# Patient Record
Sex: MALE | Race: BLACK OR AFRICAN AMERICAN | Employment: OTHER | URBAN - METROPOLITAN AREA
[De-identification: names, ages, dates, MRNs, and addresses within clinical notes are randomized per-mention and may not be internally consistent; named-entity substitution may affect disease eponyms.]

---

## 2017-01-05 ENCOUNTER — HOSPITAL ENCOUNTER (OUTPATIENT)
Dept: SLEEP MEDICINE | Age: 62
Discharge: HOME | End: 2017-01-05
Payer: COMMERCIAL

## 2017-01-05 PROCEDURE — 95811 POLYSOM 6/>YRS CPAP 4/> PARM: CPT

## 2017-01-12 PROBLEM — G47.33 SEVERE OBSTRUCTIVE SLEEP APNEA: Status: ACTIVE | Noted: 2017-01-12

## 2017-01-18 ENCOUNTER — HOSPITAL ENCOUNTER (OUTPATIENT)
Dept: LAB | Age: 62
Discharge: HOME | End: 2017-01-18

## 2017-01-18 PROCEDURE — 88305 TISSUE EXAM BY PATHOLOGIST: CPT | Performed by: INTERNAL MEDICINE

## 2017-05-30 ENCOUNTER — HOSPITAL ENCOUNTER (OUTPATIENT)
Dept: GENERAL RADIOLOGY | Age: 62
Discharge: HOME | End: 2017-05-30
Attending: FAMILY MEDICINE
Payer: COMMERCIAL

## 2017-05-30 DIAGNOSIS — M79.604 PAIN OF RIGHT LOWER EXTREMITY: ICD-10-CM

## 2017-05-30 PROCEDURE — 73552 X-RAY EXAM OF FEMUR 2/>: CPT

## 2017-05-31 NOTE — PROGRESS NOTES
X ray only showed mild osteoarthritis at the hip and knee. Try over the counter aleve twice a day for a couple of weeks to see if it helps with the inflammation. If that does not work then we can consider other treatment like amitriptyline for nerve issues.     Nikia Clifton MD

## 2018-01-23 PROBLEM — N40.1 BENIGN PROSTATIC HYPERPLASIA WITH NOCTURIA: Chronic | Status: ACTIVE | Noted: 2018-01-23

## 2018-01-23 PROBLEM — R35.1 BENIGN PROSTATIC HYPERPLASIA WITH NOCTURIA: Chronic | Status: ACTIVE | Noted: 2018-01-23

## 2018-01-30 VITALS
RESPIRATION RATE: 16 BRPM | DIASTOLIC BLOOD PRESSURE: 87 MMHG | OXYGEN SATURATION: 97 % | WEIGHT: 264 LBS | HEIGHT: 72 IN | BODY MASS INDEX: 35.76 KG/M2 | SYSTOLIC BLOOD PRESSURE: 169 MMHG | HEART RATE: 94 BPM | TEMPERATURE: 97.7 F

## 2018-01-30 PROCEDURE — 99283 EMERGENCY DEPT VISIT LOW MDM: CPT | Performed by: EMERGENCY MEDICINE

## 2018-01-31 ENCOUNTER — HOSPITAL ENCOUNTER (EMERGENCY)
Age: 63
Discharge: HOME | End: 2018-01-31
Attending: EMERGENCY MEDICINE
Payer: COMMERCIAL

## 2018-01-31 DIAGNOSIS — T78.40XA ACUTE ALLERGIC REACTION, INITIAL ENCOUNTER: Primary | ICD-10-CM

## 2018-01-31 PROCEDURE — 74011250637 HC RX REV CODE- 250/637: Performed by: EMERGENCY MEDICINE

## 2018-01-31 PROCEDURE — 74011636637 HC RX REV CODE- 636/637: Performed by: EMERGENCY MEDICINE

## 2018-01-31 RX ORDER — FAMOTIDINE 20 MG/1
20 TABLET, FILM COATED ORAL 2 TIMES DAILY
Qty: 20 TAB | Refills: 0 | Status: SHIPPED | OUTPATIENT
Start: 2018-01-31 | End: 2018-02-10

## 2018-01-31 RX ORDER — PREDNISONE 20 MG/1
60 TABLET ORAL
Status: COMPLETED | OUTPATIENT
Start: 2018-01-31 | End: 2018-01-31

## 2018-01-31 RX ORDER — FAMOTIDINE 20 MG/1
20 TABLET, FILM COATED ORAL
Status: COMPLETED | OUTPATIENT
Start: 2018-01-31 | End: 2018-01-31

## 2018-01-31 RX ORDER — PREDNISONE 20 MG/1
20 TABLET ORAL 2 TIMES DAILY
Qty: 10 TAB | Refills: 0 | Status: SHIPPED | OUTPATIENT
Start: 2018-01-31 | End: 2018-02-05

## 2018-01-31 RX ADMIN — FAMOTIDINE 20 MG: 20 TABLET ORAL at 00:14

## 2018-01-31 RX ADMIN — PREDNISONE 60 MG: 20 TABLET ORAL at 00:14

## 2018-01-31 NOTE — ED NOTES
I have reviewed discharge instructions with the patient. The patient verbalized understanding. Patient left ED via Discharge Method: ambulatory to Home with (spouse). Opportunity for questions and clarification provided. Patient given 2 scripts. To continue your aftercare when you leave the hospital, you may receive an automated call from our care team to check in on how you are doing. This is a free service and part of our promise to provide the best care and service to meet your aftercare needs.  If you have questions, or wish to unsubscribe from this service please call [phone]. Thank you for Choosing our [facility] Emergency Department.

## 2018-01-31 NOTE — ED PROVIDER NOTES
HPI Comments: After having a shrimp plate at [employer] this evening, the patient developed  Rash to the face and all over the body with significant pruritus. Pictures of the rash are consistent with urticaria. Patient is a 58 y.o. male presenting with allergic reaction. The history is provided by the patient and the spouse. Allergic Reaction    This is a new problem. The current episode started 1 to 2 hours ago. The problem has been gradually improving. Ingested substance: shrimp dinner. Pertinent negatives include no slurred speech, no nausea, no vomiting, no depression and no shortness of breath. Past Medical History:   Diagnosis Date    Back pain     Cervical radiculopathy     GERD (gastroesophageal reflux disease)     H/O seasonal allergies     Hyperlipidemia     Impotence     Insomnia     Lateral epicondylitis     Obesity     Sleep apnea        Past Surgical History:   Procedure Laterality Date    HX CIRCUMCISION      HX COLONOSCOPY  2017    Due in 2027    HX ORTHOPAEDIC      r wrist         Family History:   Problem Relation Age of Onset    Cancer Mother 80     pancreatic    Cancer Father 76     breast    No Known Problems Son     Hypertension Brother     Hypertension Brother     No Known Problems Daughter        Social History     Social History    Marital status:      Spouse name: N/A    Number of children: N/A    Years of education: N/A     Occupational History    Not on file. Social History Main Topics    Smoking status: Never Smoker    Smokeless tobacco: Never Used    Alcohol use 1.8 oz/week     3 Shots of liquor per week    Drug use: No    Sexual activity: Yes     Partners: Female     Other Topics Concern    Not on file     Social History Narrative         ALLERGIES: Review of patient's allergies indicates no known allergies. Review of Systems   Constitutional: Negative for chills and fever.    HENT: Negative for congestion, sore throat and trouble swallowing. Respiratory: Negative for shortness of breath and wheezing. Gastrointestinal: Negative for nausea and vomiting. Skin: Positive for rash. Psychiatric/Behavioral: Negative for depression. All other systems reviewed and are negative. Vitals:    01/30/18 2147   BP: 169/87   Pulse: 94   Resp: 16   Temp: 97.7 °F (36.5 °C)   SpO2: 97%   Weight: 119.7 kg (264 lb)   Height: 6' (1.829 m)            Physical Exam   Constitutional: He is oriented to person, place, and time. He appears well-developed and well-nourished. No distress. HENT:   Head: Normocephalic and atraumatic. Right Ear: Tympanic membrane and external ear normal.   Left Ear: Tympanic membrane and external ear normal.   Mouth/Throat: Oropharynx is clear and moist.   Eyes: Conjunctivae and EOM are normal. Pupils are equal, round, and reactive to light. Neck: Normal range of motion. Neck supple. No tracheal deviation present. Cardiovascular: Normal rate, regular rhythm, normal heart sounds and intact distal pulses. Exam reveals no gallop and no friction rub. No murmur heard. Pulmonary/Chest: Effort normal and breath sounds normal. No respiratory distress. He has no wheezes. Abdominal: Soft. Bowel sounds are normal. He exhibits no distension and no mass. There is no hepatosplenomegaly. There is no tenderness. There is no rebound and no guarding. Musculoskeletal: Normal range of motion. He exhibits no edema. Lymphadenopathy:     He has no cervical adenopathy. Neurological: He is alert and oriented to person, place, and time. He displays normal reflexes. No cranial nerve deficit. Skin: Skin is warm and dry. Rash (urticarial) noted. He is not diaphoretic. No erythema. Psychiatric: He has a normal mood and affect. Nursing note and vitals reviewed.        MDM  Number of Diagnoses or Management Options  Acute allergic reaction, initial encounter: new and does not require workup     Amount and/or Complexity of Data Reviewed  Review and summarize past medical records: yes    Risk of Complications, Morbidity, and/or Mortality  Presenting problems: low  Diagnostic procedures: minimal  Management options: low    Patient Progress  Patient progress: improved        ED Course       Procedures

## 2018-01-31 NOTE — LETTER
[address] [facility] EMERGENCY DEPT 
[facility] [address] [address] 
[phone] Work/School Note Date: 1/30/2018 To Whom It May concern: 
 
Chuck Kebede was seen and treated today in the emergency room by the following provider(s): 
Attending Provider: Zheng Morin MD.   
 
Chuck Kebede may return to work on 2-1-18. Sincerely, Felicia Pike RN

## 2018-01-31 NOTE — DISCHARGE INSTRUCTIONS
Allergic Reaction: Care Instructions  Your Care Instructions    An allergic reaction is an excessive response from your immune system to a medicine, chemical, food, insect bite, or other substance. A reaction can range from mild to life-threatening. Some people have a mild rash, hives, and itching or stomach cramps. In severe reactions, swelling of your tongue and throat can close up your airway so that you cannot breathe. Follow-up care is a key part of your treatment and safety. Be sure to make and go to all appointments, and call your doctor if you are having problems. It's also a good idea to know your test results and keep a list of the medicines you take. How can you care for yourself at home? · If you know what caused your allergic reaction, be sure to avoid it. Your allergy may become more severe each time you have a reaction. · Take an over-the-counter antihistamine, such as cetirizine (Zyrtec) or loratadine (Claritin), to treat mild symptoms. Read and follow directions on the label. Some antihistamines can make you feel sleepy. Do not give antihistamines to a child unless you have checked with your doctor first. Mild symptoms include sneezing or an itchy or runny nose; an itchy mouth; a few hives or mild itching; and mild nausea or stomach discomfort. · Do not scratch hives or a rash. Put a cold, moist towel on them or take cool baths to relieve itching. Put ice packs on hives, swelling, or insect stings for 10 to 15 minutes at a time. Put a thin cloth between the ice pack and your skin. Do not take hot baths or showers. They will make the itching worse. · Your doctor may prescribe a shot of epinephrine to carry with you in case you have a severe reaction. Learn how to give yourself the shot and keep it with you at all times. Make sure it is not . · Go to the emergency room every time you have a severe reaction, even if you have used your shot of epinephrine and are feeling better. Symptoms can come back after a shot. · Wear medical alert jewelry that lists your allergies. You can buy this at most drugstores. · If your child has a severe allergy, make sure that his or her teachers, babysitters, coaches, and other caregivers know about the allergy. They should have an epinephrine shot, know how and when to give it, and have a plan to take your child to the hospital.  When should you call for help? Give an epinephrine shot if:  ? · You think you are having a severe allergic reaction. ? · You have symptoms in more than one body area, such as mild nausea and an itchy mouth. ? After giving an epinephrine shot call 911, even if you feel better. ?Call 911 if:  ? · You have symptoms of a severe allergic reaction. These may include:  ¨ Sudden raised, red areas (hives) all over your body. ¨ Swelling of the throat, mouth, lips, or tongue. ¨ Trouble breathing. ¨ Passing out (losing consciousness). Or you may feel very lightheaded or suddenly feel weak, confused, or restless. ? · You have been given an epinephrine shot, even if you feel better. ?Call your doctor now or seek immediate medical care if:  ? · You have symptoms of an allergic reaction, such as:  ¨ A rash or hives (raised, red areas on the skin). ¨ Itching. ¨ Swelling. ¨ Belly pain, nausea, or vomiting. ? Watch closely for changes in your health, and be sure to contact your doctor if:  ? · You do not get better as expected. Where can you learn more? Go to http://jose-saul.info/. Enter A495 in the search box to learn more about \"Allergic Reaction: Care Instructions. \"  Current as of: September 29, 2016  Content Version: 11.4  © 3483-6763 [employer]. Care instructions adapted under license by [employer] (which disclaims liability or warranty for this information).  If you have questions about a medical condition or this instruction, always ask your healthcare professional. [employer] disclaims any warranty or liability for your use of this information.

## 2018-01-31 NOTE — ED TRIAGE NOTES
Pt states that he ate some shrimp tonight and felt like his mouth was swelling and has a generalized rash. Benadryl x 2 taken PTA.   States that he just started taking a statin last week

## 2018-01-31 NOTE — ED NOTES
Agree w/ triage note, pt reports eating shrimp carbonarra at [employer] which he has had in the past, reaction began as he was leaving the restaurant. Picture on spouses phone shows significant facial hives as well as hives to chest and arms. Currently the pt is w/o distress states that he feels much better since he took 50 mg benadryl, still has some hives to chest and few to arms. Discussed steroid and class II anti-histamine use w/ verbal understanding, will cont to monitor.

## 2018-09-06 PROBLEM — E66.01 SEVERE OBESITY (BMI 35.0-39.9): Status: ACTIVE | Noted: 2018-09-06

## 2018-11-26 ENCOUNTER — HOSPITAL ENCOUNTER (OUTPATIENT)
Dept: PHYSICAL THERAPY | Age: 63
Discharge: HOME | End: 2018-11-26
Attending: NURSE PRACTITIONER
Payer: COMMERCIAL

## 2018-11-26 DIAGNOSIS — M54.41 ACUTE RIGHT-SIDED LOW BACK PAIN WITH RIGHT-SIDED SCIATICA: ICD-10-CM

## 2018-11-26 PROCEDURE — 97161 PT EVAL LOW COMPLEX 20 MIN: CPT

## 2018-11-26 PROCEDURE — 97110 THERAPEUTIC EXERCISES: CPT

## 2018-11-26 NOTE — THERAPY EVALUATION
Balwinder Gonzalez  : 1955  Payor: [employer], [address] [place] [address] / Plan: [place] [employer] / Product Type: Commerical /    [address]  at [facility]  [address] , [address], [place] [address]  [phone]   [phone]         OUTPATIENT PHYSICAL THERAPY:Initial Assessment and Daily Note 2018    ICD-10: Treatment Diagnosis: Pain in right hip (M25.551); Low back pain (M54.5)  Precautions/Allergies:   Patient has no known allergies. MD Orders: PT eval and tx  MEDICAL/REFERRING DIAGNOSIS:  Acute right-sided low back pain with right-sided sciatica [M54.41]   DATE OF ONSET: 2 months ago  REFERRING PHYSICIAN: Jordan Ramirez NP  RETURN PHYSICIAN APPOINTMENT: none scheduled     INITIAL ASSESSMENT:  Mr. Judy Daniel presents to PT eval w/ c/o R sided hip and LB pain. He notes that his symptoms go all the way down to his R lateral calf. With evaluation, he displayed decreased ability to transfer from sit to stand, antalgic gait, decreased hip ROM, and decreased hip abduction strength. He will benefit from continued skilled PT services to improve his deficits listed below and to progress towards his PLOF. PROBLEM LIST (Impacting functional limitations):  1. Decreased Strength  2. Decreased ADL/Functional Activities  3. Decreased Transfer Abilities  4. Decreased Ambulation Ability/Technique  5. Decreased Balance  6. Increased Pain  7. Decreased Activity Tolerance  8. Decreased Flexibility/Joint Mobility  9. Decreased [place] with Home Exercise Program INTERVENTIONS PLANNED:  1. Balance Exercise  2. Cold  3. Electrical Stimulation  4. Heat  5. Home Exercise Program (HEP)  6. Manual Therapy  7. Range of Motion (ROM)  8. Therapeutic Exercise/Strengthening  9. Traction   TREATMENT PLAN:  Effective Dates: 2018 TO 2019 (60 days).  Frequency/Duration: 2 times a week for 60 Days  GOALS: (Goals have been discussed and agreed upon with patient.)  Short-Term Functional Goals: Time Frame: 3 weeks  1. Pt will be independent w/ HEP in order to improve outcomes and decrease pain. 2. Pt will report pain of 4/10 or less while performing ADLs in order to improve functional mobility. 3. Pt will report increase in sleep by 10% or more in order to return to PLOF. Discharge Goals: Time Frame: 5 weeks  1. Pt will have Oswestry score of 13 or less in order to report decreased pain and decreased functional impairments. 2. Pt will report pain of 2/10 or less while performing ADLs in order to improve functional mobility. 3. Pt will report sleep increase by 20% or more in order to return to PLOF. Rehabilitation Potential For Stated Goals: Good  Regarding Evangelina Walker's therapy, I certify that the treatment plan above will be carried out by a therapist or under their direction. Thank you for this referral,  Lluvia Guerrero, PT     Referring Physician Signature: Sigrid Fernández NP              Date                    The information in this section was collected on 11/26/18 (except where otherwise noted). HISTORY:   History of Present Injury/Illness (Reason for Referral):  Pt has L4-5 broad based disc protruction w/ mild L foraminal stensis. L5-S1 broad based disc protrusion paracentral to R lateral disc protrusion w/ stenosis noted. Pt reports pain has been going on for 2 months and is unsure how it started. No MRI or Xray for this bout. Had back pain prior which MRI results above were from. He did not have therapy for that bout. Had an injection in his hip a month ago which helped but it wasn't enough relief. Past Medical History/Comorbidities:   Mr. Jani Brooks  has a past medical history of Back pain, Cervical radiculopathy, GERD (gastroesophageal reflux disease), H/O seasonal allergies, Hyperlipidemia, Impotence, Insomnia, Lateral epicondylitis, Obesity, and Sleep apnea.   Mr. Jani Brooks  has a past surgical history that includes hx orthopaedic; hx circumcision; hx colonoscopy (2017); and hx wisdom teeth extraction. Social History/Living Environment:     Lives w/ spouse in 2 story home, w/ his bed/bath upstairs, he has walk in shower  Prior Level of Function/Work/Activity:  Works full time as , not active but would like to be  Dominant Side:         RIGHT  Current Medications:       Ambulatory/Rehab Services H2 Model Falls Risk Assessment    Risk Factors:       (1)  Gender [Male] Ability to Rise from Chair:       (1)  Pushes up, successful in one attempt    Falls Prevention Plan:       No modifications necessary   Total: (5 or greater = High Risk): 2    ©2010 [facility] . [facility] States Patent #[id number]. Federal Law prohibits the replication, distribution or use without written permission from [facility] [employer]          Current Outpatient Medications:     ibuprofen (MOTRIN) 800 mg tablet, Take 1 Tab by mouth every twelve (12) hours as needed for Pain., Disp: 60 Tab, Rfl: 1    cyclobenzaprine (FLEXERIL) 10 mg tablet, Take 1 Tab by mouth nightly., Disp: 30 Tab, Rfl: 0    predniSONE (DELTASONE) 20 mg tablet, Take 3 tabs PO x2 days, then take 2 tabs PO x2 days, then take 1 tab PO x2 days, then take 1/2 tab PO x2 days and STOP., Disp: 13 Tab, Rfl: 0    olmesartan-hydroCHLOROthiazide (BENICAR HCT) 20-12.5 mg per tablet, Take 1 Tab by mouth daily. , Disp: 90 Tab, Rfl: 1    lovastatin (MEVACOR) 10 mg tablet, Take 1 Tab by mouth nightly., Disp: 30 Tab, Rfl: 3    DISABLED PLACARD (DISABLED PLACARD) DMV, Handicap parking permit, Disp: 1 Each, Rfl: 0    amitriptyline (ELAVIL) 50 mg tablet, Take 1 Tab by mouth nightly., Disp: 30 Tab, Rfl: 3    tadalafil (CIALIS) 5 mg tablet, Take 1 Tab by mouth daily.  Indications: benign prostatic hyperplasia with lower urinary tract sx, Erectile Dysfunction, Disp: 30 Tab, Rfl: 5    CORTIFOAM 10 % (80 mg) rectal foam, INSERT 1 APPLICATOR INTO RECTUM QOD, Disp: , Rfl: 3    CETIRIZINE HCL (ZYRTEC PO), Take  by mouth., Disp: , Rfl:     multivitamin (ONE A DAY) tablet, Take 1 Tab by mouth daily. , Disp: , Rfl:     DOCOSAHEXANOIC ACID/EPA (FISH OIL PO), Take  by mouth., Disp: , Rfl:     VITAMIN A/VITAMIN D3 (NATURAL VITAMIN D PO), Take  by mouth., Disp: , Rfl:     NUCYNTA 75 mg tablet, 75 mg daily. , Disp: , Rfl: 0    VIMOVO 500-20 mg TbID, two (2) times a day., Disp: , Rfl:    Date Last Reviewed:  11/26/2018    Number of Personal Factors/Comorbidities that affect the Plan of Care: 1-2: MODERATE COMPLEXITY   EXAMINATION:   Observation/Orthostatic Postural Assessment:          Pt displayed antalgic gait and avoids sitting if possible   Palpation:          No tenderness to muscles at this time in glute or LB, pain over L3-4 area  Special Tests:           - tender over L3-S1   Functional Mobility:         Gait/Ambulation:  Antalgic        Transfers:  Uses hands unless prompted         Bed Mobility:  Independent   Balance:          Decreased in SLS on BLEs  Sensation:         Intact w/ light touch to BLEs    Joint/Muscle ROM Strength Updates   Hip flexion [facility]    Hip extension Decreased WFL    Hip abduction WFL 4-/5 R, 4/5 L    Knee flexion [facility]    Knee extension [facility]    DF [facility] WFL                     Body Structures Involved:  1. Nerves  2. Bones  3. Joints  4. Muscles  5. Ligaments Body Functions Affected:  1. Sensory/Pain  2. Neuromusculoskeletal  3. Movement Related Activities and Participation Affected:  1. General Tasks and Demands  2. Mobility  3. Self Care  4. Domestic Life  5. Interpersonal Interactions and Relationships  6. Community, Social and [place]   Number of elements (examined above) that affect the Plan of Care: 3: MODERATE COMPLEXITY   CLINICAL PRESENTATION:   Presentation: Stable and uncomplicated: LOW COMPLEXITY   CLINICAL DECISION MAKING:   Outcome Measure:    Tool Used: Modified Oswestry Low Back Pain Questionnaire  Score:  Initial: 17/50  Most Recent: X/50 (Date: -- )   Interpretation of Score: Each section is scored on a 0-5 scale, 5 representing the greatest disability. The scores of each section are added together for a total score of 50. Score 0 1-10 11-20 21-30 31-40 41-49 50   Modifier CH CI CJ CK CL CM CN     ? Mobility - Walking and Moving Around:     - CURRENT STATUS: CJ - 20%-39% impaired, limited or restricted    - GOAL STATUS: CJ - 20%-39% impaired, limited or restricted    - D/C STATUS:  ---------------To be determined---------------    Medical Necessity:   · Patient is expected to demonstrate progress in strength, range of motion, balance and coordination to increase independence with functional tasks. Reason for Services/Other Comments:  · Patient continues to demonstrate capacity to improve strength, ROM, balance, mobility which will increase independence. Use of outcome tool(s) and clinical judgement create a POC that gives a: Clear prediction of patient's progress: LOW COMPLEXITY            TREATMENT:   (In addition to Assessment/Re-Assessment sessions the following treatments were rendered)  Pre-treatment Symptoms/Complaints:  See above. Pain: Initial:     7/10 Post Session:  Much better, 0/10      THERAPEUTIC EXERCISE: (15 minutes):  Exercises per grid below to improve mobility, strength and balance. Required minimal verbal and tactile cues to promote proper body alignment, promote proper body posture and promote proper body mechanics. Progressed resistance, range, repetitions and complexity of movement as indicated.    Date:  11/26/18 Date:   Date:     Activity/Exercise Parameters Parameters Parameters   Prone press up 5x     Lumbar extension standing 10x6 in //     Pirifomis stretch 1x on R too painful     Bridge 10x     Pelvic tilt 5x, pt unable to understand at this time     Cat camel 5x, pt unable to understand at this time             Treatment/Session Assessment:    · Response to Treatment:  Pt had pain dissipate w/ lumbar extension. He struggled w/ pelvic tilt and cat camel. · Compliance with Program/Exercises: Will assess as treatment progresses. · Recommendations/Intent for next treatment session: \"Next visit will focus on advancements to more challenging activities\". [employer]  Access Code: [email or web address]   URL: [email or web address]   Date: 11/26/2018   Prepared by: Regine Wright     Exercises   Supine Bridge - 10 reps - 2 sets - 5 hold - 1x daily - 3x weekly   Standing Lumbar Extension with Counter - 10 reps - 1 sets - 5 hold - 8x daily - 3x weekly    Variance from POC: none     Treatment time: 15  Regine Wright, PT

## 2018-11-29 ENCOUNTER — HOSPITAL ENCOUNTER (OUTPATIENT)
Dept: PHYSICAL THERAPY | Age: 63
Discharge: HOME | End: 2018-11-29
Payer: COMMERCIAL

## 2018-11-29 PROCEDURE — 97110 THERAPEUTIC EXERCISES: CPT

## 2018-11-29 NOTE — PROGRESS NOTES
Agustin Gonzalez  : 1955  Payor: Manual [employer] / Plan: [employer] / Product Type: Commerical /    [address]  at [facility]  [address] , [address], [place] [address]  [phone]   [phone]         OUTPATIENT PHYSICAL THERAPY:Daily Note 2018    ICD-10: Treatment Diagnosis: Pain in right hip (M25.551); Low back pain (M54.5)  Precautions/Allergies:   Patient has no known allergies. MD Orders: PT eval and tx  MEDICAL/REFERRING DIAGNOSIS:  Lumbago with sciatica, right side [M54.41]   DATE OF ONSET: 2 months ago  REFERRING PHYSICIAN: Shelley Martínez NP  RETURN PHYSICIAN APPOINTMENT: none scheduled      ASSESSMENT:  Mr. Devera Brittle presents to PT eval w/ c/o R sided hip and LB pain. He notes that his symptoms go all the way down to his R lateral calf. With evaluation, he displayed decreased ability to transfer from sit to stand, antalgic gait, decreased hip ROM, and decreased hip abduction strength. He will benefit from continued skilled PT services to improve his deficits listed below and to progress towards his PLOF. PROBLEM LIST (Impacting functional limitations):  1. Decreased Strength  2. Decreased ADL/Functional Activities  3. Decreased Transfer Abilities  4. Decreased Ambulation Ability/Technique  5. Decreased Balance  6. Increased Pain  7. Decreased Activity Tolerance  8. Decreased Flexibility/Joint Mobility  9. Decreased [place] with Home Exercise Program INTERVENTIONS PLANNED:  1. Balance Exercise  2. Cold  3. Electrical Stimulation  4. Heat  5. Home Exercise Program (HEP)  6. Manual Therapy  7. Range of Motion (ROM)  8. Therapeutic Exercise/Strengthening  9. Traction   TREATMENT PLAN:  Effective Dates: 2018 TO 2019 (60 days). Frequency/Duration: 2 times a week for 60 Days  GOALS: (Goals have been discussed and agreed upon with patient.)  Short-Term Functional Goals: Time Frame: 3 weeks  1.  Pt will be independent w/ HEP in order to improve outcomes and decrease pain. 2. Pt will report pain of 4/10 or less while performing ADLs in order to improve functional mobility. 3. Pt will report increase in sleep by 10% or more in order to return to PLOF. Discharge Goals: Time Frame: 5 weeks  1. Pt will have Oswestry score of 13 or less in order to report decreased pain and decreased functional impairments. 2. Pt will report pain of 2/10 or less while performing ADLs in order to improve functional mobility. 3. Pt will report sleep increase by 20% or more in order to return to PLOF. The information in this section was collected on 11/26/18 (except where otherwise noted). HISTORY:   History of Present Injury/Illness (Reason for Referral):  Pt has L4-5 broad based disc protruction w/ mild L foraminal stensis. L5-S1 broad based disc protrusion paracentral to R lateral disc protrusion w/ stenosis noted. Pt reports pain has been going on for 2 months and is unsure how it started. No MRI or Xray for this bout. Had back pain prior which MRI results above were from. He did not have therapy for that bout. Had an injection in his hip a month ago which helped but it wasn't enough relief. Past Medical History/Comorbidities:   Mr. Cory Leggett  has a past medical history of Back pain, Cervical radiculopathy, GERD (gastroesophageal reflux disease), H/O seasonal allergies, Hyperlipidemia, Impotence, Insomnia, Lateral epicondylitis, Obesity, and Sleep apnea. Mr. Cory Leggett  has a past surgical history that includes hx orthopaedic; hx circumcision; hx colonoscopy (2017); and hx wisdom teeth extraction.   Social History/Living Environment:     Lives w/ spouse in 2 story home, w/ his bed/bath upstairs, he has walk in shower  Prior Level of Function/Work/Activity:  Works full time as , not active but would like to be  Dominant Side:         RIGHT  Current Medications:       Ambulatory/Rehab Services H2 Model Falls Risk Assessment    Risk Factors:       (1)  Gender [Male] Ability to Rise from Chair:       (1)  Pushes up, successful in one attempt    Falls Prevention Plan:       No modifications necessary   Total: (5 or greater = High Risk): 2    ©2010 [facility] [employer]. All Rights Reserved. [facility] Patent #[id number]. Federal Law prohibits the replication, distribution or use without written permission from [facility] [employer]          Current Outpatient Medications:     ibuprofen (MOTRIN) 800 mg tablet, Take 1 Tab by mouth every twelve (12) hours as needed for Pain., Disp: 60 Tab, Rfl: 1    cyclobenzaprine (FLEXERIL) 10 mg tablet, Take 1 Tab by mouth nightly., Disp: 30 Tab, Rfl: 0    predniSONE (DELTASONE) 20 mg tablet, Take 3 tabs PO x2 days, then take 2 tabs PO x2 days, then take 1 tab PO x2 days, then take 1/2 tab PO x2 days and STOP., Disp: 13 Tab, Rfl: 0    olmesartan-hydroCHLOROthiazide (BENICAR HCT) 20-12.5 mg per tablet, Take 1 Tab by mouth daily. , Disp: 90 Tab, Rfl: 1    lovastatin (MEVACOR) 10 mg tablet, Take 1 Tab by mouth nightly., Disp: 30 Tab, Rfl: 3    DISABLED PLACARD (DISABLED PLACARD) DMV, Handicap parking permit, Disp: 1 Each, Rfl: 0    amitriptyline (ELAVIL) 50 mg tablet, Take 1 Tab by mouth nightly., Disp: 30 Tab, Rfl: 3    tadalafil (CIALIS) 5 mg tablet, Take 1 Tab by mouth daily. Indications: benign prostatic hyperplasia with lower urinary tract sx, Erectile Dysfunction, Disp: 30 Tab, Rfl: 5    CORTIFOAM 10 % (80 mg) rectal foam, INSERT 1 APPLICATOR INTO RECTUM QOD, Disp: , Rfl: 3    CETIRIZINE HCL (ZYRTEC PO), Take  by mouth., Disp: , Rfl:     multivitamin (ONE A DAY) tablet, Take 1 Tab by mouth daily. , Disp: , Rfl:     DOCOSAHEXANOIC ACID/EPA (FISH OIL PO), Take  by mouth., Disp: , Rfl:     VITAMIN A/VITAMIN D3 (NATURAL VITAMIN D PO), Take  by mouth., Disp: , Rfl:     NUCYNTA 75 mg tablet, 75 mg daily. , Disp: , Rfl: 0    VIMOVO 500-20 mg TbID, two (2) times a day., Disp: , Rfl:    Date Last Reviewed:  11/29/2018    EXAMINATION:   Observation/Orthostatic Postural Assessment:          Pt displayed antalgic gait and avoids sitting if possible   Palpation:          No tenderness to muscles at this time in glute or LB, pain over L3-4 area  Special Tests:           - tender over L3-S1   Functional Mobility:         Gait/Ambulation:  Antalgic        Transfers:  Uses hands unless prompted         Bed Mobility:  Independent   Balance:          Decreased in SLS on BLEs  Sensation:         Intact w/ light touch to BLEs    Joint/Muscle ROM Strength Updates   Hip flexion [facility]    Hip extension Decreased WFL    Hip abduction WFL 4-/5 R, 4/5 L    Knee flexion [facility]    Knee extension [facility]    DF [facility] WFL                     Body Structures Involved:  1. Nerves  2. Bones  3. Joints  4. Muscles  5. Ligaments Body Functions Affected:  1. Sensory/Pain  2. Neuromusculoskeletal  3. Movement Related Activities and Participation Affected:  1. General Tasks and Demands  2. Mobility  3. Self Care  4. Domestic Life  5. Interpersonal Interactions and Relationships  6. Community, Social and Civic Life   CLINICAL PRESENTATION:   CLINICAL DECISION MAKING:   Outcome Measure: Tool Used: Modified Oswestry Low Back Pain Questionnaire  Score:  Initial: 17/50  Most Recent: X/50 (Date: -- )   Interpretation of Score: Each section is scored on a 0-5 scale, 5 representing the greatest disability. The scores of each section are added together for a total score of 50. Score 0 1-10 11-20 21-30 31-40 41-49 50   Modifier CH CI CJ CK CL CM CN     ?  Mobility - Walking and Moving Around:     - CURRENT STATUS: CJ - 20%-39% impaired, limited or restricted    - GOAL STATUS: CJ - 20%-39% impaired, limited or restricted    - D/C STATUS:  ---------------To be determined---------------    Medical Necessity:   · Patient is expected to demonstrate progress in strength, range of motion, balance and coordination to increase independence with functional tasks. Reason for Services/Other Comments:  · Patient continues to demonstrate capacity to improve strength, ROM, balance, mobility which will increase independence. TREATMENT:   (In addition to Assessment/Re-Assessment sessions the following treatments were rendered)  Pre-treatment Symptoms/Complaints:  Pt reports he did his exercises and can already tell a difference. Pain: Initial:   Pain Intensity 1: 5  Post Session:  Much better, 3/10      THERAPEUTIC EXERCISE: (40 minutes):  Exercises per grid below to improve mobility, strength and balance. Required minimal verbal and tactile cues to promote proper body alignment, promote proper body posture and promote proper body mechanics. Progressed resistance, range, repetitions and complexity of movement as indicated. MODALITIES: (5 minutes): *  Hot Pack Therapy in order to provide analgesia and relieve muscle spasm. Date:  11/26/18 Date:  11/29/18 Date:     Activity/Exercise Parameters Parameters Parameters   Prone press up 5x 10x    Lumbar extension standing 10x6 in // 10x2    Pirifomis stretch 1x on R too painful     Bridge 10x 10x2 YTB    Pelvic tilt 5x, pt unable to understand at this time     Cat camel 5x, pt unable to understand at this time     NuStep  10 mins, 2.5    SLR  10x2 1.5# B    Clamshell  10x2 YTB B    Hip extension  10x2 B prone    Step ups  10x B 6 inch step    Side step ups  10x B 6 inch step    Paloff press  15x RTB B    Anti rotation step aways  10x B RTB    Mini squat  10x2 w/ ex ball      Treatment/Session Assessment:    · Response to Treatment:  Pt's pain decreased w/ therex. All therex was progressed this session w/o issue. Pt really enjoyed moist heat to LB. · Compliance with Program/Exercises: Will assess as treatment progresses. · Recommendations/Intent for next treatment session:  \"Next visit will focus on advancements to more challenging activities\". [employer] Portal  Access Code: [email or web address]   URL: [email or web address]   Date: 11/26/2018   Prepared by: Kylee Xiong     Exercises   Supine Bridge - 10 reps - 2 sets - 5 hold - 1x daily - 3x weekly   Standing Lumbar Extension with Counter - 10 reps - 1 sets - 5 hold - 8x daily - 3x weekly    Variance from POC: none  PT Patient Time In/Time Out  Time In: 0815  Time Out: 0900  Treatment time:45  Kylee Xiong, PT

## 2018-12-03 ENCOUNTER — HOSPITAL ENCOUNTER (OUTPATIENT)
Dept: PHYSICAL THERAPY | Age: 63
Discharge: HOME | End: 2018-12-03
Payer: COMMERCIAL

## 2018-12-03 PROCEDURE — 97110 THERAPEUTIC EXERCISES: CPT

## 2018-12-03 NOTE — PROGRESS NOTES
Lolis Wayne  : 1955  Payor: Job [employer] / Plan: [employer] / Product Type: Commerical /    [address]  at [address]  [address], [address], [place] [address]  [phone]   [phone]         OUTPATIENT PHYSICAL THERAPY:Daily Note 12/3/2018    ICD-10: Treatment Diagnosis: Pain in right hip (M25.551); Low back pain (M54.5)  Precautions/Allergies:   Patient has no known allergies. MD Orders: PT eval and tx  MEDICAL/REFERRING DIAGNOSIS:  Lumbago with sciatica, right side [M54.41]   DATE OF ONSET: 2 months ago  REFERRING PHYSICIAN: Merry Chau NP  RETURN PHYSICIAN APPOINTMENT: none scheduled      ASSESSMENT:  Mr. Naren Roper presents to PT eval w/ c/o R sided hip and LB pain. He notes that his symptoms go all the way down to his R lateral calf. With evaluation, he displayed decreased ability to transfer from sit to stand, antalgic gait, decreased hip ROM, and decreased hip abduction strength. He will benefit from continued skilled PT services to improve his deficits listed below and to progress towards his PLOF. PROBLEM LIST (Impacting functional limitations):  1. Decreased Strength  2. Decreased ADL/Functional Activities  3. Decreased Transfer Abilities  4. Decreased Ambulation Ability/Technique  5. Decreased Balance  6. Increased Pain  7. Decreased Activity Tolerance  8. Decreased Flexibility/Joint Mobility  9. Decreased [place] with Home Exercise Program INTERVENTIONS PLANNED:  1. Balance Exercise  2. Cold  3. Electrical Stimulation  4. Heat  5. Home Exercise Program (HEP)  6. Manual Therapy  7. Range of Motion (ROM)  8. Therapeutic Exercise/Strengthening  9. Traction   TREATMENT PLAN:  Effective Dates: 2018 TO 2019 (60 days). Frequency/Duration: 2 times a week for 60 Days  GOALS: (Goals have been discussed and agreed upon with patient.)  Short-Term Functional Goals: Time Frame: 3 weeks  1.  Pt will be independent w/ HEP in order to improve outcomes and decrease pain. 2. Pt will report pain of 4/10 or less while performing ADLs in order to improve functional mobility. 3. Pt will report increase in sleep by 10% or more in order to return to PLOF. Discharge Goals: Time Frame: 5 weeks  1. Pt will have Oswestry score of 13 or less in order to report decreased pain and decreased functional impairments. 2. Pt will report pain of 2/10 or less while performing ADLs in order to improve functional mobility. 3. Pt will report sleep increase by 20% or more in order to return to PLOF. The information in this section was collected on 11/26/18 (except where otherwise noted). HISTORY:   History of Present Injury/Illness (Reason for Referral):  Pt has L4-5 broad based disc protruction w/ mild L foraminal stensis. L5-S1 broad based disc protrusion paracentral to R lateral disc protrusion w/ stenosis noted. Pt reports pain has been going on for 2 months and is unsure how it started. No MRI or Xray for this bout. Had back pain prior which MRI results above were from. He did not have therapy for that bout. Had an injection in his hip a month ago which helped but it wasn't enough relief. Past Medical History/Comorbidities:   Mr. Zoraida Terry  has a past medical history of Back pain, Cervical radiculopathy, GERD (gastroesophageal reflux disease), H/O seasonal allergies, Hyperlipidemia, Impotence, Insomnia, Lateral epicondylitis, Obesity, and Sleep apnea. Mr. Zoraida Terry  has a past surgical history that includes hx orthopaedic; hx circumcision; hx colonoscopy (2017); and hx wisdom teeth extraction.   Social History/Living Environment:     Lives w/ spouse in 2 story home, w/ his bed/bath upstairs, he has walk in shower  Prior Level of Function/Work/Activity:  Works full time as , not active but would like to be  Dominant Side:         RIGHT  Current Medications:       Ambulatory/Rehab Services H2 Model Falls Risk Assessment    Risk Factors:       (1)  Gender [Male] Ability to Rise from Chair:       (1)  Pushes up, successful in one attempt    Falls Prevention Plan:       No modifications necessary   Total: (5 or greater = High Risk): 2    ©2010 [facility] [employer]. All Rights Reserved. [facility] Patent #[id number]. Federal Law prohibits the replication, distribution or use without written permission from [facility] [employer]          Current Outpatient Medications:     ibuprofen (MOTRIN) 800 mg tablet, Take 1 Tab by mouth every twelve (12) hours as needed for Pain., Disp: 60 Tab, Rfl: 1    cyclobenzaprine (FLEXERIL) 10 mg tablet, Take 1 Tab by mouth nightly., Disp: 30 Tab, Rfl: 0    predniSONE (DELTASONE) 20 mg tablet, Take 3 tabs PO x2 days, then take 2 tabs PO x2 days, then take 1 tab PO x2 days, then take 1/2 tab PO x2 days and STOP., Disp: 13 Tab, Rfl: 0    olmesartan-hydroCHLOROthiazide (BENICAR HCT) 20-12.5 mg per tablet, Take 1 Tab by mouth daily. , Disp: 90 Tab, Rfl: 1    lovastatin (MEVACOR) 10 mg tablet, Take 1 Tab by mouth nightly., Disp: 30 Tab, Rfl: 3    DISABLED PLACARD (DISABLED PLACARD) DMV, Handicap parking permit, Disp: 1 Each, Rfl: 0    amitriptyline (ELAVIL) 50 mg tablet, Take 1 Tab by mouth nightly., Disp: 30 Tab, Rfl: 3    tadalafil (CIALIS) 5 mg tablet, Take 1 Tab by mouth daily. Indications: benign prostatic hyperplasia with lower urinary tract sx, Erectile Dysfunction, Disp: 30 Tab, Rfl: 5    CORTIFOAM 10 % (80 mg) rectal foam, INSERT 1 APPLICATOR INTO RECTUM QOD, Disp: , Rfl: 3    CETIRIZINE HCL (ZYRTEC PO), Take  by mouth., Disp: , Rfl:     multivitamin (ONE A DAY) tablet, Take 1 Tab by mouth daily. , Disp: , Rfl:     DOCOSAHEXANOIC ACID/EPA (FISH OIL PO), Take  by mouth., Disp: , Rfl:     VITAMIN A/VITAMIN D3 (NATURAL VITAMIN D PO), Take  by mouth., Disp: , Rfl:     NUCYNTA 75 mg tablet, 75 mg daily. , Disp: , Rfl: 0    VIMOVO 500-20 mg TbID, two (2) times a day., Disp: , Rfl:    Date Last Reviewed:  12/3/2018    EXAMINATION:   Observation/Orthostatic Postural Assessment:          Pt displayed antalgic gait and avoids sitting if possible   Palpation:          No tenderness to muscles at this time in glute or LB, pain over L3-4 area  Special Tests:           - tender over L3-S1   Functional Mobility:         Gait/Ambulation:  Antalgic        Transfers:  Uses hands unless prompted         Bed Mobility:  Independent   Balance:          Decreased in SLS on BLEs  Sensation:         Intact w/ light touch to BLEs    Joint/Muscle ROM Strength Updates   Hip flexion [facility]    Hip extension Decreased WFL    Hip abduction WFL 4-/5 R, 4/5 L    Knee flexion [facility]    Knee extension [facility]    DF [facility] WFL                     Body Structures Involved:  1. Nerves  2. Bones  3. Joints  4. Muscles  5. Ligaments Body Functions Affected:  1. Sensory/Pain  2. Neuromusculoskeletal  3. Movement Related Activities and Participation Affected:  1. General Tasks and Demands  2. Mobility  3. Self Care  4. Domestic Life  5. Interpersonal Interactions and Relationships  6. Community, Social and Civic Life   CLINICAL PRESENTATION:   CLINICAL DECISION MAKING:   Outcome Measure: Tool Used: Modified Oswestry Low Back Pain Questionnaire  Score:  Initial: 17/50  Most Recent: X/50 (Date: -- )   Interpretation of Score: Each section is scored on a 0-5 scale, 5 representing the greatest disability. The scores of each section are added together for a total score of 50. Score 0 1-10 11-20 21-30 31-40 41-49 50   Modifier CH CI CJ CK CL CM CN     ?  Mobility - Walking and Moving Around:     - CURRENT STATUS: CJ - 20%-39% impaired, limited or restricted    - GOAL STATUS: CJ - 20%-39% impaired, limited or restricted    - D/C STATUS:  ---------------To be determined---------------    Medical Necessity:   · Patient is expected to demonstrate progress in strength, range of motion, balance and coordination to increase independence with functional tasks. Reason for Services/Other Comments:  · Patient continues to demonstrate capacity to improve strength, ROM, balance, mobility which will increase independence. TREATMENT:   (In addition to Assessment/Re-Assessment sessions the following treatments were rendered)  Pre-treatment Symptoms/Complaints:  Pt reports he feels better than he did and he can put on shoes and socks w/o issue. He notes many ADLs have become easier and his pain has moved out of his lower leg towards his hip and back. Pain: Initial:   Pain Intensity 1: 4  Post Session:  Much better, 0/10      THERAPEUTIC EXERCISE: (40 minutes):  Exercises per grid below to improve mobility, strength and balance. Required minimal verbal and tactile cues to promote proper body alignment, promote proper body posture and promote proper body mechanics. Progressed resistance, range, repetitions and complexity of movement as indicated. MODALITIES: (5 minutes): *  Hot Pack Therapy in order to provide analgesia and relieve muscle spasm. Date:  11/26/18 Date:  11/29/18 Date:  12/3/18   Activity/Exercise Parameters Parameters Parameters   Prone press up 5x 10x 10x   Lumbar extension standing 10x6 in // 10x2 10x2   Pirifomis stretch 1x on R too painful     Bridge 10x 10x2 YTB 10x2 RTB   Pelvic tilt 5x, pt unable to understand at this time     Cat camel 5x, pt unable to understand at this time     NuStep  10 mins, 2.5 10 mins, 3.0    SLR  10x2 1.5# B 10x 2 1.5# B   Clamshell  10x2 YTB B 10x2 RTB B   Hip extension  10x2 B prone 10x2 prone   Step ups  10x B 6 inch step 10x B 6 inch step   Side step ups  10x B 6 inch step 10x B 6 inch step   Paloff press  15x RTB B 15x RTB B   Anti rotation step aways  10x B RTB 10x B RTB   Mini squat  10x2 w/ ex ball 10x2 w/ ex ball     Treatment/Session Assessment:    · Response to Treatment:  Pt had no pain w/ therex this session.  He displayed decreased strength and endurance, but was able to complete all therex w/ a few rest breaks. His pain decreased w/ exercises and moist heat. · Compliance with Program/Exercises: Will assess as treatment progresses. · Recommendations/Intent for next treatment session: \"Next visit will focus on advancements to more challenging activities\". [employer] Portal  Access Code: [email or web address]   URL: [email or web address]   Date: 11/26/2018   Prepared by: Katiana Colon     Exercises   Supine Bridge - 10 reps - 2 sets - 5 hold - 1x daily - 3x weekly   Standing Lumbar Extension with Counter - 10 reps - 1 sets - 5 hold - 8x daily - 3x weekly    Variance from POC: none  PT Patient Time In/Time Out  Time In: 1030  Time Out: 1115  Treatment time:45  Katiana Colon, PT

## 2018-12-13 ENCOUNTER — HOSPITAL ENCOUNTER (OUTPATIENT)
Dept: LAB | Age: 63
Discharge: HOME | End: 2018-12-13
Payer: COMMERCIAL

## 2018-12-13 DIAGNOSIS — D72.829 LEUKOCYTOSIS, UNSPECIFIED TYPE: ICD-10-CM

## 2018-12-13 LAB
BASOPHILS # BLD: 0 K/UL (ref 0–0.2)
BASOPHILS NFR BLD: 0 % (ref 0–2)
DIFFERENTIAL METHOD BLD: ABNORMAL
EOSINOPHIL # BLD: 0 K/UL (ref 0–0.8)
EOSINOPHIL NFR BLD: 0 % (ref 0.5–7.8)
ERYTHROCYTE [DISTWIDTH] IN BLOOD BY AUTOMATED COUNT: 14.2 % (ref 11.9–14.6)
HCT VFR BLD AUTO: 42.2 % (ref 41.1–50.3)
HGB BLD-MCNC: 13.4 G/DL (ref 13.6–17.2)
IMM GRANULOCYTES # BLD: 0 K/UL (ref 0–0.5)
IMM GRANULOCYTES NFR BLD AUTO: 0 % (ref 0–5)
LDH SERPL L TO P-CCNC: 302 U/L (ref 110–210)
LYMPHOCYTES # BLD: 78.8 K/UL (ref 0.5–4.6)
LYMPHOCYTES NFR BLD: 91 % (ref 13–44)
MCH RBC QN AUTO: 29.9 PG (ref 26.1–32.9)
MCHC RBC AUTO-ENTMCNC: 31.8 G/DL (ref 31.4–35)
MCV RBC AUTO: 94.2 FL (ref 79.6–97.8)
MONOCYTES # BLD: 3.5 K/UL (ref 0.1–1.3)
MONOCYTES NFR BLD: 4 % (ref 4–12)
NEUTS SEG # BLD: 4.3 K/UL (ref 1.7–8.2)
NEUTS SEG NFR BLD: 5 % (ref 43–78)
NRBC # BLD: 0 K/UL (ref 0–0.2)
PLATELET # BLD AUTO: 142 K/UL (ref 150–450)
PLATELET COMMENTS,PCOM: ADEQUATE
PMV BLD AUTO: 9.7 FL (ref 9.4–12.3)
RBC # BLD AUTO: 4.48 M/UL (ref 4.23–5.67)
RBC MORPH BLD: ABNORMAL
URATE SERPL-MCNC: 5.7 MG/DL (ref 2.6–6)
WBC # BLD AUTO: 86.6 K/UL (ref 4.3–11.1)
WBC MORPH BLD: ABNORMAL

## 2018-12-13 PROCEDURE — 88185 FLOWCYTOMETRY/TC ADD-ON: CPT

## 2018-12-13 PROCEDURE — 83615 LACTATE (LD) (LDH) ENZYME: CPT

## 2018-12-13 PROCEDURE — 86704 HEP B CORE ANTIBODY TOTAL: CPT

## 2018-12-13 PROCEDURE — 88184 FLOWCYTOMETRY/ TC 1 MARKER: CPT

## 2018-12-13 PROCEDURE — 84550 ASSAY OF BLOOD/URIC ACID: CPT

## 2018-12-13 PROCEDURE — 85025 COMPLETE CBC W/AUTO DIFF WBC: CPT

## 2018-12-13 PROCEDURE — 87340 HEPATITIS B SURFACE AG IA: CPT

## 2018-12-13 PROCEDURE — 36415 COLL VENOUS BLD VENIPUNCTURE: CPT

## 2018-12-14 LAB
HBV CORE AB SERPL QL IA: NEGATIVE
HBV SURFACE AG SERPL QL IA: NEGATIVE

## 2018-12-21 LAB
FLOW CYTOMETRY, FBTC1: NORMAL
SPECIMEN SOURCE: NORMAL
TEST ORDERED:: NORMAL

## 2018-12-28 ENCOUNTER — HOSPITAL ENCOUNTER (OUTPATIENT)
Dept: LAB | Age: 63
Discharge: HOME | End: 2018-12-28
Payer: COMMERCIAL

## 2018-12-28 DIAGNOSIS — D72.829 LEUKOCYTOSIS, UNSPECIFIED TYPE: ICD-10-CM

## 2018-12-28 LAB
BASOPHILS # BLD: 0 K/UL (ref 0–0.2)
BASOPHILS NFR BLD: 0 % (ref 0–2)
DIFFERENTIAL METHOD BLD: ABNORMAL
EOSINOPHIL # BLD: 0 K/UL (ref 0–0.8)
EOSINOPHIL NFR BLD: 0 % (ref 0.5–7.8)
ERYTHROCYTE [DISTWIDTH] IN BLOOD BY AUTOMATED COUNT: 14.6 % (ref 11.9–14.6)
HCT VFR BLD AUTO: 37.4 % (ref 41.1–50.3)
HGB BLD-MCNC: 12 G/DL (ref 13.6–17.2)
IMM GRANULOCYTES # BLD: 0 K/UL (ref 0–0.5)
IMM GRANULOCYTES NFR BLD AUTO: 0 % (ref 0–5)
LYMPHOCYTES # BLD: 85.5 K/UL (ref 0.5–4.6)
LYMPHOCYTES NFR BLD: 92 % (ref 13–44)
MCH RBC QN AUTO: 29.9 PG (ref 26.1–32.9)
MCHC RBC AUTO-ENTMCNC: 32.1 G/DL (ref 31.4–35)
MCV RBC AUTO: 93.3 FL (ref 79.6–97.8)
MONOCYTES # BLD: 3.7 K/UL (ref 0.1–1.3)
MONOCYTES NFR BLD: 4 % (ref 4–12)
NEUTS SEG # BLD: 3.7 K/UL (ref 1.7–8.2)
NEUTS SEG NFR BLD: 4 % (ref 43–78)
NRBC # BLD: 0 K/UL (ref 0–0.2)
PLATELET # BLD AUTO: 133 K/UL (ref 150–450)
PLATELET COMMENTS,PCOM: ADEQUATE
PMV BLD AUTO: 9.9 FL (ref 9.4–12.3)
RBC # BLD AUTO: 4.01 M/UL (ref 4.23–5.67)
RBC MORPH BLD: ABNORMAL
RBC MORPH BLD: ABNORMAL
WBC # BLD AUTO: 92.9 K/UL (ref 4.3–11.1)
WBC MORPH BLD: ABNORMAL

## 2018-12-28 PROCEDURE — 88312 SPECIAL STAINS GROUP 1: CPT

## 2018-12-28 PROCEDURE — 88305 TISSUE EXAM BY PATHOLOGIST: CPT

## 2018-12-28 PROCEDURE — 85025 COMPLETE CBC W/AUTO DIFF WBC: CPT

## 2018-12-28 PROCEDURE — 88311 DECALCIFY TISSUE: CPT

## 2018-12-28 PROCEDURE — 36415 COLL VENOUS BLD VENIPUNCTURE: CPT

## 2018-12-28 PROCEDURE — 88313 SPECIAL STAINS GROUP 2: CPT

## 2019-01-08 PROBLEM — C91.60 PROLYMPHOCYTIC LEUKEMIA OF T-CELL (HCC): Status: ACTIVE | Noted: 2019-01-08

## 2019-01-09 LAB
FLOW CYTOMETRY, FBTC1: NORMAL
SPECIMEN SOURCE: NORMAL
TEST ORDERED:: NORMAL

## 2019-01-10 ENCOUNTER — PATIENT OUTREACH (OUTPATIENT)
Dept: CASE MANAGEMENT | Age: 64
End: 2019-01-10

## 2019-01-10 ENCOUNTER — HOSPITAL ENCOUNTER (OUTPATIENT)
Dept: LAB | Age: 64
Discharge: HOME | End: 2019-01-10
Payer: COMMERCIAL

## 2019-01-10 DIAGNOSIS — C91.60 PROLYMPHOCYTIC LEUKEMIA OF T-CELL (HCC): ICD-10-CM

## 2019-01-10 DIAGNOSIS — D72.829 LEUKOCYTOSIS, UNSPECIFIED TYPE: ICD-10-CM

## 2019-01-10 LAB
BASOPHILS # BLD: 0 K/UL (ref 0–0.2)
BASOPHILS NFR BLD: 0 % (ref 0–2)
DIFFERENTIAL METHOD BLD: ABNORMAL
EOSINOPHIL # BLD: 0 K/UL (ref 0–0.8)
EOSINOPHIL NFR BLD: 0 % (ref 0.5–7.8)
ERYTHROCYTE [DISTWIDTH] IN BLOOD BY AUTOMATED COUNT: 14.4 % (ref 11.9–14.6)
HCT VFR BLD AUTO: 39.4 % (ref 41.1–50.3)
HGB BLD-MCNC: 12.9 G/DL (ref 13.6–17.2)
IMM GRANULOCYTES # BLD AUTO: 0 K/UL (ref 0–0.5)
IMM GRANULOCYTES NFR BLD AUTO: 0 % (ref 0–5)
LYMPHOCYTES # BLD: 109.1 K/UL (ref 0.5–4.6)
LYMPHOCYTES NFR BLD: 88 % (ref 13–44)
MCH RBC QN AUTO: 30.4 PG (ref 26.1–32.9)
MCHC RBC AUTO-ENTMCNC: 32.7 G/DL (ref 31.4–35)
MCV RBC AUTO: 92.9 FL (ref 79.6–97.8)
MONOCYTES # BLD: 9.9 K/UL (ref 0.1–1.3)
MONOCYTES NFR BLD: 8 % (ref 4–12)
NEUTS SEG # BLD: 5 K/UL (ref 1.7–8.2)
NEUTS SEG NFR BLD: 4 % (ref 43–78)
NRBC # BLD: 0 K/UL (ref 0–0.2)
PLATELET # BLD AUTO: 115 K/UL (ref 150–450)
PLATELET COMMENTS,PCOM: ABNORMAL
PMV BLD AUTO: 10.3 FL (ref 9.4–12.3)
RBC # BLD AUTO: 4.24 M/UL (ref 4.23–5.67)
RBC MORPH BLD: ABNORMAL
WBC # BLD AUTO: 124 K/UL (ref 4.3–11.1)
WBC MORPH BLD: ABNORMAL

## 2019-01-10 PROCEDURE — 85025 COMPLETE CBC W/AUTO DIFF WBC: CPT

## 2019-01-10 PROCEDURE — 86687 HTLV-I ANTIBODY: CPT

## 2019-01-10 PROCEDURE — 87529 HSV DNA AMP PROBE: CPT

## 2019-01-10 PROCEDURE — 36415 COLL VENOUS BLD VENIPUNCTURE: CPT

## 2019-01-11 LAB
HSV1 DNA SPEC QL NAA+PROBE: NEGATIVE
HSV2 DNA SPEC QL NAA+PROBE: NEGATIVE
SPECIMEN SOURCE: NORMAL

## 2019-01-11 NOTE — PROGRESS NOTES
1/10/19 Pt seen in the office today with Dr Hernan Florentino. Pt has recent diagnosis of T Cell Leukemia. Today Dr Hernan Florentino reviewed pathology of diagnosis as well as treatment options. Pt to start campath as soon as drug is available. He will start on inpatient for the first 2 doses and then continue out patient as tolerated. Pt given navigation's contact info and role explained. We again reviewed disease process and treatment. Pt concerned about how treatment will affect his employment. FMLA papers to be started as well as SW referral related to the possibility of disability income in the future. Pt to be contacted once Campath is available and admission will be arranged.

## 2019-01-14 ENCOUNTER — HOSPITAL ENCOUNTER (INPATIENT)
Age: 64
LOS: 3 days | Discharge: HOME | DRG: 847 | End: 2019-01-17
Attending: INTERNAL MEDICINE | Admitting: INTERNAL MEDICINE
Payer: COMMERCIAL

## 2019-01-14 DIAGNOSIS — C91.60 PROLYMPHOCYTIC LEUKEMIA OF T-CELL (HCC): Primary | ICD-10-CM

## 2019-01-14 DIAGNOSIS — N40.1 BENIGN PROSTATIC HYPERPLASIA WITH NOCTURIA: Chronic | ICD-10-CM

## 2019-01-14 DIAGNOSIS — G47.33 SEVERE OBSTRUCTIVE SLEEP APNEA: ICD-10-CM

## 2019-01-14 DIAGNOSIS — R35.1 BENIGN PROSTATIC HYPERPLASIA WITH NOCTURIA: Chronic | ICD-10-CM

## 2019-01-14 DIAGNOSIS — I10 ESSENTIAL HYPERTENSION, BENIGN: ICD-10-CM

## 2019-01-14 DIAGNOSIS — Z51.11 ADMISSION FOR ANTINEOPLASTIC CHEMOTHERAPY: ICD-10-CM

## 2019-01-14 DIAGNOSIS — M50.30 DDD (DEGENERATIVE DISC DISEASE), CERVICAL: ICD-10-CM

## 2019-01-14 LAB
ALBUMIN SERPL-MCNC: 4.6 G/DL (ref 3.2–4.6)
ALBUMIN/GLOB SERPL: 1.6 {RATIO} (ref 1.2–3.5)
ALP SERPL-CCNC: 32 U/L (ref 50–136)
ALT SERPL-CCNC: 39 U/L (ref 12–65)
ANION GAP SERPL CALC-SCNC: 7 MMOL/L (ref 7–16)
AST SERPL-CCNC: 34 U/L (ref 15–37)
BASOPHILS # BLD: 0 K/UL (ref 0–0.2)
BASOPHILS NFR BLD: 0 % (ref 0–2)
BILIRUB SERPL-MCNC: 0.6 MG/DL (ref 0.2–1.1)
BUN SERPL-MCNC: 18 MG/DL (ref 8–23)
CALCIUM SERPL-MCNC: 9 MG/DL (ref 8.3–10.4)
CHLORIDE SERPL-SCNC: 106 MMOL/L (ref 98–107)
CMV DNA SERPL NAA+PROBE-ACNC: NEGATIVE IU/ML
CMV DNA SERPL NAA+PROBE-LOG IU: NORMAL LOG10 IU/ML
CO2 SERPL-SCNC: 28 MMOL/L (ref 21–32)
CREAT SERPL-MCNC: 1.14 MG/DL (ref 0.8–1.5)
DIFFERENTIAL METHOD BLD: ABNORMAL
EOSINOPHIL # BLD: 0 K/UL (ref 0–0.8)
EOSINOPHIL NFR BLD: 0 % (ref 0.5–7.8)
ERYTHROCYTE [DISTWIDTH] IN BLOOD BY AUTOMATED COUNT: 14.6 % (ref 11.9–14.6)
GLOBULIN SER CALC-MCNC: 2.8 G/DL (ref 2.3–3.5)
GLUCOSE SERPL-MCNC: 96 MG/DL (ref 65–100)
HCT VFR BLD AUTO: 40.5 % (ref 41.1–50.3)
HGB BLD-MCNC: 13.1 G/DL (ref 13.6–17.2)
IMM GRANULOCYTES # BLD AUTO: 0 K/UL (ref 0–0.5)
IMM GRANULOCYTES NFR BLD AUTO: 0 % (ref 0–5)
LYMPHOCYTES # BLD: 100.2 K/UL (ref 0.5–4.6)
LYMPHOCYTES NFR BLD: 92 % (ref 13–44)
MAGNESIUM SERPL-MCNC: 2.2 MG/DL (ref 1.8–2.4)
MCH RBC QN AUTO: 30.8 PG (ref 26.1–32.9)
MCHC RBC AUTO-ENTMCNC: 32.3 G/DL (ref 31.4–35)
MCV RBC AUTO: 95.1 FL (ref 79.6–97.8)
MONOCYTES # BLD: 5.4 K/UL (ref 0.1–1.3)
MONOCYTES NFR BLD: 5 % (ref 4–12)
NEUTS SEG # BLD: 3.3 K/UL (ref 1.7–8.2)
NEUTS SEG NFR BLD: 3 % (ref 43–78)
NRBC # BLD: 0 K/UL (ref 0–0.2)
PLATELET # BLD AUTO: 107 K/UL (ref 150–450)
PLATELET COMMENTS,PCOM: SLIGHT
PMV BLD AUTO: 10.2 FL (ref 9.4–12.3)
POTASSIUM SERPL-SCNC: 4.4 MMOL/L (ref 3.5–5.1)
PROT SERPL-MCNC: 7.4 G/DL (ref 6.3–8.2)
RBC # BLD AUTO: 4.26 M/UL (ref 4.23–5.6)
RBC MORPH BLD: ABNORMAL
SODIUM SERPL-SCNC: 141 MMOL/L (ref 136–145)
URATE SERPL-MCNC: 6.3 MG/DL (ref 2.6–6)
WBC # BLD AUTO: 108.9 K/UL (ref 4.3–11.1)
WBC MORPH BLD: ABNORMAL

## 2019-01-14 PROCEDURE — 02HV33Z INSERTION OF INFUSION DEVICE INTO SUPERIOR VENA CAVA, PERCUTANEOUS APPROACH: ICD-10-PCS | Performed by: INTERNAL MEDICINE

## 2019-01-14 PROCEDURE — 74011000250 HC RX REV CODE- 250: Performed by: INTERNAL MEDICINE

## 2019-01-14 PROCEDURE — 77030020263 HC SOL INJ SOD CL0.9% LFCR 1000ML

## 2019-01-14 PROCEDURE — 84550 ASSAY OF BLOOD/URIC ACID: CPT

## 2019-01-14 PROCEDURE — 36415 COLL VENOUS BLD VENIPUNCTURE: CPT

## 2019-01-14 PROCEDURE — 3E0430M INTRODUCTION OF MONOCLONAL ANTIBODY INTO CENTRAL VEIN, PERCUTANEOUS APPROACH: ICD-10-PCS | Performed by: INTERNAL MEDICINE

## 2019-01-14 PROCEDURE — 99223 1ST HOSP IP/OBS HIGH 75: CPT | Performed by: INTERNAL MEDICINE

## 2019-01-14 PROCEDURE — 74011250637 HC RX REV CODE- 250/637: Performed by: INTERNAL MEDICINE

## 2019-01-14 PROCEDURE — 74011250636 HC RX REV CODE- 250/636: Performed by: NURSE PRACTITIONER

## 2019-01-14 PROCEDURE — 65270000015 HC RM PRIVATE ONCOLOGY

## 2019-01-14 PROCEDURE — 74011250637 HC RX REV CODE- 250/637: Performed by: NURSE PRACTITIONER

## 2019-01-14 PROCEDURE — 83735 ASSAY OF MAGNESIUM: CPT

## 2019-01-14 PROCEDURE — 74011250636 HC RX REV CODE- 250/636: Performed by: INTERNAL MEDICINE

## 2019-01-14 PROCEDURE — 36569 INSJ PICC 5 YR+ W/O IMAGING: CPT | Performed by: NURSE PRACTITIONER

## 2019-01-14 PROCEDURE — 85025 COMPLETE CBC W/AUTO DIFF WBC: CPT

## 2019-01-14 PROCEDURE — 74011000258 HC RX REV CODE- 258: Performed by: INTERNAL MEDICINE

## 2019-01-14 PROCEDURE — C1751 CATH, INF, PER/CENT/MIDLINE: HCPCS

## 2019-01-14 PROCEDURE — 76937 US GUIDE VASCULAR ACCESS: CPT

## 2019-01-14 PROCEDURE — 80053 COMPREHEN METABOLIC PANEL: CPT

## 2019-01-14 RX ORDER — SODIUM CHLORIDE 9 MG/ML
75 INJECTION, SOLUTION INTRAVENOUS CONTINUOUS
Status: DISCONTINUED | OUTPATIENT
Start: 2019-01-14 | End: 2019-01-17 | Stop reason: HOSPADM

## 2019-01-14 RX ORDER — ALBUTEROL SULFATE 0.83 MG/ML
2.5 SOLUTION RESPIRATORY (INHALATION) AS NEEDED
Status: CANCELLED
Start: 2019-01-30

## 2019-01-14 RX ORDER — HEPARIN 100 UNIT/ML
300-500 SYRINGE INTRAVENOUS AS NEEDED
Status: CANCELLED
Start: 2019-01-21

## 2019-01-14 RX ORDER — EPINEPHRINE 1 MG/ML
0.3 INJECTION, SOLUTION, CONCENTRATE INTRAVENOUS AS NEEDED
Status: CANCELLED | OUTPATIENT
Start: 2019-02-06

## 2019-01-14 RX ORDER — SODIUM CHLORIDE 9 MG/ML
10 INJECTION INTRAMUSCULAR; INTRAVENOUS; SUBCUTANEOUS AS NEEDED
Status: CANCELLED | OUTPATIENT
Start: 2019-01-18

## 2019-01-14 RX ORDER — ACETAMINOPHEN 325 MG/1
650 TABLET ORAL ONCE
Status: CANCELLED
Start: 2019-02-06 | End: 2019-02-06

## 2019-01-14 RX ORDER — HEPARIN 100 UNIT/ML
300-500 SYRINGE INTRAVENOUS AS NEEDED
Status: CANCELLED
Start: 2019-01-23

## 2019-01-14 RX ORDER — DIPHENHYDRAMINE HYDROCHLORIDE 50 MG/ML
50 INJECTION, SOLUTION INTRAMUSCULAR; INTRAVENOUS ONCE
Status: CANCELLED
Start: 2019-02-06 | End: 2019-02-06

## 2019-01-14 RX ORDER — ONDANSETRON 2 MG/ML
4 INJECTION INTRAMUSCULAR; INTRAVENOUS ONCE
Status: CANCELLED
Start: 2019-01-23 | End: 2019-01-23

## 2019-01-14 RX ORDER — ACETAMINOPHEN 325 MG/1
650 TABLET ORAL AS NEEDED
Status: CANCELLED
Start: 2019-01-18

## 2019-01-14 RX ORDER — ACETAMINOPHEN 325 MG/1
650 TABLET ORAL ONCE
Status: CANCELLED
Start: 2019-01-18 | End: 2019-01-18

## 2019-01-14 RX ORDER — ALBUTEROL SULFATE 0.83 MG/ML
2.5 SOLUTION RESPIRATORY (INHALATION) AS NEEDED
Status: CANCELLED
Start: 2019-02-06

## 2019-01-14 RX ORDER — HEPARIN 100 UNIT/ML
600 SYRINGE INTRAVENOUS EVERY 8 HOURS
Status: DISCONTINUED | OUTPATIENT
Start: 2019-01-14 | End: 2019-01-17 | Stop reason: HOSPADM

## 2019-01-14 RX ORDER — HEPARIN 100 UNIT/ML
300-500 SYRINGE INTRAVENOUS AS NEEDED
Status: CANCELLED
Start: 2019-02-08

## 2019-01-14 RX ORDER — DIPHENHYDRAMINE HYDROCHLORIDE 50 MG/ML
50 INJECTION, SOLUTION INTRAMUSCULAR; INTRAVENOUS AS NEEDED
Status: CANCELLED
Start: 2019-01-16

## 2019-01-14 RX ORDER — DIPHENHYDRAMINE HYDROCHLORIDE 50 MG/ML
50 INJECTION, SOLUTION INTRAMUSCULAR; INTRAVENOUS AS NEEDED
Status: ACTIVE | OUTPATIENT
Start: 2019-01-14 | End: 2019-01-14

## 2019-01-14 RX ORDER — DIPHENHYDRAMINE HYDROCHLORIDE 50 MG/ML
50 INJECTION, SOLUTION INTRAMUSCULAR; INTRAVENOUS AS NEEDED
Status: CANCELLED
Start: 2019-01-21

## 2019-01-14 RX ORDER — ONDANSETRON 2 MG/ML
4 INJECTION INTRAMUSCULAR; INTRAVENOUS ONCE
Status: CANCELLED
Start: 2019-01-30 | End: 2019-01-30

## 2019-01-14 RX ORDER — SODIUM CHLORIDE 0.9 % (FLUSH) 0.9 %
10 SYRINGE (ML) INJECTION AS NEEDED
Status: CANCELLED
Start: 2019-02-08

## 2019-01-14 RX ORDER — ACETAMINOPHEN 325 MG/1
650 TABLET ORAL AS NEEDED
Status: CANCELLED
Start: 2019-01-25

## 2019-01-14 RX ORDER — SODIUM CHLORIDE 9 MG/ML
10 INJECTION INTRAMUSCULAR; INTRAVENOUS; SUBCUTANEOUS AS NEEDED
Status: CANCELLED | OUTPATIENT
Start: 2019-01-23

## 2019-01-14 RX ORDER — DIPHENHYDRAMINE HYDROCHLORIDE 50 MG/ML
50 INJECTION, SOLUTION INTRAMUSCULAR; INTRAVENOUS AS NEEDED
Status: CANCELLED
Start: 2019-01-23

## 2019-01-14 RX ORDER — DIPHENHYDRAMINE HYDROCHLORIDE 50 MG/ML
50 INJECTION, SOLUTION INTRAMUSCULAR; INTRAVENOUS ONCE
Status: CANCELLED
Start: 2019-01-23 | End: 2019-01-23

## 2019-01-14 RX ORDER — SODIUM CHLORIDE 0.9 % (FLUSH) 0.9 %
20 SYRINGE (ML) INJECTION AS NEEDED
Status: DISCONTINUED | OUTPATIENT
Start: 2019-01-14 | End: 2019-01-17 | Stop reason: HOSPADM

## 2019-01-14 RX ORDER — ALBUTEROL SULFATE 0.83 MG/ML
2.5 SOLUTION RESPIRATORY (INHALATION) AS NEEDED
Status: CANCELLED
Start: 2019-01-15

## 2019-01-14 RX ORDER — SODIUM CHLORIDE 0.9 % (FLUSH) 0.9 %
10 SYRINGE (ML) INJECTION AS NEEDED
Status: CANCELLED
Start: 2019-02-04

## 2019-01-14 RX ORDER — ONDANSETRON 2 MG/ML
4 INJECTION INTRAMUSCULAR; INTRAVENOUS ONCE
Status: CANCELLED
Start: 2019-02-04 | End: 2019-02-04

## 2019-01-14 RX ORDER — HYDROCORTISONE SODIUM SUCCINATE 100 MG/2ML
100 INJECTION, POWDER, FOR SOLUTION INTRAMUSCULAR; INTRAVENOUS AS NEEDED
Status: CANCELLED | OUTPATIENT
Start: 2019-01-25

## 2019-01-14 RX ORDER — EPINEPHRINE 1 MG/ML
0.3 INJECTION, SOLUTION, CONCENTRATE INTRAVENOUS AS NEEDED
Status: CANCELLED | OUTPATIENT
Start: 2019-01-25

## 2019-01-14 RX ORDER — ALBUTEROL SULFATE 0.83 MG/ML
2.5 SOLUTION RESPIRATORY (INHALATION) AS NEEDED
Status: CANCELLED
Start: 2019-01-28

## 2019-01-14 RX ORDER — HEPARIN 100 UNIT/ML
300-500 SYRINGE INTRAVENOUS AS NEEDED
Status: CANCELLED
Start: 2019-01-16

## 2019-01-14 RX ORDER — ONDANSETRON 2 MG/ML
8 INJECTION INTRAMUSCULAR; INTRAVENOUS AS NEEDED
Status: CANCELLED | OUTPATIENT
Start: 2019-01-21

## 2019-01-14 RX ORDER — DIPHENHYDRAMINE HYDROCHLORIDE 50 MG/ML
50 INJECTION, SOLUTION INTRAMUSCULAR; INTRAVENOUS ONCE
Status: COMPLETED | OUTPATIENT
Start: 2019-01-14 | End: 2019-01-14

## 2019-01-14 RX ORDER — HYDROCORTISONE SODIUM SUCCINATE 100 MG/2ML
100 INJECTION, POWDER, FOR SOLUTION INTRAMUSCULAR; INTRAVENOUS AS NEEDED
Status: CANCELLED | OUTPATIENT
Start: 2019-01-14

## 2019-01-14 RX ORDER — ALBUTEROL SULFATE 0.83 MG/ML
2.5 SOLUTION RESPIRATORY (INHALATION) AS NEEDED
Status: CANCELLED
Start: 2019-01-23

## 2019-01-14 RX ORDER — DIPHENHYDRAMINE HYDROCHLORIDE 50 MG/ML
50 INJECTION, SOLUTION INTRAMUSCULAR; INTRAVENOUS AS NEEDED
Status: CANCELLED
Start: 2019-02-06

## 2019-01-14 RX ORDER — SODIUM CHLORIDE 0.9 % (FLUSH) 0.9 %
10 SYRINGE (ML) INJECTION AS NEEDED
Status: ACTIVE | OUTPATIENT
Start: 2019-01-14 | End: 2019-01-14

## 2019-01-14 RX ORDER — SODIUM CHLORIDE 0.9 % (FLUSH) 0.9 %
10 SYRINGE (ML) INJECTION AS NEEDED
Status: CANCELLED
Start: 2019-01-30

## 2019-01-14 RX ORDER — HYDROCORTISONE SODIUM SUCCINATE 100 MG/2ML
100 INJECTION, POWDER, FOR SOLUTION INTRAMUSCULAR; INTRAVENOUS AS NEEDED
Status: CANCELLED | OUTPATIENT
Start: 2019-01-18

## 2019-01-14 RX ORDER — SODIUM CHLORIDE 0.9 % (FLUSH) 0.9 %
10 SYRINGE (ML) INJECTION AS NEEDED
Status: CANCELLED
Start: 2019-01-25

## 2019-01-14 RX ORDER — ONDANSETRON 2 MG/ML
8 INJECTION INTRAMUSCULAR; INTRAVENOUS AS NEEDED
Status: CANCELLED | OUTPATIENT
Start: 2019-01-28

## 2019-01-14 RX ORDER — ACETAMINOPHEN 325 MG/1
650 TABLET ORAL AS NEEDED
Status: CANCELLED
Start: 2019-01-28

## 2019-01-14 RX ORDER — ACETAMINOPHEN 325 MG/1
650 TABLET ORAL ONCE
Status: CANCELLED
Start: 2019-02-08 | End: 2019-02-08

## 2019-01-14 RX ORDER — EPINEPHRINE 1 MG/ML
0.3 INJECTION, SOLUTION, CONCENTRATE INTRAVENOUS AS NEEDED
Status: CANCELLED | OUTPATIENT
Start: 2019-02-01

## 2019-01-14 RX ORDER — EPINEPHRINE 1 MG/ML
0.3 INJECTION, SOLUTION, CONCENTRATE INTRAVENOUS AS NEEDED
Status: CANCELLED | OUTPATIENT
Start: 2019-01-30

## 2019-01-14 RX ORDER — ACETAMINOPHEN 325 MG/1
650 TABLET ORAL AS NEEDED
Status: ACTIVE | OUTPATIENT
Start: 2019-01-14 | End: 2019-01-14

## 2019-01-14 RX ORDER — ONDANSETRON 2 MG/ML
8 INJECTION INTRAMUSCULAR; INTRAVENOUS AS NEEDED
Status: CANCELLED | OUTPATIENT
Start: 2019-02-08

## 2019-01-14 RX ORDER — ONDANSETRON 2 MG/ML
4 INJECTION INTRAMUSCULAR; INTRAVENOUS ONCE
Status: CANCELLED
Start: 2019-01-18 | End: 2019-01-18

## 2019-01-14 RX ORDER — HEPARIN 100 UNIT/ML
300-500 SYRINGE INTRAVENOUS AS NEEDED
Status: ACTIVE | OUTPATIENT
Start: 2019-01-14 | End: 2019-01-14

## 2019-01-14 RX ORDER — SODIUM CHLORIDE 9 MG/ML
10 INJECTION INTRAMUSCULAR; INTRAVENOUS; SUBCUTANEOUS AS NEEDED
Status: CANCELLED | OUTPATIENT
Start: 2019-01-28

## 2019-01-14 RX ORDER — ONDANSETRON 2 MG/ML
8 INJECTION INTRAMUSCULAR; INTRAVENOUS AS NEEDED
Status: CANCELLED | OUTPATIENT
Start: 2019-01-18

## 2019-01-14 RX ORDER — DIPHENHYDRAMINE HYDROCHLORIDE 50 MG/ML
50 INJECTION, SOLUTION INTRAMUSCULAR; INTRAVENOUS AS NEEDED
Status: CANCELLED
Start: 2019-01-18

## 2019-01-14 RX ORDER — ALBUTEROL SULFATE 0.83 MG/ML
2.5 SOLUTION RESPIRATORY (INHALATION) AS NEEDED
Status: CANCELLED
Start: 2019-02-01

## 2019-01-14 RX ORDER — ALLOPURINOL 300 MG/1
300 TABLET ORAL 2 TIMES DAILY
Status: DISCONTINUED | OUTPATIENT
Start: 2019-01-14 | End: 2019-01-17 | Stop reason: HOSPADM

## 2019-01-14 RX ORDER — ALBUTEROL SULFATE 0.83 MG/ML
2.5 SOLUTION RESPIRATORY (INHALATION) AS NEEDED
Status: CANCELLED
Start: 2019-01-21

## 2019-01-14 RX ORDER — EPINEPHRINE 1 MG/ML
0.3 INJECTION, SOLUTION, CONCENTRATE INTRAVENOUS AS NEEDED
Status: CANCELLED | OUTPATIENT
Start: 2019-01-21

## 2019-01-14 RX ORDER — HEPARIN 100 UNIT/ML
300-500 SYRINGE INTRAVENOUS AS NEEDED
Status: CANCELLED
Start: 2019-02-06

## 2019-01-14 RX ORDER — ACETAMINOPHEN 325 MG/1
650 TABLET ORAL AS NEEDED
Status: CANCELLED
Start: 2019-01-15

## 2019-01-14 RX ORDER — ONDANSETRON 2 MG/ML
4 INJECTION INTRAMUSCULAR; INTRAVENOUS ONCE
Status: CANCELLED
Start: 2019-02-01 | End: 2019-02-01

## 2019-01-14 RX ORDER — HYDROCORTISONE SODIUM SUCCINATE 100 MG/2ML
100 INJECTION, POWDER, FOR SOLUTION INTRAMUSCULAR; INTRAVENOUS ONCE
Status: COMPLETED | OUTPATIENT
Start: 2019-01-14 | End: 2019-01-14

## 2019-01-14 RX ORDER — ONDANSETRON 2 MG/ML
8 INJECTION INTRAMUSCULAR; INTRAVENOUS AS NEEDED
Status: CANCELLED | OUTPATIENT
Start: 2019-02-06

## 2019-01-14 RX ORDER — ALBUTEROL SULFATE 0.83 MG/ML
2.5 SOLUTION RESPIRATORY (INHALATION) AS NEEDED
Status: CANCELLED
Start: 2019-02-08

## 2019-01-14 RX ORDER — SODIUM CHLORIDE 0.9 % (FLUSH) 0.9 %
20 SYRINGE (ML) INJECTION EVERY 8 HOURS
Status: DISCONTINUED | OUTPATIENT
Start: 2019-01-14 | End: 2019-01-17 | Stop reason: HOSPADM

## 2019-01-14 RX ORDER — EPINEPHRINE 1 MG/ML
0.3 INJECTION, SOLUTION, CONCENTRATE INTRAVENOUS AS NEEDED
Status: CANCELLED | OUTPATIENT
Start: 2019-01-14

## 2019-01-14 RX ORDER — DIPHENHYDRAMINE HYDROCHLORIDE 50 MG/ML
50 INJECTION, SOLUTION INTRAMUSCULAR; INTRAVENOUS AS NEEDED
Status: CANCELLED
Start: 2019-02-01

## 2019-01-14 RX ORDER — ONDANSETRON 2 MG/ML
4 INJECTION INTRAMUSCULAR; INTRAVENOUS ONCE
Status: CANCELLED
Start: 2019-02-08 | End: 2019-02-08

## 2019-01-14 RX ORDER — DIPHENHYDRAMINE HYDROCHLORIDE 50 MG/ML
50 INJECTION, SOLUTION INTRAMUSCULAR; INTRAVENOUS AS NEEDED
Status: CANCELLED
Start: 2019-01-28

## 2019-01-14 RX ORDER — DIPHENHYDRAMINE HYDROCHLORIDE 50 MG/ML
50 INJECTION, SOLUTION INTRAMUSCULAR; INTRAVENOUS AS NEEDED
Status: CANCELLED
Start: 2019-01-15

## 2019-01-14 RX ORDER — ACETAMINOPHEN 325 MG/1
650 TABLET ORAL AS NEEDED
Status: CANCELLED
Start: 2019-02-06

## 2019-01-14 RX ORDER — ONDANSETRON 2 MG/ML
8 INJECTION INTRAMUSCULAR; INTRAVENOUS AS NEEDED
Status: CANCELLED | OUTPATIENT
Start: 2019-02-04

## 2019-01-14 RX ORDER — SODIUM CHLORIDE 9 MG/ML
10 INJECTION INTRAMUSCULAR; INTRAVENOUS; SUBCUTANEOUS AS NEEDED
Status: CANCELLED | OUTPATIENT
Start: 2019-02-04

## 2019-01-14 RX ORDER — ONDANSETRON 2 MG/ML
8 INJECTION INTRAMUSCULAR; INTRAVENOUS AS NEEDED
Status: CANCELLED | OUTPATIENT
Start: 2019-01-23

## 2019-01-14 RX ORDER — SODIUM CHLORIDE 0.9 % (FLUSH) 0.9 %
10 SYRINGE (ML) INJECTION AS NEEDED
Status: CANCELLED
Start: 2019-01-21

## 2019-01-14 RX ORDER — SODIUM CHLORIDE 9 MG/ML
10 INJECTION INTRAMUSCULAR; INTRAVENOUS; SUBCUTANEOUS AS NEEDED
Status: CANCELLED | OUTPATIENT
Start: 2019-01-16

## 2019-01-14 RX ORDER — ONDANSETRON 2 MG/ML
8 INJECTION INTRAMUSCULAR; INTRAVENOUS AS NEEDED
Status: CANCELLED | OUTPATIENT
Start: 2019-01-30

## 2019-01-14 RX ORDER — DIPHENHYDRAMINE HYDROCHLORIDE 50 MG/ML
50 INJECTION, SOLUTION INTRAMUSCULAR; INTRAVENOUS AS NEEDED
Status: CANCELLED
Start: 2019-02-04

## 2019-01-14 RX ORDER — ACETAMINOPHEN 325 MG/1
650 TABLET ORAL AS NEEDED
Status: CANCELLED
Start: 2019-02-08

## 2019-01-14 RX ORDER — ALBUTEROL SULFATE 0.83 MG/ML
2.5 SOLUTION RESPIRATORY (INHALATION) AS NEEDED
Status: CANCELLED
Start: 2019-01-14

## 2019-01-14 RX ORDER — DIPHENHYDRAMINE HYDROCHLORIDE 50 MG/ML
50 INJECTION, SOLUTION INTRAMUSCULAR; INTRAVENOUS AS NEEDED
Status: CANCELLED
Start: 2019-01-25

## 2019-01-14 RX ORDER — EPINEPHRINE 1 MG/ML
0.3 INJECTION, SOLUTION, CONCENTRATE INTRAVENOUS AS NEEDED
Status: CANCELLED | OUTPATIENT
Start: 2019-02-04

## 2019-01-14 RX ORDER — ACETAMINOPHEN 325 MG/1
650 TABLET ORAL ONCE
Status: CANCELLED
Start: 2019-01-30 | End: 2019-01-30

## 2019-01-14 RX ORDER — DIPHENHYDRAMINE HYDROCHLORIDE 50 MG/ML
50 INJECTION, SOLUTION INTRAMUSCULAR; INTRAVENOUS ONCE
Status: CANCELLED
Start: 2019-01-25 | End: 2019-01-25

## 2019-01-14 RX ORDER — SODIUM CHLORIDE 0.9 % (FLUSH) 0.9 %
10 SYRINGE (ML) INJECTION AS NEEDED
Status: CANCELLED
Start: 2019-01-28

## 2019-01-14 RX ORDER — ACETAMINOPHEN 325 MG/1
650 TABLET ORAL AS NEEDED
Status: CANCELLED
Start: 2019-01-21

## 2019-01-14 RX ORDER — EPINEPHRINE 1 MG/ML
0.3 INJECTION, SOLUTION, CONCENTRATE INTRAVENOUS AS NEEDED
Status: CANCELLED | OUTPATIENT
Start: 2019-02-08

## 2019-01-14 RX ORDER — DIPHENHYDRAMINE HYDROCHLORIDE 50 MG/ML
50 INJECTION, SOLUTION INTRAMUSCULAR; INTRAVENOUS ONCE
Status: CANCELLED
Start: 2019-02-01 | End: 2019-02-01

## 2019-01-14 RX ORDER — SODIUM CHLORIDE 9 MG/ML
10 INJECTION INTRAMUSCULAR; INTRAVENOUS; SUBCUTANEOUS AS NEEDED
Status: CANCELLED | OUTPATIENT
Start: 2019-01-15

## 2019-01-14 RX ORDER — EPINEPHRINE 1 MG/ML
0.3 INJECTION, SOLUTION, CONCENTRATE INTRAVENOUS AS NEEDED
Status: CANCELLED | OUTPATIENT
Start: 2019-01-16

## 2019-01-14 RX ORDER — SODIUM CHLORIDE 9 MG/ML
10 INJECTION INTRAMUSCULAR; INTRAVENOUS; SUBCUTANEOUS AS NEEDED
Status: CANCELLED | OUTPATIENT
Start: 2019-01-30

## 2019-01-14 RX ORDER — ONDANSETRON 2 MG/ML
8 INJECTION INTRAMUSCULAR; INTRAVENOUS AS NEEDED
Status: CANCELLED | OUTPATIENT
Start: 2019-01-25

## 2019-01-14 RX ORDER — HYDROCORTISONE SODIUM SUCCINATE 100 MG/2ML
100 INJECTION, POWDER, FOR SOLUTION INTRAMUSCULAR; INTRAVENOUS AS NEEDED
Status: CANCELLED | OUTPATIENT
Start: 2019-02-01

## 2019-01-14 RX ORDER — ALBUTEROL SULFATE 0.83 MG/ML
2.5 SOLUTION RESPIRATORY (INHALATION) AS NEEDED
Status: CANCELLED
Start: 2019-01-25

## 2019-01-14 RX ORDER — ACYCLOVIR 800 MG/1
400 TABLET ORAL 2 TIMES DAILY
Status: DISCONTINUED | OUTPATIENT
Start: 2019-01-14 | End: 2019-01-17 | Stop reason: HOSPADM

## 2019-01-14 RX ORDER — HEPARIN 100 UNIT/ML
300-500 SYRINGE INTRAVENOUS AS NEEDED
Status: CANCELLED
Start: 2019-01-25

## 2019-01-14 RX ORDER — ACETAMINOPHEN 325 MG/1
650 TABLET ORAL ONCE
Status: CANCELLED
Start: 2019-01-25 | End: 2019-01-25

## 2019-01-14 RX ORDER — ACETAMINOPHEN 325 MG/1
650 TABLET ORAL AS NEEDED
Status: CANCELLED
Start: 2019-01-16

## 2019-01-14 RX ORDER — SODIUM CHLORIDE 9 MG/ML
10 INJECTION INTRAMUSCULAR; INTRAVENOUS; SUBCUTANEOUS AS NEEDED
Status: CANCELLED | OUTPATIENT
Start: 2019-01-25

## 2019-01-14 RX ORDER — ACETAMINOPHEN 325 MG/1
650 TABLET ORAL AS NEEDED
Status: CANCELLED
Start: 2019-01-30

## 2019-01-14 RX ORDER — SULFAMETHOXAZOLE AND TRIMETHOPRIM 800; 160 MG/1; MG/1
1 TABLET ORAL
Status: DISCONTINUED | OUTPATIENT
Start: 2019-01-14 | End: 2019-01-17 | Stop reason: HOSPADM

## 2019-01-14 RX ORDER — HYDROCORTISONE SODIUM SUCCINATE 100 MG/2ML
100 INJECTION, POWDER, FOR SOLUTION INTRAMUSCULAR; INTRAVENOUS AS NEEDED
Status: CANCELLED | OUTPATIENT
Start: 2019-02-08

## 2019-01-14 RX ORDER — ONDANSETRON 2 MG/ML
8 INJECTION INTRAMUSCULAR; INTRAVENOUS ONCE
Status: COMPLETED | OUTPATIENT
Start: 2019-01-14 | End: 2019-01-14

## 2019-01-14 RX ORDER — ONDANSETRON 2 MG/ML
8 INJECTION INTRAMUSCULAR; INTRAVENOUS AS NEEDED
Status: DISPENSED | OUTPATIENT
Start: 2019-01-14 | End: 2019-01-14

## 2019-01-14 RX ORDER — EPINEPHRINE 1 MG/ML
0.3 INJECTION, SOLUTION, CONCENTRATE INTRAVENOUS AS NEEDED
Status: CANCELLED | OUTPATIENT
Start: 2019-01-18

## 2019-01-14 RX ORDER — DIPHENHYDRAMINE HYDROCHLORIDE 50 MG/ML
50 INJECTION, SOLUTION INTRAMUSCULAR; INTRAVENOUS ONCE
Status: CANCELLED
Start: 2019-01-18 | End: 2019-01-18

## 2019-01-14 RX ORDER — ACETAMINOPHEN 325 MG/1
650 TABLET ORAL ONCE
Status: CANCELLED
Start: 2019-01-23 | End: 2019-01-23

## 2019-01-14 RX ORDER — ONDANSETRON 2 MG/ML
8 INJECTION INTRAMUSCULAR; INTRAVENOUS AS NEEDED
Status: CANCELLED | OUTPATIENT
Start: 2019-02-01

## 2019-01-14 RX ORDER — HEPARIN 100 UNIT/ML
300-500 SYRINGE INTRAVENOUS AS NEEDED
Status: CANCELLED
Start: 2019-02-01

## 2019-01-14 RX ORDER — HYDROCORTISONE SODIUM SUCCINATE 100 MG/2ML
100 INJECTION, POWDER, FOR SOLUTION INTRAMUSCULAR; INTRAVENOUS AS NEEDED
Status: CANCELLED | OUTPATIENT
Start: 2019-01-23

## 2019-01-14 RX ORDER — EPINEPHRINE 1 MG/ML
0.3 INJECTION, SOLUTION, CONCENTRATE INTRAVENOUS AS NEEDED
Status: CANCELLED | OUTPATIENT
Start: 2019-01-15

## 2019-01-14 RX ORDER — ENOXAPARIN SODIUM 100 MG/ML
40 INJECTION SUBCUTANEOUS EVERY 24 HOURS
Status: DISCONTINUED | OUTPATIENT
Start: 2019-01-14 | End: 2019-01-17 | Stop reason: HOSPADM

## 2019-01-14 RX ORDER — DIPHENHYDRAMINE HYDROCHLORIDE 50 MG/ML
50 INJECTION, SOLUTION INTRAMUSCULAR; INTRAVENOUS AS NEEDED
Status: CANCELLED
Start: 2019-01-30

## 2019-01-14 RX ORDER — EPINEPHRINE 1 MG/ML
0.3 INJECTION, SOLUTION, CONCENTRATE INTRAVENOUS AS NEEDED
Status: CANCELLED | OUTPATIENT
Start: 2019-01-28

## 2019-01-14 RX ORDER — DIPHENHYDRAMINE HYDROCHLORIDE 50 MG/ML
50 INJECTION, SOLUTION INTRAMUSCULAR; INTRAVENOUS ONCE
Status: CANCELLED
Start: 2019-01-30 | End: 2019-01-30

## 2019-01-14 RX ORDER — DIPHENHYDRAMINE HYDROCHLORIDE 50 MG/ML
50 INJECTION, SOLUTION INTRAMUSCULAR; INTRAVENOUS ONCE
Status: CANCELLED
Start: 2019-02-08 | End: 2019-02-08

## 2019-01-14 RX ORDER — SODIUM CHLORIDE 0.9 % (FLUSH) 0.9 %
10 SYRINGE (ML) INJECTION AS NEEDED
Status: CANCELLED
Start: 2019-01-23

## 2019-01-14 RX ORDER — SODIUM CHLORIDE 0.9 % (FLUSH) 0.9 %
10 SYRINGE (ML) INJECTION AS NEEDED
Status: CANCELLED
Start: 2019-01-16

## 2019-01-14 RX ORDER — ONDANSETRON 2 MG/ML
4 INJECTION INTRAMUSCULAR; INTRAVENOUS ONCE
Status: CANCELLED
Start: 2019-02-06 | End: 2019-02-06

## 2019-01-14 RX ORDER — SODIUM CHLORIDE 9 MG/ML
10 INJECTION INTRAMUSCULAR; INTRAVENOUS; SUBCUTANEOUS AS NEEDED
Status: ACTIVE | OUTPATIENT
Start: 2019-01-14 | End: 2019-01-14

## 2019-01-14 RX ORDER — SODIUM CHLORIDE 9 MG/ML
10 INJECTION INTRAMUSCULAR; INTRAVENOUS; SUBCUTANEOUS AS NEEDED
Status: CANCELLED | OUTPATIENT
Start: 2019-02-08

## 2019-01-14 RX ORDER — ONDANSETRON 2 MG/ML
8 INJECTION INTRAMUSCULAR; INTRAVENOUS AS NEEDED
Status: CANCELLED | OUTPATIENT
Start: 2019-01-16

## 2019-01-14 RX ORDER — ACETAMINOPHEN 325 MG/1
650 TABLET ORAL ONCE
Status: CANCELLED
Start: 2019-01-21 | End: 2019-01-21

## 2019-01-14 RX ORDER — HYDROCORTISONE SODIUM SUCCINATE 100 MG/2ML
100 INJECTION, POWDER, FOR SOLUTION INTRAMUSCULAR; INTRAVENOUS AS NEEDED
Status: CANCELLED | OUTPATIENT
Start: 2019-01-28

## 2019-01-14 RX ORDER — DIPHENHYDRAMINE HYDROCHLORIDE 50 MG/ML
50 INJECTION, SOLUTION INTRAMUSCULAR; INTRAVENOUS ONCE
Status: CANCELLED
Start: 2019-02-04 | End: 2019-02-04

## 2019-01-14 RX ORDER — HYDROCORTISONE SODIUM SUCCINATE 100 MG/2ML
100 INJECTION, POWDER, FOR SOLUTION INTRAMUSCULAR; INTRAVENOUS AS NEEDED
Status: CANCELLED | OUTPATIENT
Start: 2019-02-04

## 2019-01-14 RX ORDER — HYDROCORTISONE SODIUM SUCCINATE 100 MG/2ML
100 INJECTION, POWDER, FOR SOLUTION INTRAMUSCULAR; INTRAVENOUS AS NEEDED
Status: CANCELLED | OUTPATIENT
Start: 2019-01-30

## 2019-01-14 RX ORDER — ACETAMINOPHEN 325 MG/1
650 TABLET ORAL ONCE
Status: CANCELLED
Start: 2019-02-01 | End: 2019-02-01

## 2019-01-14 RX ORDER — DIPHENHYDRAMINE HYDROCHLORIDE 50 MG/ML
50 INJECTION, SOLUTION INTRAMUSCULAR; INTRAVENOUS AS NEEDED
Status: CANCELLED
Start: 2019-01-14

## 2019-01-14 RX ORDER — HEPARIN 100 UNIT/ML
300-500 SYRINGE INTRAVENOUS AS NEEDED
Status: CANCELLED
Start: 2019-01-15

## 2019-01-14 RX ORDER — ONDANSETRON 2 MG/ML
4 INJECTION INTRAMUSCULAR; INTRAVENOUS ONCE
Status: CANCELLED
Start: 2019-01-25 | End: 2019-01-25

## 2019-01-14 RX ORDER — HYDROCORTISONE SODIUM SUCCINATE 100 MG/2ML
100 INJECTION, POWDER, FOR SOLUTION INTRAMUSCULAR; INTRAVENOUS AS NEEDED
Status: CANCELLED | OUTPATIENT
Start: 2019-02-06

## 2019-01-14 RX ORDER — DIPHENHYDRAMINE HYDROCHLORIDE 50 MG/ML
50 INJECTION, SOLUTION INTRAMUSCULAR; INTRAVENOUS AS NEEDED
Status: CANCELLED
Start: 2019-02-08

## 2019-01-14 RX ORDER — ALBUTEROL SULFATE 0.83 MG/ML
2.5 SOLUTION RESPIRATORY (INHALATION) AS NEEDED
Status: CANCELLED
Start: 2019-02-04

## 2019-01-14 RX ORDER — ACETAMINOPHEN 325 MG/1
650 TABLET ORAL ONCE
Status: CANCELLED
Start: 2019-02-04 | End: 2019-02-04

## 2019-01-14 RX ORDER — SODIUM CHLORIDE 9 MG/ML
10 INJECTION INTRAMUSCULAR; INTRAVENOUS; SUBCUTANEOUS AS NEEDED
Status: CANCELLED | OUTPATIENT
Start: 2019-02-06

## 2019-01-14 RX ORDER — SODIUM CHLORIDE 0.9 % (FLUSH) 0.9 %
10 SYRINGE (ML) INJECTION AS NEEDED
Status: CANCELLED
Start: 2019-02-06

## 2019-01-14 RX ORDER — ACETAMINOPHEN 325 MG/1
650 TABLET ORAL ONCE
Status: CANCELLED
Start: 2019-01-28 | End: 2019-01-28

## 2019-01-14 RX ORDER — ALBUTEROL SULFATE 0.83 MG/ML
2.5 SOLUTION RESPIRATORY (INHALATION) AS NEEDED
Status: CANCELLED
Start: 2019-01-16

## 2019-01-14 RX ORDER — SIMVASTATIN 10 MG/1
10 TABLET, FILM COATED ORAL
Status: DISCONTINUED | OUTPATIENT
Start: 2019-01-14 | End: 2019-01-17 | Stop reason: HOSPADM

## 2019-01-14 RX ORDER — HYDROCORTISONE SODIUM SUCCINATE 100 MG/2ML
100 INJECTION, POWDER, FOR SOLUTION INTRAMUSCULAR; INTRAVENOUS AS NEEDED
Status: CANCELLED | OUTPATIENT
Start: 2019-01-15

## 2019-01-14 RX ORDER — HEPARIN 100 UNIT/ML
600 SYRINGE INTRAVENOUS AS NEEDED
Status: DISCONTINUED | OUTPATIENT
Start: 2019-01-14 | End: 2019-01-17 | Stop reason: HOSPADM

## 2019-01-14 RX ORDER — EPINEPHRINE 1 MG/ML
0.3 INJECTION, SOLUTION, CONCENTRATE INTRAVENOUS AS NEEDED
Status: CANCELLED | OUTPATIENT
Start: 2019-01-23

## 2019-01-14 RX ORDER — ACETAMINOPHEN 325 MG/1
650 TABLET ORAL AS NEEDED
Status: CANCELLED
Start: 2019-02-01

## 2019-01-14 RX ORDER — ACETAMINOPHEN 325 MG/1
650 TABLET ORAL AS NEEDED
Status: CANCELLED
Start: 2019-02-04

## 2019-01-14 RX ORDER — HYDROCORTISONE SODIUM SUCCINATE 100 MG/2ML
100 INJECTION, POWDER, FOR SOLUTION INTRAMUSCULAR; INTRAVENOUS AS NEEDED
Status: CANCELLED | OUTPATIENT
Start: 2019-01-16

## 2019-01-14 RX ORDER — SODIUM CHLORIDE 9 MG/ML
10 INJECTION INTRAMUSCULAR; INTRAVENOUS; SUBCUTANEOUS AS NEEDED
Status: CANCELLED | OUTPATIENT
Start: 2019-01-21

## 2019-01-14 RX ORDER — HYDROCORTISONE SODIUM SUCCINATE 100 MG/2ML
100 INJECTION, POWDER, FOR SOLUTION INTRAMUSCULAR; INTRAVENOUS AS NEEDED
Status: CANCELLED | OUTPATIENT
Start: 2019-01-21

## 2019-01-14 RX ORDER — SODIUM CHLORIDE 0.9 % (FLUSH) 0.9 %
10 SYRINGE (ML) INJECTION AS NEEDED
Status: CANCELLED
Start: 2019-01-18

## 2019-01-14 RX ORDER — ACETAMINOPHEN 325 MG/1
650 TABLET ORAL ONCE
Status: COMPLETED | OUTPATIENT
Start: 2019-01-14 | End: 2019-01-14

## 2019-01-14 RX ORDER — ACETAMINOPHEN 325 MG/1
650 TABLET ORAL AS NEEDED
Status: CANCELLED
Start: 2019-01-23

## 2019-01-14 RX ORDER — HEPARIN 100 UNIT/ML
300-500 SYRINGE INTRAVENOUS AS NEEDED
Status: CANCELLED
Start: 2019-01-30

## 2019-01-14 RX ORDER — SODIUM CHLORIDE 0.9 % (FLUSH) 0.9 %
10 SYRINGE (ML) INJECTION AS NEEDED
Status: CANCELLED
Start: 2019-01-15

## 2019-01-14 RX ORDER — HEPARIN 100 UNIT/ML
300-500 SYRINGE INTRAVENOUS AS NEEDED
Status: CANCELLED
Start: 2019-02-04

## 2019-01-14 RX ORDER — ALBUTEROL SULFATE 0.83 MG/ML
2.5 SOLUTION RESPIRATORY (INHALATION) AS NEEDED
Status: CANCELLED
Start: 2019-01-18

## 2019-01-14 RX ORDER — ONDANSETRON 2 MG/ML
8 INJECTION INTRAMUSCULAR; INTRAVENOUS AS NEEDED
Status: CANCELLED | OUTPATIENT
Start: 2019-01-15

## 2019-01-14 RX ORDER — SODIUM CHLORIDE 0.9 % (FLUSH) 0.9 %
10 SYRINGE (ML) INJECTION AS NEEDED
Status: CANCELLED
Start: 2019-02-01

## 2019-01-14 RX ORDER — SODIUM CHLORIDE 9 MG/ML
10 INJECTION INTRAMUSCULAR; INTRAVENOUS; SUBCUTANEOUS AS NEEDED
Status: CANCELLED | OUTPATIENT
Start: 2019-02-01

## 2019-01-14 RX ORDER — ONDANSETRON 2 MG/ML
4 INJECTION INTRAMUSCULAR; INTRAVENOUS ONCE
Status: CANCELLED
Start: 2019-01-21 | End: 2019-01-21

## 2019-01-14 RX ORDER — DIPHENHYDRAMINE HYDROCHLORIDE 50 MG/ML
50 INJECTION, SOLUTION INTRAMUSCULAR; INTRAVENOUS ONCE
Status: CANCELLED
Start: 2019-01-28 | End: 2019-01-28

## 2019-01-14 RX ORDER — ONDANSETRON 2 MG/ML
4 INJECTION INTRAMUSCULAR; INTRAVENOUS ONCE
Status: CANCELLED
Start: 2019-01-28 | End: 2019-01-28

## 2019-01-14 RX ORDER — HEPARIN 100 UNIT/ML
300-500 SYRINGE INTRAVENOUS AS NEEDED
Status: CANCELLED
Start: 2019-01-28

## 2019-01-14 RX ORDER — DIPHENHYDRAMINE HYDROCHLORIDE 50 MG/ML
50 INJECTION, SOLUTION INTRAMUSCULAR; INTRAVENOUS ONCE
Status: CANCELLED
Start: 2019-01-21 | End: 2019-01-21

## 2019-01-14 RX ORDER — FLUCONAZOLE 100 MG/1
200 TABLET ORAL DAILY
Status: DISCONTINUED | OUTPATIENT
Start: 2019-01-14 | End: 2019-01-17 | Stop reason: HOSPADM

## 2019-01-14 RX ORDER — HEPARIN 100 UNIT/ML
300-500 SYRINGE INTRAVENOUS AS NEEDED
Status: CANCELLED
Start: 2019-01-18

## 2019-01-14 RX ADMIN — MEPERIDINE HYDROCHLORIDE 25 MG: 25 INJECTION INTRAMUSCULAR; INTRAVENOUS; SUBCUTANEOUS at 19:23

## 2019-01-14 RX ADMIN — ALEMTUZUMAB 3 MG: 30 INJECTION INTRAVENOUS at 14:37

## 2019-01-14 RX ADMIN — SULFAMETHOXAZOLE AND TRIMETHOPRIM 1 TABLET: 800; 160 TABLET ORAL at 16:25

## 2019-01-14 RX ADMIN — SODIUM CHLORIDE 500 ML: 900 INJECTION, SOLUTION INTRAVENOUS at 12:53

## 2019-01-14 RX ADMIN — ACETAMINOPHEN 650 MG: 325 TABLET ORAL at 13:42

## 2019-01-14 RX ADMIN — MEPERIDINE HYDROCHLORIDE 25 MG: 25 INJECTION INTRAMUSCULAR; INTRAVENOUS; SUBCUTANEOUS at 16:18

## 2019-01-14 RX ADMIN — SODIUM CHLORIDE 75 ML/HR: 900 INJECTION, SOLUTION INTRAVENOUS at 09:44

## 2019-01-14 RX ADMIN — SODIUM CHLORIDE, PRESERVATIVE FREE 300 UNITS: 5 INJECTION INTRAVENOUS at 16:19

## 2019-01-14 RX ADMIN — ONDANSETRON 8 MG: 2 INJECTION INTRAMUSCULAR; INTRAVENOUS at 19:50

## 2019-01-14 RX ADMIN — FAMOTIDINE 20 MG: 10 INJECTION INTRAVENOUS at 19:50

## 2019-01-14 RX ADMIN — SIMVASTATIN 10 MG: 10 TABLET, FILM COATED ORAL at 21:14

## 2019-01-14 RX ADMIN — SODIUM CHLORIDE, PRESERVATIVE FREE 300 UNITS: 5 INJECTION INTRAVENOUS at 21:14

## 2019-01-14 RX ADMIN — ALLOPURINOL 300 MG: 300 TABLET ORAL at 12:52

## 2019-01-14 RX ADMIN — SODIUM CHLORIDE 75 ML/HR: 900 INJECTION, SOLUTION INTRAVENOUS at 17:34

## 2019-01-14 RX ADMIN — DIPHENHYDRAMINE HYDROCHLORIDE 50 MG: 50 INJECTION, SOLUTION INTRAMUSCULAR; INTRAVENOUS at 13:46

## 2019-01-14 RX ADMIN — ACYCLOVIR 400 MG: 800 TABLET ORAL at 16:25

## 2019-01-14 RX ADMIN — FLUCONAZOLE 200 MG: 100 TABLET ORAL at 09:41

## 2019-01-14 RX ADMIN — HYDROCORTISONE SODIUM SUCCINATE 100 MG: 100 INJECTION, POWDER, FOR SOLUTION INTRAMUSCULAR; INTRAVENOUS at 19:56

## 2019-01-14 RX ADMIN — Medication 20 ML: at 16:18

## 2019-01-14 RX ADMIN — ALLOPURINOL 300 MG: 300 TABLET ORAL at 16:26

## 2019-01-14 RX ADMIN — ACYCLOVIR 400 MG: 800 TABLET ORAL at 09:43

## 2019-01-14 RX ADMIN — ONDANSETRON 8 MG: 2 INJECTION INTRAMUSCULAR; INTRAVENOUS at 13:44

## 2019-01-14 RX ADMIN — Medication 20 ML: at 21:14

## 2019-01-14 NOTE — PROGRESS NOTES
's initial visit attempted. Mr. Lynda Shanks was receiving visits from a couple staff while I waited. I offered 's card to staff to share with patient. Chaplains remain available for support. Alyson Walker MDiv Board Certified [employer]

## 2019-01-14 NOTE — H&P
[facility] Hematology & Oncology Inpatient Hematology / Oncology History and PhysicalReason for Asmission:  T-cell lymphoma Admission for antineoplastic chemotherapy History of Present Illness: 
Mr. Sonia Blount is a 61 y.o. male admitted on 1/14/2019. He is a known patient of Dr. 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 He is feeling well and is ready to proceed with treatment. Heme History (copied from previous):  
61 y.o. M consulted for lymphocytosis. He is a  without major [address], functions well for work and life, reportedly had recurrent shingles per dermatology, denied B symptoms and exam was unremarkable. We discussed the ddx of lymphocytosis being reactive vs malignant, however he had no viral symptoms and I suspect CLL as the leading ddx, discussed and agreed to pursue Compass test with peripheral blood, check hepatitis, LDH, uric acid, suspect rapid doubling time given the CBC in 2017 was wnl, discussed potential scenarios and RTC in 2 weeks to follow results and probably need to start rx rather than monitoring, may get CT for initial staging given the apparent rapid development. 
  
However the peripheral flowcytometry showed T-prolymphoblastic leukemia, Dr. Celine Arce discussed with me, although there was not comprehensive report issued form pathology, the flowcytometry and T-cell clonality positive were diagnostic and we urgently arrange pt and wife to return, discussed the very rare leukemia he has does not have standard nor many treatment options, stem cell transplant may be the only possible curative rx but he may have to seek opinion in major center given his age, arrange marrow biopsy to full evaluation and confirmed CD52+, aiming to start first line Campath, check HIV/HBV/HCV/HTLV, also checked molecular profile but did not find new target therapy candidate.  Pt came back with 6 family members on 1/10/19, all very supportive and brought a lot of questions, all answered and agreed to pursue first line Campath 3 times/week till maximal response for up to 12 weeks, may pursue second opinion at major academic center later, will explore the option of stem cell transplant and whether to start HLA testing for his siblings, Len Drivermarilyn to arrange ordering and plan to start in about a week, discussed toxicities and management, prophylacitic bactrim/acyclovir/fluconazole Review of Systems: 
Constitutional Denies fever, chills, weight loss, appetite changes, fatigue, night sweats. HEENT Denies trauma, blurry vision, hearing loss, ear pain, nosebleeds, sore throat, neck pain and ear discharge. Skin Denies lesions or rashes. Lungs Denies dyspnea, cough, sputum production or hemoptysis. Cardiovascular Denies chest pain, palpitations, or lower extremity edema. Gastrointestinal Denies nausea, vomiting, changes in bowel habits, bloody or black stools, abdominal pain.  Denies dysuria, frequency or hesitancy of urination. Neuro Denies headaches, visual changes or ataxia. Denies dizziness, tingling, tremors, sensory change, speech change, focal weakness or headaches. Hematology Denies easy bruising or bleeding, denies gingival bleeding or epistaxis. Endo Denies heat/cold intolerance, denies diabetes or thyroid abnormalities. MSK Denies back pain, arthralgias, myalgias or frequent falls. Psychiatric/Behavioral Denies depression and substance abuse. The patient is not nervous/anxious. No Known Allergies Past Medical History:  
Diagnosis Date  Back pain  Cervical radiculopathy  GERD (gastroesophageal reflux disease)  H/O seasonal allergies  Hyperlipidemia  Impotence  Insomnia  Lateral epicondylitis  Obesity  Sleep apnea Past Surgical History:  
Procedure Laterality Date  HX CIRCUMCISION    
 HX COLONOSCOPY  2017 Due in 2027  HX ORTHOPAEDIC    
 r wrist  
 HX WISDOM TEETH EXTRACTION Family History Problem Relation Age of Onset  Cancer Mother 80  
     pancreatic  Cancer Father 76  
     breast  
 No Known Problems Son   
 Hypertension Brother  Hypertension Brother  No Known Problems Daughter Social History Socioeconomic History  Marital status:  Spouse name: Not on file  Number of children: Not on file  Years of education: Not on file  Highest education level: Not on file Social Needs  Financial resource strain: Not on file  Food insecurity - worry: Not on file  Food insecurity - inability: Not on file  Transportation needs - medical: Not on file  Transportation needs - non-medical: Not on file Occupational History  Not on file Tobacco Use  Smoking status: Never Smoker  Smokeless tobacco: Never Used Substance and Sexual Activity  Alcohol use: Yes Alcohol/week: 1.8 oz Types: 3 Shots of liquor per week  Drug use: No  
 Sexual activity: Yes  
  Partners: Female Other Topics Concern  Not on file Social History Narrative  Not on file Current Facility-Administered Medications Medication Dose Route Frequency Provider Last Rate Last Dose  simvastatin (ZOCOR) tablet 10 mg  10 mg Oral QHS Chantell Pinto NP      
 0.9% sodium chloride infusion  75 mL/hr IntraVENous CONTINUOUS Chantell Pinto NP 75 mL/hr at 01/14/19 0944 75 mL/hr at 01/14/19 0944  enoxaparin (LOVENOX) injection 40 mg  40 mg SubCUTAneous Q24H Chantell Pinto NP   Stopped at 01/14/19 1000  
 acyclovir (ZOVIRAX) tablet 400 mg  400 mg Oral BID Mulu Collado NP   400 mg at 01/14/19 2600  fluconazole (DIFLUCAN) tablet 200 mg  200 mg Oral DAILY Mulu Collado NP   200 mg at 01/14/19 4759  trimethoprim-sulfamethoxazole (BACTRIM DS, SEPTRA DS) 160-800 mg per tablet 1 Tab  1 Tab Oral Q MON, WED & Krystle Riley NP      
  olmesartan/hydroCHLOROthiazide (BENICAR HCT) 20/12.5 mg   Oral DAILY Ladarius Aguirre MD   Stopped at 19 1100  
 sodium chloride 0.9 % bolus infusion 500 mL  500 mL IntraVENous ONCE Ladarius Aguirre MD      
 ondansetron [facility]) injection 8 mg  8 mg IntraVENous ONCE Ladarius Aguirre MD      
 acetaminophen (TYLENOL) tablet 650 mg  650 mg Oral Brien Sherman MD      
 diphenhydrAMINE (BENADRYL) injection 50 mg  50 mg IntraVENous ONCE Ladarius Aguirre MD      
 alemtuzumab (CAMPATH) 3 mg in 0.9% sodium chloride 100 mL chemo  3 mg IntraVENous ONCE Ladarius Aguirre MD      
 saline peripheral flush soln 10 mL  10 mL InterCATHeter PRN Ladarius Aguirre MD      
 sodium chloride 0.9% injection 10 mL  10 mL IntraVENous PRN Ladarius Aguirre MD      
 heparin (porcine) pf 300-500 Units  300-500 Units InterCATHeter PRN Ladarius Aguirre MD      
 sodium chloride 0.9 % bolus infusion 500 mL  500 mL IntraVENous PRN Ladarius Aguirre MD      
 diphenhydrAMINE (BENADRYL) injection 50 mg  50 mg IntraVENous PRN Ladarius Aguirre MD      
 famotidine (PF) (PEPCID) 20 mg in sodium chloride 0.9% 10 mL injection  20 mg IntraVENous PRN Ladarius Aguirre MD      
 acetaminophen (TYLENOL) tablet 650 mg  650 mg Oral PRN Ladarius Aguirre MD      
 meperidine (DEMEROL) injection 25 mg  25 mg IntraVENous PRN MD Alana Mooneydansetron [facility]) injection 8 mg  8 mg IntraVENous PRN Ladarius Aguirre MD      
 allopurinol (ZYLOPRIM) tablet 300 mg  300 mg Oral BID Abelino Espino NP      
 
 
OBJECTIVE: 
Patient Vitals for the past 8 hrs: 
 BP Temp Pulse Resp SpO2 Height Weight 19 0903 141/86 97.9 °F (36.6 °C) 83 20 97 %    
19 0900      6' (1.829 m) 273 lb 1.6 oz (123.9 kg) Temp (24hrs), Av.9 °F (36.6 °C), Min:97.9 °F (36.6 °C), Max:97.9 °F (36.6 °C) No intake/output data recorded. Physical Exam: Constitutional: Well developed, well nourished male in no acute distress, sitting comfortably in the hospital bed. Family at bedside. HEENT: Normocephalic and atraumatic. Oropharynx is clear, mucous membranes are moist.  Extraocular muscles are intact. Sclerae anicteric. Neck supple without JVD. No thyromegaly present. Lymph node Deferred Skin Warm and dry. No bruising and no rash noted. No erythema. No pallor. Respiratory Lungs are clear to auscultation bilaterally without wheezes, rales or rhonchi, normal air exchange without accessory muscle use. CVS Normal rate, regular rhythm and normal S1 and S2. No murmurs, gallops, or rubs. Abdomen Soft, nontender and nondistended, normoactive bowel sounds. No palpable mass. No hepatosplenomegaly. Neuro Grossly nonfocal with no obvious sensory or motor deficits. MSK Normal range of motion in general.  No edema and no tenderness. Psych Appropriate mood and affect. Labs:   
Recent Results (from the past 24 hour(s)) METABOLIC PANEL, COMPREHENSIVE Collection Time: 01/14/19  9:31 AM  
Result Value Ref Range Sodium 141 136 - 145 mmol/L Potassium 4.4 3.5 - 5.1 mmol/L Chloride 106 98 - 107 mmol/L  
 CO2 28 21 - 32 mmol/L Anion gap 7 7 - 16 mmol/L Glucose 96 65 - 100 mg/dL BUN 18 8 - 23 MG/DL Creatinine 1.14 0.8 - 1.5 MG/DL  
 GFR est AA >60 >60 ml/min/1.73m2 GFR est non-AA >60 >60 ml/min/1.73m2 Calcium 9.0 8.3 - 10.4 MG/DL Bilirubin, total 0.6 0.2 - 1.1 MG/DL  
 ALT (SGPT) 39 12 - 65 U/L  
 AST (SGOT) 34 15 - 37 U/L Alk. phosphatase 32 (L) 50 - 136 U/L Protein, total 7.4 6.3 - 8.2 g/dL Albumin 4.6 3.2 - 4.6 g/dL Globulin 2.8 2.3 - 3.5 g/dL A-G Ratio 1.6 1.2 - 3.5 MAGNESIUM Collection Time: 01/14/19  9:31 AM  
Result Value Ref Range Magnesium 2.2 1.8 - 2.4 mg/dL CBC WITH AUTOMATED DIFF Collection Time: 01/14/19  9:31 AM  
Result Value Ref Range .9 (HH) 4.3 - 11.1 K/uL RBC 4.26 4.23 - 5.6 M/uL  
 HGB 13.1 (L) 13.6 - 17.2 g/dL HCT 40.5 (L) 41.1 - 50.3 % MCV 95.1 79.6 - 97.8 FL  
 MCH 30.8 26.1 - 32.9 PG  
 MCHC 32.3 31.4 - 35.0 g/dL  
 RDW 14.6 11.9 - 14.6 % PLATELET 644 (L) 636 - 450 K/uL MPV 10.2 9.4 - 12.3 FL ABSOLUTE NRBC 0.00 0.0 - 0.2 K/uL NEUTROPHILS 3 (L) 43 - 78 % LYMPHOCYTES 92 (H) 13 - 44 % MONOCYTES 5 4.0 - 12.0 % EOSINOPHILS 0 (L) 0.5 - 7.8 % BASOPHILS 0 0.0 - 2.0 % IMMATURE GRANULOCYTES 0 0.0 - 5.0 %  
 ABS. NEUTROPHILS 3.3 1.7 - 8.2 K/UL  
 ABS. LYMPHOCYTES 100.2 (H) 0.5 - 4.6 K/UL  
 ABS. MONOCYTES 5.4 (H) 0.1 - 1.3 K/UL  
 ABS. EOSINOPHILS 0.0 0.0 - 0.8 K/UL  
 ABS. BASOPHILS 0.0 0.0 - 0.2 K/UL  
 ABS. IMM. GRANS. 0.0 0.0 - 0.5 K/UL  
 RBC COMMENTS NORMOCYTIC/NORMOCHROMIC    
 WBC COMMENTS OCCASIONAL    
 PLATELET COMMENTS SLIGHT    
 DF MANUAL    
URIC ACID Collection Time: 01/14/19  9:31 AM  
Result Value Ref Range Uric acid 6.3 (H) 2.6 - 6.0 MG/DL Imaging: 
n/a ASSESSMENT: 
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 9) ICD-10-CM: E66.01 
ICD-9-CM: 278.01  9/6/2018 Benign prostatic hyperplasia with nocturia (Chronic) ICD-10-CM: N40.1, R35.1 ICD-9-CM: 600.01, 788.43  1/23/2018 Severe obstructive sleep apnea ICD-10-CM: G47.33 
ICD-9-CM: 327.23  1/12/2017 Pure hypercholesterolemia (Chronic) ICD-10-CM: E78.00 ICD-9-CM: 272.0  12/22/2016 Essential hypertension, benign ICD-10-CM: I10 
ICD-9-CM: 401.1  9/21/2015 Impotence ICD-10-CM: N52.9 ICD-9-CM: 607.84  9/21/2015 DDD (degenerative disc disease), cervical ICD-10-CM: M50.30 ICD-9-CM: 722.4  9/21/2015  DDD (degenerative disc disease), lumbar ICD-10-CM: M51.36 
 ICD-9-CM: 722.52  9/21/2015 Right shoulder pain ICD-10-CM: M25.511 ICD-9-CM: 719.41  9/21/2015 First degree hemorrhoids ICD-10-CM: K64.0 ICD-9-CM: 455.0  9/21/2015 PLAN: 
T-cell prolymphocytic leukemia - Admitted for the initiation of Campath, begins at 3mg today 
- PICC ordered Risk of TLS 
- Check uric acid, start allopurinol Continue home meds Prophylactic Antibx: Acyclovir, Diflucan, Bactrim Juan Jose SOPs Lovenox for DVT prophylaxis (hold for plt <50k) Check CMV PCR weekly (next due 1/17) [facility] [address] [place] [address] Office : [phone] Fax : [phone] Attending Addendum: 
I have personally performed a face to face diagnostic evaluation on this patient. I have reviewed and agree with the care plan as documented above by Kevin Duncan, N.P.  My findings are as follows: Patient appears lethargic, heart rate regular without murmurs, abdomen is non-tender, bowel sounds are positive. 61 M newly diagnosed T cell lymphoproliferative disorder, now admitted for Alemtuzumab therapy/dose escalation. PICC line ordered. Supportive care as above including hydration. Check uric acid, may need rasburicase. Start allopurinol. Prophylactic abx weekly PCR. MD Radames Lemon [facility] [address] [place] [address] Office : [phone] Fax : [phone]

## 2019-01-14 NOTE — PROGRESS NOTES
Change of shift report given to Fox Ashford RN. Pt sitting up on sofa at this time visiting with family.

## 2019-01-14 NOTE — PROGRESS NOTES
PICC Placement Note PRE-PROCEDURE VERIFICATION Correct Procedure: yes. Time out completed with assistant Trista Antunez RN and all persons present in agreement with time out. Correct Site:  yes Temperature: Temp: 97.4 °F (36.3 °C), Temperature Source: Temp Source: Oral 
Recent Labs  
  01/14/19 
0931 BUN 18 CREA 1.14 * .9* Allergies: Patient has no known allergies. Education materials for [facility] given to patient or family. PROCEDURE DETAIL A double lumen PICC line was started for chemotherapy. The following documentation is in addition to the PICC properties in the lines/airways flowsheet : 
Lot #: [id number] 
xylocaine used: yes Mid-Arm Circumference: 40 (cm) Internal Catheter Length: 42 (cm) Internal Catheter Total Length: 43 (cm) Vein Selection for PICC:right brachial 
Central Line Bundle followed yes Complication Related to Insertion: none Both the insertion guidewire and ECG guidewire were removed intact all ports have positive blood return and were flush well with normal saline. The location of the tip of the PICC is verified using ECG technology. The tip is in the SVC per ECG reading. See image below. Line is okay to use: yes

## 2019-01-14 NOTE — PROGRESS NOTES
01/14/19 0900 Dual Skin Pressure Injury Assessment Dual Skin Pressure Injury Assessment WDL Second Care Provider (Based on [address] [place] [address]) Subha Chapman RN

## 2019-01-14 NOTE — INTERDISCIPLINARY ROUNDS
Interdisciplinary rounds with charge nurse, provider, and . Presenting Problem: T cell lymphoma; chemo Daily Goal : chemo, picc placement Expected Discharge Date: End of week Expected Discharge Needs: None identified at this time. Patient/Family Concerns addressed

## 2019-01-14 NOTE — PROGRESS NOTES
01/14/19 1437 Pre Treatment Chemotherapy Consent Form Signed Written consent obtained;Verbal consent obtained Pertinent Lab/ Xray Results Reviewed Yes BSA and Drug Calculations verified by 2 RNs yes (Comment) Initial Blood Return Obtained yes Proceed With Treatment Yes IV Access Type of IV Access  PICC IV Pump From Home No  
Chemotherapy Flowsheet Cycle 1 Date 01/14/19 Drug / Regimen Campath Dosage 3 mg Time Up 1437 Time Down 9718 Pre Hydration Yes Post Hydration N/A Pre Meds Zofran, Tylenol, Benadryl Chemotherapy Infusion Status Adverse Reaction Suspected Yes Chemotherapy Reaction Symptoms Chills;Rigors Notification of reaction Charge Nurse Interventions Stopped infusion;Treat symptoms Extravasation Suspected No  
 
Kelsea Palmer NP notified of reaction and interventions. No new orders received.

## 2019-01-15 LAB
ALBUMIN SERPL-MCNC: 3.9 G/DL (ref 3.2–4.6)
ALBUMIN/GLOB SERPL: 1.3 {RATIO} (ref 1.2–3.5)
ALP SERPL-CCNC: 37 U/L (ref 50–136)
ALT SERPL-CCNC: 36 U/L (ref 12–65)
ANION GAP SERPL CALC-SCNC: 5 MMOL/L (ref 7–16)
AST SERPL-CCNC: 41 U/L (ref 15–37)
BASOPHILS # BLD: 0 K/UL (ref 0–0.2)
BASOPHILS NFR BLD: 0 % (ref 0–2)
BILIRUB SERPL-MCNC: 0.5 MG/DL (ref 0.2–1.1)
BUN SERPL-MCNC: 16 MG/DL (ref 8–23)
CALCIUM SERPL-MCNC: 8.3 MG/DL (ref 8.3–10.4)
CHLORIDE SERPL-SCNC: 109 MMOL/L (ref 98–107)
CO2 SERPL-SCNC: 26 MMOL/L (ref 21–32)
CREAT SERPL-MCNC: 1.24 MG/DL (ref 0.8–1.5)
DIFFERENTIAL METHOD BLD: ABNORMAL
EOSINOPHIL # BLD: 0 K/UL (ref 0–0.8)
EOSINOPHIL NFR BLD: 0 % (ref 0.5–7.8)
ERYTHROCYTE [DISTWIDTH] IN BLOOD BY AUTOMATED COUNT: 14.8 % (ref 11.9–14.6)
GLOBULIN SER CALC-MCNC: 2.9 G/DL (ref 2.3–3.5)
GLUCOSE SERPL-MCNC: 124 MG/DL (ref 65–100)
HCT VFR BLD AUTO: 39.5 % (ref 41.1–50.3)
HGB BLD-MCNC: 12.5 G/DL (ref 13.6–17.2)
HTLV I AB SER QL IB: NEGATIVE
HTLV II AB SER QL: NEGATIVE
IMM GRANULOCYTES # BLD AUTO: 1.5 K/UL (ref 0–0.5)
IMM GRANULOCYTES NFR BLD AUTO: 2 % (ref 0–5)
LYMPHOCYTES # BLD: 51 K/UL (ref 0.5–4.6)
LYMPHOCYTES NFR BLD: 70 % (ref 13–44)
MAGNESIUM SERPL-MCNC: 1.9 MG/DL (ref 1.8–2.4)
MCH RBC QN AUTO: 30.5 PG (ref 26.1–32.9)
MCHC RBC AUTO-ENTMCNC: 31.6 G/DL (ref 31.4–35)
MCV RBC AUTO: 96.3 FL (ref 79.6–97.8)
MONOCYTES # BLD: 3.6 K/UL (ref 0.1–1.3)
MONOCYTES NFR BLD: 5 % (ref 4–12)
NEUTS SEG # BLD: 16.8 K/UL (ref 1.7–8.2)
NEUTS SEG NFR BLD: 23 % (ref 43–78)
NRBC # BLD: 0.14 K/UL (ref 0–0.2)
PLATELET # BLD AUTO: 68 K/UL (ref 150–450)
PLATELET COMMENTS,PCOM: ABNORMAL
PMV BLD AUTO: 10.8 FL (ref 9.4–12.3)
POTASSIUM SERPL-SCNC: 4.5 MMOL/L (ref 3.5–5.1)
PROT SERPL-MCNC: 6.8 G/DL (ref 6.3–8.2)
RBC # BLD AUTO: 4.1 M/UL (ref 4.23–5.6)
RBC MORPH BLD: ABNORMAL
SODIUM SERPL-SCNC: 140 MMOL/L (ref 136–145)
URATE SERPL-MCNC: 4.9 MG/DL (ref 2.6–6)
WBC # BLD AUTO: 72.9 K/UL (ref 4.3–11.1)
WBC MORPH BLD: ABNORMAL

## 2019-01-15 PROCEDURE — 99233 SBSQ HOSP IP/OBS HIGH 50: CPT | Performed by: INTERNAL MEDICINE

## 2019-01-15 PROCEDURE — 74011250636 HC RX REV CODE- 250/636: Performed by: NURSE PRACTITIONER

## 2019-01-15 PROCEDURE — 74011250637 HC RX REV CODE- 250/637: Performed by: NURSE PRACTITIONER

## 2019-01-15 PROCEDURE — 83735 ASSAY OF MAGNESIUM: CPT

## 2019-01-15 PROCEDURE — 74011000258 HC RX REV CODE- 258: Performed by: INTERNAL MEDICINE

## 2019-01-15 PROCEDURE — 80053 COMPREHEN METABOLIC PANEL: CPT

## 2019-01-15 PROCEDURE — 84550 ASSAY OF BLOOD/URIC ACID: CPT

## 2019-01-15 PROCEDURE — 74011250636 HC RX REV CODE- 250/636: Performed by: INTERNAL MEDICINE

## 2019-01-15 PROCEDURE — 77030020263 HC SOL INJ SOD CL0.9% LFCR 1000ML

## 2019-01-15 PROCEDURE — 36592 COLLECT BLOOD FROM PICC: CPT

## 2019-01-15 PROCEDURE — 74011250637 HC RX REV CODE- 250/637: Performed by: INTERNAL MEDICINE

## 2019-01-15 PROCEDURE — 85025 COMPLETE CBC W/AUTO DIFF WBC: CPT

## 2019-01-15 PROCEDURE — 65270000015 HC RM PRIVATE ONCOLOGY

## 2019-01-15 RX ORDER — CALCIUM CARBONATE 500(1250)
500 TABLET ORAL
Status: DISCONTINUED | OUTPATIENT
Start: 2019-01-15 | End: 2019-01-17 | Stop reason: HOSPADM

## 2019-01-15 RX ORDER — DIPHENHYDRAMINE HYDROCHLORIDE 50 MG/ML
50 INJECTION, SOLUTION INTRAMUSCULAR; INTRAVENOUS ONCE
Status: COMPLETED | OUTPATIENT
Start: 2019-01-15 | End: 2019-01-15

## 2019-01-15 RX ORDER — ONDANSETRON 2 MG/ML
4 INJECTION INTRAMUSCULAR; INTRAVENOUS ONCE
Status: COMPLETED | OUTPATIENT
Start: 2019-01-15 | End: 2019-01-15

## 2019-01-15 RX ORDER — ACETAMINOPHEN 325 MG/1
650 TABLET ORAL ONCE
Status: COMPLETED | OUTPATIENT
Start: 2019-01-15 | End: 2019-01-15

## 2019-01-15 RX ORDER — POLYETHYLENE GLYCOL 3350 17 G/17G
17 POWDER, FOR SOLUTION ORAL DAILY PRN
Status: DISCONTINUED | OUTPATIENT
Start: 2019-01-15 | End: 2019-01-17 | Stop reason: HOSPADM

## 2019-01-15 RX ORDER — AMOXICILLIN 250 MG
1 CAPSULE ORAL
Status: DISCONTINUED | OUTPATIENT
Start: 2019-01-15 | End: 2019-01-17 | Stop reason: HOSPADM

## 2019-01-15 RX ADMIN — Medication 500 MG: at 09:18

## 2019-01-15 RX ADMIN — SODIUM CHLORIDE 75 ML/HR: 900 INJECTION, SOLUTION INTRAVENOUS at 05:51

## 2019-01-15 RX ADMIN — ACYCLOVIR 400 MG: 800 TABLET ORAL at 09:17

## 2019-01-15 RX ADMIN — SIMVASTATIN 10 MG: 10 TABLET, FILM COATED ORAL at 21:01

## 2019-01-15 RX ADMIN — ALLOPURINOL 300 MG: 300 TABLET ORAL at 09:18

## 2019-01-15 RX ADMIN — STANDARDIZED SENNA CONCENTRATE AND DOCUSATE SODIUM 1 TABLET: 8.6; 5 TABLET, FILM COATED ORAL at 19:25

## 2019-01-15 RX ADMIN — ONDANSETRON 4 MG: 2 INJECTION INTRAMUSCULAR; INTRAVENOUS at 15:12

## 2019-01-15 RX ADMIN — ALLOPURINOL 300 MG: 300 TABLET ORAL at 17:43

## 2019-01-15 RX ADMIN — Medication 500 MG: at 17:43

## 2019-01-15 RX ADMIN — FLUCONAZOLE 200 MG: 100 TABLET ORAL at 09:18

## 2019-01-15 RX ADMIN — HYDROCHLOROTHIAZIDE: 12.5 CAPSULE ORAL at 09:18

## 2019-01-15 RX ADMIN — ACYCLOVIR 400 MG: 800 TABLET ORAL at 17:43

## 2019-01-15 RX ADMIN — Medication 20 ML: at 21:01

## 2019-01-15 RX ADMIN — SODIUM CHLORIDE 75 ML/HR: 900 INJECTION, SOLUTION INTRAVENOUS at 21:56

## 2019-01-15 RX ADMIN — ENOXAPARIN SODIUM 40 MG: 40 INJECTION SUBCUTANEOUS at 09:18

## 2019-01-15 RX ADMIN — DIPHENHYDRAMINE HYDROCHLORIDE 50 MG: 50 INJECTION, SOLUTION INTRAMUSCULAR; INTRAVENOUS at 15:12

## 2019-01-15 RX ADMIN — Medication 20 ML: at 05:02

## 2019-01-15 RX ADMIN — SODIUM CHLORIDE, PRESERVATIVE FREE 300 UNITS: 5 INJECTION INTRAVENOUS at 05:01

## 2019-01-15 RX ADMIN — SODIUM CHLORIDE 500 ML: 900 INJECTION, SOLUTION INTRAVENOUS at 15:12

## 2019-01-15 RX ADMIN — Medication 500 MG: at 12:44

## 2019-01-15 RX ADMIN — SODIUM CHLORIDE, PRESERVATIVE FREE 300 UNITS: 5 INJECTION INTRAVENOUS at 21:00

## 2019-01-15 RX ADMIN — ACETAMINOPHEN 650 MG: 325 TABLET ORAL at 15:12

## 2019-01-15 RX ADMIN — ALEMTUZUMAB 10 MG: 30 INJECTION INTRAVENOUS at 16:19

## 2019-01-15 NOTE — PROGRESS NOTES
END OF SHIFT NOTE: 
 
Intake/Output 01/14 1901 - 01/15 0700 In: 3575 [P.O.:980; I.V.:833] Out: 1075 [[facility]:7912] Voiding: YES Catheter: NO 
Drain:   
 
 
 
 
Stool:  0 occurrences. Emesis:  1 occurrence. VITAL SIGNS Patient Vitals for the past 12 hrs: 
 Temp Pulse Resp BP SpO2  
01/15/19 0243 97.9 °F (36.6 °C) 67 16 99/59 94 % 01/14/19 2345 98.3 °F (36.8 °C) 76 18 110/71 92 % 01/14/19 1926 98 °F (36.7 °C) 82 18 104/53 97 % 01/14/19 1659 98.4 °F (36.9 °C) 69 18 108/67 98 % Pain Assessment Pain 1 Pain Scale 1: Numeric (0 - 10) (01/15/19 0130) Pain Intensity 1: 0 (01/15/19 0130) Patient Stated Pain Goal: 0 (01/15/19 0130) Ambulating Yes Additional Information: S/P first dose Campath day shift. At around 1920;pt experienced rigors;prn dose of IV Demerol given which instantly resolved rigors;no other symptoms. NP David Mack notified; IV Solucortef/Pepcid given. Had emesis x 1;prn Zofran given;no further vomiting afterwards. Monitored pt closely;no further episodes. Shift report given to oncoming nurse SYLVIA Novak at the bedside.  
 
Senait Blake RN

## 2019-01-15 NOTE — PROGRESS NOTES
Problem: Falls - Risk of 
Goal: *Absence of Falls Outcome: Progressing Towards Goal 
Fall Risk Interventions: 
  
 
  
 
Medication Interventions: Teach patient to arise slowly, Patient to call before getting OOB Elimination Interventions: Call light in reach, Patient to call for help with toileting needs, Elevated toilet seat, Toilet paper/wipes in reach, Toileting schedule/hourly rounds

## 2019-01-15 NOTE — PROGRESS NOTES
[facility]ing in bed. No needs voiced. No s/s of acute distress. 1219-Discussed treatment plan and yesterdays infusion reactions with Deedee Sanderson NP. Ok to proceed with chemo without additional pre-meds and plt=68K.

## 2019-01-15 NOTE — PROGRESS NOTES
Problem: Falls - Risk of 
Goal: *Absence of Falls Document Maricarmen Busch Fall Risk and appropriate interventions in the flowsheet. Outcome: Progressing Towards Goal 
Fall Risk Interventions: 
  
 
  
 
Medication Interventions: Teach patient to arise slowly Elimination Interventions: Call light in reach, Patient to call for help with toileting needs, Toilet paper/wipes in reach, Toileting schedule/hourly rounds, Urinal in reach

## 2019-01-15 NOTE — PROGRESS NOTES
[facility] Hematology & Oncology Inpatient Hematology / Oncology Daily Progress Note Reason for Asmission:  T-cell lymphoma Admission for antineoplastic chemotherapy 24 Hour Events: 
Afebrile, VSS 
C1D2 Campath, 10mg today Had rigors with campath yesterday, resolved with demerol Wife at bedside ROS: 
Constitutional: +chills - resolved. Negative for fever, weakness, malaise, fatigue. CV: Negative for chest pain, palpitations, edema. Respiratory: Negative for dyspnea, cough, wheezing. GI: Negative for nausea, abdominal pain, diarrhea. 10 point review of systems is otherwise negative with the exception of the elements mentioned above in the HPI. No Known Allergies Past Medical History:  
Diagnosis Date  Back pain  Cervical radiculopathy  GERD (gastroesophageal reflux disease)  H/O seasonal allergies  Hyperlipidemia  Impotence  Insomnia  Lateral epicondylitis  Obesity  Sleep apnea Past Surgical History:  
Procedure Laterality Date  HX CIRCUMCISION    
 HX COLONOSCOPY  2017 Due in 2027  HX ORTHOPAEDIC    
 r wrist  
 HX WISDOM TEETH EXTRACTION Family History Problem Relation Age of Onset  Cancer Mother 80  
     pancreatic  Cancer Father 76  
     breast  
 No Known Problems Son   
 Hypertension Brother  Hypertension Brother  No Known Problems Daughter Social History Socioeconomic History  Marital status:  Spouse name: Not on file  Number of children: Not on file  Years of education: Not on file  Highest education level: Not on file Social Needs  Financial resource strain: Not on file  Food insecurity - worry: Not on file  Food insecurity - inability: Not on file  Transportation needs - medical: Not on file  Transportation needs - non-medical: Not on file Occupational History  Not on file Tobacco Use  Smoking status: Never Smoker  Smokeless tobacco: Never Used Substance and Sexual Activity  Alcohol use: Yes Alcohol/week: 1.8 oz Types: 3 Shots of liquor per week  Drug use: No  
 Sexual activity: Yes  
  Partners: Female Other Topics Concern  Not on file Social History Narrative  Not on file Current Facility-Administered Medications Medication Dose Route Frequency Provider Last Rate Last Dose  calcium carbonate (OS-DANIELA) tablet 500 mg [elemental]  500 mg Oral TID WITH MEALS Gal Stubbs MD   500 mg at 01/15/19 7085  simvastatin (ZOCOR) tablet 10 mg  10 mg Oral QHS Jaylin Conklin NP   10 mg at 01/14/19 2114  
 0.9% sodium chloride infusion  75 mL/hr IntraVENous CONTINUOUS Jaylin Conklin NP 75 mL/hr at 01/15/19 0551 75 mL/hr at 01/15/19 0551  enoxaparin (LOVENOX) injection 40 mg  40 mg SubCUTAneous Q24H Jaylin Conklin NP   40 mg at 01/15/19 3399  acyclovir (ZOVIRAX) tablet 400 mg  400 mg Oral BID Josephine Anderson NP   400 mg at 01/15/19 7688  
 fluconazole (DIFLUCAN) tablet 200 mg  200 mg Oral DAILY Josephine Anderson NP   200 mg at 01/15/19 1903  trimethoprim-sulfamethoxazole (BACTRIM DS, SEPTRA DS) 160-800 mg per tablet 1 Tab  1 Tab Oral Q MON, WED & Yaminisujata Wallace NP   1 Tab at 01/14/19 1625  olmesartan/hydroCHLOROthiazide (BENICAR HCT) 20/12.5 mg   Oral DAILY Gal Stubbs MD      
 allopurinol (ZYLOPRIM) tablet 300 mg  300 mg Oral BID Josephine Anderson NP   300 mg at 01/15/19 2934  sodium chloride (NS) flush 20 mL  20 mL InterCATHeter Q8H Gal Stubbs MD   20 mL at 01/15/19 0502  heparin (porcine) pf 600 Units  600 Units InterCATHeter Q8H Gal Stubbs MD   300 Units at 01/15/19 0501  
 sodium chloride (NS) flush 20 mL  20 mL InterCATHeter PRN Gal Stubbs MD      
 heparin (porcine) pf 600 Units  600 Units InterCATHeter PRN Gal Stubbs MD      
 meperidine (DEMEROL) injection 25 mg  25 mg IntraVENous Q3H PRN Davina Paige NP      
 
 
OBJECTIVE: 
 Patient Vitals for the past 8 hrs: 
 BP Temp Pulse Resp SpO2  
01/15/19 0758 119/69 98 °F (36.7 °C) 66 18 98 % 01/15/19 0243 99/59 97.9 °F (36.6 °C) 67 16 94 % Temp (24hrs), Av.1 °F (36.7 °C), Min:97.4 °F (36.3 °C), Max:98.4 °F (36.9 °C) 
 
01/15 0701 - 01/15 1900 In: 860 [P.O.:860] Out: 200 [Urine:200] Physical Exam: 
Constitutional: Well developed, well nourished male in no acute distress, sitting comfortably in the bedside couch. HEENT: Normocephalic and atraumatic. Oropharynx is clear, mucous membranes are moist.  Extraocular muscles are intact. Sclerae anicteric. Neck supple without JVD. No thyromegaly present. Lymph node Deferred Skin Warm and dry. No bruising and no rash noted. No erythema. No pallor. Respiratory Lungs are clear to auscultation bilaterally without wheezes, rales or rhonchi, normal air exchange without accessory muscle use. CVS Normal rate, regular rhythm and normal S1 and S2. No murmurs, gallops, or rubs. Abdomen Soft, nontender and nondistended, normoactive bowel sounds. No palpable mass. No hepatosplenomegaly. Neuro Grossly nonfocal with no obvious sensory or motor deficits. MSK Normal range of motion in general.  No edema and no tenderness. Psych Appropriate mood and affect. Labs:   
Recent Results (from the past 24 hour(s)) METABOLIC PANEL, COMPREHENSIVE Collection Time: 01/15/19  2:36 AM  
Result Value Ref Range Sodium 140 136 - 145 mmol/L Potassium 4.5 3.5 - 5.1 mmol/L Chloride 109 (H) 98 - 107 mmol/L  
 CO2 26 21 - 32 mmol/L Anion gap 5 (L) 7 - 16 mmol/L Glucose 124 (H) 65 - 100 mg/dL BUN 16 8 - 23 MG/DL Creatinine 1.24 0.8 - 1.5 MG/DL  
 GFR est AA >60 >60 ml/min/1.73m2 GFR est non-AA >60 >60 ml/min/1.73m2 Calcium 8.3 8.3 - 10.4 MG/DL Bilirubin, total 0.5 0.2 - 1.1 MG/DL  
 ALT (SGPT) 36 12 - 65 U/L  
 AST (SGOT) 41 (H) 15 - 37 U/L Alk.  phosphatase 37 (L) 50 - 136 U/L  
 Protein, total 6.8 6.3 - 8.2 g/dL Albumin 3.9 3.2 - 4.6 g/dL Globulin 2.9 2.3 - 3.5 g/dL A-G Ratio 1.3 1.2 - 3.5 MAGNESIUM Collection Time: 01/15/19  2:36 AM  
Result Value Ref Range Magnesium 1.9 1.8 - 2.4 mg/dL CBC WITH AUTOMATED DIFF Collection Time: 01/15/19  2:36 AM  
Result Value Ref Range WBC 72.9 (HH) 4.3 - 11.1 K/uL  
 RBC 4.10 (L) 4.23 - 5.6 M/uL  
 HGB 12.5 (L) 13.6 - 17.2 g/dL HCT 39.5 (L) 41.1 - 50.3 % MCV 96.3 79.6 - 97.8 FL  
 MCH 30.5 26.1 - 32.9 PG  
 MCHC 31.6 31.4 - 35.0 g/dL  
 RDW 14.8 (H) 11.9 - 14.6 % PLATELET 68 (L) 249 - 450 K/uL MPV 10.8 9.4 - 12.3 FL ABSOLUTE NRBC 0.14 0.0 - 0.2 K/uL NEUTROPHILS 23 (L) 43 - 78 % LYMPHOCYTES 70 (H) 13 - 44 % MONOCYTES 5 4.0 - 12.0 % EOSINOPHILS 0 (L) 0.5 - 7.8 % BASOPHILS 0 0.0 - 2.0 % IMMATURE GRANULOCYTES 2 0.0 - 5.0 %  
 ABS. NEUTROPHILS 16.8 (H) 1.7 - 8.2 K/UL  
 ABS. LYMPHOCYTES 51.0 (H) 0.5 - 4.6 K/UL  
 ABS. MONOCYTES 3.6 (H) 0.1 - 1.3 K/UL  
 ABS. EOSINOPHILS 0.0 0.0 - 0.8 K/UL  
 ABS. BASOPHILS 0.0 0.0 - 0.2 K/UL  
 ABS. IMM. GRANS. 1.5 (H) 0.0 - 0.5 K/UL  
 RBC COMMENTS NORMOCYTIC/NORMOCHROMIC    
 WBC COMMENTS Result Confirmed By Smear PLATELET COMMENTS DECREASED    
 DF AUTOMATED    
URIC ACID Collection Time: 01/15/19  2:36 AM  
Result Value Ref Range Uric acid 4.9 2.6 - 6.0 MG/DL Imaging: 
n/a ASSESSMENT: 
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 9) ICD-10-CM: E66.01 
ICD-9-CM: 278.01  9/6/2018 Benign prostatic hyperplasia with nocturia (Chronic) ICD-10-CM: N40.1, R35.1 ICD-9-CM: 600.01, 788.43  1/23/2018 Severe obstructive sleep apnea ICD-10-CM: G47.33 
ICD-9-CM: 327.23  1/12/2017 Pure hypercholesterolemia (Chronic) ICD-10-CM: E78.00 ICD-9-CM: 272.0  12/22/2016 Essential hypertension, benign ICD-10-CM: I10 
ICD-9-CM: 401.1  9/21/2015 Impotence ICD-10-CM: N52.9 ICD-9-CM: 607.84  9/21/2015 DDD (degenerative disc disease), cervical ICD-10-CM: M50.30 ICD-9-CM: 722.4  9/21/2015 DDD (degenerative disc disease), lumbar ICD-10-CM: M51.36 
ICD-9-CM: 722.52  9/21/2015 Right shoulder pain ICD-10-CM: M25.511 ICD-9-CM: 719.41  9/21/2015 First degree hemorrhoids ICD-10-CM: K64.0 ICD-9-CM: 455.0  9/21/2015 Mr. Forest Monique is a 61 y.o. male admitted on 1/14/2019. He is a known patient of Dr. Sharonda Costello with T-cell prolymphocytic leukemia being admitted for the initiation of Campath. PLAN: 
T-cell prolymphocytic leukemia - Admitted for the initiation of Campath, begins at 3mg today 
- PICC ordered 1/15 C1D2 Campath 10mg today. Had rigors yesterday with Campath - resolved with demerol. Risk of TLS 
- Check uric acid, start allopurinol 1/15 Uric acid 4.9. Con't to monitor. Continue home meds Prophylactic Antibx: Acyclovir, Diflucan, Bactrim Juan Jose SOPs Lovenox for DVT prophylaxis (hold for plt <50k) Check CMV PCR weekly (next due 1/17) Disposition:  Anticipate discharge home on 1/18 or 1/19. Doni Joya NP [facility] Hematology & Oncology [id number] [address] Office : [phone] Fax : [phone] Attending Addendum: 
I have personally performed a face to face diagnostic evaluation on this patient. I have reviewed and agree with the care plan as documented above by Doni Joya N.EMIGDIO.  My findings are as follows: Patient appears lethargic, heart rate regular without murmurs, abdomen is non-tender, bowel sounds are positive. 61 M newly diagnosed T cell lymphoproliferative disorder, now admitted for Alemtuzumab therapy/dose escalation.  PICC line placed. Supportive care as above including hydration. On allopurinol. Prophylactic abx, weekly CMV PCR. Toloerated first dose reasonably, some rigors managed w/ demerol. Proceed w/ D2 therapy. Total time 35 min 50% in direct consultation about the patient's diagnosis and management. All questions answered. Melanee Gosselin, MD 
[facility] Hematology/Oncology [address] [place] [address] Office : [phone] Fax : [phone]

## 2019-01-15 NOTE — PROGRESS NOTES
SW and CM met with pt and spouse at bedside. Pt is AAOx4 and able to make needs known. Pt lives with his spouse in a house with 1 step up at the porch. Pt is retired from [employer]. Demographics verified. Pt has no DME and is independent with ADL's. No other needs at this time. SW will continue to monitor and follow up. Care Management Interventions PCP Verified by CM: Yes Last Visit to PCP: 12/05/18 Mode of Transport at Discharge: Self Transition of Care Consult (CM Consult): Discharge Planning Discharge Durable Medical Equipment: No 
Physical Therapy Consult: No 
Occupational Therapy Consult: No 
Speech Therapy Consult: No 
Current Support Network: Lives with Spouse, Own Home Confirm Follow Up Transport: Family Plan discussed with Pt/Family/Caregiver: Yes Freedom of Choice Offered: Yes Discharge Location Discharge Placement: Home

## 2019-01-15 NOTE — INTERDISCIPLINARY ROUNDS
Interdisciplinary rounds with charge nurse, provider, and . Presenting Problem: T cell lymphoma; chemo Daily Goal : D2 chemo Expected Discharge Date: Fri or Sat Expected Discharge Needs: None identified at this time Patient/Family Concerns addressed

## 2019-01-16 ENCOUNTER — APPOINTMENT (OUTPATIENT)
Dept: ULTRASOUND IMAGING | Age: 64
DRG: 847 | End: 2019-01-16
Attending: NURSE PRACTITIONER
Payer: COMMERCIAL

## 2019-01-16 LAB
ALBUMIN SERPL-MCNC: 3.5 G/DL (ref 3.2–4.6)
ALBUMIN/GLOB SERPL: 1.3 {RATIO} (ref 1.2–3.5)
ALP SERPL-CCNC: 43 U/L (ref 50–136)
ALT SERPL-CCNC: 56 U/L (ref 12–65)
ANION GAP SERPL CALC-SCNC: 4 MMOL/L (ref 7–16)
AST SERPL-CCNC: 72 U/L (ref 15–37)
BASOPHILS # BLD: 0.9 K/UL (ref 0–0.2)
BASOPHILS NFR BLD: 1 % (ref 0–2)
BILIRUB SERPL-MCNC: 0.4 MG/DL (ref 0.2–1.1)
BUN SERPL-MCNC: 16 MG/DL (ref 8–23)
CALCIUM SERPL-MCNC: 8.3 MG/DL (ref 8.3–10.4)
CHLORIDE SERPL-SCNC: 110 MMOL/L (ref 98–107)
CO2 SERPL-SCNC: 27 MMOL/L (ref 21–32)
CREAT SERPL-MCNC: 1.22 MG/DL (ref 0.8–1.5)
DIFFERENTIAL METHOD BLD: ABNORMAL
EOSINOPHIL # BLD: 0 K/UL (ref 0–0.8)
EOSINOPHIL NFR BLD: 0 % (ref 0.5–7.8)
ERYTHROCYTE [DISTWIDTH] IN BLOOD BY AUTOMATED COUNT: 14.9 % (ref 11.9–14.6)
GLOBULIN SER CALC-MCNC: 2.6 G/DL (ref 2.3–3.5)
GLUCOSE SERPL-MCNC: 92 MG/DL (ref 65–100)
HCT VFR BLD AUTO: 37.1 % (ref 41.1–50.3)
HGB BLD-MCNC: 11.8 G/DL (ref 13.6–17.2)
IMM GRANULOCYTES # BLD AUTO: 0.9 K/UL (ref 0–0.5)
IMM GRANULOCYTES NFR BLD AUTO: 1 % (ref 0–5)
LYMPHOCYTES # BLD: 73.7 K/UL (ref 0.5–4.6)
LYMPHOCYTES NFR BLD: 83 % (ref 13–44)
MAGNESIUM SERPL-MCNC: 2 MG/DL (ref 1.8–2.4)
MCH RBC QN AUTO: 30.4 PG (ref 26.1–32.9)
MCHC RBC AUTO-ENTMCNC: 31.8 G/DL (ref 31.4–35)
MCV RBC AUTO: 95.6 FL (ref 79.6–97.8)
MONOCYTES # BLD: 6.2 K/UL (ref 0.1–1.3)
MONOCYTES NFR BLD: 7 % (ref 4–12)
NEUTS SEG # BLD: 7.1 K/UL (ref 1.7–8.2)
NEUTS SEG NFR BLD: 8 % (ref 43–78)
NRBC # BLD: 0.05 K/UL (ref 0–0.2)
PLATELET # BLD AUTO: 54 K/UL (ref 150–450)
PLATELET COMMENTS,PCOM: ABNORMAL
PMV BLD AUTO: 10.9 FL (ref 9.4–12.3)
POTASSIUM SERPL-SCNC: 4.7 MMOL/L (ref 3.5–5.1)
POTASSIUM SERPL-SCNC: 5.3 MMOL/L (ref 3.5–5.1)
PROT SERPL-MCNC: 6.1 G/DL (ref 6.3–8.2)
RBC # BLD AUTO: 3.88 M/UL (ref 4.23–5.6)
SODIUM SERPL-SCNC: 141 MMOL/L (ref 136–145)
URATE SERPL-MCNC: 4.2 MG/DL (ref 2.6–6)
WBC # BLD AUTO: 88.8 K/UL (ref 4.3–11.1)
WBC MORPH BLD: ABNORMAL

## 2019-01-16 PROCEDURE — 74011250637 HC RX REV CODE- 250/637: Performed by: INTERNAL MEDICINE

## 2019-01-16 PROCEDURE — 36592 COLLECT BLOOD FROM PICC: CPT

## 2019-01-16 PROCEDURE — 74011250636 HC RX REV CODE- 250/636: Performed by: NURSE PRACTITIONER

## 2019-01-16 PROCEDURE — 74011250637 HC RX REV CODE- 250/637: Performed by: NURSE PRACTITIONER

## 2019-01-16 PROCEDURE — 84550 ASSAY OF BLOOD/URIC ACID: CPT

## 2019-01-16 PROCEDURE — 83735 ASSAY OF MAGNESIUM: CPT

## 2019-01-16 PROCEDURE — 74011000258 HC RX REV CODE- 258: Performed by: INTERNAL MEDICINE

## 2019-01-16 PROCEDURE — 99233 SBSQ HOSP IP/OBS HIGH 50: CPT | Performed by: INTERNAL MEDICINE

## 2019-01-16 PROCEDURE — 84132 ASSAY OF SERUM POTASSIUM: CPT

## 2019-01-16 PROCEDURE — 65270000015 HC RM PRIVATE ONCOLOGY

## 2019-01-16 PROCEDURE — 77030020263 HC SOL INJ SOD CL0.9% LFCR 1000ML

## 2019-01-16 PROCEDURE — 85025 COMPLETE CBC W/AUTO DIFF WBC: CPT

## 2019-01-16 PROCEDURE — 93971 EXTREMITY STUDY: CPT

## 2019-01-16 PROCEDURE — 80053 COMPREHEN METABOLIC PANEL: CPT

## 2019-01-16 PROCEDURE — 74011250636 HC RX REV CODE- 250/636: Performed by: INTERNAL MEDICINE

## 2019-01-16 RX ORDER — DIPHENHYDRAMINE HYDROCHLORIDE 50 MG/ML
50 INJECTION, SOLUTION INTRAMUSCULAR; INTRAVENOUS ONCE
Status: COMPLETED | OUTPATIENT
Start: 2019-01-16 | End: 2019-01-16

## 2019-01-16 RX ORDER — ONDANSETRON 2 MG/ML
4 INJECTION INTRAMUSCULAR; INTRAVENOUS ONCE
Status: COMPLETED | OUTPATIENT
Start: 2019-01-16 | End: 2019-01-16

## 2019-01-16 RX ORDER — MAG HYDROX/ALUMINUM HYD/SIMETH 200-200-20
30 SUSPENSION, ORAL (FINAL DOSE FORM) ORAL
Status: DISCONTINUED | OUTPATIENT
Start: 2019-01-16 | End: 2019-01-17 | Stop reason: HOSPADM

## 2019-01-16 RX ORDER — ACETAMINOPHEN 325 MG/1
650 TABLET ORAL ONCE
Status: COMPLETED | OUTPATIENT
Start: 2019-01-16 | End: 2019-01-16

## 2019-01-16 RX ADMIN — ENOXAPARIN SODIUM 40 MG: 40 INJECTION SUBCUTANEOUS at 09:04

## 2019-01-16 RX ADMIN — Medication 500 MG: at 16:21

## 2019-01-16 RX ADMIN — ACETAMINOPHEN 650 MG: 325 TABLET ORAL at 16:21

## 2019-01-16 RX ADMIN — Medication 500 MG: at 09:05

## 2019-01-16 RX ADMIN — SULFAMETHOXAZOLE AND TRIMETHOPRIM 1 TABLET: 800; 160 TABLET ORAL at 16:21

## 2019-01-16 RX ADMIN — ACYCLOVIR 400 MG: 800 TABLET ORAL at 09:05

## 2019-01-16 RX ADMIN — SODIUM CHLORIDE 75 ML/HR: 900 INJECTION, SOLUTION INTRAVENOUS at 11:24

## 2019-01-16 RX ADMIN — SODIUM CHLORIDE 500 ML: 900 INJECTION, SOLUTION INTRAVENOUS at 16:22

## 2019-01-16 RX ADMIN — Medication 500 MG: at 12:16

## 2019-01-16 RX ADMIN — ONDANSETRON 4 MG: 2 INJECTION INTRAMUSCULAR; INTRAVENOUS at 16:22

## 2019-01-16 RX ADMIN — DIPHENHYDRAMINE HYDROCHLORIDE 50 MG: 50 INJECTION, SOLUTION INTRAMUSCULAR; INTRAVENOUS at 16:22

## 2019-01-16 RX ADMIN — ALLOPURINOL 300 MG: 300 TABLET ORAL at 09:05

## 2019-01-16 RX ADMIN — SODIUM CHLORIDE, PRESERVATIVE FREE 300 UNITS: 5 INJECTION INTRAVENOUS at 05:26

## 2019-01-16 RX ADMIN — ALEMTUZUMAB 30 MG: 30 INJECTION INTRAVENOUS at 17:30

## 2019-01-16 RX ADMIN — ALLOPURINOL 300 MG: 300 TABLET ORAL at 18:16

## 2019-01-16 RX ADMIN — SIMVASTATIN 10 MG: 10 TABLET, FILM COATED ORAL at 21:25

## 2019-01-16 RX ADMIN — SODIUM CHLORIDE, PRESERVATIVE FREE 300 UNITS: 5 INJECTION INTRAVENOUS at 21:25

## 2019-01-16 RX ADMIN — STANDARDIZED SENNA CONCENTRATE AND DOCUSATE SODIUM 1 TABLET: 8.6; 5 TABLET, FILM COATED ORAL at 09:05

## 2019-01-16 RX ADMIN — SODIUM CHLORIDE 75 ML/HR: 900 INJECTION, SOLUTION INTRAVENOUS at 16:21

## 2019-01-16 RX ADMIN — HYDROCHLOROTHIAZIDE: 12.5 CAPSULE ORAL at 09:05

## 2019-01-16 RX ADMIN — FLUCONAZOLE 200 MG: 100 TABLET ORAL at 09:05

## 2019-01-16 RX ADMIN — ACYCLOVIR 400 MG: 800 TABLET ORAL at 18:16

## 2019-01-16 RX ADMIN — Medication 20 ML: at 21:25

## 2019-01-16 RX ADMIN — ALUMINUM HYDROXIDE, MAGNESIUM HYDROXIDE, AND SIMETHICONE 30 ML: 200; 200; 20 SUSPENSION ORAL at 22:22

## 2019-01-16 RX ADMIN — Medication 20 ML: at 05:26

## 2019-01-16 NOTE — PROGRESS NOTES
END OF SHIFT NOTE: 
 
Intake/Output 01/15 1901 - 01/16 0700 In: 1929 [P.O.:1230; I.V.:699] Out: 2750 [Urine:2750] Voiding: YES Catheter: NO 
Drain:   
 
 
 
 
Stool:  0 occurrences. Emesis:  0 occurrences. VITAL SIGNS Patient Vitals for the past 12 hrs: 
 Temp Pulse Resp BP SpO2  
01/16/19 0300 98.1 °F (36.7 °C) (!) 55 16 96/52 97 % 01/15/19 2308 97.5 °F (36.4 °C) (!) 58 16 100/57 96 % 01/15/19 1833 97.9 °F (36.6 °C) 72 18 134/73 97 % 01/15/19 1747 97.8 °F (36.6 °C) 69 18 117/77 98 % Pain Assessment Pain 1 Pain Scale 1: Numeric (0 - 10) (01/16/19 0035) Pain Intensity 1: 0 (01/16/19 0035) Patient Stated Pain Goal: 0 (01/16/19 0035) Ambulating Yes Additional Information:  
 
Slept fairly well Shift report given to oncoming nurse SYLVIA Novak  at the bedside.  
 
Shireen Campoverde RN

## 2019-01-16 NOTE — PROGRESS NOTES
Problem: Falls - Risk of 
Goal: *Absence of Falls Outcome: Progressing Towards Goal 
Fall Risk Interventions: 
  
 
  
 
Medication Interventions: Teach patient to arise slowly Elimination Interventions: Call light in reach

## 2019-01-16 NOTE — PROGRESS NOTES
Problem: Falls - Risk of 
Goal: *Absence of Falls Outcome: Progressing Towards Goal 
Fall Risk Interventions: 
  
 
  
 
Medication Interventions: Teach patient to arise slowly Elimination Interventions: Call light in reach, Patient to call for help with toileting needs, Toilet paper/wipes in reach, Toileting schedule/hourly rounds

## 2019-01-16 NOTE — INTERDISCIPLINARY ROUNDS
Interdisciplinary rounds with charge nurse, provider, and . Presenting Problem: T cell lymphoma, chemo Daily Goal D3 campath, doppler LLE Expected Discharge Date: 1/18/19 or 1/19/19 if pt tolerates chemo regimen Expected Discharge Needs: none at this time Patient/Family Concerns addressed

## 2019-01-16 NOTE — PROGRESS NOTES
[facility] Hematology & Oncology Inpatient Hematology / Oncology Daily Progress NoteReason for Asmission:  T-cell lymphoma Admission for antineoplastic chemotherapy 24 Hour Events: 
Afebrile, VSS 
C1D3 Campath, 30mg today Tolerating treatment well Multiple family members at bedside ROS: 
Constitutional: Negative for fever, weakness, malaise, fatigue. CV: +slight LLE. Negative for chest pain, palpitations. Respiratory: Negative for dyspnea, cough, wheezing. GI: Negative for nausea, abdominal pain, diarrhea. 10 point review of systems is otherwise negative with the exception of the elements mentioned above in the HPI. No Known Allergies Past Medical History:  
Diagnosis Date  Back pain  Cervical radiculopathy  GERD (gastroesophageal reflux disease)  H/O seasonal allergies  Hyperlipidemia  Impotence  Insomnia  Lateral epicondylitis  Obesity  Sleep apnea Past Surgical History:  
Procedure Laterality Date  HX CIRCUMCISION    
 HX COLONOSCOPY  2017 Due in 2027  HX ORTHOPAEDIC    
 r wrist  
 HX WISDOM TEETH EXTRACTION Family History Problem Relation Age of Onset  Cancer Mother 80  
     pancreatic  Cancer Father 76  
     breast  
 No Known Problems Son   
 Hypertension Brother  Hypertension Brother  No Known Problems Daughter Social History Socioeconomic History  Marital status:  Spouse name: Not on file  Number of children: Not on file  Years of education: Not on file  Highest education level: Not on file Social Needs  Financial resource strain: Not on file  Food insecurity - worry: Not on file  Food insecurity - inability: Not on file  Transportation needs - medical: Not on file  Transportation needs - non-medical: Not on file Occupational History  Not on file Tobacco Use  Smoking status: Never Smoker  Smokeless tobacco: Never Used Substance and Sexual Activity  Alcohol use: Yes Alcohol/week: 1.8 oz Types: 3 Shots of liquor per week  Drug use: No  
 Sexual activity: Yes  
  Partners: Female Other Topics Concern  Not on file Social History Narrative  Not on file Current Facility-Administered Medications Medication Dose Route Frequency Provider Last Rate Last Dose  calcium carbonate (OS-DANIELA) tablet 500 mg [elemental]  500 mg Oral TID WITH MEALS Marilu Phan MD   500 mg at 01/16/19 7971  senna-docusate (PERICOLACE) 8.6-50 mg per tablet 1 Tab  1 Tab Oral BID PRN Marilu Phan MD   1 Tab at 01/16/19 0844  polyethylene glycol (MIRALAX) packet 17 g  17 g Oral DAILY PRN Marilu Phan MD      
 simvastatin (ZOCOR) tablet 10 mg  10 mg Oral QHS Hood Vanegas NP   10 mg at 01/15/19 2101  
 0.9% sodium chloride infusion  75 mL/hr IntraVENous CONTINUOUS Hood Vanegas NP 75 mL/hr at 01/15/19 2156 75 mL/hr at 01/15/19 2156  enoxaparin (LOVENOX) injection 40 mg  40 mg SubCUTAneous Q24H Hood Vanegas NP   40 mg at 01/16/19 6304  acyclovir (ZOVIRAX) tablet 400 mg  400 mg Oral BID Spero Edilma, NP   400 mg at 01/16/19 2334  fluconazole (DIFLUCAN) tablet 200 mg  200 mg Oral DAILY Josuero Edilma, NP   200 mg at 01/16/19 8677  trimethoprim-sulfamethoxazole (BACTRIM DS, SEPTRA DS) 160-800 mg per tablet 1 Tab  1 Tab Oral Q MON, WED & Donita Linder, NP   1 Tab at 01/14/19 1625  olmesartan/hydroCHLOROthiazide (BENICAR HCT) 20/12.5 mg   Oral DAILY Marilu Phan MD      
 allopurinol (ZYLOPRIM) tablet 300 mg  300 mg Oral BID Josuero Edilma, NP   300 mg at 01/16/19 2149  sodium chloride (NS) flush 20 mL  20 mL InterCATHeter Q8H Marilu Phan MD   20 mL at 01/16/19 0526  
 heparin (porcine) pf 600 Units  600 Units InterCATHeter Q8H Marilu Phan MD   300 Units at 01/16/19 4459  sodium chloride (NS) flush 20 mL  20 mL InterCATHeter PRN Marilu Phan MD      
  heparin (porcine) pf 600 Units  600 Units InterCATHeter PRN Colette Avelar MD      
 meperidine (DEMEROL) injection 25 mg  25 mg IntraVENous Q3H PRN Lorel Collet, NP      
 
 
OBJECTIVE: 
Patient Vitals for the past 8 hrs: 
 BP Temp Pulse Resp SpO2  
19 0803 143/81 97.8 °F (36.6 °C) (!) 51 18 96 % 19 0601 131/73  (!) 52  99 % 19 0300 96/52 98.1 °F (36.7 °C) (!) 55 16 97 % Temp (24hrs), Av.9 °F (36.6 °C), Min:97.5 °F (36.4 °C), Max:98.4 °F (36.9 °C) 
 
 0701 -  1900 In: -  
Out: 235 [JNGXR:972] Physical Exam: 
Constitutional: Well developed, well nourished male in no acute distress, sitting comfortably in the bedside couch. HEENT: Normocephalic and atraumatic. Oropharynx is clear, mucous membranes are moist.  Extraocular muscles are intact. Sclerae anicteric. Neck supple without JVD. No thyromegaly present. Lymph node Deferred Skin Warm and dry. No bruising and no rash noted. No erythema. No pallor. Respiratory Lungs are clear to auscultation bilaterally without wheezes, rales or rhonchi, normal air exchange without accessory muscle use. CVS Bradycardic rate, regular rhythm and normal S1 and S2. No murmurs, gallops, or rubs. Abdomen Soft, nontender and nondistended, normoactive bowel sounds. No palpable mass. No hepatosplenomegaly. Neuro Grossly nonfocal with no obvious sensory or motor deficits. MSK Normal range of motion in general.  No tenderness. +very trace LLE edema. Psych Appropriate mood and affect. Labs:   
Recent Results (from the past 24 hour(s)) URIC ACID Collection Time: 19  3:00 AM  
Result Value Ref Range Uric acid 4.2 2.6 - 6.0 MG/DL  
CBC WITH AUTOMATED DIFF Collection Time: 19  3:00 AM  
Result Value Ref Range WBC 88.8 (HH) 4.3 - 11.1 K/uL  
 RBC 3.88 (L) 4.23 - 5.6 M/uL  
 HGB 11.8 (L) 13.6 - 17.2 g/dL HCT 37.1 (L) 41.1 - 50.3 %  MCV 95.6 79.6 - 97.8 FL  
 MCH 30.4 26.1 - 32.9 PG  
 MCHC 31.8 31.4 - 35.0 g/dL  
 RDW 14.9 (H) 11.9 - 14.6 % PLATELET 54 (L) 556 - 450 K/uL MPV 10.9 9.4 - 12.3 FL ABSOLUTE NRBC 0.05 0.0 - 0.2 K/uL NEUTROPHILS 8 (L) 43 - 78 % LYMPHOCYTES 83 (H) 13 - 44 % MONOCYTES 7 4.0 - 12.0 % EOSINOPHILS 0 (L) 0.5 - 7.8 % BASOPHILS 1 0.0 - 2.0 % IMMATURE GRANULOCYTES 1 0.0 - 5.0 %  
 ABS. NEUTROPHILS 7.1 1.7 - 8.2 K/UL  
 ABS. LYMPHOCYTES 73.7 (H) 0.5 - 4.6 K/UL  
 ABS. MONOCYTES 6.2 (H) 0.1 - 1.3 K/UL  
 ABS. EOSINOPHILS 0.0 0.0 - 0.8 K/UL  
 ABS. BASOPHILS 0.9 (H) 0.0 - 0.2 K/UL  
 ABS. IMM. GRANS. 0.9 (H) 0.0 - 0.5 K/UL  
 WBC COMMENTS ATYPICAL LYMPHOCYTES PRESENT    
 PLATELET COMMENTS DECREASED    
 DF AUTOMATED METABOLIC PANEL, COMPREHENSIVE Collection Time: 01/16/19  3:00 AM  
Result Value Ref Range Sodium 141 136 - 145 mmol/L Potassium 5.3 (H) 3.5 - 5.1 mmol/L Chloride 110 (H) 98 - 107 mmol/L  
 CO2 27 21 - 32 mmol/L Anion gap 4 (L) 7 - 16 mmol/L Glucose 92 65 - 100 mg/dL BUN 16 8 - 23 MG/DL Creatinine 1.22 0.8 - 1.5 MG/DL  
 GFR est AA >60 >60 ml/min/1.73m2 GFR est non-AA >60 >60 ml/min/1.73m2 Calcium 8.3 8.3 - 10.4 MG/DL Bilirubin, total 0.4 0.2 - 1.1 MG/DL  
 ALT (SGPT) 56 12 - 65 U/L  
 AST (SGOT) 72 (H) 15 - 37 U/L Alk. phosphatase 43 (L) 50 - 136 U/L Protein, total 6.1 (L) 6.3 - 8.2 g/dL Albumin 3.5 3.2 - 4.6 g/dL Globulin 2.6 2.3 - 3.5 g/dL A-G Ratio 1.3 1.2 - 3.5 MAGNESIUM Collection Time: 01/16/19  3:00 AM  
Result Value Ref Range Magnesium 2.0 1.8 - 2.4 mg/dL Imaging: 
n/a ASSESSMENT: 
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 9) ICD-10-CM: E66.01 
ICD-9-CM: 278.01  9/6/2018 Benign prostatic hyperplasia with nocturia (Chronic) ICD-10-CM: N40.1, R35.1 ICD-9-CM: 600.01, 788.43  1/23/2018 Severe obstructive sleep apnea ICD-10-CM: G47.33 
ICD-9-CM: 327.23  1/12/2017 Pure hypercholesterolemia (Chronic) ICD-10-CM: E78.00 ICD-9-CM: 272.0  12/22/2016 Essential hypertension, benign ICD-10-CM: I10 
ICD-9-CM: 401.1  9/21/2015 Impotence ICD-10-CM: N52.9 ICD-9-CM: 607.84  9/21/2015 DDD (degenerative disc disease), cervical ICD-10-CM: M50.30 ICD-9-CM: 722.4  9/21/2015 DDD (degenerative disc disease), lumbar ICD-10-CM: M51.36 
ICD-9-CM: 722.52  9/21/2015 Right shoulder pain ICD-10-CM: M25.511 ICD-9-CM: 719.41  9/21/2015 First degree hemorrhoids ICD-10-CM: K64.0 ICD-9-CM: 455.0  9/21/2015  [facility] 61 [facility]/2019. He is a known patient of Dr. 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 PLAN: 
T-cell prolymphocytic leukemia - Admitted for the initiation of Campath, begins at 3mg today 
- PICC ordered 1/15 C1D2 Campath 10mg today. Had rigors yesterday with Campath - resolved with demerol. 1/16 C1D3 Campath 30mg today. Tolerating treatment well. Risk of TLS 
- Check uric acid, start allopurinol 1/15 Uric acid 4.9. Con't to monitor. 1/16 UA 4.2. K+ 5.3, repeat level. LLE edema 1/16 Very trace LLE edema. Check doppler to r/o DVT Continue home meds Prophylactic Antibx: Acyclovir, Diflucan, Bactrim Juan Jose SOPs Lovenox for DVT prophylaxis (hold for plt <50k) Check CMV PCR weekly (next due tomorrow, 1/17) Disposition:  Anticipate discharge home on 1/18 or 1/19. Brandy Irene NP [facility] Hematology & Oncology [address] [place] [address] Office : [phone] Fax : [phone] Attending Addendum: 
I have personally performed a face to face diagnostic evaluation on this patient. I have reviewed and agree with the care plan as documented above by Cameron Byrd N.P.  My findings are as follows: Patient appears lethargic, heart rate regular without murmurs, abdomen is non-tender, bowel sounds are positive. 61 M newly diagnosed T cell lymphoproliferative disorder, now admitted for Alemtuzumab therapy/dose escalation. PICC line placed. Supportive care as above including hydration. On allopurinol. Prophylactic abx, weekly CMV PCR. Toloerated first dose reasonably, some rigors managed w/ demerol. Proceed w D3 therapy. Dose escalation per protocol. Case discussed w patient and family in detial.   
 
 
Total time 35 min 50% in direct consultation about the patient's diagnosis and management. All questions answered. Mariana Donnelly MD 
[facility] Hematology/Oncology [address] [place] [address] Office : [phone] Fax : [phone]

## 2019-01-16 NOTE — PROGRESS NOTES
Problem: Falls - Risk of 
Goal: *Absence of Falls Document Concetta Figueroa Fall Risk and appropriate interventions in the flowsheet. Outcome: Progressing Towards Goal 
Fall Risk Interventions: 
  
 
  
 
Medication Interventions: Teach patient to arise slowly Elimination Interventions: Call light in reach, Elevated toilet seat, Patient to call for help with toileting needs, Toilet paper/wipes in reach, Toileting schedule/hourly rounds

## 2019-01-17 ENCOUNTER — DOCUMENTATION ONLY (OUTPATIENT)
Dept: CASE MANAGEMENT | Age: 64
End: 2019-01-17

## 2019-01-17 VITALS
DIASTOLIC BLOOD PRESSURE: 88 MMHG | TEMPERATURE: 97.9 F | HEART RATE: 64 BPM | SYSTOLIC BLOOD PRESSURE: 140 MMHG | WEIGHT: 273.7 LBS | BODY MASS INDEX: 37.07 KG/M2 | RESPIRATION RATE: 18 BRPM | OXYGEN SATURATION: 95 % | HEIGHT: 72 IN

## 2019-01-17 LAB
ALBUMIN SERPL-MCNC: 3.7 G/DL (ref 3.2–4.6)
ALBUMIN/GLOB SERPL: 1.2 {RATIO} (ref 1.2–3.5)
ALP SERPL-CCNC: 58 U/L (ref 50–136)
ALT SERPL-CCNC: 71 U/L (ref 12–65)
ANION GAP SERPL CALC-SCNC: 4 MMOL/L (ref 7–16)
AST SERPL-CCNC: 73 U/L (ref 15–37)
BASOPHILS # BLD: 0.7 K/UL (ref 0–0.2)
BASOPHILS NFR BLD: 1 % (ref 0–2)
BILIRUB SERPL-MCNC: 0.4 MG/DL (ref 0.2–1.1)
BUN SERPL-MCNC: 13 MG/DL (ref 8–23)
CALCIUM SERPL-MCNC: 9.2 MG/DL (ref 8.3–10.4)
CHLORIDE SERPL-SCNC: 107 MMOL/L (ref 98–107)
CO2 SERPL-SCNC: 28 MMOL/L (ref 21–32)
CREAT SERPL-MCNC: 1.24 MG/DL (ref 0.8–1.5)
DIFFERENTIAL METHOD BLD: ABNORMAL
EOSINOPHIL # BLD: 0 K/UL (ref 0–0.8)
EOSINOPHIL NFR BLD: 0 % (ref 0.5–7.8)
ERYTHROCYTE [DISTWIDTH] IN BLOOD BY AUTOMATED COUNT: 14.6 % (ref 11.9–14.6)
GLOBULIN SER CALC-MCNC: 3.1 G/DL (ref 2.3–3.5)
GLUCOSE SERPL-MCNC: 89 MG/DL (ref 65–100)
HCT VFR BLD AUTO: 39.5 % (ref 41.1–50.3)
HGB BLD-MCNC: 12.4 G/DL (ref 13.6–17.2)
IMM GRANULOCYTES # BLD AUTO: 0.7 K/UL (ref 0–0.5)
IMM GRANULOCYTES NFR BLD AUTO: 1 % (ref 0–5)
LYMPHOCYTES # BLD: 57.7 K/UL (ref 0.5–4.6)
LYMPHOCYTES NFR BLD: 79 % (ref 13–44)
MAGNESIUM SERPL-MCNC: 2.1 MG/DL (ref 1.8–2.4)
MCH RBC QN AUTO: 30.2 PG (ref 26.1–32.9)
MCHC RBC AUTO-ENTMCNC: 31.4 G/DL (ref 31.4–35)
MCV RBC AUTO: 96.1 FL (ref 79.6–97.8)
MONOCYTES # BLD: 8 K/UL (ref 0.1–1.3)
MONOCYTES NFR BLD: 11 % (ref 4–12)
NEUTS SEG # BLD: 5.8 K/UL (ref 1.7–8.2)
NEUTS SEG NFR BLD: 8 % (ref 43–78)
NRBC # BLD: 0.11 K/UL (ref 0–0.2)
PLATELET # BLD AUTO: 57 K/UL (ref 150–450)
PLATELET COMMENTS,PCOM: ABNORMAL
PMV BLD AUTO: 10.9 FL (ref 9.4–12.3)
POTASSIUM SERPL-SCNC: 4.4 MMOL/L (ref 3.5–5.1)
PROT SERPL-MCNC: 6.8 G/DL (ref 6.3–8.2)
RBC # BLD AUTO: 4.11 M/UL (ref 4.23–5.6)
RBC MORPH BLD: ABNORMAL
SODIUM SERPL-SCNC: 139 MMOL/L (ref 136–145)
URATE SERPL-MCNC: 4 MG/DL (ref 2.6–6)
WBC # BLD AUTO: 72.9 K/UL (ref 4.3–11.1)
WBC MORPH BLD: ABNORMAL

## 2019-01-17 PROCEDURE — 36592 COLLECT BLOOD FROM PICC: CPT

## 2019-01-17 PROCEDURE — 77030020263 HC SOL INJ SOD CL0.9% LFCR 1000ML

## 2019-01-17 PROCEDURE — 74011250636 HC RX REV CODE- 250/636: Performed by: INTERNAL MEDICINE

## 2019-01-17 PROCEDURE — 74011250637 HC RX REV CODE- 250/637: Performed by: NURSE PRACTITIONER

## 2019-01-17 PROCEDURE — 83735 ASSAY OF MAGNESIUM: CPT

## 2019-01-17 PROCEDURE — 74011250637 HC RX REV CODE- 250/637: Performed by: INTERNAL MEDICINE

## 2019-01-17 PROCEDURE — 80053 COMPREHEN METABOLIC PANEL: CPT

## 2019-01-17 PROCEDURE — 84550 ASSAY OF BLOOD/URIC ACID: CPT

## 2019-01-17 PROCEDURE — 85025 COMPLETE CBC W/AUTO DIFF WBC: CPT

## 2019-01-17 PROCEDURE — 74011250636 HC RX REV CODE- 250/636: Performed by: NURSE PRACTITIONER

## 2019-01-17 PROCEDURE — 99239 HOSP IP/OBS DSCHRG MGMT >30: CPT | Performed by: INTERNAL MEDICINE

## 2019-01-17 RX ORDER — SULFAMETHOXAZOLE AND TRIMETHOPRIM 800; 160 MG/1; MG/1
1 TABLET ORAL
Qty: 12 TAB | Refills: 0 | Status: SHIPPED | OUTPATIENT
Start: 2019-01-18 | End: 2019-02-11 | Stop reason: SDUPTHER

## 2019-01-17 RX ORDER — PROCHLORPERAZINE MALEATE 10 MG
5-10 TABLET ORAL
Qty: 90 TAB | Refills: 0 | Status: SHIPPED | OUTPATIENT
Start: 2019-01-17 | End: 2019-06-18

## 2019-01-17 RX ORDER — ONDANSETRON 8 MG/1
8 TABLET, ORALLY DISINTEGRATING ORAL
Qty: 90 TAB | Refills: 0 | Status: SHIPPED | OUTPATIENT
Start: 2019-01-17 | End: 2020-09-05

## 2019-01-17 RX ORDER — ACYCLOVIR 400 MG/1
400 TABLET ORAL 2 TIMES DAILY
Qty: 60 TAB | Refills: 0 | Status: SHIPPED | OUTPATIENT
Start: 2019-01-17 | End: 2019-03-04

## 2019-01-17 RX ORDER — FLUCONAZOLE 200 MG/1
200 TABLET ORAL DAILY
Qty: 30 TAB | Refills: 0 | Status: SHIPPED | OUTPATIENT
Start: 2019-01-18 | End: 2019-02-11 | Stop reason: SDUPTHER

## 2019-01-17 RX ORDER — ALLOPURINOL 300 MG/1
300 TABLET ORAL 2 TIMES DAILY
Qty: 60 TAB | Refills: 0 | Status: SHIPPED | OUTPATIENT
Start: 2019-01-17 | End: 2019-02-11 | Stop reason: ALTCHOICE

## 2019-01-17 RX ADMIN — Medication 500 MG: at 14:38

## 2019-01-17 RX ADMIN — SODIUM CHLORIDE 75 ML/HR: 900 INJECTION, SOLUTION INTRAVENOUS at 04:22

## 2019-01-17 RX ADMIN — Medication 20 ML: at 05:12

## 2019-01-17 RX ADMIN — ALLOPURINOL 300 MG: 300 TABLET ORAL at 08:49

## 2019-01-17 RX ADMIN — FLUCONAZOLE 200 MG: 100 TABLET ORAL at 08:48

## 2019-01-17 RX ADMIN — ENOXAPARIN SODIUM 40 MG: 40 INJECTION SUBCUTANEOUS at 08:51

## 2019-01-17 RX ADMIN — Medication 500 MG: at 08:49

## 2019-01-17 RX ADMIN — HYDROCHLOROTHIAZIDE: 12.5 CAPSULE ORAL at 08:49

## 2019-01-17 RX ADMIN — Medication 20 ML: at 15:01

## 2019-01-17 RX ADMIN — SODIUM CHLORIDE, PRESERVATIVE FREE 600 UNITS: 5 INJECTION INTRAVENOUS at 15:01

## 2019-01-17 RX ADMIN — SODIUM CHLORIDE, PRESERVATIVE FREE 300 UNITS: 5 INJECTION INTRAVENOUS at 05:11

## 2019-01-17 RX ADMIN — ACYCLOVIR 400 MG: 800 TABLET ORAL at 08:48

## 2019-01-17 NOTE — DISCHARGE SUMMARY
[address] [place] [address] Hematology & Oncology: Inpatient Hematology / Oncology Discharge Summary Note    Patient ID:  Zulema Quinn  [id number]  61 y.o.  1955    Admit Date: 1/14/2019    Discharge Date: 1/17/2019    Admission Diagnoses: T-cell lymphoma  Admission for antineoplastic chemotherapy    Discharge Diagnoses:  Principal Diagnosis: <principal problem not specified>  Active Problems:    Admission for antineoplastic chemotherapy (1/14/2019)        Hospital Course:  Mr. Yoel Puga is a 61 y.o. male admitted on 1/14/2019. He is a known patient of Dr. Maggie Martinez with T-cell prolymphocytic leukemia being admitted for the initiation of Campath. He tolerated treatment well. Had some rigors with first dose, but was relieved with demerol. Tolerating dose escalation without any complications. He does state he is feeling a little depressed. No SI/HI ideations. Declined antidepressant. He is feeling well and is ready for discharge home. He has appt with infusion tomorrow for next dose and f/u appt scheduled with Dr. Maggie Martinez on 1/21. Advised to call with fever, chills, uncontrollable symptoms, or with any other concerns. Pt verbalizes understanding. Consults:  None    Pertinent Diagnostic Studies:   Labs:    Recent Labs     01/17/19  0216 01/16/19  0300 01/15/19  0236   WBC 72.9* 88.8* 72.9*   HGB 12.4* 11.8* 12.5*   PLT 57* 54* 68*   ANEU 5.8 7.1 16.8*      Recent Labs     01/17/19  0216 01/16/19  1020 01/16/19  0300 01/15/19  0236     --  141 140   K 4.4 4.7 5.3* 4.5     --  110* 109*   CO2 28  --  27 26   GLU 89  --  92 124*   BUN 13  --  16 16   CREA 1.24  --  1.22 1.24   CA 9.2  --  8.3 8.3   SGOT 73*  --  72* 41*   AP 58  --  43* 37*   TP 6.8  --  6.1* 6.8   ALB 3.7  --  3.5 3.9   MG 2.1  --  2.0 1.9       Imaging:  n/a    Current Discharge Medication List      START taking these medications    Details   acyclovir (ZOVIRAX) 400 mg tablet Take 1 Tab by mouth two (2) times a day.   Qty: 60 Tab, Refills: 0 allopurinol (ZYLOPRIM) 300 mg tablet Take 1 Tab by mouth two (2) times a day. Qty: 60 Tab, Refills: 0      fluconazole (DIFLUCAN) 200 mg tablet Take 1 Tab by mouth daily. FDA advises cautious prescribing of oral fluconazole in pregnancy. Qty: 30 Tab, Refills: 0      trimethoprim-sulfamethoxazole (BACTRIM DS, SEPTRA DS) 160-800 mg per tablet Take 1 Tab by mouth every Monday, Wednesday, Friday. Qty: 12 Tab, Refills: 0      ondansetron (ZOFRAN ODT) 8 mg disintegrating tablet Take 1 Tab by mouth every eight (8) hours as needed for Nausea. Qty: 90 Tab, Refills: 0      prochlorperazine (COMPAZINE) 10 mg tablet Take 0.5-1 Tabs by mouth every six (6) hours as needed. Qty: 90 Tab, Refills: 0         CONTINUE these medications which have NOT CHANGED    Details   lovastatin (MEVACOR) 10 mg tablet Take 1 Tab by mouth nightly. Qty: 90 Tab, Refills: 3    Associated Diagnoses: Pure hypercholesterolemia      olmesartan-hydroCHLOROthiazide (BENICAR HCT) 20-12.5 mg per tablet Take 1 Tab by mouth daily. Qty: 90 Tab, Refills: 3    Associated Diagnoses: Essential hypertension, benign      fluticasone (FLONASE) 50 mcg/actuation nasal spray 2 Sprays by Both Nostrils route daily. Qty: 1 Bottle, Refills: 2      DISABLED PLACARD (DISABLED PLACARD) DMV Handicap parking permit  Qty: 1 Each, Refills: 0    Associated Diagnoses: DDD (degenerative disc disease), lumbar      CETIRIZINE HCL (ZYRTEC PO) Take  by mouth.      multivitamin (ONE A DAY) tablet Take 1 Tab by mouth daily. DOCOSAHEXANOIC ACID/EPA (FISH OIL PO) Take  by mouth. VITAMIN A/VITAMIN D3 (NATURAL VITAMIN D PO) Take  by mouth. VIMOVO 500-20 mg TbID two (2) times a day.                OBJECTIVE:  Patient Vitals for the past 8 hrs:   BP Temp Pulse Resp SpO2   19 0816 139/77 98 °F (36.7 °C) (!) 56 18 95 %     Temp (24hrs), Av.1 °F (36.7 °C), Min:97.5 °F (36.4 °C), Max:98.9 °F (37.2 °C)    701 -  1900  In: 860 [P.O.:860]  Out: 900 [Urine:900]    Physical Exam:  Constitutional: Well developed, well nourished male in no acute distress, sitting comfortably on the bedside couch. Wife at bedside. HEENT: Normocephalic and atraumatic. Oropharynx is clear, mucous membranes are moist. Extraocular muscles are intact. Sclerae anicteric. Neck supple without JVD. No thyromegaly present. Lymph node   Deferred   Skin Warm and dry. No bruising and no rash noted. No erythema. No pallor. Respiratory Lungs are clear to auscultation bilaterally without wheezes, rales or rhonchi, normal air exchange without accessory muscle use. CVS Normal rate, regular rhythm and normal S1 and S2. No murmurs, gallops, or rubs. Abdomen Soft, nontender and nondistended, normoactive bowel sounds. No palpable mass. No hepatosplenomegaly. Neuro Grossly nonfocal with no obvious sensory or motor deficits. MSK Normal range of motion in general.  Trace LLE edema and no tenderness. Psych Appropriate mood and affect. ASSESSMENT:    Active Problems:    Admission for antineoplastic chemotherapy (1/14/2019)        DISPOSITION:  Follow-up Appointments   Procedures    FOLLOW UP VISIT Appointment in: Other (Joselyn Sanchez) Follow up as previously scheduled, tomorrow for next infusion and f/u with Dr. Hernan Florentino on 1/21     Follow up as previously scheduled, tomorrow for next infusion and f/u with Dr. Hernan Florentino on 1/21     Standing Status:   Standing     Number of Occurrences:   1     Order Specific Question:   Appointment in     Answer: Other (Specify)           Over 30 minutes was spent in discharge planning and coordination of care. Miugel Hollis NP  [facility] [place] Hematology & Oncology  [id number] [place] [address]  Office : [phone]  Fax : [phone]       Attending Addendum:  I have personally performed a face to face diagnostic evaluation on this patient. I have reviewed and agree with the care plan as documented by Miguel Hollis N.P.  Patient appears table, heart rate regular without murmurs, abdomen is non-tender, bowel sounds are positive. He has tolerated dose escalated Alemtuzumab well. Labs ok. Clinically stable for discharge. Will get next dose outpt on Friday. F/u in hematology office early next week. I spent 32 minutes on evaluation, management, counseling and discharge planning on patient.                   Mariana Donnelly MD  [facility] Hematology/Oncology  [address] [place] [address]  Office : [phone]  Fax : [phone]

## 2019-01-17 NOTE — PROGRESS NOTES
Problem: Falls - Risk of 
Goal: *Absence of Falls Document Chucky Fonseca Fall Risk and appropriate interventions in the flowsheet. Outcome: Progressing Towards Goal 
Fall Risk Interventions: 
  
 
  
 
Medication Interventions: Teach patient to arise slowly, Patient to call before getting OOB Elimination Interventions: Call light in reach, Elevated toilet seat, Patient to call for help with toileting needs, Toilet paper/wipes in reach, Toileting schedule/hourly rounds

## 2019-01-17 NOTE — PROGRESS NOTES
END OF SHIFT NOTE: 
 
Intake/Output 01/16 1901 - 01/17 0700 In: 1993 [P.O.:740; I.V.:1253] Out: 3338 [Urine:2575] Voiding: YES Catheter: NO 
Drain:   
 
 
 
 
Stool:  0 occurrences. Stool Assessment Stool Color: Dufm Polite (01/16/19 1614) Stool Appearance: Formed; Soft (01/16/19 1614) Stool Amount: Large (01/16/19 1614) Stool Source/Status: Rectum (01/16/19 1614) Emesis:  0 occurrences. VITAL SIGNS Patient Vitals for the past 12 hrs: 
 Temp Pulse Resp BP SpO2  
01/17/19 0212 98.9 °F (37.2 °C) 63 18 129/77 94 % 01/16/19 2338 98.4 °F (36.9 °C) 77 18 108/69 92 % 01/16/19 2127    144/81   
01/16/19 1944 98 °F (36.7 °C) 65 18 (!) 165/107 97 % 01/16/19 1811 97.9 °F (36.6 °C) (!) 55 18 (!) 151/98 95 % 01/16/19 1727 98.1 °F (36.7 °C) (!) 52 18 (!) 142/91 98 % Pain Assessment Pain 1 Pain Scale 1: Numeric (0 - 10) (01/17/19 0130) Pain Intensity 1: 0 (01/17/19 0130) Patient Stated Pain Goal: 0 (01/17/19 0130) Ambulating Yes;to the BR Additional Information:  
Completed Campath early part of the shift;no untoward reactions noted;pt just states feeling weak & tired. Continued to monitor. Feeling better this AM per patient. Shift report given to oncoming nurse Scarlet RN  at the bedside.  
 
Reese Coburn RN

## 2019-01-17 NOTE — PROGRESS NOTES
Care Management Interventions PCP Verified by CM: Yes Last Visit to PCP: 12/05/18 Mode of Transport at Discharge: Self Transition of Care Consult (CM Consult): Discharge Planning Discharge Durable Medical Equipment: No 
Physical Therapy Consult: No 
Occupational Therapy Consult: No 
Speech Therapy Consult: No 
Current Support Network: Lives with Spouse, Own Home Confirm Follow Up Transport: Family Plan discussed with Pt/Family/Caregiver: Yes Freedom of Choice Offered: Yes Discharge Location Discharge Placement: Home Milestones met. Pt has orders to discharge home. No other needs at this time.

## 2019-01-17 NOTE — DISCHARGE INSTRUCTIONS
DISCHARGE SUMMARY from Nurse    PATIENT INSTRUCTIONS:  Please call physician after your discharge if the following symptoms present:    1. Temperature greater than 99.5 (check temp every day)  2. Heart rate greater than 90 beats per minute  3. Increased respirations   4. Chills  5. Cough with any sputum (color: yellow, white, green, or bloody?)  6. Shortness of breath or change in breathing pattern  7. Bleeding:  Any prolonged bleeding presented from skin tears, cuts, bleeding from brushing teeth, nose bleed, bleeding noted in stool  8. Tenderness, swelling, or drainage from IV site or central line catheter    Diet:    Neutropenic Precautions  Thrombocytopenic Precautions    Follow-Up visits:  See above      MD office #: [phone]    What to do at Home:  Recommended activity: Activity as tolerated. *  Please give a list of your current medications to your Primary Care Provider. *  Please update this list whenever your medications are discontinued, doses are      changed, or new medications (including over-the-counter products) are added. *  Please carry medication information at all times in case of emergency situations. These are general instructions for a healthy lifestyle:    No smoking/ No tobacco products/ Avoid exposure to second hand smoke  Surgeon General's Warning:  Quitting smoking now greatly reduces serious risk to your health. Obesity, smoking, and sedentary lifestyle greatly increases your risk for illness    A healthy diet, regular physical exercise & weight monitoring are important for maintaining a healthy lifestyle    You may be retaining fluid if you have a history of heart failure or if you experience any of the following symptoms:  Weight gain of 3 pounds or more overnight or 5 pounds in a week, increased swelling in our hands or feet or shortness of breath while lying flat in bed.   Please call your doctor as soon as you notice any of these symptoms; do not wait until your next office visit. Recognize signs and symptoms of STROKE:    F-face looks uneven    A-arms unable to move or move unevenly    S-speech slurred or non-existent    T-time-call 911 as soon as signs and symptoms begin-DO NOT go       Back to bed or wait to see if you get better-TIME IS BRAIN. Warning Signs of HEART ATTACK     Call 911 if you have these symptoms:   Chest discomfort. Most heart attacks involve discomfort in the center of the chest that lasts more than a few minutes, or that goes away and comes back. It can feel like uncomfortable pressure, squeezing, fullness, or pain.  Discomfort in other areas of the upper body. Symptoms can include pain or discomfort in one or both arms, the back, neck, jaw, or stomach.  Shortness of breath with or without chest discomfort.  Other signs may include breaking out in a cold sweat, nausea, or lightheadedness. Don't wait more than five minutes to call 911 - MINUTES MATTER! Fast action can save your life. Calling 911 is almost always the fastest way to get lifesaving treatment. Emergency Medical Services staff can begin treatment when they arrive -- up to an hour sooner than if someone gets to the hospital by car. The discharge information has been reviewed with the patient. The patient verbalized understanding. Discharge medications reviewed with the patient and appropriate educational materials and side effects teaching were provided.   ___________________________________________________________________________________________________________________________________

## 2019-01-18 ENCOUNTER — HOSPITAL ENCOUNTER (OUTPATIENT)
Dept: INFUSION THERAPY | Age: 64
Discharge: HOME | End: 2019-01-18
Payer: COMMERCIAL

## 2019-01-18 VITALS
TEMPERATURE: 98.1 F | SYSTOLIC BLOOD PRESSURE: 145 MMHG | HEART RATE: 70 BPM | DIASTOLIC BLOOD PRESSURE: 90 MMHG | OXYGEN SATURATION: 99 % | WEIGHT: 274 LBS | BODY MASS INDEX: 38.36 KG/M2 | RESPIRATION RATE: 18 BRPM | HEIGHT: 71 IN

## 2019-01-18 DIAGNOSIS — C91.60 PROLYMPHOCYTIC LEUKEMIA OF T-CELL (HCC): Primary | ICD-10-CM

## 2019-01-18 LAB
CMV DNA SERPL NAA+PROBE-ACNC: NEGATIVE IU/ML
CMV DNA SERPL NAA+PROBE-LOG IU: NORMAL LOG10 IU/ML

## 2019-01-18 PROCEDURE — 74011000250 HC RX REV CODE- 250: Performed by: INTERNAL MEDICINE

## 2019-01-18 PROCEDURE — 74011250636 HC RX REV CODE- 250/636: Performed by: INTERNAL MEDICINE

## 2019-01-18 PROCEDURE — 96375 TX/PRO/DX INJ NEW DRUG ADDON: CPT

## 2019-01-18 PROCEDURE — 96413 CHEMO IV INFUSION 1 HR: CPT

## 2019-01-18 PROCEDURE — 96361 HYDRATE IV INFUSION ADD-ON: CPT

## 2019-01-18 PROCEDURE — 99211 OFF/OP EST MAY X REQ PHY/QHP: CPT

## 2019-01-18 PROCEDURE — 74011000258 HC RX REV CODE- 258: Performed by: INTERNAL MEDICINE

## 2019-01-18 PROCEDURE — 96415 CHEMO IV INFUSION ADDL HR: CPT

## 2019-01-18 PROCEDURE — 74011250637 HC RX REV CODE- 250/637: Performed by: INTERNAL MEDICINE

## 2019-01-18 RX ORDER — HEPARIN 100 UNIT/ML
300-500 SYRINGE INTRAVENOUS AS NEEDED
Status: DISPENSED | OUTPATIENT
Start: 2019-01-18 | End: 2019-01-18

## 2019-01-18 RX ORDER — HYDROCORTISONE SODIUM SUCCINATE 100 MG/2ML
100 INJECTION, POWDER, FOR SOLUTION INTRAMUSCULAR; INTRAVENOUS AS NEEDED
Status: DISPENSED | OUTPATIENT
Start: 2019-01-18 | End: 2019-01-19

## 2019-01-18 RX ORDER — ACETAMINOPHEN 325 MG/1
650 TABLET ORAL ONCE
Status: COMPLETED | OUTPATIENT
Start: 2019-01-18 | End: 2019-01-18

## 2019-01-18 RX ORDER — ONDANSETRON 2 MG/ML
4 INJECTION INTRAMUSCULAR; INTRAVENOUS ONCE
Status: COMPLETED | OUTPATIENT
Start: 2019-01-18 | End: 2019-01-18

## 2019-01-18 RX ORDER — SODIUM CHLORIDE 0.9 % (FLUSH) 0.9 %
10 SYRINGE (ML) INJECTION AS NEEDED
Status: ACTIVE | OUTPATIENT
Start: 2019-01-18 | End: 2019-01-18

## 2019-01-18 RX ORDER — DIPHENHYDRAMINE HYDROCHLORIDE 50 MG/ML
50 INJECTION, SOLUTION INTRAMUSCULAR; INTRAVENOUS ONCE
Status: COMPLETED | OUTPATIENT
Start: 2019-01-18 | End: 2019-01-18

## 2019-01-18 RX ORDER — DIPHENHYDRAMINE HYDROCHLORIDE 50 MG/ML
50 INJECTION, SOLUTION INTRAMUSCULAR; INTRAVENOUS AS NEEDED
Status: DISCONTINUED | OUTPATIENT
Start: 2019-01-18 | End: 2019-01-18

## 2019-01-18 RX ADMIN — DIPHENHYDRAMINE HYDROCHLORIDE 50 MG: 50 INJECTION, SOLUTION INTRAMUSCULAR; INTRAVENOUS at 12:15

## 2019-01-18 RX ADMIN — ALEMTUZUMAB 30 MG: 30 INJECTION INTRAVENOUS at 12:50

## 2019-01-18 RX ADMIN — MEPERIDINE HYDROCHLORIDE 25 MG: 25 INJECTION INTRAMUSCULAR; INTRAVENOUS; SUBCUTANEOUS at 14:41

## 2019-01-18 RX ADMIN — SODIUM CHLORIDE, PRESERVATIVE FREE 600 UNITS: 5 INJECTION INTRAVENOUS at 15:35

## 2019-01-18 RX ADMIN — HYDROCORTISONE SODIUM SUCCINATE 100 MG: 100 INJECTION, POWDER, FOR SOLUTION INTRAMUSCULAR; INTRAVENOUS at 14:40

## 2019-01-18 RX ADMIN — SODIUM CHLORIDE 500 ML: 900 INJECTION, SOLUTION INTRAVENOUS at 12:17

## 2019-01-18 RX ADMIN — Medication 20 ML: at 15:35

## 2019-01-18 RX ADMIN — FAMOTIDINE 20 MG: 10 INJECTION, SOLUTION INTRAVENOUS at 14:42

## 2019-01-18 RX ADMIN — ACETAMINOPHEN 650 MG: 325 TABLET, FILM COATED ORAL at 12:13

## 2019-01-18 RX ADMIN — ONDANSETRON 4 MG: 2 INJECTION, SOLUTION INTRAMUSCULAR; INTRAVENOUS at 12:13

## 2019-01-18 RX ADMIN — Medication 10 ML: at 12:00

## 2019-01-18 NOTE — PROGRESS NOTES
1535-I have reviewed discharge instructions with the patient. The patient verbalized understanding. Discharged to home. To lobby via wheelchair and accompanied by staff.

## 2019-01-18 NOTE — PROGRESS NOTES
Pt ambulatory to area without complaints. Pt received premeds/hydration/chemo treatment per order. Approximately 15 mins before completion of chemo(1432), pt started c/o rigors. Chemo stopped, pt received emergency meds per order. Symptoms resolved within 15 mins. Lorenzo Wallace NP notified, states OK to stop chemo due to small amount left to infuse. This was pt's 3rd reaction to Campath. Pt remained till vitals wnl and all symptoms resolved. Aware of next appt on [employer]. Advised to call dr with any issues/concerns. Discharged home without complaints.

## 2019-01-21 ENCOUNTER — PATIENT OUTREACH (OUTPATIENT)
Dept: CASE MANAGEMENT | Age: 64
End: 2019-01-21

## 2019-01-21 ENCOUNTER — HOSPITAL ENCOUNTER (OUTPATIENT)
Dept: LAB | Age: 64
Discharge: HOME | End: 2019-01-21
Payer: COMMERCIAL

## 2019-01-21 ENCOUNTER — HOSPITAL ENCOUNTER (OUTPATIENT)
Dept: INFUSION THERAPY | Age: 64
Discharge: HOME | End: 2019-01-21
Payer: COMMERCIAL

## 2019-01-21 VITALS
HEART RATE: 77 BPM | RESPIRATION RATE: 20 BRPM | TEMPERATURE: 99.6 F | OXYGEN SATURATION: 95 % | SYSTOLIC BLOOD PRESSURE: 136 MMHG | DIASTOLIC BLOOD PRESSURE: 77 MMHG

## 2019-01-21 DIAGNOSIS — C91.60 PROLYMPHOCYTIC LEUKEMIA OF T-CELL (HCC): ICD-10-CM

## 2019-01-21 DIAGNOSIS — C91.60 PROLYMPHOCYTIC LEUKEMIA OF T-CELL (HCC): Primary | ICD-10-CM

## 2019-01-21 LAB
ALBUMIN SERPL-MCNC: 4.3 G/DL (ref 3.2–4.6)
ALBUMIN/GLOB SERPL: 1.2 {RATIO} (ref 1.2–3.5)
ALP SERPL-CCNC: 39 U/L (ref 50–136)
ALT SERPL-CCNC: 143 U/L (ref 12–65)
ANION GAP SERPL CALC-SCNC: 5 MMOL/L (ref 7–16)
AST SERPL-CCNC: 70 U/L (ref 15–37)
BASOPHILS # BLD: 0 K/UL (ref 0–0.2)
BASOPHILS NFR BLD: 0 % (ref 0–2)
BILIRUB SERPL-MCNC: 0.6 MG/DL (ref 0.2–1.1)
BUN SERPL-MCNC: 19 MG/DL (ref 8–23)
CALCIUM SERPL-MCNC: 9.4 MG/DL (ref 8.3–10.4)
CHLORIDE SERPL-SCNC: 105 MMOL/L (ref 98–107)
CO2 SERPL-SCNC: 27 MMOL/L (ref 21–32)
CREAT SERPL-MCNC: 1.29 MG/DL (ref 0.8–1.5)
DIFFERENTIAL METHOD BLD: ABNORMAL
EOSINOPHIL # BLD: 0 K/UL (ref 0–0.8)
EOSINOPHIL NFR BLD: 0 % (ref 0.5–7.8)
ERYTHROCYTE [DISTWIDTH] IN BLOOD BY AUTOMATED COUNT: 14.6 % (ref 11.9–14.6)
GLOBULIN SER CALC-MCNC: 3.6 G/DL (ref 2.3–3.5)
GLUCOSE SERPL-MCNC: 109 MG/DL (ref 65–100)
HCT VFR BLD AUTO: 40.3 % (ref 41.1–50.3)
HGB BLD-MCNC: 13.2 G/DL (ref 13.6–17.2)
IMM GRANULOCYTES # BLD AUTO: 0 K/UL (ref 0–0.5)
IMM GRANULOCYTES NFR BLD AUTO: 0 % (ref 0–5)
LYMPHOCYTES # BLD: 47.6 K/UL (ref 0.5–4.6)
LYMPHOCYTES NFR BLD: 80 % (ref 13–44)
MCH RBC QN AUTO: 30.8 PG (ref 26.1–32.9)
MCHC RBC AUTO-ENTMCNC: 32.8 G/DL (ref 31.4–35)
MCV RBC AUTO: 93.9 FL (ref 79.6–97.8)
MONOCYTES # BLD: 7.7 K/UL (ref 0.1–1.3)
MONOCYTES NFR BLD: 13 % (ref 4–12)
NEUTS SEG # BLD: 4.2 K/UL (ref 1.7–8.2)
NEUTS SEG NFR BLD: 7 % (ref 43–78)
NRBC # BLD: 0 K/UL (ref 0–0.2)
PLATELET # BLD AUTO: 79 K/UL (ref 150–450)
PLATELET COMMENTS,PCOM: ABNORMAL
PMV BLD AUTO: 10.7 FL (ref 9.4–12.3)
POTASSIUM SERPL-SCNC: 3.9 MMOL/L (ref 3.5–5.1)
PROT SERPL-MCNC: 7.9 G/DL (ref 6.3–8.2)
RBC # BLD AUTO: 4.29 M/UL (ref 4.23–5.67)
RBC MORPH BLD: ABNORMAL
SODIUM SERPL-SCNC: 137 MMOL/L (ref 136–145)
WBC # BLD AUTO: 59.5 K/UL (ref 4.3–11.1)
WBC MORPH BLD: ABNORMAL

## 2019-01-21 PROCEDURE — 99211 OFF/OP EST MAY X REQ PHY/QHP: CPT

## 2019-01-21 PROCEDURE — 74011250637 HC RX REV CODE- 250/637: Performed by: INTERNAL MEDICINE

## 2019-01-21 PROCEDURE — 96375 TX/PRO/DX INJ NEW DRUG ADDON: CPT

## 2019-01-21 PROCEDURE — 74011000250 HC RX REV CODE- 250: Performed by: INTERNAL MEDICINE

## 2019-01-21 PROCEDURE — 96413 CHEMO IV INFUSION 1 HR: CPT

## 2019-01-21 PROCEDURE — 85025 COMPLETE CBC W/AUTO DIFF WBC: CPT

## 2019-01-21 PROCEDURE — 80053 COMPREHEN METABOLIC PANEL: CPT

## 2019-01-21 PROCEDURE — 74011000258 HC RX REV CODE- 258: Performed by: INTERNAL MEDICINE

## 2019-01-21 PROCEDURE — 74011250636 HC RX REV CODE- 250/636: Performed by: INTERNAL MEDICINE

## 2019-01-21 RX ORDER — HYDROCORTISONE SODIUM SUCCINATE 100 MG/2ML
100 INJECTION, POWDER, FOR SOLUTION INTRAMUSCULAR; INTRAVENOUS AS NEEDED
Status: DISPENSED | OUTPATIENT
Start: 2019-01-21 | End: 2019-01-22

## 2019-01-21 RX ORDER — DIPHENHYDRAMINE HYDROCHLORIDE 50 MG/ML
50 INJECTION, SOLUTION INTRAMUSCULAR; INTRAVENOUS ONCE
Status: COMPLETED | OUTPATIENT
Start: 2019-01-21 | End: 2019-01-21

## 2019-01-21 RX ORDER — SODIUM CHLORIDE 0.9 % (FLUSH) 0.9 %
10 SYRINGE (ML) INJECTION AS NEEDED
Status: ACTIVE | OUTPATIENT
Start: 2019-01-21 | End: 2019-01-22

## 2019-01-21 RX ORDER — DIPHENHYDRAMINE HYDROCHLORIDE 50 MG/ML
50 INJECTION, SOLUTION INTRAMUSCULAR; INTRAVENOUS AS NEEDED
Status: DISPENSED | OUTPATIENT
Start: 2019-01-21 | End: 2019-01-22

## 2019-01-21 RX ORDER — ACETAMINOPHEN 325 MG/1
650 TABLET ORAL ONCE
Status: COMPLETED | OUTPATIENT
Start: 2019-01-21 | End: 2019-01-21

## 2019-01-21 RX ORDER — ONDANSETRON 2 MG/ML
4 INJECTION INTRAMUSCULAR; INTRAVENOUS ONCE
Status: COMPLETED | OUTPATIENT
Start: 2019-01-21 | End: 2019-01-21

## 2019-01-21 RX ORDER — HEPARIN 100 UNIT/ML
300-500 SYRINGE INTRAVENOUS AS NEEDED
Status: DISPENSED | OUTPATIENT
Start: 2019-01-21 | End: 2019-01-22

## 2019-01-21 RX ADMIN — ALEMTUZUMAB 30 MG: 30 INJECTION INTRAVENOUS at 17:10

## 2019-01-21 RX ADMIN — Medication 10 ML: at 16:19

## 2019-01-21 RX ADMIN — ONDANSETRON 4 MG: 2 INJECTION, SOLUTION INTRAMUSCULAR; INTRAVENOUS at 16:40

## 2019-01-21 RX ADMIN — SODIUM CHLORIDE, PRESERVATIVE FREE 300 UNITS: 5 INJECTION INTRAVENOUS at 19:41

## 2019-01-21 RX ADMIN — SODIUM CHLORIDE 500 ML: 900 INJECTION, SOLUTION INTRAVENOUS at 16:19

## 2019-01-21 RX ADMIN — SODIUM CHLORIDE, PRESERVATIVE FREE 300 UNITS: 5 INJECTION INTRAVENOUS at 19:40

## 2019-01-21 RX ADMIN — HYDROCORTISONE SODIUM SUCCINATE 100 MG: 100 INJECTION, POWDER, FOR SOLUTION INTRAMUSCULAR; INTRAVENOUS at 18:32

## 2019-01-21 RX ADMIN — Medication 10 ML: at 19:40

## 2019-01-21 RX ADMIN — FAMOTIDINE 20 MG: 10 INJECTION, SOLUTION INTRAVENOUS at 18:38

## 2019-01-21 RX ADMIN — MEPERIDINE HYDROCHLORIDE 25 MG: 25 INJECTION INTRAMUSCULAR; INTRAVENOUS; SUBCUTANEOUS at 18:35

## 2019-01-21 RX ADMIN — SODIUM CHLORIDE 500 ML: 900 INJECTION, SOLUTION INTRAVENOUS at 18:32

## 2019-01-21 RX ADMIN — DIPHENHYDRAMINE HYDROCHLORIDE 50 MG: 50 INJECTION, SOLUTION INTRAMUSCULAR; INTRAVENOUS at 16:42

## 2019-01-21 RX ADMIN — ACETAMINOPHEN 650 MG: 325 TABLET, FILM COATED ORAL at 16:40

## 2019-01-21 RX ADMIN — Medication 10 ML: at 19:00

## 2019-01-21 NOTE — PROGRESS NOTES
Critical value called by José Ambriz in lab. WBC 59.5  Read back and verbalizd.   Navigator notified Violet Schlatter RN)   Danilo Diego RN

## 2019-01-21 NOTE — PROGRESS NOTES
1/21/19- Pt seen in the office with Dr Clare Wallace. Pt reports tenderness to scrotum. Pt examined by Dr Clare Wallace. Sanchez ordered for pt for 7 days. Labs reviewed and pt to proceed to infusion for Campath. He will continue Campath 3 times weekly and will return to see Dr Clare Wallace in 1 week.

## 2019-01-21 NOTE — PROGRESS NOTES
Clinical Social Work Note Date of Service: 1/21/2019 Length of Service: 30 minutes Patient Diagnosis: prolymphocytic leukemia Referral Source: RN Navigator Debra Reason for Visit: assess psychosocial needs Clinical Note: SW and [address] completed visit with pt, pt's daughter, and pt's wife. SW introduced self and services. Pt has 1 son and 1 daughter, both local, and 5 grandchildren. Pt stated he was interested in applying for Social Security Disability. SW offered referral to [facility] and pt verbalized agreement. SW completed referral to [facility] representative [place]. Pt verbalized concern about copays. SW offered and provided application for hospital sponsorship and [facility]'s contact information. They verbalized appreciation. SW offered referral to [address] [place] [address] and pt verbalized agreement. SW provided pt with [address] contact info. SW completed and faxed referral to [phone], now scanned in EMR. SW offered to complete referral to [address] [place], [place] [address]. Pt verbalized agreement. SW completed and faxed referral to [phone], now scanned into EMR. SW provided education about psychosocial services available at [facility], providing pamphlet for Support Services and Oncology Massage. Pt and wife verbalized appreciation. No other needs identified. Next Steps: SW provided pt with SW contact information and encouraged pt to call should any needs arise. Pt verbalized understanding. SW intends to follow up. Electronically Signed By:  Luis Ramírez LMSW

## 2019-01-22 NOTE — PROGRESS NOTES
Arrived to the [facility] for 4th dose of IV Campath. Patient has had rigors in past.  Premeds given and Campath initiated at 1710 and at [address] patient exhibiting rigors. Campath stopped patient given IV Solucortef, IV Demerol, Dr. Moses Sawyer and pharmacy present. Patient given IV Pepcid, rigors resolved, IV Benadryl held per patient request since rigor resolved. Oxygen was initiated at 3 L/min weaned to 1 L/min then room air. Wife states that when patient received Campath inpatient and Campath was restarted patient had rebound rigors 3 hours later. Dr. Moses Sawyer made aware and decision was made not to re-challenge patient with Campath tonight. Monitored patient. PICC dressing changed. Any issues or concerns during appointment: As noted above. Wife states they do have oral Benadryl at home and instructed to give patient 1-2 tabs as needed for reaction at home and notify 911 or physician as needed. Patient aware of next infusion appointment on 1/23 (date) at 1400 (time). Discharged via wheelchair in stable condition, vital signs stable, no SOB, rash, hives itching, chills or rigors. Wife with patient.

## 2019-01-23 ENCOUNTER — HOSPITAL ENCOUNTER (OUTPATIENT)
Dept: INFUSION THERAPY | Age: 64
Discharge: HOME | End: 2019-01-23
Payer: COMMERCIAL

## 2019-01-23 VITALS
BODY MASS INDEX: 37.66 KG/M2 | RESPIRATION RATE: 18 BRPM | SYSTOLIC BLOOD PRESSURE: 118 MMHG | DIASTOLIC BLOOD PRESSURE: 71 MMHG | HEART RATE: 72 BPM | WEIGHT: 270 LBS | TEMPERATURE: 98.5 F | OXYGEN SATURATION: 97 %

## 2019-01-23 DIAGNOSIS — C91.60 PROLYMPHOCYTIC LEUKEMIA OF T-CELL (HCC): Primary | ICD-10-CM

## 2019-01-23 PROCEDURE — 74011250636 HC RX REV CODE- 250/636: Performed by: INTERNAL MEDICINE

## 2019-01-23 PROCEDURE — 96361 HYDRATE IV INFUSION ADD-ON: CPT

## 2019-01-23 PROCEDURE — 96413 CHEMO IV INFUSION 1 HR: CPT

## 2019-01-23 PROCEDURE — 74011250637 HC RX REV CODE- 250/637: Performed by: INTERNAL MEDICINE

## 2019-01-23 PROCEDURE — 74011000258 HC RX REV CODE- 258: Performed by: INTERNAL MEDICINE

## 2019-01-23 PROCEDURE — 96415 CHEMO IV INFUSION ADDL HR: CPT

## 2019-01-23 PROCEDURE — 96375 TX/PRO/DX INJ NEW DRUG ADDON: CPT

## 2019-01-23 RX ORDER — SODIUM CHLORIDE 9 MG/ML
10 INJECTION INTRAMUSCULAR; INTRAVENOUS; SUBCUTANEOUS AS NEEDED
Status: ACTIVE | OUTPATIENT
Start: 2019-01-23 | End: 2019-01-24

## 2019-01-23 RX ORDER — DIPHENHYDRAMINE HYDROCHLORIDE 50 MG/ML
50 INJECTION, SOLUTION INTRAMUSCULAR; INTRAVENOUS AS NEEDED
Status: ACTIVE | OUTPATIENT
Start: 2019-01-23 | End: 2019-01-24

## 2019-01-23 RX ORDER — HEPARIN 100 UNIT/ML
300-500 SYRINGE INTRAVENOUS AS NEEDED
Status: ACTIVE | OUTPATIENT
Start: 2019-01-23 | End: 2019-01-24

## 2019-01-23 RX ORDER — SODIUM CHLORIDE 0.9 % (FLUSH) 0.9 %
10 SYRINGE (ML) INJECTION AS NEEDED
Status: ACTIVE | OUTPATIENT
Start: 2019-01-23 | End: 2019-01-24

## 2019-01-23 RX ORDER — HYDROCORTISONE SODIUM SUCCINATE 100 MG/2ML
100 INJECTION, POWDER, FOR SOLUTION INTRAMUSCULAR; INTRAVENOUS ONCE
Status: COMPLETED | OUTPATIENT
Start: 2019-01-23 | End: 2019-01-23

## 2019-01-23 RX ORDER — ONDANSETRON 2 MG/ML
4 INJECTION INTRAMUSCULAR; INTRAVENOUS ONCE
Status: COMPLETED | OUTPATIENT
Start: 2019-01-23 | End: 2019-01-23

## 2019-01-23 RX ORDER — ACETAMINOPHEN 325 MG/1
650 TABLET ORAL ONCE
Status: COMPLETED | OUTPATIENT
Start: 2019-01-23 | End: 2019-01-23

## 2019-01-23 RX ORDER — DIPHENHYDRAMINE HYDROCHLORIDE 50 MG/ML
50 INJECTION, SOLUTION INTRAMUSCULAR; INTRAVENOUS ONCE
Status: COMPLETED | OUTPATIENT
Start: 2019-01-23 | End: 2019-01-23

## 2019-01-23 RX ADMIN — Medication 10 ML: at 12:20

## 2019-01-23 RX ADMIN — ALEMTUZUMAB 30 MG: 30 INJECTION INTRAVENOUS at 13:17

## 2019-01-23 RX ADMIN — SODIUM CHLORIDE 500 ML: 900 INJECTION, SOLUTION INTRAVENOUS at 12:45

## 2019-01-23 RX ADMIN — HEPARIN 500 UNITS: 100 SYRINGE at 18:00

## 2019-01-23 RX ADMIN — HYDROCORTISONE SODIUM SUCCINATE 100 MG: 100 INJECTION, POWDER, FOR SOLUTION INTRAMUSCULAR; INTRAVENOUS at 12:33

## 2019-01-23 RX ADMIN — ONDANSETRON 4 MG: 2 INJECTION, SOLUTION INTRAMUSCULAR; INTRAVENOUS at 12:27

## 2019-01-23 RX ADMIN — MEPERIDINE HYDROCHLORIDE 25 MG: 25 INJECTION INTRAMUSCULAR; INTRAVENOUS; SUBCUTANEOUS at 12:39

## 2019-01-23 RX ADMIN — Medication 10 ML: at 18:00

## 2019-01-23 RX ADMIN — ACETAMINOPHEN 650 MG: 325 TABLET, FILM COATED ORAL at 12:25

## 2019-01-23 RX ADMIN — DIPHENHYDRAMINE HYDROCHLORIDE 50 MG: 50 INJECTION, SOLUTION INTRAMUSCULAR; INTRAVENOUS at 12:31

## 2019-01-23 NOTE — PROGRESS NOTES
Pt. Discharged ambulatory. Tolerated chemotherapy well. No distress noted. Instructed to call physician with any problems or concerns. Understanding voiced. To return to Infusions on 1/25/19.

## 2019-01-25 ENCOUNTER — HOSPITAL ENCOUNTER (OUTPATIENT)
Dept: INFUSION THERAPY | Age: 64
Discharge: HOME | End: 2019-01-25
Payer: COMMERCIAL

## 2019-01-25 VITALS
SYSTOLIC BLOOD PRESSURE: 132 MMHG | WEIGHT: 270 LBS | OXYGEN SATURATION: 96 % | RESPIRATION RATE: 18 BRPM | DIASTOLIC BLOOD PRESSURE: 82 MMHG | TEMPERATURE: 98.1 F | HEART RATE: 74 BPM | BODY MASS INDEX: 37.66 KG/M2

## 2019-01-25 DIAGNOSIS — C91.60 PROLYMPHOCYTIC LEUKEMIA OF T-CELL (HCC): Primary | ICD-10-CM

## 2019-01-25 PROCEDURE — 74011250637 HC RX REV CODE- 250/637: Performed by: INTERNAL MEDICINE

## 2019-01-25 PROCEDURE — 96413 CHEMO IV INFUSION 1 HR: CPT

## 2019-01-25 PROCEDURE — 96415 CHEMO IV INFUSION ADDL HR: CPT

## 2019-01-25 PROCEDURE — 74011250636 HC RX REV CODE- 250/636: Performed by: INTERNAL MEDICINE

## 2019-01-25 PROCEDURE — 74011000258 HC RX REV CODE- 258: Performed by: INTERNAL MEDICINE

## 2019-01-25 PROCEDURE — 96375 TX/PRO/DX INJ NEW DRUG ADDON: CPT

## 2019-01-25 RX ORDER — DIPHENHYDRAMINE HYDROCHLORIDE 50 MG/ML
50 INJECTION, SOLUTION INTRAMUSCULAR; INTRAVENOUS ONCE
Status: DISPENSED | OUTPATIENT
Start: 2019-01-25 | End: 2019-01-25

## 2019-01-25 RX ORDER — DIPHENHYDRAMINE HYDROCHLORIDE 50 MG/ML
50 INJECTION, SOLUTION INTRAMUSCULAR; INTRAVENOUS AS NEEDED
Status: ACTIVE | OUTPATIENT
Start: 2019-01-25 | End: 2019-01-25

## 2019-01-25 RX ORDER — HYDROCORTISONE SODIUM SUCCINATE 100 MG/2ML
100 INJECTION, POWDER, FOR SOLUTION INTRAMUSCULAR; INTRAVENOUS ONCE
Status: DISPENSED | OUTPATIENT
Start: 2019-01-25 | End: 2019-01-25

## 2019-01-25 RX ORDER — SODIUM CHLORIDE 0.9 % (FLUSH) 0.9 %
10 SYRINGE (ML) INJECTION AS NEEDED
Status: ACTIVE | OUTPATIENT
Start: 2019-01-25 | End: 2019-01-25

## 2019-01-25 RX ORDER — ACETAMINOPHEN 325 MG/1
650 TABLET ORAL ONCE
Status: COMPLETED | OUTPATIENT
Start: 2019-01-25 | End: 2019-01-25

## 2019-01-25 RX ORDER — SODIUM CHLORIDE 9 MG/ML
10 INJECTION INTRAMUSCULAR; INTRAVENOUS; SUBCUTANEOUS AS NEEDED
Status: ACTIVE | OUTPATIENT
Start: 2019-01-25 | End: 2019-01-25

## 2019-01-25 RX ORDER — ONDANSETRON 2 MG/ML
4 INJECTION INTRAMUSCULAR; INTRAVENOUS ONCE
Status: COMPLETED | OUTPATIENT
Start: 2019-01-25 | End: 2019-01-25

## 2019-01-25 RX ORDER — HYDROCORTISONE SODIUM SUCCINATE 100 MG/2ML
100 INJECTION, POWDER, FOR SOLUTION INTRAMUSCULAR; INTRAVENOUS AS NEEDED
Status: ACTIVE | OUTPATIENT
Start: 2019-01-25 | End: 2019-01-25

## 2019-01-25 RX ORDER — HEPARIN 100 UNIT/ML
300-500 SYRINGE INTRAVENOUS AS NEEDED
Status: ACTIVE | OUTPATIENT
Start: 2019-01-25 | End: 2019-01-25

## 2019-01-25 RX ADMIN — HEPARIN 500 UNITS: 100 SYRINGE at 16:11

## 2019-01-25 RX ADMIN — MEPERIDINE HYDROCHLORIDE 25 MG: 25 INJECTION INTRAMUSCULAR; INTRAVENOUS; SUBCUTANEOUS at 11:29

## 2019-01-25 RX ADMIN — Medication 10 ML: at 16:10

## 2019-01-25 RX ADMIN — ACETAMINOPHEN 650 MG: 325 TABLET, FILM COATED ORAL at 11:28

## 2019-01-25 RX ADMIN — SODIUM CHLORIDE 500 ML: 900 INJECTION, SOLUTION INTRAVENOUS at 11:09

## 2019-01-25 RX ADMIN — ALEMTUZUMAB 30 MG: 30 INJECTION INTRAVENOUS at 12:13

## 2019-01-25 RX ADMIN — DIPHENHYDRAMINE HYDROCHLORIDE 50 MG: 50 INJECTION, SOLUTION INTRAMUSCULAR; INTRAVENOUS at 11:19

## 2019-01-25 RX ADMIN — ONDANSETRON 4 MG: 2 INJECTION, SOLUTION INTRAMUSCULAR; INTRAVENOUS at 11:22

## 2019-01-25 RX ADMIN — HYDROCORTISONE SODIUM SUCCINATE 100 MG: 100 INJECTION, POWDER, FOR SOLUTION INTRAMUSCULAR; INTRAVENOUS at 11:25

## 2019-01-28 ENCOUNTER — PATIENT OUTREACH (OUTPATIENT)
Dept: CASE MANAGEMENT | Age: 64
End: 2019-01-28

## 2019-01-28 ENCOUNTER — HOSPITAL ENCOUNTER (OUTPATIENT)
Dept: INFUSION THERAPY | Age: 64
Discharge: HOME | End: 2019-01-28
Payer: COMMERCIAL

## 2019-01-28 ENCOUNTER — HOSPITAL ENCOUNTER (OUTPATIENT)
Dept: LAB | Age: 64
Discharge: HOME | End: 2019-01-28
Payer: COMMERCIAL

## 2019-01-28 VITALS
DIASTOLIC BLOOD PRESSURE: 84 MMHG | SYSTOLIC BLOOD PRESSURE: 133 MMHG | HEART RATE: 69 BPM | TEMPERATURE: 97.6 F | OXYGEN SATURATION: 100 %

## 2019-01-28 DIAGNOSIS — C91.60 PROLYMPHOCYTIC LEUKEMIA OF T-CELL (HCC): Primary | ICD-10-CM

## 2019-01-28 DIAGNOSIS — C91.60 PROLYMPHOCYTIC LEUKEMIA OF T-CELL (HCC): ICD-10-CM

## 2019-01-28 LAB
ALBUMIN SERPL-MCNC: 4.3 G/DL (ref 3.2–4.6)
ALBUMIN/GLOB SERPL: 1.2 {RATIO} (ref 1.2–3.5)
ALP SERPL-CCNC: 38 U/L (ref 50–136)
ALT SERPL-CCNC: 132 U/L (ref 12–65)
ANION GAP SERPL CALC-SCNC: 6 MMOL/L (ref 7–16)
AST SERPL-CCNC: 51 U/L (ref 15–37)
BASOPHILS # BLD: 0.1 K/UL (ref 0–0.2)
BASOPHILS NFR BLD: 1 % (ref 0–2)
BILIRUB SERPL-MCNC: 0.7 MG/DL (ref 0.2–1.1)
BUN SERPL-MCNC: 18 MG/DL (ref 8–23)
CALCIUM SERPL-MCNC: 9.1 MG/DL (ref 8.3–10.4)
CHLORIDE SERPL-SCNC: 105 MMOL/L (ref 98–107)
CO2 SERPL-SCNC: 27 MMOL/L (ref 21–32)
CREAT SERPL-MCNC: 1.3 MG/DL (ref 0.8–1.5)
DIFFERENTIAL METHOD BLD: ABNORMAL
EOSINOPHIL # BLD: 0.1 K/UL (ref 0–0.8)
EOSINOPHIL NFR BLD: 1 % (ref 0.5–7.8)
ERYTHROCYTE [DISTWIDTH] IN BLOOD BY AUTOMATED COUNT: 13.6 % (ref 11.9–14.6)
GLOBULIN SER CALC-MCNC: 3.6 G/DL (ref 2.3–3.5)
GLUCOSE SERPL-MCNC: 108 MG/DL (ref 65–100)
HCT VFR BLD AUTO: 42 % (ref 41.1–50.3)
HGB BLD-MCNC: 13.8 G/DL (ref 13.6–17.2)
IMM GRANULOCYTES # BLD AUTO: 0 K/UL (ref 0–0.5)
IMM GRANULOCYTES NFR BLD AUTO: 0 % (ref 0–5)
LYMPHOCYTES # BLD: 3 K/UL (ref 0.5–4.6)
LYMPHOCYTES NFR BLD: 50 % (ref 13–44)
MAGNESIUM SERPL-MCNC: 2.5 MG/DL (ref 1.8–2.4)
MCH RBC QN AUTO: 30.5 PG (ref 26.1–32.9)
MCHC RBC AUTO-ENTMCNC: 32.9 G/DL (ref 31.4–35)
MCV RBC AUTO: 92.7 FL (ref 79.6–97.8)
MONOCYTES # BLD: 0.5 K/UL (ref 0.1–1.3)
MONOCYTES NFR BLD: 9 % (ref 4–12)
NEUTS SEG # BLD: 2.3 K/UL (ref 1.7–8.2)
NEUTS SEG NFR BLD: 39 % (ref 43–78)
NRBC # BLD: 0 K/UL (ref 0–0.2)
PLATELET # BLD AUTO: 118 K/UL (ref 150–450)
PLATELET COMMENTS,PCOM: SLIGHT
PMV BLD AUTO: 10.5 FL (ref 9.4–12.3)
POTASSIUM SERPL-SCNC: 4.1 MMOL/L (ref 3.5–5.1)
PROT SERPL-MCNC: 7.9 G/DL (ref 6.3–8.2)
RBC # BLD AUTO: 4.53 M/UL (ref 4.23–5.67)
RBC MORPH BLD: ABNORMAL
SODIUM SERPL-SCNC: 138 MMOL/L (ref 136–145)
WBC # BLD AUTO: 6 K/UL (ref 4.3–11.1)
WBC MORPH BLD: ABNORMAL

## 2019-01-28 PROCEDURE — 96415 CHEMO IV INFUSION ADDL HR: CPT

## 2019-01-28 PROCEDURE — 83735 ASSAY OF MAGNESIUM: CPT

## 2019-01-28 PROCEDURE — 74011000258 HC RX REV CODE- 258: Performed by: INTERNAL MEDICINE

## 2019-01-28 PROCEDURE — 74011000250 HC RX REV CODE- 250: Performed by: NURSE PRACTITIONER

## 2019-01-28 PROCEDURE — 74011250637 HC RX REV CODE- 250/637: Performed by: INTERNAL MEDICINE

## 2019-01-28 PROCEDURE — 85025 COMPLETE CBC W/AUTO DIFF WBC: CPT

## 2019-01-28 PROCEDURE — 96361 HYDRATE IV INFUSION ADD-ON: CPT

## 2019-01-28 PROCEDURE — 96376 TX/PRO/DX INJ SAME DRUG ADON: CPT

## 2019-01-28 PROCEDURE — 80053 COMPREHEN METABOLIC PANEL: CPT

## 2019-01-28 PROCEDURE — 96375 TX/PRO/DX INJ NEW DRUG ADDON: CPT

## 2019-01-28 PROCEDURE — 74011250636 HC RX REV CODE- 250/636: Performed by: INTERNAL MEDICINE

## 2019-01-28 PROCEDURE — 74011250636 HC RX REV CODE- 250/636: Performed by: NURSE PRACTITIONER

## 2019-01-28 PROCEDURE — 96413 CHEMO IV INFUSION 1 HR: CPT

## 2019-01-28 RX ORDER — HEPARIN 100 UNIT/ML
300-500 SYRINGE INTRAVENOUS AS NEEDED
Status: DISPENSED | OUTPATIENT
Start: 2019-01-28 | End: 2019-01-28

## 2019-01-28 RX ORDER — DIPHENHYDRAMINE HYDROCHLORIDE 50 MG/ML
50 INJECTION, SOLUTION INTRAMUSCULAR; INTRAVENOUS ONCE
Status: COMPLETED | OUTPATIENT
Start: 2019-01-28 | End: 2019-01-28

## 2019-01-28 RX ORDER — SODIUM CHLORIDE 0.9 % (FLUSH) 0.9 %
10 SYRINGE (ML) INJECTION AS NEEDED
Status: ACTIVE | OUTPATIENT
Start: 2019-01-28 | End: 2019-01-28

## 2019-01-28 RX ORDER — DIPHENHYDRAMINE HYDROCHLORIDE 50 MG/ML
50 INJECTION, SOLUTION INTRAMUSCULAR; INTRAVENOUS AS NEEDED
Status: DISPENSED | OUTPATIENT
Start: 2019-01-28 | End: 2019-01-29

## 2019-01-28 RX ORDER — ACETAMINOPHEN 325 MG/1
650 TABLET ORAL ONCE
Status: COMPLETED | OUTPATIENT
Start: 2019-01-28 | End: 2019-01-28

## 2019-01-28 RX ORDER — HYDROCORTISONE SODIUM SUCCINATE 100 MG/2ML
100 INJECTION, POWDER, FOR SOLUTION INTRAMUSCULAR; INTRAVENOUS AS NEEDED
Status: DISPENSED | OUTPATIENT
Start: 2019-01-28 | End: 2019-01-29

## 2019-01-28 RX ORDER — ONDANSETRON 2 MG/ML
4 INJECTION INTRAMUSCULAR; INTRAVENOUS ONCE
Status: COMPLETED | OUTPATIENT
Start: 2019-01-28 | End: 2019-01-28

## 2019-01-28 RX ADMIN — SODIUM CHLORIDE, PRESERVATIVE FREE 500 UNITS: 5 INJECTION INTRAVENOUS at 18:49

## 2019-01-28 RX ADMIN — Medication 10 ML: at 18:48

## 2019-01-28 RX ADMIN — SODIUM CHLORIDE 500 ML: 900 INJECTION, SOLUTION INTRAVENOUS at 15:00

## 2019-01-28 RX ADMIN — DIPHENHYDRAMINE HYDROCHLORIDE 50 MG: 50 INJECTION, SOLUTION INTRAMUSCULAR; INTRAVENOUS at 12:19

## 2019-01-28 RX ADMIN — ALEMTUZUMAB 30 MG: 30 INJECTION INTRAVENOUS at 13:00

## 2019-01-28 RX ADMIN — HYDROCORTISONE SODIUM SUCCINATE 100 MG: 100 INJECTION, POWDER, FOR SOLUTION INTRAMUSCULAR; INTRAVENOUS at 14:59

## 2019-01-28 RX ADMIN — SODIUM CHLORIDE, PRESERVATIVE FREE 300 UNITS: 5 INJECTION INTRAVENOUS at 17:20

## 2019-01-28 RX ADMIN — ONDANSETRON 4 MG: 2 INJECTION, SOLUTION INTRAMUSCULAR; INTRAVENOUS at 12:18

## 2019-01-28 RX ADMIN — SODIUM CHLORIDE 500 ML: 900 INJECTION, SOLUTION INTRAVENOUS at 12:11

## 2019-01-28 RX ADMIN — FAMOTIDINE 20 MG: 10 INJECTION, SOLUTION INTRAVENOUS at 18:27

## 2019-01-28 RX ADMIN — Medication 10 ML: at 17:20

## 2019-01-28 RX ADMIN — DIPHENHYDRAMINE HYDROCHLORIDE 50 MG: 50 INJECTION, SOLUTION INTRAMUSCULAR; INTRAVENOUS at 17:30

## 2019-01-28 RX ADMIN — ACETAMINOPHEN 650 MG: 325 TABLET, FILM COATED ORAL at 12:17

## 2019-01-28 RX ADMIN — MEPERIDINE HYDROCHLORIDE 25 MG: 25 INJECTION INTRAMUSCULAR; INTRAVENOUS; SUBCUTANEOUS at 14:57

## 2019-01-28 NOTE — PROGRESS NOTES
Arrived to the [facility]. compath completed. Any issues or concerns during appointment: experienced rigors during infusion, infusion stopped but restarted after emergency meds given. After transfusion completed, patient experiencing generalized hives and itching. 50 mg IV benadryl administered and NP notified. Report given to Maximiliano Rondon RN Patient aware of next infusion appointment on 01/30 11:30

## 2019-01-28 NOTE — PROGRESS NOTES
1/28/19- Pt seen in the office with Dr Nakia Barfield. Pt reports fatigue at home the day of treatment. Labs reviewed. Today pt's WBC results at [facility]. Dr Naika Barfield would like for pt to receive Campath today without demeral or solu-cortef as pre-meds. Both meds can be used if the pt to starts to have Rigors. Pt to proceed to infusion today for Campath as well as Wednesday and Friday. He will return in one week to see the NP. HLA typing completed on pt and 1 sibling today. Pt has 3 other siblings that will receive HLA typing kits in the mail.

## 2019-01-28 NOTE — PROGRESS NOTES
Pt. Continues with hives. No respiratory distress. Sharan Collet NP notified with Pepcid 20 mg IV administered. 1852  Pt. With hives resolving. Per MKNP ok to discharge patient. Pt. Understands need to take benedryl every 4-6 hours during the night per MKNP. If patient experiences SOP or increase in hives go to ER or call EMS. Pt. Alert and responsive with no distress noted. Pt. And family off floor via wheelchair.

## 2019-01-28 NOTE — PROGRESS NOTES
Clinical Social Work Note Date of Service: 1/28/2019 Length of Service: 15 minutes Patient Diagnosis: leukemia Referral Source: RN Navigator Debra Reason for Visit: follow up on previous referrals Clinical Note: Pt requested to speak with SW. Pt known to SW from previous encounter. SW and [address] [place] [address] Drive met with pt and wife in infusion. Pt asked about status of [employer] referral. SW reviewed pt account notes and noted [employer] has completed disability screening nor recognized referral. UVALDO securely emailed [employer] representative Izzy Lara for update. SW relayed this to pt and provided [facility] flyer, encouraging pt and/or wife to call if they'd like to begin disability screening prior to [facility] reaching out to them. They verbalized understanding and appreciation. Pt stated he has completed hospital sponsorship application. No other needs identified. Next Steps: SW confirmed pt has SW contact information and encouraged pt to call should any needs arise. Pt verbalized understanding. SW intends to follow up PRN. Electronically Signed By:  Gloria Solano LMSW

## 2019-01-28 NOTE — PROGRESS NOTES
Campath stopped due to patient experiencing rigors, Demerol and solu-cortef administered per emergency protocol and MD made aware

## 2019-01-30 ENCOUNTER — HOSPITAL ENCOUNTER (OUTPATIENT)
Dept: INFUSION THERAPY | Age: 64
Discharge: HOME | End: 2019-01-30
Payer: COMMERCIAL

## 2019-01-30 ENCOUNTER — APPOINTMENT (OUTPATIENT)
Dept: INFUSION THERAPY | Age: 64
End: 2019-01-30
Payer: COMMERCIAL

## 2019-01-30 VITALS
RESPIRATION RATE: 16 BRPM | HEART RATE: 61 BPM | OXYGEN SATURATION: 96 % | TEMPERATURE: 97.3 F | SYSTOLIC BLOOD PRESSURE: 120 MMHG | BODY MASS INDEX: 37.1 KG/M2 | WEIGHT: 266 LBS | DIASTOLIC BLOOD PRESSURE: 67 MMHG

## 2019-01-30 DIAGNOSIS — C91.60 PROLYMPHOCYTIC LEUKEMIA OF T-CELL (HCC): Primary | ICD-10-CM

## 2019-01-30 PROCEDURE — 96413 CHEMO IV INFUSION 1 HR: CPT

## 2019-01-30 PROCEDURE — 96376 TX/PRO/DX INJ SAME DRUG ADON: CPT

## 2019-01-30 PROCEDURE — 99211 OFF/OP EST MAY X REQ PHY/QHP: CPT

## 2019-01-30 PROCEDURE — 96415 CHEMO IV INFUSION ADDL HR: CPT

## 2019-01-30 PROCEDURE — 74011000250 HC RX REV CODE- 250: Performed by: INTERNAL MEDICINE

## 2019-01-30 PROCEDURE — 74011250637 HC RX REV CODE- 250/637: Performed by: INTERNAL MEDICINE

## 2019-01-30 PROCEDURE — 74011000258 HC RX REV CODE- 258: Performed by: INTERNAL MEDICINE

## 2019-01-30 PROCEDURE — 74011250636 HC RX REV CODE- 250/636: Performed by: INTERNAL MEDICINE

## 2019-01-30 PROCEDURE — 96375 TX/PRO/DX INJ NEW DRUG ADDON: CPT

## 2019-01-30 RX ORDER — ONDANSETRON 2 MG/ML
4 INJECTION INTRAMUSCULAR; INTRAVENOUS ONCE
Status: COMPLETED | OUTPATIENT
Start: 2019-01-30 | End: 2019-01-30

## 2019-01-30 RX ORDER — ACETAMINOPHEN 325 MG/1
650 TABLET ORAL ONCE
Status: COMPLETED | OUTPATIENT
Start: 2019-01-30 | End: 2019-01-30

## 2019-01-30 RX ORDER — SODIUM CHLORIDE 0.9 % (FLUSH) 0.9 %
10 SYRINGE (ML) INJECTION AS NEEDED
Status: ACTIVE | OUTPATIENT
Start: 2019-01-30 | End: 2019-01-30

## 2019-01-30 RX ORDER — DIPHENHYDRAMINE HYDROCHLORIDE 50 MG/ML
50 INJECTION, SOLUTION INTRAMUSCULAR; INTRAVENOUS AS NEEDED
Status: DISPENSED | OUTPATIENT
Start: 2019-01-30 | End: 2019-01-30

## 2019-01-30 RX ORDER — DIPHENHYDRAMINE HYDROCHLORIDE 50 MG/ML
50 INJECTION, SOLUTION INTRAMUSCULAR; INTRAVENOUS ONCE
Status: COMPLETED | OUTPATIENT
Start: 2019-01-30 | End: 2019-01-30

## 2019-01-30 RX ORDER — ACETAMINOPHEN 325 MG/1
650 TABLET ORAL AS NEEDED
Status: ACTIVE | OUTPATIENT
Start: 2019-01-30 | End: 2019-01-30

## 2019-01-30 RX ORDER — HEPARIN 100 UNIT/ML
300-500 SYRINGE INTRAVENOUS AS NEEDED
Status: DISPENSED | OUTPATIENT
Start: 2019-01-30 | End: 2019-01-30

## 2019-01-30 RX ORDER — HYDROCORTISONE SODIUM SUCCINATE 100 MG/2ML
100 INJECTION, POWDER, FOR SOLUTION INTRAMUSCULAR; INTRAVENOUS ONCE
Status: CANCELLED
Start: 2019-02-01 | End: 2019-02-01

## 2019-01-30 RX ORDER — HYDROCORTISONE SODIUM SUCCINATE 100 MG/2ML
100 INJECTION, POWDER, FOR SOLUTION INTRAMUSCULAR; INTRAVENOUS ONCE
Status: COMPLETED | OUTPATIENT
Start: 2019-01-30 | End: 2019-01-30

## 2019-01-30 RX ORDER — ONDANSETRON 2 MG/ML
8 INJECTION INTRAMUSCULAR; INTRAVENOUS AS NEEDED
Status: ACTIVE | OUTPATIENT
Start: 2019-01-30 | End: 2019-01-30

## 2019-01-30 RX ORDER — FAMOTIDINE 10 MG/ML
20 INJECTION INTRAVENOUS
Status: CANCELLED
Start: 2019-02-01

## 2019-01-30 RX ADMIN — ONDANSETRON 4 MG: 2 INJECTION INTRAMUSCULAR; INTRAVENOUS at 12:05

## 2019-01-30 RX ADMIN — HYDROCORTISONE SODIUM SUCCINATE 100 MG: 100 INJECTION, POWDER, FOR SOLUTION INTRAMUSCULAR; INTRAVENOUS at 12:25

## 2019-01-30 RX ADMIN — ACETAMINOPHEN 650 MG: 325 TABLET ORAL at 12:15

## 2019-01-30 RX ADMIN — SODIUM CHLORIDE 1000 ML: 900 INJECTION, SOLUTION INTRAVENOUS at 11:50

## 2019-01-30 RX ADMIN — ALEMTUZUMAB 30 MG: 30 INJECTION INTRAVENOUS at 12:40

## 2019-01-30 RX ADMIN — DIPHENHYDRAMINE HYDROCHLORIDE 25 MG: 50 INJECTION, SOLUTION INTRAMUSCULAR; INTRAVENOUS at 15:45

## 2019-01-30 RX ADMIN — DIPHENHYDRAMINE HYDROCHLORIDE 25 MG: 50 INJECTION, SOLUTION INTRAMUSCULAR; INTRAVENOUS at 16:44

## 2019-01-30 RX ADMIN — FAMOTIDINE 20 MG: 10 INJECTION, SOLUTION INTRAVENOUS at 12:09

## 2019-01-30 RX ADMIN — DIPHENHYDRAMINE HYDROCHLORIDE 50 MG: 50 INJECTION, SOLUTION INTRAMUSCULAR; INTRAVENOUS at 12:11

## 2019-01-30 RX ADMIN — Medication 10 ML: at 16:53

## 2019-01-30 RX ADMIN — SODIUM CHLORIDE, PRESERVATIVE FREE 500 UNITS: 5 INJECTION INTRAVENOUS at 16:53

## 2019-01-30 RX ADMIN — Medication 10 ML: at 17:03

## 2019-01-30 RX ADMIN — SODIUM CHLORIDE, PRESERVATIVE FREE 300 UNITS: 5 INJECTION INTRAVENOUS at 17:03

## 2019-01-30 RX ADMIN — FAMOTIDINE 20 MG: 10 INJECTION, SOLUTION INTRAVENOUS at 15:52

## 2019-01-30 RX ADMIN — MEPERIDINE HYDROCHLORIDE 25 MG: 25 INJECTION INTRAMUSCULAR; INTRAVENOUS; SUBCUTANEOUS at 12:28

## 2019-01-30 NOTE — PROGRESS NOTES
Arrived to the [facility]. campath completed. Patient premedicated with benadryl, solucortef, demerol, tylenol, and pepcid. approx 3 hours into infusion patient had hives appear on face and arm, campath stopped and patient received 25 mg benadryl and 20 mg pepcid and campath restarted. Hives persisted and spread to large patches along the patients sides and waist. Hives also continued on face and neck and arms. Patient complained of itching. After campath completed he received additional 25 mg of benadryl and itching subsided. No change in hives. VSS,  Patient instructed to call 911 for any difficulty breathing or trouble clearing his throat. Instructed to take more benadryl at home every 4-6 hours as needed for continued hives and itching, and to use over the counter hydrocortisone cream as needed for itching as well. Patient aware of next infusion appointment on 02/1 11:30 Discharged ambulatory

## 2019-01-30 NOTE — PROGRESS NOTES
Patient experiencing hives, Cezar Calderon NP notified, campath stopped and benadryl and pepcid administered. Campath restarted.

## 2019-02-01 ENCOUNTER — HOSPITAL ENCOUNTER (OUTPATIENT)
Dept: INFUSION THERAPY | Age: 64
Discharge: HOME | End: 2019-02-01
Payer: COMMERCIAL

## 2019-02-01 VITALS
HEART RATE: 79 BPM | SYSTOLIC BLOOD PRESSURE: 111 MMHG | DIASTOLIC BLOOD PRESSURE: 63 MMHG | RESPIRATION RATE: 16 BRPM | WEIGHT: 265 LBS | TEMPERATURE: 97.3 F | OXYGEN SATURATION: 96 % | BODY MASS INDEX: 36.96 KG/M2

## 2019-02-01 DIAGNOSIS — C91.60 PROLYMPHOCYTIC LEUKEMIA OF T-CELL (HCC): Primary | ICD-10-CM

## 2019-02-01 PROCEDURE — 96415 CHEMO IV INFUSION ADDL HR: CPT

## 2019-02-01 PROCEDURE — 74011250637 HC RX REV CODE- 250/637: Performed by: INTERNAL MEDICINE

## 2019-02-01 PROCEDURE — 74011250636 HC RX REV CODE- 250/636: Performed by: INTERNAL MEDICINE

## 2019-02-01 PROCEDURE — 96375 TX/PRO/DX INJ NEW DRUG ADDON: CPT

## 2019-02-01 PROCEDURE — 74011000258 HC RX REV CODE- 258: Performed by: INTERNAL MEDICINE

## 2019-02-01 PROCEDURE — 96413 CHEMO IV INFUSION 1 HR: CPT

## 2019-02-01 PROCEDURE — 74011250636 HC RX REV CODE- 250/636: Performed by: NURSE PRACTITIONER

## 2019-02-01 PROCEDURE — 96361 HYDRATE IV INFUSION ADD-ON: CPT

## 2019-02-01 PROCEDURE — 74011000250 HC RX REV CODE- 250: Performed by: INTERNAL MEDICINE

## 2019-02-01 RX ORDER — DIPHENHYDRAMINE HYDROCHLORIDE 50 MG/ML
50 INJECTION, SOLUTION INTRAMUSCULAR; INTRAVENOUS ONCE
Status: COMPLETED | OUTPATIENT
Start: 2019-02-01 | End: 2019-02-01

## 2019-02-01 RX ORDER — HEPARIN 100 UNIT/ML
300-500 SYRINGE INTRAVENOUS AS NEEDED
Status: DISPENSED | OUTPATIENT
Start: 2019-02-01 | End: 2019-02-01

## 2019-02-01 RX ORDER — HYDROCORTISONE SODIUM SUCCINATE 100 MG/2ML
100 INJECTION, POWDER, FOR SOLUTION INTRAMUSCULAR; INTRAVENOUS ONCE
Status: COMPLETED | OUTPATIENT
Start: 2019-02-01 | End: 2019-02-01

## 2019-02-01 RX ORDER — ONDANSETRON 2 MG/ML
4 INJECTION INTRAMUSCULAR; INTRAVENOUS ONCE
Status: COMPLETED | OUTPATIENT
Start: 2019-02-01 | End: 2019-02-01

## 2019-02-01 RX ORDER — FAMOTIDINE 10 MG/ML
20 INJECTION INTRAVENOUS
Status: DISCONTINUED | OUTPATIENT
Start: 2019-02-01 | End: 2019-02-01

## 2019-02-01 RX ORDER — ACETAMINOPHEN 325 MG/1
650 TABLET ORAL ONCE
Status: COMPLETED | OUTPATIENT
Start: 2019-02-01 | End: 2019-02-01

## 2019-02-01 RX ORDER — SODIUM CHLORIDE 0.9 % (FLUSH) 0.9 %
10 SYRINGE (ML) INJECTION AS NEEDED
Status: ACTIVE | OUTPATIENT
Start: 2019-02-01 | End: 2019-02-01

## 2019-02-01 RX ADMIN — ALEMTUZUMAB 30 MG: 30 INJECTION INTRAVENOUS at 13:10

## 2019-02-01 RX ADMIN — SODIUM CHLORIDE, PRESERVATIVE FREE 300 UNITS: 5 INJECTION INTRAVENOUS at 12:40

## 2019-02-01 RX ADMIN — Medication 10 ML: at 17:35

## 2019-02-01 RX ADMIN — HYDROCORTISONE SODIUM SUCCINATE 100 MG: 100 INJECTION, POWDER, FOR SOLUTION INTRAMUSCULAR; INTRAVENOUS at 12:21

## 2019-02-01 RX ADMIN — DIPHENHYDRAMINE HYDROCHLORIDE 50 MG: 50 INJECTION, SOLUTION INTRAMUSCULAR; INTRAVENOUS at 12:25

## 2019-02-01 RX ADMIN — Medication 10 ML: at 11:45

## 2019-02-01 RX ADMIN — FAMOTIDINE 20 MG: 10 INJECTION, SOLUTION INTRAVENOUS at 12:23

## 2019-02-01 RX ADMIN — SODIUM CHLORIDE, PRESERVATIVE FREE 500 UNITS: 5 INJECTION INTRAVENOUS at 17:35

## 2019-02-01 RX ADMIN — ONDANSETRON 4 MG: 2 INJECTION, SOLUTION INTRAMUSCULAR; INTRAVENOUS at 12:27

## 2019-02-01 RX ADMIN — Medication 10 ML: at 12:40

## 2019-02-01 RX ADMIN — ACETAMINOPHEN 650 MG: 325 TABLET, FILM COATED ORAL at 12:18

## 2019-02-01 RX ADMIN — MEPERIDINE HYDROCHLORIDE 25 MG: 25 INJECTION INTRAMUSCULAR; INTRAVENOUS; SUBCUTANEOUS at 12:29

## 2019-02-01 RX ADMIN — SODIUM CHLORIDE 1000 ML: 900 INJECTION, SOLUTION INTRAVENOUS at 11:45

## 2019-02-01 NOTE — PROGRESS NOTES
Problem: Knowledge Deficit  Goal: *Verbalizes understanding of procedures and medications  Outcome: Progressing Towards Goal  Verbalizes/demonstrates understanding of purpose/procedure/potential side effects of campath.

## 2019-02-01 NOTE — PROGRESS NOTES
Pt arrived ambulatory today at 1115, to receive IV chemotherapy. Pt tolerated without difficulty. Pt took oral prednisone this morning prior to appointment. Pt states, \"this is the best chemotherapy treatment, I ever had. \"  Pt able to sleep during treatment. Patient discharged via ambulatory accompanied by spouse. Instructed to notify physician of any problems, questions or concerns. Allowed opportunity for patient/family to ask questions. Verbalized understanding. Next appointment is Feb 4 at 1000 with Amrita Jordan.

## 2019-02-04 ENCOUNTER — PATIENT OUTREACH (OUTPATIENT)
Dept: CASE MANAGEMENT | Age: 64
End: 2019-02-04

## 2019-02-04 ENCOUNTER — HOSPITAL ENCOUNTER (OUTPATIENT)
Dept: INFUSION THERAPY | Age: 64
Discharge: HOME | End: 2019-02-04
Payer: COMMERCIAL

## 2019-02-04 ENCOUNTER — HOSPITAL ENCOUNTER (OUTPATIENT)
Dept: LAB | Age: 64
Discharge: HOME | End: 2019-02-04
Payer: COMMERCIAL

## 2019-02-04 VITALS — OXYGEN SATURATION: 97 %

## 2019-02-04 DIAGNOSIS — C91.60 PROLYMPHOCYTIC LEUKEMIA OF T-CELL (HCC): Primary | ICD-10-CM

## 2019-02-04 DIAGNOSIS — C91.60 PROLYMPHOCYTIC LEUKEMIA OF T-CELL (HCC): ICD-10-CM

## 2019-02-04 LAB
ALBUMIN SERPL-MCNC: 4.4 G/DL (ref 3.2–4.6)
ALBUMIN/GLOB SERPL: 1.3 {RATIO} (ref 1.2–3.5)
ALP SERPL-CCNC: 38 U/L (ref 50–136)
ALT SERPL-CCNC: 82 U/L (ref 12–65)
ANION GAP SERPL CALC-SCNC: 6 MMOL/L (ref 7–16)
AST SERPL-CCNC: 30 U/L (ref 15–37)
BASOPHILS # BLD: 0 K/UL (ref 0–0.2)
BASOPHILS NFR BLD: 0 % (ref 0–2)
BILIRUB SERPL-MCNC: 0.6 MG/DL (ref 0.2–1.1)
BUN SERPL-MCNC: 18 MG/DL (ref 8–23)
CALCIUM SERPL-MCNC: 9.5 MG/DL (ref 8.3–10.4)
CHLORIDE SERPL-SCNC: 103 MMOL/L (ref 98–107)
CO2 SERPL-SCNC: 29 MMOL/L (ref 21–32)
CREAT SERPL-MCNC: 1.08 MG/DL (ref 0.8–1.5)
DIFFERENTIAL METHOD BLD: ABNORMAL
EOSINOPHIL # BLD: 0 K/UL (ref 0–0.8)
EOSINOPHIL NFR BLD: 0 % (ref 0.5–7.8)
ERYTHROCYTE [DISTWIDTH] IN BLOOD BY AUTOMATED COUNT: 13.2 % (ref 11.9–14.6)
GLOBULIN SER CALC-MCNC: 3.3 G/DL (ref 2.3–3.5)
GLUCOSE SERPL-MCNC: 103 MG/DL (ref 65–100)
HCT VFR BLD AUTO: 38.7 % (ref 41.1–50.3)
HGB BLD-MCNC: 12.8 G/DL (ref 13.6–17.2)
IMM GRANULOCYTES # BLD AUTO: 0 K/UL (ref 0–0.5)
IMM GRANULOCYTES NFR BLD AUTO: 0 % (ref 0–5)
LYMPHOCYTES # BLD: 0.1 K/UL (ref 0.5–4.6)
LYMPHOCYTES NFR BLD: 1 % (ref 13–44)
MAGNESIUM SERPL-MCNC: 2.2 MG/DL (ref 1.8–2.4)
MCH RBC QN AUTO: 30.6 PG (ref 26.1–32.9)
MCHC RBC AUTO-ENTMCNC: 33.1 G/DL (ref 31.4–35)
MCV RBC AUTO: 92.6 FL (ref 79.6–97.8)
MONOCYTES # BLD: 0.3 K/UL (ref 0.1–1.3)
MONOCYTES NFR BLD: 8 % (ref 4–12)
NEUTS SEG # BLD: 3.2 K/UL (ref 1.7–8.2)
NEUTS SEG NFR BLD: 90 % (ref 43–78)
NRBC # BLD: 0 K/UL (ref 0–0.2)
PLATELET # BLD AUTO: 138 K/UL (ref 150–450)
PMV BLD AUTO: 10.3 FL (ref 9.4–12.3)
POTASSIUM SERPL-SCNC: 4.2 MMOL/L (ref 3.5–5.1)
PROT SERPL-MCNC: 7.7 G/DL (ref 6.3–8.2)
RBC # BLD AUTO: 4.18 M/UL (ref 4.23–5.67)
SODIUM SERPL-SCNC: 138 MMOL/L (ref 136–145)
WBC # BLD AUTO: 3.6 K/UL (ref 4.3–11.1)

## 2019-02-04 PROCEDURE — 74011000258 HC RX REV CODE- 258: Performed by: INTERNAL MEDICINE

## 2019-02-04 PROCEDURE — 85025 COMPLETE CBC W/AUTO DIFF WBC: CPT

## 2019-02-04 PROCEDURE — 74011250636 HC RX REV CODE- 250/636: Performed by: NURSE PRACTITIONER

## 2019-02-04 PROCEDURE — 83735 ASSAY OF MAGNESIUM: CPT

## 2019-02-04 PROCEDURE — 80053 COMPREHEN METABOLIC PANEL: CPT

## 2019-02-04 PROCEDURE — 96413 CHEMO IV INFUSION 1 HR: CPT

## 2019-02-04 PROCEDURE — 96415 CHEMO IV INFUSION ADDL HR: CPT

## 2019-02-04 PROCEDURE — 88185 FLOWCYTOMETRY/TC ADD-ON: CPT

## 2019-02-04 PROCEDURE — 74011250637 HC RX REV CODE- 250/637: Performed by: INTERNAL MEDICINE

## 2019-02-04 PROCEDURE — 96375 TX/PRO/DX INJ NEW DRUG ADDON: CPT

## 2019-02-04 PROCEDURE — 88184 FLOWCYTOMETRY/ TC 1 MARKER: CPT

## 2019-02-04 PROCEDURE — 74011250636 HC RX REV CODE- 250/636: Performed by: INTERNAL MEDICINE

## 2019-02-04 PROCEDURE — 96361 HYDRATE IV INFUSION ADD-ON: CPT

## 2019-02-04 RX ORDER — ONDANSETRON 2 MG/ML
4 INJECTION INTRAMUSCULAR; INTRAVENOUS ONCE
Status: COMPLETED | OUTPATIENT
Start: 2019-02-04 | End: 2019-02-04

## 2019-02-04 RX ORDER — DIPHENHYDRAMINE HYDROCHLORIDE 50 MG/ML
50 INJECTION, SOLUTION INTRAMUSCULAR; INTRAVENOUS ONCE
Status: COMPLETED | OUTPATIENT
Start: 2019-02-04 | End: 2019-02-04

## 2019-02-04 RX ORDER — HYDROCORTISONE SODIUM SUCCINATE 100 MG/2ML
100 INJECTION, POWDER, FOR SOLUTION INTRAMUSCULAR; INTRAVENOUS ONCE
Status: COMPLETED | OUTPATIENT
Start: 2019-02-04 | End: 2019-02-04

## 2019-02-04 RX ORDER — ALBUTEROL SULFATE 0.83 MG/ML
2.5 SOLUTION RESPIRATORY (INHALATION) AS NEEDED
Status: ACTIVE | OUTPATIENT
Start: 2019-02-04 | End: 2019-02-04

## 2019-02-04 RX ORDER — FAMOTIDINE 10 MG/ML
20 INJECTION INTRAVENOUS
Status: DISCONTINUED | OUTPATIENT
Start: 2019-02-04 | End: 2019-02-06

## 2019-02-04 RX ORDER — HEPARIN 100 UNIT/ML
300-500 SYRINGE INTRAVENOUS AS NEEDED
Status: DISPENSED | OUTPATIENT
Start: 2019-02-04 | End: 2019-02-04

## 2019-02-04 RX ORDER — ACETAMINOPHEN 325 MG/1
650 TABLET ORAL ONCE
Status: COMPLETED | OUTPATIENT
Start: 2019-02-04 | End: 2019-02-04

## 2019-02-04 RX ORDER — HYDROCORTISONE SODIUM SUCCINATE 100 MG/2ML
100 INJECTION, POWDER, FOR SOLUTION INTRAMUSCULAR; INTRAVENOUS AS NEEDED
Status: DISPENSED | OUTPATIENT
Start: 2019-02-04 | End: 2019-02-04

## 2019-02-04 RX ORDER — DIPHENHYDRAMINE HYDROCHLORIDE 50 MG/ML
50 INJECTION, SOLUTION INTRAMUSCULAR; INTRAVENOUS AS NEEDED
Status: DISPENSED | OUTPATIENT
Start: 2019-02-04 | End: 2019-02-04

## 2019-02-04 RX ORDER — SODIUM CHLORIDE 9 MG/ML
10 INJECTION INTRAMUSCULAR; INTRAVENOUS; SUBCUTANEOUS AS NEEDED
Status: DISPENSED | OUTPATIENT
Start: 2019-02-04 | End: 2019-02-04

## 2019-02-04 RX ADMIN — FAMOTIDINE 20 MG: 10 INJECTION, SOLUTION INTRAVENOUS at 11:10

## 2019-02-04 RX ADMIN — DIPHENHYDRAMINE HYDROCHLORIDE 50 MG: 50 INJECTION, SOLUTION INTRAMUSCULAR; INTRAVENOUS at 10:51

## 2019-02-04 RX ADMIN — SODIUM CHLORIDE, PRESERVATIVE FREE 600 UNITS: 5 INJECTION INTRAVENOUS at 16:40

## 2019-02-04 RX ADMIN — SODIUM CHLORIDE 10 ML: 9 INJECTION INTRAMUSCULAR; INTRAVENOUS; SUBCUTANEOUS at 16:40

## 2019-02-04 RX ADMIN — MEPERIDINE HYDROCHLORIDE 25 MG: 25 INJECTION INTRAMUSCULAR; INTRAVENOUS; SUBCUTANEOUS at 10:56

## 2019-02-04 RX ADMIN — SODIUM CHLORIDE 500 ML: 900 INJECTION, SOLUTION INTRAVENOUS at 10:40

## 2019-02-04 RX ADMIN — ONDANSETRON 4 MG: 2 INJECTION INTRAMUSCULAR; INTRAVENOUS at 11:16

## 2019-02-04 RX ADMIN — ALEMTUZUMAB 30 MG: 30 INJECTION INTRAVENOUS at 11:48

## 2019-02-04 RX ADMIN — SODIUM CHLORIDE 10 ML: 9 INJECTION INTRAMUSCULAR; INTRAVENOUS; SUBCUTANEOUS at 16:39

## 2019-02-04 RX ADMIN — ACETAMINOPHEN 650 MG: 325 TABLET, FILM COATED ORAL at 10:45

## 2019-02-04 RX ADMIN — HYDROCORTISONE SODIUM SUCCINATE 100 MG: 100 INJECTION, POWDER, FOR SOLUTION INTRAMUSCULAR; INTRAVENOUS at 10:46

## 2019-02-04 NOTE — PROGRESS NOTES
2/4/19- Pt seen in the office today with NP prior to infusion for campath. Pt reports feeling overall well today. Denied nausea at home. Pt took his PO prednisone last night as well as this morning. Labs reviewed. Pt to proceed to infusion today for Campath.

## 2019-02-04 NOTE — PROGRESS NOTES
Problem: Patient Education:  Go to Education Activity  Goal: Patient/Family Education  Outcome: Progressing Towards Goal  Reviewed Campath infusion with patient. Verbalizes understanding.

## 2019-02-04 NOTE — PROGRESS NOTES
Tolerated Campath without reaction symptoms. Slept at long intervals today. Voiding without difficulty. Patient discharged via ambulation accompanied by wife. Instructed to notify physician of any problems, questions or concerns after discharge. Next appointment is 02/06/19 at 0900 with Maureen.

## 2019-02-05 LAB
FLOW CYTOMETRY, FBTC1: NORMAL
SPECIMEN SOURCE: NORMAL
TEST ORDERED:: NORMAL

## 2019-02-06 ENCOUNTER — HOSPITAL ENCOUNTER (OUTPATIENT)
Dept: INFUSION THERAPY | Age: 64
Discharge: HOME | End: 2019-02-06
Payer: COMMERCIAL

## 2019-02-06 VITALS
TEMPERATURE: 98.1 F | SYSTOLIC BLOOD PRESSURE: 111 MMHG | HEART RATE: 80 BPM | OXYGEN SATURATION: 98 % | WEIGHT: 265.8 LBS | DIASTOLIC BLOOD PRESSURE: 69 MMHG | BODY MASS INDEX: 37.07 KG/M2 | RESPIRATION RATE: 16 BRPM

## 2019-02-06 DIAGNOSIS — C91.60 PROLYMPHOCYTIC LEUKEMIA OF T-CELL (HCC): Primary | ICD-10-CM

## 2019-02-06 LAB
CMV DNA SERPL NAA+PROBE-ACNC: NORMAL IU/ML
CMV DNA SERPL NAA+PROBE-LOG IU: NORMAL LOG10 IU/ML

## 2019-02-06 PROCEDURE — 74011000250 HC RX REV CODE- 250: Performed by: INTERNAL MEDICINE

## 2019-02-06 PROCEDURE — 96375 TX/PRO/DX INJ NEW DRUG ADDON: CPT

## 2019-02-06 PROCEDURE — 74011250637 HC RX REV CODE- 250/637: Performed by: INTERNAL MEDICINE

## 2019-02-06 PROCEDURE — 74011250636 HC RX REV CODE- 250/636: Performed by: NURSE PRACTITIONER

## 2019-02-06 PROCEDURE — 96415 CHEMO IV INFUSION ADDL HR: CPT

## 2019-02-06 PROCEDURE — 96361 HYDRATE IV INFUSION ADD-ON: CPT

## 2019-02-06 PROCEDURE — 96413 CHEMO IV INFUSION 1 HR: CPT

## 2019-02-06 PROCEDURE — 74011000258 HC RX REV CODE- 258: Performed by: INTERNAL MEDICINE

## 2019-02-06 PROCEDURE — 74011250636 HC RX REV CODE- 250/636: Performed by: INTERNAL MEDICINE

## 2019-02-06 RX ORDER — HYDROCORTISONE SODIUM SUCCINATE 100 MG/2ML
100 INJECTION, POWDER, FOR SOLUTION INTRAMUSCULAR; INTRAVENOUS ONCE
Status: COMPLETED | OUTPATIENT
Start: 2019-02-06 | End: 2019-02-06

## 2019-02-06 RX ORDER — SODIUM CHLORIDE 0.9 % (FLUSH) 0.9 %
10 SYRINGE (ML) INJECTION AS NEEDED
Status: ACTIVE | OUTPATIENT
Start: 2019-02-06 | End: 2019-02-06

## 2019-02-06 RX ORDER — ONDANSETRON 2 MG/ML
4 INJECTION INTRAMUSCULAR; INTRAVENOUS ONCE
Status: COMPLETED | OUTPATIENT
Start: 2019-02-06 | End: 2019-02-06

## 2019-02-06 RX ORDER — HEPARIN 100 UNIT/ML
300-500 SYRINGE INTRAVENOUS AS NEEDED
Status: DISPENSED | OUTPATIENT
Start: 2019-02-06 | End: 2019-02-06

## 2019-02-06 RX ORDER — HYDROCORTISONE SODIUM SUCCINATE 100 MG/2ML
100 INJECTION, POWDER, FOR SOLUTION INTRAMUSCULAR; INTRAVENOUS AS NEEDED
Status: DISPENSED | OUTPATIENT
Start: 2019-02-06 | End: 2019-02-06

## 2019-02-06 RX ORDER — FAMOTIDINE 10 MG/ML
20 INJECTION INTRAVENOUS
Status: DISCONTINUED | OUTPATIENT
Start: 2019-02-06 | End: 2019-02-06

## 2019-02-06 RX ORDER — ACETAMINOPHEN 325 MG/1
650 TABLET ORAL ONCE
Status: COMPLETED | OUTPATIENT
Start: 2019-02-06 | End: 2019-02-06

## 2019-02-06 RX ORDER — DIPHENHYDRAMINE HYDROCHLORIDE 50 MG/ML
50 INJECTION, SOLUTION INTRAMUSCULAR; INTRAVENOUS AS NEEDED
Status: ACTIVE | OUTPATIENT
Start: 2019-02-06 | End: 2019-02-06

## 2019-02-06 RX ORDER — DIPHENHYDRAMINE HYDROCHLORIDE 50 MG/ML
50 INJECTION, SOLUTION INTRAMUSCULAR; INTRAVENOUS ONCE
Status: COMPLETED | OUTPATIENT
Start: 2019-02-06 | End: 2019-02-06

## 2019-02-06 RX ADMIN — SODIUM CHLORIDE 500 ML: 900 INJECTION, SOLUTION INTRAVENOUS at 08:45

## 2019-02-06 RX ADMIN — ALEMTUZUMAB 30 MG: 30 INJECTION INTRAVENOUS at 10:14

## 2019-02-06 RX ADMIN — DIPHENHYDRAMINE HYDROCHLORIDE 50 MG: 50 INJECTION, SOLUTION INTRAMUSCULAR; INTRAVENOUS at 09:24

## 2019-02-06 RX ADMIN — SODIUM CHLORIDE, PRESERVATIVE FREE 500 UNITS: 5 INJECTION INTRAVENOUS at 13:26

## 2019-02-06 RX ADMIN — ACETAMINOPHEN 650 MG: 325 TABLET, FILM COATED ORAL at 09:15

## 2019-02-06 RX ADMIN — Medication 10 ML: at 13:25

## 2019-02-06 RX ADMIN — MEPERIDINE HYDROCHLORIDE 25 MG: 25 INJECTION INTRAMUSCULAR; INTRAVENOUS; SUBCUTANEOUS at 09:27

## 2019-02-06 RX ADMIN — ONDANSETRON 4 MG: 2 INJECTION INTRAMUSCULAR; INTRAVENOUS at 09:19

## 2019-02-06 RX ADMIN — FAMOTIDINE 20 MG: 10 INJECTION, SOLUTION INTRAVENOUS at 09:21

## 2019-02-06 RX ADMIN — HYDROCORTISONE SODIUM SUCCINATE 100 MG: 100 INJECTION, POWDER, FOR SOLUTION INTRAMUSCULAR; INTRAVENOUS at 09:17

## 2019-02-06 NOTE — PROGRESS NOTES
Arrived to the [facility]. chemo completed. Patient tolerated well. Any issues or concerns during appointment: no.  Patient aware of next infusion appointment on 2/8 at 1130. Discharged to home ambulatory with his wife.

## 2019-02-08 ENCOUNTER — HOSPITAL ENCOUNTER (OUTPATIENT)
Dept: INFUSION THERAPY | Age: 64
Discharge: HOME | End: 2019-02-08
Payer: COMMERCIAL

## 2019-02-08 VITALS — HEART RATE: 83 BPM | SYSTOLIC BLOOD PRESSURE: 105 MMHG | DIASTOLIC BLOOD PRESSURE: 63 MMHG | TEMPERATURE: 97.8 F

## 2019-02-08 DIAGNOSIS — C91.60 PROLYMPHOCYTIC LEUKEMIA OF T-CELL (HCC): Primary | ICD-10-CM

## 2019-02-08 PROCEDURE — 96413 CHEMO IV INFUSION 1 HR: CPT

## 2019-02-08 PROCEDURE — 74011250637 HC RX REV CODE- 250/637: Performed by: INTERNAL MEDICINE

## 2019-02-08 PROCEDURE — 74011000258 HC RX REV CODE- 258: Performed by: INTERNAL MEDICINE

## 2019-02-08 PROCEDURE — 74011250636 HC RX REV CODE- 250/636: Performed by: INTERNAL MEDICINE

## 2019-02-08 PROCEDURE — 96375 TX/PRO/DX INJ NEW DRUG ADDON: CPT

## 2019-02-08 PROCEDURE — 96415 CHEMO IV INFUSION ADDL HR: CPT

## 2019-02-08 PROCEDURE — 74011250636 HC RX REV CODE- 250/636: Performed by: NURSE PRACTITIONER

## 2019-02-08 PROCEDURE — 96361 HYDRATE IV INFUSION ADD-ON: CPT

## 2019-02-08 RX ORDER — HYDROCORTISONE SODIUM SUCCINATE 100 MG/2ML
100 INJECTION, POWDER, FOR SOLUTION INTRAMUSCULAR; INTRAVENOUS AS NEEDED
Status: ACTIVE | OUTPATIENT
Start: 2019-02-08 | End: 2019-02-09

## 2019-02-08 RX ORDER — SODIUM CHLORIDE 0.9 % (FLUSH) 0.9 %
10 SYRINGE (ML) INJECTION AS NEEDED
Status: ACTIVE | OUTPATIENT
Start: 2019-02-08 | End: 2019-02-08

## 2019-02-08 RX ORDER — DIPHENHYDRAMINE HYDROCHLORIDE 50 MG/ML
50 INJECTION, SOLUTION INTRAMUSCULAR; INTRAVENOUS ONCE
Status: COMPLETED | OUTPATIENT
Start: 2019-02-08 | End: 2019-02-08

## 2019-02-08 RX ORDER — ONDANSETRON 2 MG/ML
8 INJECTION INTRAMUSCULAR; INTRAVENOUS AS NEEDED
Status: ACTIVE | OUTPATIENT
Start: 2019-02-08 | End: 2019-02-09

## 2019-02-08 RX ORDER — DIPHENHYDRAMINE HYDROCHLORIDE 50 MG/ML
50 INJECTION, SOLUTION INTRAMUSCULAR; INTRAVENOUS AS NEEDED
Status: ACTIVE | OUTPATIENT
Start: 2019-02-08 | End: 2019-02-09

## 2019-02-08 RX ORDER — HYDROCORTISONE SODIUM SUCCINATE 100 MG/2ML
100 INJECTION, POWDER, FOR SOLUTION INTRAMUSCULAR; INTRAVENOUS ONCE
Status: COMPLETED | OUTPATIENT
Start: 2019-02-08 | End: 2019-02-08

## 2019-02-08 RX ORDER — HEPARIN 100 UNIT/ML
300-500 SYRINGE INTRAVENOUS AS NEEDED
Status: DISPENSED | OUTPATIENT
Start: 2019-02-08 | End: 2019-02-08

## 2019-02-08 RX ORDER — ACETAMINOPHEN 325 MG/1
650 TABLET ORAL ONCE
Status: COMPLETED | OUTPATIENT
Start: 2019-02-08 | End: 2019-02-08

## 2019-02-08 RX ORDER — ONDANSETRON 2 MG/ML
4 INJECTION INTRAMUSCULAR; INTRAVENOUS ONCE
Status: COMPLETED | OUTPATIENT
Start: 2019-02-08 | End: 2019-02-08

## 2019-02-08 RX ORDER — FAMOTIDINE 10 MG/ML
20 INJECTION INTRAVENOUS
Status: DISCONTINUED | OUTPATIENT
Start: 2019-02-08 | End: 2019-02-11 | Stop reason: ALTCHOICE

## 2019-02-08 RX ADMIN — SODIUM CHLORIDE 500 ML: 900 INJECTION, SOLUTION INTRAVENOUS at 11:38

## 2019-02-08 RX ADMIN — Medication 20 ML: at 17:14

## 2019-02-08 RX ADMIN — DIPHENHYDRAMINE HYDROCHLORIDE 50 MG: 50 INJECTION, SOLUTION INTRAMUSCULAR; INTRAVENOUS at 12:00

## 2019-02-08 RX ADMIN — SODIUM CHLORIDE, PRESERVATIVE FREE 600 UNITS: 5 INJECTION INTRAVENOUS at 17:14

## 2019-02-08 RX ADMIN — ACETAMINOPHEN 650 MG: 325 TABLET, FILM COATED ORAL at 12:02

## 2019-02-08 RX ADMIN — MEPERIDINE HYDROCHLORIDE 25 MG: 25 INJECTION INTRAMUSCULAR; INTRAVENOUS; SUBCUTANEOUS at 12:17

## 2019-02-08 RX ADMIN — HYDROCORTISONE SODIUM SUCCINATE 100 MG: 100 INJECTION, POWDER, FOR SOLUTION INTRAMUSCULAR; INTRAVENOUS at 12:10

## 2019-02-08 RX ADMIN — ONDANSETRON 4 MG: 2 INJECTION INTRAMUSCULAR; INTRAVENOUS at 11:57

## 2019-02-08 RX ADMIN — ALEMTUZUMAB 30 MG: 30 INJECTION INTRAVENOUS at 13:15

## 2019-02-08 RX ADMIN — Medication 10 ML: at 11:38

## 2019-02-08 RX ADMIN — FAMOTIDINE 20 MG: 10 INJECTION, SOLUTION INTRAVENOUS at 12:28

## 2019-02-08 NOTE — PROGRESS NOTES
Arrived to the [facility]. Assessment completed. Labs reviewed Patient tolerated chemotherapy well today. Call placed to Vargas Luther NP. Clarified with her about if patient needs labs or not today, before treatment. She states that he does not. Patient voided several times per urinal while here today. Any issues or concerns during appointment: none. Patient aware of next infusion appointment on 2/13/19 (date) at 0830 (time) with IV infusion center. Discharged ambulatory, with wife. Patient instructed to call Dr. Edward Costello office immediately for any problems or concerns. He verbalizes understanding.

## 2019-02-11 ENCOUNTER — HOSPITAL ENCOUNTER (OUTPATIENT)
Dept: INFUSION THERAPY | Age: 64
Discharge: HOME | End: 2019-02-11
Payer: COMMERCIAL

## 2019-02-11 ENCOUNTER — PATIENT OUTREACH (OUTPATIENT)
Dept: CASE MANAGEMENT | Age: 64
End: 2019-02-11

## 2019-02-11 ENCOUNTER — HOSPITAL ENCOUNTER (OUTPATIENT)
Dept: LAB | Age: 64
Discharge: HOME | End: 2019-02-11
Payer: COMMERCIAL

## 2019-02-11 DIAGNOSIS — C91.60 PROLYMPHOCYTIC LEUKEMIA OF T-CELL (HCC): ICD-10-CM

## 2019-02-11 DIAGNOSIS — C91.60 PROLYMPHOCYTIC LEUKEMIA OF T-CELL (HCC): Primary | ICD-10-CM

## 2019-02-11 LAB
ALBUMIN SERPL-MCNC: 4.2 G/DL (ref 3.2–4.6)
ALBUMIN/GLOB SERPL: 1.2 {RATIO} (ref 1.2–3.5)
ALP SERPL-CCNC: 31 U/L (ref 50–136)
ALT SERPL-CCNC: 48 U/L (ref 12–65)
ANION GAP SERPL CALC-SCNC: 7 MMOL/L (ref 7–16)
AST SERPL-CCNC: 15 U/L (ref 15–37)
BASOPHILS # BLD: 0 K/UL (ref 0–0.2)
BASOPHILS NFR BLD: 0 % (ref 0–2)
BILIRUB SERPL-MCNC: 0.6 MG/DL (ref 0.2–1.1)
BUN SERPL-MCNC: 16 MG/DL (ref 8–23)
CALCIUM SERPL-MCNC: 9.7 MG/DL (ref 8.3–10.4)
CHLORIDE SERPL-SCNC: 105 MMOL/L (ref 98–107)
CO2 SERPL-SCNC: 27 MMOL/L (ref 21–32)
CREAT SERPL-MCNC: 1.07 MG/DL (ref 0.8–1.5)
DIFFERENTIAL METHOD BLD: ABNORMAL
EOSINOPHIL # BLD: 0 K/UL (ref 0–0.8)
EOSINOPHIL NFR BLD: 0 % (ref 0.5–7.8)
ERYTHROCYTE [DISTWIDTH] IN BLOOD BY AUTOMATED COUNT: 13.1 % (ref 11.9–14.6)
GLOBULIN SER CALC-MCNC: 3.6 G/DL (ref 2.3–3.5)
GLUCOSE SERPL-MCNC: 89 MG/DL (ref 65–100)
HCT VFR BLD AUTO: 39.7 % (ref 41.1–50.3)
HGB BLD-MCNC: 13.2 G/DL (ref 13.6–17.2)
IMM GRANULOCYTES # BLD AUTO: 0 K/UL (ref 0–0.5)
IMM GRANULOCYTES NFR BLD AUTO: 0 % (ref 0–5)
LYMPHOCYTES # BLD: 0.1 K/UL (ref 0.5–4.6)
LYMPHOCYTES NFR BLD: 1 % (ref 13–44)
MAGNESIUM SERPL-MCNC: 2.3 MG/DL (ref 1.8–2.4)
MCH RBC QN AUTO: 30.6 PG (ref 26.1–32.9)
MCHC RBC AUTO-ENTMCNC: 33.2 G/DL (ref 31.4–35)
MCV RBC AUTO: 92.1 FL (ref 79.6–97.8)
MONOCYTES # BLD: 0.3 K/UL (ref 0.1–1.3)
MONOCYTES NFR BLD: 9 % (ref 4–12)
NEUTS SEG # BLD: 3.5 K/UL (ref 1.7–8.2)
NEUTS SEG NFR BLD: 90 % (ref 43–78)
NRBC # BLD: 0 K/UL (ref 0–0.2)
PLATELET # BLD AUTO: 144 K/UL (ref 150–450)
PMV BLD AUTO: 10.5 FL (ref 9.4–12.3)
POTASSIUM SERPL-SCNC: 4.1 MMOL/L (ref 3.5–5.1)
PROT SERPL-MCNC: 7.8 G/DL (ref 6.3–8.2)
RBC # BLD AUTO: 4.31 M/UL (ref 4.23–5.67)
SODIUM SERPL-SCNC: 139 MMOL/L (ref 136–145)
WBC # BLD AUTO: 3.9 K/UL (ref 4.3–11.1)

## 2019-02-11 PROCEDURE — 83735 ASSAY OF MAGNESIUM: CPT

## 2019-02-11 PROCEDURE — 74011250637 HC RX REV CODE- 250/637: Performed by: INTERNAL MEDICINE

## 2019-02-11 PROCEDURE — 96415 CHEMO IV INFUSION ADDL HR: CPT

## 2019-02-11 PROCEDURE — 96413 CHEMO IV INFUSION 1 HR: CPT

## 2019-02-11 PROCEDURE — 74011250636 HC RX REV CODE- 250/636: Performed by: INTERNAL MEDICINE

## 2019-02-11 PROCEDURE — 80053 COMPREHEN METABOLIC PANEL: CPT

## 2019-02-11 PROCEDURE — 96375 TX/PRO/DX INJ NEW DRUG ADDON: CPT

## 2019-02-11 PROCEDURE — 74011000258 HC RX REV CODE- 258: Performed by: INTERNAL MEDICINE

## 2019-02-11 PROCEDURE — 85025 COMPLETE CBC W/AUTO DIFF WBC: CPT

## 2019-02-11 PROCEDURE — 74011000250 HC RX REV CODE- 250: Performed by: INTERNAL MEDICINE

## 2019-02-11 RX ORDER — SODIUM CHLORIDE 0.9 % (FLUSH) 0.9 %
10 SYRINGE (ML) INJECTION AS NEEDED
Status: ACTIVE | OUTPATIENT
Start: 2019-02-11 | End: 2019-02-11

## 2019-02-11 RX ORDER — ONDANSETRON 2 MG/ML
4 INJECTION INTRAMUSCULAR; INTRAVENOUS ONCE
Status: COMPLETED | OUTPATIENT
Start: 2019-02-11 | End: 2019-02-11

## 2019-02-11 RX ORDER — DIPHENHYDRAMINE HYDROCHLORIDE 50 MG/ML
50 INJECTION, SOLUTION INTRAMUSCULAR; INTRAVENOUS ONCE
Status: COMPLETED | OUTPATIENT
Start: 2019-02-11 | End: 2019-02-11

## 2019-02-11 RX ORDER — ACETAMINOPHEN 325 MG/1
650 TABLET ORAL ONCE
Status: COMPLETED | OUTPATIENT
Start: 2019-02-11 | End: 2019-02-11

## 2019-02-11 RX ORDER — FAMOTIDINE 10 MG/ML
20 INJECTION INTRAVENOUS
Status: DISCONTINUED | OUTPATIENT
Start: 2019-02-11 | End: 2019-02-11 | Stop reason: ALTCHOICE

## 2019-02-11 RX ORDER — HEPARIN 100 UNIT/ML
500 SYRINGE INTRAVENOUS AS NEEDED
Status: DISPENSED | OUTPATIENT
Start: 2019-02-11 | End: 2019-02-11

## 2019-02-11 RX ORDER — HYDROCORTISONE SODIUM SUCCINATE 100 MG/2ML
100 INJECTION, POWDER, FOR SOLUTION INTRAMUSCULAR; INTRAVENOUS ONCE
Status: COMPLETED | OUTPATIENT
Start: 2019-02-11 | End: 2019-02-11

## 2019-02-11 RX ORDER — HEPARIN 100 UNIT/ML
300-500 SYRINGE INTRAVENOUS AS NEEDED
Status: ACTIVE | OUTPATIENT
Start: 2019-02-11 | End: 2019-02-11

## 2019-02-11 RX ADMIN — SODIUM CHLORIDE, PRESERVATIVE FREE 300 UNITS: 5 INJECTION INTRAVENOUS at 18:10

## 2019-02-11 RX ADMIN — FAMOTIDINE 20 MG: 10 INJECTION, SOLUTION INTRAVENOUS at 12:51

## 2019-02-11 RX ADMIN — ALEMTUZUMAB 30 MG: 30 INJECTION INTRAVENOUS at 13:52

## 2019-02-11 RX ADMIN — ACETAMINOPHEN 650 MG: 325 TABLET, FILM COATED ORAL at 12:25

## 2019-02-11 RX ADMIN — SODIUM CHLORIDE 500 ML: 900 INJECTION, SOLUTION INTRAVENOUS at 12:22

## 2019-02-11 RX ADMIN — MEPERIDINE HYDROCHLORIDE 25 MG: 25 INJECTION INTRAMUSCULAR; INTRAVENOUS; SUBCUTANEOUS at 12:57

## 2019-02-11 RX ADMIN — Medication 10 ML: at 18:10

## 2019-02-11 RX ADMIN — HYDROCORTISONE SODIUM SUCCINATE 100 MG: 100 INJECTION, POWDER, FOR SOLUTION INTRAMUSCULAR; INTRAVENOUS at 12:45

## 2019-02-11 RX ADMIN — ONDANSETRON 4 MG: 2 INJECTION INTRAMUSCULAR; INTRAVENOUS at 12:27

## 2019-02-11 RX ADMIN — Medication 10 ML: at 12:22

## 2019-02-11 RX ADMIN — DIPHENHYDRAMINE HYDROCHLORIDE 50 MG: 50 INJECTION, SOLUTION INTRAMUSCULAR; INTRAVENOUS at 12:29

## 2019-02-11 NOTE — PROGRESS NOTES
Arrived to the [facility]. Assessment completed. Patient was seen in [place] [address] today, before coming to infusion. Labs reviewed. Patient tolerated chemotherapy well today. Any issues or concerns during appointment: none. Patient aware of next infusion appointment on 2/13/18 (date) at 0830 (time) with IV infusion center. Discharged ambulatory, with wife. Patient instructed to call Dr. Herbert Grier office immediately for any problems or concerns. He verbalizes understanding.

## 2019-02-11 NOTE — PROGRESS NOTES
2/11/19- Pt seen in the office with Dr Shereen Cherry. Pt reports feeling overall well. Labs reviewed. HLA typing results received on patient and one sibling so far. CMV now detected so pt switched to Valcyte from Acyclovir. CMV to be checked weekly. Discussed referral possibilities between [facility] and [facility]. Once HLA typing is complete on siblings we will refer pt to one of those as well as Dr Zion Law. Pt to proceed to infusion today for Campath. He will continue Campath on Monday, Wednesday, and Friday and will see a provider weekly. Will also plan for a BMX in th future with the date to be determined.

## 2019-02-13 ENCOUNTER — HOSPITAL ENCOUNTER (OUTPATIENT)
Dept: INFUSION THERAPY | Age: 64
Discharge: HOME | End: 2019-02-13
Payer: COMMERCIAL

## 2019-02-13 VITALS
DIASTOLIC BLOOD PRESSURE: 57 MMHG | SYSTOLIC BLOOD PRESSURE: 118 MMHG | BODY MASS INDEX: 36.82 KG/M2 | TEMPERATURE: 98.1 F | RESPIRATION RATE: 16 BRPM | OXYGEN SATURATION: 99 % | WEIGHT: 264 LBS | HEART RATE: 63 BPM

## 2019-02-13 DIAGNOSIS — C91.60 PROLYMPHOCYTIC LEUKEMIA OF T-CELL (HCC): Primary | ICD-10-CM

## 2019-02-13 PROCEDURE — 74011000250 HC RX REV CODE- 250: Performed by: INTERNAL MEDICINE

## 2019-02-13 PROCEDURE — 96413 CHEMO IV INFUSION 1 HR: CPT

## 2019-02-13 PROCEDURE — 74011250636 HC RX REV CODE- 250/636: Performed by: INTERNAL MEDICINE

## 2019-02-13 PROCEDURE — 96375 TX/PRO/DX INJ NEW DRUG ADDON: CPT

## 2019-02-13 PROCEDURE — 74011000258 HC RX REV CODE- 258: Performed by: INTERNAL MEDICINE

## 2019-02-13 PROCEDURE — 96415 CHEMO IV INFUSION ADDL HR: CPT

## 2019-02-13 PROCEDURE — 74011250637 HC RX REV CODE- 250/637: Performed by: INTERNAL MEDICINE

## 2019-02-13 RX ORDER — DIPHENHYDRAMINE HYDROCHLORIDE 50 MG/ML
50 INJECTION, SOLUTION INTRAMUSCULAR; INTRAVENOUS ONCE
Status: COMPLETED | OUTPATIENT
Start: 2019-02-13 | End: 2019-02-13

## 2019-02-13 RX ORDER — ACETAMINOPHEN 325 MG/1
650 TABLET ORAL ONCE
Status: COMPLETED | OUTPATIENT
Start: 2019-02-13 | End: 2019-02-13

## 2019-02-13 RX ORDER — DIPHENHYDRAMINE HYDROCHLORIDE 50 MG/ML
50 INJECTION, SOLUTION INTRAMUSCULAR; INTRAVENOUS AS NEEDED
Status: DISPENSED | OUTPATIENT
Start: 2019-02-13 | End: 2019-02-13

## 2019-02-13 RX ORDER — HYDROCORTISONE SODIUM SUCCINATE 100 MG/2ML
100 INJECTION, POWDER, FOR SOLUTION INTRAMUSCULAR; INTRAVENOUS ONCE
Status: COMPLETED | OUTPATIENT
Start: 2019-02-13 | End: 2019-02-13

## 2019-02-13 RX ORDER — HEPARIN 100 UNIT/ML
300-500 SYRINGE INTRAVENOUS AS NEEDED
Status: DISPENSED | OUTPATIENT
Start: 2019-02-13 | End: 2019-02-13

## 2019-02-13 RX ORDER — FAMOTIDINE 10 MG/ML
20 INJECTION INTRAVENOUS
Status: DISCONTINUED | OUTPATIENT
Start: 2019-02-13 | End: 2019-02-13 | Stop reason: CLARIF

## 2019-02-13 RX ORDER — SODIUM CHLORIDE 0.9 % (FLUSH) 0.9 %
10 SYRINGE (ML) INJECTION AS NEEDED
Status: ACTIVE | OUTPATIENT
Start: 2019-02-13 | End: 2019-02-13

## 2019-02-13 RX ORDER — HYDROCORTISONE SODIUM SUCCINATE 100 MG/2ML
100 INJECTION, POWDER, FOR SOLUTION INTRAMUSCULAR; INTRAVENOUS AS NEEDED
Status: DISPENSED | OUTPATIENT
Start: 2019-02-13 | End: 2019-02-13

## 2019-02-13 RX ORDER — ONDANSETRON 2 MG/ML
4 INJECTION INTRAMUSCULAR; INTRAVENOUS ONCE
Status: COMPLETED | OUTPATIENT
Start: 2019-02-13 | End: 2019-02-13

## 2019-02-13 RX ADMIN — HYDROCORTISONE SODIUM SUCCINATE 100 MG: 100 INJECTION, POWDER, FOR SOLUTION INTRAMUSCULAR; INTRAVENOUS at 09:03

## 2019-02-13 RX ADMIN — FAMOTIDINE 20 MG: 10 INJECTION, SOLUTION INTRAVENOUS at 09:10

## 2019-02-13 RX ADMIN — Medication 10 ML: at 13:47

## 2019-02-13 RX ADMIN — ONDANSETRON 4 MG: 2 INJECTION INTRAMUSCULAR; INTRAVENOUS at 09:06

## 2019-02-13 RX ADMIN — SODIUM CHLORIDE 500 ML: 900 INJECTION, SOLUTION INTRAVENOUS at 09:14

## 2019-02-13 RX ADMIN — Medication 600 UNITS: at 13:47

## 2019-02-13 RX ADMIN — Medication 10 ML: at 08:54

## 2019-02-13 RX ADMIN — DIPHENHYDRAMINE HYDROCHLORIDE 50 MG: 50 INJECTION, SOLUTION INTRAMUSCULAR; INTRAVENOUS at 08:59

## 2019-02-13 RX ADMIN — MEPERIDINE HYDROCHLORIDE 25 MG: 25 INJECTION INTRAMUSCULAR; INTRAVENOUS; SUBCUTANEOUS at 09:32

## 2019-02-13 RX ADMIN — Medication 10 ML: at 13:46

## 2019-02-13 RX ADMIN — ALEMTUZUMAB 30 MG: 30 INJECTION INTRAVENOUS at 09:50

## 2019-02-13 RX ADMIN — ACETAMINOPHEN 650 MG: 325 TABLET, FILM COATED ORAL at 08:54

## 2019-02-13 NOTE — PROGRESS NOTES
Arrived to the [facility]. Campath completed. Patient tolerated well. Any issues or concerns during appointment: none. Patient aware of next infusion appointment on 2/15 (date) at 8:00 AM (time). Discharged ambulatory.

## 2019-02-15 ENCOUNTER — HOSPITAL ENCOUNTER (OUTPATIENT)
Dept: INFUSION THERAPY | Age: 64
Discharge: HOME | End: 2019-02-15
Payer: COMMERCIAL

## 2019-02-15 VITALS
SYSTOLIC BLOOD PRESSURE: 107 MMHG | WEIGHT: 264 LBS | BODY MASS INDEX: 36.82 KG/M2 | HEART RATE: 68 BPM | DIASTOLIC BLOOD PRESSURE: 75 MMHG | OXYGEN SATURATION: 98 % | TEMPERATURE: 97.7 F | RESPIRATION RATE: 18 BRPM

## 2019-02-15 DIAGNOSIS — C91.60 PROLYMPHOCYTIC LEUKEMIA OF T-CELL (HCC): Primary | ICD-10-CM

## 2019-02-15 PROCEDURE — 74011250636 HC RX REV CODE- 250/636: Performed by: INTERNAL MEDICINE

## 2019-02-15 PROCEDURE — 74011250637 HC RX REV CODE- 250/637: Performed by: INTERNAL MEDICINE

## 2019-02-15 PROCEDURE — 74011000258 HC RX REV CODE- 258: Performed by: INTERNAL MEDICINE

## 2019-02-15 PROCEDURE — 74011000250 HC RX REV CODE- 250: Performed by: INTERNAL MEDICINE

## 2019-02-15 PROCEDURE — 96361 HYDRATE IV INFUSION ADD-ON: CPT

## 2019-02-15 PROCEDURE — 96415 CHEMO IV INFUSION ADDL HR: CPT

## 2019-02-15 PROCEDURE — 96375 TX/PRO/DX INJ NEW DRUG ADDON: CPT

## 2019-02-15 PROCEDURE — 96413 CHEMO IV INFUSION 1 HR: CPT

## 2019-02-15 RX ORDER — DIPHENHYDRAMINE HYDROCHLORIDE 50 MG/ML
50 INJECTION, SOLUTION INTRAMUSCULAR; INTRAVENOUS ONCE
Status: COMPLETED | OUTPATIENT
Start: 2019-02-15 | End: 2019-02-15

## 2019-02-15 RX ORDER — SODIUM CHLORIDE 0.9 % (FLUSH) 0.9 %
10 SYRINGE (ML) INJECTION AS NEEDED
Status: ACTIVE | OUTPATIENT
Start: 2019-02-15 | End: 2019-02-15

## 2019-02-15 RX ORDER — ONDANSETRON 2 MG/ML
4 INJECTION INTRAMUSCULAR; INTRAVENOUS ONCE
Status: DISCONTINUED | OUTPATIENT
Start: 2019-02-15 | End: 2019-02-15

## 2019-02-15 RX ORDER — SODIUM CHLORIDE 9 MG/ML
500 INJECTION, SOLUTION INTRAVENOUS ONCE
Status: DISCONTINUED | OUTPATIENT
Start: 2019-02-15 | End: 2019-02-15 | Stop reason: SDUPTHER

## 2019-02-15 RX ORDER — HEPARIN 100 UNIT/ML
300-500 SYRINGE INTRAVENOUS AS NEEDED
Status: DISPENSED | OUTPATIENT
Start: 2019-02-15 | End: 2019-02-15

## 2019-02-15 RX ORDER — HYDROCORTISONE SODIUM SUCCINATE 100 MG/2ML
100 INJECTION, POWDER, FOR SOLUTION INTRAMUSCULAR; INTRAVENOUS ONCE
Status: COMPLETED | OUTPATIENT
Start: 2019-02-15 | End: 2019-02-15

## 2019-02-15 RX ORDER — FAMOTIDINE 10 MG/ML
20 INJECTION INTRAVENOUS
Status: DISCONTINUED | OUTPATIENT
Start: 2019-02-15 | End: 2019-02-15

## 2019-02-15 RX ORDER — HEPARIN 100 UNIT/ML
300 SYRINGE INTRAVENOUS AS NEEDED
Status: DISPENSED | OUTPATIENT
Start: 2019-02-15 | End: 2019-02-15

## 2019-02-15 RX ORDER — ONDANSETRON 2 MG/ML
8 INJECTION INTRAMUSCULAR; INTRAVENOUS ONCE
Status: COMPLETED | OUTPATIENT
Start: 2019-02-15 | End: 2019-02-15

## 2019-02-15 RX ORDER — ACETAMINOPHEN 325 MG/1
650 TABLET ORAL ONCE
Status: COMPLETED | OUTPATIENT
Start: 2019-02-15 | End: 2019-02-15

## 2019-02-15 RX ADMIN — ALEMTUZUMAB 30 MG: 30 INJECTION INTRAVENOUS at 09:57

## 2019-02-15 RX ADMIN — MEPERIDINE HYDROCHLORIDE 25 MG: 25 INJECTION INTRAMUSCULAR; INTRAVENOUS; SUBCUTANEOUS at 09:00

## 2019-02-15 RX ADMIN — ACETAMINOPHEN 650 MG: 325 TABLET, FILM COATED ORAL at 08:35

## 2019-02-15 RX ADMIN — Medication 20 ML: at 14:08

## 2019-02-15 RX ADMIN — SODIUM CHLORIDE 500 ML: 900 INJECTION, SOLUTION INTRAVENOUS at 08:25

## 2019-02-15 RX ADMIN — Medication 10 ML: at 08:25

## 2019-02-15 RX ADMIN — SODIUM CHLORIDE, PRESERVATIVE FREE 600 UNITS: 5 INJECTION INTRAVENOUS at 14:08

## 2019-02-15 RX ADMIN — HYDROCORTISONE SODIUM SUCCINATE 100 MG: 100 INJECTION, POWDER, FOR SOLUTION INTRAMUSCULAR; INTRAVENOUS at 08:51

## 2019-02-15 RX ADMIN — ONDANSETRON 8 MG: 2 INJECTION INTRAMUSCULAR; INTRAVENOUS at 08:32

## 2019-02-15 RX ADMIN — DIPHENHYDRAMINE HYDROCHLORIDE 50 MG: 50 INJECTION, SOLUTION INTRAMUSCULAR; INTRAVENOUS at 08:34

## 2019-02-15 RX ADMIN — FAMOTIDINE 20 MG: 10 INJECTION, SOLUTION INTRAVENOUS at 09:12

## 2019-02-15 NOTE — PROGRESS NOTES
Arrived to the [facility]. Labs reviewed. Assessment completed. Patient tolerated chemotherapy well. Any issues or concerns during appointment: states that he has experienced some \"itching\" since he started the Gangcyclovir this past Monday. He also states that he has experienced some :jittery\" feelings since being on the new medication as well. He also states that he will be decreasing the dose of this on Monday. Informed Justino Lewis, navigator, of this. Patient states that he feels like he can go ahead and \"finish the medication\". Patient aware of next infusion appointment on 2/18/19 (date) at 0800 (time) with IV infusion center. Talked with Justino Lewis about patient's schedule. She states that it is fine for patient to have the labs on Monday, before the infusion, and then see the doctor on Tuesday. Discharged ambulatory, with wife. Patient instructed to call Dr. Anabella Rincon office immediately for any problems or concerns. He verbalizes understanding.

## 2019-02-18 ENCOUNTER — HOSPITAL ENCOUNTER (OUTPATIENT)
Dept: INFUSION THERAPY | Age: 64
Discharge: HOME | End: 2019-02-18
Payer: COMMERCIAL

## 2019-02-18 VITALS
HEART RATE: 60 BPM | OXYGEN SATURATION: 97 % | BODY MASS INDEX: 37.24 KG/M2 | RESPIRATION RATE: 18 BRPM | WEIGHT: 267 LBS | TEMPERATURE: 97.6 F | DIASTOLIC BLOOD PRESSURE: 70 MMHG | SYSTOLIC BLOOD PRESSURE: 115 MMHG

## 2019-02-18 DIAGNOSIS — C91.60 PROLYMPHOCYTIC LEUKEMIA OF T-CELL (HCC): Primary | ICD-10-CM

## 2019-02-18 LAB
ALBUMIN SERPL-MCNC: 4.2 G/DL (ref 3.2–4.6)
ALBUMIN/GLOB SERPL: 1.2 {RATIO} (ref 1.2–3.5)
ALP SERPL-CCNC: 32 U/L (ref 50–136)
ALT SERPL-CCNC: 40 U/L (ref 12–65)
ANION GAP SERPL CALC-SCNC: 5 MMOL/L (ref 7–16)
AST SERPL-CCNC: 17 U/L (ref 15–37)
BASOPHILS # BLD: 0 K/UL (ref 0–0.2)
BASOPHILS NFR BLD: 0 % (ref 0–2)
BILIRUB SERPL-MCNC: 0.5 MG/DL (ref 0.2–1.1)
BUN SERPL-MCNC: 20 MG/DL (ref 8–23)
CALCIUM SERPL-MCNC: 9.4 MG/DL (ref 8.3–10.4)
CHLORIDE SERPL-SCNC: 106 MMOL/L (ref 98–107)
CO2 SERPL-SCNC: 27 MMOL/L (ref 21–32)
CREAT SERPL-MCNC: 1.02 MG/DL (ref 0.8–1.5)
DIFFERENTIAL METHOD BLD: ABNORMAL
EOSINOPHIL # BLD: 0 K/UL (ref 0–0.8)
EOSINOPHIL NFR BLD: 0 % (ref 0.5–7.8)
ERYTHROCYTE [DISTWIDTH] IN BLOOD BY AUTOMATED COUNT: 12.8 % (ref 11.9–14.6)
GLOBULIN SER CALC-MCNC: 3.5 G/DL (ref 2.3–3.5)
GLUCOSE SERPL-MCNC: 103 MG/DL (ref 65–100)
HCT VFR BLD AUTO: 39.8 % (ref 41.1–50.3)
HGB BLD-MCNC: 13.4 G/DL (ref 13.6–17.2)
IMM GRANULOCYTES # BLD AUTO: 0 K/UL (ref 0–0.5)
IMM GRANULOCYTES NFR BLD AUTO: 0 % (ref 0–5)
LYMPHOCYTES # BLD: 0.1 K/UL (ref 0.5–4.6)
LYMPHOCYTES NFR BLD: 1 % (ref 13–44)
MCH RBC QN AUTO: 31 PG (ref 26.1–32.9)
MCHC RBC AUTO-ENTMCNC: 33.7 G/DL (ref 31.4–35)
MCV RBC AUTO: 92.1 FL (ref 79.6–97.8)
MONOCYTES # BLD: 0.1 K/UL (ref 0.1–1.3)
MONOCYTES NFR BLD: 3 % (ref 4–12)
NEUTS SEG # BLD: 4.3 K/UL (ref 1.7–8.2)
NEUTS SEG NFR BLD: 96 % (ref 43–78)
NRBC # BLD: 0 K/UL (ref 0–0.2)
PLATELET # BLD AUTO: 132 K/UL (ref 150–450)
PMV BLD AUTO: 9.6 FL (ref 9.4–12.3)
POTASSIUM SERPL-SCNC: 4 MMOL/L (ref 3.5–5.1)
PROT SERPL-MCNC: 7.7 G/DL (ref 6.3–8.2)
RBC # BLD AUTO: 4.32 M/UL (ref 4.23–5.67)
SODIUM SERPL-SCNC: 138 MMOL/L (ref 136–145)
WBC # BLD AUTO: 4.5 K/UL (ref 4.3–11.1)

## 2019-02-18 PROCEDURE — 96413 CHEMO IV INFUSION 1 HR: CPT

## 2019-02-18 PROCEDURE — 80053 COMPREHEN METABOLIC PANEL: CPT

## 2019-02-18 PROCEDURE — 96375 TX/PRO/DX INJ NEW DRUG ADDON: CPT

## 2019-02-18 PROCEDURE — 85025 COMPLETE CBC W/AUTO DIFF WBC: CPT

## 2019-02-18 PROCEDURE — 96415 CHEMO IV INFUSION ADDL HR: CPT

## 2019-02-18 PROCEDURE — 74011250637 HC RX REV CODE- 250/637: Performed by: INTERNAL MEDICINE

## 2019-02-18 PROCEDURE — 74011250636 HC RX REV CODE- 250/636: Performed by: INTERNAL MEDICINE

## 2019-02-18 PROCEDURE — 74011000258 HC RX REV CODE- 258: Performed by: INTERNAL MEDICINE

## 2019-02-18 PROCEDURE — 96361 HYDRATE IV INFUSION ADD-ON: CPT

## 2019-02-18 RX ORDER — ONDANSETRON 2 MG/ML
4 INJECTION INTRAMUSCULAR; INTRAVENOUS ONCE
Status: COMPLETED | OUTPATIENT
Start: 2019-02-18 | End: 2019-02-18

## 2019-02-18 RX ORDER — HYDROCORTISONE SODIUM SUCCINATE 100 MG/2ML
100 INJECTION, POWDER, FOR SOLUTION INTRAMUSCULAR; INTRAVENOUS AS NEEDED
Status: ACTIVE | OUTPATIENT
Start: 2019-02-18 | End: 2019-02-18

## 2019-02-18 RX ORDER — SODIUM CHLORIDE 0.9 % (FLUSH) 0.9 %
10 SYRINGE (ML) INJECTION AS NEEDED
Status: ACTIVE | OUTPATIENT
Start: 2019-02-18 | End: 2019-02-18

## 2019-02-18 RX ORDER — DIPHENHYDRAMINE HYDROCHLORIDE 50 MG/ML
50 INJECTION, SOLUTION INTRAMUSCULAR; INTRAVENOUS ONCE
Status: COMPLETED | OUTPATIENT
Start: 2019-02-18 | End: 2019-02-18

## 2019-02-18 RX ORDER — HYDROCORTISONE SODIUM SUCCINATE 100 MG/2ML
100 INJECTION, POWDER, FOR SOLUTION INTRAMUSCULAR; INTRAVENOUS ONCE
Status: COMPLETED | OUTPATIENT
Start: 2019-02-18 | End: 2019-02-18

## 2019-02-18 RX ORDER — ACETAMINOPHEN 325 MG/1
650 TABLET ORAL ONCE
Status: COMPLETED | OUTPATIENT
Start: 2019-02-18 | End: 2019-02-18

## 2019-02-18 RX ORDER — DIPHENHYDRAMINE HYDROCHLORIDE 50 MG/ML
50 INJECTION, SOLUTION INTRAMUSCULAR; INTRAVENOUS AS NEEDED
Status: ACTIVE | OUTPATIENT
Start: 2019-02-18 | End: 2019-02-18

## 2019-02-18 RX ORDER — HEPARIN 100 UNIT/ML
300-500 SYRINGE INTRAVENOUS AS NEEDED
Status: DISPENSED | OUTPATIENT
Start: 2019-02-18 | End: 2019-02-18

## 2019-02-18 RX ORDER — FAMOTIDINE 10 MG/ML
20 INJECTION INTRAVENOUS ONCE
Status: COMPLETED | OUTPATIENT
Start: 2019-02-18 | End: 2019-02-18

## 2019-02-18 RX ADMIN — SODIUM CHLORIDE, PRESERVATIVE FREE 600 UNITS: 5 INJECTION INTRAVENOUS at 14:40

## 2019-02-18 RX ADMIN — HYDROCORTISONE SODIUM SUCCINATE 100 MG: 100 INJECTION, POWDER, FOR SOLUTION INTRAMUSCULAR; INTRAVENOUS at 09:40

## 2019-02-18 RX ADMIN — ACETAMINOPHEN 650 MG: 325 TABLET, FILM COATED ORAL at 09:29

## 2019-02-18 RX ADMIN — FAMOTIDINE 20 MG: 10 INJECTION, SOLUTION INTRAVENOUS at 09:35

## 2019-02-18 RX ADMIN — DIPHENHYDRAMINE HYDROCHLORIDE 50 MG: 50 INJECTION, SOLUTION INTRAMUSCULAR; INTRAVENOUS at 09:33

## 2019-02-18 RX ADMIN — MEPERIDINE HYDROCHLORIDE 25 MG: 25 INJECTION INTRAMUSCULAR; INTRAVENOUS; SUBCUTANEOUS at 09:42

## 2019-02-18 RX ADMIN — Medication 10 ML: at 09:20

## 2019-02-18 RX ADMIN — SODIUM CHLORIDE 500 ML: 900 INJECTION, SOLUTION INTRAVENOUS at 09:44

## 2019-02-18 RX ADMIN — ALEMTUZUMAB 30 MG: 30 INJECTION INTRAVENOUS at 10:43

## 2019-02-18 RX ADMIN — ONDANSETRON 4 MG: 2 INJECTION INTRAMUSCULAR; INTRAVENOUS at 09:30

## 2019-02-18 RX ADMIN — Medication 10 ML: at 14:40

## 2019-02-18 NOTE — PROGRESS NOTES
Arrived to the [facility]. Campath completed. Patient tolerated well. Any issues or concerns during appointment: None. Patient aware of next infusion appointment on February 20th at 0830. Discharged ambulatory.

## 2019-02-19 ENCOUNTER — PATIENT OUTREACH (OUTPATIENT)
Dept: CASE MANAGEMENT | Age: 64
End: 2019-02-19

## 2019-02-19 ENCOUNTER — HOSPITAL ENCOUNTER (OUTPATIENT)
Dept: LAB | Age: 64
Discharge: HOME | End: 2019-02-19
Payer: COMMERCIAL

## 2019-02-19 DIAGNOSIS — C91.60 PROLYMPHOCYTIC LEUKEMIA OF T-CELL (HCC): ICD-10-CM

## 2019-02-19 LAB
ALBUMIN SERPL-MCNC: 4.5 G/DL (ref 3.2–4.6)
ALBUMIN/GLOB SERPL: 1.2 {RATIO} (ref 1.2–3.5)
ALP SERPL-CCNC: 35 U/L (ref 50–136)
ALT SERPL-CCNC: 40 U/L (ref 12–65)
ANION GAP SERPL CALC-SCNC: 2 MMOL/L (ref 7–16)
AST SERPL-CCNC: 15 U/L (ref 15–37)
BASOPHILS # BLD: 0 K/UL (ref 0–0.2)
BASOPHILS NFR BLD: 0 % (ref 0–2)
BILIRUB SERPL-MCNC: 0.3 MG/DL (ref 0.2–1.1)
BUN SERPL-MCNC: 23 MG/DL (ref 8–23)
CALCIUM SERPL-MCNC: 9.7 MG/DL (ref 8.3–10.4)
CHLORIDE SERPL-SCNC: 105 MMOL/L (ref 98–107)
CO2 SERPL-SCNC: 30 MMOL/L (ref 21–32)
CREAT SERPL-MCNC: 1.25 MG/DL (ref 0.8–1.5)
DIFFERENTIAL METHOD BLD: ABNORMAL
EOSINOPHIL # BLD: 0 K/UL (ref 0–0.8)
EOSINOPHIL NFR BLD: 0 % (ref 0.5–7.8)
ERYTHROCYTE [DISTWIDTH] IN BLOOD BY AUTOMATED COUNT: 13.2 % (ref 11.9–14.6)
GLOBULIN SER CALC-MCNC: 3.8 G/DL (ref 2.3–3.5)
GLUCOSE SERPL-MCNC: 97 MG/DL (ref 65–100)
HCT VFR BLD AUTO: 41.8 % (ref 41.1–50.3)
HGB BLD-MCNC: 13.7 G/DL (ref 13.6–17.2)
IMM GRANULOCYTES # BLD AUTO: 0 K/UL (ref 0–0.5)
IMM GRANULOCYTES NFR BLD AUTO: 0 % (ref 0–5)
LYMPHOCYTES # BLD: 0.1 K/UL (ref 0.5–4.6)
LYMPHOCYTES NFR BLD: 1 % (ref 13–44)
MAGNESIUM SERPL-MCNC: 2.2 MG/DL (ref 1.8–2.4)
MCH RBC QN AUTO: 30.8 PG (ref 26.1–32.9)
MCHC RBC AUTO-ENTMCNC: 32.8 G/DL (ref 31.4–35)
MCV RBC AUTO: 93.9 FL (ref 79.6–97.8)
MONOCYTES # BLD: 0.1 K/UL (ref 0.1–1.3)
MONOCYTES NFR BLD: 3 % (ref 4–12)
NEUTS SEG # BLD: 5 K/UL (ref 1.7–8.2)
NEUTS SEG NFR BLD: 96 % (ref 43–78)
NRBC # BLD: 0 K/UL (ref 0–0.2)
PLATELET # BLD AUTO: 134 K/UL (ref 150–450)
PMV BLD AUTO: 10.1 FL (ref 9.4–12.3)
POTASSIUM SERPL-SCNC: 4 MMOL/L (ref 3.5–5.1)
PROT SERPL-MCNC: 8.3 G/DL (ref 6.3–8.2)
RBC # BLD AUTO: 4.45 M/UL (ref 4.23–5.67)
SODIUM SERPL-SCNC: 137 MMOL/L (ref 136–145)
WBC # BLD AUTO: 5.2 K/UL (ref 4.3–11.1)

## 2019-02-19 PROCEDURE — 80053 COMPREHEN METABOLIC PANEL: CPT

## 2019-02-19 PROCEDURE — 36415 COLL VENOUS BLD VENIPUNCTURE: CPT

## 2019-02-19 PROCEDURE — 85025 COMPLETE CBC W/AUTO DIFF WBC: CPT

## 2019-02-19 PROCEDURE — 83735 ASSAY OF MAGNESIUM: CPT

## 2019-02-19 NOTE — PROGRESS NOTES
2/19/19- Pt seen in the office today with Dr Raudel Burk. Pt reports having loose stool at home 6x's in the past 24 hours. Dr Raudel Burk aware. Pt to attempt to provide specimen for testing. Pt still undecided at this time of which location he would like to go to for referral for possible future allo. He will continue to receive Campath on Monday Wednesday and Friday with a planned 9 weeks of treatment followed by a BMX. Appt made with Dr Kai Burt next week to discuss transplant. He will return to see Dr Raudel Burk on 2/25 with labs including a CMV level.

## 2019-02-20 ENCOUNTER — HOSPITAL ENCOUNTER (OUTPATIENT)
Dept: INFUSION THERAPY | Age: 64
Discharge: HOME | End: 2019-02-20
Payer: COMMERCIAL

## 2019-02-20 VITALS
BODY MASS INDEX: 36.96 KG/M2 | DIASTOLIC BLOOD PRESSURE: 71 MMHG | RESPIRATION RATE: 18 BRPM | SYSTOLIC BLOOD PRESSURE: 118 MMHG | OXYGEN SATURATION: 99 % | HEART RATE: 69 BPM | TEMPERATURE: 98.2 F | WEIGHT: 265 LBS

## 2019-02-20 DIAGNOSIS — C91.60 PROLYMPHOCYTIC LEUKEMIA OF T-CELL (HCC): Primary | ICD-10-CM

## 2019-02-20 PROCEDURE — 96413 CHEMO IV INFUSION 1 HR: CPT

## 2019-02-20 PROCEDURE — 74011000258 HC RX REV CODE- 258: Performed by: INTERNAL MEDICINE

## 2019-02-20 PROCEDURE — 96415 CHEMO IV INFUSION ADDL HR: CPT

## 2019-02-20 PROCEDURE — 74011250637 HC RX REV CODE- 250/637: Performed by: INTERNAL MEDICINE

## 2019-02-20 PROCEDURE — 96375 TX/PRO/DX INJ NEW DRUG ADDON: CPT

## 2019-02-20 PROCEDURE — 74011250636 HC RX REV CODE- 250/636: Performed by: INTERNAL MEDICINE

## 2019-02-20 RX ORDER — HYDROCORTISONE SODIUM SUCCINATE 100 MG/2ML
100 INJECTION, POWDER, FOR SOLUTION INTRAMUSCULAR; INTRAVENOUS ONCE
Status: COMPLETED | OUTPATIENT
Start: 2019-02-20 | End: 2019-02-20

## 2019-02-20 RX ORDER — SODIUM CHLORIDE 0.9 % (FLUSH) 0.9 %
10 SYRINGE (ML) INJECTION AS NEEDED
Status: ACTIVE | OUTPATIENT
Start: 2019-02-20 | End: 2019-02-20

## 2019-02-20 RX ORDER — HYDROCORTISONE SODIUM SUCCINATE 100 MG/2ML
100 INJECTION, POWDER, FOR SOLUTION INTRAMUSCULAR; INTRAVENOUS AS NEEDED
Status: ACTIVE | OUTPATIENT
Start: 2019-02-20 | End: 2019-02-20

## 2019-02-20 RX ORDER — ONDANSETRON 2 MG/ML
4 INJECTION INTRAMUSCULAR; INTRAVENOUS ONCE
Status: COMPLETED | OUTPATIENT
Start: 2019-02-20 | End: 2019-02-20

## 2019-02-20 RX ORDER — HEPARIN 100 UNIT/ML
300-500 SYRINGE INTRAVENOUS AS NEEDED
Status: DISPENSED | OUTPATIENT
Start: 2019-02-20 | End: 2019-02-20

## 2019-02-20 RX ORDER — FAMOTIDINE 10 MG/ML
20 INJECTION INTRAVENOUS ONCE
Status: COMPLETED | OUTPATIENT
Start: 2019-02-20 | End: 2019-02-20

## 2019-02-20 RX ORDER — DIPHENHYDRAMINE HYDROCHLORIDE 50 MG/ML
50 INJECTION, SOLUTION INTRAMUSCULAR; INTRAVENOUS ONCE
Status: COMPLETED | OUTPATIENT
Start: 2019-02-20 | End: 2019-02-20

## 2019-02-20 RX ORDER — DIPHENHYDRAMINE HYDROCHLORIDE 50 MG/ML
50 INJECTION, SOLUTION INTRAMUSCULAR; INTRAVENOUS AS NEEDED
Status: ACTIVE | OUTPATIENT
Start: 2019-02-20 | End: 2019-02-20

## 2019-02-20 RX ORDER — ACETAMINOPHEN 325 MG/1
650 TABLET ORAL ONCE
Status: COMPLETED | OUTPATIENT
Start: 2019-02-20 | End: 2019-02-20

## 2019-02-20 RX ADMIN — Medication 600 UNITS: at 13:48

## 2019-02-20 RX ADMIN — SODIUM CHLORIDE 500 ML: 900 INJECTION, SOLUTION INTRAVENOUS at 09:04

## 2019-02-20 RX ADMIN — ONDANSETRON 4 MG: 2 INJECTION INTRAMUSCULAR; INTRAVENOUS at 09:00

## 2019-02-20 RX ADMIN — ACETAMINOPHEN 650 MG: 325 TABLET, FILM COATED ORAL at 08:52

## 2019-02-20 RX ADMIN — ALEMTUZUMAB 30 MG: 30 INJECTION INTRAVENOUS at 09:45

## 2019-02-20 RX ADMIN — Medication 10 ML: at 08:52

## 2019-02-20 RX ADMIN — FAMOTIDINE 20 MG: 10 INJECTION, SOLUTION INTRAVENOUS at 08:52

## 2019-02-20 RX ADMIN — Medication 20 ML: at 13:48

## 2019-02-20 RX ADMIN — HYDROCORTISONE SODIUM SUCCINATE 100 MG: 100 INJECTION, POWDER, FOR SOLUTION INTRAMUSCULAR; INTRAVENOUS at 08:54

## 2019-02-20 RX ADMIN — DIPHENHYDRAMINE HYDROCHLORIDE 50 MG: 50 INJECTION, SOLUTION INTRAMUSCULAR; INTRAVENOUS at 08:58

## 2019-02-20 RX ADMIN — MEPERIDINE HYDROCHLORIDE 25 MG: 25 INJECTION INTRAMUSCULAR; INTRAVENOUS; SUBCUTANEOUS at 09:25

## 2019-02-20 NOTE — PROGRESS NOTES
Arrived to the [facility]. Campath completed. Patient tolerated well. Any issues or concerns during appointment: none. Patient aware of next infusion appointment on 2/22 (date) at 8:30 AM (time). Discharged ambulatory.

## 2019-02-21 LAB
CMV DNA SERPL NAA+PROBE-ACNC: 609 IU/ML
CMV DNA SERPL NAA+PROBE-LOG IU: 2.79 LOG10 IU/ML

## 2019-02-22 ENCOUNTER — HOSPITAL ENCOUNTER (OUTPATIENT)
Dept: INFUSION THERAPY | Age: 64
Discharge: HOME | End: 2019-02-22
Payer: COMMERCIAL

## 2019-02-22 VITALS
DIASTOLIC BLOOD PRESSURE: 68 MMHG | WEIGHT: 267 LBS | BODY MASS INDEX: 37.24 KG/M2 | HEART RATE: 76 BPM | RESPIRATION RATE: 18 BRPM | SYSTOLIC BLOOD PRESSURE: 129 MMHG | OXYGEN SATURATION: 99 % | TEMPERATURE: 98.1 F

## 2019-02-22 DIAGNOSIS — C91.60 PROLYMPHOCYTIC LEUKEMIA OF T-CELL (HCC): Primary | ICD-10-CM

## 2019-02-22 PROCEDURE — 74011250636 HC RX REV CODE- 250/636: Performed by: INTERNAL MEDICINE

## 2019-02-22 PROCEDURE — 74011000250 HC RX REV CODE- 250: Performed by: INTERNAL MEDICINE

## 2019-02-22 PROCEDURE — 96413 CHEMO IV INFUSION 1 HR: CPT

## 2019-02-22 PROCEDURE — 96375 TX/PRO/DX INJ NEW DRUG ADDON: CPT

## 2019-02-22 PROCEDURE — 74011250637 HC RX REV CODE- 250/637: Performed by: INTERNAL MEDICINE

## 2019-02-22 PROCEDURE — 74011000258 HC RX REV CODE- 258: Performed by: INTERNAL MEDICINE

## 2019-02-22 PROCEDURE — 96415 CHEMO IV INFUSION ADDL HR: CPT

## 2019-02-22 RX ORDER — FAMOTIDINE 10 MG/ML
20 INJECTION INTRAVENOUS
Status: DISCONTINUED | OUTPATIENT
Start: 2019-02-22 | End: 2019-02-26 | Stop reason: HOSPADM

## 2019-02-22 RX ORDER — HYDROCORTISONE SODIUM SUCCINATE 100 MG/2ML
100 INJECTION, POWDER, FOR SOLUTION INTRAMUSCULAR; INTRAVENOUS ONCE
Status: COMPLETED | OUTPATIENT
Start: 2019-02-22 | End: 2019-02-22

## 2019-02-22 RX ORDER — HEPARIN 100 UNIT/ML
300-500 SYRINGE INTRAVENOUS AS NEEDED
Status: DISPENSED | OUTPATIENT
Start: 2019-02-22 | End: 2019-02-22

## 2019-02-22 RX ORDER — ONDANSETRON 2 MG/ML
4 INJECTION INTRAMUSCULAR; INTRAVENOUS ONCE
Status: COMPLETED | OUTPATIENT
Start: 2019-02-22 | End: 2019-02-22

## 2019-02-22 RX ORDER — DIPHENHYDRAMINE HYDROCHLORIDE 50 MG/ML
50 INJECTION, SOLUTION INTRAMUSCULAR; INTRAVENOUS ONCE
Status: COMPLETED | OUTPATIENT
Start: 2019-02-22 | End: 2019-02-22

## 2019-02-22 RX ORDER — ACETAMINOPHEN 325 MG/1
650 TABLET ORAL ONCE
Status: COMPLETED | OUTPATIENT
Start: 2019-02-22 | End: 2019-02-22

## 2019-02-22 RX ORDER — SODIUM CHLORIDE 9 MG/ML
10 INJECTION INTRAMUSCULAR; INTRAVENOUS; SUBCUTANEOUS AS NEEDED
Status: DISPENSED | OUTPATIENT
Start: 2019-02-22 | End: 2019-02-22

## 2019-02-22 RX ORDER — SODIUM CHLORIDE 0.9 % (FLUSH) 0.9 %
10 SYRINGE (ML) INJECTION EVERY 8 HOURS
Status: DISCONTINUED | OUTPATIENT
Start: 2019-02-22 | End: 2019-02-26 | Stop reason: HOSPADM

## 2019-02-22 RX ADMIN — Medication 500 UNITS: at 16:46

## 2019-02-22 RX ADMIN — SODIUM CHLORIDE 10 ML: 9 INJECTION, SOLUTION INTRAMUSCULAR; INTRAVENOUS; SUBCUTANEOUS at 10:43

## 2019-02-22 RX ADMIN — ACETAMINOPHEN 650 MG: 325 TABLET, FILM COATED ORAL at 10:35

## 2019-02-22 RX ADMIN — FAMOTIDINE 20 MG: 10 INJECTION, SOLUTION INTRAVENOUS at 10:43

## 2019-02-22 RX ADMIN — MEPERIDINE HYDROCHLORIDE 25 MG: 25 INJECTION INTRAMUSCULAR; INTRAVENOUS; SUBCUTANEOUS at 11:55

## 2019-02-22 RX ADMIN — ONDANSETRON 4 MG: 2 INJECTION INTRAMUSCULAR; INTRAVENOUS at 10:36

## 2019-02-22 RX ADMIN — DIPHENHYDRAMINE HYDROCHLORIDE 50 MG: 50 INJECTION, SOLUTION INTRAMUSCULAR; INTRAVENOUS at 10:50

## 2019-02-22 RX ADMIN — ALEMTUZUMAB 30 MG: 30 INJECTION INTRAVENOUS at 12:15

## 2019-02-22 RX ADMIN — SODIUM CHLORIDE 500 ML: 900 INJECTION, SOLUTION INTRAVENOUS at 10:25

## 2019-02-22 RX ADMIN — Medication 10 ML: at 16:45

## 2019-02-22 RX ADMIN — HYDROCORTISONE SODIUM SUCCINATE 100 MG: 100 INJECTION, POWDER, FOR SOLUTION INTRAMUSCULAR; INTRAVENOUS at 11:02

## 2019-02-22 NOTE — PROGRESS NOTES
Arrived to the [facility]. Compath infusion completed. Patient tolerated well. Any issues or concerns during appointment: none. Patient aware of next infusion appointment on 02.25.2019 (date) at [phone] (time). Discharged ambulatory with spouse to home.

## 2019-02-25 ENCOUNTER — HOSPITAL ENCOUNTER (OUTPATIENT)
Dept: LAB | Age: 64
Discharge: HOME | End: 2019-02-25
Payer: COMMERCIAL

## 2019-02-25 ENCOUNTER — PATIENT OUTREACH (OUTPATIENT)
Dept: CASE MANAGEMENT | Age: 64
End: 2019-02-25

## 2019-02-25 ENCOUNTER — HOSPITAL ENCOUNTER (OUTPATIENT)
Dept: INFUSION THERAPY | Age: 64
Discharge: HOME | End: 2019-02-25
Payer: COMMERCIAL

## 2019-02-25 DIAGNOSIS — C91.60 PROLYMPHOCYTIC LEUKEMIA OF T-CELL (HCC): ICD-10-CM

## 2019-02-25 DIAGNOSIS — C91.60 PROLYMPHOCYTIC LEUKEMIA OF T-CELL (HCC): Primary | ICD-10-CM

## 2019-02-25 DIAGNOSIS — R19.7 DIARRHEA, UNSPECIFIED TYPE: ICD-10-CM

## 2019-02-25 LAB
ALBUMIN SERPL-MCNC: 4.3 G/DL (ref 3.2–4.6)
ALBUMIN/GLOB SERPL: 1.2 {RATIO} (ref 1.2–3.5)
ALP SERPL-CCNC: 30 U/L (ref 50–136)
ALT SERPL-CCNC: 32 U/L (ref 12–65)
ANION GAP SERPL CALC-SCNC: 3 MMOL/L (ref 7–16)
AST SERPL-CCNC: 14 U/L (ref 15–37)
BASOPHILS # BLD: 0 K/UL (ref 0–0.2)
BASOPHILS NFR BLD: 0 % (ref 0–2)
BILIRUB SERPL-MCNC: 0.6 MG/DL (ref 0.2–1.1)
BUN SERPL-MCNC: 20 MG/DL (ref 8–23)
CALCIUM SERPL-MCNC: 9.4 MG/DL (ref 8.3–10.4)
CHLORIDE SERPL-SCNC: 105 MMOL/L (ref 98–107)
CO2 SERPL-SCNC: 28 MMOL/L (ref 21–32)
CREAT SERPL-MCNC: 1.08 MG/DL (ref 0.8–1.5)
DIFFERENTIAL METHOD BLD: ABNORMAL
EOSINOPHIL # BLD: 0 K/UL (ref 0–0.8)
EOSINOPHIL NFR BLD: 0 % (ref 0.5–7.8)
ERYTHROCYTE [DISTWIDTH] IN BLOOD BY AUTOMATED COUNT: 13 % (ref 11.9–14.6)
GLOBULIN SER CALC-MCNC: 3.5 G/DL (ref 2.3–3.5)
GLUCOSE SERPL-MCNC: 110 MG/DL (ref 65–100)
HCT VFR BLD AUTO: 39.3 % (ref 41.1–50.3)
HGB BLD-MCNC: 13.1 G/DL (ref 13.6–17.2)
IMM GRANULOCYTES # BLD AUTO: 0 K/UL (ref 0–0.5)
IMM GRANULOCYTES NFR BLD AUTO: 0 % (ref 0–5)
LYMPHOCYTES # BLD: 0.1 K/UL (ref 0.5–4.6)
LYMPHOCYTES NFR BLD: 2 % (ref 13–44)
MAGNESIUM SERPL-MCNC: 2.1 MG/DL (ref 1.8–2.4)
MCH RBC QN AUTO: 30.6 PG (ref 26.1–32.9)
MCHC RBC AUTO-ENTMCNC: 33.3 G/DL (ref 31.4–35)
MCV RBC AUTO: 91.8 FL (ref 79.6–97.8)
MONOCYTES # BLD: 0 K/UL (ref 0.1–1.3)
MONOCYTES NFR BLD: 1 % (ref 4–12)
NEUTS SEG # BLD: 2.8 K/UL (ref 1.7–8.2)
NEUTS SEG NFR BLD: 96 % (ref 43–78)
NRBC # BLD: 0 K/UL (ref 0–0.2)
PLATELET # BLD AUTO: 114 K/UL (ref 150–450)
PMV BLD AUTO: 9.8 FL (ref 9.4–12.3)
POTASSIUM SERPL-SCNC: 3.9 MMOL/L (ref 3.5–5.1)
PROT SERPL-MCNC: 7.8 G/DL (ref 6.3–8.2)
RBC # BLD AUTO: 4.28 M/UL (ref 4.23–5.67)
SODIUM SERPL-SCNC: 136 MMOL/L (ref 136–145)
VIT B12 SERPL-MCNC: 627 PG/ML (ref 193–986)
WBC # BLD AUTO: 2.9 K/UL (ref 4.3–11.1)

## 2019-02-25 PROCEDURE — 74011250636 HC RX REV CODE- 250/636: Performed by: INTERNAL MEDICINE

## 2019-02-25 PROCEDURE — 82652 VIT D 1 25-DIHYDROXY: CPT

## 2019-02-25 PROCEDURE — 96415 CHEMO IV INFUSION ADDL HR: CPT

## 2019-02-25 PROCEDURE — 96361 HYDRATE IV INFUSION ADD-ON: CPT

## 2019-02-25 PROCEDURE — 74011000258 HC RX REV CODE- 258: Performed by: INTERNAL MEDICINE

## 2019-02-25 PROCEDURE — 85025 COMPLETE CBC W/AUTO DIFF WBC: CPT

## 2019-02-25 PROCEDURE — 96375 TX/PRO/DX INJ NEW DRUG ADDON: CPT

## 2019-02-25 PROCEDURE — 74011250637 HC RX REV CODE- 250/637: Performed by: INTERNAL MEDICINE

## 2019-02-25 PROCEDURE — 96413 CHEMO IV INFUSION 1 HR: CPT

## 2019-02-25 PROCEDURE — 83735 ASSAY OF MAGNESIUM: CPT

## 2019-02-25 PROCEDURE — 82607 VITAMIN B-12: CPT

## 2019-02-25 PROCEDURE — 80053 COMPREHEN METABOLIC PANEL: CPT

## 2019-02-25 RX ORDER — ONDANSETRON 2 MG/ML
4 INJECTION INTRAMUSCULAR; INTRAVENOUS ONCE
Status: COMPLETED | OUTPATIENT
Start: 2019-02-25 | End: 2019-02-25

## 2019-02-25 RX ORDER — HYDROCORTISONE SODIUM SUCCINATE 100 MG/2ML
100 INJECTION, POWDER, FOR SOLUTION INTRAMUSCULAR; INTRAVENOUS ONCE
Status: COMPLETED | OUTPATIENT
Start: 2019-02-25 | End: 2019-02-25

## 2019-02-25 RX ORDER — SODIUM CHLORIDE 0.9 % (FLUSH) 0.9 %
10 SYRINGE (ML) INJECTION AS NEEDED
Status: ACTIVE | OUTPATIENT
Start: 2019-02-25 | End: 2019-02-25

## 2019-02-25 RX ORDER — HEPARIN 100 UNIT/ML
300-500 SYRINGE INTRAVENOUS AS NEEDED
Status: DISPENSED | OUTPATIENT
Start: 2019-02-25 | End: 2019-02-25

## 2019-02-25 RX ORDER — ACETAMINOPHEN 325 MG/1
650 TABLET ORAL ONCE
Status: COMPLETED | OUTPATIENT
Start: 2019-02-25 | End: 2019-02-25

## 2019-02-25 RX ORDER — FAMOTIDINE 10 MG/ML
20 INJECTION INTRAVENOUS
Status: DISCONTINUED | OUTPATIENT
Start: 2019-02-25 | End: 2019-03-01 | Stop reason: HOSPADM

## 2019-02-25 RX ORDER — DIPHENHYDRAMINE HYDROCHLORIDE 50 MG/ML
50 INJECTION, SOLUTION INTRAMUSCULAR; INTRAVENOUS ONCE
Status: COMPLETED | OUTPATIENT
Start: 2019-02-25 | End: 2019-02-25

## 2019-02-25 RX ADMIN — Medication 20 ML: at 11:42

## 2019-02-25 RX ADMIN — SODIUM CHLORIDE 500 ML: 900 INJECTION, SOLUTION INTRAVENOUS at 12:01

## 2019-02-25 RX ADMIN — ACETAMINOPHEN 650 MG: 325 TABLET, FILM COATED ORAL at 11:54

## 2019-02-25 RX ADMIN — FAMOTIDINE 20 MG: 10 INJECTION, SOLUTION INTRAVENOUS at 11:54

## 2019-02-25 RX ADMIN — ONDANSETRON 4 MG: 2 INJECTION INTRAMUSCULAR; INTRAVENOUS at 11:53

## 2019-02-25 RX ADMIN — MEPERIDINE HYDROCHLORIDE 25 MG: 25 INJECTION INTRAMUSCULAR; INTRAVENOUS; SUBCUTANEOUS at 12:43

## 2019-02-25 RX ADMIN — ALEMTUZUMAB 30 MG: 30 INJECTION INTRAVENOUS at 12:56

## 2019-02-25 RX ADMIN — DIPHENHYDRAMINE HYDROCHLORIDE 50 MG: 50 INJECTION, SOLUTION INTRAMUSCULAR; INTRAVENOUS at 11:56

## 2019-02-25 RX ADMIN — Medication 20 ML: at 17:19

## 2019-02-25 RX ADMIN — HYDROCORTISONE SODIUM SUCCINATE 100 MG: 100 INJECTION, POWDER, FOR SOLUTION INTRAMUSCULAR; INTRAVENOUS at 11:59

## 2019-02-25 RX ADMIN — Medication 600 UNITS: at 17:19

## 2019-02-25 NOTE — PROGRESS NOTES
2/25/19- Pt seen in the office with Dr Kamryn Khanna. Pt reports feeling overall well. Pt reports he has an appt with [facility] on 3/19 for BMT referral. He will also meet with Dr Lydia Grant on 2/28 to discuss transplant and disease process. CMV increased to 609. Dr Kamryn Khanna would like for pt to increase his Valcyte back to 900mg BID. Script sent to pharmacy. Pt to proceed to infusion today for Campath and will continue Campath on 2/27 and 3/1.

## 2019-02-25 NOTE — PROGRESS NOTES
Arrived to the [facility]. Assessment complete, labs reviewed. Campath completed. PICC line dressing changed. Patient tolerated without problems. Any issues or concerns during appointment: None. Patient aware of next infusion appointment on 2/27/2019 (date) at [phone] (time). Discharged ambulatory with spouse.

## 2019-02-26 LAB — 1,25(OH)2D3 SERPL-MCNC: 32.9 PG/ML (ref 19.9–79.3)

## 2019-02-27 ENCOUNTER — HOSPITAL ENCOUNTER (OUTPATIENT)
Dept: INFUSION THERAPY | Age: 64
Discharge: HOME | End: 2019-02-27
Payer: COMMERCIAL

## 2019-02-27 VITALS
TEMPERATURE: 97.4 F | BODY MASS INDEX: 37.21 KG/M2 | WEIGHT: 266.8 LBS | RESPIRATION RATE: 18 BRPM | DIASTOLIC BLOOD PRESSURE: 59 MMHG | OXYGEN SATURATION: 100 % | HEART RATE: 77 BPM | SYSTOLIC BLOOD PRESSURE: 114 MMHG

## 2019-02-27 DIAGNOSIS — C91.60 PROLYMPHOCYTIC LEUKEMIA OF T-CELL (HCC): Primary | ICD-10-CM

## 2019-02-27 LAB
CMV DNA SERPL NAA+PROBE-ACNC: 797 IU/ML
CMV DNA SERPL NAA+PROBE-LOG IU: 2.9 LOG10 IU/ML

## 2019-02-27 PROCEDURE — 74011250636 HC RX REV CODE- 250/636: Performed by: INTERNAL MEDICINE

## 2019-02-27 PROCEDURE — 74011000258 HC RX REV CODE- 258: Performed by: INTERNAL MEDICINE

## 2019-02-27 PROCEDURE — 96375 TX/PRO/DX INJ NEW DRUG ADDON: CPT

## 2019-02-27 PROCEDURE — 74011000250 HC RX REV CODE- 250: Performed by: INTERNAL MEDICINE

## 2019-02-27 PROCEDURE — 96415 CHEMO IV INFUSION ADDL HR: CPT

## 2019-02-27 PROCEDURE — 96413 CHEMO IV INFUSION 1 HR: CPT

## 2019-02-27 PROCEDURE — 74011250637 HC RX REV CODE- 250/637: Performed by: INTERNAL MEDICINE

## 2019-02-27 RX ORDER — SODIUM CHLORIDE 0.9 % (FLUSH) 0.9 %
10-60 SYRINGE (ML) INJECTION AS NEEDED
Status: DISCONTINUED | OUTPATIENT
Start: 2019-02-27 | End: 2019-03-03 | Stop reason: HOSPADM

## 2019-02-27 RX ORDER — FAMOTIDINE 10 MG/ML
20 INJECTION INTRAVENOUS
Status: DISCONTINUED | OUTPATIENT
Start: 2019-02-27 | End: 2019-02-27 | Stop reason: SDUPTHER

## 2019-02-27 RX ORDER — HEPARIN 100 UNIT/ML
300-500 SYRINGE INTRAVENOUS AS NEEDED
Status: DISCONTINUED | OUTPATIENT
Start: 2019-02-27 | End: 2019-02-27

## 2019-02-27 RX ORDER — HEPARIN 100 UNIT/ML
500-1000 SYRINGE INTRAVENOUS AS NEEDED
Status: DISPENSED | OUTPATIENT
Start: 2019-02-27 | End: 2019-02-27

## 2019-02-27 RX ORDER — ONDANSETRON 2 MG/ML
4 INJECTION INTRAMUSCULAR; INTRAVENOUS ONCE
Status: COMPLETED | OUTPATIENT
Start: 2019-02-27 | End: 2019-02-27

## 2019-02-27 RX ORDER — HYDROCORTISONE SODIUM SUCCINATE 100 MG/2ML
100 INJECTION, POWDER, FOR SOLUTION INTRAMUSCULAR; INTRAVENOUS ONCE
Status: COMPLETED | OUTPATIENT
Start: 2019-02-27 | End: 2019-02-27

## 2019-02-27 RX ORDER — ACETAMINOPHEN 325 MG/1
650 TABLET ORAL ONCE
Status: COMPLETED | OUTPATIENT
Start: 2019-02-27 | End: 2019-02-27

## 2019-02-27 RX ORDER — DIPHENHYDRAMINE HYDROCHLORIDE 50 MG/ML
50 INJECTION, SOLUTION INTRAMUSCULAR; INTRAVENOUS ONCE
Status: COMPLETED | OUTPATIENT
Start: 2019-02-27 | End: 2019-02-27

## 2019-02-27 RX ORDER — SODIUM CHLORIDE 0.9 % (FLUSH) 0.9 %
10 SYRINGE (ML) INJECTION AS NEEDED
Status: DISCONTINUED | OUTPATIENT
Start: 2019-02-27 | End: 2019-02-27

## 2019-02-27 RX ADMIN — HEPARIN 1000 UNITS: 100 SYRINGE at 16:45

## 2019-02-27 RX ADMIN — DIPHENHYDRAMINE HYDROCHLORIDE 50 MG: 50 INJECTION, SOLUTION INTRAMUSCULAR; INTRAVENOUS at 11:23

## 2019-02-27 RX ADMIN — FAMOTIDINE 20 MG: 10 INJECTION, SOLUTION INTRAVENOUS at 11:18

## 2019-02-27 RX ADMIN — HYDROCORTISONE SODIUM SUCCINATE 100 MG: 100 INJECTION, POWDER, FOR SOLUTION INTRAMUSCULAR; INTRAVENOUS at 11:37

## 2019-02-27 RX ADMIN — SODIUM CHLORIDE 500 ML: 900 INJECTION, SOLUTION INTRAVENOUS at 11:40

## 2019-02-27 RX ADMIN — ACETAMINOPHEN 650 MG: 325 TABLET, FILM COATED ORAL at 11:15

## 2019-02-27 RX ADMIN — MEPERIDINE HYDROCHLORIDE 25 MG: 25 INJECTION INTRAMUSCULAR; INTRAVENOUS; SUBCUTANEOUS at 12:10

## 2019-02-27 RX ADMIN — ONDANSETRON 4 MG: 2 INJECTION INTRAMUSCULAR; INTRAVENOUS at 11:35

## 2019-02-27 RX ADMIN — ALEMTUZUMAB 30 MG: 30 INJECTION INTRAVENOUS at 12:40

## 2019-02-27 RX ADMIN — Medication 20 ML: at 11:00

## 2019-02-27 NOTE — PROGRESS NOTES
Arrived to the [facility]. Campath completed. Patient tolerated well.   Any issues or concerns during appointment: No  Patient aware of next infusion appointment on Friday,March 1st @ 1030  Discharged home ambulatory

## 2019-02-28 ENCOUNTER — PATIENT OUTREACH (OUTPATIENT)
Dept: CASE MANAGEMENT | Age: 64
End: 2019-02-28

## 2019-02-28 ENCOUNTER — HOSPITAL ENCOUNTER (OUTPATIENT)
Dept: LAB | Age: 64
Discharge: HOME | End: 2019-02-28
Payer: COMMERCIAL

## 2019-02-28 DIAGNOSIS — C91.60 PROLYMPHOCYTIC LEUKEMIA OF T-CELL (HCC): ICD-10-CM

## 2019-02-28 PROCEDURE — 87190 SC STD MYOBACTERIA PROP METH: CPT

## 2019-02-28 NOTE — PROGRESS NOTES
2/28/19- Pt seen in the office with Dr Kali Rinaldi for consult. Dr Kali Rinaldi reviewed records and discussed options with pt including allo vs auto. Dr Kali Rinaldi recommends completing 12 weeks of Campath then re-assessing transplant options. Dr Kali Rinaldi would like to see pt at the end of March.

## 2019-03-01 ENCOUNTER — HOSPITAL ENCOUNTER (OUTPATIENT)
Dept: INFUSION THERAPY | Age: 64
Discharge: HOME | End: 2019-03-01
Payer: COMMERCIAL

## 2019-03-01 VITALS
HEART RATE: 55 BPM | OXYGEN SATURATION: 96 % | SYSTOLIC BLOOD PRESSURE: 116 MMHG | RESPIRATION RATE: 16 BRPM | TEMPERATURE: 97.9 F | DIASTOLIC BLOOD PRESSURE: 69 MMHG | BODY MASS INDEX: 36.82 KG/M2 | WEIGHT: 264 LBS

## 2019-03-01 DIAGNOSIS — C91.60 PROLYMPHOCYTIC LEUKEMIA OF T-CELL (HCC): Primary | ICD-10-CM

## 2019-03-01 LAB
Lab: NORMAL
REFERENCE LAB,REFLB: NORMAL
TEST DESCRIPTION:,ATST: NORMAL

## 2019-03-01 PROCEDURE — 96375 TX/PRO/DX INJ NEW DRUG ADDON: CPT

## 2019-03-01 PROCEDURE — 96413 CHEMO IV INFUSION 1 HR: CPT

## 2019-03-01 PROCEDURE — 96415 CHEMO IV INFUSION ADDL HR: CPT

## 2019-03-01 PROCEDURE — 74011000258 HC RX REV CODE- 258: Performed by: INTERNAL MEDICINE

## 2019-03-01 PROCEDURE — 74011250636 HC RX REV CODE- 250/636: Performed by: INTERNAL MEDICINE

## 2019-03-01 PROCEDURE — 74011250637 HC RX REV CODE- 250/637: Performed by: INTERNAL MEDICINE

## 2019-03-01 RX ORDER — HYDROCORTISONE SODIUM SUCCINATE 100 MG/2ML
100 INJECTION, POWDER, FOR SOLUTION INTRAMUSCULAR; INTRAVENOUS ONCE
Status: COMPLETED | OUTPATIENT
Start: 2019-03-01 | End: 2019-03-01

## 2019-03-01 RX ORDER — ACETAMINOPHEN 325 MG/1
650 TABLET ORAL ONCE
Status: COMPLETED | OUTPATIENT
Start: 2019-03-01 | End: 2019-03-01

## 2019-03-01 RX ORDER — HEPARIN 100 UNIT/ML
300-500 SYRINGE INTRAVENOUS AS NEEDED
Status: DISPENSED | OUTPATIENT
Start: 2019-03-01 | End: 2019-03-01

## 2019-03-01 RX ORDER — HEPARIN 100 UNIT/ML
600 SYRINGE INTRAVENOUS AS NEEDED
Status: DISCONTINUED | OUTPATIENT
Start: 2019-03-01 | End: 2019-03-03 | Stop reason: HOSPADM

## 2019-03-01 RX ORDER — FAMOTIDINE 10 MG/ML
20 INJECTION INTRAVENOUS
Status: DISCONTINUED | OUTPATIENT
Start: 2019-03-01 | End: 2019-03-05 | Stop reason: HOSPADM

## 2019-03-01 RX ORDER — ONDANSETRON 2 MG/ML
4 INJECTION INTRAMUSCULAR; INTRAVENOUS ONCE
Status: COMPLETED | OUTPATIENT
Start: 2019-03-01 | End: 2019-03-01

## 2019-03-01 RX ORDER — SODIUM CHLORIDE 9 MG/ML
10 INJECTION INTRAMUSCULAR; INTRAVENOUS; SUBCUTANEOUS AS NEEDED
Status: ACTIVE | OUTPATIENT
Start: 2019-03-01 | End: 2019-03-01

## 2019-03-01 RX ORDER — DIPHENHYDRAMINE HYDROCHLORIDE 50 MG/ML
50 INJECTION, SOLUTION INTRAMUSCULAR; INTRAVENOUS ONCE
Status: COMPLETED | OUTPATIENT
Start: 2019-03-01 | End: 2019-03-01

## 2019-03-01 RX ADMIN — HYDROCORTISONE SODIUM SUCCINATE 100 MG: 100 INJECTION, POWDER, FOR SOLUTION INTRAMUSCULAR; INTRAVENOUS at 11:50

## 2019-03-01 RX ADMIN — DIPHENHYDRAMINE HYDROCHLORIDE 50 MG: 50 INJECTION, SOLUTION INTRAMUSCULAR; INTRAVENOUS at 12:00

## 2019-03-01 RX ADMIN — HEPARIN 500 UNITS: 100 SYRINGE at 17:30

## 2019-03-01 RX ADMIN — ACETAMINOPHEN 650 MG: 325 TABLET, FILM COATED ORAL at 11:28

## 2019-03-01 RX ADMIN — MEPERIDINE HYDROCHLORIDE 25 MG: 25 INJECTION INTRAMUSCULAR; INTRAVENOUS; SUBCUTANEOUS at 12:50

## 2019-03-01 RX ADMIN — SODIUM CHLORIDE 10 ML: 9 INJECTION INTRAMUSCULAR; INTRAVENOUS; SUBCUTANEOUS at 17:30

## 2019-03-01 RX ADMIN — FAMOTIDINE 20 MG: 10 INJECTION, SOLUTION INTRAVENOUS at 11:32

## 2019-03-01 RX ADMIN — SODIUM CHLORIDE 500 ML: 900 INJECTION, SOLUTION INTRAVENOUS at 10:55

## 2019-03-01 RX ADMIN — HEPARIN 500 UNITS: 100 SYRINGE at 17:25

## 2019-03-01 RX ADMIN — ALEMTUZUMAB 30 MG: 30 INJECTION INTRAVENOUS at 13:31

## 2019-03-01 RX ADMIN — SODIUM CHLORIDE 10 ML: 9 INJECTION INTRAMUSCULAR; INTRAVENOUS; SUBCUTANEOUS at 17:25

## 2019-03-01 RX ADMIN — ONDANSETRON 4 MG: 2 INJECTION INTRAMUSCULAR; INTRAVENOUS at 11:30

## 2019-03-01 NOTE — PROGRESS NOTES
Pt arrived ambulatory today at [phone], to receive IV chemotherapy. Pt tolerated without difficulty. Patient discharged via ambulatory accompanied by spouse. Instructed to notify physician of any problems, questions or concerns. Allowed opportunity for patient/family to ask questions. Verbalized understanding. Next appointment is March 4 at 21 [phone] with Amrita 55Paul.

## 2019-03-04 ENCOUNTER — PATIENT OUTREACH (OUTPATIENT)
Dept: CASE MANAGEMENT | Age: 64
End: 2019-03-04

## 2019-03-04 ENCOUNTER — HOSPITAL ENCOUNTER (OUTPATIENT)
Dept: INFUSION THERAPY | Age: 64
Discharge: HOME | End: 2019-03-04
Payer: COMMERCIAL

## 2019-03-04 ENCOUNTER — HOSPITAL ENCOUNTER (OUTPATIENT)
Dept: LAB | Age: 64
Discharge: HOME | End: 2019-03-04
Payer: COMMERCIAL

## 2019-03-04 DIAGNOSIS — C91.60 PROLYMPHOCYTIC LEUKEMIA OF T-CELL (HCC): Primary | ICD-10-CM

## 2019-03-04 DIAGNOSIS — C91.60 PROLYMPHOCYTIC LEUKEMIA OF T-CELL (HCC): ICD-10-CM

## 2019-03-04 LAB
ALBUMIN SERPL-MCNC: 4.4 G/DL (ref 3.2–4.6)
ALBUMIN/GLOB SERPL: 1.2 {RATIO} (ref 1.2–3.5)
ALP SERPL-CCNC: 31 U/L (ref 50–136)
ALT SERPL-CCNC: 30 U/L (ref 12–65)
ANION GAP SERPL CALC-SCNC: 6 MMOL/L (ref 7–16)
AST SERPL-CCNC: 14 U/L (ref 15–37)
BASOPHILS # BLD: 0 K/UL (ref 0–0.2)
BASOPHILS NFR BLD: 0 % (ref 0–2)
BILIRUB SERPL-MCNC: 0.7 MG/DL (ref 0.2–1.1)
BUN SERPL-MCNC: 23 MG/DL (ref 8–23)
CALCIUM SERPL-MCNC: 9.6 MG/DL (ref 8.3–10.4)
CHLORIDE SERPL-SCNC: 104 MMOL/L (ref 98–107)
CO2 SERPL-SCNC: 27 MMOL/L (ref 21–32)
CREAT SERPL-MCNC: 1.15 MG/DL (ref 0.8–1.5)
DIFFERENTIAL METHOD BLD: ABNORMAL
EOSINOPHIL # BLD: 0 K/UL (ref 0–0.8)
EOSINOPHIL NFR BLD: 0 % (ref 0.5–7.8)
ERYTHROCYTE [DISTWIDTH] IN BLOOD BY AUTOMATED COUNT: 12.9 % (ref 11.9–14.6)
GLOBULIN SER CALC-MCNC: 3.8 G/DL (ref 2.3–3.5)
GLUCOSE SERPL-MCNC: 123 MG/DL (ref 65–100)
HCT VFR BLD AUTO: 38.7 % (ref 41.1–50.3)
HGB BLD-MCNC: 13.4 G/DL (ref 13.6–17.2)
IMM GRANULOCYTES # BLD AUTO: 0 K/UL (ref 0–0.5)
IMM GRANULOCYTES NFR BLD AUTO: 0 % (ref 0–5)
LYMPHOCYTES # BLD: 0.1 K/UL (ref 0.5–4.6)
LYMPHOCYTES NFR BLD: 2 % (ref 13–44)
MAGNESIUM SERPL-MCNC: 2.3 MG/DL (ref 1.8–2.4)
MCH RBC QN AUTO: 31.6 PG (ref 26.1–32.9)
MCHC RBC AUTO-ENTMCNC: 34.6 G/DL (ref 31.4–35)
MCV RBC AUTO: 91.3 FL (ref 79.6–97.8)
MONOCYTES # BLD: 0 K/UL (ref 0.1–1.3)
MONOCYTES NFR BLD: 0 % (ref 4–12)
NEUTS SEG # BLD: 2.7 K/UL (ref 1.7–8.2)
NEUTS SEG NFR BLD: 98 % (ref 43–78)
NRBC # BLD: 0 K/UL (ref 0–0.2)
PLATELET # BLD AUTO: 117 K/UL (ref 150–450)
PLATELET COMMENTS,PCOM: SLIGHT
PMV BLD AUTO: 9.4 FL (ref 9.4–12.3)
POTASSIUM SERPL-SCNC: 3.8 MMOL/L (ref 3.5–5.1)
PROT SERPL-MCNC: 8.2 G/DL (ref 6.3–8.2)
RBC # BLD AUTO: 4.24 M/UL (ref 4.23–5.67)
RBC MORPH BLD: ABNORMAL
SODIUM SERPL-SCNC: 137 MMOL/L (ref 136–145)
WBC # BLD AUTO: 2.8 K/UL (ref 4.3–11.1)
WBC MORPH BLD: ABNORMAL

## 2019-03-04 PROCEDURE — 83735 ASSAY OF MAGNESIUM: CPT

## 2019-03-04 PROCEDURE — 74011000258 HC RX REV CODE- 258: Performed by: INTERNAL MEDICINE

## 2019-03-04 PROCEDURE — 96375 TX/PRO/DX INJ NEW DRUG ADDON: CPT

## 2019-03-04 PROCEDURE — 96415 CHEMO IV INFUSION ADDL HR: CPT

## 2019-03-04 PROCEDURE — 96413 CHEMO IV INFUSION 1 HR: CPT

## 2019-03-04 PROCEDURE — 85025 COMPLETE CBC W/AUTO DIFF WBC: CPT

## 2019-03-04 PROCEDURE — 80053 COMPREHEN METABOLIC PANEL: CPT

## 2019-03-04 PROCEDURE — 74011250637 HC RX REV CODE- 250/637: Performed by: INTERNAL MEDICINE

## 2019-03-04 PROCEDURE — 74011250636 HC RX REV CODE- 250/636: Performed by: INTERNAL MEDICINE

## 2019-03-04 RX ORDER — ONDANSETRON 2 MG/ML
4 INJECTION INTRAMUSCULAR; INTRAVENOUS ONCE
Status: COMPLETED | OUTPATIENT
Start: 2019-03-04 | End: 2019-03-04

## 2019-03-04 RX ORDER — DIPHENHYDRAMINE HYDROCHLORIDE 50 MG/ML
50 INJECTION, SOLUTION INTRAMUSCULAR; INTRAVENOUS ONCE
Status: COMPLETED | OUTPATIENT
Start: 2019-03-04 | End: 2019-03-04

## 2019-03-04 RX ORDER — HEPARIN 100 UNIT/ML
300-500 SYRINGE INTRAVENOUS AS NEEDED
Status: DISPENSED | OUTPATIENT
Start: 2019-03-04 | End: 2019-03-05

## 2019-03-04 RX ORDER — ACETAMINOPHEN 325 MG/1
650 TABLET ORAL ONCE
Status: COMPLETED | OUTPATIENT
Start: 2019-03-04 | End: 2019-03-04

## 2019-03-04 RX ORDER — HYDROCORTISONE SODIUM SUCCINATE 100 MG/2ML
100 INJECTION, POWDER, FOR SOLUTION INTRAMUSCULAR; INTRAVENOUS ONCE
Status: COMPLETED | OUTPATIENT
Start: 2019-03-04 | End: 2019-03-04

## 2019-03-04 RX ORDER — SODIUM CHLORIDE 0.9 % (FLUSH) 0.9 %
10 SYRINGE (ML) INJECTION AS NEEDED
Status: ACTIVE | OUTPATIENT
Start: 2019-03-04 | End: 2019-03-05

## 2019-03-04 RX ORDER — SODIUM CHLORIDE 9 MG/ML
25 INJECTION, SOLUTION INTRAVENOUS CONTINUOUS
Status: DISCONTINUED | OUTPATIENT
Start: 2019-03-04 | End: 2019-03-08 | Stop reason: HOSPADM

## 2019-03-04 RX ORDER — FAMOTIDINE 10 MG/ML
20 INJECTION INTRAVENOUS
Status: DISCONTINUED | OUTPATIENT
Start: 2019-03-04 | End: 2019-03-08 | Stop reason: HOSPADM

## 2019-03-04 RX ADMIN — MEPERIDINE HYDROCHLORIDE 25 MG: 25 INJECTION INTRAMUSCULAR; INTRAVENOUS; SUBCUTANEOUS at 13:34

## 2019-03-04 RX ADMIN — ONDANSETRON 4 MG: 2 INJECTION INTRAMUSCULAR; INTRAVENOUS at 12:40

## 2019-03-04 RX ADMIN — HYDROCORTISONE SODIUM SUCCINATE 100 MG: 100 INJECTION, POWDER, FOR SOLUTION INTRAMUSCULAR; INTRAVENOUS at 12:49

## 2019-03-04 RX ADMIN — ACETAMINOPHEN 650 MG: 325 TABLET, FILM COATED ORAL at 12:47

## 2019-03-04 RX ADMIN — SODIUM CHLORIDE 25 ML/HR: 900 INJECTION, SOLUTION INTRAVENOUS at 12:55

## 2019-03-04 RX ADMIN — Medication 600 UNITS: at 17:59

## 2019-03-04 RX ADMIN — ALEMTUZUMAB 30 MG: 30 INJECTION INTRAVENOUS at 14:00

## 2019-03-04 RX ADMIN — DIPHENHYDRAMINE HYDROCHLORIDE 50 MG: 50 INJECTION, SOLUTION INTRAMUSCULAR; INTRAVENOUS at 12:44

## 2019-03-04 RX ADMIN — Medication 20 ML: at 17:58

## 2019-03-04 RX ADMIN — FAMOTIDINE 20 MG: 10 INJECTION, SOLUTION INTRAVENOUS at 12:53

## 2019-03-04 NOTE — PROGRESS NOTES
SW received voicemail from pt's wife requesting follow up. UVALDO met with pt and family in infusion. Pt presented application for Medicaid they received in the mail but it appeared application was intended for pt's wife. They verbalized confusion about application. SW encouraged pt and wife to contacted Medicaid and/or [employer] to receive additional information prior to completing and submitting it. They verbalized understanding. No other needs identified. SW encouraged them to call SW should any need anything else. They verbalized understanding. SW intends to follow up PRN.

## 2019-03-04 NOTE — PROGRESS NOTES
Pt. Arrived to [facility] for: Campath which he tolerated well after premeds: Pepcid, Zofran, Tylenol, Benedryl, Solucortef and Demerol. Any issues or concerns during this appointment: none  Patient aware of next appointment on: 3-6-19 @ 1030  Pt.  Discharged: ambulatory home with wife

## 2019-03-05 LAB
CMV DNA SERPL NAA+PROBE-ACNC: NORMAL IU/ML
CMV DNA SERPL NAA+PROBE-LOG IU: NORMAL LOG10 IU/ML

## 2019-03-06 ENCOUNTER — HOSPITAL ENCOUNTER (OUTPATIENT)
Dept: INFUSION THERAPY | Age: 64
Discharge: HOME | End: 2019-03-06
Payer: COMMERCIAL

## 2019-03-06 VITALS
SYSTOLIC BLOOD PRESSURE: 120 MMHG | RESPIRATION RATE: 16 BRPM | OXYGEN SATURATION: 98 % | WEIGHT: 268 LBS | BODY MASS INDEX: 37.38 KG/M2 | HEART RATE: 65 BPM | TEMPERATURE: 97.7 F | DIASTOLIC BLOOD PRESSURE: 70 MMHG

## 2019-03-06 DIAGNOSIS — C91.60 PROLYMPHOCYTIC LEUKEMIA OF T-CELL (HCC): Primary | ICD-10-CM

## 2019-03-06 PROCEDURE — 74011250636 HC RX REV CODE- 250/636: Performed by: INTERNAL MEDICINE

## 2019-03-06 PROCEDURE — 96375 TX/PRO/DX INJ NEW DRUG ADDON: CPT

## 2019-03-06 PROCEDURE — 74011250637 HC RX REV CODE- 250/637: Performed by: INTERNAL MEDICINE

## 2019-03-06 PROCEDURE — 74011000250 HC RX REV CODE- 250: Performed by: INTERNAL MEDICINE

## 2019-03-06 PROCEDURE — 96415 CHEMO IV INFUSION ADDL HR: CPT

## 2019-03-06 PROCEDURE — 96413 CHEMO IV INFUSION 1 HR: CPT

## 2019-03-06 PROCEDURE — 74011000258 HC RX REV CODE- 258: Performed by: INTERNAL MEDICINE

## 2019-03-06 RX ORDER — FAMOTIDINE 10 MG/ML
20 INJECTION INTRAVENOUS
Status: DISCONTINUED | OUTPATIENT
Start: 2019-03-06 | End: 2019-03-06

## 2019-03-06 RX ORDER — ONDANSETRON 2 MG/ML
4 INJECTION INTRAMUSCULAR; INTRAVENOUS ONCE
Status: COMPLETED | OUTPATIENT
Start: 2019-03-06 | End: 2019-03-06

## 2019-03-06 RX ORDER — ACETAMINOPHEN 325 MG/1
650 TABLET ORAL ONCE
Status: COMPLETED | OUTPATIENT
Start: 2019-03-06 | End: 2019-03-06

## 2019-03-06 RX ORDER — HYDROCORTISONE SODIUM SUCCINATE 100 MG/2ML
100 INJECTION, POWDER, FOR SOLUTION INTRAMUSCULAR; INTRAVENOUS ONCE
Status: COMPLETED | OUTPATIENT
Start: 2019-03-06 | End: 2019-03-06

## 2019-03-06 RX ORDER — HEPARIN 100 UNIT/ML
300-500 SYRINGE INTRAVENOUS AS NEEDED
Status: DISPENSED | OUTPATIENT
Start: 2019-03-06 | End: 2019-03-06

## 2019-03-06 RX ORDER — DIPHENHYDRAMINE HYDROCHLORIDE 50 MG/ML
50 INJECTION, SOLUTION INTRAMUSCULAR; INTRAVENOUS ONCE
Status: COMPLETED | OUTPATIENT
Start: 2019-03-06 | End: 2019-03-06

## 2019-03-06 RX ORDER — SODIUM CHLORIDE 0.9 % (FLUSH) 0.9 %
10 SYRINGE (ML) INJECTION AS NEEDED
Status: ACTIVE | OUTPATIENT
Start: 2019-03-06 | End: 2019-03-06

## 2019-03-06 RX ORDER — SODIUM CHLORIDE 9 MG/ML
25 INJECTION, SOLUTION INTRAVENOUS CONTINUOUS
Status: ACTIVE | OUTPATIENT
Start: 2019-03-06 | End: 2019-03-06

## 2019-03-06 RX ADMIN — ACETAMINOPHEN 650 MG: 325 TABLET, FILM COATED ORAL at 10:56

## 2019-03-06 RX ADMIN — Medication 20 ML: at 15:47

## 2019-03-06 RX ADMIN — SODIUM CHLORIDE 25 ML/HR: 900 INJECTION, SOLUTION INTRAVENOUS at 10:50

## 2019-03-06 RX ADMIN — FAMOTIDINE 20 MG: 10 INJECTION, SOLUTION INTRAVENOUS at 10:57

## 2019-03-06 RX ADMIN — ONDANSETRON 4 MG: 2 INJECTION INTRAMUSCULAR; INTRAVENOUS at 10:59

## 2019-03-06 RX ADMIN — Medication 10 ML: at 10:50

## 2019-03-06 RX ADMIN — Medication 600 UNITS: at 15:47

## 2019-03-06 RX ADMIN — DIPHENHYDRAMINE HYDROCHLORIDE 50 MG: 50 INJECTION, SOLUTION INTRAMUSCULAR; INTRAVENOUS at 11:02

## 2019-03-06 RX ADMIN — HYDROCORTISONE SODIUM SUCCINATE 100 MG: 100 INJECTION, POWDER, FOR SOLUTION INTRAMUSCULAR; INTRAVENOUS at 10:51

## 2019-03-06 RX ADMIN — ALEMTUZUMAB 30 MG: 30 INJECTION INTRAVENOUS at 11:54

## 2019-03-06 RX ADMIN — MEPERIDINE HYDROCHLORIDE 25 MG: 25 INJECTION INTRAMUSCULAR; INTRAVENOUS; SUBCUTANEOUS at 11:32

## 2019-03-08 ENCOUNTER — HOSPITAL ENCOUNTER (OUTPATIENT)
Dept: INFUSION THERAPY | Age: 64
Discharge: HOME | End: 2019-03-08
Payer: COMMERCIAL

## 2019-03-08 VITALS
DIASTOLIC BLOOD PRESSURE: 79 MMHG | OXYGEN SATURATION: 95 % | HEART RATE: 71 BPM | BODY MASS INDEX: 37.66 KG/M2 | SYSTOLIC BLOOD PRESSURE: 128 MMHG | RESPIRATION RATE: 16 BRPM | TEMPERATURE: 97.8 F | WEIGHT: 270 LBS

## 2019-03-08 DIAGNOSIS — C91.60 PROLYMPHOCYTIC LEUKEMIA OF T-CELL (HCC): Primary | ICD-10-CM

## 2019-03-08 PROCEDURE — 96413 CHEMO IV INFUSION 1 HR: CPT

## 2019-03-08 PROCEDURE — 74011250637 HC RX REV CODE- 250/637: Performed by: INTERNAL MEDICINE

## 2019-03-08 PROCEDURE — 74011250636 HC RX REV CODE- 250/636: Performed by: INTERNAL MEDICINE

## 2019-03-08 PROCEDURE — 96375 TX/PRO/DX INJ NEW DRUG ADDON: CPT

## 2019-03-08 PROCEDURE — 96415 CHEMO IV INFUSION ADDL HR: CPT

## 2019-03-08 PROCEDURE — 74011000258 HC RX REV CODE- 258: Performed by: INTERNAL MEDICINE

## 2019-03-08 RX ORDER — SODIUM CHLORIDE 9 MG/ML
25 INJECTION, SOLUTION INTRAVENOUS CONTINUOUS
Status: DISCONTINUED | OUTPATIENT
Start: 2019-03-08 | End: 2019-03-12 | Stop reason: HOSPADM

## 2019-03-08 RX ORDER — ONDANSETRON 2 MG/ML
4 INJECTION INTRAMUSCULAR; INTRAVENOUS ONCE
Status: COMPLETED | OUTPATIENT
Start: 2019-03-08 | End: 2019-03-08

## 2019-03-08 RX ORDER — ACETAMINOPHEN 325 MG/1
650 TABLET ORAL ONCE
Status: COMPLETED | OUTPATIENT
Start: 2019-03-08 | End: 2019-03-08

## 2019-03-08 RX ORDER — HEPARIN 100 UNIT/ML
300-500 SYRINGE INTRAVENOUS AS NEEDED
Status: DISPENSED | OUTPATIENT
Start: 2019-03-08 | End: 2019-03-08

## 2019-03-08 RX ORDER — SODIUM CHLORIDE 0.9 % (FLUSH) 0.9 %
10 SYRINGE (ML) INJECTION AS NEEDED
Status: ACTIVE | OUTPATIENT
Start: 2019-03-08 | End: 2019-03-08

## 2019-03-08 RX ORDER — DIPHENHYDRAMINE HYDROCHLORIDE 50 MG/ML
50 INJECTION, SOLUTION INTRAMUSCULAR; INTRAVENOUS ONCE
Status: COMPLETED | OUTPATIENT
Start: 2019-03-08 | End: 2019-03-08

## 2019-03-08 RX ORDER — HYDROCORTISONE SODIUM SUCCINATE 100 MG/2ML
100 INJECTION, POWDER, FOR SOLUTION INTRAMUSCULAR; INTRAVENOUS ONCE
Status: COMPLETED | OUTPATIENT
Start: 2019-03-08 | End: 2019-03-08

## 2019-03-08 RX ORDER — FAMOTIDINE 10 MG/ML
20 INJECTION INTRAVENOUS
Status: DISPENSED | OUTPATIENT
Start: 2019-03-08 | End: 2019-03-08

## 2019-03-08 RX ADMIN — HYDROCORTISONE SODIUM SUCCINATE 100 MG: 100 INJECTION, POWDER, FOR SOLUTION INTRAMUSCULAR; INTRAVENOUS at 11:01

## 2019-03-08 RX ADMIN — SODIUM CHLORIDE 25 ML/HR: 900 INJECTION, SOLUTION INTRAVENOUS at 10:53

## 2019-03-08 RX ADMIN — ACETAMINOPHEN 650 MG: 325 TABLET, FILM COATED ORAL at 10:51

## 2019-03-08 RX ADMIN — MEPERIDINE HYDROCHLORIDE 25 MG: 25 INJECTION INTRAMUSCULAR; INTRAVENOUS; SUBCUTANEOUS at 11:33

## 2019-03-08 RX ADMIN — Medication 10 ML: at 10:52

## 2019-03-08 RX ADMIN — FAMOTIDINE 20 MG: 10 INJECTION, SOLUTION INTRAVENOUS at 11:07

## 2019-03-08 RX ADMIN — HEPARIN 500 UNITS: 100 SYRINGE at 16:03

## 2019-03-08 RX ADMIN — Medication 10 ML: at 16:04

## 2019-03-08 RX ADMIN — ALEMTUZUMAB 30 MG: 30 INJECTION INTRAVENOUS at 12:00

## 2019-03-08 RX ADMIN — HEPARIN 500 UNITS: 100 SYRINGE at 16:04

## 2019-03-08 RX ADMIN — DIPHENHYDRAMINE HYDROCHLORIDE 50 MG: 50 INJECTION, SOLUTION INTRAMUSCULAR; INTRAVENOUS at 10:57

## 2019-03-08 RX ADMIN — ONDANSETRON 4 MG: 2 INJECTION INTRAMUSCULAR; INTRAVENOUS at 10:54

## 2019-03-08 RX ADMIN — Medication 10 ML: at 16:03

## 2019-03-08 RX ADMIN — Medication 10 ML: at 10:53

## 2019-03-08 NOTE — PROGRESS NOTES
Arrived to the [facility]. Campath completed. Patient tolerated well. Any issues or concerns during appointment: None. Patient aware of next infusion appointment on 3/11/19 at 1000 following [address] Discharged ambulatory from Infusion with his wife.

## 2019-03-11 ENCOUNTER — PATIENT OUTREACH (OUTPATIENT)
Dept: CASE MANAGEMENT | Age: 64
End: 2019-03-11

## 2019-03-11 ENCOUNTER — HOSPITAL ENCOUNTER (OUTPATIENT)
Dept: LAB | Age: 64
Discharge: HOME | End: 2019-03-11
Payer: COMMERCIAL

## 2019-03-11 ENCOUNTER — HOSPITAL ENCOUNTER (OUTPATIENT)
Dept: INFUSION THERAPY | Age: 64
Discharge: HOME | End: 2019-03-11
Payer: COMMERCIAL

## 2019-03-11 DIAGNOSIS — C91.60 PROLYMPHOCYTIC LEUKEMIA OF T-CELL (HCC): ICD-10-CM

## 2019-03-11 LAB
ALBUMIN SERPL-MCNC: 4.3 G/DL (ref 3.2–4.6)
ALBUMIN/GLOB SERPL: 1.2 {RATIO} (ref 1.2–3.5)
ALP SERPL-CCNC: 29 U/L (ref 50–136)
ALT SERPL-CCNC: 28 U/L (ref 12–65)
ANION GAP SERPL CALC-SCNC: 3 MMOL/L (ref 7–16)
AST SERPL-CCNC: 11 U/L (ref 15–37)
BASOPHILS # BLD: 0 K/UL (ref 0–0.2)
BASOPHILS NFR BLD: 0 % (ref 0–2)
BILIRUB SERPL-MCNC: 0.6 MG/DL (ref 0.2–1.1)
BUN SERPL-MCNC: 16 MG/DL (ref 8–23)
CALCIUM SERPL-MCNC: 9.5 MG/DL (ref 8.3–10.4)
CHLORIDE SERPL-SCNC: 104 MMOL/L (ref 98–107)
CO2 SERPL-SCNC: 30 MMOL/L (ref 21–32)
CREAT SERPL-MCNC: 1.06 MG/DL (ref 0.8–1.5)
DIFFERENTIAL METHOD BLD: ABNORMAL
EOSINOPHIL # BLD: 0 K/UL (ref 0–0.8)
EOSINOPHIL NFR BLD: 0 % (ref 0.5–7.8)
ERYTHROCYTE [DISTWIDTH] IN BLOOD BY AUTOMATED COUNT: 12.8 % (ref 11.9–14.6)
GLOBULIN SER CALC-MCNC: 3.6 G/DL (ref 2.3–3.5)
GLUCOSE SERPL-MCNC: 122 MG/DL (ref 65–100)
HCT VFR BLD AUTO: 38.4 % (ref 41.1–50.3)
HGB BLD-MCNC: 13 G/DL (ref 13.6–17.2)
IMM GRANULOCYTES # BLD AUTO: 0 K/UL (ref 0–0.5)
IMM GRANULOCYTES NFR BLD AUTO: 0 % (ref 0–5)
LYMPHOCYTES # BLD: 0 K/UL (ref 0.5–4.6)
LYMPHOCYTES NFR BLD: 11 % (ref 13–44)
MAGNESIUM SERPL-MCNC: 2.2 MG/DL (ref 1.8–2.4)
MCH RBC QN AUTO: 31.2 PG (ref 26.1–32.9)
MCHC RBC AUTO-ENTMCNC: 33.9 G/DL (ref 31.4–35)
MCV RBC AUTO: 92.1 FL (ref 79.6–97.8)
MONOCYTES # BLD: 0 K/UL (ref 0.1–1.3)
MONOCYTES NFR BLD: 0 % (ref 4–12)
NEUTS SEG # BLD: 0.3 K/UL (ref 1.7–8.2)
NEUTS SEG NFR BLD: 90 % (ref 43–78)
NRBC # BLD: 0 K/UL (ref 0–0.2)
PLATELET # BLD AUTO: 89 K/UL (ref 150–450)
PMV BLD AUTO: 9.3 FL (ref 9.4–12.3)
POTASSIUM SERPL-SCNC: 3.9 MMOL/L (ref 3.5–5.1)
PROT SERPL-MCNC: 7.9 G/DL (ref 6.3–8.2)
RBC # BLD AUTO: 4.17 M/UL (ref 4.23–5.67)
SODIUM SERPL-SCNC: 137 MMOL/L (ref 136–145)
WBC # BLD AUTO: 0.4 K/UL (ref 4.3–11.1)

## 2019-03-11 PROCEDURE — 85025 COMPLETE CBC W/AUTO DIFF WBC: CPT

## 2019-03-11 PROCEDURE — 80053 COMPREHEN METABOLIC PANEL: CPT

## 2019-03-11 PROCEDURE — 83735 ASSAY OF MAGNESIUM: CPT

## 2019-03-11 NOTE — PROGRESS NOTES
3/11/19- Pt seen in the office with Dr Esha Barragan. Labs reviewed. Today the pt's [address] resulted at 300 so treatment held. Pt also instructed to hold Bactrim. He will return in 1 week.

## 2019-03-13 ENCOUNTER — HOSPITAL ENCOUNTER (OUTPATIENT)
Dept: INFUSION THERAPY | Age: 64
End: 2019-03-13
Payer: COMMERCIAL

## 2019-03-13 LAB
CMV DNA SERPL NAA+PROBE-ACNC: NORMAL IU/ML
CMV DNA SERPL NAA+PROBE-LOG IU: NORMAL LOG10 IU/ML

## 2019-03-15 ENCOUNTER — APPOINTMENT (OUTPATIENT)
Dept: INFUSION THERAPY | Age: 64
End: 2019-03-15
Payer: COMMERCIAL

## 2019-03-18 ENCOUNTER — HOSPITAL ENCOUNTER (OUTPATIENT)
Dept: LAB | Age: 64
Discharge: HOME | End: 2019-03-18
Payer: COMMERCIAL

## 2019-03-18 ENCOUNTER — PATIENT OUTREACH (OUTPATIENT)
Dept: CASE MANAGEMENT | Age: 64
End: 2019-03-18

## 2019-03-18 ENCOUNTER — HOSPITAL ENCOUNTER (OUTPATIENT)
Dept: INFUSION THERAPY | Age: 64
Discharge: HOME | End: 2019-03-18
Payer: COMMERCIAL

## 2019-03-18 VITALS — BODY MASS INDEX: 36.69 KG/M2 | WEIGHT: 263.1 LBS

## 2019-03-18 DIAGNOSIS — C91.60 PROLYMPHOCYTIC LEUKEMIA OF T-CELL (HCC): ICD-10-CM

## 2019-03-18 LAB
ALBUMIN SERPL-MCNC: 4.1 G/DL (ref 3.2–4.6)
ALBUMIN/GLOB SERPL: 1 {RATIO} (ref 1.2–3.5)
ALP SERPL-CCNC: 31 U/L (ref 50–136)
ALT SERPL-CCNC: 27 U/L (ref 12–65)
ANION GAP SERPL CALC-SCNC: 7 MMOL/L (ref 7–16)
AST SERPL-CCNC: 20 U/L (ref 15–37)
BILIRUB SERPL-MCNC: 0.8 MG/DL (ref 0.2–1.1)
BUN SERPL-MCNC: 24 MG/DL (ref 8–23)
CALCIUM SERPL-MCNC: 8.9 MG/DL (ref 8.3–10.4)
CHLORIDE SERPL-SCNC: 103 MMOL/L (ref 98–107)
CO2 SERPL-SCNC: 27 MMOL/L (ref 21–32)
CREAT SERPL-MCNC: 1.21 MG/DL (ref 0.8–1.5)
DIFFERENTIAL METHOD BLD: ABNORMAL
ERYTHROCYTE [DISTWIDTH] IN BLOOD BY AUTOMATED COUNT: 12.6 % (ref 11.9–14.6)
GLOBULIN SER CALC-MCNC: 4 G/DL (ref 2.3–3.5)
GLUCOSE SERPL-MCNC: 116 MG/DL (ref 65–100)
HCT VFR BLD AUTO: 36.5 % (ref 41.1–50.3)
HGB BLD-MCNC: 12.8 G/DL (ref 13.6–17.2)
MAGNESIUM SERPL-MCNC: 2.1 MG/DL (ref 1.8–2.4)
MCH RBC QN AUTO: 31.4 PG (ref 26.1–32.9)
MCHC RBC AUTO-ENTMCNC: 35.1 G/DL (ref 31.4–35)
MCV RBC AUTO: 89.7 FL (ref 79.6–97.8)
NRBC # BLD: 0 K/UL (ref 0–0.2)
PLATELET # BLD AUTO: 52 K/UL (ref 150–450)
PLATELET COMMENTS,PCOM: ABNORMAL
PMV BLD AUTO: 8.3 FL (ref 9.4–12.3)
POTASSIUM SERPL-SCNC: 3.7 MMOL/L (ref 3.5–5.1)
PROT SERPL-MCNC: 8.1 G/DL (ref 6.3–8.2)
RBC # BLD AUTO: 4.07 M/UL (ref 4.23–5.67)
RBC MORPH BLD: ABNORMAL
SODIUM SERPL-SCNC: 137 MMOL/L (ref 136–145)
WBC # BLD AUTO: 0.1 K/UL (ref 4.3–11.1)
WBC MORPH BLD: ABNORMAL

## 2019-03-18 PROCEDURE — 74011250636 HC RX REV CODE- 250/636: Performed by: INTERNAL MEDICINE

## 2019-03-18 PROCEDURE — 80053 COMPREHEN METABOLIC PANEL: CPT

## 2019-03-18 PROCEDURE — 83735 ASSAY OF MAGNESIUM: CPT

## 2019-03-18 PROCEDURE — 96523 IRRIG DRUG DELIVERY DEVICE: CPT

## 2019-03-18 PROCEDURE — 85025 COMPLETE CBC W/AUTO DIFF WBC: CPT

## 2019-03-18 RX ORDER — HEPARIN 100 UNIT/ML
600 SYRINGE INTRAVENOUS AS NEEDED
Status: DISPENSED | OUTPATIENT
Start: 2019-03-18 | End: 2019-03-18

## 2019-03-18 RX ORDER — SODIUM CHLORIDE 0.9 % (FLUSH) 0.9 %
10-20 SYRINGE (ML) INJECTION AS NEEDED
Status: ACTIVE | OUTPATIENT
Start: 2019-03-18 | End: 2019-03-18

## 2019-03-18 RX ADMIN — Medication 20 ML: at 09:55

## 2019-03-18 RX ADMIN — HEPARIN 600 UNITS: 100 SYRINGE at 09:55

## 2019-03-18 NOTE — PROGRESS NOTES
Problem: Knowledge Deficit  Goal: *Verbalizes understanding of procedures and medications  Outcome: Progressing Towards Goal  Verbalizes/demonstrates understanding of purpose/procedure of PICC Dressing change and flush.

## 2019-03-18 NOTE — PROGRESS NOTES
Pt arrived today ambulatory at 0930, to have PICC dressing changed and flush. Chemotherapy on hold this week. Pt tolerated without difficulty. Patient discharged via ambulatory accompanied by spouse. Instructed to notify physician of any problems, questions or concerns. Allowed opportunity for patient/family to ask questions. Verbalized understanding. Next appointment is March 25 at [phone] with Amrita Jordan.

## 2019-03-18 NOTE — PROGRESS NOTES
3/18/19- Pt seen in the office with Dr Brant Gresham. Labs reviewed. WBC 0.1, plts 52K. Dr Brant Gresham would like to hold Campath for an additional week as well as decrease Valcyte to 900mg once a day. Pt to return in 1 week to see Dr Sasha Montano for follow up.

## 2019-03-18 NOTE — PROGRESS NOTES
Today is pt 59 birthday, pt requesting financial assistance. SW seen pt and his wife at the Infusion area. Pt requesting SW to check on his sponsorship and T-PLL funding. SW emailing Ms. Radames Finn about pt's request.    Mood and affect were congruent. Patient did not report suicidal ideations, intent or plans. Patient did not report homicidal ideations, intent or plans. Patient is oriented to self, place, time and situation.       Of note, see note from Denis Bravo on 3/4/2019    Signed By: Milton Palma LMSW     March 18, 2019

## 2019-03-20 ENCOUNTER — HOSPITAL ENCOUNTER (OUTPATIENT)
Dept: INFUSION THERAPY | Age: 64
End: 2019-03-20
Payer: COMMERCIAL

## 2019-03-20 LAB
CMV DNA SERPL NAA+PROBE-ACNC: NEGATIVE IU/ML
CMV DNA SERPL NAA+PROBE-LOG IU: NORMAL LOG10 IU/ML

## 2019-03-22 ENCOUNTER — APPOINTMENT (OUTPATIENT)
Dept: INFUSION THERAPY | Age: 64
End: 2019-03-22
Payer: COMMERCIAL

## 2019-03-25 ENCOUNTER — HOSPITAL ENCOUNTER (OUTPATIENT)
Dept: LAB | Age: 64
Discharge: HOME | End: 2019-03-25
Payer: COMMERCIAL

## 2019-03-25 ENCOUNTER — APPOINTMENT (OUTPATIENT)
Dept: GENERAL RADIOLOGY | Age: 64
DRG: 809 | End: 2019-03-25
Attending: NURSE PRACTITIONER
Payer: COMMERCIAL

## 2019-03-25 ENCOUNTER — PATIENT OUTREACH (OUTPATIENT)
Dept: CASE MANAGEMENT | Age: 64
End: 2019-03-25

## 2019-03-25 ENCOUNTER — HOSPITAL ENCOUNTER (INPATIENT)
Age: 64
LOS: 6 days | Discharge: HOME | DRG: 809 | End: 2019-03-31
Attending: INTERNAL MEDICINE | Admitting: INTERNAL MEDICINE
Payer: COMMERCIAL

## 2019-03-25 ENCOUNTER — HOSPITAL ENCOUNTER (OUTPATIENT)
Dept: INFUSION THERAPY | Age: 64
Discharge: HOME | End: 2019-03-25
Payer: COMMERCIAL

## 2019-03-25 DIAGNOSIS — C91.60 PROLYMPHOCYTIC LEUKEMIA OF T-CELL (HCC): ICD-10-CM

## 2019-03-25 DIAGNOSIS — D70.9 NEUTROPENIC FEVER (HCC): ICD-10-CM

## 2019-03-25 DIAGNOSIS — R68.89 COLD INTOLERANCE: ICD-10-CM

## 2019-03-25 DIAGNOSIS — D70.9 NEUTROPENIA, UNSPECIFIED TYPE (HCC): ICD-10-CM

## 2019-03-25 DIAGNOSIS — D69.6 THROMBOCYTOPENIA (HCC): ICD-10-CM

## 2019-03-25 DIAGNOSIS — R68.89 RIGORS: ICD-10-CM

## 2019-03-25 DIAGNOSIS — R50.81 NEUTROPENIC FEVER (HCC): ICD-10-CM

## 2019-03-25 DIAGNOSIS — M79.10 MUSCLE PAIN: ICD-10-CM

## 2019-03-25 LAB
ALBUMIN SERPL-MCNC: 3.6 G/DL (ref 3.2–4.6)
ALBUMIN/GLOB SERPL: 0.8 {RATIO} (ref 1.2–3.5)
ALP SERPL-CCNC: 40 U/L (ref 50–136)
ALT SERPL-CCNC: 46 U/L (ref 12–65)
ANION GAP SERPL CALC-SCNC: 7 MMOL/L (ref 7–16)
APPEARANCE UR: ABNORMAL
AST SERPL-CCNC: 32 U/L (ref 15–37)
BACTERIA URNS QL MICRO: 0 /HPF
BILIRUB SERPL-MCNC: 0.7 MG/DL (ref 0.2–1.1)
BILIRUB UR QL: NEGATIVE
BUN SERPL-MCNC: 27 MG/DL (ref 8–23)
CALCIUM SERPL-MCNC: 9.2 MG/DL (ref 8.3–10.4)
CASTS URNS QL MICRO: ABNORMAL /LPF
CHLORIDE SERPL-SCNC: 105 MMOL/L (ref 98–107)
CO2 SERPL-SCNC: 21 MMOL/L (ref 21–32)
COLOR UR: ABNORMAL
CREAT SERPL-MCNC: 1.51 MG/DL (ref 0.8–1.5)
DIFFERENTIAL METHOD BLD: ABNORMAL
EPI CELLS #/AREA URNS HPF: ABNORMAL /HPF
ERYTHROCYTE [DISTWIDTH] IN BLOOD BY AUTOMATED COUNT: 12.3 % (ref 11.9–14.6)
FLUAV AG NPH QL IA: NEGATIVE
FLUBV AG NPH QL IA: NEGATIVE
GLOBULIN SER CALC-MCNC: 4.6 G/DL (ref 2.3–3.5)
GLUCOSE SERPL-MCNC: 112 MG/DL (ref 65–100)
GLUCOSE UR STRIP.AUTO-MCNC: NEGATIVE MG/DL
HCT VFR BLD AUTO: 35.7 % (ref 41.1–50.3)
HGB BLD-MCNC: 12.6 G/DL (ref 13.6–17.2)
HGB UR QL STRIP: ABNORMAL
KETONES UR QL STRIP.AUTO: NEGATIVE MG/DL
LACTATE SERPL-SCNC: 0.9 MMOL/L (ref 0.4–2)
LEUKOCYTE ESTERASE UR QL STRIP.AUTO: NEGATIVE
MAGNESIUM SERPL-MCNC: 2.5 MG/DL (ref 1.8–2.4)
MCH RBC QN AUTO: 31.2 PG (ref 26.1–32.9)
MCHC RBC AUTO-ENTMCNC: 35.3 G/DL (ref 31.4–35)
MCV RBC AUTO: 88.4 FL (ref 79.6–97.8)
NITRITE UR QL STRIP.AUTO: NEGATIVE
NRBC # BLD: 0 K/UL (ref 0–0.2)
PH UR STRIP: 5 [PH] (ref 5–9)
PLATELET # BLD AUTO: 44 K/UL (ref 150–450)
PLATELET COMMENTS,PCOM: ABNORMAL
PMV BLD AUTO: 9.9 FL (ref 9.4–12.3)
POTASSIUM SERPL-SCNC: 4.1 MMOL/L (ref 3.5–5.1)
PROCALCITONIN SERPL-MCNC: 0.2 NG/ML
PROT SERPL-MCNC: 8.2 G/DL (ref 6.3–8.2)
PROT UR STRIP-MCNC: ABNORMAL MG/DL
RBC # BLD AUTO: 4.04 M/UL (ref 4.23–5.67)
RBC #/AREA URNS HPF: ABNORMAL /HPF
RBC MORPH BLD: ABNORMAL
SODIUM SERPL-SCNC: 133 MMOL/L (ref 136–145)
SP GR UR REFRACTOMETRY: 1.03 (ref 1–1.02)
SPECIMEN SOURCE: NORMAL
UROBILINOGEN UR QL STRIP.AUTO: 0.2 EU/DL (ref 0.2–1)
WBC # BLD AUTO: 0.3 K/UL (ref 4.3–11.1)
WBC MORPH BLD: ABNORMAL
WBC URNS QL MICRO: ABNORMAL /HPF

## 2019-03-25 PROCEDURE — 65270000015 HC RM PRIVATE ONCOLOGY

## 2019-03-25 PROCEDURE — 74011000258 HC RX REV CODE- 258: Performed by: NURSE PRACTITIONER

## 2019-03-25 PROCEDURE — 87086 URINE CULTURE/COLONY COUNT: CPT

## 2019-03-25 PROCEDURE — 83735 ASSAY OF MAGNESIUM: CPT

## 2019-03-25 PROCEDURE — 87040 BLOOD CULTURE FOR BACTERIA: CPT

## 2019-03-25 PROCEDURE — 81001 URINALYSIS AUTO W/SCOPE: CPT

## 2019-03-25 PROCEDURE — 83605 ASSAY OF LACTIC ACID: CPT

## 2019-03-25 PROCEDURE — 99222 1ST HOSP IP/OBS MODERATE 55: CPT | Performed by: INTERNAL MEDICINE

## 2019-03-25 PROCEDURE — 74011250637 HC RX REV CODE- 250/637: Performed by: INTERNAL MEDICINE

## 2019-03-25 PROCEDURE — 84145 PROCALCITONIN (PCT): CPT

## 2019-03-25 PROCEDURE — 71046 X-RAY EXAM CHEST 2 VIEWS: CPT

## 2019-03-25 PROCEDURE — 87804 INFLUENZA ASSAY W/OPTIC: CPT

## 2019-03-25 PROCEDURE — 74011250637 HC RX REV CODE- 250/637: Performed by: NURSE PRACTITIONER

## 2019-03-25 PROCEDURE — 80053 COMPREHEN METABOLIC PANEL: CPT

## 2019-03-25 PROCEDURE — 36415 COLL VENOUS BLD VENIPUNCTURE: CPT

## 2019-03-25 PROCEDURE — 74011250636 HC RX REV CODE- 250/636: Performed by: NURSE PRACTITIONER

## 2019-03-25 PROCEDURE — 85025 COMPLETE CBC W/AUTO DIFF WBC: CPT

## 2019-03-25 RX ORDER — MORPHINE SULFATE 2 MG/ML
2 INJECTION, SOLUTION INTRAMUSCULAR; INTRAVENOUS
Status: DISCONTINUED | OUTPATIENT
Start: 2019-03-25 | End: 2019-03-31 | Stop reason: HOSPADM

## 2019-03-25 RX ORDER — SODIUM CHLORIDE 9 MG/ML
1000 INJECTION, SOLUTION INTRAVENOUS ONCE
Status: COMPLETED | OUTPATIENT
Start: 2019-03-25 | End: 2019-03-25

## 2019-03-25 RX ORDER — FLUCONAZOLE 100 MG/1
200 TABLET ORAL DAILY
Status: DISCONTINUED | OUTPATIENT
Start: 2019-03-26 | End: 2019-03-31 | Stop reason: HOSPADM

## 2019-03-25 RX ORDER — VANCOMYCIN 1.75 GRAM/500 ML IN 0.9 % SODIUM CHLORIDE INTRAVENOUS
1750 EVERY 12 HOURS
Status: DISCONTINUED | OUTPATIENT
Start: 2019-03-26 | End: 2019-03-27

## 2019-03-25 RX ORDER — PROCHLORPERAZINE EDISYLATE 5 MG/ML
5-10 INJECTION INTRAMUSCULAR; INTRAVENOUS
Status: DISCONTINUED | OUTPATIENT
Start: 2019-03-25 | End: 2019-03-31 | Stop reason: HOSPADM

## 2019-03-25 RX ORDER — LORATADINE 10 MG/1
10 TABLET ORAL
Status: DISCONTINUED | OUTPATIENT
Start: 2019-03-25 | End: 2019-03-31 | Stop reason: HOSPADM

## 2019-03-25 RX ORDER — HYDROCODONE BITARTRATE AND ACETAMINOPHEN 5; 325 MG/1; MG/1
1 TABLET ORAL
Status: DISCONTINUED | OUTPATIENT
Start: 2019-03-25 | End: 2019-03-31 | Stop reason: HOSPADM

## 2019-03-25 RX ORDER — ONDANSETRON 2 MG/ML
4 INJECTION INTRAMUSCULAR; INTRAVENOUS
Status: DISCONTINUED | OUTPATIENT
Start: 2019-03-25 | End: 2019-03-31 | Stop reason: HOSPADM

## 2019-03-25 RX ORDER — SODIUM CHLORIDE 9 MG/ML
75 INJECTION, SOLUTION INTRAVENOUS CONTINUOUS
Status: DISCONTINUED | OUTPATIENT
Start: 2019-03-25 | End: 2019-03-31

## 2019-03-25 RX ORDER — VALGANCICLOVIR 450 MG/1
900 TABLET, FILM COATED ORAL 2 TIMES DAILY
Status: DISCONTINUED | OUTPATIENT
Start: 2019-03-25 | End: 2019-03-25

## 2019-03-25 RX ORDER — VALGANCICLOVIR 450 MG/1
450 TABLET, FILM COATED ORAL 2 TIMES DAILY
Status: DISCONTINUED | OUTPATIENT
Start: 2019-03-26 | End: 2019-03-31 | Stop reason: HOSPADM

## 2019-03-25 RX ADMIN — VANCOMYCIN HYDROCHLORIDE 2500 MG: 10 INJECTION, POWDER, LYOPHILIZED, FOR SOLUTION INTRAVENOUS at 14:58

## 2019-03-25 RX ADMIN — CEFEPIME HYDROCHLORIDE 2 G: 2 INJECTION, POWDER, FOR SOLUTION INTRAVENOUS at 22:04

## 2019-03-25 RX ADMIN — VALGANCICLOVIR 450 MG: 450 TABLET, FILM COATED ORAL at 19:10

## 2019-03-25 RX ADMIN — LORATADINE 10 MG: 10 TABLET ORAL at 22:04

## 2019-03-25 RX ADMIN — CEFEPIME HYDROCHLORIDE 2 G: 2 INJECTION, POWDER, FOR SOLUTION INTRAVENOUS at 13:46

## 2019-03-25 RX ADMIN — SODIUM CHLORIDE 1000 ML: 900 INJECTION, SOLUTION INTRAVENOUS at 13:49

## 2019-03-25 RX ADMIN — TBO-FILGRASTIM 480 MCG: 480 INJECTION, SOLUTION SUBCUTANEOUS at 13:48

## 2019-03-25 RX ADMIN — SODIUM CHLORIDE 75 ML/HR: 900 INJECTION, SOLUTION INTRAVENOUS at 15:00

## 2019-03-25 NOTE — PROGRESS NOTES
3/25/19- Pt seen in the office today with Dr Chirstiano Gutierrez. Today pt complains of fatigue, weakness, lethargy, cough, and SOB. Labs reviewed. Today WBC is 0.3. Pt denies fevers but does admit to having chills at home on several occasions. Pt to be admitted to [facility] to room 528 for febrile neutropenia work up and granix.

## 2019-03-25 NOTE — PROGRESS NOTES
TRANSFER - IN REPORT: 
 
Verbal report received from Peter Luna RN (name) on Margarita Grandchild  being received from [facility] (unit) for routine progression of care Report consisted of patients Situation, Background, Assessment and  
Recommendations(SBAR). Information from the following report(s) SBAR, MAR and Recent Results was reviewed with the receiving nurse. Opportunity for questions and clarification was provided. Assessment completed upon patients arrival to unit and care assumed.

## 2019-03-25 NOTE — PROGRESS NOTES
03/25/19 1310 Dual Skin Pressure Injury Assessment Dual Skin Pressure Injury Assessment WDL Second Care Provider (Based on [address] [place] [address]) [place], RN Dual skin assessment completed with this RN and Louie Humphreys RN. No abnormalities. Will continue to monitor.

## 2019-03-25 NOTE — H&P
Inpatient Hematology / Oncology History and PhysicalReason for Asmission:  Neutropenic fever ([facility] [address] [D70.9, R50.81] History of Present Illness: 
Mr. Mellisa De La Cruz is a 59 y.o. male admitted on 3/25/2019 with a primary diagnosis of There were no encounter diagnoses. Joan Rm Chills, rigors and neutropenia Review of Systems: 
Constitutional c/o chills. HEENT Denies trauma, blurry vision, hearing loss, ear pain, nosebleeds, sore throat, neck pain and ear discharge. Skin Denies lesions or rashes. Lungs Denies dyspnea, cough, sputum production or hemoptysis. Cardiovascular Denies chest pain, palpitations, or lower extremity edema. Gastrointestinal Denies nausea, vomiting, changes in bowel habits, bloody or black stools, abdominal pain.  Denies dysuria, frequency or hesitancy of urination. Neuro Denies headaches, visual changes or ataxia. Denies dizziness, tingling, tremors, sensory change, speech change, focal weakness or headaches. Hematology Denies easy bruising or bleeding, denies gingival bleeding or epistaxis. Endo Denies heat/cold intolerance, denies diabetes or thyroid abnormalities. MSK Denies back pain, arthralgias, myalgias or frequent falls. Psychiatric/Behavioral Denies depression and substance abuse. The patient is not nervous/anxious. Allergies Allergen Reactions  Campath [Alemtuzumab] Other (comments) Rigors Past Medical History:  
Diagnosis Date  Back pain  Cervical radiculopathy  GERD (gastroesophageal reflux disease)  H/O seasonal allergies  Hyperlipidemia  Impotence  Insomnia  Lateral epicondylitis  Obesity  Sleep apnea Past Surgical History:  
Procedure Laterality Date  HX CIRCUMCISION    
 HX COLONOSCOPY  2017 Due in 2027  HX ORTHOPAEDIC    
 r wrist  
 HX WISDOM TEETH EXTRACTION Family History Problem Relation Age of Onset  Cancer Mother 80  
     pancreatic  Cancer Father 76 breast  
 No Known Problems Son   
 Hypertension Brother  Hypertension Brother  No Known Problems Daughter Social History Socioeconomic History  Marital status:  Spouse name: Not on file  Number of children: Not on file  Years of education: Not on file  Highest education level: Not on file Occupational History  Not on file Social Needs  Financial resource strain: Not on file  Food insecurity:  
  Worry: Not on file Inability: Not on file  Transportation needs:  
  Medical: Not on file Non-medical: Not on file Tobacco Use  Smoking status: Never Smoker  Smokeless tobacco: Never Used Substance and Sexual Activity  Alcohol use: Yes Alcohol/week: 1.8 oz Types: 3 Shots of liquor per week  Drug use: No  
 Sexual activity: Yes  
  Partners: Female Lifestyle  Physical activity:  
  Days per week: Not on file Minutes per session: Not on file  Stress: Not on file Relationships  Social connections:  
  Talks on phone: Not on file Gets together: Not on file Attends [employer] service: Not on file Active member of club or organization: Not on file Attends meetings of clubs or organizations: Not on file Relationship status: Not on file  Intimate partner violence:  
  Fear of current or ex partner: Not on file Emotionally abused: Not on file Physically abused: Not on file Forced sexual activity: Not on file Other Topics Concern [address] Service Not Asked  Blood Transfusions Not Asked  Caffeine Concern Not Asked  Occupational Exposure Not Asked Skipper Sarna Hazards Not Asked  Sleep Concern Not Asked  Stress Concern Not Asked  Weight Concern Not Asked  Special Diet Not Asked  Back Care Not Asked  Exercise Not Asked  Bike Helmet Not Asked  Seat Belt Not Asked  Self-Exams Not Asked Social History Narrative  Not on file Current Facility-Administered Medications Medication Dose Route Frequency Provider Last Rate Last Dose  [START ON 3/26/2019] fluconazole (DIFLUCAN) tablet 200 mg  200 mg Oral DAILY Chelle Costain, NP      
 valGANciclovir (VALCYTE) tablet 900 mg  900 mg Oral BID Chelle Costain, NP      
 0.9% sodium chloride infusion  75 mL/hr IntraVENous CONTINUOUS Chelle Costain, NP 75 mL/hr at 19 1500 75 mL/hr at 19 1500  
 ondansetron (ZOFRAN) injection 4 mg  4 mg IntraVENous Q4H PRN Chelle Costain, NP      
 prochlorperazine (COMPAZINE) injection 5-10 mg  5-10 mg IntraVENous Q6H PRN Chelle Costain, NP      
 HYDROcodone-acetaminophen (NORCO) 5-325 mg per tablet 1 Tab  1 Tab Oral Q6H PRN Chelle Costain, NP      
 morphine injection 2 mg  2 mg IntraVENous Q4H PRN Chelle Costain, NP      
 tbo-filgrastim [facility]) injection 480 mcg  480 mcg SubCUTAneous DAILY Chelle Costain, NP   480 mcg at 19 1348  cefepime (MAXIPIME) 2 g in 0.9% sodium chloride (MBP/ADV) 100 mL  2 g IntraVENous Q8H Chelle Costain,  mL/hr at 19 1346 2 g at 19 1346  
 [START ON 3/26/2019] vancomycin (VANCOCIN) 1750 mg in  ml infusion  1,750 mg IntraVENous Q12H Chelle Costain, NP      
 
 
OBJECTIVE: 
Patient Vitals for the past 8 hrs: 
 BP Temp Pulse Resp SpO2  
19 1501 111/75 97.6 °F (36.4 °C) 97 24 100 % 19 1322 (!) 89/61 97.7 °F (36.5 °C) 69 24 100 % Temp (24hrs), Av.9 °F (36.6 °C), Min:97.6 °F (36.4 °C), Max:98.4 °F (36.9 °C) 
 
 0701 -  1900 In: 9064 [I.V.:1097] Out: 300 [Urine:300] Physical Exam: 
Constitutional: Well developed, well nourished male in no acute distress, sitting comfortably in the hospital bed. HEENT: Normocephalic and atraumatic. Oropharynx is clear, mucous membranes are moist.  Pupils are equal, round, and reactive to light. Extraocular muscles are intact. Sclerae anicteric. Neck supple without JVD. No thyromegaly present. Lymph node No palpable submandibular, cervical, supraclavicular, axillary or inguinal lymph nodes. Skin Warm and dry. No bruising and no rash noted. No erythema. No pallor. Respiratory Lungs are clear to auscultation bilaterally without wheezes, rales or rhonchi, normal air exchange without accessory muscle use. CVS Normal rate, regular rhythm and normal S1 and S2. No murmurs, gallops, or rubs. Abdomen Soft, nontender and nondistended, normoactive bowel sounds. No palpable mass. No hepatosplenomegaly. Neuro Grossly nonfocal with no obvious sensory or motor deficits. MSK Normal range of motion in general.  No edema and no tenderness. Psych Appropriate mood and affect. Labs:   
Recent Results (from the past 24 hour(s)) CBC WITH AUTOMATED DIFF Collection Time: 03/25/19 10:40 AM  
Result Value Ref Range WBC 0.3 (LL) 4.3 - 11.1 K/uL  
 RBC 4.04 (L) 4.23 - 5.67 M/uL  
 HGB 12.6 (L) 13.6 - 17.2 g/dL HCT 35.7 (L) 41.1 - 50.3 % MCV 88.4 79.6 - 97.8 FL  
 MCH 31.2 26.1 - 32.9 PG  
 MCHC 35.3 (H) 31.4 - 35.0 g/dL  
 RDW 12.3 11.9 - 14.6 % PLATELET 44 (L) 909 - 450 K/uL MPV 9.9 9.4 - 12.3 FL ABSOLUTE NRBC 0.00 0.0 - 0.2 K/uL  
 RBC COMMENTS NORMOCYTIC/NORMOCHROMIC    
 WBC COMMENTS Result Confirmed By Smear PLATELET COMMENTS MARKED    
 DF MANUAL METABOLIC PANEL, COMPREHENSIVE Collection Time: 03/25/19 10:40 AM  
Result Value Ref Range Sodium 133 (L) 136 - 145 mmol/L Potassium 4.1 3.5 - 5.1 mmol/L Chloride 105 98 - 107 mmol/L  
 CO2 21 21 - 32 mmol/L Anion gap 7 7 - 16 mmol/L Glucose 112 (H) 65 - 100 mg/dL BUN 27 (H) 8 - 23 MG/DL Creatinine 1.51 (H) 0.8 - 1.5 MG/DL  
 GFR est AA >60 >60 ml/min/1.73m2 GFR est non-AA 50 (L) >60 ml/min/1.73m2 Calcium 9.2 8.3 - 10.4 MG/DL Bilirubin, total 0.7 0.2 - 1.1 MG/DL  
 ALT (SGPT) 46 12 - 65 U/L  
 AST (SGOT) 32 15 - 37 U/L Alk. phosphatase 40 (L) 50 - 136 U/L Protein, total 8.2 6.3 - 8.2 g/dL Albumin 3.6 3.2 - 4.6 g/dL Globulin 4.6 (H) 2.3 - 3.5 g/dL A-G Ratio 0.8 (L) 1.2 - 3.5 MAGNESIUM Collection Time: 03/25/19 10:40 AM  
Result Value Ref Range Magnesium 2.5 (H) 1.8 - 2.4 mg/dL URINALYSIS W/ RFLX MICROSCOPIC Collection Time: 03/25/19  1:36 PM  
Result Value Ref Range Color GAGAN Appearance CLOUDY Specific gravity 1.026 (H) 1.001 - 1.023    
 pH (UA) 5.0 5.0 - 9.0 Protein TRACE (A) NEG mg/dL Glucose NEGATIVE  mg/dL Ketone NEGATIVE  NEG mg/dL Bilirubin NEGATIVE  NEG Blood TRACE (A) NEG Urobilinogen 0.2 0.2 - 1.0 EU/dL Nitrites NEGATIVE  NEG Leukocyte Esterase NEGATIVE  NEG    
 WBC 0-3 0 /hpf  
 RBC 3-5 0 /hpf Epithelial cells 0-3 0 /hpf Bacteria 0 0 /hpf Casts 10-20 0 /lpf INFLUENZA A & B AG (RAPID TEST) Collection Time: 03/25/19  1:36 PM  
Result Value Ref Range Influenza A Ag NEGATIVE  NEG Influenza B Ag NEGATIVE  NEG Source NASOPHARYNGEAL    
LACTIC ACID Collection Time: 03/25/19  1:48 PM  
Result Value Ref Range Lactic acid 0.9 0.4 - 2.0 MMOL/L  
PROCALCITONIN Collection Time: 03/25/19  1:48 PM  
Result Value Ref Range Procalcitonin 0.2 ng/mL Imaging: No images are attached to the encounter. ASSESSMENT: 
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 9) ICD-10-CM: E66.01 
ICD-9-CM: 278.01  9/6/2018 Benign prostatic hyperplasia with nocturia (Chronic) ICD-10-CM: N40.1, R35.1 ICD-9-CM: 600.01, 788.43  1/23/2018 Severe obstructive sleep apnea ICD-10-CM: G47.33 
ICD-9-CM: 327.23  1/12/2017 Pure hypercholesterolemia (Chronic) ICD-10-CM: E78.00 ICD-9-CM: 272.0  12/22/2016 Essential hypertension, benign ICD-10-CM: I10 
ICD-9-CM: 401.1  9/21/2015 Impotence ICD-10-CM: N52.9 ICD-9-CM: 607.84  9/21/2015 DDD (degenerative disc disease), cervical ICD-10-CM: M50.30 ICD-9-CM: 722.4  9/21/2015 DDD (degenerative disc disease), lumbar ICD-10-CM: M51.36 
ICD-9-CM: 722.52  9/21/2015 Right shoulder pain ICD-10-CM: M25.511 ICD-9-CM: 719.41  9/21/2015 First degree hemorrhoids ICD-10-CM: K64.0 ICD-9-CM: 455.0  9/21/2015 PLAN: 
· Admit to 5th floor. Blood and urine cultures IV ABX 
G-CSF Chest x-ray Lab studies and imaging studies were personally reviewed. Pertinent old records were reviewed from [facility] hospitals. Rochelle Gutierrez Thank you for allowing us to participate in the care of Mr. Keira Braxton. Jf Lua MD 
[address] Office : [phone] Fax : [phone]

## 2019-03-25 NOTE — PROGRESS NOTES
END OF SHIFT NOTE: 
 
Intake/Output 
03/25 0701 - 03/25 1900 In: 3567 [I.V.:1097] Out: 300 [Urine:300] Voiding: YES Catheter: NO 
Drain:   
 
 
 
 
Stool:  0 occurrences. Stool Assessment Stool Appearance: Loose(per pt) (03/25/19 1310) Emesis:  0 occurrences. VITAL SIGNS Patient Vitals for the past 12 hrs: 
 Temp Pulse Resp BP SpO2  
03/25/19 1501 97.6 °F (36.4 °C) 97 24 111/75 100 % 03/25/19 1322 97.7 °F (36.5 °C) 69 24 (!) 89/61 100 % Pain Assessment Pain 1 Pain Scale 1: Numeric (0 - 10) (03/25/19 1310) Pain Intensity 1: 0 (03/25/19 1310) Patient Stated Pain Goal: 0 (03/25/19 1310) Ambulating Yes Additional Information: Full neutropenic workup done upon admission. Normal saline bolus given for hypotension. Pt currently off the floor for CXR. Night shift RN to administer scheduled 1800 valcyte upon arrival back to floor. PICC drsg to be changed tonight. Shift report given to oncoming nurse at the bedside. Luis M Akbar

## 2019-03-25 NOTE — PROGRESS NOTES
Pharmacokinetic Consult to Pharmacist 
 
Jin Foster is a 59 y.o. male being treated for febrile neutropenia with vancomycin. Lab Results Component Value Date/Time BUN 27 (H) 03/25/2019 10:40 AM  
 Creatinine 1.51 (H) 03/25/2019 10:40 AM  
 WBC 0.3 (LL) 03/25/2019 10:40 AM  
  
Estimated Creatinine Clearance: 64.5 mL/min (A) (based on SCr of 1.51 mg/dL (H)). CULTURES: 
All Micro Results Procedure Component Value Units Date/Time CULTURE, BLOOD [[id number]] Order Status:  Sent Specimen:  Blood INFLUENZA A & B AG (RAPID TEST) [[id number]] Order Status:  Sent Specimen:  Nasopharyngeal   
 CULTURE, BLOOD [[id number]] Order Status:  Sent Specimen:  Blood CULTURE, BLOOD [[id number]] Order Status:  Sent Specimen:  Blood CULTURE, URINE [[id number]] Order Status:  Sent Specimen:  Urine from Clean catch No results found for: Jimiia Holbrook Day 1 of vancomycin. Goal trough is 15-20. Will give 2500mg loading dose x 1 followed by 1750mg IVPB q 12 h to target goal trough 15-20. Will continue to follow patient. Thank you, 
Larissa Castro PharmD 
[phone]

## 2019-03-26 LAB
ALBUMIN SERPL-MCNC: 3 G/DL (ref 3.2–4.6)
ALBUMIN/GLOB SERPL: 0.8 {RATIO} (ref 1.2–3.5)
ALP SERPL-CCNC: 33 U/L (ref 50–136)
ALT SERPL-CCNC: 35 U/L (ref 12–65)
ANION GAP SERPL CALC-SCNC: 8 MMOL/L (ref 7–16)
AST SERPL-CCNC: 21 U/L (ref 15–37)
BILIRUB SERPL-MCNC: 0.7 MG/DL (ref 0.2–1.1)
BNP SERPL-MCNC: 34 PG/ML
BUN SERPL-MCNC: 21 MG/DL (ref 8–23)
CALCIUM SERPL-MCNC: 8.4 MG/DL (ref 8.3–10.4)
CHLORIDE SERPL-SCNC: 107 MMOL/L (ref 98–107)
CK SERPL-CCNC: 44 U/L (ref 21–215)
CO2 SERPL-SCNC: 21 MMOL/L (ref 21–32)
CREAT SERPL-MCNC: 1.28 MG/DL (ref 0.8–1.5)
DIFFERENTIAL METHOD BLD: ABNORMAL
ERYTHROCYTE [DISTWIDTH] IN BLOOD BY AUTOMATED COUNT: 12.3 % (ref 11.9–14.6)
GLOBULIN SER CALC-MCNC: 3.9 G/DL (ref 2.3–3.5)
GLUCOSE SERPL-MCNC: 106 MG/DL (ref 65–100)
HCT VFR BLD AUTO: 30.3 % (ref 41.1–50.3)
HGB BLD-MCNC: 10.5 G/DL (ref 13.6–17.2)
MAGNESIUM SERPL-MCNC: 2.1 MG/DL (ref 1.8–2.4)
MCH RBC QN AUTO: 31 PG (ref 26.1–32.9)
MCHC RBC AUTO-ENTMCNC: 34.7 G/DL (ref 31.4–35)
MCV RBC AUTO: 89.4 FL (ref 79.6–97.8)
MYOGLOBIN SERPL-MCNC: 31 NG/ML (ref 16–96)
NRBC # BLD: 0 K/UL (ref 0–0.2)
PLATELET # BLD AUTO: 35 K/UL (ref 150–450)
PLATELET COMMENTS,PCOM: ABNORMAL
PMV BLD AUTO: 10.7 FL (ref 9.4–12.3)
POTASSIUM SERPL-SCNC: 4.2 MMOL/L (ref 3.5–5.1)
PROT SERPL-MCNC: 6.9 G/DL (ref 6.3–8.2)
RBC # BLD AUTO: 3.39 M/UL (ref 4.23–5.6)
RBC MORPH BLD: ABNORMAL
SODIUM SERPL-SCNC: 136 MMOL/L (ref 136–145)
T3 SERPL-MCNC: 0.99 NG/ML (ref 0.6–1.81)
T4 FREE SERPL-MCNC: 1.4 NG/DL (ref 0.78–1.46)
TSH SERPL DL<=0.005 MIU/L-ACNC: 1.32 UIU/ML (ref 0.36–3.74)
WBC # BLD AUTO: 0.2 K/UL (ref 4.3–11.1)
WBC MORPH BLD: ABNORMAL

## 2019-03-26 PROCEDURE — 85025 COMPLETE CBC W/AUTO DIFF WBC: CPT

## 2019-03-26 PROCEDURE — 74011250636 HC RX REV CODE- 250/636: Performed by: NURSE PRACTITIONER

## 2019-03-26 PROCEDURE — 83880 ASSAY OF NATRIURETIC PEPTIDE: CPT

## 2019-03-26 PROCEDURE — 74011250637 HC RX REV CODE- 250/637: Performed by: INTERNAL MEDICINE

## 2019-03-26 PROCEDURE — 74011250637 HC RX REV CODE- 250/637: Performed by: NURSE PRACTITIONER

## 2019-03-26 PROCEDURE — 84480 ASSAY TRIIODOTHYRONINE (T3): CPT

## 2019-03-26 PROCEDURE — 36592 COLLECT BLOOD FROM PICC: CPT

## 2019-03-26 PROCEDURE — 84439 ASSAY OF FREE THYROXINE: CPT

## 2019-03-26 PROCEDURE — 84443 ASSAY THYROID STIM HORMONE: CPT

## 2019-03-26 PROCEDURE — 77030020263 HC SOL INJ SOD CL0.9% LFCR 1000ML

## 2019-03-26 PROCEDURE — 99233 SBSQ HOSP IP/OBS HIGH 50: CPT | Performed by: INTERNAL MEDICINE

## 2019-03-26 PROCEDURE — 83874 ASSAY OF MYOGLOBIN: CPT

## 2019-03-26 PROCEDURE — 74011000258 HC RX REV CODE- 258: Performed by: NURSE PRACTITIONER

## 2019-03-26 PROCEDURE — 65270000015 HC RM PRIVATE ONCOLOGY

## 2019-03-26 PROCEDURE — 83735 ASSAY OF MAGNESIUM: CPT

## 2019-03-26 PROCEDURE — 82550 ASSAY OF CK (CPK): CPT

## 2019-03-26 PROCEDURE — 80053 COMPREHEN METABOLIC PANEL: CPT

## 2019-03-26 RX ADMIN — VALGANCICLOVIR 450 MG: 450 TABLET, FILM COATED ORAL at 08:13

## 2019-03-26 RX ADMIN — FLUCONAZOLE 200 MG: 100 TABLET ORAL at 08:14

## 2019-03-26 RX ADMIN — CEFEPIME HYDROCHLORIDE 2 G: 2 INJECTION, POWDER, FOR SOLUTION INTRAVENOUS at 21:41

## 2019-03-26 RX ADMIN — VALGANCICLOVIR 450 MG: 450 TABLET, FILM COATED ORAL at 18:04

## 2019-03-26 RX ADMIN — SODIUM CHLORIDE 100 ML/HR: 900 INJECTION, SOLUTION INTRAVENOUS at 21:42

## 2019-03-26 RX ADMIN — TBO-FILGRASTIM 480 MCG: 480 INJECTION, SOLUTION SUBCUTANEOUS at 08:14

## 2019-03-26 RX ADMIN — SODIUM CHLORIDE 75 ML/HR: 900 INJECTION, SOLUTION INTRAVENOUS at 05:12

## 2019-03-26 RX ADMIN — VANCOMYCIN HYDROCHLORIDE 1750 MG: 10 INJECTION, POWDER, LYOPHILIZED, FOR SOLUTION INTRAVENOUS at 01:52

## 2019-03-26 RX ADMIN — HYDROCODONE BITARTRATE AND ACETAMINOPHEN 1 TABLET: 5; 325 TABLET ORAL at 16:54

## 2019-03-26 RX ADMIN — CEFEPIME HYDROCHLORIDE 2 G: 2 INJECTION, POWDER, FOR SOLUTION INTRAVENOUS at 13:55

## 2019-03-26 RX ADMIN — SODIUM CHLORIDE 500 ML: 900 INJECTION, SOLUTION INTRAVENOUS at 10:58

## 2019-03-26 RX ADMIN — VANCOMYCIN HYDROCHLORIDE 1750 MG: 10 INJECTION, POWDER, LYOPHILIZED, FOR SOLUTION INTRAVENOUS at 14:34

## 2019-03-26 RX ADMIN — CEFEPIME HYDROCHLORIDE 2 G: 2 INJECTION, POWDER, FOR SOLUTION INTRAVENOUS at 05:12

## 2019-03-26 NOTE — PROGRESS NOTES
END OF SHIFT NOTE: 
 
Intake/Output 
03/25 1901 - 03/26 0700 In: 0320 [P.O.:840; I.V.:898] Out: 1900 [ODURS:5312] Voiding: YES Catheter: NO 
Drain:   
 
 
 
 
Stool:  0 occurrences. Stool Assessment Stool Appearance: Loose(per pt) (03/25/19 1310) Emesis:  0 occurrences. VITAL SIGNS Patient Vitals for the past 12 hrs: 
 Temp Pulse Resp BP SpO2  
03/26/19 0325 98.7 °F (37.1 °C) (!) 57 18 101/64 94 % 03/26/19 0016 99.4 °F (37.4 °C) (!) 109 20 117/62 96 % 03/25/19 1930 98 °F (36.7 °C) (!) 109 20 134/73 98 % Pain Assessment Pain 1 Pain Scale 1: Visual (03/26/19 0225) Pain Intensity 1: 0 (03/26/19 0225) Patient Stated Pain Goal: 0 (03/26/19 0225) Pain Reassessment 1: Patient sleeping (03/26/19 0225) Ambulating Yes Additional Information:  PICC dressing changed. Afebrile throughout shift. Rested well. VSS. No other needs at this time. Shift report given to oncoming nurse at the bedside.  
 
Burgess Marciano RN

## 2019-03-26 NOTE — INTERDISCIPLINARY ROUNDS
Interdisciplinary rounds with charge nurse, provider, and . Presenting Problem: neutropenic fever, T cell leukemia Daily Goal fluid bolus for hypotension, ABT, vitals and labs Expected Discharge Date: 3/29/19 Expected Discharge Needs: none at this time Patient/Family Concerns addressed

## 2019-03-26 NOTE — PROGRESS NOTES
Problem: Patient Education: Go to Patient Education Activity Goal: Patient/Family Education Outcome: Progressing Towards Goal 
  
Problem: Neutropenic Fever: 0-2 hours Goal: Activity/Safety Outcome: Progressing Towards Goal 
Goal: Diagnostic Test/Procedures Outcome: Progressing Towards Goal 
Goal: Nutrition/Diet Outcome: Progressing Towards Goal 
Goal: Medications Outcome: Progressing Towards Goal 
Goal: Treatments/Interventions/Procedures Outcome: Progressing Towards Goal 
Goal: Psychosocial 
Outcome: Progressing Towards Goal 
Goal: *Optimal pain control at patient's stated goal 
Outcome: Progressing Towards Goal 
Goal: *Hemodynamically stable Outcome: Progressing Towards Goal 
Goal: *Adequate oxygenation Description Maintain saturation greater than or equal to 92%. Outcome: Progressing Towards Goal 
Goal: *Receives antibiotics within one hour of admission or first febrile episode Outcome: Progressing Towards Goal 
  
Problem: Falls - Risk of 
Goal: *Absence of Falls Description Document Blade Beckman Fall Risk and appropriate interventions in the flowsheet. Outcome: Progressing Towards Goal 
  
Problem: Patient Education: Go to Patient Education Activity Goal: Patient/Family Education Outcome: Progressing Towards Goal

## 2019-03-26 NOTE — PROGRESS NOTES
Problem: Patient Education: Go to Patient Education Activity Goal: Patient/Family Education Outcome: Progressing Towards Goal 
  
Problem: Neutropenic Fever: 0-2 hours Goal: Activity/Safety Outcome: Progressing Towards Goal 
Goal: Diagnostic Test/Procedures Outcome: Progressing Towards Goal 
Goal: Nutrition/Diet Outcome: Progressing Towards Goal 
Goal: Medications Outcome: Progressing Towards Goal 
Goal: Treatments/Interventions/Procedures Outcome: Progressing Towards Goal 
Goal: Psychosocial 
Outcome: Progressing Towards Goal 
Goal: *Optimal pain control at patient's stated goal 
Outcome: Progressing Towards Goal 
Goal: *Hemodynamically stable Outcome: Progressing Towards Goal 
Goal: *Adequate oxygenation Description Maintain saturation greater than or equal to 92%. Outcome: Progressing Towards Goal 
Goal: *Receives antibiotics within one hour of admission or first febrile episode Outcome: Progressing Towards Goal

## 2019-03-26 NOTE — PROGRESS NOTES
[facility] Inpatient Hematology / Oncology Progress NoteAdmission Date: 3/25/2019  1:15 PM 
Reason for Admission/Hospital Course: Neutropenic fever ([facility] [address] [D70.9, R50.81] 24 Hour Events: 
Still having chills-\"cold to the bone\" No rigors and no temp Feeling better today Wife at bedside ROS: 
Constitutional: negative for fever. Positive  Chills and fatigue. CV: negative for chest pain, palpitations, edema. Respiratory: SOBOE, no cough, no wheezing. GI: negative for nausea, abdominal pain, diarrhea. 10 point review of systems is otherwise negative with the exception of the elements mentioned above in the HPI. Allergies Allergen Reactions  Campath [Alemtuzumab] Other (comments) Rigors OBJECTIVE: 
Patient Vitals for the past 8 hrs: 
 BP Temp Pulse Resp SpO2  
19 1145 95/66      
19 1044 (!) 89/60 98.3 °F (36.8 °C) 84 18 98 % 19 0710 112/64 98.8 °F (37.1 °C) 80 20 99 % Temp (24hrs), Av.4 °F (36.9 °C), Min:97.6 °F (36.4 °C), Max:99.4 °F (37.4 °C) 
 
 0701 -  1900 In: 1202 [P.O.:860; I.V.:456] Out: 1350 [YEUV] Physical Exam: 
Constitutional: Well developed, well nourished male in no acute distress, sitting comfortably in the hospital bed. HEENT: Normocephalic and atraumatic. Oropharynx is clear, mucous membranes are moist. Extraocular muscles are intact. Sclerae anicteric. Skin Warm and dry. No bruising and no rash noted. No erythema. No pallor. Respiratory Lungs are clear to auscultation bilaterally without wheezes, rales or rhonchi, normal air exchange without accessory muscle use. CVS Normal rate, regular rhythm and normal S1 and S2. No murmurs, gallops, or rubs. Abdomen Soft, nontender and nondistended, normoactive bowel sounds. Neuro Grossly nonfocal with no obvious sensory or motor deficits. MSK Normal range of motion in general.  No edema and no tenderness. Psych Appropriate mood and affect. Labs: 
   
Recent Labs  
  03/26/19 
0321 03/25/19 
1040 WBC 0.2* 0.3*  
RBC 3.39* 4.04* HGB 10.5* 12.6* HCT 30.3* 35.7* MCV 89.4 88.4 MCH 31.0 31.2 MCHC 34.7 35.3*  
RDW 12.3 12.3 PLT 35* 44* DF MANUAL MANUAL Recent Labs  
  03/26/19 
0321 03/25/19 
1040  133* K 4.2 4.1  105 CO2 21 21 AGAP 8 7 * 112* BUN 21 27* CREA 1.28 1.51* GFRAA >60 >60 GFRNA >60 50* CA 8.4 9.2 SGOT 21 32 AP 33* 40* TP 6.9 8.2 ALB 3.0* 3.6 GLOB 3.9* 4.6* AGRAT 0.8* 0.8* MG 2.1 2.5* Imaging: 
 
Medications: 
Current Facility-Administered Medications Medication Dose Route Frequency  [START ON 3/27/2019] VANC TROUGH REMINDER   Other ONCE  
 sodium chloride 0.9 % bolus infusion 500 mL  500 mL IntraVENous ONCE  
 fluconazole (DIFLUCAN) tablet 200 mg  200 mg Oral DAILY  0.9% sodium chloride infusion  100 mL/hr IntraVENous CONTINUOUS  
 ondansetron (ZOFRAN) injection 4 mg  4 mg IntraVENous Q4H PRN  prochlorperazine (COMPAZINE) injection 5-10 mg  5-10 mg IntraVENous Q6H PRN  
 HYDROcodone-acetaminophen (NORCO) 5-325 mg per tablet 1 Tab  1 Tab Oral Q6H PRN  
 morphine injection 2 mg  2 mg IntraVENous Q4H PRN  tbo-filgrastim (GRANIX) injection 480 mcg  480 mcg SubCUTAneous DAILY  cefepime (MAXIPIME) 2 g in 0.9% sodium chloride (MBP/ADV) 100 mL  2 g IntraVENous Q8H  
 vancomycin (VANCOCIN) 1750 mg in  ml infusion  1,750 mg IntraVENous Q12H  
 valGANciclovir (VALCYTE) tablet 450 mg  450 mg Oral BID  loratadine (CLARITIN) tablet 10 mg  10 mg Oral DAILY PRN  
 
 
ASSESSMENT: 
 
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 
 ICD-9-CM: 204.90  1/8/2019 Severe obesity (BMI 35.0-39. 9) ICD-10-CM: E66.01 
ICD-9-CM: 278.01  9/6/2018 Benign prostatic hyperplasia with nocturia (Chronic) ICD-10-CM: N40.1, R35.1 ICD-9-CM: 600.01, 788.43  1/23/2018 Severe obstructive sleep apnea ICD-10-CM: G47.33 
ICD-9-CM: 327.23  1/12/2017 Pure hypercholesterolemia (Chronic) ICD-10-CM: E78.00 ICD-9-CM: 272.0  12/22/2016 Essential hypertension, benign ICD-10-CM: I10 
ICD-9-CM: 401.1  9/21/2015 Impotence ICD-10-CM: N52.9 ICD-9-CM: 607.84  9/21/2015 DDD (degenerative disc disease), cervical ICD-10-CM: M50.30 ICD-9-CM: 722.4  9/21/2015 DDD (degenerative disc disease), lumbar ICD-10-CM: M51.36 
ICD-9-CM: 722.52  9/21/2015 Right shoulder pain ICD-10-CM: M25.511 ICD-9-CM: 719.41  9/21/2015 First degree hemorrhoids ICD-10-CM: K64.0 ICD-9-CM: 455.0  9/21/2015 PLAN: 
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 Day 2 of cefe/vanc. Patient decribed symptoms as 'cold to the bone'. Check thyroid panel. Neutropenia 3/26 Day 2 of granix. WBC 0.2 Muscle pain 3/26 check CPK, myoglobin Juan Jose SOPs SCDs (platelets low) Goals and plan of care reviewed with the patient. All questions answered to the best of our ability. Fela Kellogg, NP [facility] Hematology [facility] Oncology [address] [address] Office : [phone] Fax : [phone] I personally saw, exammed and counselled the patient, and discussed with NP, agree with above history/assessment/plan. 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 Peyton Fontenot M.D. [address] Office : [phone] Fax : [phone]

## 2019-03-26 NOTE — PROGRESS NOTES
END OF SHIFT NOTE: 
 
Intake/Output 
03/26 0701 - 03/26 1900 In: 2684 [P.O.:1610; I.V.:1074] Out: 1625 [WEF:7328] Voiding: YES Catheter: NO 
Drain:   
 
 
 
 
Stool:  0 occurrences. Stool Assessment Stool Appearance: Loose(per pt) (03/25/19 1310) Emesis:  0 occurrences. VITAL SIGNS Patient Vitals for the past 12 hrs: 
 Temp Pulse Resp BP SpO2  
03/26/19 1508 97.5 °F (36.4 °C) 86 17 95/67 97 % 03/26/19 1245    90/60   
03/26/19 1145    95/66   
03/26/19 1044 98.3 °F (36.8 °C) 84 18 (!) 89/60 98 % 03/26/19 0710 98.8 °F (37.1 °C) 80 20 112/64 99 % Pain Assessment Pain 1 Pain Scale 1: Numeric (0 - 10) (03/26/19 1751) Pain Intensity 1: 4 (03/26/19 1751) Patient Stated Pain Goal: 0 (03/26/19 1651) Pain Reassessment 1: Yes (03/26/19 1751) Pain Onset 1: pta (03/26/19 1651) Pain Location 1: Back (03/26/19 1651) Pain Orientation 1: Lower (03/26/19 1651) Pain Description 1: Shooting (03/26/19 1651) Pain Intervention(s) 1: Medication (see MAR) (03/26/19 1651) Ambulating Yes Additional Information: Pt with hypotension today. 500ml bolus given (see previous note). D2 granix. PRN norco given 1x for back pain. Shift report given to oncoming nurse at the bedside. Emilia Marks

## 2019-03-26 NOTE — PROGRESS NOTES
SW reviewed case. Pt is here for Neutropenic fever. He is AAOx4 and able to make needs known. Pt lives with his spouse. Demographics verified. Pt has completed an application at the Infusion center for Sponsorship. SW will continue to monitor pt for needs. Care Management Interventions PCP Verified by CM: Yes Mode of Transport at Discharge: Self Transition of Care Consult (CM Consult): Discharge Planning Discharge Durable Medical Equipment: No 
Physical Therapy Consult: No 
Occupational Therapy Consult: No 
Speech Therapy Consult: No 
Current Support Network: Lives with Spouse, Own Home Confirm Follow Up Transport: Family Freedom of Choice Offered: Yes Discharge Location Discharge Placement: Home

## 2019-03-26 NOTE — PROGRESS NOTES
Aparna Garcia NP notified of bp 90/60 after completion of 500cc bolus. Pt is asymptomatic. No new orders placed. Will continue to monitor pt and continue fluids at 100ml/hr.

## 2019-03-26 NOTE — PROGRESS NOTES
[facility], NP notified of BP 89/60. Order given to administer 500ml bolus and then increase continuous fluids to 100ml/hr.

## 2019-03-26 NOTE — PROGRESS NOTES
Initial visit to assess pt's spiritual needs. Feeling today? better Receiving good care?  yes Needs from Spiritual Care:  prayer Ministry of presence & prayer to demonstrate caring & concern, convey emotional & spiritual support.  
 
 Nathanael Boas, MDiv,ThM,PhD

## 2019-03-27 ENCOUNTER — HOSPITAL ENCOUNTER (OUTPATIENT)
Dept: INFUSION THERAPY | Age: 64
End: 2019-03-27
Payer: COMMERCIAL

## 2019-03-27 LAB
ALBUMIN SERPL-MCNC: 2.8 G/DL (ref 3.2–4.6)
ALBUMIN/GLOB SERPL: 0.8 {RATIO} (ref 1.2–3.5)
ALP SERPL-CCNC: 33 U/L (ref 50–136)
ALT SERPL-CCNC: 31 U/L (ref 12–65)
ANION GAP SERPL CALC-SCNC: 7 MMOL/L (ref 7–16)
AST SERPL-CCNC: 16 U/L (ref 15–37)
BACTERIA SPEC CULT: NORMAL
BILIRUB SERPL-MCNC: 0.5 MG/DL (ref 0.2–1.1)
BUN SERPL-MCNC: 18 MG/DL (ref 8–23)
CALCIUM SERPL-MCNC: 8.1 MG/DL (ref 8.3–10.4)
CHLORIDE SERPL-SCNC: 109 MMOL/L (ref 98–107)
CMV DNA SERPL NAA+PROBE-ACNC: NORMAL IU/ML
CMV DNA SERPL NAA+PROBE-LOG IU: NORMAL LOG10 IU/ML
CO2 SERPL-SCNC: 22 MMOL/L (ref 21–32)
CREAT SERPL-MCNC: 1.13 MG/DL (ref 0.8–1.5)
DIFFERENTIAL METHOD BLD: ABNORMAL
ERYTHROCYTE [DISTWIDTH] IN BLOOD BY AUTOMATED COUNT: 12.4 % (ref 11.9–14.6)
GLOBULIN SER CALC-MCNC: 3.5 G/DL (ref 2.3–3.5)
GLUCOSE SERPL-MCNC: 115 MG/DL (ref 65–100)
HCT VFR BLD AUTO: 27.1 % (ref 41.1–50.3)
HGB BLD-MCNC: 9.5 G/DL (ref 13.6–17.2)
MAGNESIUM SERPL-MCNC: 2 MG/DL (ref 1.8–2.4)
MCH RBC QN AUTO: 31.6 PG (ref 26.1–32.9)
MCHC RBC AUTO-ENTMCNC: 35.1 G/DL (ref 31.4–35)
MCV RBC AUTO: 90 FL (ref 79.6–97.8)
NRBC # BLD: 0 K/UL (ref 0–0.2)
PLATELET # BLD AUTO: 31 K/UL (ref 150–450)
PLATELET COMMENTS,PCOM: ABNORMAL
PMV BLD AUTO: 10 FL (ref 9.4–12.3)
POTASSIUM SERPL-SCNC: 4 MMOL/L (ref 3.5–5.1)
PROT SERPL-MCNC: 6.3 G/DL (ref 6.3–8.2)
RBC # BLD AUTO: 3.01 M/UL (ref 4.23–5.6)
RBC MORPH BLD: ABNORMAL
SERVICE CMNT-IMP: NORMAL
SODIUM SERPL-SCNC: 138 MMOL/L (ref 136–145)
VANCOMYCIN TROUGH SERPL-MCNC: 20.1 UG/ML (ref 5–20)
WBC # BLD AUTO: 0.1 K/UL (ref 4.3–11.1)
WBC MORPH BLD: ABNORMAL

## 2019-03-27 PROCEDURE — 74011250636 HC RX REV CODE- 250/636: Performed by: INTERNAL MEDICINE

## 2019-03-27 PROCEDURE — 74011000258 HC RX REV CODE- 258: Performed by: NURSE PRACTITIONER

## 2019-03-27 PROCEDURE — 77030020263 HC SOL INJ SOD CL0.9% LFCR 1000ML

## 2019-03-27 PROCEDURE — 74011250637 HC RX REV CODE- 250/637: Performed by: NURSE PRACTITIONER

## 2019-03-27 PROCEDURE — 74011250637 HC RX REV CODE- 250/637: Performed by: INTERNAL MEDICINE

## 2019-03-27 PROCEDURE — 65270000015 HC RM PRIVATE ONCOLOGY

## 2019-03-27 PROCEDURE — 99232 SBSQ HOSP IP/OBS MODERATE 35: CPT | Performed by: INTERNAL MEDICINE

## 2019-03-27 PROCEDURE — 83735 ASSAY OF MAGNESIUM: CPT

## 2019-03-27 PROCEDURE — 74011250636 HC RX REV CODE- 250/636: Performed by: NURSE PRACTITIONER

## 2019-03-27 PROCEDURE — 85025 COMPLETE CBC W/AUTO DIFF WBC: CPT

## 2019-03-27 PROCEDURE — 80053 COMPREHEN METABOLIC PANEL: CPT

## 2019-03-27 PROCEDURE — 36592 COLLECT BLOOD FROM PICC: CPT

## 2019-03-27 PROCEDURE — 80202 ASSAY OF VANCOMYCIN: CPT

## 2019-03-27 RX ORDER — VANCOMYCIN HYDROCHLORIDE
1250 EVERY 12 HOURS
Status: DISCONTINUED | OUTPATIENT
Start: 2019-03-27 | End: 2019-03-28

## 2019-03-27 RX ADMIN — VANCOMYCIN HYDROCHLORIDE 1750 MG: 10 INJECTION, POWDER, LYOPHILIZED, FOR SOLUTION INTRAVENOUS at 02:24

## 2019-03-27 RX ADMIN — CEFEPIME HYDROCHLORIDE 2 G: 2 INJECTION, POWDER, FOR SOLUTION INTRAVENOUS at 13:14

## 2019-03-27 RX ADMIN — HYDROCODONE BITARTRATE AND ACETAMINOPHEN 1 TABLET: 5; 325 TABLET ORAL at 00:25

## 2019-03-27 RX ADMIN — VANCOMYCIN HYDROCHLORIDE 1250 MG: 10 INJECTION, POWDER, LYOPHILIZED, FOR SOLUTION INTRAVENOUS at 17:49

## 2019-03-27 RX ADMIN — HYDROCODONE BITARTRATE AND ACETAMINOPHEN 1 TABLET: 5; 325 TABLET ORAL at 22:02

## 2019-03-27 RX ADMIN — HYDROCODONE BITARTRATE AND ACETAMINOPHEN 1 TABLET: 5; 325 TABLET ORAL at 13:14

## 2019-03-27 RX ADMIN — SODIUM CHLORIDE 100 ML/HR: 900 INJECTION, SOLUTION INTRAVENOUS at 13:15

## 2019-03-27 RX ADMIN — TBO-FILGRASTIM 480 MCG: 480 INJECTION, SOLUTION SUBCUTANEOUS at 08:12

## 2019-03-27 RX ADMIN — VALGANCICLOVIR 450 MG: 450 TABLET, FILM COATED ORAL at 08:14

## 2019-03-27 RX ADMIN — CEFEPIME HYDROCHLORIDE 2 G: 2 INJECTION, POWDER, FOR SOLUTION INTRAVENOUS at 06:09

## 2019-03-27 RX ADMIN — VALGANCICLOVIR 450 MG: 450 TABLET, FILM COATED ORAL at 17:49

## 2019-03-27 RX ADMIN — CEFEPIME HYDROCHLORIDE 2 G: 2 INJECTION, POWDER, FOR SOLUTION INTRAVENOUS at 21:58

## 2019-03-27 RX ADMIN — FLUCONAZOLE 200 MG: 100 TABLET ORAL at 08:11

## 2019-03-27 NOTE — PROGRESS NOTES
[facility] Hematology & Oncology Inpatient Hematology / Oncology Progress NoteAdmission Date: 3/25/2019  1:15 PM 
Reason for Admission/Hospital Course: Neutropenic fever ([address] [D70.9, R50.81] 24 Hour Events: 
CMV+ <200 WBC 0.1-continue granix Feeling better today Wife at bedside BP in 90s-asymptomatic-monitor ROS: 
Constitutional: negative for fever. Positive chills improved CV: negative for chest pain, palpitations, edema. Respiratory: SOBOE, no cough, no wheezing. GI: negative for nausea, abdominal pain, diarrhea. 10 point review of systems is otherwise negative with the exception of the elements mentioned above in the HPI. Allergies Allergen Reactions  Campath [Alemtuzumab] Other (comments) Rigors OBJECTIVE: 
Patient Vitals for the past 8 hrs: 
 BP Temp Pulse Resp SpO2  
19 0749 92/55 98.4 °F (36.9 °C) 75 18 96 % 19 0335 98/55 99 °F (37.2 °C) 77 20 96 % Temp (24hrs), Av.4 °F (36.9 °C), Min:97.5 °F (36.4 °C), Max:99 °F (37.2 °C) 
 
 0701 -  1900 In: 740 [P.O.:740] Out: 825 [Urine:825] Physical Exam: 
Constitutional: Well developed, well nourished male in no acute distress, up and about in room HEENT: Normocephalic and atraumatic. Oropharynx is clear, mucous membranes are moist. Extraocular muscles are intact. Sclerae anicteric. Skin Warm and dry. No bruising and no rash noted. No erythema. No pallor. Respiratory Lungs are clear to auscultation bilaterally without wheezes, rales or rhonchi, normal air exchange without accessory muscle use. CVS Normal rate, regular rhythm and normal S1 and S2. No murmurs, gallops, or rubs. Abdomen Soft, nontender and nondistended, normoactive bowel sounds. Neuro Grossly nonfocal with no obvious sensory or motor deficits. MSK Normal range of motion in general.  No edema and no tenderness. Psych Appropriate mood and affect. Labs: 
   
Recent Labs  
  19 8676 03/26/19 
0321 03/25/19 
1040 WBC 0.1* 0.2* 0.3*  
RBC 3.01* 3.39* 4.04* HGB 9.5* 10.5* 12.6* HCT 27.1* 30.3* 35.7* MCV 90.0 89.4 88.4 MCH 31.6 31.0 31.2 MCHC 35.1* 34.7 35.3*  
RDW 12.4 12.3 12.3 PLT 31* 35* 44* DF MANUAL MANUAL MANUAL Recent Labs  
  03/27/19 
0342 03/26/19 
0321 03/25/19 
1040  136 133* K 4.0 4.2 4.1 * 107 105 CO2 22 21 21 AGAP 7 8 7 * 106* 112* BUN 18 21 27* CREA 1.13 1.28 1.51* GFRAA >60 >60 >60 GFRNA >60 >60 50* CA 8.1* 8.4 9.2 SGOT 16 21 32 AP 33* 33* 40* TP 6.3 6.9 8.2 ALB 2.8* 3.0* 3.6 GLOB 3.5 3.9* 4.6* AGRAT 0.8* 0.8* 0.8* MG 2.0 2.1 2.5* Imaging: 
 
Medications: 
Current Facility-Administered Medications Medication Dose Route Frequency  vancomycin (VANCOCIN) 1250 mg in  ml infusion  1,250 mg IntraVENous Q12H  
 fluconazole (DIFLUCAN) tablet 200 mg  200 mg Oral DAILY  0.9% sodium chloride infusion  100 mL/hr IntraVENous CONTINUOUS  
 ondansetron (ZOFRAN) injection 4 mg  4 mg IntraVENous Q4H PRN  prochlorperazine (COMPAZINE) injection 5-10 mg  5-10 mg IntraVENous Q6H PRN  
 HYDROcodone-acetaminophen (NORCO) 5-325 mg per tablet 1 Tab  1 Tab Oral Q6H PRN  
 morphine injection 2 mg  2 mg IntraVENous Q4H PRN  tbo-filgrastim (GRANIX) injection 480 mcg  480 mcg SubCUTAneous DAILY  cefepime (MAXIPIME) 2 g in 0.9% sodium chloride (MBP/ADV) 100 mL  2 g IntraVENous Q8H  
 valGANciclovir (VALCYTE) tablet 450 mg  450 mg Oral BID  loratadine (CLARITIN) tablet 10 mg  10 mg Oral DAILY PRN  
 
 
ASSESSMENT: 
 
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 9) ICD-10-CM: E66.01 
ICD-9-CM: 278.01  9/6/2018 Benign prostatic hyperplasia with nocturia (Chronic) ICD-10-CM: N40.1, R35.1 ICD-9-CM: 600.01, 788.43  1/23/2018 Severe obstructive sleep apnea ICD-10-CM: G47.33 
ICD-9-CM: 327.23  1/12/2017 Pure hypercholesterolemia (Chronic) ICD-10-CM: E78.00 ICD-9-CM: 272.0  12/22/2016 Essential hypertension, benign ICD-10-CM: I10 
ICD-9-CM: 401.1  9/21/2015 Impotence ICD-10-CM: N52.9 ICD-9-CM: 607.84  9/21/2015 DDD (degenerative disc disease), cervical ICD-10-CM: M50.30 ICD-9-CM: 722.4  9/21/2015 DDD (degenerative disc disease), lumbar ICD-10-CM: M51.36 
ICD-9-CM: 722.52  9/21/2015 Right shoulder pain ICD-10-CM: M25.511 ICD-9-CM: 719.41  9/21/2015 First degree hemorrhoids ICD-10-CM: K64.0 ICD-9-CM: 455.0  9/21/2015 PLAN: 
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 
3/27 CMV +<200 already on valcyte Chills/rigors 3/26 BC/UC NTD. Day 2 of cefe/vanc. Patient decribed symptoms as 'cold to the bone'. Check thyroid panel. 3/27 Thyroid studies wnl. Feels better today. Continue abx. Cultures remain negative. Neutropenia 3/26 Day 2 of granix. WBC 0.2 
3/27 Day 3 granix. Counts still low WBC 0.1 Muscle pain 3/26 check CPK, myoglobin 3/27 Labs wnl. Juan Jose SOPs SCDs (platelets low) Goals and plan of care reviewed with the patient. All questions answered to the best of our ability. Camelia Mcmahan NP [facility] Hematology [facility] Oncology [address] [address] Office : [phone] Fax : [phone] I personally saw, exammed and counselled the patient, and discussed with NP, agree with above history/assessment/plan.  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 
  
 
Ira Swan M.D. [address] Office : [phone] Fax : [phone]

## 2019-03-27 NOTE — PROGRESS NOTES
Pharmacokinetic Consult to Pharmacist 
 
Floyd Martinez is a 59 y.o. male being treated for febrile neutropenia with vancomycin and cefepime. Weight: 120.2 kg (264 lb 14.4 oz) Lab Results Component Value Date/Time BUN 18 03/27/2019 03:42 AM  
 Creatinine 1.13 03/27/2019 03:42 AM  
 WBC 0.1 (LL) 03/27/2019 03:42 AM  
 Procalcitonin 0.2 03/25/2019 01:48 PM  
 Lactic acid 0.9 03/25/2019 01:48 PM  
  
Estimated Creatinine Clearance: 87.2 mL/min (based on SCr of 1.13 mg/dL). CULTURES: 
All Micro Results Procedure Component Value Units Date/Time CULTURE, BLOOD [[id number]] Collected:  03/25/19 1336 Order Status:  Completed Specimen:  Blood Updated:  03/27/19 3491 Special Requests: RED PORT Culture result: NO GROWTH 2 DAYS     
 CULTURE, BLOOD [[id number]] Collected:  03/25/19 1502 Order Status:  Completed Specimen:  Blood Updated:  03/27/19 6137 Special Requests: --     
  LEFT Antecubital 
  
  Culture result: NO GROWTH 2 DAYS     
 CULTURE, BLOOD [[id number]] Collected:  03/25/19 1346 Order Status:  Completed Specimen:  Blood Updated:  03/27/19 0001 Special Requests: BLUE PICC Culture result: NO GROWTH 2 DAYS     
 CULTURE, URINE [[id number]] Collected:  03/25/19 1336 Order Status:  Completed Specimen:  Urine from Clean catch Updated:  03/27/19 6372 Special Requests: NO SPECIAL REQUESTS Culture result: NO GROWTH 2 DAYS INFLUENZA A & B AG (RAPID TEST) [[id number]] Collected:  03/25/19 1336 Order Status:  Completed Specimen:  Nasopharyngeal from Nasal washing Updated:  03/25/19 1432 Influenza A Ag NEGATIVE Comment: NEGATIVE FOR THE PRESENCE OF INFLUENZA A ANTIGEN 
INFECTION DUE TO INFLUENZA A CANNOT BE RULED OUT. BECAUSE THE ANTIGEN PRESENT IN THE SAMPLE MAY BE BELOW 
THE DETECTION LIMIT OF THE TEST. A NEGATIVE TEST IS PRESUMPTIVE AND IT IS RECOMMENDED THAT THESE RESULTS BE CONFIRMED BY VIRAL CULTURE OR AN FDA-CLEARED INFLUENZA A AND B MOLECULAR ASSAY. Influenza B Ag NEGATIVE Comment: NEGATIVE FOR THE PRESENCE OF INFLUENZA B ANTIGEN 
INFECTION DUE TO INFLUENZA B CANNOT BE RULED OUT. BECAUSE THE ANTIGEN PRESENT IN THE SAMPLE MAY BE BELOW 
THE DETECTION LIMIT OF THE TEST. A NEGATIVE TEST IS PRESUMPTIVE AND IT IS RECOMMENDED THAT THESE RESULTS BE CONFIRMED BY VIRAL CULTURE OR AN FDA-CLEARED INFLUENZA A AND B MOLECULAR ASSAY. Source NASOPHARYNGEAL Lab Results Component Value Date/Time Vancomycin,trough 20.1 (HH) 03/27/2019 12:35 AM  
 
 
Day 3 of vancomycin. Goal trough is 15-20. Will decrease vancomycin to 1250mg q 12 hours. Will continue to follow patient. Thank you, Edel Garcia, JobD

## 2019-03-27 NOTE — INTERDISCIPLINARY ROUNDS
Interdisciplinary rounds with charge nurse, provider, and . Presenting Problem: Neutropenic fever Daily Goal : monitor counts, monitor for fever, granix daily Expected Discharge Date: 3/31 Expected Discharge Needs: None identified at this time Patient/Family Concerns addressed

## 2019-03-27 NOTE — PROGRESS NOTES
END OF SHIFT NOTE: 
 
Patient afebrile this shift, mild c/o low back pain, relieved with PRN Norco (See MAR). Receiving Granix daily, however WBC continue to trend down. CMV + Wife at bedside, no complaints at this time. Intake/Output 
03/26 1901 - 03/27 0700 In: 2552 [P.O.:720; I.V.:1832] Out: 1400 [QKKRR:0746] Voiding: YES Catheter: NO 
Drain:   
 
 
 
 
Stool:  0 occurrences. Stool Assessment Stool Appearance: Loose(per pt) (03/25/19 1310) Emesis:  0 occurrences. VITAL SIGNS Patient Vitals for the past 12 hrs: 
 Temp Pulse Resp BP SpO2  
03/27/19 0335 99 °F (37.2 °C) 77 20 98/55 96 % 03/26/19 2358 98.6 °F (37 °C) 82 20 113/70 99 % 03/26/19 1927 98.3 °F (36.8 °C) 80 16 100/67 100 % Pain Assessment Pain 1 Pain Scale 1: Numeric (0 - 10) (03/27/19 0335) Pain Intensity 1: 0 (03/27/19 0335) Patient Stated Pain Goal: 0 (03/27/19 0335) Pain Reassessment 1: Patient sleeping (03/27/19 0119) Pain Onset 1: ongoing (03/27/19 0026) Pain Location 1: Back (03/27/19 0026) Pain Orientation 1: Lower (03/27/19 0026) Pain Description 1: Sharp; Shooting (03/27/19 0026) Pain Intervention(s) 1: Medication (see MAR) (03/27/19 0026) Ambulating Yes Additional Information:  
 
Shift report given to oncoming nurse, Franki Moyer,  at the bedside. Sheryle England

## 2019-03-27 NOTE — PROGRESS NOTES
Problem: Neutropenic Fever: 0-2 hours Goal: Activity/Safety 3/27/2019 1034 by Ivar Gammons Outcome: Progressing Towards Goal 
3/27/2019 1034 by Ivar Gammons Outcome: Progressing Towards Goal 
Goal: Diagnostic Test/Procedures 3/27/2019 1034 by Ivar Gammons Outcome: Progressing Towards Goal 
3/27/2019 1034 by Ivar Gammons Outcome: Progressing Towards Goal 
Goal: Nutrition/Diet 3/27/2019 1034 by Ivar Gammons Outcome: Progressing Towards Goal 
3/27/2019 1034 by Ivar Gammons Outcome: Progressing Towards Goal 
Goal: Medications 3/27/2019 1034 by Ivar Gammons Outcome: Progressing Towards Goal 
3/27/2019 1034 by Ivar Gammons Outcome: Progressing Towards Goal 
Goal: Treatments/Interventions/Procedures 3/27/2019 1034 by Ivar Gammons Outcome: Progressing Towards Goal 
3/27/2019 1034 by Ivar Gammons Outcome: Progressing Towards Goal 
Goal: Psychosocial 
3/27/2019 1034 by Ivar Gammons Outcome: Progressing Towards Goal 
3/27/2019 1034 by Ivar Gammons Outcome: Progressing Towards Goal 
Goal: *Optimal pain control at patient's stated goal 
3/27/2019 1034 by Ivar Gammons Outcome: Progressing Towards Goal 
3/27/2019 1034 by Ivar Gammons Outcome: Progressing Towards Goal 
Goal: *Hemodynamically stable 3/27/2019 1034 by Ivar Gammons Outcome: Progressing Towards Goal 
3/27/2019 1034 by Ivar Gammons Outcome: Progressing Towards Goal 
Goal: *Adequate oxygenation Description Maintain saturation greater than or equal to 92%. 3/27/2019 1034 by Ivar Gammons Outcome: Progressing Towards Goal 
3/27/2019 1034 by Ivar Gammons Outcome: Progressing Towards Goal 
Goal: *Receives antibiotics within one hour of admission or first febrile episode 3/27/2019 1034 by Ivar Gammons Outcome: Progressing Towards Goal 
3/27/2019 1034 by Ivar Gammons 
 Outcome: Progressing Towards Goal 
  
Problem: Falls - Risk of 
Goal: *Absence of Falls Description Document Talt President Fall Risk and appropriate interventions in the flowsheet. 3/27/2019 1034 by Home Rocha Outcome: Progressing Towards Goal 
3/27/2019 1034 by Home Rocha Outcome: Progressing Towards Goal

## 2019-03-27 NOTE — ADT AUTH CERT NOTES
Utilization Reviews  
 
   
Medical Oncology [address] Day 3 (3/27/2019) by Val Walker  
 
   
Review Status Review Entered Completed 3/27/2019 15:33  
   
Criteria Review Care Day: 3 Care Date: 3/27/2019 Level of Care: Inpatient Floor Guideline Day 3 Level Of Care ( ) * Activity level acceptable ( ) * Complete discharge planning Clinical Status  
(X) * Pain and nausea absent or adequately managed   
(X) * Temperature status acceptable 3/27/2019 15:33:01 EDT by Val Walker T 98.4   
  
( ) * No infection, or status acceptable ( ) * No neutropenia, or status acceptable 3/27/2019 15:33:01 EDT by Val Walker   
wbc 0.1   
  
( ) * Abdominal status acceptable   
(X) * Mucositis absent or adequately resolved   
(X) * Diarrhea absent or adequately controlled ( ) * Blood cell count acceptable 3/27/2019 15:33:01 EDT by Val Walker   
wbc 0.1, rbc 3.01, plt 31   
  
( ) * Hematologic complications absent or stabilized   
(X) * Neurologic status acceptable   
(X) * Electrolyte status acceptable 3/27/2019 15:33:01 EDT by Val Walker , K 4.0, , CA 8.1, MAG 2.0   
  
(X) * Tumor lysis absent or resolved   
(X) * Malignant effusions absent or adequately controlled   
(X) * Pathologic fracture absent or stabilized ( ) * General Discharge Criteria met Interventions  
(X) * Intake acceptable ( ) * No inpatient interventions needed * Milestone Additional Notes Inpatient Hematology / Oncology Progress Note CMV+ <200 WBC 0.1-continue granix Feeling better today Wife at bedside BP in 90s-asymptomatic-monitor T BP 92/55, P 75, R 18, 02 SAT 95%  RA HGB 9.5, HCT 27.1, GLUC 115, ALB 2.8, ALK PHOS 33, VANCO TROUGH 20.1 PLAN:  
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  
3/27 CMV +<200 already on valcyte  
   
Chills/rigors 3/26 BC/UC NTD. Day 2 of cefe/vanc. Patient decribed symptoms as 'cold to the bone'. Check thyroid panel. 3/27 Thyroid studies wnl. Feels better today. Continue abx. Cultures remain negative.   
   
Neutropenia 3/26 Day 2 of granix. WBC 0.2  
3/27 Day 3 granix. Counts still low WBC 0.1  
   
Muscle pain 3/26 check CPK, myoglobin 3/27 Labs wnl.   
   
Juan Jose SOPs SCDs (platelets low)  
  
  
  
  Medical Oncology [address] [place] [address] Day 2 (3/26/2019) by Swati Lab  
 
   
Review Status Review Entered Completed 3/27/2019 15:19  
   
Criteria Review Care Day: 2 Care Date: 3/26/2019 Level of Care: Inpatient Floor Guideline Day 2 Level Of Care (X) Floor Clinical Status  
(X) * No ICU or intermediate care needs Interventions ( ) Inpatient interventions continue 3/27/2019 15:19:09 EDT by Swati Lab IV  ML/HR, MAXIPIME 2G IV Q 8 HRS, DIFLUCAN 200 MG PO QD, NORCO 1 TAB PO Q 6 HRS PRN X1, GRANIX 480 MCG SC QD, VALCYTE 450 MG PO BID, IV  ML BOLUS, VANCOCIN 1750 MG IV Q 12 HRS   
  
  
  
  
  
* Milestone Additional Notes Inpatient Hematology / Oncology Progress Note  
   
  
  
Still having chills-\"cold to the bone\". No rigors and no temp. Feeling better today T 98.8, BP 89/60, P 84, R 20, 02 SAT 99% RA  
  
WBC 0.2, RBC 3.39, HCT 30.3, GLUC 106, ALB 3.0, GLOB 3.9, PLAN:  
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  
   
Chills/rigors 3/26 BC/UC NTD. Day 2 of cefe/vanc. Patient decribed symptoms as 'cold to the bone'. Check thyroid panel.   
   
Neutropenia 3/26 Day 2 of granix. WBC 0.2  
   
Muscle pain 3/26 check CPK, myoglobin  
   
Juan Jose SOPs SCDs (platelets low)

## 2019-03-27 NOTE — PROGRESS NOTES
0652-Bedside report received from Jose Guardado RN. Resting in bed. No needs voiced. No s/s of acute distress. 1800-END OF SHIFT NOTE: 
Pt's VSS and is in no acute distress. Pt on Cef and Vanc. CMV+ <200-on valcyte, WBC 0.1-continue granix. Intake/Output 
03/27 0701 - 03/27 1900 In: 0156 [P.O.:2220; I.V.:1194] Out: 7365 [Urine:2585] Voiding: YES Catheter: NO 
Drain:   
Stool:  0 occurrences. Stool Assessment Stool Appearance: Loose(per pt) (03/25/19 1310) Emesis:  0 occurrences. VITAL SIGNS Patient Vitals for the past 12 hrs: 
 Temp Pulse Resp BP SpO2  
03/27/19 1546 98.4 °F (36.9 °C) 82 18 113/66 100 % 03/27/19 1208 98.4 °F (36.9 °C) 63 18 129/74 100 % 03/27/19 0749 98.4 °F (36.9 °C) 75 18 92/55 96 % Pain Assessment Pain 1 Pain Scale 1: Numeric (0 - 10) (03/27/19 1345) Pain Intensity 1: 0 (03/27/19 1345) Patient Stated Pain Goal: 0 (03/27/19 0335) Pain Reassessment 1: Yes (03/27/19 1345) Pain Onset 1: ongoing (03/27/19 0026) Pain Location 1: Back (03/27/19 1314) Pain Orientation 1: Lower (03/27/19 1314) Pain Description 1: Sharp; Shooting (03/27/19 1314) Pain Intervention(s) 1: Medication (see MAR) (03/27/19 1314) Ambulating Yes Mary Ann Brock 1969-Bedside shift change report given to 1 Iveth Foy (oncoming nurse) by Lo Betancourt RN (offgoing nurse). Report included the following information SBAR, Kardex, Intake/Output, MAR and Recent Results.

## 2019-03-28 LAB
ALBUMIN SERPL-MCNC: 2.7 G/DL (ref 3.2–4.6)
ALBUMIN/GLOB SERPL: 0.8 {RATIO} (ref 1.2–3.5)
ALP SERPL-CCNC: 30 U/L (ref 50–136)
ALT SERPL-CCNC: 27 U/L (ref 12–65)
ANION GAP SERPL CALC-SCNC: 6 MMOL/L (ref 7–16)
AST SERPL-CCNC: 16 U/L (ref 15–37)
BILIRUB SERPL-MCNC: 0.4 MG/DL (ref 0.2–1.1)
BUN SERPL-MCNC: 10 MG/DL (ref 8–23)
CALCIUM SERPL-MCNC: 7.7 MG/DL (ref 8.3–10.4)
CHLORIDE SERPL-SCNC: 111 MMOL/L (ref 98–107)
CO2 SERPL-SCNC: 23 MMOL/L (ref 21–32)
CREAT SERPL-MCNC: 0.88 MG/DL (ref 0.8–1.5)
DIFFERENTIAL METHOD BLD: ABNORMAL
ERYTHROCYTE [DISTWIDTH] IN BLOOD BY AUTOMATED COUNT: 12.3 % (ref 11.9–14.6)
GLOBULIN SER CALC-MCNC: 3.5 G/DL (ref 2.3–3.5)
GLUCOSE SERPL-MCNC: 94 MG/DL (ref 65–100)
HAPTOGLOB SERPL-MCNC: 261 MG/DL (ref 30–200)
HCT VFR BLD AUTO: 25.6 % (ref 41.1–50.3)
HGB BLD-MCNC: 8.7 G/DL (ref 13.6–17.2)
HGB RETIC QN AUTO: 39 PG (ref 29–35)
IMM RETICS NFR: 13.4 % (ref 2.3–13.4)
LDH SERPL L TO P-CCNC: 131 U/L (ref 110–210)
MAGNESIUM SERPL-MCNC: 1.9 MG/DL (ref 1.8–2.4)
MCH RBC QN AUTO: 30.7 PG (ref 26.1–32.9)
MCHC RBC AUTO-ENTMCNC: 34 G/DL (ref 31.4–35)
MCV RBC AUTO: 90.5 FL (ref 79.6–97.8)
NRBC # BLD: 0 K/UL (ref 0–0.2)
PLATELET # BLD AUTO: 25 K/UL (ref 150–450)
PMV BLD AUTO: 11 FL (ref 9.4–12.3)
POTASSIUM SERPL-SCNC: 4.1 MMOL/L (ref 3.5–5.1)
PROT SERPL-MCNC: 6.2 G/DL (ref 6.3–8.2)
RBC # BLD AUTO: 2.83 M/UL (ref 4.23–5.6)
RETICS # AUTO: 0.02 M/UL (ref 0.03–0.1)
RETICS/RBC NFR AUTO: 0.6 % (ref 0.3–2)
SODIUM SERPL-SCNC: 140 MMOL/L (ref 136–145)
WBC # BLD AUTO: 0.1 K/UL (ref 4.3–11.1)

## 2019-03-28 PROCEDURE — 83735 ASSAY OF MAGNESIUM: CPT

## 2019-03-28 PROCEDURE — 74011250637 HC RX REV CODE- 250/637: Performed by: NURSE PRACTITIONER

## 2019-03-28 PROCEDURE — 80053 COMPREHEN METABOLIC PANEL: CPT

## 2019-03-28 PROCEDURE — 74011250636 HC RX REV CODE- 250/636: Performed by: NURSE PRACTITIONER

## 2019-03-28 PROCEDURE — 74011250636 HC RX REV CODE- 250/636: Performed by: INTERNAL MEDICINE

## 2019-03-28 PROCEDURE — 74011250637 HC RX REV CODE- 250/637: Performed by: INTERNAL MEDICINE

## 2019-03-28 PROCEDURE — 65270000015 HC RM PRIVATE ONCOLOGY

## 2019-03-28 PROCEDURE — 77030020263 HC SOL INJ SOD CL0.9% LFCR 1000ML

## 2019-03-28 PROCEDURE — 74011000258 HC RX REV CODE- 258: Performed by: NURSE PRACTITIONER

## 2019-03-28 PROCEDURE — 99232 SBSQ HOSP IP/OBS MODERATE 35: CPT | Performed by: INTERNAL MEDICINE

## 2019-03-28 PROCEDURE — 83010 ASSAY OF HAPTOGLOBIN QUANT: CPT

## 2019-03-28 PROCEDURE — 36591 DRAW BLOOD OFF VENOUS DEVICE: CPT

## 2019-03-28 PROCEDURE — 85025 COMPLETE CBC W/AUTO DIFF WBC: CPT

## 2019-03-28 PROCEDURE — 83615 LACTATE (LD) (LDH) ENZYME: CPT

## 2019-03-28 PROCEDURE — 85046 RETICYTE/HGB CONCENTRATE: CPT

## 2019-03-28 RX ORDER — POLYETHYLENE GLYCOL 3350 17 G/17G
17 POWDER, FOR SOLUTION ORAL DAILY
Status: DISCONTINUED | OUTPATIENT
Start: 2019-03-28 | End: 2019-03-31 | Stop reason: HOSPADM

## 2019-03-28 RX ORDER — HEPARIN 100 UNIT/ML
300 SYRINGE INTRAVENOUS AS NEEDED
Status: DISCONTINUED | OUTPATIENT
Start: 2019-03-28 | End: 2019-03-28

## 2019-03-28 RX ORDER — LACTULOSE 10 G/15ML
10 SOLUTION ORAL; RECTAL ONCE
Status: COMPLETED | OUTPATIENT
Start: 2019-03-28 | End: 2019-03-28

## 2019-03-28 RX ORDER — HEPARIN 100 UNIT/ML
600 SYRINGE INTRAVENOUS AS NEEDED
Status: DISCONTINUED | OUTPATIENT
Start: 2019-03-28 | End: 2019-03-31 | Stop reason: HOSPADM

## 2019-03-28 RX ORDER — AMOXICILLIN 250 MG
1 CAPSULE ORAL DAILY
Status: DISCONTINUED | OUTPATIENT
Start: 2019-03-28 | End: 2019-03-30

## 2019-03-28 RX ADMIN — TBO-FILGRASTIM 480 MCG: 480 INJECTION, SOLUTION SUBCUTANEOUS at 08:57

## 2019-03-28 RX ADMIN — SODIUM CHLORIDE 75 ML/HR: 900 INJECTION, SOLUTION INTRAVENOUS at 15:43

## 2019-03-28 RX ADMIN — SENNOSIDES AND DOCUSATE SODIUM 1 TABLET: 8.6; 5 TABLET ORAL at 12:28

## 2019-03-28 RX ADMIN — FLUCONAZOLE 200 MG: 100 TABLET ORAL at 08:53

## 2019-03-28 RX ADMIN — Medication 600 UNITS: at 14:11

## 2019-03-28 RX ADMIN — VALGANCICLOVIR 450 MG: 450 TABLET, FILM COATED ORAL at 17:48

## 2019-03-28 RX ADMIN — VANCOMYCIN HYDROCHLORIDE 1250 MG: 10 INJECTION, POWDER, LYOPHILIZED, FOR SOLUTION INTRAVENOUS at 05:45

## 2019-03-28 RX ADMIN — POLYETHYLENE GLYCOL 3350 17 G: 17 POWDER, FOR SOLUTION ORAL at 12:28

## 2019-03-28 RX ADMIN — CEFEPIME HYDROCHLORIDE 2 G: 2 INJECTION, POWDER, FOR SOLUTION INTRAVENOUS at 05:05

## 2019-03-28 RX ADMIN — CEFEPIME HYDROCHLORIDE 2 G: 2 INJECTION, POWDER, FOR SOLUTION INTRAVENOUS at 21:12

## 2019-03-28 RX ADMIN — SODIUM CHLORIDE 100 ML/HR: 900 INJECTION, SOLUTION INTRAVENOUS at 05:06

## 2019-03-28 RX ADMIN — LACTULOSE 10 G: 20 SOLUTION ORAL at 10:05

## 2019-03-28 RX ADMIN — VALGANCICLOVIR 450 MG: 450 TABLET, FILM COATED ORAL at 08:55

## 2019-03-28 RX ADMIN — CEFEPIME HYDROCHLORIDE 2 G: 2 INJECTION, POWDER, FOR SOLUTION INTRAVENOUS at 14:10

## 2019-03-28 NOTE — PROGRESS NOTES
END OF SHIFT NOTE: 
Patient in room,  out of bed , sitting on sofa. Patient had a bowel movement after bowel regimen given. No temperature elevation. Patient's appetite is good. Patient voice no complain of pain or nausea. Intake/Output No intake/output data recorded. Voiding: Yes Catheter:  No  
Drain:   
 
 
 
 
Stool:  One stool as reported not seen by nurse  occurrences. Stool Assessment Stool Appearance: Loose(per pt) (03/25/19 1310) Emesis:  No  occurrences. VITAL SIGNS Patient Vitals for the past 12 hrs: 
 Temp Pulse Resp BP SpO2  
03/28/19 1515 97.5 °F (36.4 °C) 93 18 117/69 100 % 03/28/19 1140 97.6 °F (36.4 °C) 65 18 115/79 98 % 03/28/19 0722 98.8 °F (37.1 °C) 71 18 100/70 97 % Pain Assessment Pain 1 Pain Scale 1: Numeric (0 - 10) (03/28/19 1545) Pain Intensity 1: 0 (03/28/19 1545) Patient Stated Pain Goal: 0 (03/28/19 0230) Pain Reassessment 1: Patient sleeping (03/28/19 0230) Pain Onset 1: ongoing (03/27/19 0026) Pain Location 1: Back (03/27/19 2202) Pain Orientation 1: Lower (03/27/19 2202) Pain Description 1: Aching (03/27/19 2202) Pain Intervention(s) 1: Medication (see MAR) (03/27/19 2202) Ambulating Yes in room Additional Information:  See above Shift report given to oncoming nurse SYLVIA Hebert  at the bedside.  
 
Rodolfo Conley, RN

## 2019-03-28 NOTE — PROGRESS NOTES
END OF SHIFT NOTE: 
 
Intake/Output 
03/27 1901 - 03/28 0700 In: 7338 [I.V.:1132] Out: 1600 [Urine:1600] Voiding: YES Catheter: NO 
Drain:   
 
 
 
 
Stool:  0 occurrences. Stool Assessment Stool Appearance: Loose(per pt) (03/25/19 1310) Emesis:  0 occurrences. VITAL SIGNS Patient Vitals for the past 12 hrs: 
 Temp Pulse Resp BP SpO2  
03/28/19 0300 98.6 °F (37 °C) 70 16 94/59 98 % 03/28/19 0008 98.3 °F (36.8 °C) 88 16 91/55 97 % 03/27/19 2102 97.9 °F (36.6 °C) 66 18 115/68 100 % Pain Assessment Pain 1 Pain Scale 1: Visual (03/28/19 0230) Pain Intensity 1: 0 (03/28/19 0230) Patient Stated Pain Goal: 0 (03/28/19 0230) Pain Reassessment 1: Patient sleeping (03/28/19 0230) Pain Onset 1: ongoing (03/27/19 0026) Pain Location 1: Back (03/27/19 2202) Pain Orientation 1: Lower (03/27/19 2202) Pain Description 1: Aching (03/27/19 2202) Pain Intervention(s) 1: Medication (see MAR) (03/27/19 2202) Ambulating Yes Additional Information: Afebrile this shift. Mild c/o low back pain, relieved with PRN norco. VSS. No other needs at this time. Shift report given to oncoming nurse at the bedside.  
 
Burgess Tariq, RN

## 2019-03-28 NOTE — PROGRESS NOTES
Massage Therapy Note Gentle 20 minute massage to lower legs and feet while patient supine, using hypoallergic massage lotion. Patient reported pain in feet of 5/10 before massage and 1/10 after. No followup visits are planned. CARINE Hurt

## 2019-03-28 NOTE — PROGRESS NOTES
[facility] Hematology & Oncology Inpatient Hematology / Oncology Progress NoteAdmission Date: 3/25/2019  1:15 PM 
Reason for Admission/Hospital Course: Neutropenic fever ([address] [D70.9, R50.81] 24 Hour Events: WBC 0.1-continue granix Still having chills Complaint of vertigo Wife at bedside ROS: 
Constitutional: negative for fever. Positive chills CV: negative for chest pain, palpitations, edema. Respiratory: SOBOE, no cough, no wheezing. GI: negative for nausea, abdominal pain, diarrhea. Constipation. 10 point review of systems is otherwise negative with the exception of the elements mentioned above in the HPI. Allergies Allergen Reactions  Campath [Alemtuzumab] Other (comments) Rigors OBJECTIVE: 
Patient Vitals for the past 8 hrs: 
 BP Temp Pulse Resp SpO2  
19 0722 100/70 98.8 °F (37.1 °C) 71 18 97 % 19 0300 94/59 98.6 °F (37 °C) 70 16 98 % Temp (24hrs), Av.4 °F (36.9 °C), Min:97.9 °F (36.6 °C), Max:98.8 °F (37.1 °C) 
 
 0701 -  1900 In: -  
Out: 700 [Urine:700] Physical Exam: 
Constitutional: Well developed, well nourished male in no acute distress, up and about in room HEENT: Normocephalic and atraumatic. Oropharynx is clear, mucous membranes are moist. Extraocular muscles are intact. Sclerae anicteric. Skin Warm and dry. No bruising and no rash noted. No erythema. No pallor. Respiratory Lungs are clear to auscultation bilaterally without wheezes, rales or rhonchi, normal air exchange without accessory muscle use. CVS Normal rate, regular rhythm and normal S1 and S2. No murmurs, gallops, or rubs. Abdomen Soft, nontender and nondistended, normoactive bowel sounds. Neuro Grossly nonfocal with no obvious sensory or motor deficits. Vertigo MSK Normal range of motion in general.  No edema and no tenderness. Psych Appropriate mood and affect. Labs: 
   
Recent Labs  
  19 
0306 19 9846 03/26/19 
0321 WBC 0.1* 0.1* 0.2*  
RBC 2.83* 3.01* 3.39* HGB 8.7* 9.5* 10.5* HCT 25.6* 27.1* 30.3* MCV 90.5 90.0 89.4 MCH 30.7 31.6 31.0 MCHC 34.0 35.1* 34.7  
RDW 12.3 12.4 12.3 PLT 25* 31* 35* DF AUTOMATED MANUAL MANUAL Recent Labs  
  03/28/19 
0306 03/27/19 
0342 03/26/19 
0321  138 136  
K 4.1 4.0 4.2 * 109* 107 CO2 23 22 21 AGAP 6* 7 8 GLU 94 115* 106* BUN 10 18 21 CREA 0.88 1.13 1.28  
GFRAA >60 >60 >60 GFRNA >60 >60 >60  
CA 7.7* 8.1* 8.4 SGOT 16 16 21 AP 30* 33* 33* TP 6.2* 6.3 6.9 ALB 2.7* 2.8* 3.0*  
GLOB 3.5 3.5 3.9* AGRAT 0.8* 0.8* 0.8* MG 1.9 2.0 2.1 Imaging: 
 
Medications: 
Current Facility-Administered Medications Medication Dose Route Frequency  vancomycin (VANCOCIN) 1250 mg in  ml infusion  1,250 mg IntraVENous Q12H  
 fluconazole (DIFLUCAN) tablet 200 mg  200 mg Oral DAILY  0.9% sodium chloride infusion  100 mL/hr IntraVENous CONTINUOUS  
 ondansetron (ZOFRAN) injection 4 mg  4 mg IntraVENous Q4H PRN  prochlorperazine (COMPAZINE) injection 5-10 mg  5-10 mg IntraVENous Q6H PRN  
 HYDROcodone-acetaminophen (NORCO) 5-325 mg per tablet 1 Tab  1 Tab Oral Q6H PRN  
 morphine injection 2 mg  2 mg IntraVENous Q4H PRN  tbo-filgrastim (GRANIX) injection 480 mcg  480 mcg SubCUTAneous DAILY  cefepime (MAXIPIME) 2 g in 0.9% sodium chloride (MBP/ADV) 100 mL  2 g IntraVENous Q8H  
 valGANciclovir (VALCYTE) tablet 450 mg  450 mg Oral BID  loratadine (CLARITIN) tablet 10 mg  10 mg Oral DAILY PRN  
 
 
ASSESSMENT: 
 
Problem List  Date Reviewed: 3/18/2019 Codes Class Noted Neutropenic fever ([facility] [address] ICD-10-CM: D70.9, R50.81 ICD-9-CM: 288.00, 780.61  3/25/2019 Admission for antineoplastic chemotherapy ICD-10-CM: Z51.11 ICD-9-CM: V58.11  1/14/2019 Prolymphocytic leukemia of T-cell (HCC) ICD-10-CM: C91.60 ICD-9-CM: 204.90  1/8/2019 Severe obesity (BMI 35.0-39. 9) ICD-10-CM: E66.01 
ICD-9-CM: 278.01  9/6/2018 Benign prostatic hyperplasia with nocturia (Chronic) ICD-10-CM: N40.1, R35.1 ICD-9-CM: 600.01, 788.43  1/23/2018 Severe obstructive sleep apnea ICD-10-CM: G47.33 
ICD-9-CM: 327.23  1/12/2017 Pure hypercholesterolemia (Chronic) ICD-10-CM: E78.00 ICD-9-CM: 272.0  12/22/2016 Essential hypertension, benign ICD-10-CM: I10 
ICD-9-CM: 401.1  9/21/2015 Impotence ICD-10-CM: N52.9 ICD-9-CM: 607.84  9/21/2015 DDD (degenerative disc disease), cervical ICD-10-CM: M50.30 ICD-9-CM: 722.4  9/21/2015 DDD (degenerative disc disease), lumbar ICD-10-CM: M51.36 
ICD-9-CM: 722.52  9/21/2015 Right shoulder pain ICD-10-CM: M25.511 ICD-9-CM: 719.41  9/21/2015 First degree hemorrhoids ICD-10-CM: K64.0 ICD-9-CM: 455.0  9/21/2015 PLAN: 
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 
3/27 CMV +<200 already on valcyte 3/28 Consider BMbx if still here on Monday Chills/rigors 3/26 BC/UC NTD. Day 2 of cefe/vanc. Patient decribed symptoms as 'cold to the bone'. Check thyroid panel. 3/27 Thyroid studies wnl. Feels better today. Continue abx. Cultures remain negative. 3/28 no fevers. Cultures remain NTD. Continue cefe. DC Vanc Neutropenia/Pancytopenia 3/26 Day 2 of granix. WBC 0.2 
3/28 Day 4 granix. Counts still low WBC 0.1. Platelets dropping 16P today. Hgb 8.7 down from 10.5. Check hemolysis labs. Muscle pain 3/26 check CPK, myoglobin 3/27 Labs wnl. Constipation-miralax, pericolace 3/28 one time dose lactulose Juan Jose SOPs SCDs (platelets low) Goals and plan of care reviewed with the patient. All questions answered to the best of our ability. Zachariah Burnett NP [facility] Hematology & Oncology [id number] [place] [address] Office : [phone] Fax : [phone] I personally saw, exammed and counselled the patient, and discussed with NP, agree with above history/assessment/plan. 64 y.o.male prolymphocytic T cell leukemia s/p 8 weeks of Campath with good response but had to hold due to severe cytopenia, was admitted for chills and rigor, pending pan cultures, felt better with empirical care above, CMV turned positive again and continue valganciclovir and monitor, continue above care, r/o'd rhabdo and hypothyroidism. I don not think he would resume campath rx, plan for marrow biopsy to evaluate the leukemia status and the persistent cytopenia. 
  
 
Franca Baumann M.D. [address] Office : [phone] Fax : [phone]

## 2019-03-28 NOTE — PROGRESS NOTES
Problem: Neutropenic Fever: 0-2 hours Goal: Activity/Safety Outcome: Progressing Towards Goal 
Goal: Diagnostic Test/Procedures Outcome: Progressing Towards Goal 
Goal: Nutrition/Diet Outcome: Progressing Towards Goal 
Goal: Medications Outcome: Progressing Towards Goal 
Goal: Treatments/Interventions/Procedures Outcome: Progressing Towards Goal 
Goal: Psychosocial 
Outcome: Progressing Towards Goal 
Goal: *Optimal pain control at patient's stated goal 
Outcome: Progressing Towards Goal 
Goal: *Hemodynamically stable Outcome: Progressing Towards Goal 
Goal: *Adequate oxygenation Description Maintain saturation greater than or equal to 92%. Outcome: Progressing Towards Goal 
Goal: *Receives antibiotics within one hour of admission or first febrile episode Outcome: Progressing Towards Goal 
  
Problem: Falls - Risk of 
Goal: *Absence of Falls Description Document Chris Miles Fall Risk and appropriate interventions in the flowsheet.  
Outcome: Progressing Towards Goal

## 2019-03-29 ENCOUNTER — APPOINTMENT (OUTPATIENT)
Dept: INFUSION THERAPY | Age: 64
End: 2019-03-29
Payer: COMMERCIAL

## 2019-03-29 LAB
ALBUMIN SERPL-MCNC: 2.9 G/DL (ref 3.2–4.6)
ALBUMIN/GLOB SERPL: 0.8 {RATIO} (ref 1.2–3.5)
ALP SERPL-CCNC: 29 U/L (ref 50–136)
ALT SERPL-CCNC: 33 U/L (ref 12–65)
ANION GAP SERPL CALC-SCNC: 7 MMOL/L (ref 7–16)
AST SERPL-CCNC: 20 U/L (ref 15–37)
BILIRUB SERPL-MCNC: 0.4 MG/DL (ref 0.2–1.1)
BUN SERPL-MCNC: 10 MG/DL (ref 8–23)
CALCIUM SERPL-MCNC: 8.2 MG/DL (ref 8.3–10.4)
CHLORIDE SERPL-SCNC: 111 MMOL/L (ref 98–107)
CO2 SERPL-SCNC: 23 MMOL/L (ref 21–32)
CREAT SERPL-MCNC: 0.91 MG/DL (ref 0.8–1.5)
DIFFERENTIAL METHOD BLD: ABNORMAL
ERYTHROCYTE [DISTWIDTH] IN BLOOD BY AUTOMATED COUNT: 12.2 % (ref 11.9–14.6)
GLOBULIN SER CALC-MCNC: 3.7 G/DL (ref 2.3–3.5)
GLUCOSE SERPL-MCNC: 100 MG/DL (ref 65–100)
HCT VFR BLD AUTO: 29 % (ref 41.1–50.3)
HGB BLD-MCNC: 9.8 G/DL (ref 13.6–17.2)
MAGNESIUM SERPL-MCNC: 2 MG/DL (ref 1.8–2.4)
MCH RBC QN AUTO: 30.8 PG (ref 26.1–32.9)
MCHC RBC AUTO-ENTMCNC: 33.8 G/DL (ref 31.4–35)
MCV RBC AUTO: 91.2 FL (ref 79.6–97.8)
NRBC # BLD: 0 K/UL (ref 0–0.2)
PLATELET # BLD AUTO: 33 K/UL (ref 150–450)
PLATELET COMMENTS,PCOM: ABNORMAL
PMV BLD AUTO: 11.1 FL (ref 9.4–12.3)
POTASSIUM SERPL-SCNC: 4.1 MMOL/L (ref 3.5–5.1)
PROT SERPL-MCNC: 6.6 G/DL (ref 6.3–8.2)
RBC # BLD AUTO: 3.18 M/UL (ref 4.23–5.6)
RBC MORPH BLD: ABNORMAL
SODIUM SERPL-SCNC: 141 MMOL/L (ref 136–145)
WBC # BLD AUTO: 0.2 K/UL (ref 4.3–11.1)
WBC MORPH BLD: ABNORMAL

## 2019-03-29 PROCEDURE — 74011000258 HC RX REV CODE- 258: Performed by: NURSE PRACTITIONER

## 2019-03-29 PROCEDURE — 65270000015 HC RM PRIVATE ONCOLOGY

## 2019-03-29 PROCEDURE — 74011250636 HC RX REV CODE- 250/636: Performed by: NURSE PRACTITIONER

## 2019-03-29 PROCEDURE — 77030020263 HC SOL INJ SOD CL0.9% LFCR 1000ML

## 2019-03-29 PROCEDURE — 83735 ASSAY OF MAGNESIUM: CPT

## 2019-03-29 PROCEDURE — 74011250637 HC RX REV CODE- 250/637: Performed by: NURSE PRACTITIONER

## 2019-03-29 PROCEDURE — 99232 SBSQ HOSP IP/OBS MODERATE 35: CPT | Performed by: INTERNAL MEDICINE

## 2019-03-29 PROCEDURE — 80053 COMPREHEN METABOLIC PANEL: CPT

## 2019-03-29 PROCEDURE — 74011250637 HC RX REV CODE- 250/637: Performed by: INTERNAL MEDICINE

## 2019-03-29 PROCEDURE — 85025 COMPLETE CBC W/AUTO DIFF WBC: CPT

## 2019-03-29 RX ORDER — SODIUM CHLORIDE 0.9 % (FLUSH) 0.9 %
10 SYRINGE (ML) INJECTION AS NEEDED
Status: DISCONTINUED | OUTPATIENT
Start: 2019-03-29 | End: 2019-03-31 | Stop reason: HOSPADM

## 2019-03-29 RX ADMIN — CEFEPIME HYDROCHLORIDE 2 G: 2 INJECTION, POWDER, FOR SOLUTION INTRAVENOUS at 14:12

## 2019-03-29 RX ADMIN — CEFEPIME HYDROCHLORIDE 2 G: 2 INJECTION, POWDER, FOR SOLUTION INTRAVENOUS at 21:07

## 2019-03-29 RX ADMIN — VALGANCICLOVIR 450 MG: 450 TABLET, FILM COATED ORAL at 08:43

## 2019-03-29 RX ADMIN — FLUCONAZOLE 200 MG: 100 TABLET ORAL at 08:39

## 2019-03-29 RX ADMIN — SENNOSIDES AND DOCUSATE SODIUM 1 TABLET: 8.6; 5 TABLET ORAL at 08:40

## 2019-03-29 RX ADMIN — VALGANCICLOVIR 450 MG: 450 TABLET, FILM COATED ORAL at 17:44

## 2019-03-29 RX ADMIN — Medication 10 ML: at 21:07

## 2019-03-29 RX ADMIN — SODIUM CHLORIDE 75 ML/HR: 900 INJECTION, SOLUTION INTRAVENOUS at 20:46

## 2019-03-29 RX ADMIN — TBO-FILGRASTIM 480 MCG: 480 INJECTION, SOLUTION SUBCUTANEOUS at 08:41

## 2019-03-29 RX ADMIN — CEFEPIME HYDROCHLORIDE 2 G: 2 INJECTION, POWDER, FOR SOLUTION INTRAVENOUS at 05:45

## 2019-03-29 RX ADMIN — SODIUM CHLORIDE 75 ML/HR: 900 INJECTION, SOLUTION INTRAVENOUS at 05:45

## 2019-03-29 NOTE — PROGRESS NOTES
END OF SHIFT NOTE: 
 
Intake/Output 
03/28 1901 - 03/29 0700 In: 500 [P.O.:500] Out: 1775 [[facility]:6393] Voiding: YES Catheter: NO 
Drain:   
 
 
 
 
Stool:  0 occurrences. Stool Assessment Stool Appearance: Loose(per pt) (03/25/19 1310) Emesis:  0 occurrences. VITAL SIGNS Patient Vitals for the past 12 hrs: 
 Temp Pulse Resp BP SpO2  
03/29/19 0330 98.4 °F (36.9 °C) 68 18 110/75 97 % 03/29/19 0010 98.7 °F (37.1 °C) 87 18 123/82 100 % 03/28/19 1933 98.8 °F (37.1 °C) 74 18 123/78 98 % Pain Assessment Pain 1 Pain Scale 1: FLACC (03/29/19 0000) Pain Intensity 1: 0 (03/28/19 1900) Patient Stated Pain Goal: 0 (03/28/19 1900) Pain Reassessment 1: Patient sleeping (03/28/19 0230) Pain Onset 1: ongoing (03/27/19 0026) Pain Location 1: Back (03/27/19 2202) Pain Orientation 1: Lower (03/27/19 2202) Pain Description 1: Aching (03/27/19 2202) Pain Intervention(s) 1: Medication (see MAR) (03/27/19 2202) Ambulating Yes Additional Information:  
 
Shift report given to oncoming nurse at the bedside. Mary Marc

## 2019-03-29 NOTE — PROGRESS NOTES
Patient incidentally bit the inner left cheek area of his mouth bruising  noted, no active bleeding. Normal saline mouth rinses provider . Ms. Juve Whitten NP notified .

## 2019-03-29 NOTE — PROGRESS NOTES
[address] [facility] Hematology & Oncology Inpatient Hematology / Oncology Progress NoteAdmission Date: 3/25/2019  1:15 PM 
Reason for Admission/Hospital Course: Neutropenic fever ([address] [D70.9, R50.81] 24 Hour Events: WBC 0.2-continue granix Vertigo resolved /75 Wife at bedside ROS: 
Constitutional: negative for fever. Positive chills CV: negative for chest pain, palpitations, edema. Respiratory: SOBOE, no cough, no wheezing. GI: negative for nausea, abdominal pain, diarrhea. 10 point review of systems is otherwise negative with the exception of the elements mentioned above in the HPI. Allergies Allergen Reactions  Campath [Alemtuzumab] Other (comments) Rigors OBJECTIVE: 
Patient Vitals for the past 8 hrs: 
 BP Temp Pulse Resp SpO2  
19 0736 110/75 98.6 °F (37 °C) 83 18 97 % 19 0330 110/75 98.4 °F (36.9 °C) 68 18 97 % Temp (24hrs), Av.3 °F (36.8 °C), Min:97.5 °F (36.4 °C), Max:98.8 °F (37.1 °C) 
 
 0701 -  1900 In: 360 [P.O.:360] Out: 200 [Urine:200] Physical Exam: 
Constitutional: Well developed, well nourished male in no acute distress, up and about in room HEENT: Normocephalic and atraumatic. Oropharynx is clear, mucous membranes are moist. Extraocular muscles are intact. Sclerae anicteric. Skin Warm and dry. No bruising and no rash noted. No erythema. No pallor. Respiratory Lungs are clear to auscultation bilaterally without wheezes, rales or rhonchi, normal air exchange without accessory muscle use. CVS Normal rate, regular rhythm and normal S1 and S2. No murmurs, gallops, or rubs. Abdomen Soft, nontender and nondistended, normoactive bowel sounds. Neuro Grossly nonfocal with no obvious sensory or motor deficits. Vertigo MSK Normal range of motion in general.  No edema and no tenderness. Psych Appropriate mood and affect. Labs: 
   
Recent Labs  
  19 
0326 19 
0306 19 
8306 WBC 0.2* 0.1* 0.1*  
RBC 3.18* 2.83* 3.01* HGB 9.8* 8.7* 9.5* HCT 29.0* 25.6* 27.1*  
MCV 91.2 90.5 90.0 MCH 30.8 30.7 31.6 MCHC 33.8 34.0 35.1*  
RDW 12.2 12.3 12.4 PLT 33* 25* 31* DF MANUAL AUTOMATED MANUAL Recent Labs  
  03/29/19 
0326 03/28/19 
0306 03/27/19 
5096  140 138  
K 4.1 4.1 4.0  
* 111* 109* CO2 23 23 22 AGAP 7 6* 7  94 115* BUN 10 10 18 CREA 0.91 0.88 1.13 GFRAA >60 >60 >60 GFRNA >60 >60 >60  
CA 8.2* 7.7* 8.1*  
SGOT 20 16 16 AP 29* 30* 33* TP 6.6 6.2* 6.3 ALB 2.9* 2.7* 2.8*  
GLOB 3.7* 3.5 3.5 AGRAT 0.8* 0.8* 0.8* MG 2.0 1.9 2.0 Imaging: 
 
Medications: 
Current Facility-Administered Medications Medication Dose Route Frequency  polyethylene glycol (MIRALAX) packet 17 g  17 g Oral DAILY  senna-docusate (PERICOLACE) 8.6-50 mg per tablet 1 Tab  1 Tab Oral DAILY  heparin (porcine) pf 600 Units  600 Units InterCATHeter PRN  
 fluconazole (DIFLUCAN) tablet 200 mg  200 mg Oral DAILY  0.9% sodium chloride infusion  75 mL/hr IntraVENous CONTINUOUS  
 ondansetron (ZOFRAN) injection 4 mg  4 mg IntraVENous Q4H PRN  prochlorperazine (COMPAZINE) injection 5-10 mg  5-10 mg IntraVENous Q6H PRN  
 HYDROcodone-acetaminophen (NORCO) 5-325 mg per tablet 1 Tab  1 Tab Oral Q6H PRN  
 morphine injection 2 mg  2 mg IntraVENous Q4H PRN  
 cefepime (MAXIPIME) 2 g in 0.9% sodium chloride (MBP/ADV) 100 mL  2 g IntraVENous Q8H  
 valGANciclovir (VALCYTE) tablet 450 mg  450 mg Oral BID  loratadine (CLARITIN) tablet 10 mg  10 mg Oral DAILY PRN  
 
 
ASSESSMENT: 
 
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 9) ICD-10-CM: E66.01 
ICD-9-CM: 278.01  9/6/2018 Benign prostatic hyperplasia with nocturia (Chronic) ICD-10-CM: N40.1, R35.1 ICD-9-CM: 600.01, 788.43  1/23/2018 Severe obstructive sleep apnea ICD-10-CM: G47.33 
ICD-9-CM: 327.23  1/12/2017 Pure hypercholesterolemia (Chronic) ICD-10-CM: E78.00 ICD-9-CM: 272.0  12/22/2016 Essential hypertension, benign ICD-10-CM: I10 
ICD-9-CM: 401.1  9/21/2015 Impotence ICD-10-CM: N52.9 ICD-9-CM: 607.84  9/21/2015 DDD (degenerative disc disease), cervical ICD-10-CM: M50.30 ICD-9-CM: 722.4  9/21/2015 DDD (degenerative disc disease), lumbar ICD-10-CM: M51.36 
ICD-9-CM: 722.52  9/21/2015 Right shoulder pain ICD-10-CM: M25.511 ICD-9-CM: 719.41  9/21/2015 First degree hemorrhoids ICD-10-CM: K64.0 ICD-9-CM: 455.0  9/21/2015 PLAN: 
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 
3/27 CMV +<200 already on valcyte 3/28 Consider BMbx if still here on Monday Chills/rigors 3/26 BC/UC NTD. Day 2 of cefe/vanc. Patient decribed symptoms as 'cold to the bone'. Check thyroid panel. 3/27 Thyroid studies wnl. Feels better today. Continue abx. Cultures remain negative. 3/28 no fevers. Cultures remain NTD. Continue cefe. DC Vanc Neutropenia/Pancytopenia 3/26 Day 2 of granix. WBC 0.2 
3/28 Day 4 granix. Counts still low WBC 0.1. Platelets dropping 12N today. Hgb 8.7 down from 10.5. Check hemolysis labs. 3/29 hemolysis labs negative. WBC 0.2. Hgb 9.8, platelets 54E. Muscle pain 3/26 check CPK, myoglobin 3/27 Labs wnl. Constipation-miralax, pericolace 3/28 one time dose lactulose 3/29 had BM yesterday Juan Jose SOPs SCDs (platelets low) Goals and plan of care reviewed with the patient. All questions answered to the best of our ability. Deliah Lennox, NP [facility] Hematology & Oncology [id number] [facility] Office : [phone] Fax : [phone] I personally saw, exammed and counselled the patient, and discussed with NP, agree with above history/assessment/plan. 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 I don not think he would resume campath rx, plan for marrow biopsy to evaluate the leukemia status and the persistent cytopenia. Lactulose prn constipation. 
  
 
Ira Swan M.D. [address] Office : [phone] Fax : [phone]

## 2019-03-29 NOTE — PROGRESS NOTES
END OF SHIFT NOTE: 
Patient out of bed sitting on sofa or standing up in room. No complain pain or nausea. Patient has good appetite and afebrile. Wife present @ bedside. Neutropenic precautions enforced. Intake/Output 
03/29 0701 - 03/29 1900 In: 2090 [Y.C.:0543] Out: 1000 [Urine:1000] Voiding: Yes Catheter: No  
Drain:   
 
 
 
 
Stool:  No occurrences. Stool Assessment Stool Appearance: Loose(per pt) (03/25/19 1310) Emesis:   No  occurrences. VITAL SIGNS Patient Vitals for the past 12 hrs: 
 Temp Pulse Resp BP SpO2  
03/29/19 1518 97.8 °F (36.6 °C) 83 18 107/65 100 % 03/29/19 1100 97.5 °F (36.4 °C) 96 18 124/86 100 % 03/29/19 0736 98.6 °F (37 °C) 83 18 110/75 97 % Pain Assessment Pain 1 Pain Scale 1: Numeric (0 - 10) (03/29/19 1457) Pain Intensity 1: 0 (03/29/19 1457) Patient Stated Pain Goal: 0 (03/28/19 1900) Pain Reassessment 1: Patient sleeping (03/28/19 0230) Pain Onset 1: ongoing (03/27/19 0026) Pain Location 1: Back (03/27/19 2202) Pain Orientation 1: Lower (03/27/19 2202) Pain Description 1: Aching (03/27/19 2202) Pain Intervention(s) 1: Medication (see MAR) (03/27/19 2202) Ambulating Yes in room and hallway with protective mask in place. Additional Information:  See above Shift report given to oncoming nurse Sai Damico RN  at the bedside.  
 
Blane Oakley RN

## 2019-03-29 NOTE — INTERDISCIPLINARY ROUNDS
1400-Interdisciplinary rounds with charge nurse, provider, and . Presenting Problem: neutropenic fever Daily Goal continue granix Expected Discharge Date: TBD Expected Discharge Needs: NA 
Patient/Family Concerns addressed

## 2019-03-30 LAB
ALBUMIN SERPL-MCNC: 2.9 G/DL (ref 3.2–4.6)
ALBUMIN/GLOB SERPL: 0.8 {RATIO} (ref 1.2–3.5)
ALP SERPL-CCNC: 30 U/L (ref 50–136)
ALT SERPL-CCNC: 31 U/L (ref 12–65)
ANION GAP SERPL CALC-SCNC: 7 MMOL/L (ref 7–16)
AST SERPL-CCNC: 19 U/L (ref 15–37)
BACTERIA SPEC CULT: NORMAL
BILIRUB SERPL-MCNC: 0.4 MG/DL (ref 0.2–1.1)
BUN SERPL-MCNC: 12 MG/DL (ref 8–23)
CALCIUM SERPL-MCNC: 8.1 MG/DL (ref 8.3–10.4)
CHLORIDE SERPL-SCNC: 111 MMOL/L (ref 98–107)
CO2 SERPL-SCNC: 23 MMOL/L (ref 21–32)
CREAT SERPL-MCNC: 0.82 MG/DL (ref 0.8–1.5)
DIFFERENTIAL METHOD BLD: ABNORMAL
ERYTHROCYTE [DISTWIDTH] IN BLOOD BY AUTOMATED COUNT: 12.1 % (ref 11.9–14.6)
GLOBULIN SER CALC-MCNC: 3.5 G/DL (ref 2.3–3.5)
GLUCOSE SERPL-MCNC: 98 MG/DL (ref 65–100)
HCT VFR BLD AUTO: 25.9 % (ref 41.1–50.3)
HGB BLD-MCNC: 8.8 G/DL (ref 13.6–17.2)
MAGNESIUM SERPL-MCNC: 1.9 MG/DL (ref 1.8–2.4)
MCH RBC QN AUTO: 30.8 PG (ref 26.1–32.9)
MCHC RBC AUTO-ENTMCNC: 34 G/DL (ref 31.4–35)
MCV RBC AUTO: 90.6 FL (ref 79.6–97.8)
NRBC # BLD: 0 K/UL (ref 0–0.2)
PLATELET # BLD AUTO: 27 K/UL (ref 150–450)
PLATELET COMMENTS,PCOM: ABNORMAL
PMV BLD AUTO: 11.1 FL (ref 9.4–12.3)
POTASSIUM SERPL-SCNC: 4.1 MMOL/L (ref 3.5–5.1)
PROT SERPL-MCNC: 6.4 G/DL (ref 6.3–8.2)
RBC # BLD AUTO: 2.86 M/UL (ref 4.23–5.6)
RBC MORPH BLD: ABNORMAL
SERVICE CMNT-IMP: NORMAL
SODIUM SERPL-SCNC: 141 MMOL/L (ref 136–145)
WBC # BLD AUTO: 0.3 K/UL (ref 4.3–11.1)
WBC MORPH BLD: ABNORMAL

## 2019-03-30 PROCEDURE — 74011250636 HC RX REV CODE- 250/636: Performed by: NURSE PRACTITIONER

## 2019-03-30 PROCEDURE — 74011250637 HC RX REV CODE- 250/637: Performed by: NURSE PRACTITIONER

## 2019-03-30 PROCEDURE — 77030020263 HC SOL INJ SOD CL0.9% LFCR 1000ML

## 2019-03-30 PROCEDURE — 36592 COLLECT BLOOD FROM PICC: CPT

## 2019-03-30 PROCEDURE — 65270000015 HC RM PRIVATE ONCOLOGY

## 2019-03-30 PROCEDURE — 74011250637 HC RX REV CODE- 250/637: Performed by: INTERNAL MEDICINE

## 2019-03-30 PROCEDURE — 83735 ASSAY OF MAGNESIUM: CPT

## 2019-03-30 PROCEDURE — 99232 SBSQ HOSP IP/OBS MODERATE 35: CPT | Performed by: INTERNAL MEDICINE

## 2019-03-30 PROCEDURE — 80053 COMPREHEN METABOLIC PANEL: CPT

## 2019-03-30 PROCEDURE — 85025 COMPLETE CBC W/AUTO DIFF WBC: CPT

## 2019-03-30 PROCEDURE — 74011000258 HC RX REV CODE- 258: Performed by: NURSE PRACTITIONER

## 2019-03-30 RX ORDER — AMOXICILLIN 250 MG
1 CAPSULE ORAL 2 TIMES DAILY
Status: DISCONTINUED | OUTPATIENT
Start: 2019-03-30 | End: 2019-03-31 | Stop reason: HOSPADM

## 2019-03-30 RX ORDER — LEVOFLOXACIN 500 MG/1
500 TABLET, FILM COATED ORAL EVERY 24 HOURS
Status: DISCONTINUED | OUTPATIENT
Start: 2019-03-30 | End: 2019-03-31 | Stop reason: HOSPADM

## 2019-03-30 RX ADMIN — SENNOSIDES, DOCUSATE SODIUM 1 TABLET: 50; 8.6 TABLET, FILM COATED ORAL at 18:03

## 2019-03-30 RX ADMIN — POLYETHYLENE GLYCOL 3350 17 G: 17 POWDER, FOR SOLUTION ORAL at 07:10

## 2019-03-30 RX ADMIN — LEVOFLOXACIN 500 MG: 500 TABLET, FILM COATED ORAL at 13:02

## 2019-03-30 RX ADMIN — VALGANCICLOVIR 450 MG: 450 TABLET, FILM COATED ORAL at 19:29

## 2019-03-30 RX ADMIN — HYDROCODONE BITARTRATE AND ACETAMINOPHEN 1 TABLET: 5; 325 TABLET ORAL at 08:20

## 2019-03-30 RX ADMIN — LORATADINE 10 MG: 10 TABLET ORAL at 13:07

## 2019-03-30 RX ADMIN — SENNOSIDES AND DOCUSATE SODIUM 1 TABLET: 8.6; 5 TABLET ORAL at 07:10

## 2019-03-30 RX ADMIN — TBO-FILGRASTIM 480 MCG: 300 INJECTION, SOLUTION SUBCUTANEOUS at 13:03

## 2019-03-30 RX ADMIN — SODIUM CHLORIDE 75 ML/HR: 900 INJECTION, SOLUTION INTRAVENOUS at 23:49

## 2019-03-30 RX ADMIN — Medication 10 ML: at 19:34

## 2019-03-30 RX ADMIN — FLUCONAZOLE 200 MG: 100 TABLET ORAL at 07:10

## 2019-03-30 RX ADMIN — VALGANCICLOVIR 450 MG: 450 TABLET, FILM COATED ORAL at 08:20

## 2019-03-30 RX ADMIN — SODIUM CHLORIDE 75 ML/HR: 900 INJECTION, SOLUTION INTRAVENOUS at 07:11

## 2019-03-30 RX ADMIN — CEFEPIME HYDROCHLORIDE 2 G: 2 INJECTION, POWDER, FOR SOLUTION INTRAVENOUS at 05:06

## 2019-03-30 NOTE — PROGRESS NOTES
[facility] [place] Hematology & Oncology Inpatient Hematology / Oncology Progress NoteAdmission Date: 3/25/2019  1:15 PM 
Reason for Admission/Hospital Course: Neutropenic fever ([address] [D70.9, R50.81] 24 Hour Events: 
Afebrile, VSS 
WBC up to 300 On G-CSF Wife at bedside ROS: 
Constitutional: negative for fever CV: negative for chest pain, palpitations, edema. Respiratory: +GENTILE, no cough, no wheezing. GI: negative for nausea, abdominal pain, diarrhea. 10 point review of systems is otherwise negative with the exception of the elements mentioned above in the HPI. Allergies Allergen Reactions  Campath [Alemtuzumab] Other (comments) Rigors OBJECTIVE: 
Patient Vitals for the past 8 hrs: 
 BP Temp Pulse Resp SpO2  
19 0655 146/76 98.2 °F (36.8 °C) 65 18 98 % 19 0325 113/58 98.6 °F (37 °C) 82 18 96 % Temp (24hrs), Av °F (36.7 °C), Min:97.5 °F (36.4 °C), Max:98.6 °F (37 °C) No intake/output data recorded. Physical Exam: 
Constitutional: Well developed, well nourished male in no acute distress, up and about in room HEENT: Normocephalic and atraumatic. Oropharynx is clear, mucous membranes are moist. Extraocular muscles are intact. Sclerae anicteric. Skin Warm and dry. No bruising and no rash noted. No erythema. No pallor. Respiratory Lungs are clear to auscultation bilaterally without wheezes, rales or rhonchi, normal air exchange without accessory muscle use. CVS Normal rate, regular rhythm and normal S1 and S2. No murmurs, gallops, or rubs. Abdomen Soft, nontender and nondistended, normoactive bowel sounds. Neuro Grossly nonfocal with no obvious sensory or motor deficits. Vertigo MSK Normal range of motion in general.  No edema and no tenderness. Psych Appropriate mood and affect. Labs: 
   
Recent Labs  
  19 
0259 19 
0326 19 
0521 WBC 0.3* 0.2* 0.1*  
RBC 2.86* 3.18* 2.83* HGB 8.8* 9.8* 8.7*  
 HCT 25.9* 29.0* 25.6* MCV 90.6 91.2 90.5 MCH 30.8 30.8 30.7 MCHC 34.0 33.8 34.0  
RDW 12.1 12.2 12.3 PLT 27* 33* 25* DF MANUAL MANUAL AUTOMATED Recent Labs  
  03/30/19 
0259 03/29/19 
0326 03/28/19 
7730  141 140  
K 4.1 4.1 4.1 * 111* 111* CO2 23 23 23 AGAP 7 7 6*  
GLU 98 100 94 BUN 12 10 10 CREA 0.82 0.91 0.88 GFRAA >60 >60 >60 GFRNA >60 >60 >60  
CA 8.1* 8.2* 7.7* SGOT 19 20 16 AP 30* 29* 30* TP 6.4 6.6 6.2* ALB 2.9* 2.9* 2.7*  
GLOB 3.5 3.7* 3.5 AGRAT 0.8* 0.8* 0.8* MG 1.9 2.0 1.9 Imaging: 
 
Medications: 
Current Facility-Administered Medications Medication Dose Route Frequency  central line flush (saline) syringe 10 mL  10 mL InterCATHeter PRN  polyethylene glycol (MIRALAX) packet 17 g  17 g Oral DAILY  senna-docusate (PERICOLACE) 8.6-50 mg per tablet 1 Tab  1 Tab Oral DAILY  heparin (porcine) pf 600 Units  600 Units InterCATHeter PRN  
 fluconazole (DIFLUCAN) tablet 200 mg  200 mg Oral DAILY  0.9% sodium chloride infusion  75 mL/hr IntraVENous CONTINUOUS  
 ondansetron (ZOFRAN) injection 4 mg  4 mg IntraVENous Q4H PRN  prochlorperazine (COMPAZINE) injection 5-10 mg  5-10 mg IntraVENous Q6H PRN  
 HYDROcodone-acetaminophen (NORCO) 5-325 mg per tablet 1 Tab  1 Tab Oral Q6H PRN  
 morphine injection 2 mg  2 mg IntraVENous Q4H PRN  
 cefepime (MAXIPIME) 2 g in 0.9% sodium chloride (MBP/ADV) 100 mL  2 g IntraVENous Q8H  
 valGANciclovir (VALCYTE) tablet 450 mg  450 mg Oral BID  loratadine (CLARITIN) tablet 10 mg  10 mg Oral DAILY PRN  
 
 
ASSESSMENT: 
 
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 9) ICD-10-CM: E66.01 
ICD-9-CM: 278.01  9/6/2018 Benign prostatic hyperplasia with nocturia (Chronic) ICD-10-CM: N40.1, R35.1 ICD-9-CM: 600.01, 788.43  1/23/2018 Severe obstructive sleep apnea ICD-10-CM: G47.33 
ICD-9-CM: 327.23  1/12/2017 Pure hypercholesterolemia (Chronic) ICD-10-CM: E78.00 ICD-9-CM: 272.0  12/22/2016 Essential hypertension, benign ICD-10-CM: I10 
ICD-9-CM: 401.1  9/21/2015 Impotence ICD-10-CM: N52.9 ICD-9-CM: 607.84  9/21/2015 DDD (degenerative disc disease), cervical ICD-10-CM: M50.30 ICD-9-CM: 722.4  9/21/2015 DDD (degenerative disc disease), lumbar ICD-10-CM: M51.36 
ICD-9-CM: 722.52  9/21/2015 Right shoulder pain ICD-10-CM: M25.511 ICD-9-CM: 719.41  9/21/2015 First degree hemorrhoids ICD-10-CM: K64.0 ICD-9-CM: 455.0  9/21/2015 PLAN: 
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 
3/27 CMV +<200 already on valcyte 3/28 Consider BMbx if still here on Monday 3/30 IR consulted for BMbx Chills/rigors 3/26 BC/UC NTD. Day 2 of cefe/vanc. Patient decribed symptoms as 'cold to the bone'. Check thyroid panel. 3/27 Thyroid studies wnl. Feels better today. Continue abx. Cultures remain negative. 3/28 no fevers. Cultures remain NTD. Continue cefe. DC Vanc 3/30 Remains afebrile. BCx/UCx-NG. Change Cef to LVQ. Neutropenia/Pancytopenia 3/26 Day 2 of granix. WBC 0.2 
3/28 Day 4 granix. Counts still low WBC 0.1. Platelets dropping 54W today. Hgb 8.7 down from 10.5. Check hemolysis labs. 3/29 hemolysis labs negative. WBC 0.2. Hgb 9.8, platelets 65G.  
1/34 WBC up to 300. Con't G-CSF. Muscle pain 3/26 check CPK, myoglobin 3/27 Labs wnl. Constipation-miralax, pericolace 3/28 one time dose lactulose 3/29 had BM yesterday 3/30 Reports that he still feels constipated. Increase deepali-colace to BID. Juan Jose SOPs SCDs (platelets low) Goals and plan of care reviewed with the patient. All questions answered to the best of our ability. Tania Navas NP [facility] Hematology & Oncology [address] [address] Office : [phone] Fax : [phone] I personally saw, exammed and counselled the patient, and discussed with NP, agree with above history/assessment/plan. 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 I don not think he would resume campath rx, plan for marrow biopsy next week to evaluate the leukemia status and the persistent cytopenia. Lactulose prn constipation. 
  
 
Andrew Diop M.D. Devaughn Rosario [id number] [place] [address] Office : [phone] Fax : [phone]

## 2019-03-30 NOTE — PROGRESS NOTES
END OF SHIFT NOTE: 
 
Intake/Output 
03/29 1901 - 03/30 0700 In: 1917 [P.O.:980; I.V.:937] Out: 2925 [Urine:2925] Voiding: YES Catheter: NO 
Drain:   
 
 
 
 
Stool:  0 occurrences. Stool Assessment Stool Appearance: Loose(per pt) (03/25/19 1310) Emesis:  0 occurrences. VITAL SIGNS Patient Vitals for the past 12 hrs: 
 Temp Pulse Resp BP SpO2  
03/30/19 0325 98.6 °F (37 °C) 82 18 113/58 96 % 03/29/19 2315 97.8 °F (36.6 °C) 73 18 135/77 100 % 03/29/19 1915 98.1 °F (36.7 °C) 69 18 106/75 99 % Pain Assessment Pain 1 Pain Scale 1: Numeric (0 - 10) (03/30/19 0100) Pain Intensity 1: 0 (03/30/19 0100) Patient Stated Pain Goal: 0 (03/30/19 0100) Pain Reassessment 1: Patient sleeping (03/28/19 0230) Pain Onset 1: ongoing (03/27/19 0026) Pain Location 1: Back (03/27/19 2202) Pain Orientation 1: Lower (03/27/19 2202) Pain Description 1: Aching (03/27/19 2202) Pain Intervention(s) 1: Medication (see MAR) (03/27/19 2202) Ambulating Yes Additional Information: Afebrile throughout the shift;no new complaints. Shift report given to oncoming nurse Diana RN at the bedside.  
 
Dotty Campbell RN

## 2019-03-30 NOTE — PROGRESS NOTES
Problem: Falls - Risk of 
Goal: *Absence of Falls Description Document Saravanan Garcia Fall Risk and appropriate interventions in the flowsheet. Outcome: Progressing Towards Goal 
  
Problem: Patient Education: Go to Patient Education Activity Goal: Patient/Family Education Outcome: Progressing Towards Goal 
  
Problem: Neutropenic Fever: Discharge Outcomes Goal: *Demonstrates ability to perform prescribed activity without shortness of breath or discomfort Outcome: Progressing Towards Goal 
Goal: *Verbalizes understanding and describes prescribed diet Outcome: Progressing Towards Goal 
Goal: *Describes follow-up/return visits to physicians Outcome: Progressing Towards Goal 
Goal: *Describes home care/support arrangements established based on need Outcome: Progressing Towards Goal 
Goal: *Describes available resources and support systems Outcome: Progressing Towards Goal 
Goal: *Anxiety reduced or absent Outcome: Progressing Towards Goal 
Goal: *Understands and describes signs and symptoms to report to providers(Stroke Metric) Outcome: Progressing Towards Goal 
Goal: *Hemodynamically stable Outcome: Progressing Towards Goal 
Goal: *Maintains normothermia Outcome: Progressing Towards Goal 
Goal: *Verbalizes acceptable level of comfort with minimal use of antipyretics Outcome: Progressing Towards Goal 
Goal: *Tolerating diet Outcome: Progressing Towards Goal 
Goal: *Absence of nausea/vomiting Outcome: Progressing Towards Goal 
Goal: *Verbalizes and demonstrates neutropenic precautions Outcome: Progressing Towards Goal 
Goal: *Verbalizes understanding and describes medication purposes and frequencies Outcome: Progressing Towards Goal 
Goal: *Adequate oxygenation Outcome: Progressing Towards Goal

## 2019-03-31 VITALS
HEART RATE: 76 BPM | TEMPERATURE: 97.7 F | WEIGHT: 273.1 LBS | BODY MASS INDEX: 38.09 KG/M2 | RESPIRATION RATE: 18 BRPM | OXYGEN SATURATION: 96 % | SYSTOLIC BLOOD PRESSURE: 140 MMHG | DIASTOLIC BLOOD PRESSURE: 82 MMHG

## 2019-03-31 LAB
ALBUMIN SERPL-MCNC: 3.1 G/DL (ref 3.2–4.6)
ALBUMIN/GLOB SERPL: 0.9 {RATIO} (ref 1.2–3.5)
ALP SERPL-CCNC: 32 U/L (ref 50–136)
ALT SERPL-CCNC: 31 U/L (ref 12–65)
ANION GAP SERPL CALC-SCNC: 6 MMOL/L (ref 7–16)
AST SERPL-CCNC: 16 U/L (ref 15–37)
BILIRUB SERPL-MCNC: 0.5 MG/DL (ref 0.2–1.1)
BUN SERPL-MCNC: 11 MG/DL (ref 8–23)
CALCIUM SERPL-MCNC: 8.6 MG/DL (ref 8.3–10.4)
CHLORIDE SERPL-SCNC: 110 MMOL/L (ref 98–107)
CO2 SERPL-SCNC: 24 MMOL/L (ref 21–32)
CREAT SERPL-MCNC: 0.87 MG/DL (ref 0.8–1.5)
DIFFERENTIAL METHOD BLD: ABNORMAL
ERYTHROCYTE [DISTWIDTH] IN BLOOD BY AUTOMATED COUNT: 12.1 % (ref 11.9–14.6)
GLOBULIN SER CALC-MCNC: 3.6 G/DL (ref 2.3–3.5)
GLUCOSE SERPL-MCNC: 95 MG/DL (ref 65–100)
HCT VFR BLD AUTO: 26.8 % (ref 41.1–50.3)
HGB BLD-MCNC: 9.2 G/DL (ref 13.6–17.2)
MAGNESIUM SERPL-MCNC: 1.9 MG/DL (ref 1.8–2.4)
MCH RBC QN AUTO: 30.7 PG (ref 26.1–32.9)
MCHC RBC AUTO-ENTMCNC: 34.3 G/DL (ref 31.4–35)
MCV RBC AUTO: 89.3 FL (ref 79.6–97.8)
NRBC # BLD: 0 K/UL (ref 0–0.2)
PLATELET # BLD AUTO: 25 K/UL (ref 150–450)
PLATELET COMMENTS,PCOM: ABNORMAL
PMV BLD AUTO: 11.5 FL (ref 9.4–12.3)
POTASSIUM SERPL-SCNC: 4.3 MMOL/L (ref 3.5–5.1)
PROT SERPL-MCNC: 6.7 G/DL (ref 6.3–8.2)
RBC # BLD AUTO: 3 M/UL (ref 4.23–5.6)
RBC MORPH BLD: ABNORMAL
SODIUM SERPL-SCNC: 140 MMOL/L (ref 136–145)
WBC # BLD AUTO: 0.3 K/UL (ref 4.3–11.1)
WBC MORPH BLD: ABNORMAL

## 2019-03-31 PROCEDURE — 74011250636 HC RX REV CODE- 250/636: Performed by: NURSE PRACTITIONER

## 2019-03-31 PROCEDURE — 74011250637 HC RX REV CODE- 250/637: Performed by: NURSE PRACTITIONER

## 2019-03-31 PROCEDURE — 74011250637 HC RX REV CODE- 250/637: Performed by: INTERNAL MEDICINE

## 2019-03-31 PROCEDURE — 99239 HOSP IP/OBS DSCHRG MGMT >30: CPT | Performed by: INTERNAL MEDICINE

## 2019-03-31 PROCEDURE — 36592 COLLECT BLOOD FROM PICC: CPT

## 2019-03-31 PROCEDURE — 74011250636 HC RX REV CODE- 250/636: Performed by: INTERNAL MEDICINE

## 2019-03-31 PROCEDURE — 80053 COMPREHEN METABOLIC PANEL: CPT

## 2019-03-31 PROCEDURE — 83735 ASSAY OF MAGNESIUM: CPT

## 2019-03-31 PROCEDURE — 85025 COMPLETE CBC W/AUTO DIFF WBC: CPT

## 2019-03-31 RX ADMIN — Medication 10 ML: at 13:10

## 2019-03-31 RX ADMIN — SENNOSIDES, DOCUSATE SODIUM 1 TABLET: 50; 8.6 TABLET, FILM COATED ORAL at 08:23

## 2019-03-31 RX ADMIN — FLUCONAZOLE 200 MG: 100 TABLET ORAL at 08:23

## 2019-03-31 RX ADMIN — LEVOFLOXACIN 500 MG: 500 TABLET, FILM COATED ORAL at 13:09

## 2019-03-31 RX ADMIN — POLYETHYLENE GLYCOL 3350 17 G: 17 POWDER, FOR SOLUTION ORAL at 08:24

## 2019-03-31 RX ADMIN — Medication 600 UNITS: at 13:10

## 2019-03-31 RX ADMIN — TBO-FILGRASTIM 480 MCG: 300 INJECTION, SOLUTION SUBCUTANEOUS at 08:24

## 2019-03-31 RX ADMIN — VALGANCICLOVIR 450 MG: 450 TABLET, FILM COATED ORAL at 08:20

## 2019-03-31 NOTE — DISCHARGE INSTRUCTIONS
DISCHARGE SUMMARY from Nurse    PATIENT INSTRUCTIONS:    While taking prescription Narcotics:  · Limit your activities  · Do not drive and operate hazardous machinery  · Do not make important personal or business decisions  · Do  not drink alcoholic beverages  ·   Please call physician after your discharge if the following symptoms present:    1. Temperature greater than 100.4  2. Heart rate greater than 90 beats per minute  3. Increased respirations   4. Chills  5. Cough with any sputum (color: yellow, white, green, or bloody?)  6. Shortness of breath or change in breathing pattern  7. Bleeding:  Any prolonged bleeding presented from skin tears, cuts, bleeding from brushing teeth, nose bleed, bleeding noted in stool  8. Tenderness, swelling, or drainage from IV site or central line catheter    Diet:Regular    Neutropenic Precautions  Thrombocytopenic Precautions    MD office #:[phone]    What to do at Home:  Recommended activity: Activity as tolerated    *  Please give a list of your current medications to your Primary Care Provider. *  Please update this list whenever your medications are discontinued, doses are      changed, or new medications (including over-the-counter products) are added. *  Please carry medication information at all times in case of emergency situations. These are general instructions for a healthy lifestyle:    No smoking/ No tobacco products/ Avoid exposure to second hand smoke  Surgeon General's Warning:  Quitting smoking now greatly reduces serious risk to your health.     Obesity, smoking, and sedentary lifestyle greatly increases your risk for illness    A healthy diet, regular physical exercise & weight monitoring are important for maintaining a healthy lifestyle    You may be retaining fluid if you have a history of heart failure or if you experience any of the following symptoms:  Weight gain of 3 pounds or more overnight or 5 pounds in a week, increased swelling in our hands or feet or shortness of breath while lying flat in bed. Please call your doctor as soon as you notice any of these symptoms; do not wait until your next office visit. Recognize signs and symptoms of STROKE:    F-face looks uneven    A-arms unable to move or move unevenly    S-speech slurred or non-existent    T-time-call 911 as soon as signs and symptoms begin-DO NOT go       Back to bed or wait to see if you get better-TIME IS BRAIN. Warning Signs of HEART ATTACK     Call 911 if you have these symptoms:   Chest discomfort. Most heart attacks involve discomfort in the center of the chest that lasts more than a few minutes, or that goes away and comes back. It can feel like uncomfortable pressure, squeezing, fullness, or pain.  Discomfort in other areas of the upper body. Symptoms can include pain or discomfort in one or both arms, the back, neck, jaw, or stomach.  Shortness of breath with or without chest discomfort.  Other signs may include breaking out in a cold sweat, nausea, or lightheadedness. Don't wait more than five minutes to call 911 - MINUTES MATTER! Fast action can save your life. Calling 911 is almost always the fastest way to get lifesaving treatment. Emergency Medical Services staff can begin treatment when they arrive -- up to an hour sooner than if someone gets to the hospital by car. The discharge information has been reviewed with the patient. The patient verbalized understanding. Discharge medications reviewed with the patient and appropriate educational materials and side effects teaching were provided.   ___________________________________________________________________________________________________________________________________

## 2019-03-31 NOTE — PROGRESS NOTES
D/C instructions reviewed with pt and spouse. Pt verbalized understanding. PICC dsg c/d/i at d/c. Pt declined WC assistance; self ambulatory at d/c.

## 2019-03-31 NOTE — PROGRESS NOTES
Report received from [facility]. Pt awake and sitting on sofa, no needs at present, NAD.

## 2019-03-31 NOTE — PROGRESS NOTES
Problem: Falls - Risk of 
Goal: *Absence of Falls Description Document Lul Sherman Fall Risk and appropriate interventions in the flowsheet. Outcome: Progressing Towards Goal 
  
Problem: Patient Education: Go to Patient Education Activity Goal: Patient/Family Education Outcome: Progressing Towards Goal 
  
Problem: Neutropenic Fever: Discharge Outcomes Goal: *Demonstrates ability to perform prescribed activity without shortness of breath or discomfort Outcome: Progressing Towards Goal 
Goal: *Verbalizes understanding and describes prescribed diet Outcome: Progressing Towards Goal 
Goal: *Describes follow-up/return visits to physicians Outcome: Progressing Towards Goal 
Goal: *Describes home care/support arrangements established based on need Outcome: Progressing Towards Goal 
Goal: *Describes available resources and support systems Outcome: Progressing Towards Goal 
Goal: *Anxiety reduced or absent Outcome: Progressing Towards Goal 
Goal: *Understands and describes signs and symptoms to report to providers(Stroke Metric) Outcome: Progressing Towards Goal 
Goal: *Hemodynamically stable Outcome: Progressing Towards Goal 
Goal: *Maintains normothermia Outcome: Progressing Towards Goal 
Goal: *Verbalizes acceptable level of comfort with minimal use of antipyretics Outcome: Progressing Towards Goal 
Goal: *Tolerating diet Outcome: Progressing Towards Goal 
Goal: *Absence of nausea/vomiting Outcome: Progressing Towards Goal 
Goal: *Verbalizes and demonstrates neutropenic precautions Outcome: Progressing Towards Goal 
Goal: *Verbalizes understanding and describes medication purposes and frequencies Outcome: Progressing Towards Goal 
Goal: *Adequate oxygenation Outcome: Progressing Towards Goal

## 2019-03-31 NOTE — PROGRESS NOTES
END OF SHIFT NOTE: 
 
Intake/Output 03/30 1901 - 03/31 0700 In: 2299 [P.O.:1500; I.V.:799] Out: 0449 [RQPMA:7004] Voiding: YES Catheter: NO 
Drain:   
 
 
 
 
Stool:  1 occurrence. Stool Assessment Stool Color: Brown (03/30/19 1950) Stool Appearance: Soft(per pt had to strain) (03/30/19 1950) Stool Amount: Small (03/30/19 1950) Stool Source/Status: Rectum (03/30/19 1950) Emesis:  0 occurrences. VITAL SIGNS Patient Vitals for the past 12 hrs: 
 Temp Pulse Resp BP SpO2  
03/31/19 0326 98.7 °F (37.1 °C) 69 16 128/67 95 % 03/30/19 2335 97.6 °F (36.4 °C) 66 18 140/87 99 % 03/1955 98.7 °F (37.1 °C) 82 18 118/59 97 % Pain Assessment Pain 1 Pain Scale 1: Numeric (0 - 10) (03/31/19 0110) Pain Intensity 1: 0 (03/31/19 0110) Patient Stated Pain Goal: 0 (03/31/19 0110) Pain Reassessment 1: Yes (03/30/19 0913) Pain Onset 1: this am (03/30/19 0820) Pain Location 1: Head (03/30/19 0820) Pain Orientation 1: Anterior (03/30/19 0820) Pain Description 1: Aching (03/30/19 0820) Pain Intervention(s) 1: Medication (see MAR) (03/30/19 0820) Ambulating Yes Additional Information: Afebrile;no bleeding. Weight gain of 2.2 kgs noted;monitored urine output;voiding large amount of urine;swelling noted BLE. Shift report given to oncoming nurse SYLVIA Damico at the bedside.  
 
Nayeli Cali RN

## 2019-03-31 NOTE — DISCHARGE SUMMARY
[facility]: Inpatient Hematology / Oncology Discharge Summary Note    Patient ID:  Edelmira Jerez  [id number]  59 y.o.  1955    Admit Date: 3/25/2019    Discharge Date: 3/31/2019    Admission Diagnoses: Neutropenic fever ([facility] [D70.9, R50.81]    Discharge Diagnoses:  Principal Diagnosis: <principal problem not specified>  Active Problems:    Neutropenic fever ([facility] (3/25/2019)        Hospital Course:  Mr. Latasha Estrada is a 59 y.o male admitted on 03/25/2019 for neutropenic fever. He is a patient of Dr. Bharti Pedroza. In 12/2018 he was diagnosed with T-Cell Pro-Lymphocytic Leukemia, in 1/2019 he was started on Alemtuzumab and has received 8 weeks of it. He presented to the clinic with c/o chills/rigors.  on admission. No source of infection was found. Cultures negative. He was treated with Cef/Vanc, then transitioned to oral LVQ. He has remained afebrile and no longer having chills. He received G-CSF x 6 days with no changes in his WBC. Plan was to perform BMbx tomorrow, but pt requests to be discharge home and to f/u as OP for BMbx. He is scheduled to f/u with Dr. Bharti Pedroza tomorrow. He will continue his prophylactic Cipro upon discharge. Advised to call with fever, chills, uncontrollable symptoms, or with any other concerns. Pt verbalizes understanding.     Consults:  None    Pertinent Diagnostic Studies:   Labs:    Recent Labs     03/31/19 0315 03/30/19 0259 03/29/19  0326   WBC 0.3* 0.3* 0.2*   HGB 9.2* 8.8* 9.8*   PLT 25* 27* 33*      Recent Labs     03/31/19 0315 03/30/19  0259 03/29/19  0326    141 141   K 4.3 4.1 4.1   * 111* 111*   CO2 24 23 23   GLU 95 98 100   BUN 11 12 10   CREA 0.87 0.82 0.91   CA 8.6 8.1* 8.2*   SGOT 16 19 20   AP 32* 30* 29*   TP 6.7 6.4 6.6   ALB 3.1* 2.9* 2.9*   MG 1.9 1.9 2.0       Imaging:  XR CHEST PA LAT [[id number]] Collected: 03/25/19 1737   Order Status: Completed Updated: 03/25/19 1740   Narrative:     AP LATERAL CHEST  3/25/2019 5:36 PM     HISTORY:  neutropenic fever    COMPARISON: None    FINDINGS: A right-sided PICC line is present with catheter tip in the SVC. There  is no lobar consolidation or pleural effusions. Impression:     IMPRESSION: No consolidation. Current Discharge Medication List      CONTINUE these medications which have NOT CHANGED    Details   valGANciclovir (VALCYTE) 450 mg tablet Take 2 Tabs by mouth two (2) times a day. Qty: 120 Tab, Refills: 2      ondansetron (ZOFRAN ODT) 8 mg disintegrating tablet Take 1 Tab by mouth every eight (8) hours as needed for Nausea. Qty: 90 Tab, Refills: 0      lovastatin (MEVACOR) 10 mg tablet Take 1 Tab by mouth nightly. Qty: 90 Tab, Refills: 3    Associated Diagnoses: Pure hypercholesterolemia      olmesartan-hydroCHLOROthiazide (BENICAR HCT) 20-12.5 mg per tablet Take 1 Tab by mouth daily. Qty: 90 Tab, Refills: 3    Associated Diagnoses: Essential hypertension, benign      DISABLED PLACARD (DISABLED PLACARD) DMV Handicap parking permit  Qty: 1 Each, Refills: 0    Associated Diagnoses: DDD (degenerative disc disease), lumbar      CETIRIZINE HCL (ZYRTEC PO) Take  by mouth.      multivitamin (ONE A DAY) tablet Take 1 Tab by mouth daily. DOCOSAHEXANOIC ACID/EPA (FISH OIL PO) Take  by mouth. ciprofloxacin HCl (CIPRO) 500 mg tablet Take 1 Tab by mouth two (2) times a day. Qty: 60 Tab, Refills: 0      fluconazole (DIFLUCAN) 200 mg tablet Take 1 Tab by mouth daily. FDA advises cautious prescribing of oral fluconazole in pregnancy. Qty: 30 Tab, Refills: 0    Associated Diagnoses: Prolymphocytic leukemia of T-cell (HCC)      predniSONE (DELTASONE) 5 mg tablet Take 1 Tab by mouth every twelve (12) hours as needed. Qty: 60 Tab, Refills: 0      prochlorperazine (COMPAZINE) 10 mg tablet Take 0.5-1 Tabs by mouth every six (6) hours as needed. Qty: 90 Tab, Refills: 0      VITAMIN A/VITAMIN D3 (NATURAL VITAMIN D PO) Take  by mouth.                  OBJECTIVE:  Patient Vitals for the past 8 hrs:   BP Temp Pulse Resp SpO2   19 1031 140/82 97.7 °F (36.5 °C) 76 18 96 %   19 0704 120/82 98.4 °F (36.9 °C) 65 17 98 %     Temp (24hrs), Av.2 °F (36.8 °C), Min:97.6 °F (36.4 °C), Max:98.7 °F (37.1 °C)     07 -  1900  In: 940 [P.O.:750; I.V.:190]  Out: 700 [Urine:700]    Physical Exam:  Constitutional: Well developed, well nourished male in no acute distress, sitting comfortably on the bedside couch. HEENT: Normocephalic and atraumatic. Oropharynx is clear, mucous membranes are moist.   Extraocular muscles are intact. Sclerae anicteric. Neck supple without JVD. No thyromegaly present. Lymph node   No palpable submandibular, cervical, supraclavicular, axillary or inguinal lymph nodes. Skin Warm and dry. No bruising and no rash noted. No erythema. No pallor. Respiratory Lungs are clear to auscultation bilaterally without wheezes, rales or rhonchi, normal air exchange without accessory muscle use. CVS Normal rate, regular rhythm and normal S1 and S2. No murmurs, gallops, or rubs. Abdomen Soft, nontender and nondistended, normoactive bowel sounds. No palpable mass. No hepatosplenomegaly. Neuro Grossly nonfocal with no obvious sensory or motor deficits. MSK Normal range of motion in general.  No edema and no tenderness. Psych Appropriate mood and affect. ASSESSMENT:    Active Problems:    Neutropenic fever ([address] (3/25/2019)        DISPOSITION:  Follow-up Appointments   Procedures    FOLLOW UP VISIT Appointment in: Other (Sridevi Pastor) Follow up as previously scheduled tomorrow with Dr. Raudel Richards     Follow up as previously scheduled tomorrow with Dr. Raudel Richards     Standing Status:   Standing     Number of Occurrences:   1     Order Specific Question:   Appointment in     Answer: Other (Specify)           Over 30 minutes was spent in discharge planning and coordination of care.             Vj Mascorro NP  [facility] Hematology & Oncology  [facility] [address] [place] [id number]  Office : [phone]  Fax : [phone]   I personally saw, exammed and counselled the patient, and discussed with NP, agree with above history/assessment/plan. 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 I don not think he would resume campath rx, plan for marrow biopsy next week to evaluate the leukemia status and the persistent cytopenia. Lactulose prn constipation. DC as above arranged with levaquin and follow in office, marrow next week.       Franca Baumann M.D.   [address]  Office : [phone]  Fax : [phone]

## 2019-04-01 ENCOUNTER — HOSPITAL ENCOUNTER (OUTPATIENT)
Dept: LAB | Age: 64
Discharge: HOME | End: 2019-04-01
Payer: COMMERCIAL

## 2019-04-01 ENCOUNTER — PATIENT OUTREACH (OUTPATIENT)
Dept: CASE MANAGEMENT | Age: 64
End: 2019-04-01

## 2019-04-01 ENCOUNTER — HOSPITAL ENCOUNTER (OUTPATIENT)
Dept: INFUSION THERAPY | Age: 64
Discharge: HOME | End: 2019-04-01
Payer: COMMERCIAL

## 2019-04-01 DIAGNOSIS — C91.60 PROLYMPHOCYTIC LEUKEMIA OF T-CELL (HCC): ICD-10-CM

## 2019-04-01 LAB
ALBUMIN SERPL-MCNC: 3.6 G/DL (ref 3.2–4.6)
ALBUMIN/GLOB SERPL: 0.9 {RATIO} (ref 1.2–3.5)
ALP SERPL-CCNC: 34 U/L (ref 50–136)
ALT SERPL-CCNC: 33 U/L (ref 12–65)
ANION GAP SERPL CALC-SCNC: 6 MMOL/L (ref 7–16)
AST SERPL-CCNC: 22 U/L (ref 15–37)
BILIRUB SERPL-MCNC: 0.7 MG/DL (ref 0.2–1.1)
BUN SERPL-MCNC: 14 MG/DL (ref 8–23)
CALCIUM SERPL-MCNC: 9.1 MG/DL (ref 8.3–10.4)
CHLORIDE SERPL-SCNC: 109 MMOL/L (ref 98–107)
CO2 SERPL-SCNC: 25 MMOL/L (ref 21–32)
CREAT SERPL-MCNC: 1.04 MG/DL (ref 0.8–1.5)
DIFFERENTIAL METHOD BLD: ABNORMAL
ERYTHROCYTE [DISTWIDTH] IN BLOOD BY AUTOMATED COUNT: 12.3 % (ref 11.9–14.6)
GLOBULIN SER CALC-MCNC: 4 G/DL (ref 2.3–3.5)
GLUCOSE SERPL-MCNC: 107 MG/DL (ref 65–100)
HCT VFR BLD AUTO: 30.3 % (ref 41.1–50.3)
HGB BLD-MCNC: 10.9 G/DL (ref 13.6–17.2)
MAGNESIUM SERPL-MCNC: 2 MG/DL (ref 1.8–2.4)
MCH RBC QN AUTO: 31.5 PG (ref 26.1–32.9)
MCHC RBC AUTO-ENTMCNC: 36 G/DL (ref 31.4–35)
MCV RBC AUTO: 87.6 FL (ref 79.6–97.8)
NRBC # BLD: 0.01 K/UL (ref 0–0.2)
PLATELET # BLD AUTO: 24 K/UL (ref 150–450)
PLATELET COMMENTS,PCOM: ABNORMAL
PMV BLD AUTO: 10.7 FL (ref 9.4–12.3)
POTASSIUM SERPL-SCNC: 4 MMOL/L (ref 3.5–5.1)
PROT SERPL-MCNC: 7.6 G/DL (ref 6.3–8.2)
RBC # BLD AUTO: 3.46 M/UL (ref 4.23–5.67)
RBC MORPH BLD: ABNORMAL
RBC MORPH BLD: ABNORMAL
SODIUM SERPL-SCNC: 140 MMOL/L (ref 136–145)
WBC # BLD AUTO: 0.3 K/UL (ref 4.3–11.1)
WBC MORPH BLD: ABNORMAL

## 2019-04-01 PROCEDURE — 80053 COMPREHEN METABOLIC PANEL: CPT

## 2019-04-01 PROCEDURE — 85025 COMPLETE CBC W/AUTO DIFF WBC: CPT

## 2019-04-01 PROCEDURE — 36592 COLLECT BLOOD FROM PICC: CPT

## 2019-04-01 PROCEDURE — 83735 ASSAY OF MAGNESIUM: CPT

## 2019-04-01 RX ORDER — SODIUM CHLORIDE 9 MG/ML
250 INJECTION, SOLUTION INTRAVENOUS AS NEEDED
Status: DISCONTINUED | OUTPATIENT
Start: 2019-04-01 | End: 2019-04-05 | Stop reason: HOSPADM

## 2019-04-01 RX ADMIN — Medication 10 ML: at 11:00

## 2019-04-01 NOTE — PROGRESS NOTES
4/1/19:  Patient in for hospital follow-up with Dr. Mary Carrasquillo. Dr. Mary Carrasquillo reviewed labs and assessed the patient. Patient feeling better and glad to be at home. Dr. Mary Carrasquillo would like to hold off on campath and patient to have bone marrow biopsy in the near future. Patient requests bone marrow biopsy to be done in IR with sedation. Patient platelets lower today and will need infusion appt prior to bone marrow for possible platelets. Discussed PCP prophylaxis with Dr. Mary Carrasquillo and plan is to start nebulized pentamidine monthly. Per Dr. Mary Carrasquillo, patient only needs weekly visits at this point. Infusion to change PICC line dressing today.

## 2019-04-01 NOTE — PROGRESS NOTES
Arrived to the [facility]. Assessment, labs, and PICC care completed. Patient tolerated well. Any issues or concerns during appointment: No. 
Patient aware of next infusion appointment on 4/08/19 at [address] [place] [address]. 
Discharged ambulatory with spouse.

## 2019-04-01 NOTE — PROGRESS NOTES
This note will not be viewable in [address] Transition of Care Discharge Follow-up Questionnaire Date/Time of Call: 
 4/1/19 
 334pm  
What was the patient hospitalized for? Neutropenic fever Hx:  T-Cell Pro-Lymphocytic Leukemia Does the patient understand his/her diagnosis and/or treatment and what happened during the hospitalization? Yes, spoke with patient, he states understanding of diagnosis and treatment; and is agreeable to call. Patient states he is doing well Did the patient receive discharge instructions? Yes   
CM Assessed Risk for Readmission:  
 
 
Patient stated Risk for Readmission: Low/moderate r/t diagnosis and/or comorbidities None stated Review any discharge instructions (see discharge instructions/AVS in [facility]). Ask patient if they understand these. Do they have any questions? Reviewed, understanding is stated, no questions at this time Were home services ordered (nursing, PT, OT, ST, etc.)? No 
  
If so, has the first visit occurred? If not, why? (Assist with coordination of services if necessary.) 
 N/A Was any DME ordered? No 
  
If so, has it been received? If not, why?  (Assist patient in obtaining DME orders &/or equipment if necessary.) N/A Complete a review of all medications (new, continued and discontinued meds per the D/C instructions and medication tab in [facility]). Completed ASK how to take: 
desoximetasone 0.25 % topical cream (TOPICORT) Were all new prescriptions filled? If not, why?  (Assist patient in obtaining medications if necessary  escalate for CCM &/or SW if ongoing issues are verbalized by pt or anticipated) No new medications at discharge Does the patient understand the purpose and dosing instructions for all medications? (If patient has questions, provide explanation and education.) Yes Does the patient have any problems in performing ADLs? (If patient is unable to perform ADLs  what is the limiting factor(s)? Do they have a support system that can assist? If no support system is present, discuss possible assistance that they may be able to obtain. Escalate for CCM/SW if ongoing issues are verbalized by pt or anticipated) Independent with ADLs Does the patient have all follow-up appointments scheduled? 7 day f/up with PCP?  
(f/up with PCP may be w/in 14 days if patient has a f/up with their specialist w/in 7 days) 7-14 day f/up with specialist?  
(or per discharge instructions) If f/up has not been made  what actions has the care coordinator made to accomplish this? Has transportation been arranged? Yes   
 
 
Wilber Bodily  patient primarily following care with Dr. Radha Gonzalez  at this time Dr. Radha Gonzalez 4/1/19, patient attended next visit 4/8/19 and weekly following Yes, no transportation needs identified at this time Any other questions or concerns expressed by the patient? No other needs or concerns identified. Patient states his gratitude for follow up. Contact information for [facility] was given, instructed to call with new questions or concerns. Schedule next appointment with [employer] Coordinator or refer to RN Case Manager/ per the workflow guidelines. When is care coordinators next follow-up call scheduled? If referred for CCM  what RN care manager was the referral assigned? Community Care Coordinator will follow per workflow guidelines. Followed by oncology nurse navigator Within 30 days CARLOS Call Completed By: Karma Luther LPN Community Care Coordinator

## 2019-04-02 RX ORDER — SODIUM CHLORIDE 0.9 % (FLUSH) 0.9 %
5 SYRINGE (ML) INJECTION AS NEEDED
Status: DISCONTINUED | OUTPATIENT
Start: 2019-04-02 | End: 2019-04-05 | Stop reason: HOSPADM

## 2019-04-03 ENCOUNTER — HOSPITAL ENCOUNTER (OUTPATIENT)
Dept: INFUSION THERAPY | Age: 64
End: 2019-04-03
Payer: COMMERCIAL

## 2019-04-04 LAB
CMV DNA SERPL NAA+PROBE-ACNC: NEGATIVE IU/ML
CMV DNA SERPL NAA+PROBE-LOG IU: NORMAL LOG10 IU/ML

## 2019-04-05 ENCOUNTER — APPOINTMENT (OUTPATIENT)
Dept: INFUSION THERAPY | Age: 64
End: 2019-04-05
Payer: COMMERCIAL

## 2019-04-07 ENCOUNTER — HOSPITAL ENCOUNTER (OUTPATIENT)
Dept: INFUSION THERAPY | Age: 64
Discharge: HOME | End: 2019-04-07
Payer: COMMERCIAL

## 2019-04-07 VITALS
RESPIRATION RATE: 18 BRPM | HEART RATE: 71 BPM | DIASTOLIC BLOOD PRESSURE: 65 MMHG | SYSTOLIC BLOOD PRESSURE: 100 MMHG | BODY MASS INDEX: 37.54 KG/M2 | WEIGHT: 261.6 LBS | OXYGEN SATURATION: 98 % | TEMPERATURE: 98.3 F

## 2019-04-07 DIAGNOSIS — C91.60 PROLYMPHOCYTIC LEUKEMIA OF T-CELL (HCC): ICD-10-CM

## 2019-04-07 LAB
ABO + RH BLD: NORMAL
BLOOD GROUP ANTIBODIES SERPL: NORMAL
PLATELET # BLD AUTO: 69 K/UL (ref 150–450)
SPECIMEN EXP DATE BLD: NORMAL

## 2019-04-07 PROCEDURE — 86900 BLOOD TYPING SEROLOGIC ABO: CPT

## 2019-04-07 PROCEDURE — 74011250637 HC RX REV CODE- 250/637: Performed by: NURSE PRACTITIONER

## 2019-04-07 PROCEDURE — 85049 AUTOMATED PLATELET COUNT: CPT

## 2019-04-07 PROCEDURE — 86644 CMV ANTIBODY: CPT

## 2019-04-07 PROCEDURE — 36430 TRANSFUSION BLD/BLD COMPNT: CPT

## 2019-04-07 PROCEDURE — 77030018667 ADMN ST IV BLD FENW -A

## 2019-04-07 PROCEDURE — P9037 PLATE PHERES LEUKOREDU IRRAD: HCPCS

## 2019-04-07 PROCEDURE — 74011250636 HC RX REV CODE- 250/636: Performed by: NURSE PRACTITIONER

## 2019-04-07 RX ORDER — DIPHENHYDRAMINE HCL 25 MG
25 CAPSULE ORAL ONCE
Status: COMPLETED | OUTPATIENT
Start: 2019-04-07 | End: 2019-04-07

## 2019-04-07 RX ORDER — SODIUM CHLORIDE 0.9 % (FLUSH) 0.9 %
10 SYRINGE (ML) INJECTION AS NEEDED
Status: DISCONTINUED | OUTPATIENT
Start: 2019-04-07 | End: 2019-04-11 | Stop reason: HOSPADM

## 2019-04-07 RX ORDER — ACETAMINOPHEN 325 MG/1
650 TABLET ORAL ONCE
Status: COMPLETED | OUTPATIENT
Start: 2019-04-07 | End: 2019-04-07

## 2019-04-07 RX ORDER — SODIUM CHLORIDE 9 MG/ML
250 INJECTION, SOLUTION INTRAVENOUS AS NEEDED
Status: DISCONTINUED | OUTPATIENT
Start: 2019-04-07 | End: 2019-04-11 | Stop reason: HOSPADM

## 2019-04-07 RX ADMIN — DIPHENHYDRAMINE HYDROCHLORIDE 25 MG: 25 CAPSULE ORAL at 16:16

## 2019-04-07 RX ADMIN — SODIUM CHLORIDE 250 ML: 900 INJECTION, SOLUTION INTRAVENOUS at 16:09

## 2019-04-07 RX ADMIN — ACETAMINOPHEN 650 MG: 325 TABLET, FILM COATED ORAL at 16:16

## 2019-04-07 NOTE — PROGRESS NOTES
Arrived to the [facility]. Patient having BM Bx in AM and to receive platelets. Patient has no history of transfusion at [facility], [address] [address] had to be redrawn. Patient transfused 1 unit platelets. Repeat platelet count 30 minutes post transfusion of 69 K. Patient instructed not to cut blood bracelet off in case needs transfusion in AM in IR. Patient tolerated well. Any issues or concerns during appointment: None. Next infusion appointment 4/10 at 1600. Discharged ambulatory in stable condition.

## 2019-04-08 ENCOUNTER — HOSPITAL ENCOUNTER (OUTPATIENT)
Dept: INFUSION THERAPY | Age: 64
End: 2019-04-08

## 2019-04-08 ENCOUNTER — HOSPITAL ENCOUNTER (OUTPATIENT)
Dept: LAB | Age: 64
Discharge: HOME | End: 2019-04-08
Attending: INTERNAL MEDICINE
Payer: COMMERCIAL

## 2019-04-08 ENCOUNTER — HOSPITAL ENCOUNTER (OUTPATIENT)
Dept: CT IMAGING | Age: 64
Discharge: HOME | End: 2019-04-08
Attending: INTERNAL MEDICINE
Payer: COMMERCIAL

## 2019-04-08 ENCOUNTER — APPOINTMENT (OUTPATIENT)
Dept: INFUSION THERAPY | Age: 64
End: 2019-04-08
Payer: COMMERCIAL

## 2019-04-08 VITALS
TEMPERATURE: 98 F | OXYGEN SATURATION: 100 % | DIASTOLIC BLOOD PRESSURE: 82 MMHG | HEART RATE: 64 BPM | RESPIRATION RATE: 16 BRPM | SYSTOLIC BLOOD PRESSURE: 159 MMHG

## 2019-04-08 DIAGNOSIS — C91.60 PROLYMPHOCYTIC LEUKEMIA OF T-CELL (HCC): ICD-10-CM

## 2019-04-08 LAB
BLD PROD TYP BPU: NORMAL
BONE MARROW PREP & W,BMA: NORMAL
BPU ID: NORMAL
DIFFERENTIAL METHOD BLD: ABNORMAL
ERYTHROCYTE [DISTWIDTH] IN BLOOD BY AUTOMATED COUNT: 12.2 % (ref 11.9–14.6)
HCT VFR BLD AUTO: 28.7 % (ref 41.1–50.3)
HGB BLD-MCNC: 10 G/DL (ref 13.6–17.2)
MCH RBC QN AUTO: 31.2 PG (ref 26.1–32.9)
MCHC RBC AUTO-ENTMCNC: 34.8 G/DL (ref 31.4–35)
MCV RBC AUTO: 89.4 FL (ref 79.6–97.8)
NRBC # BLD: 0 K/UL (ref 0–0.2)
PLATELET # BLD AUTO: 57 K/UL (ref 150–450)
PMV BLD AUTO: 11.2 FL (ref 9.4–12.3)
RBC # BLD AUTO: 3.21 M/UL (ref 4.23–5.6)
STATUS OF UNIT,%ST: NORMAL
UNIT DIVISION, %UDIV: 0
WBC # BLD AUTO: 0.4 K/UL (ref 4.3–11.1)

## 2019-04-08 PROCEDURE — 74011250636 HC RX REV CODE- 250/636

## 2019-04-08 PROCEDURE — 88311 DECALCIFY TISSUE: CPT

## 2019-04-08 PROCEDURE — 88313 SPECIAL STAINS GROUP 2: CPT

## 2019-04-08 PROCEDURE — 85025 COMPLETE CBC W/AUTO DIFF WBC: CPT

## 2019-04-08 PROCEDURE — 99152 MOD SED SAME PHYS/QHP 5/>YRS: CPT

## 2019-04-08 PROCEDURE — 88185 FLOWCYTOMETRY/TC ADD-ON: CPT

## 2019-04-08 PROCEDURE — 88305 TISSUE EXAM BY PATHOLOGIST: CPT

## 2019-04-08 PROCEDURE — 88312 SPECIAL STAINS GROUP 1: CPT

## 2019-04-08 PROCEDURE — 74011250636 HC RX REV CODE- 250/636: Performed by: RADIOLOGY

## 2019-04-08 PROCEDURE — 77012 CT SCAN FOR NEEDLE BIOPSY: CPT

## 2019-04-08 PROCEDURE — 88184 FLOWCYTOMETRY/ TC 1 MARKER: CPT

## 2019-04-08 RX ORDER — LIDOCAINE HYDROCHLORIDE 20 MG/ML
1-20 INJECTION, SOLUTION EPIDURAL; INFILTRATION; INTRACAUDAL; PERINEURAL ONCE
Status: COMPLETED | OUTPATIENT
Start: 2019-04-08 | End: 2019-04-08

## 2019-04-08 RX ORDER — FENTANYL CITRATE 50 UG/ML
25-50 INJECTION, SOLUTION INTRAMUSCULAR; INTRAVENOUS
Status: DISCONTINUED | OUTPATIENT
Start: 2019-04-08 | End: 2019-04-12 | Stop reason: HOSPADM

## 2019-04-08 RX ORDER — SODIUM CHLORIDE 9 MG/ML
25 INJECTION, SOLUTION INTRAVENOUS ONCE
Status: DISCONTINUED | OUTPATIENT
Start: 2019-04-08 | End: 2019-04-09 | Stop reason: HOSPADM

## 2019-04-08 RX ORDER — MIDAZOLAM HYDROCHLORIDE 1 MG/ML
.5-2 INJECTION, SOLUTION INTRAMUSCULAR; INTRAVENOUS
Status: DISCONTINUED | OUTPATIENT
Start: 2019-04-08 | End: 2019-04-12 | Stop reason: HOSPADM

## 2019-04-08 RX ADMIN — FENTANYL CITRATE 50 MCG: 50 INJECTION, SOLUTION INTRAMUSCULAR; INTRAVENOUS at 13:10

## 2019-04-08 RX ADMIN — MIDAZOLAM HYDROCHLORIDE 1 MG: 1 INJECTION, SOLUTION INTRAMUSCULAR; INTRAVENOUS at 13:10

## 2019-04-08 RX ADMIN — MIDAZOLAM HYDROCHLORIDE 0.5 MG: 1 INJECTION, SOLUTION INTRAMUSCULAR; INTRAVENOUS at 13:19

## 2019-04-08 RX ADMIN — FENTANYL CITRATE 50 MCG: 50 INJECTION, SOLUTION INTRAMUSCULAR; INTRAVENOUS at 13:16

## 2019-04-08 RX ADMIN — MIDAZOLAM HYDROCHLORIDE 1 MG: 1 INJECTION, SOLUTION INTRAMUSCULAR; INTRAVENOUS at 13:16

## 2019-04-08 RX ADMIN — LIDOCAINE HYDROCHLORIDE 10 ML: 20 INJECTION, SOLUTION EPIDURAL; INFILTRATION; INTRACAUDAL; PERINEURAL at 13:16

## 2019-04-08 RX ADMIN — FENTANYL CITRATE 25 MCG: 50 INJECTION, SOLUTION INTRAMUSCULAR; INTRAVENOUS at 13:19

## 2019-04-08 NOTE — DISCHARGE INSTRUCTIONS
[address]  Department of Interventional Radiology  [address] [facility]  [phone] Office  [phone] Fax    BIOPSY DISCHARGE INSTRUCTIONS    General Instructions:     A biopsy is the removal of a small piece of tissue for microscopic examination or testing. Healthy tissue can be obtained for the purpose of tissue-type matching for transplants. Unhealthy tissues are more commonly biopsied to diagnose disease. Lung Biopsy:     A needle lung biopsy is performed when there is a mass discovered in the lung area. The most serious risk is infection, bleeding, and pneumothorax (a collapsed lung). Signs of pneumothorax include shortness of breath, rapid heart rate, and blueness of the skin. If any of these occur, call 911. Liver Biopsy: This test helps detect cancer, infections, and the cause of an enlargement of the liver or elevated liver enzymes. It also helps to diagnose a number of liver diseases. The pain associated with the procedure may be felt in the shoulder. The risks include internal bleeding, pneumothorax, and injury to the surrounding organs. Renal Biopsy: This procedure is sometimes done to evaluate a transplanted kidney. It is also used to evaluate unexplained decrease in kidney function, blood, or protein in the urine. You may see bright red blood in the urine the first 24 hours after the test. If the bleeding lasts longer, you need to call your doctor. There is a risk of infection and bleeding into the muscle, which may cause soreness. Spinal Biopsy: This test is sometimes done in conjunction with other procedures. Your back will be sore, as we are taking a small sample of bone, which is slightly more difficult to sample than tissue. General Biopsy:     A mass can grow in any area of the body, and we are taking a specimen as ordered by your doctor. The risks are the same.  They include bleeding, pain, and infection. Home Care Instructions: You may resume your regular diet and medication regimen. Do not drink alcohol, drive, or make any important legal decisions in the next 24 hours. Do not lift anything heavier than a gallon of milk until the soreness goes away. You may use over the counter acetaminophen or ibuprofen for the soreness. You may apply an ice pack to the affected area for 20-30 minutes at time for the first 24 hours. After that, you may apply a heat pack. Call If: You should call your Physician and/or the Radiology Nurse if you have any questions or concerns about the biopsy site. Call if you should have increased pain, fever, redness, drainage, or bleeding more than a small spot on the bandage. Follow-Up Instructions: Please see your ordering doctor as he/she has requested. To Reach Us: If you have any questions about your procedure, please call the Interventional Radiology department at [phone]. After business hours (5pm) and weekends, call the answering service at [phone] and ask for the Radiologist on call to be paged. Si tiene Preguntas acerca del procedimiento, por favor llame al departamento de Radiología Intervencional al [phone]. Después de horas de oficina (5 pm) y los fines de [place], llamar al [facility] de llamadas al [phone] y pregunte por el Radiologo de [facility]. Interventional Radiology General Nurse Discharge    After general anesthesia or intravenous sedation, for 24 hours or while taking prescription Narcotics:  · Limit your activities  · Do not drive and operate hazardous machinery  · Do not make important personal or business decisions  · Do  not drink alcoholic beverages  · If you have not urinated within 8 hours after discharge, please contact your surgeon on call. * Please give a list of your current medications to your Primary Care Provider.   * Please update this list whenever your medications are discontinued, doses are changed, or new medications (including over-the-counter products) are added. * Please carry medication information at all times in case of emergency situations. These are general instructions for a healthy lifestyle:    No smoking/ No tobacco products/ Avoid exposure to second hand smoke  Surgeon General's Warning:  Quitting smoking now greatly reduces serious risk to your health. Obesity, smoking, and sedentary lifestyle greatly increases your risk for illness  A healthy diet, regular physical exercise & weight monitoring are important for maintaining a healthy lifestyle    You may be retaining fluid if you have a history of heart failure or if you experience any of the following symptoms:  Weight gain of 3 pounds or more overnight or 5 pounds in a week, increased swelling in our hands or feet or shortness of breath while lying flat in bed. Please call your doctor as soon as you notice any of these symptoms; do not wait until your next office visit. Recognize signs and symptoms of STROKE:  F-face looks uneven    A-arms unable to move or move unevenly    S-speech slurred or non-existent    T-time-call 911 as soon as signs and symptoms begin-DO NOT go       Back to bed or wait to see if you get better-TIME IS BRAIN.     Patient Signature:  Date: 4/8/2019  Discharging Nurse: Ina Rivers RN

## 2019-04-08 NOTE — PROCEDURES
Department of Interventional Radiology  [phone]        Interventional Radiology Brief Procedure Note    Patient: Todd Colon MRN: [id number]  SSN: [id number]    YOB: 1955  Age: 59 y.o.   Sex: male      Date of Procedure: 4/8/2019    Pre-Procedure Diagnosis: Leukemia    Post-Procedure Diagnosis: SAME    Procedure(s): Image Guided Biopsy    Brief Description of Procedure: CT guided bone marrow biopsy    Performed By: Maximiliano Cates MD     Assistants: None    Anesthesia:Moderate Sedation    Estimated Blood Loss: Less than 10ml    Specimens:  Pathology    Implants:  None    Findings: left iliac    Complications: None    Recommendations: routine post care     Follow Up: as needed    Signed By: Maximiliano Cates MD     April 8, 2019

## 2019-04-08 NOTE — PROGRESS NOTES
TRANSFER - OUT REPORT:    Verbal report given to [facility], RN(name) on Ricco Eden  being transferred to IR Recovery(unit) for Post-Op recovery. Report consisted of patients Situation, Background, Assessment and   Recommendations(SBAR). Information from the following report(s) SBAR, Kardex, Procedure Summary and MAR was reviewed with the receiving nurse. Lines:   PICC Double Lumen 01/14/19 Right;Brachial (Active)        Opportunity for questions and clarification was provided.

## 2019-04-08 NOTE — H&P
Department of Interventional Radiology  [phone]    History and Physical    Patient:  Jin Foster MRN:  [id number]  SSN:  [id number]    YOB: 1955  Age:  59 y.o. Sex:  male      Primary Care Provider:  Bhargav Cruz NP  Referring Physician:  Corine Bateman MD    Subjective:     Chief Complaint: biopsy    History of the Present Illness: The patient is a 59 y.o. male who presents for bone marrow biopsy. Leukemia. NPO. Past Medical History:   Diagnosis Date    Back pain     Cervical radiculopathy     GERD (gastroesophageal reflux disease)     H/O seasonal allergies     Hyperlipidemia     Impotence     Insomnia     Lateral epicondylitis     Leukemia (HCC)     CHEMO    Obesity     Sleep apnea      Past Surgical History:   Procedure Laterality Date    HX CIRCUMCISION      HX COLONOSCOPY  2017    Due in 2027    HX ORTHOPAEDIC      r wrist    HX WISDOM TEETH EXTRACTION          Review of Systems:    Pertinent items are noted in the History of Present Illness. Current Outpatient Medications   Medication Sig    ciprofloxacin HCl (CIPRO) 500 mg tablet Take 1 Tab by mouth two (2) times a day.  fluconazole (DIFLUCAN) 200 mg tablet Take 1 Tab by mouth daily. FDA advises cautious prescribing of oral fluconazole in pregnancy.  valGANciclovir (VALCYTE) 450 mg tablet Take 2 Tabs by mouth two (2) times a day.  olmesartan-hydroCHLOROthiazide (BENICAR HCT) 20-12.5 mg per tablet Take 1 Tab by mouth daily.  DISABLED PLACARD (DISABLED PLACARD) DMV Handicap parking permit    CETIRIZINE HCL (ZYRTEC PO) Take  by mouth.  multivitamin (ONE A DAY) tablet Take 1 Tab by mouth daily.  DOCOSAHEXANOIC ACID/EPA (FISH OIL PO) Take  by mouth.  VITAMIN A/VITAMIN D3 (NATURAL VITAMIN D PO) Take  by mouth.  predniSONE (DELTASONE) 5 mg tablet Take 1 Tab by mouth every twelve (12) hours as needed.     desoximetasone (TOPICORT) 0.25 % topical cream Apply  to affected area two (2) times a day.  ondansetron (ZOFRAN ODT) 8 mg disintegrating tablet Take 1 Tab by mouth every eight (8) hours as needed for Nausea.  prochlorperazine (COMPAZINE) 10 mg tablet Take 0.5-1 Tabs by mouth every six (6) hours as needed.  lovastatin (MEVACOR) 10 mg tablet Take 1 Tab by mouth nightly. No current facility-administered medications for this encounter. Facility-Administered Medications Ordered in Other Encounters   Medication Dose Route Frequency    0.9% sodium chloride infusion 250 mL  250 mL IntraVENous PRN    saline peripheral flush soln 10 mL  10 mL InterCATHeter PRN        Allergies   Allergen Reactions    Campath [Alemtuzumab] Other (comments)     Rigors       Family History   Problem Relation Age of Onset    Cancer Mother 80        pancreatic    Cancer Father 76        breast    No Known Problems Son     Hypertension Brother     Hypertension Brother     No Known Problems Daughter      Social History     Tobacco Use    Smoking status: Never Smoker    Smokeless tobacco: Never Used   Substance Use Topics    Alcohol use: Not Currently     Alcohol/week: 0.0 oz        Objective:       Physical Examination:    Vitals:    04/08/19 1145   BP: 141/82   Pulse: 76   Resp: 16   Temp: 98 °F (36.7 °C)   SpO2: 100%     Blood pressure 141/82, pulse 76, temperature 98 °F (36.7 °C), resp. rate 16, SpO2 100 %.   HEART: regular rate and rhythm  LUNG: clear to auscultation bilaterally  ABDOMEN: normal findings: soft, non-tender  EXTREMITIES: warm, no edema    Laboratory:     Lab Results   Component Value Date/Time    Sodium 140 04/01/2019 09:03 AM    Sodium 140 03/31/2019 03:15 AM    Potassium 4.0 04/01/2019 09:03 AM    Potassium 4.3 03/31/2019 03:15 AM    Chloride 109 (H) 04/01/2019 09:03 AM    Chloride 110 (H) 03/31/2019 03:15 AM    CO2 25 04/01/2019 09:03 AM    CO2 24 03/31/2019 03:15 AM    Anion gap 6 (L) 04/01/2019 09:03 AM    Anion gap 6 (L) 03/31/2019 03:15 AM    Glucose 107 (H) 04/01/2019 09:03 AM    Glucose 95 03/31/2019 03:15 AM    BUN 14 04/01/2019 09:03 AM    BUN 11 03/31/2019 03:15 AM    Creatinine 1.04 04/01/2019 09:03 AM    Creatinine 0.87 03/31/2019 03:15 AM    GFR est AA >60 04/01/2019 09:03 AM    GFR est AA >60 03/31/2019 03:15 AM    GFR est non-AA >60 04/01/2019 09:03 AM    GFR est non-AA >60 03/31/2019 03:15 AM    Calcium 9.1 04/01/2019 09:03 AM    Calcium 8.6 03/31/2019 03:15 AM    Magnesium 2.0 04/01/2019 09:03 AM    Magnesium 1.9 03/31/2019 03:15 AM    Albumin 3.6 04/01/2019 09:03 AM    Albumin 3.1 (L) 03/31/2019 03:15 AM    Protein, total 7.6 04/01/2019 09:03 AM    Protein, total 6.7 03/31/2019 03:15 AM    Globulin 4.0 (H) 04/01/2019 09:03 AM    Globulin 3.6 (H) 03/31/2019 03:15 AM    A-G Ratio 0.9 (L) 04/01/2019 09:03 AM    A-G Ratio 0.9 (L) 03/31/2019 03:15 AM    AST (SGOT) 22 04/01/2019 09:03 AM    AST (SGOT) 16 03/31/2019 03:15 AM    ALT (SGPT) 33 04/01/2019 09:03 AM    ALT (SGPT) 31 03/31/2019 03:15 AM     Lab Results   Component Value Date/Time    WBC 0.3 (LL) 04/01/2019 09:03 AM    WBC 0.3 (LL) 03/31/2019 03:15 AM    HGB 10.9 (L) 04/01/2019 09:03 AM    HGB 9.2 (L) 03/31/2019 03:15 AM    HCT 30.3 (L) 04/01/2019 09:03 AM    HCT 26.8 (L) 03/31/2019 03:15 AM    PLATELET 69 (L) 46/36/7097 06:00 PM    PLATELET 24 (LL) 44/33/5178 09:03 AM     No results found for: APTT, PTP, INR    Assessment:     leukemia    Plan:     Planned Procedure:  Bone marrow biopsy    Risks, benefits, and alternatives reviewed with patient and he agrees to proceed with the procedure.       Signed By: Jennifer Flynn PA-C     April 8, 2019

## 2019-04-08 NOTE — PROGRESS NOTES
Recovery period without difficulty. Pt alert and oriented and denies pain. Dressing is clean, dry, and intact. Reviewed discharge instructions with patient and wife, both verbalized understanding. [facility] Vital signs and Bruno score completed.

## 2019-04-10 ENCOUNTER — APPOINTMENT (OUTPATIENT)
Dept: INFUSION THERAPY | Age: 64
End: 2019-04-10
Payer: COMMERCIAL

## 2019-04-10 ENCOUNTER — PATIENT OUTREACH (OUTPATIENT)
Dept: CASE MANAGEMENT | Age: 64
End: 2019-04-10

## 2019-04-10 ENCOUNTER — HOSPITAL ENCOUNTER (OUTPATIENT)
Dept: RESPIRATORY THERAPY | Age: 64
Discharge: HOME | End: 2019-04-10
Attending: INTERNAL MEDICINE
Payer: COMMERCIAL

## 2019-04-10 VITALS — HEART RATE: 62 BPM | RESPIRATION RATE: 15 BRPM | OXYGEN SATURATION: 99 %

## 2019-04-10 DIAGNOSIS — C91.60 PROLYMPHOCYTIC LEUKEMIA OF T-CELL (HCC): ICD-10-CM

## 2019-04-10 PROCEDURE — 74011000250 HC RX REV CODE- 250: Performed by: INTERNAL MEDICINE

## 2019-04-10 PROCEDURE — 94761 N-INVAS EAR/PLS OXIMETRY MLT: CPT

## 2019-04-10 PROCEDURE — 94644 CONT INHLJ TX 1ST HOUR: CPT

## 2019-04-10 PROCEDURE — 94642 AEROSOL INHALATION TREATMENT: CPT

## 2019-04-10 RX ORDER — PENTAMIDINE ISETHIONATE 300 MG/300MG
300 INHALANT RESPIRATORY (INHALATION) ONCE
Status: COMPLETED | OUTPATIENT
Start: 2019-04-10 | End: 2019-04-10

## 2019-04-10 RX ADMIN — PENTAMIDINE ISETHIONATE 300 MG: 300 INHALANT RESPIRATORY (INHALATION) at 10:26

## 2019-04-10 NOTE — PROGRESS NOTES
4/10/19 - Patient here to discuss bmbx results. Preliminary results only available at this time. Patient will not receive any treatment at this time other than supportive care to allow bone marrow to recover. Patient was under the impression his appt for Monday with Dr. Rosita Arevalo was still on and Dr. Johana Ortiz agreed that seeing Dr. Rosita Arevalo on Monday was acceptable. Patient added to Dr. Rickey Leiva schedule for 4/15

## 2019-04-12 ENCOUNTER — HOSPITAL ENCOUNTER (OUTPATIENT)
Dept: INFUSION THERAPY | Age: 64
End: 2019-04-12
Payer: COMMERCIAL

## 2019-04-15 ENCOUNTER — APPOINTMENT (OUTPATIENT)
Dept: INFUSION THERAPY | Age: 64
End: 2019-04-15
Payer: COMMERCIAL

## 2019-04-15 ENCOUNTER — HOSPITAL ENCOUNTER (OUTPATIENT)
Dept: LAB | Age: 64
Discharge: HOME | End: 2019-04-15
Payer: COMMERCIAL

## 2019-04-15 ENCOUNTER — PATIENT OUTREACH (OUTPATIENT)
Dept: CASE MANAGEMENT | Age: 64
End: 2019-04-15

## 2019-04-15 DIAGNOSIS — C91.60 PROLYMPHOCYTIC LEUKEMIA OF T-CELL (HCC): ICD-10-CM

## 2019-04-15 LAB
ALBUMIN SERPL-MCNC: 4 G/DL (ref 3.2–4.6)
ALBUMIN/GLOB SERPL: 1.1 {RATIO} (ref 1.2–3.5)
ALP SERPL-CCNC: 41 U/L (ref 50–136)
ALT SERPL-CCNC: 22 U/L (ref 12–65)
ANION GAP SERPL CALC-SCNC: 6 MMOL/L (ref 7–16)
AST SERPL-CCNC: 19 U/L (ref 15–37)
BILIRUB SERPL-MCNC: 0.6 MG/DL (ref 0.2–1.1)
BUN SERPL-MCNC: 21 MG/DL (ref 8–23)
CALCIUM SERPL-MCNC: 8.8 MG/DL (ref 8.3–10.4)
CHLORIDE SERPL-SCNC: 106 MMOL/L (ref 98–107)
CO2 SERPL-SCNC: 26 MMOL/L (ref 21–32)
CREAT SERPL-MCNC: 1.25 MG/DL (ref 0.8–1.5)
DIFFERENTIAL METHOD BLD: ABNORMAL
ERYTHROCYTE [DISTWIDTH] IN BLOOD BY AUTOMATED COUNT: 13 % (ref 11.9–14.6)
GLOBULIN SER CALC-MCNC: 3.7 G/DL (ref 2.3–3.5)
GLUCOSE SERPL-MCNC: 134 MG/DL (ref 65–100)
HCT VFR BLD AUTO: 26.3 % (ref 41.1–50.3)
HGB BLD-MCNC: 9.3 G/DL (ref 13.6–17.2)
MCH RBC QN AUTO: 31.7 PG (ref 26.1–32.9)
MCHC RBC AUTO-ENTMCNC: 35.4 G/DL (ref 31.4–35)
MCV RBC AUTO: 89.8 FL (ref 79.6–97.8)
NRBC # BLD: 0 K/UL (ref 0–0.2)
PLATELET # BLD AUTO: 28 K/UL (ref 150–450)
PLATELET COMMENTS,PCOM: ABNORMAL
PMV BLD AUTO: 11.1 FL (ref 9.4–12.3)
POTASSIUM SERPL-SCNC: 3.7 MMOL/L (ref 3.5–5.1)
PROT SERPL-MCNC: 7.7 G/DL (ref 6.3–8.2)
RBC # BLD AUTO: 2.93 M/UL (ref 4.23–5.67)
RBC MORPH BLD: ABNORMAL
RBC MORPH BLD: ABNORMAL
SODIUM SERPL-SCNC: 138 MMOL/L (ref 136–145)
WBC # BLD AUTO: 0.4 K/UL (ref 4.3–11.1)
WBC MORPH BLD: ABNORMAL

## 2019-04-15 PROCEDURE — 85025 COMPLETE CBC W/AUTO DIFF WBC: CPT

## 2019-04-15 PROCEDURE — 80053 COMPREHEN METABOLIC PANEL: CPT

## 2019-04-15 NOTE — PROGRESS NOTES
4/15/19- Pt seen in the office with Dr Ivan Rodriguez. Labs reviewed and discussed with pt. Dr Ivan Rodriguez would like for pt to start pendostatin weekly. Pt given printed information on drug. He will return in one week to see Dr Ivan Rodriguez prior to infusion. Pt encouraged to increase oral intake of fluids due to low BP and was also instructed to notify us with any signs of bleeding at home due to platelet level. Patient complains of back pain, MRI of lumbar spine ordered.

## 2019-04-16 ENCOUNTER — HOSPITAL ENCOUNTER (OUTPATIENT)
Dept: INFUSION THERAPY | Age: 64
End: 2019-04-16
Payer: COMMERCIAL

## 2019-04-17 ENCOUNTER — APPOINTMENT (OUTPATIENT)
Dept: INFUSION THERAPY | Age: 64
End: 2019-04-17
Payer: COMMERCIAL

## 2019-04-17 ENCOUNTER — PATIENT OUTREACH (OUTPATIENT)
Dept: CASE MANAGEMENT | Age: 64
End: 2019-04-17

## 2019-04-17 NOTE — PROGRESS NOTES
This note will not be viewable in [address] Attempted outreach follow up without success, left message. Per [facility] patient is noted to be following given plan of care. Patient has attended follow up visits, with next visits: Ni Gonazlez NP 9/5/19 and Dr. Laura Lovell oncology 4/22/19. Patient will be graduated from [employer] program. Will reopen if call is returned.

## 2019-04-18 LAB
CMV DNA SERPL NAA+PROBE-ACNC: NEGATIVE IU/ML
CMV DNA SERPL NAA+PROBE-LOG IU: NORMAL LOG10 IU/ML
FLOW CYTOMETRY, FBTC1: NORMAL
SPECIMEN SOURCE: NORMAL
TEST ORDERED:: NORMAL

## 2019-04-19 ENCOUNTER — HOSPITAL ENCOUNTER (OUTPATIENT)
Dept: CT IMAGING | Age: 64
Discharge: HOME | End: 2019-04-19
Attending: INTERNAL MEDICINE

## 2019-04-22 ENCOUNTER — HOSPITAL ENCOUNTER (OUTPATIENT)
Dept: INFUSION THERAPY | Age: 64
End: 2019-04-22
Payer: COMMERCIAL

## 2019-04-24 ENCOUNTER — PATIENT OUTREACH (OUTPATIENT)
Dept: CASE MANAGEMENT | Age: 64
End: 2019-04-24

## 2019-04-24 ENCOUNTER — HOSPITAL ENCOUNTER (OUTPATIENT)
Dept: INFUSION THERAPY | Age: 64
Discharge: HOME | End: 2019-04-24
Payer: COMMERCIAL

## 2019-04-24 ENCOUNTER — HOSPITAL ENCOUNTER (OUTPATIENT)
Dept: LAB | Age: 64
Discharge: HOME | End: 2019-04-24
Payer: COMMERCIAL

## 2019-04-24 DIAGNOSIS — C91.60 PROLYMPHOCYTIC LEUKEMIA OF T-CELL (HCC): Primary | ICD-10-CM

## 2019-04-24 DIAGNOSIS — C91.60 PROLYMPHOCYTIC LEUKEMIA OF T-CELL (HCC): ICD-10-CM

## 2019-04-24 LAB
ALBUMIN SERPL-MCNC: 4.1 G/DL (ref 3.2–4.6)
ALBUMIN/GLOB SERPL: 1.1 {RATIO} (ref 1.2–3.5)
ALP SERPL-CCNC: 37 U/L (ref 50–136)
ALT SERPL-CCNC: 17 U/L (ref 12–65)
ANION GAP SERPL CALC-SCNC: 7 MMOL/L (ref 7–16)
AST SERPL-CCNC: 19 U/L (ref 15–37)
BASOPHILS # BLD: 0 K/UL (ref 0–0.2)
BASOPHILS NFR BLD: 0 % (ref 0–2)
BILIRUB SERPL-MCNC: 0.6 MG/DL (ref 0.2–1.1)
BUN SERPL-MCNC: 15 MG/DL (ref 8–23)
CALCIUM SERPL-MCNC: 9.5 MG/DL (ref 8.3–10.4)
CHLORIDE SERPL-SCNC: 108 MMOL/L (ref 98–107)
CO2 SERPL-SCNC: 25 MMOL/L (ref 21–32)
CREAT SERPL-MCNC: 1.21 MG/DL (ref 0.8–1.5)
DIFFERENTIAL METHOD BLD: ABNORMAL
EOSINOPHIL # BLD: 0 K/UL (ref 0–0.8)
EOSINOPHIL NFR BLD: 0 % (ref 0.5–7.8)
ERYTHROCYTE [DISTWIDTH] IN BLOOD BY AUTOMATED COUNT: 14.5 % (ref 11.9–14.6)
GLOBULIN SER CALC-MCNC: 3.8 G/DL (ref 2.3–3.5)
GLUCOSE SERPL-MCNC: 129 MG/DL (ref 65–100)
HCT VFR BLD AUTO: 26.1 % (ref 41.1–50.3)
HGB BLD-MCNC: 9 G/DL (ref 13.6–17.2)
IMM GRANULOCYTES # BLD AUTO: 0 K/UL (ref 0–0.5)
IMM GRANULOCYTES NFR BLD AUTO: 1 % (ref 0–5)
LYMPHOCYTES # BLD: 0.1 K/UL (ref 0.5–4.6)
LYMPHOCYTES NFR BLD: 11 % (ref 13–44)
MAGNESIUM SERPL-MCNC: 2.1 MG/DL (ref 1.8–2.4)
MCH RBC QN AUTO: 31.5 PG (ref 26.1–32.9)
MCHC RBC AUTO-ENTMCNC: 34.5 G/DL (ref 31.4–35)
MCV RBC AUTO: 91.3 FL (ref 79.6–97.8)
MONOCYTES # BLD: 0.1 K/UL (ref 0.1–1.3)
MONOCYTES NFR BLD: 18 % (ref 4–12)
NEUTS SEG # BLD: 0.5 K/UL (ref 1.7–8.2)
NEUTS SEG NFR BLD: 69 % (ref 43–78)
NRBC # BLD: 0.01 K/UL (ref 0–0.2)
PLATELET # BLD AUTO: 43 K/UL (ref 150–450)
PMV BLD AUTO: 9.4 FL (ref 9.4–12.3)
POTASSIUM SERPL-SCNC: 3.7 MMOL/L (ref 3.5–5.1)
PROT SERPL-MCNC: 7.9 G/DL (ref 6.3–8.2)
RBC # BLD AUTO: 2.86 M/UL (ref 4.23–5.67)
SODIUM SERPL-SCNC: 140 MMOL/L (ref 136–145)
WBC # BLD AUTO: 0.7 K/UL (ref 4.3–11.1)

## 2019-04-24 PROCEDURE — 74011000258 HC RX REV CODE- 258: Performed by: INTERNAL MEDICINE

## 2019-04-24 PROCEDURE — 96413 CHEMO IV INFUSION 1 HR: CPT

## 2019-04-24 PROCEDURE — 85025 COMPLETE CBC W/AUTO DIFF WBC: CPT

## 2019-04-24 PROCEDURE — 74011250636 HC RX REV CODE- 250/636: Performed by: INTERNAL MEDICINE

## 2019-04-24 PROCEDURE — 80053 COMPREHEN METABOLIC PANEL: CPT

## 2019-04-24 PROCEDURE — 83735 ASSAY OF MAGNESIUM: CPT

## 2019-04-24 PROCEDURE — 96375 TX/PRO/DX INJ NEW DRUG ADDON: CPT

## 2019-04-24 PROCEDURE — 74011250637 HC RX REV CODE- 250/637: Performed by: INTERNAL MEDICINE

## 2019-04-24 RX ORDER — SODIUM CHLORIDE 9 MG/ML
10 INJECTION INTRAMUSCULAR; INTRAVENOUS; SUBCUTANEOUS AS NEEDED
Status: ACTIVE | OUTPATIENT
Start: 2019-04-24 | End: 2019-04-24

## 2019-04-24 RX ORDER — DIPHENHYDRAMINE HYDROCHLORIDE 50 MG/ML
50 INJECTION, SOLUTION INTRAMUSCULAR; INTRAVENOUS ONCE
Status: COMPLETED | OUTPATIENT
Start: 2019-04-24 | End: 2019-04-24

## 2019-04-24 RX ORDER — ACETAMINOPHEN 325 MG/1
650 TABLET ORAL ONCE
Status: COMPLETED | OUTPATIENT
Start: 2019-04-24 | End: 2019-04-24

## 2019-04-24 RX ORDER — ONDANSETRON 2 MG/ML
8 INJECTION INTRAMUSCULAR; INTRAVENOUS ONCE
Status: COMPLETED | OUTPATIENT
Start: 2019-04-24 | End: 2019-04-24

## 2019-04-24 RX ORDER — HEPARIN 100 UNIT/ML
300-500 SYRINGE INTRAVENOUS AS NEEDED
Status: DISPENSED | OUTPATIENT
Start: 2019-04-24 | End: 2019-04-24

## 2019-04-24 RX ADMIN — ONDANSETRON 8 MG: 2 INJECTION INTRAMUSCULAR; INTRAVENOUS at 12:33

## 2019-04-24 RX ADMIN — SODIUM CHLORIDE: 900 INJECTION, SOLUTION INTRAVENOUS at 13:59

## 2019-04-24 RX ADMIN — ACETAMINOPHEN 650 MG: 325 TABLET, FILM COATED ORAL at 12:32

## 2019-04-24 RX ADMIN — HEPARIN SODIUM (PORCINE) LOCK FLUSH IV SOLN 100 UNIT/ML 500 UNITS: 100 SOLUTION at 13:46

## 2019-04-24 RX ADMIN — DEXAMETHASONE SODIUM PHOSPHATE 12 MG: 4 INJECTION, SOLUTION INTRAMUSCULAR; INTRAVENOUS at 12:52

## 2019-04-24 RX ADMIN — DIPHENHYDRAMINE HYDROCHLORIDE 50 MG: 50 INJECTION, SOLUTION INTRAMUSCULAR; INTRAVENOUS at 12:37

## 2019-04-24 RX ADMIN — SODIUM CHLORIDE 500 ML: 900 INJECTION, SOLUTION INTRAVENOUS at 12:15

## 2019-04-24 NOTE — PROGRESS NOTES
Arrived to the [facility]. Pendostatin infusion completed. Patient tolerated well. Any issues or concerns during appointment: none. Patient aware of next infusion appointment on 05.01.2019 (date) at 56 (time). Discharged ambulatory with spouse to home.

## 2019-04-24 NOTE — PROGRESS NOTES
4/24/19- Pt seen in the office with Dr Yeison Arnett. Labs reviewed today. Dr Yeison Arnett would like for pt to proceed with pentostatin today in infusion. He would like for pt to continue this treatment plan weekly for 4 weeks. Pt aware of plan. Pt complains of pain in knees. He was encouraged to take extra strength tylenol for the pain and continue his knee support device if it helps with his pain. Pt to return to see Dr Yeison Arnett in 1 week.

## 2019-04-27 LAB
CMV DNA SERPL NAA+PROBE-ACNC: NEGATIVE IU/ML
CMV DNA SERPL NAA+PROBE-LOG IU: NORMAL LOG10 IU/ML

## 2019-04-29 ENCOUNTER — APPOINTMENT (OUTPATIENT)
Dept: INFUSION THERAPY | Age: 64
End: 2019-04-29
Payer: COMMERCIAL

## 2019-04-30 ENCOUNTER — APPOINTMENT (OUTPATIENT)
Dept: INFUSION THERAPY | Age: 64
End: 2019-04-30
Payer: COMMERCIAL

## 2019-05-01 ENCOUNTER — HOSPITAL ENCOUNTER (OUTPATIENT)
Dept: INFUSION THERAPY | Age: 64
Discharge: HOME | End: 2019-05-01
Payer: COMMERCIAL

## 2019-05-01 ENCOUNTER — HOSPITAL ENCOUNTER (OUTPATIENT)
Dept: LAB | Age: 64
Discharge: HOME | End: 2019-05-01
Payer: COMMERCIAL

## 2019-05-01 ENCOUNTER — PATIENT OUTREACH (OUTPATIENT)
Dept: CASE MANAGEMENT | Age: 64
End: 2019-05-01

## 2019-05-01 ENCOUNTER — APPOINTMENT (OUTPATIENT)
Dept: INFUSION THERAPY | Age: 64
End: 2019-05-01
Payer: COMMERCIAL

## 2019-05-01 DIAGNOSIS — C91.60 PROLYMPHOCYTIC LEUKEMIA OF T-CELL (HCC): Primary | ICD-10-CM

## 2019-05-01 DIAGNOSIS — C91.60 PROLYMPHOCYTIC LEUKEMIA OF T-CELL (HCC): ICD-10-CM

## 2019-05-01 LAB
ALBUMIN SERPL-MCNC: 4 G/DL (ref 3.2–4.6)
ALBUMIN/GLOB SERPL: 1.1 {RATIO}
ALP SERPL-CCNC: 36 U/L (ref 50–136)
ALT SERPL-CCNC: 29 U/L (ref 12–65)
ANION GAP SERPL CALC-SCNC: 9 MMOL/L (ref 7–16)
AST SERPL-CCNC: 21 U/L (ref 15–37)
BASOPHILS # BLD: 0 K/UL (ref 0–0.2)
BASOPHILS NFR BLD: 1 % (ref 0–2)
BILIRUB SERPL-MCNC: 0.7 MG/DL (ref 0.2–1.1)
BUN SERPL-MCNC: 25 MG/DL (ref 8–23)
CALCIUM SERPL-MCNC: 9.5 MG/DL (ref 8.3–10.4)
CHLORIDE SERPL-SCNC: 103 MMOL/L (ref 98–107)
CO2 SERPL-SCNC: 26 MMOL/L (ref 21–32)
CREAT SERPL-MCNC: 1.2 MG/DL (ref 0.8–1.5)
DIFFERENTIAL METHOD BLD: ABNORMAL
EOSINOPHIL # BLD: 0 K/UL (ref 0–0.8)
EOSINOPHIL NFR BLD: 0 % (ref 0.5–7.8)
ERYTHROCYTE [DISTWIDTH] IN BLOOD BY AUTOMATED COUNT: 17.8 % (ref 11.9–14.6)
GLOBULIN SER CALC-MCNC: 3.6 G/DL
GLUCOSE SERPL-MCNC: 148 MG/DL (ref 65–100)
HCT VFR BLD AUTO: 28.8 % (ref 41.1–50.3)
HGB BLD-MCNC: 9.9 G/DL (ref 13.6–17.2)
IMM GRANULOCYTES # BLD AUTO: 0.3 K/UL (ref 0–0.5)
IMM GRANULOCYTES NFR BLD AUTO: 14 % (ref 0–5)
LYMPHOCYTES # BLD: 0.1 K/UL (ref 0.5–4.6)
LYMPHOCYTES NFR BLD: 8 % (ref 13–44)
MAGNESIUM SERPL-MCNC: 2.3 MG/DL (ref 1.8–2.4)
MCH RBC QN AUTO: 32.9 PG (ref 26.1–32.9)
MCHC RBC AUTO-ENTMCNC: 34.4 G/DL (ref 31.4–35)
MCV RBC AUTO: 95.7 FL (ref 79.6–97.8)
MONOCYTES # BLD: 0.5 K/UL (ref 0.1–1.3)
MONOCYTES NFR BLD: 26 % (ref 4–12)
NEUTS SEG # BLD: 0.9 K/UL (ref 1.7–8.2)
NEUTS SEG NFR BLD: 51 % (ref 43–78)
NRBC # BLD: 0.15 K/UL (ref 0–0.2)
PLATELET # BLD AUTO: 60 K/UL (ref 150–450)
PLATELET COMMENTS,PCOM: ABNORMAL
PMV BLD AUTO: 10.8 FL (ref 9.4–12.3)
POTASSIUM SERPL-SCNC: 3.7 MMOL/L (ref 3.5–5.1)
PROT SERPL-MCNC: 7.6 G/DL (ref 6.3–8.2)
RBC # BLD AUTO: 3.01 M/UL (ref 4.23–5.67)
RBC MORPH BLD: ABNORMAL
SODIUM SERPL-SCNC: 138 MMOL/L (ref 136–145)
WBC # BLD AUTO: 1.8 K/UL (ref 4.3–11.1)
WBC MORPH BLD: ABNORMAL

## 2019-05-01 PROCEDURE — 83735 ASSAY OF MAGNESIUM: CPT

## 2019-05-01 PROCEDURE — 74011250637 HC RX REV CODE- 250/637: Performed by: INTERNAL MEDICINE

## 2019-05-01 PROCEDURE — 74011000258 HC RX REV CODE- 258: Performed by: INTERNAL MEDICINE

## 2019-05-01 PROCEDURE — 80053 COMPREHEN METABOLIC PANEL: CPT

## 2019-05-01 PROCEDURE — 96375 TX/PRO/DX INJ NEW DRUG ADDON: CPT

## 2019-05-01 PROCEDURE — 85025 COMPLETE CBC W/AUTO DIFF WBC: CPT

## 2019-05-01 PROCEDURE — 74011250636 HC RX REV CODE- 250/636: Performed by: INTERNAL MEDICINE

## 2019-05-01 PROCEDURE — 96367 TX/PROPH/DG ADDL SEQ IV INF: CPT

## 2019-05-01 PROCEDURE — 96413 CHEMO IV INFUSION 1 HR: CPT

## 2019-05-01 RX ORDER — HEPARIN 100 UNIT/ML
300-500 SYRINGE INTRAVENOUS AS NEEDED
Status: DISPENSED | OUTPATIENT
Start: 2019-05-01 | End: 2019-05-01

## 2019-05-01 RX ORDER — DIPHENHYDRAMINE HYDROCHLORIDE 50 MG/ML
50 INJECTION, SOLUTION INTRAMUSCULAR; INTRAVENOUS ONCE
Status: COMPLETED | OUTPATIENT
Start: 2019-05-01 | End: 2019-05-01

## 2019-05-01 RX ORDER — SODIUM CHLORIDE 0.9 % (FLUSH) 0.9 %
10 SYRINGE (ML) INJECTION AS NEEDED
Status: ACTIVE | OUTPATIENT
Start: 2019-05-01 | End: 2019-05-01

## 2019-05-01 RX ORDER — ACETAMINOPHEN 325 MG/1
650 TABLET ORAL ONCE
Status: COMPLETED | OUTPATIENT
Start: 2019-05-01 | End: 2019-05-01

## 2019-05-01 RX ORDER — ONDANSETRON 2 MG/ML
8 INJECTION INTRAMUSCULAR; INTRAVENOUS ONCE
Status: COMPLETED | OUTPATIENT
Start: 2019-05-01 | End: 2019-05-01

## 2019-05-01 RX ADMIN — HEPARIN 500 UNITS: 100 SYRINGE at 12:37

## 2019-05-01 RX ADMIN — DIPHENHYDRAMINE HYDROCHLORIDE 50 MG: 50 INJECTION, SOLUTION INTRAMUSCULAR; INTRAVENOUS at 11:08

## 2019-05-01 RX ADMIN — SODIUM CHLORIDE 500 ML: 900 INJECTION, SOLUTION INTRAVENOUS at 10:55

## 2019-05-01 RX ADMIN — DEXAMETHASONE SODIUM PHOSPHATE 12 MG: 4 INJECTION, SOLUTION INTRAMUSCULAR; INTRAVENOUS at 11:20

## 2019-05-01 RX ADMIN — ONDANSETRON 8 MG: 2 INJECTION INTRAMUSCULAR; INTRAVENOUS at 11:12

## 2019-05-01 RX ADMIN — SODIUM CHLORIDE: 900 INJECTION, SOLUTION INTRAVENOUS at 11:58

## 2019-05-01 RX ADMIN — ACETAMINOPHEN 650 MG: 325 TABLET, FILM COATED ORAL at 11:10

## 2019-05-01 NOTE — PROGRESS NOTES
Arrived to the [facility] ambulatory. Pentostatin completed. Patient tolerated well. Any issues or concerns during appointment: no.  Patient aware of next infusion appointment on  5/8 at 0900. Discharged to home ambulatory with his wife.

## 2019-05-01 NOTE — PROGRESS NOTES
5/1/19- Pt seen in the office with Dr Hillary Rock for his second dose of Pentostatin. Pt reports feeling overall well this week other than ongoing knee pain. Ortho referral placed for management of knee pain. Labs reviewed Pt to proceed to infusion today for treatment and will return to see Dr Hillary Rock in 1 week.

## 2019-05-02 ENCOUNTER — HOSPITAL ENCOUNTER (OUTPATIENT)
Dept: MRI IMAGING | Age: 64
Discharge: HOME | End: 2019-05-02
Attending: INTERNAL MEDICINE
Payer: COMMERCIAL

## 2019-05-02 ENCOUNTER — APPOINTMENT (OUTPATIENT)
Dept: INFUSION THERAPY | Age: 64
End: 2019-05-02
Payer: COMMERCIAL

## 2019-05-02 DIAGNOSIS — C91.60 PROLYMPHOCYTIC LEUKEMIA OF T-CELL (HCC): ICD-10-CM

## 2019-05-02 PROCEDURE — A9575 INJ GADOTERATE MEGLUMI 0.1ML: HCPCS | Performed by: INTERNAL MEDICINE

## 2019-05-02 PROCEDURE — 74011250636 HC RX REV CODE- 250/636: Performed by: INTERNAL MEDICINE

## 2019-05-02 PROCEDURE — 72158 MRI LUMBAR SPINE W/O & W/DYE: CPT

## 2019-05-02 RX ORDER — GADOTERATE MEGLUMINE 376.9 MG/ML
23 INJECTION INTRAVENOUS
Status: COMPLETED | OUTPATIENT
Start: 2019-05-02 | End: 2019-05-02

## 2019-05-02 RX ORDER — SODIUM CHLORIDE 0.9 % (FLUSH) 0.9 %
10 SYRINGE (ML) INJECTION
Status: COMPLETED | OUTPATIENT
Start: 2019-05-02 | End: 2019-05-02

## 2019-05-02 RX ADMIN — GADOTERATE MEGLUMINE 23 ML: 376.9 INJECTION INTRAVENOUS at 13:04

## 2019-05-02 RX ADMIN — Medication 10 ML: at 13:04

## 2019-05-03 ENCOUNTER — APPOINTMENT (OUTPATIENT)
Dept: INFUSION THERAPY | Age: 64
End: 2019-05-03
Payer: COMMERCIAL

## 2019-05-03 LAB
CMV DNA SERPL NAA+PROBE-ACNC: NEGATIVE IU/ML
CMV DNA SERPL NAA+PROBE-LOG IU: NORMAL LOG10 IU/ML

## 2019-05-06 ENCOUNTER — APPOINTMENT (OUTPATIENT)
Dept: INFUSION THERAPY | Age: 64
End: 2019-05-06
Payer: COMMERCIAL

## 2019-05-08 ENCOUNTER — HOSPITAL ENCOUNTER (OUTPATIENT)
Dept: INFUSION THERAPY | Age: 64
Discharge: HOME | End: 2019-05-08
Payer: COMMERCIAL

## 2019-05-08 ENCOUNTER — HOSPITAL ENCOUNTER (OUTPATIENT)
Dept: LAB | Age: 64
Discharge: HOME | End: 2019-05-08
Payer: COMMERCIAL

## 2019-05-08 ENCOUNTER — PATIENT OUTREACH (OUTPATIENT)
Dept: CASE MANAGEMENT | Age: 64
End: 2019-05-08

## 2019-05-08 VITALS — OXYGEN SATURATION: 99 %

## 2019-05-08 DIAGNOSIS — C91.60 PROLYMPHOCYTIC LEUKEMIA OF T-CELL (HCC): Primary | ICD-10-CM

## 2019-05-08 DIAGNOSIS — C91.60 PROLYMPHOCYTIC LEUKEMIA OF T-CELL (HCC): ICD-10-CM

## 2019-05-08 LAB
ALBUMIN SERPL-MCNC: 3.9 G/DL (ref 3.2–4.6)
ALBUMIN/GLOB SERPL: 1.1 {RATIO} (ref 1.2–3.5)
ALP SERPL-CCNC: 33 U/L (ref 50–136)
ALT SERPL-CCNC: 32 U/L (ref 12–65)
ANION GAP SERPL CALC-SCNC: 7 MMOL/L (ref 7–16)
AST SERPL-CCNC: 28 U/L (ref 15–37)
BASOPHILS # BLD: 0 K/UL (ref 0–0.2)
BASOPHILS NFR BLD: 1 % (ref 0–2)
BILIRUB SERPL-MCNC: 0.7 MG/DL (ref 0.2–1.1)
BUN SERPL-MCNC: 17 MG/DL (ref 8–23)
CALCIUM SERPL-MCNC: 8.9 MG/DL (ref 8.3–10.4)
CHLORIDE SERPL-SCNC: 108 MMOL/L (ref 98–107)
CO2 SERPL-SCNC: 25 MMOL/L (ref 21–32)
CREAT SERPL-MCNC: 1.13 MG/DL (ref 0.8–1.5)
DIFFERENTIAL METHOD BLD: ABNORMAL
EOSINOPHIL # BLD: 0 K/UL (ref 0–0.8)
EOSINOPHIL NFR BLD: 0 % (ref 0.5–7.8)
ERYTHROCYTE [DISTWIDTH] IN BLOOD BY AUTOMATED COUNT: 18.9 % (ref 11.9–14.6)
GLOBULIN SER CALC-MCNC: 3.6 G/DL (ref 2.3–3.5)
GLUCOSE SERPL-MCNC: 143 MG/DL (ref 65–100)
HCT VFR BLD AUTO: 29 % (ref 41.1–50.3)
HGB BLD-MCNC: 9.9 G/DL (ref 13.6–17.2)
IMM GRANULOCYTES # BLD AUTO: 0 K/UL (ref 0–0.5)
IMM GRANULOCYTES NFR BLD AUTO: 3 % (ref 0–5)
LYMPHOCYTES # BLD: 0.1 K/UL (ref 0.5–4.6)
LYMPHOCYTES NFR BLD: 10 % (ref 13–44)
MAGNESIUM SERPL-MCNC: 2.2 MG/DL (ref 1.8–2.4)
MCH RBC QN AUTO: 33.7 PG (ref 26.1–32.9)
MCHC RBC AUTO-ENTMCNC: 34.1 G/DL (ref 31.4–35)
MCV RBC AUTO: 98.6 FL (ref 79.6–97.8)
MONOCYTES # BLD: 0.1 K/UL (ref 0.1–1.3)
MONOCYTES NFR BLD: 13 % (ref 4–12)
NEUTS SEG # BLD: 0.8 K/UL (ref 1.7–8.2)
NEUTS SEG NFR BLD: 73 % (ref 43–78)
NRBC # BLD: 0.01 K/UL (ref 0–0.2)
PLATELET # BLD AUTO: 70 K/UL (ref 150–450)
PMV BLD AUTO: 12 FL (ref 9.4–12.3)
POTASSIUM SERPL-SCNC: 3.8 MMOL/L (ref 3.5–5.1)
PROT SERPL-MCNC: 7.5 G/DL (ref 6.3–8.2)
RBC # BLD AUTO: 2.94 M/UL (ref 4.23–5.67)
SODIUM SERPL-SCNC: 140 MMOL/L (ref 136–145)
WBC # BLD AUTO: 1.1 K/UL (ref 4.3–11.1)

## 2019-05-08 PROCEDURE — 96367 TX/PROPH/DG ADDL SEQ IV INF: CPT

## 2019-05-08 PROCEDURE — 85025 COMPLETE CBC W/AUTO DIFF WBC: CPT

## 2019-05-08 PROCEDURE — 74011250637 HC RX REV CODE- 250/637: Performed by: INTERNAL MEDICINE

## 2019-05-08 PROCEDURE — 96375 TX/PRO/DX INJ NEW DRUG ADDON: CPT

## 2019-05-08 PROCEDURE — 74011250636 HC RX REV CODE- 250/636: Performed by: INTERNAL MEDICINE

## 2019-05-08 PROCEDURE — 96413 CHEMO IV INFUSION 1 HR: CPT

## 2019-05-08 PROCEDURE — 83735 ASSAY OF MAGNESIUM: CPT

## 2019-05-08 PROCEDURE — 74011000258 HC RX REV CODE- 258: Performed by: INTERNAL MEDICINE

## 2019-05-08 PROCEDURE — 80053 COMPREHEN METABOLIC PANEL: CPT

## 2019-05-08 RX ORDER — ACETAMINOPHEN 325 MG/1
650 TABLET ORAL ONCE
Status: COMPLETED | OUTPATIENT
Start: 2019-05-08 | End: 2019-05-08

## 2019-05-08 RX ORDER — ONDANSETRON 2 MG/ML
8 INJECTION INTRAMUSCULAR; INTRAVENOUS ONCE
Status: COMPLETED | OUTPATIENT
Start: 2019-05-08 | End: 2019-05-08

## 2019-05-08 RX ORDER — SODIUM CHLORIDE 9 MG/ML
10 INJECTION INTRAMUSCULAR; INTRAVENOUS; SUBCUTANEOUS AS NEEDED
Status: ACTIVE | OUTPATIENT
Start: 2019-05-08 | End: 2019-05-08

## 2019-05-08 RX ORDER — HYDROCORTISONE SODIUM SUCCINATE 100 MG/2ML
100 INJECTION, POWDER, FOR SOLUTION INTRAMUSCULAR; INTRAVENOUS AS NEEDED
Status: DISPENSED | OUTPATIENT
Start: 2019-05-08 | End: 2019-05-08

## 2019-05-08 RX ORDER — DIPHENHYDRAMINE HYDROCHLORIDE 50 MG/ML
25 INJECTION, SOLUTION INTRAMUSCULAR; INTRAVENOUS AS NEEDED
Status: ACTIVE | OUTPATIENT
Start: 2019-05-08 | End: 2019-05-08

## 2019-05-08 RX ORDER — DIPHENHYDRAMINE HYDROCHLORIDE 50 MG/ML
25 INJECTION, SOLUTION INTRAMUSCULAR; INTRAVENOUS ONCE
Status: COMPLETED | OUTPATIENT
Start: 2019-05-08 | End: 2019-05-08

## 2019-05-08 RX ORDER — HEPARIN 100 UNIT/ML
300-500 SYRINGE INTRAVENOUS AS NEEDED
Status: DISPENSED | OUTPATIENT
Start: 2019-05-08 | End: 2019-05-08

## 2019-05-08 RX ADMIN — DEXAMETHASONE SODIUM PHOSPHATE 12 MG: 4 INJECTION, SOLUTION INTRAMUSCULAR; INTRAVENOUS at 10:03

## 2019-05-08 RX ADMIN — SODIUM CHLORIDE 10 ML: 9 INJECTION INTRAMUSCULAR; INTRAVENOUS; SUBCUTANEOUS at 11:30

## 2019-05-08 RX ADMIN — SODIUM CHLORIDE 500 ML: 900 INJECTION, SOLUTION INTRAVENOUS at 09:30

## 2019-05-08 RX ADMIN — DIPHENHYDRAMINE HYDROCHLORIDE 25 MG: 50 INJECTION, SOLUTION INTRAMUSCULAR; INTRAVENOUS at 09:49

## 2019-05-08 RX ADMIN — HEPARIN 500 UNITS: 100 SYRINGE at 11:31

## 2019-05-08 RX ADMIN — SODIUM CHLORIDE 10 ML: 9 INJECTION INTRAMUSCULAR; INTRAVENOUS; SUBCUTANEOUS at 11:31

## 2019-05-08 RX ADMIN — HEPARIN 300 UNITS: 100 SYRINGE at 11:30

## 2019-05-08 RX ADMIN — ONDANSETRON 8 MG: 2 INJECTION INTRAMUSCULAR; INTRAVENOUS at 09:47

## 2019-05-08 RX ADMIN — SODIUM CHLORIDE: 900 INJECTION, SOLUTION INTRAVENOUS at 10:32

## 2019-05-08 RX ADMIN — ACETAMINOPHEN 650 MG: 325 TABLET, FILM COATED ORAL at 09:42

## 2019-05-08 NOTE — PROGRESS NOTES
Tolerated infusion without difficulty. PICC dressing changed via sterile technique. Site clear. Patient aware to push oral hydration. Patient discharged via ambulation accompanied by wife. Instructed to notify physician of any problems, questions or concerns after discharge. Next appointment is 05/15/19 at 0900 with Maureen with labs and OV prior.

## 2019-05-09 LAB
CMV DNA SERPL NAA+PROBE-ACNC: NEGATIVE IU/ML
CMV DNA SERPL NAA+PROBE-LOG IU: NORMAL LOG10 IU/ML

## 2019-05-13 ENCOUNTER — APPOINTMENT (OUTPATIENT)
Dept: INFUSION THERAPY | Age: 64
End: 2019-05-13
Payer: COMMERCIAL

## 2019-05-14 ENCOUNTER — HOSPITAL ENCOUNTER (OUTPATIENT)
Dept: INFUSION THERAPY | Age: 64
End: 2019-05-14

## 2019-05-15 ENCOUNTER — HOSPITAL ENCOUNTER (OUTPATIENT)
Dept: LAB | Age: 64
Discharge: HOME | End: 2019-05-15
Payer: COMMERCIAL

## 2019-05-15 ENCOUNTER — HOSPITAL ENCOUNTER (OUTPATIENT)
Dept: INFUSION THERAPY | Age: 64
Discharge: HOME | End: 2019-05-15
Payer: COMMERCIAL

## 2019-05-15 ENCOUNTER — PATIENT OUTREACH (OUTPATIENT)
Dept: CASE MANAGEMENT | Age: 64
End: 2019-05-15

## 2019-05-15 DIAGNOSIS — C91.60 PROLYMPHOCYTIC LEUKEMIA OF T-CELL (HCC): Primary | ICD-10-CM

## 2019-05-15 DIAGNOSIS — C91.60 PROLYMPHOCYTIC LEUKEMIA OF T-CELL (HCC): ICD-10-CM

## 2019-05-15 LAB
ALBUMIN SERPL-MCNC: 3.7 G/DL (ref 3.2–4.6)
ALBUMIN/GLOB SERPL: 1 {RATIO} (ref 1.2–3.5)
ALP SERPL-CCNC: 32 U/L (ref 50–136)
ALT SERPL-CCNC: 37 U/L (ref 12–65)
ANION GAP SERPL CALC-SCNC: 8 MMOL/L (ref 7–16)
AST SERPL-CCNC: 27 U/L (ref 15–37)
BASOPHILS # BLD: 0 K/UL (ref 0–0.2)
BASOPHILS NFR BLD: 0 % (ref 0–2)
BILIRUB SERPL-MCNC: 0.5 MG/DL (ref 0.2–1.1)
BUN SERPL-MCNC: 15 MG/DL (ref 8–23)
CALCIUM SERPL-MCNC: 8.9 MG/DL (ref 8.3–10.4)
CHLORIDE SERPL-SCNC: 109 MMOL/L (ref 98–107)
CO2 SERPL-SCNC: 25 MMOL/L (ref 21–32)
CREAT SERPL-MCNC: 1.1 MG/DL (ref 0.8–1.5)
DIFFERENTIAL METHOD BLD: ABNORMAL
EOSINOPHIL # BLD: 0 K/UL (ref 0–0.8)
EOSINOPHIL NFR BLD: 1 % (ref 0.5–7.8)
ERYTHROCYTE [DISTWIDTH] IN BLOOD BY AUTOMATED COUNT: 18.9 % (ref 11.9–14.6)
GLOBULIN SER CALC-MCNC: 3.8 G/DL (ref 2.3–3.5)
GLUCOSE SERPL-MCNC: 105 MG/DL (ref 65–100)
HCT VFR BLD AUTO: 27 % (ref 41.1–50.3)
HGB BLD-MCNC: 9 G/DL (ref 13.6–17.2)
IMM GRANULOCYTES # BLD AUTO: 0.1 K/UL (ref 0–0.5)
IMM GRANULOCYTES NFR BLD AUTO: 11 % (ref 0–5)
LYMPHOCYTES # BLD: 0.1 K/UL (ref 0.5–4.6)
LYMPHOCYTES NFR BLD: 8 % (ref 13–44)
MAGNESIUM SERPL-MCNC: 1.9 MG/DL (ref 1.8–2.4)
MCH RBC QN AUTO: 33.7 PG (ref 26.1–32.9)
MCHC RBC AUTO-ENTMCNC: 33.3 G/DL (ref 31.4–35)
MCV RBC AUTO: 101.1 FL (ref 79.6–97.8)
MONOCYTES # BLD: 0.2 K/UL (ref 0.1–1.3)
MONOCYTES NFR BLD: 20 % (ref 4–12)
NEUTS SEG # BLD: 0.4 K/UL (ref 1.7–8.2)
NEUTS SEG NFR BLD: 60 % (ref 43–78)
NRBC # BLD: 0.01 K/UL (ref 0–0.2)
PLATELET # BLD AUTO: 68 K/UL (ref 150–450)
PLATELET COMMENTS,PCOM: ABNORMAL
PMV BLD AUTO: 10.7 FL (ref 9.4–12.3)
POTASSIUM SERPL-SCNC: 3.9 MMOL/L (ref 3.5–5.1)
PROT SERPL-MCNC: 7.5 G/DL (ref 6.3–8.2)
RBC # BLD AUTO: 2.67 M/UL (ref 4.23–5.67)
RBC MORPH BLD: ABNORMAL
SODIUM SERPL-SCNC: 142 MMOL/L (ref 136–145)
WBC # BLD AUTO: 0.8 K/UL (ref 4.3–11.1)
WBC MORPH BLD: ABNORMAL

## 2019-05-15 PROCEDURE — 85025 COMPLETE CBC W/AUTO DIFF WBC: CPT

## 2019-05-15 PROCEDURE — 96375 TX/PRO/DX INJ NEW DRUG ADDON: CPT

## 2019-05-15 PROCEDURE — 80053 COMPREHEN METABOLIC PANEL: CPT

## 2019-05-15 PROCEDURE — 96413 CHEMO IV INFUSION 1 HR: CPT

## 2019-05-15 PROCEDURE — 83735 ASSAY OF MAGNESIUM: CPT

## 2019-05-15 PROCEDURE — 74011000258 HC RX REV CODE- 258: Performed by: INTERNAL MEDICINE

## 2019-05-15 PROCEDURE — 74011250636 HC RX REV CODE- 250/636: Performed by: INTERNAL MEDICINE

## 2019-05-15 PROCEDURE — 74011250637 HC RX REV CODE- 250/637: Performed by: INTERNAL MEDICINE

## 2019-05-15 RX ORDER — DIPHENHYDRAMINE HYDROCHLORIDE 50 MG/ML
25 INJECTION, SOLUTION INTRAMUSCULAR; INTRAVENOUS ONCE
Status: COMPLETED | OUTPATIENT
Start: 2019-05-15 | End: 2019-05-15

## 2019-05-15 RX ORDER — ACETAMINOPHEN 325 MG/1
650 TABLET ORAL ONCE
Status: COMPLETED | OUTPATIENT
Start: 2019-05-15 | End: 2019-05-15

## 2019-05-15 RX ORDER — ONDANSETRON 2 MG/ML
8 INJECTION INTRAMUSCULAR; INTRAVENOUS ONCE
Status: COMPLETED | OUTPATIENT
Start: 2019-05-15 | End: 2019-05-15

## 2019-05-15 RX ORDER — HEPARIN 100 UNIT/ML
300-500 SYRINGE INTRAVENOUS AS NEEDED
Status: DISPENSED | OUTPATIENT
Start: 2019-05-15 | End: 2019-05-15

## 2019-05-15 RX ORDER — SODIUM CHLORIDE 0.9 % (FLUSH) 0.9 %
10 SYRINGE (ML) INJECTION AS NEEDED
Status: ACTIVE | OUTPATIENT
Start: 2019-05-15 | End: 2019-05-15

## 2019-05-15 RX ADMIN — ONDANSETRON 8 MG: 2 INJECTION INTRAMUSCULAR; INTRAVENOUS at 10:09

## 2019-05-15 RX ADMIN — DEXAMETHASONE SODIUM PHOSPHATE 12 MG: 4 INJECTION, SOLUTION INTRAMUSCULAR; INTRAVENOUS at 10:28

## 2019-05-15 RX ADMIN — ACETAMINOPHEN 650 MG: 325 TABLET, FILM COATED ORAL at 10:08

## 2019-05-15 RX ADMIN — Medication 10 ML: at 12:01

## 2019-05-15 RX ADMIN — Medication 10 ML: at 10:00

## 2019-05-15 RX ADMIN — HEPARIN 500 UNITS: 100 SYRINGE at 12:01

## 2019-05-15 RX ADMIN — DIPHENHYDRAMINE HYDROCHLORIDE 25 MG: 50 INJECTION, SOLUTION INTRAMUSCULAR; INTRAVENOUS at 10:13

## 2019-05-15 RX ADMIN — SODIUM CHLORIDE 500 ML: 900 INJECTION, SOLUTION INTRAVENOUS at 10:43

## 2019-05-15 RX ADMIN — Medication 10 ML: at 12:02

## 2019-05-15 RX ADMIN — HEPARIN 500 UNITS: 100 SYRINGE at 12:02

## 2019-05-15 RX ADMIN — SODIUM CHLORIDE: 900 INJECTION, SOLUTION INTRAVENOUS at 11:05

## 2019-05-15 NOTE — PROGRESS NOTES
Arrived to the [facility]. Pentostatin infusion completed. Patient tolerated well. Any issues or concerns during appointment: NO.  Patient aware of next infusion appointment on 05/29/19 (date) at 1500 (time). Discharged ambulatory.

## 2019-05-15 NOTE — PROGRESS NOTES
5/15/19- Pt seen in the office with Dr Yaneth Rivera. Labs reviewed. Pt to proceed to infusion for 4th dose of Pentostatin. Dr Yaneth Rivera would like for pt to continue Pentostatin every 2 week for a total of 14 doses. Pt aware of plan and will return in 2 weeks.

## 2019-05-16 LAB
CMV DNA SERPL NAA+PROBE-ACNC: NORMAL IU/ML
CMV DNA SERPL NAA+PROBE-LOG IU: NORMAL LOG10 IU/ML

## 2019-05-22 ENCOUNTER — HOSPITAL ENCOUNTER (OUTPATIENT)
Dept: INFUSION THERAPY | Age: 64
Discharge: HOME | End: 2019-05-22
Payer: COMMERCIAL

## 2019-05-22 VITALS
RESPIRATION RATE: 18 BRPM | SYSTOLIC BLOOD PRESSURE: 137 MMHG | TEMPERATURE: 97.7 F | DIASTOLIC BLOOD PRESSURE: 85 MMHG | HEART RATE: 64 BPM

## 2019-05-22 PROCEDURE — 99211 OFF/OP EST MAY X REQ PHY/QHP: CPT

## 2019-05-22 NOTE — PROGRESS NOTES
Arrived to infusion  States he has had a little bit of \"stinging \" at insertion site of PICC  Dressing is loose but reinforced by patient with a large amount of tape, damp. When changing dressing,small area of erosion noted under the PICC catheter itself. Catheter shifted to one side and new statlock aligned to avoid area of irritation. No drainage or swelling noted. Patient advised to keep dressing dry,use opposite arm for bathing etc. Also cautioned to watch for swelling,redness or drainage and report those immediately. Tolerated well denies any discomfort now. Next appt 5/29  Message sent to Navigator also.

## 2019-05-28 RX ORDER — SODIUM CHLORIDE 0.9 % (FLUSH) 0.9 %
10 SYRINGE (ML) INJECTION EVERY 8 HOURS
Status: DISCONTINUED | OUTPATIENT
Start: 2019-05-28 | End: 2019-05-28

## 2019-05-28 RX ORDER — SODIUM CHLORIDE 0.9 % (FLUSH) 0.9 %
10 SYRINGE (ML) INJECTION EVERY 8 HOURS
Status: DISCONTINUED | OUTPATIENT
Start: 2019-05-22 | End: 2019-10-24 | Stop reason: HOSPADM

## 2019-05-29 ENCOUNTER — PATIENT OUTREACH (OUTPATIENT)
Dept: CASE MANAGEMENT | Age: 64
End: 2019-05-29

## 2019-05-29 ENCOUNTER — HOSPITAL ENCOUNTER (OUTPATIENT)
Dept: LAB | Age: 64
Discharge: HOME | End: 2019-05-29
Payer: COMMERCIAL

## 2019-05-29 ENCOUNTER — HOSPITAL ENCOUNTER (OUTPATIENT)
Dept: ULTRASOUND IMAGING | Age: 64
Discharge: HOME | End: 2019-05-29
Attending: INTERNAL MEDICINE
Payer: COMMERCIAL

## 2019-05-29 ENCOUNTER — HOSPITAL ENCOUNTER (OUTPATIENT)
Dept: INFUSION THERAPY | Age: 64
Discharge: HOME | End: 2019-05-29
Payer: COMMERCIAL

## 2019-05-29 DIAGNOSIS — C91.60 PROLYMPHOCYTIC LEUKEMIA OF T-CELL (HCC): ICD-10-CM

## 2019-05-29 DIAGNOSIS — C91.60 PROLYMPHOCYTIC LEUKEMIA OF T-CELL (HCC): Primary | ICD-10-CM

## 2019-05-29 LAB
ALBUMIN SERPL-MCNC: 3.9 G/DL (ref 3.2–4.6)
ALBUMIN/GLOB SERPL: 1.1 {RATIO} (ref 1.2–3.5)
ALP SERPL-CCNC: 35 U/L (ref 50–136)
ALT SERPL-CCNC: 22 U/L (ref 12–65)
ANION GAP SERPL CALC-SCNC: 8 MMOL/L (ref 7–16)
AST SERPL-CCNC: 16 U/L (ref 15–37)
BASOPHILS # BLD: 0 K/UL (ref 0–0.2)
BASOPHILS NFR BLD: 1 % (ref 0–2)
BILIRUB SERPL-MCNC: 0.4 MG/DL (ref 0.2–1.1)
BUN SERPL-MCNC: 17 MG/DL (ref 8–23)
CALCIUM SERPL-MCNC: 9.2 MG/DL (ref 8.3–10.4)
CHLORIDE SERPL-SCNC: 109 MMOL/L (ref 98–107)
CO2 SERPL-SCNC: 24 MMOL/L (ref 21–32)
CREAT SERPL-MCNC: 0.98 MG/DL (ref 0.8–1.5)
DIFFERENTIAL METHOD BLD: ABNORMAL
EOSINOPHIL # BLD: 0 K/UL (ref 0–0.8)
EOSINOPHIL NFR BLD: 2 % (ref 0.5–7.8)
ERYTHROCYTE [DISTWIDTH] IN BLOOD BY AUTOMATED COUNT: 16.2 % (ref 11.9–14.6)
GLOBULIN SER CALC-MCNC: 3.5 G/DL (ref 2.3–3.5)
GLUCOSE SERPL-MCNC: 113 MG/DL (ref 65–100)
HCT VFR BLD AUTO: 28.4 % (ref 41.1–50.3)
HGB BLD-MCNC: 9.4 G/DL (ref 13.6–17.2)
IMM GRANULOCYTES # BLD AUTO: 0 K/UL (ref 0–0.5)
IMM GRANULOCYTES NFR BLD AUTO: 0 % (ref 0–5)
LYMPHOCYTES # BLD: 0.2 K/UL (ref 0.5–4.6)
LYMPHOCYTES NFR BLD: 18 % (ref 13–44)
MAGNESIUM SERPL-MCNC: 2.2 MG/DL (ref 1.8–2.4)
MCH RBC QN AUTO: 33.8 PG (ref 26.1–32.9)
MCHC RBC AUTO-ENTMCNC: 33.1 G/DL (ref 31.4–35)
MCV RBC AUTO: 102.2 FL (ref 79.6–97.8)
MONOCYTES # BLD: 0.3 K/UL (ref 0.1–1.3)
MONOCYTES NFR BLD: 40 % (ref 4–12)
NEUTS SEG # BLD: 0.3 K/UL (ref 1.7–8.2)
NEUTS SEG NFR BLD: 39 % (ref 43–78)
NRBC # BLD: 0 K/UL (ref 0–0.2)
PLATELET # BLD AUTO: 100 K/UL (ref 150–450)
PMV BLD AUTO: 11 FL (ref 9.4–12.3)
POTASSIUM SERPL-SCNC: 3.9 MMOL/L (ref 3.5–5.1)
PROT SERPL-MCNC: 7.4 G/DL (ref 6.3–8.2)
RBC # BLD AUTO: 2.78 M/UL (ref 4.23–5.67)
SODIUM SERPL-SCNC: 141 MMOL/L (ref 136–145)
WBC # BLD AUTO: 0.8 K/UL (ref 4.3–11.1)

## 2019-05-29 PROCEDURE — 96413 CHEMO IV INFUSION 1 HR: CPT

## 2019-05-29 PROCEDURE — 96375 TX/PRO/DX INJ NEW DRUG ADDON: CPT

## 2019-05-29 PROCEDURE — 83735 ASSAY OF MAGNESIUM: CPT

## 2019-05-29 PROCEDURE — 74011000258 HC RX REV CODE- 258: Performed by: INTERNAL MEDICINE

## 2019-05-29 PROCEDURE — 74011250637 HC RX REV CODE- 250/637: Performed by: INTERNAL MEDICINE

## 2019-05-29 PROCEDURE — 93971 EXTREMITY STUDY: CPT

## 2019-05-29 PROCEDURE — 80053 COMPREHEN METABOLIC PANEL: CPT

## 2019-05-29 PROCEDURE — 74011250636 HC RX REV CODE- 250/636: Performed by: INTERNAL MEDICINE

## 2019-05-29 PROCEDURE — 85025 COMPLETE CBC W/AUTO DIFF WBC: CPT

## 2019-05-29 RX ORDER — SODIUM CHLORIDE 0.9 % (FLUSH) 0.9 %
10 SYRINGE (ML) INJECTION AS NEEDED
Status: DISCONTINUED | OUTPATIENT
Start: 2019-05-29 | End: 2019-05-30 | Stop reason: HOSPADM

## 2019-05-29 RX ORDER — DIPHENHYDRAMINE HYDROCHLORIDE 50 MG/ML
25 INJECTION, SOLUTION INTRAMUSCULAR; INTRAVENOUS ONCE
Status: COMPLETED | OUTPATIENT
Start: 2019-05-29 | End: 2019-05-29

## 2019-05-29 RX ORDER — HEPARIN 100 UNIT/ML
300-500 SYRINGE INTRAVENOUS AS NEEDED
Status: DISCONTINUED | OUTPATIENT
Start: 2019-05-29 | End: 2019-05-30 | Stop reason: HOSPADM

## 2019-05-29 RX ORDER — ONDANSETRON 2 MG/ML
8 INJECTION INTRAMUSCULAR; INTRAVENOUS ONCE
Status: COMPLETED | OUTPATIENT
Start: 2019-05-29 | End: 2019-05-29

## 2019-05-29 RX ORDER — ACETAMINOPHEN 325 MG/1
650 TABLET ORAL ONCE
Status: COMPLETED | OUTPATIENT
Start: 2019-05-29 | End: 2019-05-29

## 2019-05-29 RX ORDER — DIPHENHYDRAMINE HYDROCHLORIDE 50 MG/ML
25 INJECTION, SOLUTION INTRAMUSCULAR; INTRAVENOUS AS NEEDED
Status: DISCONTINUED | OUTPATIENT
Start: 2019-05-29 | End: 2019-05-30 | Stop reason: HOSPADM

## 2019-05-29 RX ORDER — HYDROCORTISONE SODIUM SUCCINATE 100 MG/2ML
100 INJECTION, POWDER, FOR SOLUTION INTRAMUSCULAR; INTRAVENOUS AS NEEDED
Status: DISCONTINUED | OUTPATIENT
Start: 2019-05-29 | End: 2019-05-30 | Stop reason: HOSPADM

## 2019-05-29 RX ADMIN — ACETAMINOPHEN 650 MG: 325 TABLET, FILM COATED ORAL at 15:45

## 2019-05-29 RX ADMIN — DIPHENHYDRAMINE HYDROCHLORIDE 25 MG: 50 INJECTION, SOLUTION INTRAMUSCULAR; INTRAVENOUS at 16:14

## 2019-05-29 RX ADMIN — DEXAMETHASONE SODIUM PHOSPHATE 12 MG: 4 INJECTION, SOLUTION INTRAMUSCULAR; INTRAVENOUS at 15:55

## 2019-05-29 RX ADMIN — SODIUM CHLORIDE: 900 INJECTION, SOLUTION INTRAVENOUS at 16:45

## 2019-05-29 RX ADMIN — ONDANSETRON 8 MG: 2 INJECTION INTRAMUSCULAR; INTRAVENOUS at 16:26

## 2019-05-29 RX ADMIN — SODIUM CHLORIDE 500 ML: 900 INJECTION, SOLUTION INTRAVENOUS at 15:52

## 2019-05-29 NOTE — PROGRESS NOTES
Patient voices concern over swollen left lower foot and leg. States \"I forgot to mention to the doctor that I was having this swelling when I was over there today. \"  Call placed to Tracy Cuenca RN Navigator and reported that patient has edema from foot to ankle up to knee approx 2+. Patient denies pain to left lower extremity. LLE warm to touch.

## 2019-05-29 NOTE — PROGRESS NOTES
Tolerated infusion without difficulty. LLE remains swollen and warm to touch with mild redness present. Patient is to report to [facility] radiology dept at 6pm for ultrasound to rule out blood clot. Wife verbalizes understanding. Patient discharged via ambulation accompanied by wife. Instructed to notify physician of any problems, questions or concerns after discharge. Next appointment is 06/05/19 at 0800 with Infusion enter for PICC dressing change. Encouraged patient to take measures to keep dressing dry. Verbalizes understanding.

## 2019-05-29 NOTE — PROGRESS NOTES
5/29/19- Pt seen in the office with Dr Salbador Holman. Labs reviewed. Pt to proceed to infusion today for Pentostatin. He will continue weekly PICC dressing changes and Pentostatin every 2 weeks. Will plan for port once WBC is improved.

## 2019-05-29 NOTE — PROGRESS NOTES
Zane Peters RN Navigator here to see patient. LLE examined and orders received for ultrasound study of extremity today at [address]  Wife advised and understands.

## 2019-05-31 LAB
CMV DNA SERPL NAA+PROBE-ACNC: NEGATIVE IU/ML
CMV DNA SERPL NAA+PROBE-LOG IU: NORMAL LOG10 IU/ML

## 2019-06-05 ENCOUNTER — HOSPITAL ENCOUNTER (OUTPATIENT)
Dept: INFUSION THERAPY | Age: 64
Discharge: HOME | End: 2019-06-05
Payer: COMMERCIAL

## 2019-06-05 VITALS
TEMPERATURE: 98 F | HEART RATE: 76 BPM | DIASTOLIC BLOOD PRESSURE: 68 MMHG | RESPIRATION RATE: 18 BRPM | OXYGEN SATURATION: 98 % | SYSTOLIC BLOOD PRESSURE: 142 MMHG

## 2019-06-05 PROCEDURE — 96523 IRRIG DRUG DELIVERY DEVICE: CPT

## 2019-06-05 RX ORDER — SODIUM CHLORIDE 0.9 % (FLUSH) 0.9 %
10 SYRINGE (ML) INJECTION AS NEEDED
Status: DISCONTINUED | OUTPATIENT
Start: 2019-06-05 | End: 2019-06-09 | Stop reason: HOSPADM

## 2019-06-05 RX ADMIN — Medication 20 ML: at 08:04

## 2019-06-05 NOTE — PROGRESS NOTES
Arrived to the [facility]. PICC dressing changed. Patient tolerated well. Any issues or concerns during appointment: none. Patient aware of next infusion appointment on 6/12/19 at 1100. Discharged ambulatory.     Shanelle Millan RN

## 2019-06-06 ENCOUNTER — HOSPITAL ENCOUNTER (OUTPATIENT)
Dept: PHYSICAL THERAPY | Age: 64
Discharge: HOME | End: 2019-06-06
Payer: COMMERCIAL

## 2019-06-06 PROCEDURE — 97161 PT EVAL LOW COMPLEX 20 MIN: CPT

## 2019-06-06 NOTE — THERAPY EVALUATION
Gene Aleman  : 1955  Primary: [employer], In*  Secondary:  [address]  at [facility]  [address] , [address], [place] [address]  [phone]   [phone]          Louis Rivero Assessment 2019   ICD-10: Treatment Diagnosis: Pain in right knee (M25.561); Pain in left knee (M25.562); Difficulty in walking, not elsewhere classified (R26.2)  Precautions/Allergies:   Campath [alemtuzumab]   TREATMENT PLAN:  Effective Dates: 2019 TO 2019 (60 days). Frequency/Duration: 2 times a week for 60 Day(s) MEDICAL/REFERRING DIAGNOSIS:  Pain in left knee [M25.562]  Pain in right knee [M25.561]  Chondromalacia patellae, left knee [M22.42]  Chondromalacia patellae, right knee [M22.41]   DATE OF ONSET: Chronic with recent episode 2019  REFERRING PHYSICIAN: Zuleika Slaughter MD MD Orders: evaluate and treat, include water therapy   Return MD Appointment: not set     INITIAL ASSESSMENT:  Mr. Xavier Pérez presents in therapy today with reports of chronic B/L knee and LE pain. He has difficulty with prolonged walking, activity, and rising from a seated position. He will benefit from aquatic-based therapy, for the decreased weight bearing, pain reduction and increased resistance during strengthening activities, that the warm water allows. Aquatic therapy is not provided at this clinic, so he will be rescheduled at our [facility] clinic for follow-up visits. PROBLEM LIST (Impacting functional limitations):  1. Decreased Strength  2. Decreased ADL/Functional Activities  3. Decreased Ambulation Ability/Technique  4. Increased Pain  5. Decreased Activity Tolerance  6. Decreased Flexibility/Joint Mobility  7. Decreased Knowledge of Precautions  8. Decreased [place] with Home Exercise Program INTERVENTIONS PLANNED: (Treatment may consist of any combination of the following)  1. Cold  2. Gait Training  3. Heat  4.  Home Exercise Program (HEP)  5. Manual Therapy  6. Neuromuscular Re-education/Strengthening  7. Range of Motion (ROM)  8. Therapeutic Activites  9. Therapeutic Exercise/Strengthening  10. Aquatic Therapy     GOALS: (Goals have been discussed and agreed upon with patient.)     SHORT-TERM FUNCTIONAL GOALS: Time Frame: 3 weeks  1. Patient will be compliant with HEP focused on lower extremity strengthening and ROM. 2.  Patient will rate B/L LE pain no greater than 5/10 for improved tolerance to daily and work duties. DISCHARGE GOALS: Time Frame: 6 weeks  1. Patient will be independent with comprehensive HEP focused on continued performance of lower extremity strengthening and ROM activities. 2.  Patient will rate B/L LE pain no greater than 3/10 and which does not significantly interfere with daily or work duties for return to previous level of function. 3.  Patient will demonstrate near symmetrical bilateral LE AROM, at least 0-120, for optimum functional mobility with daily and work duties. 4.  Patient will demonstrate near symmetrical bilateral LE strength grades, at least 4+/5, for optimum performance and safety with daily and work duties. OUTCOME MEASURE:   Tool Used: Lower Extremity Functional Scale (LEFS)  Score:  Initial: 32/80 Most Recent: X/80 (Date: -- )   Interpretation of Score: 20 questions each scored on a 5 point scale with 0 representing \"extreme difficulty or unable to perform\" and 4 representing \"no difficulty\". The lower the score, the greater the functional disability. 80/80 represents no disability. Minimal detectable change is 9 points. MEDICAL NECESSITY:   · Patient is expected to demonstrate progress in strength and range of motion to improve safety during functional mobility. REASON FOR SERVICES/OTHER COMMENTS:  · Patient continues to require skilled intervention due to not reaching long term goals.   Total Duration:  PT Patient Time In/Time Out  Time In: 0945  Time Out: 1015    Rehabilitation Potential For Stated Goals: Good  Regarding Viji Walker's therapy, I certify that the treatment plan above will be carried out by a therapist or under their direction. Thank you for this referral,  Oumar Vaughn, PT, DPT     Referring Physician Signature: Bijan Obregon MD No Signature is Required for this note. PAIN/SUBJECTIVE:   Initial: Pain Intensity 1: 5  Pain Location 1: Knee  Pain Orientation 1: Left, Right  Post Session:  5/10   HISTORY:   History of Injury/Illness (Reason for Referral):  Patient reports chronic and gradual onset of B/L knee pain, with recent episode in April 2019. He reports seeing Dr. Sagar Domínguez, who mentioned that water therapy would be beneficial.   Past Medical History/Comorbidities:   Mr. Joelle Marin  has a past medical history of Back pain, Cervical radiculopathy, GERD (gastroesophageal reflux disease), H/O seasonal allergies, Hyperlipidemia, Impotence, Insomnia, Lateral epicondylitis, Leukemia ([address] Obesity, and Sleep apnea. Mr. Joelle Marin  has a past surgical history that includes hx orthopaedic; hx circumcision; hx colonoscopy (2017); and hx wisdom teeth extraction. Social History/Living Environment:     Lives with spouse in [facility]  Prior Level of Function/Work/Activity:  Independent   Personal Factors:          Sex:  male        Age:  59 y.o. Ambulatory/Rehab Services H2 Model Falls Risk Assessment   Risk Factors:       (1)  Gender [Male] Ability to Rise from Chair:       (1)  Pushes up, successful in one attempt   Falls Prevention Plan:       No modifications necessary   Total: (5 or greater = High Risk): 2   ©2010 [facility]. All Rights Reserved. [facility] Patent #[id number].  Federal Law prohibits the replication, distribution or use without written permission from [facility]   Current Medications:       Current Outpatient Medications:     rivaroxaban (XARELTO) 15 mg (42)- 20 mg (9) DsPk, Take one 15 mg tablet twice a day with food for the first 21 days. Then, take one 20 mg tablet once a day with food for 9 days. Indications: blood clot in a deep vein of the extremities, Disp: 1 Dose Pack, Rfl: 0    fluconazole (DIFLUCAN) 200 mg tablet, Take 1 Tab by mouth daily. FDA advises cautious prescribing of oral fluconazole in pregnancy. , Disp: 30 Tab, Rfl: 4    predniSONE (STERAPRED DS) 10 mg dose pack, Take 1 Tab by mouth See Admin Instructions. See administration instruction per 10mg dose pack, Disp: 21 Tab, Rfl: 0    valGANciclovir (VALCYTE) 450 mg tablet, Take 2 Tabs by mouth two (2) times a day., Disp: 120 Tab, Rfl: 2    predniSONE (DELTASONE) 5 mg tablet, Take 1 Tab by mouth every twelve (12) hours as needed. , Disp: 60 Tab, Rfl: 0    desoximetasone (TOPICORT) 0.25 % topical cream, Apply  to affected area two (2) times a day., Disp: 15 g, Rfl: 0    ondansetron (ZOFRAN ODT) 8 mg disintegrating tablet, Take 1 Tab by mouth every eight (8) hours as needed for Nausea., Disp: 90 Tab, Rfl: 0    prochlorperazine (COMPAZINE) 10 mg tablet, Take 0.5-1 Tabs by mouth every six (6) hours as needed. , Disp: 90 Tab, Rfl: 0    lovastatin (MEVACOR) 10 mg tablet, Take 1 Tab by mouth nightly., Disp: 90 Tab, Rfl: 3    olmesartan-hydroCHLOROthiazide (BENICAR HCT) 20-12.5 mg per tablet, Take 1 Tab by mouth daily. , Disp: 90 Tab, Rfl: 3    DISABLED PLACARD (DISABLED PLACARD) DMV, Handicap parking permit, Disp: 1 Each, Rfl: 0    CETIRIZINE HCL (ZYRTEC PO), Take  by mouth., Disp: , Rfl:     multivitamin (ONE A DAY) tablet, Take 1 Tab by mouth daily. , Disp: , Rfl:     DOCOSAHEXANOIC ACID/EPA (FISH OIL PO), Take  by mouth., Disp: , Rfl:     VITAMIN A/VITAMIN D3 (NATURAL VITAMIN D PO), Take  by mouth., Disp: , Rfl:   No current facility-administered medications for this encounter.      Facility-Administered Medications Ordered in Other Encounters:     saline peripheral flush soln 10 mL, 10 mL, InterCATHeter, PRN, Dora Appiah, NP, 20 mL at 06/05/19 0804    central line flush (saline) syringe 10 mL, 10 mL, InterCATHeter, Q8H, Elisha Contreras MD   Date Last Reviewed:  6/6/2019     Number of Personal Factors/Comorbidities that affect the Plan of Care: 1-2: MODERATE COMPLEXITY   EXAMINATION:   Observation/Orthostatic Postural Assessment:          Patient rises slowly and pushes through UEs from chair, ambulates with slow gait speed and shortened stride; decreased heel strike and push off bilaterally; mild to moderate edema present at B/L knees  Palpation:          No increase in warmth at B/L knees, mild tenderness at B/L posterior capsules; no tenderness with B/L calf squeeze   ROM:          R knee AROM: 0-110; L knee AROM: 0-115  Strength:          R knee: flexion 4/5, extension 4-/5, hip flexion 3+/5        L knee: flexion 4/5, extension 4/5, hip flexion 4/5   Body Structures Involved:  1. Bones  2. Joints  3. Muscles Body Functions Affected:  1. Sensory/Pain  2. Movement Related Activities and Participation Affected:  1. General Tasks and Demands  2. Mobility  3. Self Care  4. Domestic Life  5. Interpersonal Interactions and Relationships  6.  Community, Social and [place]   Number of elements (examined above) that affect the Plan of Care: 3: MODERATE COMPLEXITY   CLINICAL PRESENTATION:   Presentation: Stable and uncomplicated: LOW COMPLEXITY   CLINICAL DECISION MAKING:   Use of outcome tool(s) and clinical judgement create a POC that gives a: Clear prediction of patient's progress: LOW COMPLEXITY

## 2019-06-12 ENCOUNTER — PATIENT OUTREACH (OUTPATIENT)
Dept: CASE MANAGEMENT | Age: 64
End: 2019-06-12

## 2019-06-12 ENCOUNTER — HOSPITAL ENCOUNTER (OUTPATIENT)
Dept: INFUSION THERAPY | Age: 64
Discharge: HOME | End: 2019-06-12
Payer: COMMERCIAL

## 2019-06-12 ENCOUNTER — HOSPITAL ENCOUNTER (OUTPATIENT)
Dept: LAB | Age: 64
Discharge: HOME | End: 2019-06-12
Payer: COMMERCIAL

## 2019-06-12 DIAGNOSIS — C91.60 PROLYMPHOCYTIC LEUKEMIA OF T-CELL (HCC): ICD-10-CM

## 2019-06-12 DIAGNOSIS — C91.60 PROLYMPHOCYTIC LEUKEMIA OF T-CELL (HCC): Primary | ICD-10-CM

## 2019-06-12 LAB
ALBUMIN SERPL-MCNC: 4 G/DL (ref 3.2–4.6)
ALBUMIN/GLOB SERPL: 1 {RATIO} (ref 1.2–3.5)
ALP SERPL-CCNC: 40 U/L (ref 50–136)
ALT SERPL-CCNC: 24 U/L (ref 12–65)
ANION GAP SERPL CALC-SCNC: 7 MMOL/L (ref 7–16)
AST SERPL-CCNC: 22 U/L (ref 15–37)
BASOPHILS # BLD: 0 K/UL (ref 0–0.2)
BASOPHILS NFR BLD: 0 % (ref 0–2)
BILIRUB SERPL-MCNC: 0.5 MG/DL (ref 0.2–1.1)
BUN SERPL-MCNC: 19 MG/DL (ref 8–23)
CALCIUM SERPL-MCNC: 9.5 MG/DL (ref 8.3–10.4)
CHLORIDE SERPL-SCNC: 108 MMOL/L (ref 98–107)
CO2 SERPL-SCNC: 25 MMOL/L (ref 21–32)
CREAT SERPL-MCNC: 1.14 MG/DL (ref 0.8–1.5)
DIFFERENTIAL METHOD BLD: ABNORMAL
EOSINOPHIL # BLD: 0.1 K/UL (ref 0–0.8)
EOSINOPHIL NFR BLD: 2 % (ref 0.5–7.8)
ERYTHROCYTE [DISTWIDTH] IN BLOOD BY AUTOMATED COUNT: 14.3 % (ref 11.9–14.6)
GLOBULIN SER CALC-MCNC: 3.9 G/DL (ref 2.3–3.5)
GLUCOSE SERPL-MCNC: 110 MG/DL (ref 65–100)
HCT VFR BLD AUTO: 32.9 % (ref 41.1–50.3)
HGB BLD-MCNC: 10.8 G/DL (ref 13.6–17.2)
IMM GRANULOCYTES # BLD AUTO: 0 K/UL (ref 0–0.5)
IMM GRANULOCYTES NFR BLD AUTO: 0 % (ref 0–5)
LYMPHOCYTES # BLD: 0.3 K/UL (ref 0.5–4.6)
LYMPHOCYTES NFR BLD: 7 % (ref 13–44)
MAGNESIUM SERPL-MCNC: 2.1 MG/DL (ref 1.8–2.4)
MCH RBC QN AUTO: 33.2 PG (ref 26.1–32.9)
MCHC RBC AUTO-ENTMCNC: 32.8 G/DL (ref 31.4–35)
MCV RBC AUTO: 101.2 FL (ref 79.6–97.8)
MONOCYTES # BLD: 0.4 K/UL (ref 0.1–1.3)
MONOCYTES NFR BLD: 11 % (ref 4–12)
NEUTS SEG # BLD: 2.8 K/UL (ref 1.7–8.2)
NEUTS SEG NFR BLD: 79 % (ref 43–78)
NRBC # BLD: 0 K/UL (ref 0–0.2)
PLATELET # BLD AUTO: 136 K/UL (ref 150–450)
PMV BLD AUTO: 10.5 FL (ref 9.4–12.3)
POTASSIUM SERPL-SCNC: 3.5 MMOL/L (ref 3.5–5.1)
PROT SERPL-MCNC: 7.9 G/DL (ref 6.3–8.2)
RBC # BLD AUTO: 3.25 M/UL (ref 4.23–5.67)
SODIUM SERPL-SCNC: 140 MMOL/L (ref 136–145)
WBC # BLD AUTO: 3.6 K/UL (ref 4.3–11.1)

## 2019-06-12 PROCEDURE — 80053 COMPREHEN METABOLIC PANEL: CPT

## 2019-06-12 PROCEDURE — 96375 TX/PRO/DX INJ NEW DRUG ADDON: CPT

## 2019-06-12 PROCEDURE — 83735 ASSAY OF MAGNESIUM: CPT

## 2019-06-12 PROCEDURE — 74011250636 HC RX REV CODE- 250/636: Performed by: INTERNAL MEDICINE

## 2019-06-12 PROCEDURE — 96374 THER/PROPH/DIAG INJ IV PUSH: CPT

## 2019-06-12 PROCEDURE — 74011000258 HC RX REV CODE- 258: Performed by: INTERNAL MEDICINE

## 2019-06-12 PROCEDURE — 74011250637 HC RX REV CODE- 250/637: Performed by: INTERNAL MEDICINE

## 2019-06-12 PROCEDURE — 85025 COMPLETE CBC W/AUTO DIFF WBC: CPT

## 2019-06-12 RX ORDER — DIPHENHYDRAMINE HYDROCHLORIDE 50 MG/ML
25 INJECTION, SOLUTION INTRAMUSCULAR; INTRAVENOUS ONCE
Status: COMPLETED | OUTPATIENT
Start: 2019-06-12 | End: 2019-06-12

## 2019-06-12 RX ORDER — ACETAMINOPHEN 325 MG/1
650 TABLET ORAL ONCE
Status: COMPLETED | OUTPATIENT
Start: 2019-06-12 | End: 2019-06-12

## 2019-06-12 RX ORDER — SODIUM CHLORIDE 0.9 % (FLUSH) 0.9 %
10 SYRINGE (ML) INJECTION AS NEEDED
Status: ACTIVE | OUTPATIENT
Start: 2019-06-12 | End: 2019-06-12

## 2019-06-12 RX ORDER — ONDANSETRON 2 MG/ML
8 INJECTION INTRAMUSCULAR; INTRAVENOUS ONCE
Status: COMPLETED | OUTPATIENT
Start: 2019-06-12 | End: 2019-06-12

## 2019-06-12 RX ADMIN — ACETAMINOPHEN 650 MG: 325 TABLET, FILM COATED ORAL at 11:57

## 2019-06-12 RX ADMIN — DEXAMETHASONE SODIUM PHOSPHATE 12 MG: 4 INJECTION, SOLUTION INTRAMUSCULAR; INTRAVENOUS at 12:05

## 2019-06-12 RX ADMIN — ONDANSETRON 8 MG: 2 INJECTION INTRAMUSCULAR; INTRAVENOUS at 12:00

## 2019-06-12 RX ADMIN — SODIUM CHLORIDE 500 ML: 900 INJECTION, SOLUTION INTRAVENOUS at 12:20

## 2019-06-12 RX ADMIN — Medication 10 ML: at 11:55

## 2019-06-12 RX ADMIN — DIPHENHYDRAMINE HYDROCHLORIDE 25 MG: 50 INJECTION, SOLUTION INTRAMUSCULAR; INTRAVENOUS at 11:57

## 2019-06-12 RX ADMIN — Medication 20 ML: at 12:50

## 2019-06-12 NOTE — PROGRESS NOTES
Arrived to infusion after UOA visit  Pt states left leg dvt is improving but still sore. No other concerns  Unfortunately after premeds were given pharmacy informed patient that they did not have his pendostatin here . Patient has been rescheduled for tomorrow when drug will be  Available.  Patient and spouse verbalized understanding

## 2019-06-12 NOTE — PROGRESS NOTES
6/12/19- Pt seen in the office with Dr Leann Catherine. Labs reviewed with counts improving. Pt to have port placed now that [address] is above 1K. He will proceed to infusion today for Pentostatin and return to see Dr Leann Catherine in 2 weeks. Pt to continue Xerelto for 6 months. No edema noted to lower extremities.

## 2019-06-13 ENCOUNTER — HOSPITAL ENCOUNTER (OUTPATIENT)
Dept: INFUSION THERAPY | Age: 64
Discharge: HOME | End: 2019-06-13
Payer: COMMERCIAL

## 2019-06-13 VITALS — BODY MASS INDEX: 36.44 KG/M2 | WEIGHT: 261.3 LBS

## 2019-06-13 DIAGNOSIS — C91.60 PROLYMPHOCYTIC LEUKEMIA OF T-CELL (HCC): Primary | ICD-10-CM

## 2019-06-13 PROBLEM — D70.9 NEUTROPENIC FEVER (HCC): Status: RESOLVED | Noted: 2019-03-25 | Resolved: 2019-06-13

## 2019-06-13 PROBLEM — R50.81 NEUTROPENIC FEVER (HCC): Status: RESOLVED | Noted: 2019-03-25 | Resolved: 2019-06-13

## 2019-06-13 LAB
CMV DNA SERPL NAA+PROBE-ACNC: NEGATIVE IU/ML
CMV DNA SERPL NAA+PROBE-LOG IU: NORMAL LOG10 IU/ML

## 2019-06-13 PROCEDURE — 96365 THER/PROPH/DIAG IV INF INIT: CPT

## 2019-06-13 PROCEDURE — 74011250636 HC RX REV CODE- 250/636: Performed by: INTERNAL MEDICINE

## 2019-06-13 PROCEDURE — 96375 TX/PRO/DX INJ NEW DRUG ADDON: CPT

## 2019-06-13 PROCEDURE — 74011250637 HC RX REV CODE- 250/637: Performed by: INTERNAL MEDICINE

## 2019-06-13 PROCEDURE — 74011000258 HC RX REV CODE- 258: Performed by: INTERNAL MEDICINE

## 2019-06-13 PROCEDURE — 96413 CHEMO IV INFUSION 1 HR: CPT

## 2019-06-13 RX ORDER — DIPHENHYDRAMINE HYDROCHLORIDE 50 MG/ML
25 INJECTION, SOLUTION INTRAMUSCULAR; INTRAVENOUS ONCE
Status: COMPLETED | OUTPATIENT
Start: 2019-06-13 | End: 2019-06-13

## 2019-06-13 RX ORDER — ACETAMINOPHEN 325 MG/1
650 TABLET ORAL ONCE
Status: COMPLETED | OUTPATIENT
Start: 2019-06-13 | End: 2019-06-13

## 2019-06-13 RX ORDER — ONDANSETRON 2 MG/ML
8 INJECTION INTRAMUSCULAR; INTRAVENOUS ONCE
Status: COMPLETED | OUTPATIENT
Start: 2019-06-13 | End: 2019-06-13

## 2019-06-13 RX ORDER — SODIUM CHLORIDE 0.9 % (FLUSH) 0.9 %
10 SYRINGE (ML) INJECTION AS NEEDED
Status: DISCONTINUED | OUTPATIENT
Start: 2019-06-13 | End: 2019-06-14 | Stop reason: HOSPADM

## 2019-06-13 RX ADMIN — SODIUM CHLORIDE 500 ML: 900 INJECTION, SOLUTION INTRAVENOUS at 13:45

## 2019-06-13 RX ADMIN — SODIUM CHLORIDE: 900 INJECTION, SOLUTION INTRAVENOUS at 14:54

## 2019-06-13 RX ADMIN — DIPHENHYDRAMINE HYDROCHLORIDE 25 MG: 50 INJECTION, SOLUTION INTRAMUSCULAR; INTRAVENOUS at 14:16

## 2019-06-13 RX ADMIN — ONDANSETRON 8 MG: 2 INJECTION INTRAMUSCULAR; INTRAVENOUS at 14:18

## 2019-06-13 RX ADMIN — Medication 10 ML: at 15:40

## 2019-06-13 RX ADMIN — Medication 10 ML: at 15:35

## 2019-06-13 RX ADMIN — Medication 10 ML: at 13:45

## 2019-06-13 RX ADMIN — DEXAMETHASONE SODIUM PHOSPHATE 12 MG: 4 INJECTION, SOLUTION INTRAMUSCULAR; INTRAVENOUS at 14:20

## 2019-06-13 RX ADMIN — ACETAMINOPHEN 650 MG: 325 TABLET, FILM COATED ORAL at 14:09

## 2019-06-13 NOTE — PROGRESS NOTES
Pt arrived ambulatory today at 1340, to receive IV chemotherapy. Pt tolerated without difficulty. Patient discharged via ambulatory accompanied by spouse. Instructed to notify physician of any problems, questions or concerns. Allowed opportunity for patient/family to ask questions. Verbalized understanding. Next appointment is June 19 at 0800 with Amrita Jordan.

## 2019-06-14 ENCOUNTER — HOSPITAL ENCOUNTER (OUTPATIENT)
Dept: INTERVENTIONAL RADIOLOGY/VASCULAR | Age: 64
Discharge: HOME | End: 2019-06-14
Attending: INTERNAL MEDICINE

## 2019-06-18 ENCOUNTER — HOSPITAL ENCOUNTER (OUTPATIENT)
Dept: PHYSICAL THERAPY | Age: 64
Discharge: HOME | End: 2019-06-18
Payer: COMMERCIAL

## 2019-06-18 ENCOUNTER — ANESTHESIA EVENT (OUTPATIENT)
Dept: SURGERY | Age: 64
End: 2019-06-18
Payer: COMMERCIAL

## 2019-06-18 PROCEDURE — 97113 AQUATIC THERAPY/EXERCISES: CPT

## 2019-06-18 RX ORDER — LIDOCAINE HYDROCHLORIDE 10 MG/ML
0.1 INJECTION INFILTRATION; PERINEURAL AS NEEDED
Status: CANCELLED | OUTPATIENT
Start: 2019-06-18

## 2019-06-18 RX ORDER — SODIUM CHLORIDE, SODIUM LACTATE, POTASSIUM CHLORIDE, CALCIUM CHLORIDE 600; 310; 30; 20 MG/100ML; MG/100ML; MG/100ML; MG/100ML
75 INJECTION, SOLUTION INTRAVENOUS CONTINUOUS
Status: CANCELLED | OUTPATIENT
Start: 2019-06-18 | End: 2019-06-19

## 2019-06-18 RX ORDER — MIDAZOLAM HYDROCHLORIDE 1 MG/ML
2 INJECTION, SOLUTION INTRAMUSCULAR; INTRAVENOUS
Status: CANCELLED | OUTPATIENT
Start: 2019-06-18 | End: 2019-06-19

## 2019-06-18 NOTE — PROGRESS NOTES
Linden Yuen  : 1955  Payor: Delmy Schultz / Plan: STARLA Albrecht. / Product Type: Commerical /  [address]  at [facility] VANNESSA RANDHAWA  [address] [place] [address], [address], [address] [place] [address]  [phone]   [phone]       OUTPATIENT PHYSICAL THERAPY: Daily Treatment Note 2019  Visit Count: 2     ICD-10: Treatment Diagnosis: Pain in right knee (M25.561); Pain in left knee (M25.562); Difficulty in walking, not elsewhere classified (R26.2)  Precautions/Allergies:   Campath [alemtuzumab]  MEDICAL/REFERRING DIAGNOSIS:  Pain in left knee [M25.562]  Pain in right knee [M25.561]  Chondromalacia patellae, 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 Pain: Initial:   not rated Post Session:  Not rated   Medications Last Reviewed:  2019    Updated Objective Findings:   None Today     TREATMENT:     HEP: none  MedBridge Portal     Aquatic Therapy (45 minutes): Aquatic treatment performed per flow grid for Decreased muscle strength, Decreased activity endurance, Decompression, Ease of movement and Low impact and reduced weight bearing activity. Cues provided for posture. Aquatic Exercise Log       Date  19 Date   Date   Date   Date     Activity/ Exercise Parameters Parameters Parameters Parameters Parameters   Walking forward 6       Walking backward 6       Walking sideways 6         Marching 6         Goose Step 6         Tip toes 3         Heels 3         Lunges        Side step squats        LE Exercises 4. 5' holding rail         Hip Flex/Ext 10         Hip Abd/Add 10         Hip IR/ER          Calf raises          Knee Flex 10 Squats          Leg Circles 10/10         Step Ups 10       UE Exercises          Squeeze In          Push Down          Pull Down          Bicep/Tricep        Rows/Press outs         Chi Positions          Trunk Rotation        Deep H2O/ Noodles 7' with blue rings          Stabilization          Arms only          Legs only Bicycle 2 min       Cross   Country 2 min         Scissors 2 min         Crab walk        Lower abdominal   work  [employer] 2 min         Cardio          Jogging        Lap   Swimming          Stretches          Hamstrings          Heelcords          Piriformis          Neck          Treatment/Session Summary:    · Response to Treatment:  Pt toleated the water well. His PICC line was covered with tegaderm. He is scheduled to go for a port tomorrow. .  · Communication/Consultation:  None today  · Equipment provided today:  None today  · Recommendations/Intent for next treatment session: Next visit will focus on aquatic therapy with adding in resistance as tolerated. Effective Dates: 6/6/2019 TO 8/5/2019 (60 days).   Frequency/Duration: 2 times a week for 60 Day(s)       Total Treatment Billable Duration:  45 minutes   PT Patient Time In/Time Out  Time In: 1215  Time Out: 100 Derrick Moy, DIVINE    Future Appointments   Date Time Provider Maame Schrader   6/19/2019  8:00 AM NUR4 GCCOPIG [facility]   6/19/2019  9:30 AM SFD IR UNIT 2 SFDRSP SFD   6/26/2019  1:20 PM [facility] OUTREACH INSURANCE [address]   6/26/2019  2:00 PM Sherrill Esquivel MD [facility]   6/26/2019  3:00 PM Jami Almanza [address]   6/27/2019  9:30 AM SFO AMBER SANDERS [facility]PT[facility]   7/1/2019  8:45 AM Jacque Juarez PT [facility]FORPT[facility]   7/3/2019  8:00 AM NUR1 [facility]OP[facility]   7/3/2019  2:30 PM GREGORY Rivas [facility]   7/8/2019  9:30 AM GREGORY Rivas [facility]   7/10/2019  9:30 AM Jacque Juarez PT SFMUSHTAQTTON [facility]   7/10/2019  1:30 PM [address] [place] [address] OUTREACH INSURANCE [facility]   7/10/2019  2:00 PM Samantha Reyes MD [facility]   7/10/2019  3:00 PM Bety Joseph [address]   7/24/2019  8:50 AM [address] [place] [address] OUTREACH INSURANCE GCCOIG [address]   7/24/2019  9:30 AM Samantha Reyes MD [facility]   7/24/2019 10:30 AM NUR3 GCCOPIG [facility]   12/9/2019  9:00 AM Sundar Morales NP [facility] RF

## 2019-06-19 ENCOUNTER — ANESTHESIA (OUTPATIENT)
Dept: SURGERY | Age: 64
End: 2019-06-19
Payer: COMMERCIAL

## 2019-06-19 ENCOUNTER — HOSPITAL ENCOUNTER (OUTPATIENT)
Age: 64
Setting detail: OUTPATIENT SURGERY
Discharge: HOME | End: 2019-06-19
Attending: RADIOLOGY | Admitting: RADIOLOGY
Payer: COMMERCIAL

## 2019-06-19 ENCOUNTER — APPOINTMENT (OUTPATIENT)
Dept: INTERVENTIONAL RADIOLOGY/VASCULAR | Age: 64
End: 2019-06-19
Attending: INTERNAL MEDICINE
Payer: COMMERCIAL

## 2019-06-19 VITALS
DIASTOLIC BLOOD PRESSURE: 99 MMHG | HEART RATE: 61 BPM | HEIGHT: 71 IN | SYSTOLIC BLOOD PRESSURE: 163 MMHG | RESPIRATION RATE: 16 BRPM | TEMPERATURE: 98 F | BODY MASS INDEX: 36.68 KG/M2 | OXYGEN SATURATION: 100 % | WEIGHT: 262 LBS

## 2019-06-19 DIAGNOSIS — C91.60 PROLYMPHOCYTIC LEUKEMIA OF T-CELL (HCC): ICD-10-CM

## 2019-06-19 PROCEDURE — 74011250636 HC RX REV CODE- 250/636

## 2019-06-19 PROCEDURE — 74011000250 HC RX REV CODE- 250: Performed by: RADIOLOGY

## 2019-06-19 PROCEDURE — 74011250636 HC RX REV CODE- 250/636: Performed by: RADIOLOGY

## 2019-06-19 PROCEDURE — 77030031139 HC SUT VCRL2 J&J -A

## 2019-06-19 PROCEDURE — 77030037400 HC ADH TISS HI VISC EXOFIN CHMP -B

## 2019-06-19 PROCEDURE — 76937 US GUIDE VASCULAR ACCESS: CPT

## 2019-06-19 PROCEDURE — 77030031131 HC SUT MXN P COVD -B

## 2019-06-19 PROCEDURE — 76060000032 HC ANESTHESIA 0.5 TO 1 HR: Performed by: RADIOLOGY

## 2019-06-19 PROCEDURE — C1788 PORT, INDWELLING, IMP: HCPCS

## 2019-06-19 PROCEDURE — C1894 INTRO/SHEATH, NON-LASER: HCPCS

## 2019-06-19 RX ORDER — SODIUM CHLORIDE, SODIUM LACTATE, POTASSIUM CHLORIDE, CALCIUM CHLORIDE 600; 310; 30; 20 MG/100ML; MG/100ML; MG/100ML; MG/100ML
75 INJECTION, SOLUTION INTRAVENOUS CONTINUOUS
Status: DISCONTINUED | OUTPATIENT
Start: 2019-06-19 | End: 2019-06-20 | Stop reason: HOSPADM

## 2019-06-19 RX ORDER — SODIUM CHLORIDE, SODIUM LACTATE, POTASSIUM CHLORIDE, CALCIUM CHLORIDE 600; 310; 30; 20 MG/100ML; MG/100ML; MG/100ML; MG/100ML
INJECTION, SOLUTION INTRAVENOUS
Status: DISCONTINUED | OUTPATIENT
Start: 2019-06-19 | End: 2019-06-19 | Stop reason: HOSPADM

## 2019-06-19 RX ORDER — PROPOFOL 10 MG/ML
INJECTION, EMULSION INTRAVENOUS AS NEEDED
Status: DISCONTINUED | OUTPATIENT
Start: 2019-06-19 | End: 2019-06-19 | Stop reason: HOSPADM

## 2019-06-19 RX ORDER — OXYCODONE AND ACETAMINOPHEN 5; 325 MG/1; MG/1
1 TABLET ORAL AS NEEDED
Status: DISCONTINUED | OUTPATIENT
Start: 2019-06-19 | End: 2019-06-20 | Stop reason: HOSPADM

## 2019-06-19 RX ORDER — CEFAZOLIN SODIUM/WATER 2 G/20 ML
2 SYRINGE (ML) INTRAVENOUS ONCE
Status: COMPLETED | OUTPATIENT
Start: 2019-06-19 | End: 2019-06-19

## 2019-06-19 RX ORDER — HYDROMORPHONE HYDROCHLORIDE 2 MG/ML
0.5 INJECTION, SOLUTION INTRAMUSCULAR; INTRAVENOUS; SUBCUTANEOUS
Status: DISCONTINUED | OUTPATIENT
Start: 2019-06-19 | End: 2019-06-20 | Stop reason: HOSPADM

## 2019-06-19 RX ORDER — SODIUM CHLORIDE 0.9 % (FLUSH) 0.9 %
5-40 SYRINGE (ML) INJECTION EVERY 8 HOURS
Status: DISCONTINUED | OUTPATIENT
Start: 2019-06-19 | End: 2019-06-20 | Stop reason: HOSPADM

## 2019-06-19 RX ORDER — FENTANYL CITRATE 50 UG/ML
INJECTION, SOLUTION INTRAMUSCULAR; INTRAVENOUS AS NEEDED
Status: DISCONTINUED | OUTPATIENT
Start: 2019-06-19 | End: 2019-06-19 | Stop reason: HOSPADM

## 2019-06-19 RX ORDER — PROPOFOL 10 MG/ML
INJECTION, EMULSION INTRAVENOUS
Status: DISCONTINUED | OUTPATIENT
Start: 2019-06-19 | End: 2019-06-19 | Stop reason: HOSPADM

## 2019-06-19 RX ORDER — NALOXONE HYDROCHLORIDE 0.4 MG/ML
0.2 INJECTION, SOLUTION INTRAMUSCULAR; INTRAVENOUS; SUBCUTANEOUS AS NEEDED
Status: DISCONTINUED | OUTPATIENT
Start: 2019-06-19 | End: 2019-06-20 | Stop reason: HOSPADM

## 2019-06-19 RX ORDER — HEPARIN 100 UNIT/ML
500 SYRINGE INTRAVENOUS ONCE
Status: COMPLETED | OUTPATIENT
Start: 2019-06-19 | End: 2019-06-19

## 2019-06-19 RX ORDER — HEPARIN SODIUM 200 [USP'U]/100ML
1000 INJECTION, SOLUTION INTRAVENOUS ONCE
Status: DISCONTINUED | OUTPATIENT
Start: 2019-06-19 | End: 2019-06-19

## 2019-06-19 RX ORDER — MIDAZOLAM HYDROCHLORIDE 1 MG/ML
INJECTION, SOLUTION INTRAMUSCULAR; INTRAVENOUS AS NEEDED
Status: DISCONTINUED | OUTPATIENT
Start: 2019-06-19 | End: 2019-06-19 | Stop reason: HOSPADM

## 2019-06-19 RX ORDER — SODIUM CHLORIDE 0.9 % (FLUSH) 0.9 %
5-40 SYRINGE (ML) INJECTION AS NEEDED
Status: DISCONTINUED | OUTPATIENT
Start: 2019-06-19 | End: 2019-06-20 | Stop reason: HOSPADM

## 2019-06-19 RX ADMIN — SODIUM CHLORIDE, SODIUM LACTATE, POTASSIUM CHLORIDE, CALCIUM CHLORIDE: 600; 310; 30; 20 INJECTION, SOLUTION INTRAVENOUS at 09:16

## 2019-06-19 RX ADMIN — FENTANYL CITRATE 50 MCG: 50 INJECTION, SOLUTION INTRAMUSCULAR; INTRAVENOUS at 09:15

## 2019-06-19 RX ADMIN — FENTANYL CITRATE 25 MCG: 50 INJECTION, SOLUTION INTRAMUSCULAR; INTRAVENOUS at 09:40

## 2019-06-19 RX ADMIN — PROPOFOL 50 MG: 10 INJECTION, EMULSION INTRAVENOUS at 09:26

## 2019-06-19 RX ADMIN — Medication 2 G: at 09:32

## 2019-06-19 RX ADMIN — SODIUM CHLORIDE, PRESERVATIVE FREE 500 UNITS: 5 INJECTION INTRAVENOUS at 09:00

## 2019-06-19 RX ADMIN — PROPOFOL 250 MCG/KG/MIN: 10 INJECTION, EMULSION INTRAVENOUS at 09:26

## 2019-06-19 RX ADMIN — LIDOCAINE HYDROCHLORIDE 100 MG: 10; .005 INJECTION, SOLUTION EPIDURAL; INFILTRATION; INTRACAUDAL; PERINEURAL at 09:38

## 2019-06-19 RX ADMIN — MIDAZOLAM HYDROCHLORIDE 1 MG: 1 INJECTION, SOLUTION INTRAMUSCULAR; INTRAVENOUS at 09:15

## 2019-06-19 NOTE — PROCEDURES
Department of Interventional Radiology  [phone]        Interventional Radiology Brief Procedure Note    Patient: Pino Reyes MRN: [id number]  SSN: [id number]    YOB: 1955  Age: 59 y.o. Sex: male      Date of Procedure: 6/19/2019    Pre-Procedure Diagnosis: leukemia    Post-Procedure Diagnosis: SAME    Procedure(s): Venous Chest Port Placement    Brief Description of Procedure: US, fluoro guided right IJ venous chest port placed    Performed By: Marlane Hashimoto, PA-C     Assistants: None    Anesthesia:TIVS/MAC    Estimated Blood Loss: Less than 10ml    Specimens:  None    Implants:  Chest Port Placement    Findings: catheter tip in right atrium     Complications: None    Recommendations: ok to use port.   Remove PICC     Follow Up: referring MD    Signed By: Marlane Hashimoto, PA-C     June 19, 2019

## 2019-06-19 NOTE — PROGRESS NOTES
Patient has completed 30 minutes MAC anesthesia Dr. Clementina Brooks aware and will sign patient out at this time .

## 2019-06-19 NOTE — H&P
Department of Interventional Radiology  ([phone])    History and Physical    Patient:  Lanelle Lennox MRN:  [id number]  SSN:  [id number]    YOB: 1955  Age:  59 y.o. Sex:  male      Primary Care Provider:  Go Viera NP  Referring Physician:  Maximiliano Tariq MD    Subjective:     Chief Complaint: leukemia    History of the Present Illness: The patient is a 59 y.o. male who presents for venous chest port placement. Leukemia. NPO. Xarelto held. Past Medical History:   Diagnosis Date    Back pain     occassionally    Cervical radiculopathy     Claustrophobia     DVT (deep venous thrombosis) ([facility] [address] 06/2019    currently treated with Xarelto- started on 5/30/2019    GERD (gastroesophageal reflux disease)     H/O seasonal allergies     Hyperlipidemia     Impotence     Insomnia     Lateral epicondylitis     Leukemia (HCC)     CHEMO    Obesity     BMI 36.6    Sleep apnea     noncomplaint with CPAP     Past Surgical History:   Procedure Laterality Date    HX CIRCUMCISION      HX COLONOSCOPY  2017    Due in 2027    HX ORTHOPAEDIC Right     r wrist    HX WISDOM TEETH EXTRACTION          Review of Systems:    Pertinent items are noted in the History of Present Illness.    Hospital Medications    heparin (PF) 2 units/ml in NS infusion 2,000 Units     cefTRIAXone (ROCEPHIN) 2 g in 0.9% sodium chloride (MBP/ADV) 50 mL     lidocaine-EPINEPHrine (PF) (XYLOCAINE) 1 %-1:200,000 injection  mg     heparin (porcine) pf 500 Units     central line flush (saline) syringe 10 mL    Outpatient Medications    docusate sodium (COLACE) 100 mg capsule     olmesartan-hydroCHLOROthiazide (BENICAR HCT) 20-12.5 mg per tablet     rivaroxaban (XARELTO) 15 mg (42)- 20 mg (9) DsPk     fluconazole (DIFLUCAN) 200 mg tablet     valGANciclovir (VALCYTE) 450 mg tablet     lovastatin (MEVACOR) 10 mg tablet     multivitamin (ONE A DAY) tablet     DOCOSAHEXANOIC ACID/EPA (FISH OIL PO) VITAMIN A/VITAMIN D3 (NATURAL VITAMIN D PO)     hydrocortisone (ANUSOL-HC) 2.5 % rectal cream     ondansetron (ZOFRAN ODT) 8 mg disintegrating tablet            Current Facility-Administered Medications   Medication Dose Route Frequency    heparin (PF) 2 units/ml in NS infusion 2,000 Units  1,000 mL Irrigation ONCE    cefTRIAXone (ROCEPHIN) 2 g in 0.9% sodium chloride (MBP/ADV) 50 mL  2 g IntraVENous NOW    lidocaine-EPINEPHrine (PF) (XYLOCAINE) 1 %-1:200,000 injection  mg  1-10 mL SubCUTAneous ONCE    heparin (porcine) pf 500 Units  500 Units InterCATHeter ONCE     Facility-Administered Medications Ordered in Other Encounters   Medication Dose Route Frequency    central line flush (saline) syringe 10 mL  10 mL InterCATHeter Q8H        Allergies   Allergen Reactions    Campath [Alemtuzumab] Other (comments)     Rigors       Family History   Problem Relation Age of Onset    Cancer Mother 80        pancreatic    Cancer Father 76        breast    No Known Problems Son     Hypertension Brother     Hypertension Brother     No Known Problems Daughter      Social History     Tobacco Use    Smoking status: Never Smoker    Smokeless tobacco: Never Used   Substance Use Topics    Alcohol use: Not Currently     Alcohol/week: 0.0 oz        Objective:       Physical Examination:    Vitals:    06/17/19 1030 06/19/19 0842   BP:  (!) 164/94   Pulse:  60   Resp:  18   Temp:  98.5 °F (36.9 °C)   SpO2:  100%   Weight: 118.8 kg (262 lb) 118.8 kg (262 lb)   Height: 5' 11\" (1.803 m) 5' 11\" (1.803 m)     Blood pressure (!) 164/94, pulse 60, temperature 98.5 °F (36.9 °C), resp. rate 18, height 5' 11\" (1.803 m), weight 118.8 kg (262 lb), SpO2 100 %.   General:  alert, cooperative, no distress  Heart:  regular rate and rhythm  Lungs:  clear to auscultation bilaterally  Abdomen:  soft  Neuro:  normal without focal findings  mental status, speech normal, alert and oriented x iii  Extremities:  negative      Laboratory: Lab Results   Component Value Date/Time    Sodium 140 06/12/2019 09:41 AM    Sodium 141 05/29/2019 02:07 PM    Potassium 3.5 06/12/2019 09:41 AM    Potassium 3.9 05/29/2019 02:07 PM    Chloride 108 (H) 06/12/2019 09:41 AM    Chloride 109 (H) 05/29/2019 02:07 PM    CO2 25 06/12/2019 09:41 AM    CO2 24 05/29/2019 02:07 PM    Anion gap 7 06/12/2019 09:41 AM    Anion gap 8 05/29/2019 02:07 PM    Glucose 110 (H) 06/12/2019 09:41 AM    Glucose 113 (H) 05/29/2019 02:07 PM    BUN 19 06/12/2019 09:41 AM    BUN 17 05/29/2019 02:07 PM    Creatinine 1.14 06/12/2019 09:41 AM    Creatinine 0.98 05/29/2019 02:07 PM    GFR est AA >60 06/12/2019 09:41 AM    GFR est AA >60 05/29/2019 02:07 PM    GFR est non-AA >60 06/12/2019 09:41 AM    GFR est non-AA >60 05/29/2019 02:07 PM    Calcium 9.5 06/12/2019 09:41 AM    Calcium 9.2 05/29/2019 02:07 PM    Magnesium 2.1 06/12/2019 09:41 AM    Magnesium 2.2 05/29/2019 02:07 PM    Albumin 4.0 06/12/2019 09:41 AM    Albumin 3.9 05/29/2019 02:07 PM    Protein, total 7.9 06/12/2019 09:41 AM    Protein, total 7.4 05/29/2019 02:07 PM    Globulin 3.9 (H) 06/12/2019 09:41 AM    Globulin 3.5 05/29/2019 02:07 PM    A-G Ratio 1.0 (L) 06/12/2019 09:41 AM    A-G Ratio 1.1 (L) 05/29/2019 02:07 PM    AST (SGOT) 22 06/12/2019 09:41 AM    AST (SGOT) 16 05/29/2019 02:07 PM    ALT (SGPT) 24 06/12/2019 09:41 AM    ALT (SGPT) 22 05/29/2019 02:07 PM     Lab Results   Component Value Date/Time    WBC 3.6 (L) 06/12/2019 09:41 AM    WBC 0.8 (LL) 05/29/2019 02:07 PM    HGB 10.8 (L) 06/12/2019 09:41 AM    HGB 9.4 (L) 05/29/2019 02:07 PM    HCT 32.9 (L) 06/12/2019 09:41 AM    HCT 28.4 (L) 05/29/2019 02:07 PM    PLATELET 391 (L) 76/24/7513 09:41 AM    PLATELET 844 (L) 50/04/5045 02:07 PM     No results found for: APTT, PTP, INR    Assessment:     leukemia    Plan:     Planned Procedure:  Port placement    Risks, benefits, and alternatives reviewed with patient and he agrees to proceed with the procedure.       Signed By: Mk France PA-C     June 19, 2019

## 2019-06-19 NOTE — PROGRESS NOTES
IR Nurse Pre-Procedure Checklist Part 2    Patient in [address] [place] [address] CRNA with anesthesia    Consent signed: Yes    H&P complete:  Yes    Antibiotics: Yes    Airway/Mallampati Done: Yes    Shaved: Yes    Pregnancy Form:Yes    Patient Position: Yes    MD Side: Yes     Biopsy Worksheet: Not applicable    Specimen Medium: Not applicable

## 2019-06-19 NOTE — ANESTHESIA PREPROCEDURE EVALUATION
Relevant Problems   No relevant active problems       Anesthetic History               Review of Systems / Medical History      Pulmonary        Sleep apnea        Comments: Noncompliant with CPAP   Neuro/Psych             Comments: Cervical radiculopathy Cardiovascular    Hypertension: well controlled          Hyperlipidemia    Exercise tolerance: >4 METS  Comments: DVTs   GI/Hepatic/Renal     GERD: well controlled           Endo/Other        Obesity and arthritis     Other Findings   Comments: Leukemia           Physical Exam    Airway  Mallampati: II  TM Distance: 4 - 6 cm  Neck ROM: normal range of motion   Mouth opening: Normal     Cardiovascular    Rhythm: regular           Dental  No notable dental hx       Pulmonary                 Abdominal  GI exam deferred       Other Findings            Anesthetic Plan    ASA: 3  Anesthesia type: total IV anesthesia            Anesthetic plan and risks discussed with: Patient and Spouse

## 2019-06-19 NOTE — ROUTINE PROCESS
TRANSFER - OUT REPORT: 
 
Verbal report given to [address] [place] [address] on Sujatha Bermudez  being transferred to IR recovery for routine post - op Report consisted of patients Situation, Background, Assessment and  
Recommendations(SBAR). Information from the following report(s) SBAR, Procedure Summary and MAR was reviewed with the receiving nurse. Opportunity for questions and clarification was provided. Patient transported with: 
 Registered Nurse

## 2019-06-19 NOTE — ANESTHESIA POSTPROCEDURE EVALUATION
Procedure(s):  IR ANESTHESIA/PORT INSERT.    total IV anesthesia    Anesthesia Post Evaluation      Multimodal analgesia: multimodal analgesia used between 6 hours prior to anesthesia start to PACU discharge  Patient location during evaluation: PACU  Patient participation: complete - patient participated  Level of consciousness: awake and alert  Pain management: adequate  Airway patency: patent  Anesthetic complications: no  Cardiovascular status: acceptable  Respiratory status: acceptable  Hydration status: acceptable  Post anesthesia nausea and vomiting:  none      Vitals Value Taken Time   /101 6/19/2019 10:12 AM   Temp 36.7 °C (98 °F) 6/19/2019 10:02 AM   Pulse 72 6/19/2019 10:13 AM   Resp 16 6/19/2019 10:07 AM   SpO2 98 % 6/19/2019 10:13 AM   Vitals shown include unvalidated device data.

## 2019-06-19 NOTE — DISCHARGE INSTRUCTIONS
[address]  Department of Interventional Radiology  [facility]  [phone] Office  [phone] Fax  Implanted Port Discharge Instructions      General Instructions:   A port is like an implanted IV. They are usually ordered for patients who will be getting chemotherapy, but can also be used as an IV for long term antibiotics, large amounts of fluids, and/or blood products. Your blood can be drawn from your port for labs also. Those patients who do not have good veins find the ports convenient as they can get the IV they need with one stick. The port can be used long term, and the care is easy. The device is under the skin, and once the skin heals, care is minimal. All that is required is the nurse who accesses the port will need to flush it with heparinized saline after each use. Ports are usually placed in the chest wall, usually on the right side. But they can be place in the arms and in the abdomen. Home Care Instructions: If your port is in your arm, do not allow blood pressure or other IVs to be place in that arm. Do not allow bra straps or any clothing to rub the skin over the port. Do not bathe or swim until the skin has healed and if the port is accessed. Once it is healed, and when the port is not accessed, it is okay to bathe and swim. Restrict yourself to light activity for the first 5 days after getting the port put in, after that, resume normal activity slowly. You may resume your normal diet and medications. Follow-Up Instructions: Please see your oncologist, or whatever physician ordered the port as he/she has requested of you. Call If: You should call your Physician and/or the Radiology Nurse if you notice redness, pus, swelling, or pain from the area of your incision. Call if you should develop a fever. The nurses who access your port will know to call your doctor if the port does not seem to be working properly. You need to tell the nurses who use the port if you should have any pain or swelling at the site during an infusion. To Reach Us: If you have any questions about your procedure, please call the Interventional Radiology department at [phone]. After business hours (5pm) and weekends, call the answering service at [phone] and ask for the Radiologist on call to be paged. Interventional Radiology General Nurse Discharge    After general anesthesia or intravenous sedation, for 24 hours or while taking prescription Narcotics:  · Limit your activities  · Do not drive and operate hazardous machinery  · Do not make important personal or business decisions  · Do  not drink alcoholic beverages  · If you have not urinated within 8 hours after discharge, please contact your surgeon on call. * Please give a list of your current medications to your Primary Care Provider. * Please update this list whenever your medications are discontinued, doses are     changed, or new medications (including over-the-counter products) are added. * Please carry medication information at all times in case of emergency situations. These are general instructions for a healthy lifestyle:    No smoking/ No tobacco products/ Avoid exposure to second hand smoke  Surgeon General's Warning:  Quitting smoking now greatly reduces serious risk to your health. Obesity, smoking, and sedentary lifestyle greatly increases your risk for illness  A healthy diet, regular physical exercise & weight monitoring are important for maintaining a healthy lifestyle    You may be retaining fluid if you have a history of heart failure or if you experience any of the following symptoms:  Weight gain of 3 pounds or more overnight or 5 pounds in a week, increased swelling in our hands or feet or shortness of breath while lying flat in bed.   Please call your doctor as soon as you notice any of these symptoms; do not wait until your next office visit. Recognize signs and symptoms of STROKE:  F-face looks uneven    A-arms unable to move or move unevenly    S-speech slurred or non-existent    T-time-call 911 as soon as signs and symptoms begin-DO NOT go       Back to bed or wait to see if you get better-TIME IS BRAIN.         Patient Signature:  Date: 6/19/2019  Discharging Nurse: Chalino Greenfield RN

## 2019-06-26 ENCOUNTER — HOSPITAL ENCOUNTER (OUTPATIENT)
Dept: INFUSION THERAPY | Age: 64
Discharge: HOME | End: 2019-06-26
Payer: COMMERCIAL

## 2019-06-26 ENCOUNTER — PATIENT OUTREACH (OUTPATIENT)
Dept: CASE MANAGEMENT | Age: 64
End: 2019-06-26

## 2019-06-26 ENCOUNTER — HOSPITAL ENCOUNTER (OUTPATIENT)
Dept: LAB | Age: 64
Discharge: HOME | End: 2019-06-26
Payer: COMMERCIAL

## 2019-06-26 VITALS — BODY MASS INDEX: 37.49 KG/M2 | HEIGHT: 70 IN

## 2019-06-26 DIAGNOSIS — C91.60 PROLYMPHOCYTIC LEUKEMIA OF T-CELL (HCC): ICD-10-CM

## 2019-06-26 DIAGNOSIS — C91.60 PROLYMPHOCYTIC LEUKEMIA OF T-CELL (HCC): Primary | ICD-10-CM

## 2019-06-26 LAB
ALBUMIN SERPL-MCNC: 4.1 G/DL (ref 3.2–4.6)
ALBUMIN/GLOB SERPL: 1.1 {RATIO} (ref 1.2–3.5)
ALP SERPL-CCNC: 33 U/L (ref 50–136)
ALT SERPL-CCNC: 21 U/L (ref 12–65)
ANION GAP SERPL CALC-SCNC: 4 MMOL/L (ref 7–16)
AST SERPL-CCNC: 18 U/L (ref 15–37)
BASOPHILS # BLD: 0 K/UL (ref 0–0.2)
BASOPHILS NFR BLD: 1 % (ref 0–2)
BILIRUB SERPL-MCNC: 0.6 MG/DL (ref 0.2–1.1)
BUN SERPL-MCNC: 20 MG/DL (ref 8–23)
CALCIUM SERPL-MCNC: 9.8 MG/DL (ref 8.3–10.4)
CHLORIDE SERPL-SCNC: 107 MMOL/L (ref 98–107)
CO2 SERPL-SCNC: 28 MMOL/L (ref 21–32)
CREAT SERPL-MCNC: 1.03 MG/DL (ref 0.8–1.5)
DIFFERENTIAL METHOD BLD: ABNORMAL
EOSINOPHIL # BLD: 0.1 K/UL (ref 0–0.8)
EOSINOPHIL NFR BLD: 3 % (ref 0.5–7.8)
ERYTHROCYTE [DISTWIDTH] IN BLOOD BY AUTOMATED COUNT: 13.8 % (ref 11.9–14.6)
GLOBULIN SER CALC-MCNC: 3.7 G/DL (ref 2.3–3.5)
GLUCOSE SERPL-MCNC: 97 MG/DL (ref 65–100)
HCT VFR BLD AUTO: 33.8 % (ref 41.1–50.3)
HGB BLD-MCNC: 11.1 G/DL (ref 13.6–17.2)
IMM GRANULOCYTES # BLD AUTO: 0 K/UL (ref 0–0.5)
IMM GRANULOCYTES NFR BLD AUTO: 1 % (ref 0–5)
LYMPHOCYTES # BLD: 0.3 K/UL (ref 0.5–4.6)
LYMPHOCYTES NFR BLD: 10 % (ref 13–44)
MAGNESIUM SERPL-MCNC: 2.1 MG/DL (ref 1.8–2.4)
MCH RBC QN AUTO: 33 PG (ref 26.1–32.9)
MCHC RBC AUTO-ENTMCNC: 32.8 G/DL (ref 31.4–35)
MCV RBC AUTO: 100.6 FL (ref 79.6–97.8)
MONOCYTES # BLD: 0.4 K/UL (ref 0.1–1.3)
MONOCYTES NFR BLD: 15 % (ref 4–12)
NEUTS SEG # BLD: 1.9 K/UL (ref 1.7–8.2)
NEUTS SEG NFR BLD: 71 % (ref 43–78)
NRBC # BLD: 0 K/UL (ref 0–0.2)
PLATELET # BLD AUTO: 124 K/UL (ref 150–450)
PMV BLD AUTO: 10.8 FL (ref 9.4–12.3)
POTASSIUM SERPL-SCNC: 3.8 MMOL/L (ref 3.5–5.1)
PROT SERPL-MCNC: 7.8 G/DL (ref 6.3–8.2)
RBC # BLD AUTO: 3.36 M/UL (ref 4.23–5.67)
SODIUM SERPL-SCNC: 139 MMOL/L (ref 136–145)
WBC # BLD AUTO: 2.7 K/UL (ref 4.3–11.1)

## 2019-06-26 PROCEDURE — 85025 COMPLETE CBC W/AUTO DIFF WBC: CPT

## 2019-06-26 PROCEDURE — 96367 TX/PROPH/DG ADDL SEQ IV INF: CPT

## 2019-06-26 PROCEDURE — 96413 CHEMO IV INFUSION 1 HR: CPT

## 2019-06-26 PROCEDURE — 96375 TX/PRO/DX INJ NEW DRUG ADDON: CPT

## 2019-06-26 PROCEDURE — 74011250637 HC RX REV CODE- 250/637: Performed by: INTERNAL MEDICINE

## 2019-06-26 PROCEDURE — 80053 COMPREHEN METABOLIC PANEL: CPT

## 2019-06-26 PROCEDURE — 83735 ASSAY OF MAGNESIUM: CPT

## 2019-06-26 PROCEDURE — 74011000258 HC RX REV CODE- 258: Performed by: INTERNAL MEDICINE

## 2019-06-26 PROCEDURE — 74011250636 HC RX REV CODE- 250/636: Performed by: INTERNAL MEDICINE

## 2019-06-26 RX ORDER — DIPHENHYDRAMINE HYDROCHLORIDE 50 MG/ML
25 INJECTION, SOLUTION INTRAMUSCULAR; INTRAVENOUS ONCE
Status: COMPLETED | OUTPATIENT
Start: 2019-06-26 | End: 2019-06-26

## 2019-06-26 RX ORDER — ACETAMINOPHEN 325 MG/1
650 TABLET ORAL ONCE
Status: COMPLETED | OUTPATIENT
Start: 2019-06-26 | End: 2019-06-26

## 2019-06-26 RX ORDER — HEPARIN 100 UNIT/ML
300-500 SYRINGE INTRAVENOUS AS NEEDED
Status: DISCONTINUED | OUTPATIENT
Start: 2019-06-26 | End: 2019-06-27 | Stop reason: HOSPADM

## 2019-06-26 RX ORDER — ONDANSETRON 2 MG/ML
8 INJECTION INTRAMUSCULAR; INTRAVENOUS ONCE
Status: COMPLETED | OUTPATIENT
Start: 2019-06-26 | End: 2019-06-26

## 2019-06-26 RX ORDER — SODIUM CHLORIDE 0.9 % (FLUSH) 0.9 %
10 SYRINGE (ML) INJECTION AS NEEDED
Status: DISCONTINUED | OUTPATIENT
Start: 2019-06-26 | End: 2019-06-27 | Stop reason: HOSPADM

## 2019-06-26 RX ADMIN — ACETAMINOPHEN 650 MG: 325 TABLET, FILM COATED ORAL at 15:20

## 2019-06-26 RX ADMIN — ONDANSETRON 8 MG: 2 INJECTION INTRAMUSCULAR; INTRAVENOUS at 15:21

## 2019-06-26 RX ADMIN — DIPHENHYDRAMINE HYDROCHLORIDE 25 MG: 50 INJECTION, SOLUTION INTRAMUSCULAR; INTRAVENOUS at 15:22

## 2019-06-26 RX ADMIN — HEPARIN 500 UNITS: 100 SYRINGE at 16:46

## 2019-06-26 RX ADMIN — Medication 10 ML: at 16:46

## 2019-06-26 RX ADMIN — SODIUM CHLORIDE: 900 INJECTION, SOLUTION INTRAVENOUS at 16:12

## 2019-06-26 RX ADMIN — SODIUM CHLORIDE 500 ML: 900 INJECTION, SOLUTION INTRAVENOUS at 15:51

## 2019-06-26 RX ADMIN — DEXAMETHASONE SODIUM PHOSPHATE 12 MG: 4 INJECTION, SOLUTION INTRAMUSCULAR; INTRAVENOUS at 15:29

## 2019-06-26 NOTE — PROGRESS NOTES
Pt was seen and labs reviewed by Dr. Hadley Alegre. Pt is doing well. He will proceed with treatment today. He will return to clinic in 2 weeks. FCs notified to come discuss financial situation with patient.

## 2019-06-26 NOTE — PROGRESS NOTES
Clinical Social Work Note    Date of Service: 6/26/2019    Length of Service: 40 minutes    Patient Diagnosis: T-Cell Pro-Lymphocytic Leukemia    Referral Source: RN Navigator Debra    Reason for Visit: pt losing insurnace    Clinical Note: Pt well known to SW from previous encounters. SW met with pt and wife after MD appt. Pt confirmed his current insurance will end June 30 and stated he has attempted to get [facility] healthcare plan but premium was cost prohibitive. SW offered to consult with financial navigators Chance and Jerardo Gross to assess access to care and they verbalized agreement. Pt and wife aware he will have Medicare when pt is 72years old, March 2020. SW and financial navigators reviewed pt's account notes and pt confirmed he has applied for hospital sponsorship (awaiting determination) and will be receiving Social Security Disability. Pt and wife clarified all other income. Financial navigators to follow pt for available stephany assistance with both insurance coverage and medication assistnace. SW encouraged [facility] to [facility]. Pt verbalized understanding. SW encouraged pt to let team know if he gets insurance coverage or if he will be uninsured and SW can follow up on any other resources. They verbalized understanding and appreciation. Next Steps: SW provided pt with SW contact information and encouraged pt to call should any needs arise. Pt verbalized understanding.  SW intends to follow up.    3 most recent [facility] [place] Screens 6/26/2019 6/13/2019 6/12/2019   Little interest or pleasure in doing things Not at all Not at all Not at all   Feeling down, depressed, irritable, or hopeless Not at all Not at all Not at all   Total Score PHQ 2 0 0 0         Electronically Signed By: Todd Willis LMSW

## 2019-06-26 NOTE — PROGRESS NOTES
Arrived to the [facility]. Pentostatin completed. Patient tolerated without problems. Any issues or concerns during appointment: no.  Patient aware of next infusion appointment on 7/10/19 (date) at 1500 (time). Discharged ambulatory with spouse.

## 2019-06-27 ENCOUNTER — HOSPITAL ENCOUNTER (OUTPATIENT)
Dept: PHYSICAL THERAPY | Age: 64
Discharge: HOME | End: 2019-06-27
Payer: COMMERCIAL

## 2019-06-27 PROCEDURE — 97113 AQUATIC THERAPY/EXERCISES: CPT

## 2019-06-27 NOTE — PROGRESS NOTES
Mckinley Wilfrido  : 1955  Payor: Dearl Prem / Plan: [place] PLANNED Theresia Olszewski. / Product Type: Commerical /  [address]  at [address], [address], [place] [address].  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 He reports he did have some soreness after his first aquatic visit. Pain: Initial:   4/10  Post Session:  Not rated upon departure. Medications Last Reviewed:  2019    Updated Objective Findings:   None Today     TREATMENT:          Aquatic Therapy (45 minutes): Aquatic treatment performed per flow grid for Decreased muscle strength, Decreased activity endurance, Decompression, Ease of movement and Low impact and reduced weight bearing activity. Cues provided for posture. Aquatic Exercise Log       Date  19 Date  19 Date   Date   Date     Activity/ Exercise Parameters Parameters Parameters Parameters Parameters   Walking forward 6 6      Walking backward 6 6      Walking sideways 6 6        Marching 6 6        Goose Step 6 6        Tip toes 3 4        Heels 3 4        Lunges        Side step squats        LE Exercises 4. 5' holding rail 4.5 ft holding to rail  With noodle         Hip Flex/Ext 10 Marching x 10 B        Hip Abd/Add 10 X 10 B        Hip IR/ER          Calf raises          Knee Flex 10 X 10 B        Squats          Leg Circles 10/10 10/10 B        Step Ups 10 Small x 10 B      UE Exercises          Squeeze In          Push Down          Pull Down          Bicep/Tricep        Rows/Press outs         Chi Positions          Trunk Rotation        Deep H2O/ Noodles 7' with blue rings  7 ft with rings        Stabilization          Arms only          Legs only Bicycle 2 min Bicycle - 2 min       Cross   Country 2 min 2 min        Scissors 2 min 2 min         Crab walk        Lower abdominal   work  DKTC 2 min DKTC - 2 min         Cardio          Jogging        Lap   Swimming          Stretches          Hamstrings          Heelcords          Piriformis          Neck          Treatment/Session Summary:    · Response to Treatment:  Patient doing well in the aquatic environment. He reports less pain upon departure. He had no difficulties with slight addition of resistance as noted above. .  · Communication/Consultation:  None today  · Equipment provided today:  None today  · Recommendations/Intent for next treatment session: Next visit will focus on aquatic therapy with adding in resistance as tolerated. Effective Dates: 6/6/2019 TO 8/5/2019 (60 days).   Frequency/Duration: 2 times a week for 60 Day(s)       Total Treatment Billable Duration:  45 minutes   PT Patient Time In/Time Out  Time In: 0930  Time Out: [address], PTA    Future Appointments   Date Time Provider Maame Schrader   7/1/2019  8:45 AM Kathleen Estevez PT [place] [facility]   7/3/2019  2:30 PM Kathleen Estevez PT [facility] [facility]   7/8/2019  9:30 AM GREGORY MurrayORPTTON [facility]   7/10/2019  9:30 AM Kathleen Estevez PT [facility]   7/10/2019  1:30 PM [address] [place] [address] OUTREACH INSURANCE GCCOIG [address]   7/10/2019  2:00 PM Pat Simon MD [facility]   7/10/2019  3:00 PM Pritesh Cummings [address]   7/24/2019  8:50 AM Gabrielej [address] GVL [facility]   7/24/2019  9:30 AM Rachel Langford MD [facility]   7/24/2019 10:30 AM NUR3 GCCOPIG [facility]   12/9/2019  9:00 AM Hang Chawla NP [facility] RF RF

## 2019-07-01 ENCOUNTER — HOSPITAL ENCOUNTER (OUTPATIENT)
Dept: PHYSICAL THERAPY | Age: 64
Discharge: HOME | End: 2019-07-01
Payer: COMMERCIAL

## 2019-07-01 PROCEDURE — 97113 AQUATIC THERAPY/EXERCISES: CPT

## 2019-07-01 NOTE — PROGRESS NOTES
Paul Carrizales  : 1955  Payor: Brad Arce / Plan: [place] PLANNED Syble Rides. / Product Type: Commerical /  [address]  at [facility] VANNESSA RANDHAWA  [address] [place] [address], [address], [address] [place] [address]  [phone]   [phone]       OUTPATIENT PHYSICAL THERAPY: Daily Treatment Note 2019  Visit Count: 3     ICD-10: Treatment Diagnosis: Pain in right knee (M25.561); Pain in left knee (M25.562); Difficulty in walking, not elsewhere classified (R26.2)  Precautions/Allergies:   Campath [alemtuzumab]  MEDICAL/REFERRING DIAGNOSIS:  Pain in left knee [M25.562]  Pain in right knee [M25.561]  Chondromalacia patellae, left knee [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 Pain: Initial:   7/10  Post Session:  Not rated upon departure. Medications Last Reviewed:  2019    Updated Objective Findings:   None Today     TREATMENT:          Aquatic Therapy (45 minutes): Aquatic treatment performed per flow grid for Decreased muscle strength, Decreased activity endurance, Decompression, Ease of movement and Low impact and reduced weight bearing activity. Cues provided for posture. Aquatic Exercise Log       Date  19 Date  19 Date  19   Date   Date     Activity/ Exercise Parameters Parameters Parameters Parameters Parameters   Walking forward 6 6 6     Walking backward 6 6 6     Walking sideways 6 6 6       Marching 6 6 6       Goose Step 6 6 6       Tip toes 3 4 4       Heels 3 4 4       Lunges        Side step squats        LE Exercises 4. 5' holding rail 4.5 ft holding to rail  With noodle  4.5 holding to rail with noodle       Hip Flex/Ext 10 Marching x 10 B Marching x 10 B       Hip Abd/Add 10 X 10 B X 10 B       Hip IR/ER          Calf raises          Knee Flex 10 X 10 B X 10 B       Squats          Leg Circles 10/10 10/10 B 10/10 B       Step Ups 10 Small x 10 B Small x 10B     UE Exercises          Squeeze In          Push Down          Pull Down          Bicep/Tricep        Rows/Press outs         Chi Positions          Trunk Rotation        Deep H2O/ Noodles 7' with blue rings  7 ft with rings 7' ft with rings       Stabilization          Arms only          Legs only Bicycle 2 min Bicycle - 2 min  Bicycle 2 min     Cross   Country 2 min 2 min 2 min       Scissors 2 min 2 min  2 min       Crab walk        Lower abdominal   work  DKTC 2 min DKTC - 2 min         Cardio          Jogging        Lap   Swimming          Stretches          Hamstrings          Heelcords          Piriformis          Neck          Treatment/Session Summary:    · Response to Treatment:  Pt felt better after the pool session. .  · Communication/Consultation:  None today  · Equipment provided today:  None today  · Recommendations/Intent for next treatment session: Next visit will focus on aquatic therapy with adding in resistance as tolerated. Effective Dates: 6/6/2019 TO 8/5/2019 (60 days).   Frequency/Duration: 2 times a week for 60 Day(s)       Total Treatment Billable Duration:  45 minutes   PT Patient Time In/Time Out  Time In: 0845  Time Out: 0930    Fely Nguyen PT    Future Appointments   Date Time Provider Maame Schrader   7/3/2019  2:30 PM Dru Fuller PT [facility]   7/8/2019  9:30 AM Dru Fuller PT [facility]   7/10/2019  9:30 AM Dru Fuller PT [facility]   7/10/2019  1:30 PM [address] [place] [address] OUTREACH INSURANCE GCCOIG [address]   7/10/2019  2:00 PM Alicia Rucker MD [facility]   7/10/2019  3:00 PM Berger Dural [address]   7/24/2019  8:50 AM GCC OUTREACH INSURANCE GCCOIG [address]   7/24/2019  9:30 AM Alicia Rucker MD [facility] 7/24/2019 10:30 AM NUR3 GCCOPIG GVL GCC   12/9/2019  9:00 AM Madison Kaufman NP SSA RFM RFM

## 2019-07-03 ENCOUNTER — HOSPITAL ENCOUNTER (OUTPATIENT)
Dept: PHYSICAL THERAPY | Age: 64
Discharge: HOME | End: 2019-07-03
Payer: COMMERCIAL

## 2019-07-03 ENCOUNTER — APPOINTMENT (OUTPATIENT)
Dept: INFUSION THERAPY | Age: 64
End: 2019-07-03
Payer: COMMERCIAL

## 2019-07-03 PROCEDURE — 97113 AQUATIC THERAPY/EXERCISES: CPT

## 2019-07-03 NOTE — PROGRESS NOTES
Vika Mckinnon  : 1955  Payor: Cassidy Fatima / Plan: STARLA Cartermike. / Product Type: Commerical /  [address]  at [facility]  [address] [place] [address], [address], [place] [address].  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 Pain: Initial:   7/10  Post Session:  5/10   Medications Last Reviewed:  7/3/2019    Updated Objective Findings:   None Today     TREATMENT:          Aquatic Therapy (45 minutes): Aquatic treatment performed per flow grid for Decreased muscle strength, Decreased activity endurance, Decompression, Ease of movement and Low impact and reduced weight bearing activity. Cues provided for posture. Aquatic Exercise Log       Date  19 Date  19 Date  19   Date  7/3/19 Date     Activity/ Exercise Parameters Parameters Parameters Parameters Parameters   Walking forward 6 6 6 6    Walking backward 6 6 6 6    Walking sideways 6 6 6 6      Marching 6 6 6 6      Goose Step 6 6 6 6      Tip toes 3 4 4 4      Heels 3 4 4 4      Lunges        Side step squats        LE Exercises 4. 5' holding rail 4.5 ft holding to rail  With noodle  4.5 holding to rail with noodle 4#      Hip Flex/Ext 10 Marching x 10 B Marching x 10 B 10      Hip Abd/Add 10 X 10 B X 10 B 10      Hip IR/ER          Calf raises          Knee Flex 10 X 10 B X 10 B 10      Squats          Leg Circles 10/10 10/10 B 10/10 B 10/10      Step Ups 10 Small x 10 B Small x 10B 10    UE Exercises          Squeeze In          Push Down          Pull Down          Bicep/Tricep        Rows/Press outs         Chi Positions          Trunk Rotation        Deep H2O/ Noodles 7' with blue rings  7 ft with rings 7' ft with rings 7' with rings      Stabilization          Arms only          Legs only Bicycle 2 min Bicycle - 2 min  Bicycle 2 min Bicycle 2 min    Cross   Country 2 min 2 min 2 min 2 min      Scissors 2 min 2 min  2 min 2 min      Crab walk        Lower abdominal   work  DKTC 2 min DKTC - 2 min         Cardio          Jogging        Lap   Swimming          Stretches          Hamstrings          Heelcords          Piriformis          Neck          Treatment/Session Summary:    · Response to Treatment:  Pt states he is not as stiff after pool session. .  · Communication/Consultation:  None today  · Equipment provided today:  None today  · Recommendations/Intent for next treatment session: Next visit will focus on aquatic therapy with adding in resistance as tolerated. Effective Dates: 6/6/2019 TO 8/5/2019 (60 days).   Frequency/Duration: 2 times a week for 60 Day(s)       Total Treatment Billable Duration:  45 minutes   PT Patient Time In/Time Out  Time In: 0230  Time Out: 0315    Maggie Sung PT    Future Appointments   Date Time Provider Maame Schrader   7/8/2019  9:30 AM GREGORY Lunsford [facility]   7/10/2019  9:30 AM GREGORY Lunsford [facility]   7/10/2019  1:30 PM [facility] OUTREACH INSURANCE GCC[facility]   7/10/2019  2:00 PM Ary Iglesias MD [facility]   7/10/2019  3:00 PM Caleb Alvarado [facility]   7/24/2019  8:50 AM GCC OUTREACH INSURANCE GCCOIG [facility]   7/24/2019  9:30 AM Ary Iglesias MD [facility]   7/24/2019 10:30 AM NUR3 GCCOPIG [facility] 12/9/2019  9:00 AM ROXANA Barakat [facility]M RF

## 2019-07-10 ENCOUNTER — HOSPITAL ENCOUNTER (OUTPATIENT)
Dept: LAB | Age: 64
Discharge: HOME | End: 2019-07-10
Payer: COMMERCIAL

## 2019-07-10 ENCOUNTER — HOSPITAL ENCOUNTER (OUTPATIENT)
Dept: PHYSICAL THERAPY | Age: 64
Discharge: HOME | End: 2019-07-10
Payer: COMMERCIAL

## 2019-07-10 ENCOUNTER — PATIENT OUTREACH (OUTPATIENT)
Dept: CASE MANAGEMENT | Age: 64
End: 2019-07-10

## 2019-07-10 ENCOUNTER — HOSPITAL ENCOUNTER (OUTPATIENT)
Dept: INFUSION THERAPY | Age: 64
Discharge: HOME | End: 2019-07-10
Payer: COMMERCIAL

## 2019-07-10 DIAGNOSIS — C91.60 PROLYMPHOCYTIC LEUKEMIA OF T-CELL (HCC): Primary | ICD-10-CM

## 2019-07-10 DIAGNOSIS — C91.60 PROLYMPHOCYTIC LEUKEMIA OF T-CELL (HCC): ICD-10-CM

## 2019-07-10 LAB
ALBUMIN SERPL-MCNC: 4.1 G/DL (ref 3.2–4.6)
ALBUMIN/GLOB SERPL: 1.1 {RATIO} (ref 1.2–3.5)
ALP SERPL-CCNC: 41 U/L (ref 50–136)
ALT SERPL-CCNC: 20 U/L (ref 12–65)
ANION GAP SERPL CALC-SCNC: 6 MMOL/L (ref 7–16)
AST SERPL-CCNC: 20 U/L (ref 15–37)
BASOPHILS # BLD: 0 K/UL (ref 0–0.2)
BASOPHILS NFR BLD: 1 % (ref 0–2)
BILIRUB SERPL-MCNC: 0.5 MG/DL (ref 0.2–1.1)
BUN SERPL-MCNC: 19 MG/DL (ref 8–23)
CALCIUM SERPL-MCNC: 9.8 MG/DL (ref 8.3–10.4)
CHLORIDE SERPL-SCNC: 104 MMOL/L (ref 98–107)
CO2 SERPL-SCNC: 27 MMOL/L (ref 21–32)
CREAT SERPL-MCNC: 1.06 MG/DL (ref 0.8–1.5)
DIFFERENTIAL METHOD BLD: ABNORMAL
EOSINOPHIL # BLD: 0.1 K/UL (ref 0–0.8)
EOSINOPHIL NFR BLD: 4 % (ref 0.5–7.8)
ERYTHROCYTE [DISTWIDTH] IN BLOOD BY AUTOMATED COUNT: 13.3 % (ref 11.9–14.6)
GLOBULIN SER CALC-MCNC: 3.8 G/DL (ref 2.3–3.5)
GLUCOSE SERPL-MCNC: 102 MG/DL (ref 65–100)
HCT VFR BLD AUTO: 34.9 % (ref 41.1–50.3)
HGB BLD-MCNC: 11.5 G/DL (ref 13.6–17.2)
IMM GRANULOCYTES # BLD AUTO: 0.1 K/UL (ref 0–0.5)
IMM GRANULOCYTES NFR BLD AUTO: 2 % (ref 0–5)
LYMPHOCYTES # BLD: 0.4 K/UL (ref 0.5–4.6)
LYMPHOCYTES NFR BLD: 10 % (ref 13–44)
MAGNESIUM SERPL-MCNC: 2.2 MG/DL (ref 1.8–2.4)
MCH RBC QN AUTO: 32.5 PG (ref 26.1–32.9)
MCHC RBC AUTO-ENTMCNC: 33 G/DL (ref 31.4–35)
MCV RBC AUTO: 98.6 FL (ref 79.6–97.8)
MONOCYTES # BLD: 0.5 K/UL (ref 0.1–1.3)
MONOCYTES NFR BLD: 13 % (ref 4–12)
NEUTS SEG # BLD: 2.7 K/UL (ref 1.7–8.2)
NEUTS SEG NFR BLD: 71 % (ref 43–78)
NRBC # BLD: 0 K/UL (ref 0–0.2)
PLATELET # BLD AUTO: 130 K/UL (ref 150–450)
PMV BLD AUTO: 11 FL (ref 9.4–12.3)
POTASSIUM SERPL-SCNC: 3.9 MMOL/L (ref 3.5–5.1)
PROT SERPL-MCNC: 7.9 G/DL (ref 6.3–8.2)
RBC # BLD AUTO: 3.54 M/UL (ref 4.23–5.6)
SODIUM SERPL-SCNC: 137 MMOL/L (ref 136–145)
WBC # BLD AUTO: 3.8 K/UL (ref 4.3–11.1)

## 2019-07-10 PROCEDURE — 74011250636 HC RX REV CODE- 250/636: Performed by: INTERNAL MEDICINE

## 2019-07-10 PROCEDURE — 97113 AQUATIC THERAPY/EXERCISES: CPT

## 2019-07-10 PROCEDURE — 96413 CHEMO IV INFUSION 1 HR: CPT

## 2019-07-10 PROCEDURE — 96375 TX/PRO/DX INJ NEW DRUG ADDON: CPT

## 2019-07-10 PROCEDURE — 74011000258 HC RX REV CODE- 258: Performed by: INTERNAL MEDICINE

## 2019-07-10 PROCEDURE — 80053 COMPREHEN METABOLIC PANEL: CPT

## 2019-07-10 PROCEDURE — 74011250637 HC RX REV CODE- 250/637: Performed by: INTERNAL MEDICINE

## 2019-07-10 PROCEDURE — 85025 COMPLETE CBC W/AUTO DIFF WBC: CPT

## 2019-07-10 PROCEDURE — 83735 ASSAY OF MAGNESIUM: CPT

## 2019-07-10 RX ORDER — ONDANSETRON 2 MG/ML
8 INJECTION INTRAMUSCULAR; INTRAVENOUS ONCE
Status: COMPLETED | OUTPATIENT
Start: 2019-07-10 | End: 2019-07-10

## 2019-07-10 RX ORDER — DIPHENHYDRAMINE HYDROCHLORIDE 50 MG/ML
25 INJECTION, SOLUTION INTRAMUSCULAR; INTRAVENOUS ONCE
Status: COMPLETED | OUTPATIENT
Start: 2019-07-10 | End: 2019-07-10

## 2019-07-10 RX ORDER — ACETAMINOPHEN 325 MG/1
650 TABLET ORAL ONCE
Status: COMPLETED | OUTPATIENT
Start: 2019-07-10 | End: 2019-07-10

## 2019-07-10 RX ORDER — SODIUM CHLORIDE 9 MG/ML
10 INJECTION INTRAMUSCULAR; INTRAVENOUS; SUBCUTANEOUS AS NEEDED
Status: DISCONTINUED | OUTPATIENT
Start: 2019-07-10 | End: 2019-07-11 | Stop reason: HOSPADM

## 2019-07-10 RX ADMIN — ACETAMINOPHEN 650 MG: 325 TABLET, FILM COATED ORAL at 15:39

## 2019-07-10 RX ADMIN — SODIUM CHLORIDE 500 ML: 900 INJECTION, SOLUTION INTRAVENOUS at 15:36

## 2019-07-10 RX ADMIN — SODIUM CHLORIDE: 900 INJECTION, SOLUTION INTRAVENOUS at 16:35

## 2019-07-10 RX ADMIN — DEXAMETHASONE SODIUM PHOSPHATE 12 MG: 4 INJECTION, SOLUTION INTRAMUSCULAR; INTRAVENOUS at 15:52

## 2019-07-10 RX ADMIN — DIPHENHYDRAMINE HYDROCHLORIDE 25 MG: 50 INJECTION, SOLUTION INTRAMUSCULAR; INTRAVENOUS at 15:45

## 2019-07-10 RX ADMIN — ONDANSETRON 8 MG: 2 INJECTION INTRAMUSCULAR; INTRAVENOUS at 15:41

## 2019-07-10 RX ADMIN — SODIUM CHLORIDE 10 ML: 9 INJECTION INTRAMUSCULAR; INTRAVENOUS; SUBCUTANEOUS at 15:35

## 2019-07-10 NOTE — PROGRESS NOTES
Johnny Rodrigues  : 1955  Payor: Stefan Hooper / Plan: [place] PLANNED Glenys Pieter. / Product Type: Commerical /  [address]  at [address], [address], [address] 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 Pain: Initial:   8/10  Post Session:  7/10 states feels looser   Medications Last Reviewed:  7/10/2019    Updated Objective Findings:   None Today     TREATMENT:          Aquatic Therapy (45 minutes): Aquatic treatment performed per flow grid for Decreased muscle strength, Decreased activity endurance, Decompression, Ease of movement and Low impact and reduced weight bearing activity. Cues provided for posture. Aquatic Exercise Log       Date  19 Date  19 Date  19   Date  7/3/19 Date  7/10/19     Activity/ Exercise Parameters Parameters Parameters Parameters Parameters   Walking forward 6 6 6 6 6   Walking backward 6 6 6 6 6   Walking sideways 6 6 6 6 6     Marching 6 6 6 6 6     Goose Step 6 6 6 6 6     Tip toes 3 4 4 4 4     Heels 3 4 4 4 4     Lunges        Side step squats        LE Exercises 4. 5' holding rail 4.5 ft holding to rail  With noodle  4.5 holding to rail with noodle 4# 4#     Hip Flex/Ext 10 Marching x 10 B Marching x 10 B 10 10     Hip Abd/Add 10 X 10 B X 10 B 10 10     Hip IR/ER          Calf raises          Knee Flex 10 X 10 B X 10 B 10 10     Squats          Leg Circles 10/10 10/10 B 10/10 B 10/10 10/10     Step Ups 10 Small x 10 B Small x 10B 10    UE Exercises          Squeeze In          Push Down          Pull Down          Bicep/Tricep        Rows/Press outs         Chi Positions          Trunk Rotation        Deep H2O/ Noodles 7' with blue rings  7 ft with rings 7' ft with rings 7' with rings 7' with noodle     Stabilization          Arms only          Legs only Bicycle 2 min Bicycle - 2 min  Bicycle 2 min Bicycle 2 min 2 min x 2   Cross   [place] 2 min 2 min 2 min 2 min 2 min      Scissors 2 min 2 min  2 min 2 min 2 min     Crab walk        Lower abdominal   work  DKTC 2 min DKTC - 2 min         Cardio          Jogging        Lap   Swimming          Stretches          Hamstrings          Heelcords          Piriformis          Neck          Treatment/Session Summary:    · Response to Treatment:  pt moving slower today- L hip painful. .  · Communication/Consultation:  None today  · Equipment provided today:  None today  · Recommendations/Intent for next treatment session: Next visit will focus on aquatic therapy with adding in resistance as tolerated. Effective Dates: 6/6/2019 TO 8/5/2019 (60 days).   Frequency/Duration: 2 times a week for 60 Day(s)       Total Treatment Billable Duration:  45 minutes   PT Patient Time In/Time Out  Time In: 0930  Time Out: 1015    Anders Horta PT    Future Appointments   Date Time Provider Maame Schrader   7/10/2019  1:30 PM [address] [place] [address] [facility]ANCE [facility] [address]   7/10/2019  2:00 PM Pat Simon MD [facility]   7/10/2019  3:00 PM Pritesh Cummings [address]   7/24/2019  8:50 AM [address] [place] [address] OUTREACH INSURANCE [facility] GVL [address] [place] [address]   7/24/2019  9:30 AM Pat Simon MD [facility]   7/24/2019 10:30 AM PET7 [facility]OP[facility] GVL [facility]   12/9/2019  9:00 AM Slava Major NP [facility]

## 2019-07-17 ENCOUNTER — HOSPITAL ENCOUNTER (OUTPATIENT)
Dept: PHYSICAL THERAPY | Age: 64
Discharge: HOME | End: 2019-07-17
Payer: COMMERCIAL

## 2019-07-24 ENCOUNTER — HOSPITAL ENCOUNTER (OUTPATIENT)
Dept: INFUSION THERAPY | Age: 64
Discharge: HOME | End: 2019-07-24
Payer: COMMERCIAL

## 2019-07-24 ENCOUNTER — HOSPITAL ENCOUNTER (OUTPATIENT)
Dept: LAB | Age: 64
Discharge: HOME | End: 2019-07-24
Payer: COMMERCIAL

## 2019-07-24 ENCOUNTER — PATIENT OUTREACH (OUTPATIENT)
Dept: CASE MANAGEMENT | Age: 64
End: 2019-07-24

## 2019-07-24 DIAGNOSIS — C91.60 PROLYMPHOCYTIC LEUKEMIA OF T-CELL (HCC): ICD-10-CM

## 2019-07-24 DIAGNOSIS — C91.60 PROLYMPHOCYTIC LEUKEMIA OF T-CELL (HCC): Primary | ICD-10-CM

## 2019-07-24 LAB
ALBUMIN SERPL-MCNC: 4 G/DL (ref 3.2–4.6)
ALBUMIN/GLOB SERPL: 1.1 {RATIO} (ref 1.2–3.5)
ALP SERPL-CCNC: 37 U/L (ref 50–136)
ALT SERPL-CCNC: 22 U/L (ref 12–65)
ANION GAP SERPL CALC-SCNC: 7 MMOL/L (ref 7–16)
AST SERPL-CCNC: 21 U/L (ref 15–37)
BASOPHILS # BLD: 0 K/UL (ref 0–0.2)
BASOPHILS NFR BLD: 1 % (ref 0–2)
BILIRUB SERPL-MCNC: 0.3 MG/DL (ref 0.2–1.1)
BUN SERPL-MCNC: 21 MG/DL (ref 8–23)
CALCIUM SERPL-MCNC: 9.7 MG/DL (ref 8.3–10.4)
CHLORIDE SERPL-SCNC: 109 MMOL/L (ref 98–107)
CO2 SERPL-SCNC: 25 MMOL/L (ref 21–32)
CREAT SERPL-MCNC: 1.03 MG/DL (ref 0.8–1.5)
DIFFERENTIAL METHOD BLD: ABNORMAL
EOSINOPHIL # BLD: 0.1 K/UL (ref 0–0.8)
EOSINOPHIL NFR BLD: 5 % (ref 0.5–7.8)
ERYTHROCYTE [DISTWIDTH] IN BLOOD BY AUTOMATED COUNT: 13.2 % (ref 11.9–14.6)
GLOBULIN SER CALC-MCNC: 3.5 G/DL (ref 2.3–3.5)
GLUCOSE SERPL-MCNC: 98 MG/DL (ref 65–100)
HCT VFR BLD AUTO: 35.1 % (ref 41.1–50.3)
HGB BLD-MCNC: 11.3 G/DL (ref 13.6–17.2)
IMM GRANULOCYTES # BLD AUTO: 0 K/UL (ref 0–0.5)
IMM GRANULOCYTES NFR BLD AUTO: 1 % (ref 0–5)
LYMPHOCYTES # BLD: 0.2 K/UL (ref 0.5–4.6)
LYMPHOCYTES NFR BLD: 13 % (ref 13–44)
MAGNESIUM SERPL-MCNC: 2 MG/DL (ref 1.8–2.4)
MCH RBC QN AUTO: 31.2 PG (ref 26.1–32.9)
MCHC RBC AUTO-ENTMCNC: 32.2 G/DL (ref 31.4–35)
MCV RBC AUTO: 97 FL (ref 79.6–97.8)
MONOCYTES # BLD: 0.2 K/UL (ref 0.1–1.3)
MONOCYTES NFR BLD: 11 % (ref 4–12)
NEUTS SEG # BLD: 1.2 K/UL (ref 1.7–8.2)
NEUTS SEG NFR BLD: 70 % (ref 43–78)
NRBC # BLD: 0 K/UL (ref 0–0.2)
PLATELET # BLD AUTO: 102 K/UL (ref 150–450)
PMV BLD AUTO: 10.7 FL (ref 9.4–12.3)
POTASSIUM SERPL-SCNC: 3.7 MMOL/L (ref 3.5–5.1)
PROT SERPL-MCNC: 7.5 G/DL (ref 6.3–8.2)
RBC # BLD AUTO: 3.62 M/UL (ref 4.23–5.67)
SODIUM SERPL-SCNC: 141 MMOL/L (ref 136–145)
WBC # BLD AUTO: 1.7 K/UL (ref 4.3–11.1)

## 2019-07-24 PROCEDURE — 74011250636 HC RX REV CODE- 250/636: Performed by: INTERNAL MEDICINE

## 2019-07-24 PROCEDURE — 96413 CHEMO IV INFUSION 1 HR: CPT

## 2019-07-24 PROCEDURE — 74011250637 HC RX REV CODE- 250/637: Performed by: INTERNAL MEDICINE

## 2019-07-24 PROCEDURE — 96375 TX/PRO/DX INJ NEW DRUG ADDON: CPT

## 2019-07-24 PROCEDURE — 85025 COMPLETE CBC W/AUTO DIFF WBC: CPT

## 2019-07-24 PROCEDURE — 83735 ASSAY OF MAGNESIUM: CPT

## 2019-07-24 PROCEDURE — 74011000258 HC RX REV CODE- 258: Performed by: INTERNAL MEDICINE

## 2019-07-24 PROCEDURE — 80053 COMPREHEN METABOLIC PANEL: CPT

## 2019-07-24 RX ORDER — ACETAMINOPHEN 325 MG/1
650 TABLET ORAL ONCE
Status: COMPLETED | OUTPATIENT
Start: 2019-07-24 | End: 2019-07-24

## 2019-07-24 RX ORDER — SODIUM CHLORIDE 0.9 % (FLUSH) 0.9 %
10 SYRINGE (ML) INJECTION AS NEEDED
Status: ACTIVE | OUTPATIENT
Start: 2019-07-24 | End: 2019-07-24

## 2019-07-24 RX ORDER — ONDANSETRON 2 MG/ML
8 INJECTION INTRAMUSCULAR; INTRAVENOUS ONCE
Status: COMPLETED | OUTPATIENT
Start: 2019-07-24 | End: 2019-07-24

## 2019-07-24 RX ORDER — DIPHENHYDRAMINE HYDROCHLORIDE 50 MG/ML
25 INJECTION, SOLUTION INTRAMUSCULAR; INTRAVENOUS ONCE
Status: COMPLETED | OUTPATIENT
Start: 2019-07-24 | End: 2019-07-24

## 2019-07-24 RX ADMIN — ACETAMINOPHEN 650 MG: 325 TABLET, FILM COATED ORAL at 10:34

## 2019-07-24 RX ADMIN — SODIUM CHLORIDE 500 ML: 900 INJECTION, SOLUTION INTRAVENOUS at 10:20

## 2019-07-24 RX ADMIN — SODIUM CHLORIDE: 900 INJECTION, SOLUTION INTRAVENOUS at 11:40

## 2019-07-24 RX ADMIN — ONDANSETRON 8 MG: 2 INJECTION INTRAMUSCULAR; INTRAVENOUS at 10:38

## 2019-07-24 RX ADMIN — DEXAMETHASONE SODIUM PHOSPHATE 12 MG: 4 INJECTION, SOLUTION INTRAMUSCULAR; INTRAVENOUS at 10:41

## 2019-07-24 RX ADMIN — DIPHENHYDRAMINE HYDROCHLORIDE 25 MG: 50 INJECTION, SOLUTION INTRAMUSCULAR; INTRAVENOUS at 10:34

## 2019-07-24 RX ADMIN — Medication 10 ML: at 12:20

## 2019-07-24 NOTE — PROGRESS NOTES
7/24/19- Pt was seen in the office with Dr Heidi Cuadra. Pt reports lesion in mouth on tongue. He continues Valcyte and will try OTC oragel at home. Labs reviewed today with WBC decreasing to 1.7. Pt to continue to infusion today for treatment with Pentostatin and will return to see Dr Heidi Cuadra in 2 weeks.

## 2019-07-25 LAB
CMV DNA SERPL NAA+PROBE-ACNC: NEGATIVE IU/ML
CMV DNA SERPL NAA+PROBE-LOG IU: NORMAL LOG10 IU/ML

## 2019-08-07 ENCOUNTER — HOSPITAL ENCOUNTER (OUTPATIENT)
Dept: INFUSION THERAPY | Age: 64
Discharge: HOME | End: 2019-08-07
Payer: COMMERCIAL

## 2019-08-07 ENCOUNTER — HOSPITAL ENCOUNTER (OUTPATIENT)
Dept: LAB | Age: 64
Discharge: HOME | End: 2019-08-07
Payer: COMMERCIAL

## 2019-08-07 DIAGNOSIS — C91.60 PROLYMPHOCYTIC LEUKEMIA OF T-CELL (HCC): ICD-10-CM

## 2019-08-07 DIAGNOSIS — C91.60 PROLYMPHOCYTIC LEUKEMIA OF T-CELL (HCC): Primary | ICD-10-CM

## 2019-08-07 LAB
ALBUMIN SERPL-MCNC: 4.1 G/DL (ref 3.2–4.6)
ALBUMIN/GLOB SERPL: 1.2 {RATIO} (ref 1.2–3.5)
ALP SERPL-CCNC: 37 U/L (ref 50–136)
ALT SERPL-CCNC: 25 U/L (ref 12–65)
ANION GAP SERPL CALC-SCNC: 8 MMOL/L (ref 7–16)
AST SERPL-CCNC: 21 U/L (ref 15–37)
BASOPHILS # BLD: 0 K/UL (ref 0–0.2)
BASOPHILS NFR BLD: 1 % (ref 0–2)
BILIRUB SERPL-MCNC: 0.3 MG/DL (ref 0.2–1.1)
BUN SERPL-MCNC: 19 MG/DL (ref 8–23)
CALCIUM SERPL-MCNC: 9.4 MG/DL (ref 8.3–10.4)
CHLORIDE SERPL-SCNC: 107 MMOL/L (ref 98–107)
CO2 SERPL-SCNC: 25 MMOL/L (ref 21–32)
CREAT SERPL-MCNC: 1.18 MG/DL (ref 0.8–1.5)
DIFFERENTIAL METHOD BLD: ABNORMAL
EOSINOPHIL # BLD: 0.1 K/UL (ref 0–0.8)
EOSINOPHIL NFR BLD: 4 % (ref 0.5–7.8)
ERYTHROCYTE [DISTWIDTH] IN BLOOD BY AUTOMATED COUNT: 13.8 % (ref 11.9–14.6)
GLOBULIN SER CALC-MCNC: 3.5 G/DL (ref 2.3–3.5)
GLUCOSE SERPL-MCNC: 84 MG/DL (ref 65–100)
HCT VFR BLD AUTO: 33 % (ref 41.1–50.3)
HGB BLD-MCNC: 11 G/DL (ref 13.6–17.2)
IMM GRANULOCYTES # BLD AUTO: 0 K/UL (ref 0–0.5)
IMM GRANULOCYTES NFR BLD AUTO: 2 % (ref 0–5)
LYMPHOCYTES # BLD: 0.3 K/UL (ref 0.5–4.6)
LYMPHOCYTES NFR BLD: 15 % (ref 13–44)
MAGNESIUM SERPL-MCNC: 2 MG/DL (ref 1.8–2.4)
MCH RBC QN AUTO: 31.8 PG (ref 26.1–32.9)
MCHC RBC AUTO-ENTMCNC: 33.3 G/DL (ref 31.4–35)
MCV RBC AUTO: 95.4 FL (ref 79.6–97.8)
MONOCYTES # BLD: 0.4 K/UL (ref 0.1–1.3)
MONOCYTES NFR BLD: 21 % (ref 4–12)
NEUTS SEG # BLD: 1 K/UL (ref 1.7–8.2)
NEUTS SEG NFR BLD: 58 % (ref 43–78)
NRBC # BLD: 0 K/UL (ref 0–0.2)
PLATELET # BLD AUTO: 98 K/UL (ref 150–450)
PMV BLD AUTO: 11.1 FL (ref 9.4–12.3)
POTASSIUM SERPL-SCNC: 3.9 MMOL/L (ref 3.5–5.1)
PROT SERPL-MCNC: 7.6 G/DL (ref 6.3–8.2)
RBC # BLD AUTO: 3.46 M/UL (ref 4.23–5.67)
SODIUM SERPL-SCNC: 140 MMOL/L (ref 136–145)
WBC # BLD AUTO: 1.7 K/UL (ref 4.3–11.1)

## 2019-08-07 PROCEDURE — 96413 CHEMO IV INFUSION 1 HR: CPT

## 2019-08-07 PROCEDURE — 83735 ASSAY OF MAGNESIUM: CPT

## 2019-08-07 PROCEDURE — 80053 COMPREHEN METABOLIC PANEL: CPT

## 2019-08-07 PROCEDURE — 74011250636 HC RX REV CODE- 250/636: Performed by: INTERNAL MEDICINE

## 2019-08-07 PROCEDURE — 96375 TX/PRO/DX INJ NEW DRUG ADDON: CPT

## 2019-08-07 PROCEDURE — 74011000258 HC RX REV CODE- 258: Performed by: INTERNAL MEDICINE

## 2019-08-07 PROCEDURE — 85025 COMPLETE CBC W/AUTO DIFF WBC: CPT

## 2019-08-07 PROCEDURE — 74011250637 HC RX REV CODE- 250/637: Performed by: INTERNAL MEDICINE

## 2019-08-07 RX ORDER — SODIUM CHLORIDE 0.9 % (FLUSH) 0.9 %
10 SYRINGE (ML) INJECTION AS NEEDED
Status: DISCONTINUED | OUTPATIENT
Start: 2019-08-07 | End: 2019-08-08 | Stop reason: HOSPADM

## 2019-08-07 RX ORDER — ONDANSETRON 2 MG/ML
8 INJECTION INTRAMUSCULAR; INTRAVENOUS ONCE
Status: COMPLETED | OUTPATIENT
Start: 2019-08-07 | End: 2019-08-07

## 2019-08-07 RX ORDER — DIPHENHYDRAMINE HYDROCHLORIDE 50 MG/ML
25 INJECTION, SOLUTION INTRAMUSCULAR; INTRAVENOUS ONCE
Status: COMPLETED | OUTPATIENT
Start: 2019-08-07 | End: 2019-08-07

## 2019-08-07 RX ORDER — ACETAMINOPHEN 325 MG/1
650 TABLET ORAL ONCE
Status: COMPLETED | OUTPATIENT
Start: 2019-08-07 | End: 2019-08-07

## 2019-08-07 RX ADMIN — Medication 10 ML: at 17:51

## 2019-08-07 RX ADMIN — SODIUM CHLORIDE 500 ML: 900 INJECTION, SOLUTION INTRAVENOUS at 16:44

## 2019-08-07 RX ADMIN — DIPHENHYDRAMINE HYDROCHLORIDE 25 MG: 50 INJECTION, SOLUTION INTRAMUSCULAR; INTRAVENOUS at 16:25

## 2019-08-07 RX ADMIN — ONDANSETRON 8 MG: 2 INJECTION INTRAMUSCULAR; INTRAVENOUS at 16:24

## 2019-08-07 RX ADMIN — DEXAMETHASONE SODIUM PHOSPHATE 12 MG: 4 INJECTION, SOLUTION INTRAMUSCULAR; INTRAVENOUS at 16:29

## 2019-08-07 RX ADMIN — ACETAMINOPHEN 650 MG: 325 TABLET, FILM COATED ORAL at 16:23

## 2019-08-07 RX ADMIN — SODIUM CHLORIDE: 900 INJECTION, SOLUTION INTRAVENOUS at 17:11

## 2019-08-07 RX ADMIN — Medication 10 ML: at 16:12

## 2019-08-07 NOTE — PROGRESS NOTES
Arrived to the [facility]. Assessment complete, labs reviewed. Dr. Hawkins Overcast aware of low ANC and WBC, no new orders received. Pentostatin completed. Patient tolerated without problems. Any issues or concerns during appointment: None. Patient aware of next infusion appointment on 8/21/2019 (date) at 80 (time). Discharged ambulatory with spouse.

## 2019-08-08 ENCOUNTER — PATIENT OUTREACH (OUTPATIENT)
Dept: CASE MANAGEMENT | Age: 64
End: 2019-08-08

## 2019-08-08 NOTE — PROGRESS NOTES
7/7/19:  Patient in for cycle #10 pentostatin. Dr. Grayson Graff reviewed labs and assessed the patient. Patient doing well with no complaints. Dr. Grayson Graff discussed repeating bone marrow biopsy in October. If biopsy shows residual disease, patient to start process for auto transplant.

## 2019-08-09 LAB
CMV DNA SERPL NAA+PROBE-ACNC: NEGATIVE IU/ML
CMV DNA SERPL NAA+PROBE-LOG IU: NORMAL LOG10 IU/ML

## 2019-08-21 ENCOUNTER — APPOINTMENT (OUTPATIENT)
Dept: INFUSION THERAPY | Age: 64
End: 2019-08-21
Payer: COMMERCIAL

## 2019-08-21 ENCOUNTER — HOSPITAL ENCOUNTER (OUTPATIENT)
Dept: LAB | Age: 64
Discharge: HOME | End: 2019-08-21
Payer: COMMERCIAL

## 2019-08-21 ENCOUNTER — HOSPITAL ENCOUNTER (OUTPATIENT)
Dept: INFUSION THERAPY | Age: 64
Discharge: HOME | End: 2019-08-21
Payer: COMMERCIAL

## 2019-08-21 DIAGNOSIS — C91.60 PROLYMPHOCYTIC LEUKEMIA OF T-CELL (HCC): ICD-10-CM

## 2019-08-21 DIAGNOSIS — C91.60 PROLYMPHOCYTIC LEUKEMIA OF T-CELL (HCC): Primary | ICD-10-CM

## 2019-08-21 LAB
ALBUMIN SERPL-MCNC: 4.2 G/DL (ref 3.2–4.6)
ALBUMIN/GLOB SERPL: 1.1 {RATIO} (ref 1.2–3.5)
ALP SERPL-CCNC: 36 U/L (ref 50–136)
ALT SERPL-CCNC: 22 U/L (ref 12–65)
ANION GAP SERPL CALC-SCNC: 8 MMOL/L (ref 7–16)
AST SERPL-CCNC: 19 U/L (ref 15–37)
BASOPHILS # BLD: 0 K/UL (ref 0–0.2)
BASOPHILS NFR BLD: 1 % (ref 0–2)
BILIRUB SERPL-MCNC: 0.5 MG/DL (ref 0.2–1.1)
BUN SERPL-MCNC: 17 MG/DL (ref 8–23)
CALCIUM SERPL-MCNC: 9.4 MG/DL (ref 8.3–10.4)
CHLORIDE SERPL-SCNC: 109 MMOL/L (ref 98–107)
CO2 SERPL-SCNC: 24 MMOL/L (ref 21–32)
CREAT SERPL-MCNC: 1.15 MG/DL (ref 0.8–1.5)
DIFFERENTIAL METHOD BLD: ABNORMAL
EOSINOPHIL # BLD: 0.1 K/UL (ref 0–0.8)
EOSINOPHIL NFR BLD: 9 % (ref 0.5–7.8)
ERYTHROCYTE [DISTWIDTH] IN BLOOD BY AUTOMATED COUNT: 13.8 % (ref 11.9–14.6)
GLOBULIN SER CALC-MCNC: 3.7 G/DL (ref 2.3–3.5)
GLUCOSE SERPL-MCNC: 109 MG/DL (ref 65–100)
HCT VFR BLD AUTO: 36 % (ref 41.1–50.3)
HGB BLD-MCNC: 11.9 G/DL (ref 13.6–17.2)
IMM GRANULOCYTES # BLD AUTO: 0 K/UL (ref 0–0.5)
IMM GRANULOCYTES NFR BLD AUTO: 0 % (ref 0–5)
LYMPHOCYTES # BLD: 0.2 K/UL (ref 0.5–4.6)
LYMPHOCYTES NFR BLD: 31 % (ref 13–44)
MAGNESIUM SERPL-MCNC: 2 MG/DL (ref 1.8–2.4)
MCH RBC QN AUTO: 31.1 PG (ref 26.1–32.9)
MCHC RBC AUTO-ENTMCNC: 33.1 G/DL (ref 31.4–35)
MCV RBC AUTO: 94 FL (ref 79.6–97.8)
MONOCYTES # BLD: 0.2 K/UL (ref 0.1–1.3)
MONOCYTES NFR BLD: 28 % (ref 4–12)
NEUTS SEG # BLD: 0.2 K/UL (ref 1.7–8.2)
NEUTS SEG NFR BLD: 31 % (ref 43–78)
NRBC # BLD: 0 K/UL (ref 0–0.2)
PLATELET # BLD AUTO: 84 K/UL (ref 150–450)
PMV BLD AUTO: 10.6 FL (ref 9.4–12.3)
POTASSIUM SERPL-SCNC: 3.8 MMOL/L (ref 3.5–5.1)
PROT SERPL-MCNC: 7.9 G/DL (ref 6.3–8.2)
RBC # BLD AUTO: 3.83 M/UL (ref 4.23–5.67)
SODIUM SERPL-SCNC: 141 MMOL/L (ref 136–145)
WBC # BLD AUTO: 0.7 K/UL (ref 4.3–11.1)

## 2019-08-21 PROCEDURE — 74011250637 HC RX REV CODE- 250/637: Performed by: INTERNAL MEDICINE

## 2019-08-21 PROCEDURE — 80053 COMPREHEN METABOLIC PANEL: CPT

## 2019-08-21 PROCEDURE — 85025 COMPLETE CBC W/AUTO DIFF WBC: CPT

## 2019-08-21 PROCEDURE — 96375 TX/PRO/DX INJ NEW DRUG ADDON: CPT

## 2019-08-21 PROCEDURE — 96413 CHEMO IV INFUSION 1 HR: CPT

## 2019-08-21 PROCEDURE — 74011000258 HC RX REV CODE- 258: Performed by: INTERNAL MEDICINE

## 2019-08-21 PROCEDURE — 83735 ASSAY OF MAGNESIUM: CPT

## 2019-08-21 PROCEDURE — 74011250636 HC RX REV CODE- 250/636: Performed by: INTERNAL MEDICINE

## 2019-08-21 RX ORDER — ACETAMINOPHEN 325 MG/1
650 TABLET ORAL ONCE
Status: COMPLETED | OUTPATIENT
Start: 2019-08-21 | End: 2019-08-21

## 2019-08-21 RX ORDER — DIPHENHYDRAMINE HYDROCHLORIDE 50 MG/ML
25 INJECTION, SOLUTION INTRAMUSCULAR; INTRAVENOUS ONCE
Status: COMPLETED | OUTPATIENT
Start: 2019-08-21 | End: 2019-08-21

## 2019-08-21 RX ORDER — ONDANSETRON 2 MG/ML
8 INJECTION INTRAMUSCULAR; INTRAVENOUS ONCE
Status: COMPLETED | OUTPATIENT
Start: 2019-08-21 | End: 2019-08-21

## 2019-08-21 RX ORDER — SODIUM CHLORIDE 0.9 % (FLUSH) 0.9 %
10 SYRINGE (ML) INJECTION AS NEEDED
Status: DISCONTINUED | OUTPATIENT
Start: 2019-08-21 | End: 2019-08-22 | Stop reason: HOSPADM

## 2019-08-21 RX ADMIN — SODIUM CHLORIDE 500 ML: 900 INJECTION, SOLUTION INTRAVENOUS at 12:15

## 2019-08-21 RX ADMIN — Medication 10 ML: at 14:10

## 2019-08-21 RX ADMIN — ONDANSETRON 8 MG: 2 INJECTION INTRAMUSCULAR; INTRAVENOUS at 12:34

## 2019-08-21 RX ADMIN — SODIUM CHLORIDE: 900 INJECTION, SOLUTION INTRAVENOUS at 13:17

## 2019-08-21 RX ADMIN — DEXAMETHASONE SODIUM PHOSPHATE 12 MG: 4 INJECTION, SOLUTION INTRAMUSCULAR; INTRAVENOUS at 12:38

## 2019-08-21 RX ADMIN — ACETAMINOPHEN 650 MG: 325 TABLET, FILM COATED ORAL at 12:30

## 2019-08-21 RX ADMIN — DIPHENHYDRAMINE HYDROCHLORIDE 25 MG: 50 INJECTION, SOLUTION INTRAMUSCULAR; INTRAVENOUS at 12:30

## 2019-08-21 NOTE — PROGRESS NOTES
Arrived to the [facility] ambulatory with his wife. chemo completed. Patient tolerated well. Any issues or concerns during appointment: no.  Patient aware of next infusion appointment on  9/4 at 1100. Discharged to home ambulatory.

## 2019-08-22 LAB
CMV DNA SERPL NAA+PROBE-ACNC: NEGATIVE IU/ML
CMV DNA SERPL NAA+PROBE-LOG IU: NORMAL LOG10 IU/ML

## 2019-09-04 ENCOUNTER — HOSPITAL ENCOUNTER (OUTPATIENT)
Dept: LAB | Age: 64
Discharge: HOME | End: 2019-09-04
Payer: COMMERCIAL

## 2019-09-04 ENCOUNTER — HOSPITAL ENCOUNTER (OUTPATIENT)
Dept: INFUSION THERAPY | Age: 64
Discharge: HOME | End: 2019-09-04
Payer: COMMERCIAL

## 2019-09-04 VITALS — HEIGHT: 70 IN | BODY MASS INDEX: 37.02 KG/M2

## 2019-09-04 DIAGNOSIS — C91.60 PROLYMPHOCYTIC LEUKEMIA OF T-CELL (HCC): Primary | ICD-10-CM

## 2019-09-04 DIAGNOSIS — C91.60 PROLYMPHOCYTIC LEUKEMIA OF T-CELL (HCC): ICD-10-CM

## 2019-09-04 LAB
ALBUMIN SERPL-MCNC: 4 G/DL (ref 3.2–4.6)
ALBUMIN/GLOB SERPL: 1 {RATIO} (ref 1.2–3.5)
ALP SERPL-CCNC: 39 U/L (ref 50–136)
ALT SERPL-CCNC: 31 U/L (ref 12–65)
ANION GAP SERPL CALC-SCNC: 9 MMOL/L (ref 7–16)
AST SERPL-CCNC: 19 U/L (ref 15–37)
BASOPHILS # BLD: 0 K/UL (ref 0–0.2)
BASOPHILS NFR BLD: 1 % (ref 0–2)
BILIRUB SERPL-MCNC: 0.4 MG/DL (ref 0.2–1.1)
BUN SERPL-MCNC: 24 MG/DL (ref 8–23)
CALCIUM SERPL-MCNC: 9.9 MG/DL (ref 8.3–10.4)
CHLORIDE SERPL-SCNC: 108 MMOL/L (ref 98–107)
CO2 SERPL-SCNC: 24 MMOL/L (ref 21–32)
CREAT SERPL-MCNC: 1.1 MG/DL (ref 0.8–1.5)
DIFFERENTIAL METHOD BLD: ABNORMAL
EOSINOPHIL # BLD: 0 K/UL (ref 0–0.8)
EOSINOPHIL NFR BLD: 5 % (ref 0.5–7.8)
ERYTHROCYTE [DISTWIDTH] IN BLOOD BY AUTOMATED COUNT: 13.2 % (ref 11.9–14.6)
GLOBULIN SER CALC-MCNC: 4 G/DL (ref 2.3–3.5)
GLUCOSE SERPL-MCNC: 95 MG/DL (ref 65–100)
HCT VFR BLD AUTO: 35.6 % (ref 41.1–50.3)
HGB BLD-MCNC: 11.8 G/DL (ref 13.6–17.2)
IMM GRANULOCYTES # BLD AUTO: 0 K/UL (ref 0–0.5)
IMM GRANULOCYTES NFR BLD AUTO: 0 % (ref 0–5)
LYMPHOCYTES # BLD: 0.2 K/UL (ref 0.5–4.6)
LYMPHOCYTES NFR BLD: 25 % (ref 13–44)
MAGNESIUM SERPL-MCNC: 2.1 MG/DL (ref 1.8–2.4)
MCH RBC QN AUTO: 30.9 PG (ref 26.1–32.9)
MCHC RBC AUTO-ENTMCNC: 33.1 G/DL (ref 31.4–35)
MCV RBC AUTO: 93.2 FL (ref 79.6–97.8)
MONOCYTES # BLD: 0.3 K/UL (ref 0.1–1.3)
MONOCYTES NFR BLD: 31 % (ref 4–12)
NEUTS SEG # BLD: 0.3 K/UL (ref 1.7–8.2)
NEUTS SEG NFR BLD: 38 % (ref 43–78)
NRBC # BLD: 0 K/UL (ref 0–0.2)
PLATELET # BLD AUTO: 88 K/UL (ref 150–450)
PMV BLD AUTO: 10.2 FL (ref 9.4–12.3)
POTASSIUM SERPL-SCNC: 4.1 MMOL/L (ref 3.5–5.1)
PROT SERPL-MCNC: 8 G/DL (ref 6.3–8.2)
RBC # BLD AUTO: 3.82 M/UL (ref 4.23–5.67)
SODIUM SERPL-SCNC: 141 MMOL/L (ref 136–145)
WBC # BLD AUTO: 0.8 K/UL (ref 4.3–11.1)

## 2019-09-04 PROCEDURE — 80053 COMPREHEN METABOLIC PANEL: CPT

## 2019-09-04 PROCEDURE — 74011250636 HC RX REV CODE- 250/636: Performed by: INTERNAL MEDICINE

## 2019-09-04 PROCEDURE — 85025 COMPLETE CBC W/AUTO DIFF WBC: CPT

## 2019-09-04 PROCEDURE — 74011250637 HC RX REV CODE- 250/637: Performed by: INTERNAL MEDICINE

## 2019-09-04 PROCEDURE — 96413 CHEMO IV INFUSION 1 HR: CPT

## 2019-09-04 PROCEDURE — 74011000258 HC RX REV CODE- 258: Performed by: INTERNAL MEDICINE

## 2019-09-04 PROCEDURE — 96375 TX/PRO/DX INJ NEW DRUG ADDON: CPT

## 2019-09-04 PROCEDURE — 83735 ASSAY OF MAGNESIUM: CPT

## 2019-09-04 RX ORDER — ONDANSETRON 2 MG/ML
8 INJECTION INTRAMUSCULAR; INTRAVENOUS ONCE
Status: COMPLETED | OUTPATIENT
Start: 2019-09-04 | End: 2019-09-04

## 2019-09-04 RX ORDER — ACETAMINOPHEN 325 MG/1
650 TABLET ORAL ONCE
Status: COMPLETED | OUTPATIENT
Start: 2019-09-04 | End: 2019-09-04

## 2019-09-04 RX ORDER — DIPHENHYDRAMINE HYDROCHLORIDE 50 MG/ML
25 INJECTION, SOLUTION INTRAMUSCULAR; INTRAVENOUS ONCE
Status: COMPLETED | OUTPATIENT
Start: 2019-09-04 | End: 2019-09-04

## 2019-09-04 RX ORDER — SODIUM CHLORIDE 0.9 % (FLUSH) 0.9 %
10 SYRINGE (ML) INJECTION AS NEEDED
Status: DISCONTINUED | OUTPATIENT
Start: 2019-09-04 | End: 2019-09-05 | Stop reason: HOSPADM

## 2019-09-04 RX ADMIN — SODIUM CHLORIDE 500 ML: 900 INJECTION, SOLUTION INTRAVENOUS at 12:25

## 2019-09-04 RX ADMIN — SODIUM CHLORIDE: 900 INJECTION, SOLUTION INTRAVENOUS at 13:30

## 2019-09-04 RX ADMIN — ACETAMINOPHEN 650 MG: 325 TABLET, FILM COATED ORAL at 12:29

## 2019-09-04 RX ADMIN — ONDANSETRON 8 MG: 2 INJECTION INTRAMUSCULAR; INTRAVENOUS at 12:29

## 2019-09-04 RX ADMIN — DEXAMETHASONE SODIUM PHOSPHATE 12 MG: 4 INJECTION, SOLUTION INTRAMUSCULAR; INTRAVENOUS at 12:43

## 2019-09-04 RX ADMIN — Medication 10 ML: at 12:24

## 2019-09-04 RX ADMIN — Medication 10 ML: at 14:19

## 2019-09-04 RX ADMIN — DIPHENHYDRAMINE HYDROCHLORIDE 25 MG: 50 INJECTION, SOLUTION INTRAMUSCULAR; INTRAVENOUS at 12:32

## 2019-09-04 NOTE — PROGRESS NOTES
Pt arrived ambulatory to [facility] with port previously accessed with good blood return. NS infusing. Premeds given as ordered. Pentostatin infusing. Pt aware of next appt on 9/18/19 at 1100. Port flushed and de accessed. Pt discharged ambulatory.

## 2019-09-18 ENCOUNTER — PATIENT OUTREACH (OUTPATIENT)
Dept: CASE MANAGEMENT | Age: 64
End: 2019-09-18

## 2019-09-18 ENCOUNTER — HOSPITAL ENCOUNTER (OUTPATIENT)
Dept: INFUSION THERAPY | Age: 64
Discharge: HOME | End: 2019-09-18
Payer: COMMERCIAL

## 2019-09-18 ENCOUNTER — HOSPITAL ENCOUNTER (OUTPATIENT)
Dept: LAB | Age: 64
Discharge: HOME | End: 2019-09-18
Payer: COMMERCIAL

## 2019-09-18 DIAGNOSIS — C91.60 PROLYMPHOCYTIC LEUKEMIA OF T-CELL (HCC): ICD-10-CM

## 2019-09-18 DIAGNOSIS — C91.60 PROLYMPHOCYTIC LEUKEMIA OF T-CELL (HCC): Primary | ICD-10-CM

## 2019-09-18 LAB
ALBUMIN SERPL-MCNC: 3.8 G/DL (ref 3.2–4.6)
ALBUMIN/GLOB SERPL: 1.1 {RATIO} (ref 1.2–3.5)
ALP SERPL-CCNC: 40 U/L (ref 50–136)
ALT SERPL-CCNC: 29 U/L (ref 12–65)
ANION GAP SERPL CALC-SCNC: 9 MMOL/L (ref 7–16)
AST SERPL-CCNC: 21 U/L (ref 15–37)
BASOPHILS # BLD: 0 K/UL (ref 0–0.2)
BASOPHILS NFR BLD: 1 % (ref 0–2)
BILIRUB SERPL-MCNC: 0.6 MG/DL (ref 0.2–1.1)
BUN SERPL-MCNC: 17 MG/DL (ref 8–23)
CALCIUM SERPL-MCNC: 9.4 MG/DL (ref 8.3–10.4)
CHLORIDE SERPL-SCNC: 107 MMOL/L (ref 98–107)
CO2 SERPL-SCNC: 24 MMOL/L (ref 21–32)
CREAT SERPL-MCNC: 1.18 MG/DL (ref 0.8–1.5)
DIFFERENTIAL METHOD BLD: ABNORMAL
EOSINOPHIL # BLD: 0.1 K/UL (ref 0–0.8)
EOSINOPHIL NFR BLD: 7 % (ref 0.5–7.8)
ERYTHROCYTE [DISTWIDTH] IN BLOOD BY AUTOMATED COUNT: 13.7 % (ref 11.9–14.6)
GLOBULIN SER CALC-MCNC: 3.6 G/DL (ref 2.3–3.5)
GLUCOSE SERPL-MCNC: 94 MG/DL (ref 65–100)
HCT VFR BLD AUTO: 32.1 % (ref 41.1–50.3)
HGB BLD-MCNC: 10.7 G/DL (ref 13.6–17.2)
IMM GRANULOCYTES # BLD AUTO: 0 K/UL (ref 0–0.5)
IMM GRANULOCYTES NFR BLD AUTO: 0 % (ref 0–5)
LYMPHOCYTES # BLD: 0.2 K/UL (ref 0.5–4.6)
LYMPHOCYTES NFR BLD: 28 % (ref 13–44)
MAGNESIUM SERPL-MCNC: 2 MG/DL (ref 1.8–2.4)
MCH RBC QN AUTO: 30.7 PG (ref 26.1–32.9)
MCHC RBC AUTO-ENTMCNC: 33.3 G/DL (ref 31.4–35)
MCV RBC AUTO: 92.2 FL (ref 79.6–97.8)
MONOCYTES # BLD: 0.3 K/UL (ref 0.1–1.3)
MONOCYTES NFR BLD: 37 % (ref 4–12)
NEUTS SEG # BLD: 0.2 K/UL (ref 1.7–8.2)
NEUTS SEG NFR BLD: 27 % (ref 43–78)
NRBC # BLD: 0 K/UL (ref 0–0.2)
PLATELET # BLD AUTO: 71 K/UL (ref 150–450)
PMV BLD AUTO: 10.1 FL (ref 9.4–12.3)
POTASSIUM SERPL-SCNC: 3.8 MMOL/L (ref 3.5–5.1)
PROT SERPL-MCNC: 7.4 G/DL (ref 6.3–8.2)
RBC # BLD AUTO: 3.48 M/UL (ref 4.23–5.67)
SODIUM SERPL-SCNC: 140 MMOL/L (ref 136–145)
WBC # BLD AUTO: 0.7 K/UL (ref 4.3–11.1)

## 2019-09-18 PROCEDURE — 96361 HYDRATE IV INFUSION ADD-ON: CPT

## 2019-09-18 PROCEDURE — 83735 ASSAY OF MAGNESIUM: CPT

## 2019-09-18 PROCEDURE — 96413 CHEMO IV INFUSION 1 HR: CPT

## 2019-09-18 PROCEDURE — 96375 TX/PRO/DX INJ NEW DRUG ADDON: CPT

## 2019-09-18 PROCEDURE — 80053 COMPREHEN METABOLIC PANEL: CPT

## 2019-09-18 PROCEDURE — 85025 COMPLETE CBC W/AUTO DIFF WBC: CPT

## 2019-09-18 PROCEDURE — 74011250636 HC RX REV CODE- 250/636: Performed by: INTERNAL MEDICINE

## 2019-09-18 PROCEDURE — 74011250637 HC RX REV CODE- 250/637: Performed by: INTERNAL MEDICINE

## 2019-09-18 PROCEDURE — 74011000258 HC RX REV CODE- 258: Performed by: INTERNAL MEDICINE

## 2019-09-18 RX ORDER — SODIUM CHLORIDE 0.9 % (FLUSH) 0.9 %
10 SYRINGE (ML) INJECTION AS NEEDED
Status: ACTIVE | OUTPATIENT
Start: 2019-09-18 | End: 2019-09-18

## 2019-09-18 RX ORDER — ACETAMINOPHEN 325 MG/1
650 TABLET ORAL ONCE
Status: COMPLETED | OUTPATIENT
Start: 2019-09-18 | End: 2019-09-18

## 2019-09-18 RX ORDER — DIPHENHYDRAMINE HYDROCHLORIDE 50 MG/ML
25 INJECTION, SOLUTION INTRAMUSCULAR; INTRAVENOUS ONCE
Status: COMPLETED | OUTPATIENT
Start: 2019-09-18 | End: 2019-09-18

## 2019-09-18 RX ORDER — ONDANSETRON 2 MG/ML
8 INJECTION INTRAMUSCULAR; INTRAVENOUS ONCE
Status: COMPLETED | OUTPATIENT
Start: 2019-09-18 | End: 2019-09-18

## 2019-09-18 RX ADMIN — DIPHENHYDRAMINE HYDROCHLORIDE 25 MG: 50 INJECTION, SOLUTION INTRAMUSCULAR; INTRAVENOUS at 11:54

## 2019-09-18 RX ADMIN — Medication 10 ML: at 13:38

## 2019-09-18 RX ADMIN — ONDANSETRON 8 MG: 2 INJECTION INTRAMUSCULAR; INTRAVENOUS at 11:50

## 2019-09-18 RX ADMIN — SODIUM CHLORIDE: 900 INJECTION, SOLUTION INTRAVENOUS at 13:00

## 2019-09-18 RX ADMIN — ACETAMINOPHEN 650 MG: 325 TABLET, FILM COATED ORAL at 11:48

## 2019-09-18 RX ADMIN — DEXAMETHASONE SODIUM PHOSPHATE 12 MG: 4 INJECTION, SOLUTION INTRAMUSCULAR; INTRAVENOUS at 12:08

## 2019-09-18 RX ADMIN — SODIUM CHLORIDE 500 ML: 900 INJECTION, SOLUTION INTRAVENOUS at 12:18

## 2019-09-18 RX ADMIN — Medication 10 ML: at 11:50

## 2019-09-18 NOTE — PROGRESS NOTES
9/18/19- Pt was seen in the office with Dr Momo Phipps for dose #13 of Pentostatin. Labs reviewed. Pt to proceed to infusion today for treatment. He will be given next dose in 3 weeks to allow for better platelet recovery. Pt will need a BMX after 14th dose.

## 2019-09-18 NOTE — PROGRESS NOTES
Arrived to the [facility]. Pentostatin completed. Patient tolerated well. Any issues or concerns during appointment: none. Patient aware of next infusion appointment on 10/9 (date) at 11:30 AM (time). Discharged ambulatory.

## 2019-09-19 LAB
CMV DNA SERPL NAA+PROBE-ACNC: NEGATIVE IU/ML
CMV DNA SERPL NAA+PROBE-LOG IU: NORMAL LOG10 IU/ML

## 2019-10-02 ENCOUNTER — APPOINTMENT (OUTPATIENT)
Dept: INFUSION THERAPY | Age: 64
End: 2019-10-02
Payer: COMMERCIAL

## 2019-10-09 ENCOUNTER — HOSPITAL ENCOUNTER (OUTPATIENT)
Dept: INFUSION THERAPY | Age: 64
Discharge: HOME | End: 2019-10-09
Payer: COMMERCIAL

## 2019-10-09 ENCOUNTER — HOSPITAL ENCOUNTER (OUTPATIENT)
Dept: LAB | Age: 64
Discharge: HOME | End: 2019-10-09
Payer: COMMERCIAL

## 2019-10-09 DIAGNOSIS — C91.60 PROLYMPHOCYTIC LEUKEMIA OF T-CELL (HCC): ICD-10-CM

## 2019-10-09 DIAGNOSIS — C91.60 PROLYMPHOCYTIC LEUKEMIA OF T-CELL (HCC): Primary | ICD-10-CM

## 2019-10-09 LAB
ALBUMIN SERPL-MCNC: 4 G/DL (ref 3.2–4.6)
ALBUMIN/GLOB SERPL: 1.1 {RATIO} (ref 1.2–3.5)
ALP SERPL-CCNC: 39 U/L (ref 50–136)
ALT SERPL-CCNC: 19 U/L (ref 12–65)
ANION GAP SERPL CALC-SCNC: 7 MMOL/L (ref 7–16)
AST SERPL-CCNC: 19 U/L (ref 15–37)
BASOPHILS # BLD: 0 K/UL (ref 0–0.2)
BASOPHILS NFR BLD: 1 % (ref 0–2)
BILIRUB SERPL-MCNC: 0.4 MG/DL (ref 0.2–1.1)
BUN SERPL-MCNC: 13 MG/DL (ref 8–23)
CALCIUM SERPL-MCNC: 9.6 MG/DL (ref 8.3–10.4)
CHLORIDE SERPL-SCNC: 109 MMOL/L (ref 98–107)
CO2 SERPL-SCNC: 27 MMOL/L (ref 21–32)
CREAT SERPL-MCNC: 1.17 MG/DL (ref 0.8–1.5)
DIFFERENTIAL METHOD BLD: ABNORMAL
EOSINOPHIL # BLD: 0.1 K/UL (ref 0–0.8)
EOSINOPHIL NFR BLD: 5 % (ref 0.5–7.8)
ERYTHROCYTE [DISTWIDTH] IN BLOOD BY AUTOMATED COUNT: 14.6 % (ref 11.9–14.6)
GLOBULIN SER CALC-MCNC: 3.5 G/DL (ref 2.3–3.5)
GLUCOSE SERPL-MCNC: 119 MG/DL (ref 65–100)
HCT VFR BLD AUTO: 32.2 % (ref 41.1–50.3)
HGB BLD-MCNC: 10.6 G/DL (ref 13.6–17.2)
IMM GRANULOCYTES # BLD AUTO: 0 K/UL (ref 0–0.5)
IMM GRANULOCYTES NFR BLD AUTO: 1 % (ref 0–5)
LYMPHOCYTES # BLD: 0.3 K/UL (ref 0.5–4.6)
LYMPHOCYTES NFR BLD: 20 % (ref 13–44)
MAGNESIUM SERPL-MCNC: 2.1 MG/DL (ref 1.8–2.4)
MCH RBC QN AUTO: 30.9 PG (ref 26.1–32.9)
MCHC RBC AUTO-ENTMCNC: 32.9 G/DL (ref 31.4–35)
MCV RBC AUTO: 93.9 FL (ref 79.6–97.8)
MONOCYTES # BLD: 0.3 K/UL (ref 0.1–1.3)
MONOCYTES NFR BLD: 18 % (ref 4–12)
NEUTS SEG # BLD: 0.8 K/UL (ref 1.7–8.2)
NEUTS SEG NFR BLD: 55 % (ref 43–78)
NRBC # BLD: 0 K/UL (ref 0–0.2)
PLATELET # BLD AUTO: 90 K/UL (ref 150–450)
PMV BLD AUTO: 10.7 FL (ref 9.4–12.3)
POTASSIUM SERPL-SCNC: 3.9 MMOL/L (ref 3.5–5.1)
PROT SERPL-MCNC: 7.5 G/DL (ref 6.3–8.2)
RBC # BLD AUTO: 3.43 M/UL (ref 4.23–5.67)
SODIUM SERPL-SCNC: 143 MMOL/L (ref 136–145)
WBC # BLD AUTO: 1.5 K/UL (ref 4.3–11.1)

## 2019-10-09 PROCEDURE — 80053 COMPREHEN METABOLIC PANEL: CPT

## 2019-10-09 PROCEDURE — 83735 ASSAY OF MAGNESIUM: CPT

## 2019-10-09 PROCEDURE — 74011000258 HC RX REV CODE- 258: Performed by: INTERNAL MEDICINE

## 2019-10-09 PROCEDURE — 74011250636 HC RX REV CODE- 250/636: Performed by: INTERNAL MEDICINE

## 2019-10-09 PROCEDURE — 96375 TX/PRO/DX INJ NEW DRUG ADDON: CPT

## 2019-10-09 PROCEDURE — 85025 COMPLETE CBC W/AUTO DIFF WBC: CPT

## 2019-10-09 PROCEDURE — 96413 CHEMO IV INFUSION 1 HR: CPT

## 2019-10-09 PROCEDURE — 96361 HYDRATE IV INFUSION ADD-ON: CPT

## 2019-10-09 PROCEDURE — 74011250637 HC RX REV CODE- 250/637: Performed by: INTERNAL MEDICINE

## 2019-10-09 RX ORDER — SODIUM CHLORIDE 0.9 % (FLUSH) 0.9 %
10 SYRINGE (ML) INJECTION AS NEEDED
Status: DISCONTINUED | OUTPATIENT
Start: 2019-10-09 | End: 2019-10-10 | Stop reason: HOSPADM

## 2019-10-09 RX ORDER — ONDANSETRON 2 MG/ML
8 INJECTION INTRAMUSCULAR; INTRAVENOUS ONCE
Status: COMPLETED | OUTPATIENT
Start: 2019-10-09 | End: 2019-10-09

## 2019-10-09 RX ORDER — ACETAMINOPHEN 325 MG/1
650 TABLET ORAL ONCE
Status: COMPLETED | OUTPATIENT
Start: 2019-10-09 | End: 2019-10-09

## 2019-10-09 RX ORDER — DIPHENHYDRAMINE HYDROCHLORIDE 50 MG/ML
25 INJECTION, SOLUTION INTRAMUSCULAR; INTRAVENOUS ONCE
Status: COMPLETED | OUTPATIENT
Start: 2019-10-09 | End: 2019-10-09

## 2019-10-09 RX ADMIN — SODIUM CHLORIDE: 900 INJECTION, SOLUTION INTRAVENOUS at 13:15

## 2019-10-09 RX ADMIN — Medication 10 ML: at 13:45

## 2019-10-09 RX ADMIN — Medication 10 ML: at 12:16

## 2019-10-09 RX ADMIN — SODIUM CHLORIDE 500 ML: 900 INJECTION, SOLUTION INTRAVENOUS at 12:40

## 2019-10-09 RX ADMIN — ONDANSETRON 8 MG: 2 INJECTION INTRAMUSCULAR; INTRAVENOUS at 12:20

## 2019-10-09 RX ADMIN — DIPHENHYDRAMINE HYDROCHLORIDE 25 MG: 50 INJECTION, SOLUTION INTRAMUSCULAR; INTRAVENOUS at 12:22

## 2019-10-09 RX ADMIN — ACETAMINOPHEN 650 MG: 325 TABLET, FILM COATED ORAL at 12:19

## 2019-10-09 RX ADMIN — DEXAMETHASONE SODIUM PHOSPHATE 12 MG: 4 INJECTION, SOLUTION INTRAMUSCULAR; INTRAVENOUS at 12:25

## 2019-10-09 NOTE — PROGRESS NOTES
Pt ambulatory to area without complaints. Received chemo treatment per order, tolerated well. No return appt at this time. Advised to call dr with any issues/concerns. Discharged home without complaints.

## 2019-10-10 LAB
CMV DNA SERPL NAA+PROBE-ACNC: NEGATIVE IU/ML
CMV DNA SERPL NAA+PROBE-LOG IU: NORMAL LOG10 IU/ML

## 2019-10-30 ENCOUNTER — PATIENT OUTREACH (OUTPATIENT)
Dept: CASE MANAGEMENT | Age: 64
End: 2019-10-30

## 2019-10-30 ENCOUNTER — HOSPITAL ENCOUNTER (OUTPATIENT)
Dept: LAB | Age: 64
Discharge: HOME | End: 2019-10-30
Payer: COMMERCIAL

## 2019-10-30 DIAGNOSIS — C91.60 PROLYMPHOCYTIC LEUKEMIA OF T-CELL (HCC): ICD-10-CM

## 2019-10-30 DIAGNOSIS — C91.60 PROLYMPHOCYTIC LEUKEMIA OF T-CELL (HCC): Primary | ICD-10-CM

## 2019-10-30 LAB
ALBUMIN SERPL-MCNC: 4 G/DL (ref 3.2–4.6)
ALBUMIN/GLOB SERPL: 1.1 {RATIO} (ref 1.2–3.5)
ALP SERPL-CCNC: 44 U/L (ref 50–136)
ALT SERPL-CCNC: 22 U/L (ref 12–65)
ANION GAP SERPL CALC-SCNC: 8 MMOL/L (ref 7–16)
AST SERPL-CCNC: 25 U/L (ref 15–37)
BASOPHILS # BLD: 0 K/UL (ref 0–0.2)
BASOPHILS NFR BLD: 1 % (ref 0–2)
BILIRUB SERPL-MCNC: 0.5 MG/DL (ref 0.2–1.1)
BUN SERPL-MCNC: 14 MG/DL (ref 8–23)
CALCIUM SERPL-MCNC: 9.3 MG/DL (ref 8.3–10.4)
CHLORIDE SERPL-SCNC: 107 MMOL/L (ref 98–107)
CO2 SERPL-SCNC: 26 MMOL/L (ref 21–32)
CREAT SERPL-MCNC: 1.2 MG/DL (ref 0.8–1.5)
DIFFERENTIAL METHOD BLD: ABNORMAL
EOSINOPHIL # BLD: 0.1 K/UL (ref 0–0.8)
EOSINOPHIL NFR BLD: 4 % (ref 0.5–7.8)
ERYTHROCYTE [DISTWIDTH] IN BLOOD BY AUTOMATED COUNT: 14.9 % (ref 11.9–14.6)
GLOBULIN SER CALC-MCNC: 3.6 G/DL (ref 2.3–3.5)
GLUCOSE SERPL-MCNC: 121 MG/DL (ref 65–100)
HCT VFR BLD AUTO: 30.9 % (ref 41.1–50.3)
HGB BLD-MCNC: 10.4 G/DL (ref 13.6–17.2)
IMM GRANULOCYTES # BLD AUTO: 0 K/UL (ref 0–0.5)
IMM GRANULOCYTES NFR BLD AUTO: 2 % (ref 0–5)
LYMPHOCYTES # BLD: 0.2 K/UL (ref 0.5–4.6)
LYMPHOCYTES NFR BLD: 15 % (ref 13–44)
MAGNESIUM SERPL-MCNC: 2 MG/DL (ref 1.8–2.4)
MCH RBC QN AUTO: 32.2 PG (ref 26.1–32.9)
MCHC RBC AUTO-ENTMCNC: 33.7 G/DL (ref 31.4–35)
MCV RBC AUTO: 95.7 FL (ref 79.6–97.8)
MONOCYTES # BLD: 0.2 K/UL (ref 0.1–1.3)
MONOCYTES NFR BLD: 15 % (ref 4–12)
NEUTS SEG # BLD: 0.9 K/UL (ref 1.7–8.2)
NEUTS SEG NFR BLD: 63 % (ref 43–78)
NRBC # BLD: 0 K/UL (ref 0–0.2)
PLATELET # BLD AUTO: 78 K/UL (ref 150–450)
PMV BLD AUTO: 10.7 FL (ref 9.4–12.3)
POTASSIUM SERPL-SCNC: 3.7 MMOL/L (ref 3.5–5.1)
PROT SERPL-MCNC: 7.6 G/DL (ref 6.3–8.2)
RBC # BLD AUTO: 3.23 M/UL (ref 4.23–5.67)
SODIUM SERPL-SCNC: 141 MMOL/L (ref 136–145)
WBC # BLD AUTO: 1.5 K/UL (ref 4.3–11.1)

## 2019-10-30 PROCEDURE — 85025 COMPLETE CBC W/AUTO DIFF WBC: CPT

## 2019-10-30 PROCEDURE — 80053 COMPREHEN METABOLIC PANEL: CPT

## 2019-10-30 PROCEDURE — 83735 ASSAY OF MAGNESIUM: CPT

## 2019-10-30 NOTE — PROGRESS NOTES
10/30/19- Pt was seen in the office with Dr Gerhardt Aguas for follow up post 3 weeks pentostatin. Labs reviewed. Dr Gerhardt Aguas would like for pt to have more time to recover prior to [address] scheduled for 3 weeks. Pt will return to see Dr Gerhardt Aguas on 11/29/19 to review results.

## 2019-11-14 ENCOUNTER — TELEPHONE (OUTPATIENT)
Dept: CASE MANAGEMENT | Age: 64
End: 2019-11-14

## 2019-11-14 NOTE — TELEPHONE ENCOUNTER
11/14/19- Call received from pt regarding swelling to left foot and left leg. Pt states it has been swollen for about 4 days. Reviewed with Dr Tamiko Bond and [address] ordered. Pt also complains of continuing knee pain and sciatica pain. Ortho referral placed.

## 2019-11-15 ENCOUNTER — HOSPITAL ENCOUNTER (OUTPATIENT)
Dept: ULTRASOUND IMAGING | Age: 64
Discharge: HOME | End: 2019-11-15
Attending: INTERNAL MEDICINE
Payer: COMMERCIAL

## 2019-11-15 DIAGNOSIS — C91.60 PROLYMPHOCYTIC LEUKEMIA OF T-CELL (HCC): ICD-10-CM

## 2019-11-15 PROCEDURE — 93971 EXTREMITY STUDY: CPT

## 2019-11-20 ENCOUNTER — HOSPITAL ENCOUNTER (OUTPATIENT)
Dept: LAB | Age: 64
Discharge: HOME | End: 2019-11-20
Attending: INTERNAL MEDICINE
Payer: COMMERCIAL

## 2019-11-20 ENCOUNTER — HOSPITAL ENCOUNTER (OUTPATIENT)
Dept: CT IMAGING | Age: 64
Discharge: HOME | End: 2019-11-20
Attending: INTERNAL MEDICINE
Payer: COMMERCIAL

## 2019-11-20 VITALS
TEMPERATURE: 97.6 F | HEART RATE: 80 BPM | SYSTOLIC BLOOD PRESSURE: 131 MMHG | DIASTOLIC BLOOD PRESSURE: 85 MMHG | OXYGEN SATURATION: 92 % | RESPIRATION RATE: 16 BRPM

## 2019-11-20 DIAGNOSIS — C91.60 PROLYMPHOCYTIC LEUKEMIA OF T-CELL (HCC): ICD-10-CM

## 2019-11-20 LAB
BASOPHILS # BLD: 0 K/UL (ref 0–0.2)
BASOPHILS NFR BLD: 1 % (ref 0–2)
BONE MARROW PREP & W,BMA: NORMAL
DIFFERENTIAL METHOD BLD: ABNORMAL
EOSINOPHIL # BLD: 0.1 K/UL (ref 0–0.8)
EOSINOPHIL NFR BLD: 4 % (ref 0.5–7.8)
ERYTHROCYTE [DISTWIDTH] IN BLOOD BY AUTOMATED COUNT: 14.2 % (ref 11.9–14.6)
HCT VFR BLD AUTO: 29.1 % (ref 41.1–50.3)
HGB BLD-MCNC: 9.7 G/DL (ref 13.6–17.2)
IMM GRANULOCYTES # BLD AUTO: 0.1 K/UL (ref 0–0.5)
IMM GRANULOCYTES NFR BLD AUTO: 3 % (ref 0–5)
LYMPHOCYTES # BLD: 0.4 K/UL (ref 0.5–4.6)
LYMPHOCYTES NFR BLD: 25 % (ref 13–44)
MCH RBC QN AUTO: 33 PG (ref 26.1–32.9)
MCHC RBC AUTO-ENTMCNC: 33.3 G/DL (ref 31.4–35)
MCV RBC AUTO: 99 FL (ref 79.6–97.8)
MONOCYTES # BLD: 0.3 K/UL (ref 0.1–1.3)
MONOCYTES NFR BLD: 19 % (ref 4–12)
NEUTS SEG # BLD: 0.7 K/UL (ref 1.7–8.2)
NEUTS SEG NFR BLD: 48 % (ref 43–78)
NRBC # BLD: 0 K/UL (ref 0–0.2)
PLATELET # BLD AUTO: 66 K/UL (ref 150–450)
PMV BLD AUTO: 11.3 FL (ref 9.4–12.3)
RBC # BLD AUTO: 2.94 M/UL (ref 4.23–5.6)
WBC # BLD AUTO: 1.5 K/UL (ref 4.3–11.1)

## 2019-11-20 PROCEDURE — 74011250636 HC RX REV CODE- 250/636: Performed by: INTERNAL MEDICINE

## 2019-11-20 PROCEDURE — 88311 DECALCIFY TISSUE: CPT

## 2019-11-20 PROCEDURE — 74011000250 HC RX REV CODE- 250: Performed by: RADIOLOGY

## 2019-11-20 PROCEDURE — 36415 COLL VENOUS BLD VENIPUNCTURE: CPT

## 2019-11-20 PROCEDURE — 88312 SPECIAL STAINS GROUP 1: CPT

## 2019-11-20 PROCEDURE — 88305 TISSUE EXAM BY PATHOLOGIST: CPT

## 2019-11-20 PROCEDURE — 88184 FLOWCYTOMETRY/ TC 1 MARKER: CPT

## 2019-11-20 PROCEDURE — 74011250636 HC RX REV CODE- 250/636: Performed by: RADIOLOGY

## 2019-11-20 PROCEDURE — 85025 COMPLETE CBC W/AUTO DIFF WBC: CPT

## 2019-11-20 PROCEDURE — 99152 MOD SED SAME PHYS/QHP 5/>YRS: CPT

## 2019-11-20 PROCEDURE — 77012 CT SCAN FOR NEEDLE BIOPSY: CPT

## 2019-11-20 PROCEDURE — 88185 FLOWCYTOMETRY/TC ADD-ON: CPT

## 2019-11-20 PROCEDURE — 77030003560 HC NDL HUBR BARD -A

## 2019-11-20 PROCEDURE — 88313 SPECIAL STAINS GROUP 2: CPT

## 2019-11-20 RX ORDER — DIPHENHYDRAMINE HYDROCHLORIDE 50 MG/ML
25-50 INJECTION, SOLUTION INTRAMUSCULAR; INTRAVENOUS ONCE
Status: COMPLETED | OUTPATIENT
Start: 2019-11-20 | End: 2019-11-20

## 2019-11-20 RX ORDER — MIDAZOLAM HYDROCHLORIDE 1 MG/ML
.5-2 INJECTION, SOLUTION INTRAMUSCULAR; INTRAVENOUS
Status: DISCONTINUED | OUTPATIENT
Start: 2019-11-20 | End: 2019-11-24 | Stop reason: HOSPADM

## 2019-11-20 RX ORDER — HEPARIN 100 UNIT/ML
500 SYRINGE INTRAVENOUS ONCE
Status: COMPLETED | OUTPATIENT
Start: 2019-11-20 | End: 2019-11-20

## 2019-11-20 RX ORDER — SODIUM CHLORIDE 9 MG/ML
500 INJECTION, SOLUTION INTRAVENOUS CONTINUOUS
Status: DISCONTINUED | OUTPATIENT
Start: 2019-11-20 | End: 2019-11-24 | Stop reason: HOSPADM

## 2019-11-20 RX ORDER — FENTANYL CITRATE 50 UG/ML
25-50 INJECTION, SOLUTION INTRAMUSCULAR; INTRAVENOUS
Status: DISCONTINUED | OUTPATIENT
Start: 2019-11-20 | End: 2019-11-24 | Stop reason: HOSPADM

## 2019-11-20 RX ORDER — LIDOCAINE HYDROCHLORIDE 20 MG/ML
20-200 INJECTION, SOLUTION INFILTRATION; PERINEURAL ONCE
Status: COMPLETED | OUTPATIENT
Start: 2019-11-20 | End: 2019-11-20

## 2019-11-20 RX ADMIN — SODIUM CHLORIDE 500 ML: 900 INJECTION, SOLUTION INTRAVENOUS at 15:08

## 2019-11-20 RX ADMIN — FENTANYL CITRATE 50 MCG: 50 INJECTION, SOLUTION INTRAMUSCULAR; INTRAVENOUS at 15:13

## 2019-11-20 RX ADMIN — MIDAZOLAM 1 MG: 1 INJECTION INTRAMUSCULAR; INTRAVENOUS at 15:08

## 2019-11-20 RX ADMIN — HEPARIN SODIUM (PORCINE) LOCK FLUSH IV SOLN 100 UNIT/ML 500 UNITS: 100 SOLUTION at 16:30

## 2019-11-20 RX ADMIN — LIDOCAINE HYDROCHLORIDE 200 MG: 20 INJECTION, SOLUTION INFILTRATION; PERINEURAL at 15:13

## 2019-11-20 RX ADMIN — FENTANYL CITRATE 50 MCG: 50 INJECTION, SOLUTION INTRAMUSCULAR; INTRAVENOUS at 15:08

## 2019-11-20 RX ADMIN — DIPHENHYDRAMINE HYDROCHLORIDE 50 MG: 50 INJECTION, SOLUTION INTRAMUSCULAR; INTRAVENOUS at 15:08

## 2019-11-20 RX ADMIN — MIDAZOLAM 1 MG: 1 INJECTION INTRAMUSCULAR; INTRAVENOUS at 15:13

## 2019-11-20 NOTE — H&P
Department of Interventional Radiology  [phone]    History and Physical    Patient:  Tony Baires MRN:  [id number]  SSN:  [id number]    YOB: 1955  Age:  59 y.o. Sex:  male      Primary Care Provider:  Sony Reyes NP  Referring Physician:  Tegan Reddy MD    Subjective:     Chief Complaint: biopsy    History of the Present Illness: The patient is a 59 y.o. male who presents for bone marrow biopsy. Leukemia. NPO. Past Medical History:   Diagnosis Date    Back pain     occassionally    Cervical radiculopathy     Claustrophobia     DVT (deep venous thrombosis) ([facility] [address] 06/2019    currently treated with Xarelto- started on 5/30/2019    GERD (gastroesophageal reflux disease)     H/O seasonal allergies     Hyperlipidemia     Impotence     Insomnia     Lateral epicondylitis     Leukemia (HCC)     CHEMO    Obesity     BMI 36.6    Sleep apnea     noncomplaint with CPAP     Past Surgical History:   Procedure Laterality Date    HX CIRCUMCISION      HX COLONOSCOPY  2017    Due in 2027    HX ORTHOPAEDIC Right     r wrist    HX WISDOM TEETH EXTRACTION      IR INSERT TUNL CVC W PORT OVER 5 YEARS  6/19/2019        Review of Systems:    Pertinent items are noted in the History of Present Illness. Prior to Admission medications    Medication Sig Start Date End Date Taking? Authorizing Provider   Cetirizine (ZYRTEC) 10 mg cap Take 10 mg by mouth. Yes Provider, Historical   fluconazole (DIFLUCAN) 200 mg tablet TAKE 1 TABLET BY MOUTH DAILY 11/17/19  Yes Tegan Reddy MD   aluminum-magnesium hydroxide 200-200 mg/5 mL susp 5 mL, diphenhydrAMINE 12.5 mg/5 mL liqd 12.5 mg, lidocaine 2 % soln 5 mL 5 mL by Swish and Spit route two (2) times daily as needed for Stomatitis. Magic mouth wash   Maalox  Lidocaine 2% viscous   Diphenhydramine oral solution     Pharmacy to mix equal portions of ingredients to a total volume as indicated in the dispense amount.  9/4/19  Yes Flaquita Thompson Boogie Ramírez MD   trimethoprim-sulfamethoxazole (BACTRIM DS, SEPTRA DS) 160-800 mg per tablet Take 1 Tab by mouth every Monday, Wednesday, Friday. 7/26/19  Yes Trista Coronado MD   valGANciclovir (VALCYTE) 450 mg tablet Take 2 Tabs by mouth two (2) times a day. 7/24/19  Yes Trista Coronado MD   lidocaine-prilocaine (EMLA) topical cream Apply  to affected area as needed for Pain. Apply to port site 45-60 minutes prior to lab appt or infusion 6/25/19  Yes Trista Coronado MD   docusate sodium (COLACE) 100 mg capsule Take 100 mg by mouth nightly. Yes Provider, Historical   olmesartan-hydroCHLOROthiazide (BENICAR HCT) 20-12.5 mg per tablet Take 1 Tab by mouth daily. 6/13/19  Yes Siena Rollins NP   ondansetron (ZOFRAN ODT) 8 mg disintegrating tablet Take 1 Tab by mouth every eight (8) hours as needed for Nausea. 1/17/19  Yes Dino Alarcon NP   lovastatin (MEVACOR) 10 mg tablet Take 1 Tab by mouth nightly. 12/5/18  Yes Siena Rollins NP   multivitamin (ONE A DAY) tablet Take 1 Tab by mouth daily. Yes Provider, Historical   DOCOSAHEXANOIC ACID/EPA (FISH OIL PO) Take 1 Cap by mouth daily. Yes Provider, Historical   VITAMIN A/VITAMIN D3 (NATURAL VITAMIN D PO) Take 1 Tab by mouth daily. Yes Provider, Historical   rivaroxaban (XARELTO) 20 mg tab tablet Take 1 Tab by mouth daily. 6/24/19   Trista Coronado MD   hydrocortisone (ANUSOL-HC) 2.5 % rectal cream Insert  into rectum four (4) times daily. Patient taking differently: Insert  into rectum as needed.  6/13/19   Siena Rollins NP        Allergies   Allergen Reactions    Campath [Alemtuzumab] Other (comments)     Rigors       Family History   Problem Relation Age of Onset    Cancer Mother 80        pancreatic    Cancer Father 76        breast    No Known Problems Son     Hypertension Brother     Hypertension Brother     No Known Problems Daughter      Social History     Tobacco Use    Smoking status: Never Smoker    Smokeless tobacco: Never Used Substance Use Topics    Alcohol use: Not Currently     Alcohol/week: 0.0 standard drinks        Objective:       Physical Examination:    Vitals:    11/20/19 1317   BP: 122/74   Pulse: 73   Resp: 18   Temp: 97.6 °F (36.4 °C)   SpO2: 94%       Pain Assessment  Pain Intensity 1: 7 (11/20/19 1358)  Pain Location 1: Back, Buttocks, Leg  Pain Intervention(s) 1: Medication (see MAR)  Patient Stated Pain Goal: 0      HEART: regular rate and rhythm  LUNG: clear to auscultation bilaterally  ABDOMEN: normal findings: soft, non-tender  EXTREMITIES: warm, + LE edema    Laboratory:     Lab Results   Component Value Date/Time    Sodium 141 10/30/2019 10:32 AM    Sodium 143 10/09/2019 10:29 AM    Potassium 3.7 10/30/2019 10:32 AM    Potassium 3.9 10/09/2019 10:29 AM    Chloride 107 10/30/2019 10:32 AM    Chloride 109 (H) 10/09/2019 10:29 AM    CO2 26 10/30/2019 10:32 AM    CO2 27 10/09/2019 10:29 AM    Anion gap 8 10/30/2019 10:32 AM    Anion gap 7 10/09/2019 10:29 AM    Glucose 121 (H) 10/30/2019 10:32 AM    Glucose 119 (H) 10/09/2019 10:29 AM    BUN 14 10/30/2019 10:32 AM    BUN 13 10/09/2019 10:29 AM    Creatinine 1.20 10/30/2019 10:32 AM    Creatinine 1.17 10/09/2019 10:29 AM    GFR est AA >60 10/30/2019 10:32 AM    GFR est AA >60 10/09/2019 10:29 AM    GFR est non-AA >60 10/30/2019 10:32 AM    GFR est non-AA >60 10/09/2019 10:29 AM    Calcium 9.3 10/30/2019 10:32 AM    Calcium 9.6 10/09/2019 10:29 AM    Magnesium 2.0 10/30/2019 10:32 AM    Magnesium 2.1 10/09/2019 10:29 AM    Albumin 4.0 10/30/2019 10:32 AM    Albumin 4.0 10/09/2019 10:29 AM    Protein, total 7.6 10/30/2019 10:32 AM    Protein, total 7.5 10/09/2019 10:29 AM    Globulin 3.6 (H) 10/30/2019 10:32 AM    Globulin 3.5 10/09/2019 10:29 AM    A-G Ratio 1.1 (L) 10/30/2019 10:32 AM    A-G Ratio 1.1 (L) 10/09/2019 10:29 AM    AST (SGOT) 25 10/30/2019 10:32 AM    AST (SGOT) 19 10/09/2019 10:29 AM    ALT (SGPT) 22 10/30/2019 10:32 AM    ALT (SGPT) 19 10/09/2019 10:29 AM     Lab Results   Component Value Date/Time    WBC 1.5 (LL) 10/30/2019 10:32 AM    WBC 1.5 (LL) 10/09/2019 10:29 AM    HGB 10.4 (L) 10/30/2019 10:32 AM    HGB 10.6 (L) 10/09/2019 10:29 AM    HCT 30.9 (L) 10/30/2019 10:32 AM    HCT 32.2 (L) 10/09/2019 10:29 AM    PLATELET 78 (L) 84/49/7740 10:32 AM    PLATELET 90 (L) 19/22/9507 10:29 AM     No results found for: APTT, PTP, INR, INREXT    Assessment:     leukemia    Hospital Problems  Date Reviewed: 6/13/2019    None          Plan:     Planned Procedure:  Bone marrow biopsy    Risks, benefits, and alternatives reviewed with patient and he agrees to proceed with the procedure.       Signed By: Srinivasa Gilbert PA-C     November 20, 2019

## 2019-11-20 NOTE — DISCHARGE INSTRUCTIONS
[address]  Department of Interventional Radiology  [facility]  [phone] Office  [phone] Fax    BIOPSY DISCHARGE INSTRUCTIONS    General Instructions:     A biopsy is the removal of a small piece of tissue for microscopic examination or testing. Healthy tissue can be obtained for the purpose of tissue-type matching for transplants. Unhealthy tissues are more commonly biopsied to diagnose disease. General Biopsy:     A mass can grow in any area of the body, and we are taking a specimen as ordered by your doctor. The risks are the same. They include bleeding, pain, and infection. Home Care Instructions: You may resume your regular diet and medication regimen. Do not drink alcohol, drive, or make any important legal decisions in the next 24 hours. Do not lift anything heavier than a gallon of milk until the soreness goes away. You may use over the counter acetaminophen or ibuprofen for the soreness. You may apply an ice pack to the affected area for 20-30 minutes at time for the first 24 hours. After that, you may apply a heat pack. Call If: You should call your Physician and/or the Radiology Nurse if you have any questions or concerns about the biopsy site. Call if you should have increased pain, fever, redness, drainage, or bleeding more than a small spot on the bandage. Follow-Up Instructions: Please see your ordering doctor as he/she has requested. To Reach Us: If you have any questions about your procedure, please call the Interventional Radiology department at [phone]. After business hours (5pm) and weekends, call the answering service at [phone] and ask for the Radiologist on call to be paged.      Interventional Radiology General Nurse Discharge    After general anesthesia or intravenous sedation, for 24 hours or while taking prescription Narcotics:  · Limit your activities  · Do not drive and operate hazardous machinery  · Do not make important personal or business decisions  · Do  not drink alcoholic beverages  · If you have not urinated within 8 hours after discharge, please contact your surgeon on call. * Please give a list of your current medications to your Primary Care Provider. * Please update this list whenever your medications are discontinued, doses are     changed, or new medications (including over-the-counter products) are added. * Please carry medication information at all times in case of emergency situations. These are general instructions for a healthy lifestyle:    No smoking/ No tobacco products/ Avoid exposure to second hand smoke  Surgeon General's Warning:  Quitting smoking now greatly reduces serious risk to your health. Obesity, smoking, and sedentary lifestyle greatly increases your risk for illness  A healthy diet, regular physical exercise & weight monitoring are important for maintaining a healthy lifestyle    You may be retaining fluid if you have a history of heart failure or if you experience any of the following symptoms:  Weight gain of 3 pounds or more overnight or 5 pounds in a week, increased swelling in our hands or feet or shortness of breath while lying flat in bed. Please call your doctor as soon as you notice any of these symptoms; do not wait until your next office visit. Recognize signs and symptoms of STROKE:  F-face looks uneven    A-arms unable to move or move unevenly    S-speech slurred or non-existent    T-time-call 911 as soon as signs and symptoms begin-DO NOT go       Back to bed or wait to see if you get better-TIME IS BRAIN. Patient Signature:  Date: 11/20/2019  Discharging Nurse:  Luis Maya RN

## 2019-11-20 NOTE — PROCEDURES
Department of Interventional Radiology  [phone]        Interventional Radiology Brief Procedure Note    Patient: Darrell Da Silva MRN: [id number]  SSN: [id number]    YOB: 1955  Age: 59 y.o. Sex: male      Date of Procedure: 11/20/2019    Pre-Procedure Diagnosis: Leukemia    Post-Procedure Diagnosis: SAME    Procedure(s): CT guided bone marrow biopsy    Brief Description of Procedure: Successful CT guided bone marrow biopsy, right iliac bone. Performed By: Qing Sanchez MD     Assistants: None    Anesthesia:Moderate Sedation    Estimated Blood Loss: Less than 10ml    Specimens:  Core and aspirates obtained. Pathology present to collcet specimen. Implants:  None    Findings: Right iliac bone amenable to biopsy    Complications: None    Follow Up: As needed.      Signed By: Qing Sanchez MD     November 20, 2019

## 2019-11-20 NOTE — PROGRESS NOTES
Recovery period without difficulty. Pt alert and oriented and denies pain. Dressing is clean, dry, and intact. Reviewed discharge instructions with patient and damily, both verbalized understanding. Port packed with heparin and deaccessed. Pt escorted to lobby discharge area via wheelchair. Vital signs and Bruno score completed.

## 2019-11-20 NOTE — H&P
Department of Interventional Radiology 
[phone] History and Physical 
 
Patient:  Mey Mullins MRN:  [id number]  SSN:  [id number] YOB: 1955  Age:  59 y.o. Sex:  male Primary Care Provider:  Serina Salmon NP Referring Physician:  Rinku Schaeffer MD 
 
Subjective: Chief Complaint: Request for bone marrow biopsy History of the Present Illness: The patient is a 59 y.o. male with hx of T cell pro-lymphocytic leukemia who presents for bone marrow bx. No acute complaints. Past Medical History:  
Diagnosis Date  Back pain   
 occassionally  Cervical radiculopathy  Claustrophobia  DVT (deep venous thrombosis) ([facility] [address] 06/2019  
 currently treated with Xarelto- started on 5/30/2019  GERD (gastroesophageal reflux disease)  H/O seasonal allergies  Hyperlipidemia  Impotence  Insomnia  Lateral epicondylitis  Leukemia ([facility] [address] CHEMO  
 Obesity BMI 36.6  Sleep apnea   
 noncomplaint with CPAP Past Surgical History:  
Procedure Laterality Date  HX CIRCUMCISION    
 HX COLONOSCOPY  2017 Due in 2027  HX ORTHOPAEDIC Right   
 r wrist  
 HX WISDOM TEETH EXTRACTION    
 IR INSERT TUNL CVC W PORT OVER 5 YEARS  6/19/2019 Review of Systems:   
{Ros - complete:02158433} Prior to Admission medications Medication Sig Start Date End Date Taking? Authorizing Provider Cetirizine (ZYRTEC) 10 mg cap Take 10 mg by mouth. Yes Provider, Historical  
fluconazole (DIFLUCAN) 200 mg tablet TAKE 1 TABLET BY MOUTH DAILY 11/17/19  Yes Rinku Schaeffer MD  
aluminum-magnesium hydroxide 200-200 mg/5 mL susp 5 mL, diphenhydrAMINE 12.5 mg/5 mL liqd 12.5 mg, lidocaine 2 % soln 5 mL 5 mL by Swish and Spit route two (2) times daily as needed for Stomatitis. Magic mouth wash Maalox Lidocaine 2% viscous Diphenhydramine oral solution Pharmacy to mix equal portions of ingredients to a total volume as indicated in the dispense amount. 9/4/19  Yes Ness Rondon MD  
trimethoprim-sulfamethoxazole (BACTRIM DS, SEPTRA DS) 160-800 mg per tablet Take 1 Tab by mouth every Monday, Wednesday, Friday. 7/26/19  Yes Ness Rondon MD  
valGANciclovir (VALCYTE) 450 mg tablet Take 2 Tabs by mouth two (2) times a day. 7/24/19  Yes Ness Rondon MD  
lidocaine-prilocaine (EMLA) topical cream Apply  to affected area as needed for Pain. Apply to port site 45-60 minutes prior to lab appt or infusion 6/25/19  Yes Ness Rondon MD  
docusate sodium (COLACE) 100 mg capsule Take 100 mg by mouth nightly. Yes Provider, Historical  
olmesartan-hydroCHLOROthiazide (BENICAR HCT) 20-12.5 mg per tablet Take 1 Tab by mouth daily. 6/13/19  Yes Zaira Morrison NP  
ondansetron (ZOFRAN ODT) 8 mg disintegrating tablet Take 1 Tab by mouth every eight (8) hours as needed for Nausea. 1/17/19  Yes Afua Keyes NP  
lovastatin (MEVACOR) 10 mg tablet Take 1 Tab by mouth nightly. 12/5/18  Yes Zaira Morrison NP  
multivitamin (ONE A DAY) tablet Take 1 Tab by mouth daily. Yes Provider, Historical  
DOCOSAHEXANOIC ACID/EPA (FISH OIL PO) Take 1 Cap by mouth daily. Yes Provider, Historical  
VITAMIN A/VITAMIN D3 (NATURAL VITAMIN D PO) Take 1 Tab by mouth daily. Yes Provider, Historical  
rivaroxaban (XARELTO) 20 mg tab tablet Take 1 Tab by mouth daily. 6/24/19   Ness Rondon MD  
hydrocortisone (ANUSOL-HC) 2.5 % rectal cream Insert  into rectum four (4) times daily. Patient taking differently: Insert  into rectum as needed. 6/13/19   Zaira Morrison NP Allergies Allergen Reactions  Campath [Alemtuzumab] Other (comments) Rigors Family History Problem Relation Age of Onset  Cancer Mother 80  
     pancreatic  Cancer Father 76  
     breast  
 No Known Problems Son   
 Hypertension Brother  Hypertension Brother  No Known Problems Daughter Social History Tobacco Use  
  Smoking status: Never Smoker  Smokeless tobacco: Never Used Substance Use Topics  Alcohol use: Not Currently Alcohol/week: 0.0 standard drinks Objective:    
 
Physical Examination:   
Vitals:  
 11/20/19 1317 BP: 122/74 Pulse: 73 Resp: 18 Temp: 97.6 °F (36.4 °C) SpO2: 94% Pain Assessment Pain Intensity 1: 7 (11/20/19 1358) Pain Location 1: Back, Buttocks, Leg 
Pain Intervention(s) 1: Medication (see MAR) Patient Stated Pain Goal: 0 
 
 
{IR PHYSICAL KPMY:[id number]} Laboratory:    
Lab Results Component Value Date/Time Sodium 141 10/30/2019 10:32 AM  
 Sodium 143 10/09/2019 10:29 AM  
 Potassium 3.7 10/30/2019 10:32 AM  
 Potassium 3.9 10/09/2019 10:29 AM  
 Chloride 107 10/30/2019 10:32 AM  
 Chloride 109 (H) 10/09/2019 10:29 AM  
 CO2 26 10/30/2019 10:32 AM  
 CO2 27 10/09/2019 10:29 AM  
 Anion gap 8 10/30/2019 10:32 AM  
 Anion gap 7 10/09/2019 10:29 AM  
 Glucose 121 (H) 10/30/2019 10:32 AM  
 Glucose 119 (H) 10/09/2019 10:29 AM  
 BUN 14 10/30/2019 10:32 AM  
 BUN 13 10/09/2019 10:29 AM  
 Creatinine 1.20 10/30/2019 10:32 AM  
 Creatinine 1.17 10/09/2019 10:29 AM  
 GFR est AA >60 10/30/2019 10:32 AM  
 GFR est AA >60 10/09/2019 10:29 AM  
 GFR est non-AA >60 10/30/2019 10:32 AM  
 GFR est non-AA >60 10/09/2019 10:29 AM  
 Calcium 9.3 10/30/2019 10:32 AM  
 Calcium 9.6 10/09/2019 10:29 AM  
 Magnesium 2.0 10/30/2019 10:32 AM  
 Magnesium 2.1 10/09/2019 10:29 AM  
 Albumin 4.0 10/30/2019 10:32 AM  
 Albumin 4.0 10/09/2019 10:29 AM  
 Protein, total 7.6 10/30/2019 10:32 AM  
 Protein, total 7.5 10/09/2019 10:29 AM  
 Globulin 3.6 (H) 10/30/2019 10:32 AM  
 Globulin 3.5 10/09/2019 10:29 AM  
 A-G Ratio 1.1 (L) 10/30/2019 10:32 AM  
 A-G Ratio 1.1 (L) 10/09/2019 10:29 AM  
 AST (SGOT) 25 10/30/2019 10:32 AM  
 AST (SGOT) 19 10/09/2019 10:29 AM  
 ALT (SGPT) 22 10/30/2019 10:32 AM  
 ALT (SGPT) 19 10/09/2019 10:29 AM  
 
Lab Results Component Value Date/Time WBC 1.5 (LL) 10/30/2019 10:32 AM  
 WBC 1.5 (LL) 10/09/2019 10:29 AM  
 HGB 10.4 (L) 10/30/2019 10:32 AM  
 HGB 10.6 (L) 10/09/2019 10:29 AM  
 HCT 30.9 (L) 10/30/2019 10:32 AM  
 HCT 32.2 (L) 10/09/2019 10:29 AM  
 PLATELET 78 (L) 03/05/2302 10:32 AM  
 PLATELET 90 (L) 99/36/6952 10:29 AM  
 
No results found for: APTT, PTP, INR, INREXT Assessment:  
 
*** Hospital Problems  Date Reviewed: 6/13/2019 None Plan:  
 
Planned Procedure:  *** Risks, benefits, and alternatives reviewed with patient and he agrees to proceed with the procedure. Signed By: Nanette Curtis MD   
 November 20, 2019

## 2019-11-29 ENCOUNTER — PATIENT OUTREACH (OUTPATIENT)
Dept: CASE MANAGEMENT | Age: 64
End: 2019-11-29

## 2019-11-29 ENCOUNTER — HOSPITAL ENCOUNTER (OUTPATIENT)
Dept: LAB | Age: 64
Discharge: HOME | End: 2019-11-29
Payer: COMMERCIAL

## 2019-11-29 DIAGNOSIS — C91.60 PROLYMPHOCYTIC LEUKEMIA OF T-CELL (HCC): ICD-10-CM

## 2019-11-29 LAB
ALBUMIN SERPL-MCNC: 4.1 G/DL (ref 3.2–4.6)
ALBUMIN/GLOB SERPL: 1.2 {RATIO} (ref 1.2–3.5)
ALP SERPL-CCNC: 36 U/L (ref 50–136)
ALT SERPL-CCNC: 21 U/L (ref 12–65)
ANION GAP SERPL CALC-SCNC: 7 MMOL/L (ref 7–16)
AST SERPL-CCNC: 18 U/L (ref 15–37)
BASOPHILS # BLD: 0 K/UL (ref 0–0.2)
BASOPHILS NFR BLD: 1 % (ref 0–2)
BILIRUB SERPL-MCNC: 0.5 MG/DL (ref 0.2–1.1)
BUN SERPL-MCNC: 17 MG/DL (ref 8–23)
CALCIUM SERPL-MCNC: 8.9 MG/DL (ref 8.3–10.4)
CHLORIDE SERPL-SCNC: 109 MMOL/L (ref 98–107)
CO2 SERPL-SCNC: 25 MMOL/L (ref 21–32)
CREAT SERPL-MCNC: 0.96 MG/DL (ref 0.8–1.5)
DIFFERENTIAL METHOD BLD: ABNORMAL
EOSINOPHIL # BLD: 0.1 K/UL (ref 0–0.8)
EOSINOPHIL NFR BLD: 4 % (ref 0.5–7.8)
ERYTHROCYTE [DISTWIDTH] IN BLOOD BY AUTOMATED COUNT: 14.2 % (ref 11.9–14.6)
GLOBULIN SER CALC-MCNC: 3.4 G/DL (ref 2.3–3.5)
GLUCOSE SERPL-MCNC: 121 MG/DL (ref 65–100)
HCT VFR BLD AUTO: 29.7 % (ref 41.1–50.3)
HGB BLD-MCNC: 10 G/DL (ref 13.6–17.2)
IMM GRANULOCYTES # BLD AUTO: 0.1 K/UL (ref 0–0.5)
IMM GRANULOCYTES NFR BLD AUTO: 6 % (ref 0–5)
LYMPHOCYTES # BLD: 0.3 K/UL (ref 0.5–4.6)
LYMPHOCYTES NFR BLD: 21 % (ref 13–44)
MAGNESIUM SERPL-MCNC: 2 MG/DL (ref 1.8–2.4)
MCH RBC QN AUTO: 33.3 PG (ref 26.1–32.9)
MCHC RBC AUTO-ENTMCNC: 33.7 G/DL (ref 31.4–35)
MCV RBC AUTO: 99 FL (ref 79.6–97.8)
MONOCYTES # BLD: 0.2 K/UL (ref 0.1–1.3)
MONOCYTES NFR BLD: 14 % (ref 4–12)
NEUTS SEG # BLD: 0.8 K/UL (ref 1.7–8.2)
NEUTS SEG NFR BLD: 54 % (ref 43–78)
NRBC # BLD: 0 K/UL (ref 0–0.2)
PLATELET # BLD AUTO: 68 K/UL (ref 150–450)
PLATELET COMMENTS,PCOM: SLIGHT
PMV BLD AUTO: 11 FL (ref 9.4–12.3)
POTASSIUM SERPL-SCNC: 3.7 MMOL/L (ref 3.5–5.1)
PROT SERPL-MCNC: 7.5 G/DL (ref 6.3–8.2)
RBC # BLD AUTO: 3 M/UL (ref 4.23–5.67)
RBC MORPH BLD: ABNORMAL
SODIUM SERPL-SCNC: 141 MMOL/L (ref 136–145)
WBC # BLD AUTO: 1.5 K/UL (ref 4.3–11.1)
WBC MORPH BLD: ABNORMAL

## 2019-11-29 PROCEDURE — 85025 COMPLETE CBC W/AUTO DIFF WBC: CPT

## 2019-11-29 PROCEDURE — 80053 COMPREHEN METABOLIC PANEL: CPT

## 2019-11-29 PROCEDURE — 83735 ASSAY OF MAGNESIUM: CPT

## 2019-11-29 NOTE — PROGRESS NOTES
Pt was seen and labs reviewed byDr. Dieudonne Dinh. Pt was told that his bone marrow biopsy shows no sign of disease. WBC is still low, so he wants to give pt a break for his counts to recover. Pt was told that this may take 6 months, and it is not a guarantee that we would be able to collect for transplant. Pt has swelling and redness to LLE, known DVT and already on Xarelto. Pt states he has 2-3 weeks left on current Xarelto supply. Dr. Dieudonne Dinh wants pt to complete current Xarelto supply and then start Eliquis 5 mg BID. Script sent to pharmacy. He was instructed to call navigator or triage with worsening redness, swelling pain. He will return to clinic on 12/30 with labs.

## 2019-12-02 LAB
FLOW CYTOMETRY, FBTC1: NORMAL
SPECIMEN SOURCE: NORMAL
TEST ORDERED:: NORMAL

## 2019-12-04 ENCOUNTER — APPOINTMENT (OUTPATIENT)
Dept: ULTRASOUND IMAGING | Age: 64
End: 2019-12-04
Attending: EMERGENCY MEDICINE
Payer: COMMERCIAL

## 2019-12-04 ENCOUNTER — HOSPITAL ENCOUNTER (EMERGENCY)
Age: 64
Discharge: HOME | End: 2019-12-04
Attending: EMERGENCY MEDICINE
Payer: COMMERCIAL

## 2019-12-04 VITALS
DIASTOLIC BLOOD PRESSURE: 64 MMHG | BODY MASS INDEX: 36.82 KG/M2 | OXYGEN SATURATION: 99 % | WEIGHT: 263 LBS | SYSTOLIC BLOOD PRESSURE: 112 MMHG | RESPIRATION RATE: 18 BRPM | HEART RATE: 82 BPM | TEMPERATURE: 98.2 F | HEIGHT: 71 IN

## 2019-12-04 DIAGNOSIS — I82.562 CHRONIC DEEP VEIN THROMBOSIS (DVT) OF CALF MUSCLE VEIN OF LEFT LOWER EXTREMITY (HCC): ICD-10-CM

## 2019-12-04 DIAGNOSIS — I87.2 EDEMA OF BOTH LOWER EXTREMITIES DUE TO PERIPHERAL VENOUS INSUFFICIENCY: Primary | ICD-10-CM

## 2019-12-04 LAB
ALBUMIN SERPL-MCNC: 3.9 G/DL (ref 3.2–4.6)
ALBUMIN/GLOB SERPL: 1.1 {RATIO} (ref 1.2–3.5)
ALP SERPL-CCNC: 35 U/L (ref 50–136)
ALT SERPL-CCNC: 24 U/L (ref 12–65)
ANION GAP SERPL CALC-SCNC: 9 MMOL/L (ref 7–16)
AST SERPL-CCNC: 18 U/L (ref 15–37)
BASOPHILS # BLD: 0 K/UL (ref 0–0.2)
BASOPHILS NFR BLD: 0 % (ref 0–2)
BILIRUB SERPL-MCNC: 0.5 MG/DL (ref 0.2–1.1)
BNP SERPL-MCNC: 343 PG/ML (ref 5–125)
BUN SERPL-MCNC: 15 MG/DL (ref 8–23)
CALCIUM SERPL-MCNC: 8.9 MG/DL (ref 8.3–10.4)
CHLORIDE SERPL-SCNC: 109 MMOL/L (ref 98–107)
CO2 SERPL-SCNC: 24 MMOL/L (ref 21–32)
CREAT SERPL-MCNC: 1.13 MG/DL (ref 0.8–1.5)
DIFFERENTIAL METHOD BLD: ABNORMAL
EOSINOPHIL # BLD: 0.1 K/UL (ref 0–0.8)
EOSINOPHIL NFR BLD: 4 % (ref 0.5–7.8)
ERYTHROCYTE [DISTWIDTH] IN BLOOD BY AUTOMATED COUNT: 14.5 % (ref 11.9–14.6)
GLOBULIN SER CALC-MCNC: 3.4 G/DL (ref 2.3–3.5)
GLUCOSE SERPL-MCNC: 114 MG/DL (ref 65–100)
HCT VFR BLD AUTO: 28.6 % (ref 41.1–50.3)
HGB BLD-MCNC: 9.5 G/DL (ref 13.6–17.2)
IMM GRANULOCYTES # BLD AUTO: 0.1 K/UL (ref 0–0.5)
IMM GRANULOCYTES NFR BLD AUTO: 5 % (ref 0–5)
LYMPHOCYTES # BLD: 0.4 K/UL (ref 0.5–4.6)
LYMPHOCYTES NFR BLD: 26 % (ref 13–44)
MCH RBC QN AUTO: 33.2 PG (ref 26.1–32.9)
MCHC RBC AUTO-ENTMCNC: 33.2 G/DL (ref 31.4–35)
MCV RBC AUTO: 100 FL (ref 79.6–97.8)
MONOCYTES # BLD: 0.2 K/UL (ref 0.1–1.3)
MONOCYTES NFR BLD: 16 % (ref 4–12)
NEUTS SEG # BLD: 0.7 K/UL (ref 1.7–8.2)
NEUTS SEG NFR BLD: 49 % (ref 43–78)
NRBC # BLD: 0 K/UL (ref 0–0.2)
PLATELET # BLD AUTO: 70 K/UL (ref 150–450)
PLATELET COMMENTS,PCOM: ABNORMAL
PMV BLD AUTO: 11.7 FL (ref 9.4–12.3)
POTASSIUM SERPL-SCNC: 3.8 MMOL/L (ref 3.5–5.1)
PROT SERPL-MCNC: 7.3 G/DL (ref 6.3–8.2)
RBC # BLD AUTO: 2.86 M/UL (ref 4.23–5.6)
RBC MORPH BLD: ABNORMAL
SODIUM SERPL-SCNC: 142 MMOL/L (ref 136–145)
WBC # BLD AUTO: 1.5 K/UL (ref 4.3–11.1)
WBC MORPH BLD: ABNORMAL

## 2019-12-04 PROCEDURE — 85025 COMPLETE CBC W/AUTO DIFF WBC: CPT

## 2019-12-04 PROCEDURE — 93970 EXTREMITY STUDY: CPT

## 2019-12-04 PROCEDURE — 80053 COMPREHEN METABOLIC PANEL: CPT

## 2019-12-04 PROCEDURE — 99283 EMERGENCY DEPT VISIT LOW MDM: CPT | Performed by: EMERGENCY MEDICINE

## 2019-12-04 PROCEDURE — 83880 ASSAY OF NATRIURETIC PEPTIDE: CPT

## 2019-12-04 RX ORDER — POTASSIUM CHLORIDE 750 MG/1
10 CAPSULE, EXTENDED RELEASE ORAL 2 TIMES DAILY
Qty: 40 CAP | Refills: 0 | Status: SHIPPED | OUTPATIENT
Start: 2019-12-04 | End: 2020-02-10 | Stop reason: ALTCHOICE

## 2019-12-04 RX ORDER — FUROSEMIDE 40 MG/1
40 TABLET ORAL DAILY
Qty: 20 TAB | Refills: 0 | Status: SHIPPED | OUTPATIENT
Start: 2019-12-04 | End: 2019-12-24

## 2019-12-04 NOTE — ED NOTES
I have reviewed discharge instructions with the patient and spouse. The patient and spouse verbalized understanding. Patient left ED via Discharge Method: ambulatory to Home with spouse. Opportunity for questions and clarification provided. Patient given 2 scripts. To continue your aftercare when you leave the hospital, you may receive an automated call from our care team to check in on how you are doing. This is a free service and part of our promise to provide the best care and service to meet your aftercare needs.  If you have questions, or wish to unsubscribe from this service please call [phone]. Thank you for Choosing our [facility] Emergency Department.

## 2019-12-04 NOTE — ED PROVIDER NOTES
Patient presents the ER complaint of leg swelling. Reports history of DVT. Currently taking Xarelto. Reports he is noticed increased swelling over the past couple weeks. Reports swelling in left leg as well as right now. Denies any numbness, tingling or weakness. Denies any chest pain or shortness of breath. Followed by oncology for T-cell lymphocytic leukemia    The history is provided by the patient. Leg Swelling    This is a recurrent problem. The current episode started more than 1 week ago. The problem has been gradually worsening. The pain is present in the left lower leg, left foot, right lower leg and right foot. The pain is mild. Pertinent negatives include no numbness, full range of motion and no back pain.         Past Medical History:   Diagnosis Date    Back pain     occassionally    Cervical radiculopathy     Claustrophobia     DVT (deep venous thrombosis) ([facility] [address] 06/2019    currently treated with Xarelto- started on 5/30/2019    GERD (gastroesophageal reflux disease)     H/O seasonal allergies     Hyperlipidemia     Impotence     Insomnia     Lateral epicondylitis     Leukemia (HCC)     CHEMO    Obesity     BMI 36.6    Sleep apnea     noncomplaint with CPAP       Past Surgical History:   Procedure Laterality Date    HX CIRCUMCISION      HX COLONOSCOPY  2017    Due in 2027    HX ORTHOPAEDIC Right     r wrist    HX WISDOM TEETH EXTRACTION      IR INSERT TUNL CVC W PORT OVER 5 YEARS  6/19/2019         Family History:   Problem Relation Age of Onset    Cancer Mother 80        pancreatic    Cancer Father 76        breast    No Known Problems Son     Hypertension Brother     Hypertension Brother     No Known Problems Daughter        Social History     Socioeconomic History    Marital status:      Spouse name: Not on file    Number of children: Not on file    Years of education: Not on file    Highest education level: Not on file   Occupational History    Not on file Social Needs    Financial resource strain: Not on file    Food insecurity:     Worry: Not on file     Inability: Not on file    Transportation needs:     Medical: Not on file     Non-medical: Not on file   Tobacco Use    Smoking status: Never Smoker    Smokeless tobacco: Never Used   Substance and Sexual Activity    Alcohol use: Not Currently     Alcohol/week: 0.0 standard drinks    Drug use: No    Sexual activity: Yes     Partners: Female   Lifestyle    Physical activity:     Days per week: Not on file     Minutes per session: Not on file    Stress: Not on file   Relationships    Social connections:     Talks on phone: Not on file     Gets together: Not on file     Attends Muslim service: Not on file     Active member of club or organization: Not on file     Attends meetings of clubs or organizations: Not on file     Relationship status: Not on file    Intimate partner violence:     Fear of current or ex partner: Not on file     Emotionally abused: Not on file     Physically abused: Not on file     Forced sexual activity: Not on file   Other Topics Concern     Service Not Asked    Blood Transfusions Not Asked    Caffeine Concern Not Asked    Occupational Exposure Not Asked   Maximiliano Maxcy Hazards Not Asked    Sleep Concern Not Asked    Stress Concern Not Asked    Weight Concern Not Asked    Special Diet Not Asked    Back Care Not Asked    Exercise Not Asked    Bike Helmet Not Asked   [address] Not Asked    Self-Exams Not Asked   Social History Narrative    Not on file         ALLERGIES: Campath [alemtuzumab]    Review of Systems   Constitutional: Negative for fatigue and fever. HENT: Negative for congestion and dental problem. Eyes: Negative for photophobia and visual disturbance. Respiratory: Negative for chest tightness. Cardiovascular: Negative for palpitations and leg swelling. Gastrointestinal: Negative for abdominal pain.    Genitourinary: Negative for flank pain and urgency. Musculoskeletal: Negative for back pain and gait problem. Neurological: Negative for seizures and numbness. Hematological: Negative for adenopathy. Psychiatric/Behavioral: Negative for behavioral problems. All other systems reviewed and are negative. Vitals:    12/04/19 1059   BP: 124/74   Pulse: 83   Resp: 20   Temp: 98.5 °F (36.9 °C)   SpO2: 97%   Weight: 119.3 kg (263 lb)   Height: 5' 11\" (1.803 m)            Physical Exam  Vitals signs and nursing note reviewed. Constitutional:       Appearance: Normal appearance. HENT:      Head: Normocephalic and atraumatic. Right Ear: Tympanic membrane normal.      Left Ear: Tympanic membrane normal.   Neck:      Musculoskeletal: Normal range of motion and neck supple. No neck rigidity or muscular tenderness. Cardiovascular:      Rate and Rhythm: Normal rate and regular rhythm. Pulses: Normal pulses. Heart sounds: Normal heart sounds. No murmur. No friction rub. Pulmonary:      Effort: Pulmonary effort is normal. No respiratory distress. Breath sounds: Normal breath sounds. No stridor. Abdominal:      General: Abdomen is flat. Bowel sounds are normal.      Palpations: Abdomen is soft. There is no mass. Musculoskeletal:         General: No swelling or tenderness. Right lower leg: Edema present. Left lower leg: Edema present. Skin:     General: Skin is warm. Coloration: Skin is not jaundiced or pale. Neurological:      General: No focal deficit present. Mental Status: He is alert. MDM  Number of Diagnoses or Management Options  Diagnosis management comments: Will obtain ultrasound of left and right lower extremity to rule out any acute DVT    12:47 PM  Labs only significant for white count of 1.5, stable    Chemistry panel stable as well. Ultrasound shows stable left popliteal DVT, no right lower extremity DVT. Plan to place patient on a short course of diuretics.   Encourage follow-up with PCP and oncology. Amount and/or Complexity of Data Reviewed  Clinical lab tests: reviewed and ordered    Risk of Complications, Morbidity, and/or Mortality  Presenting problems: moderate  Diagnostic procedures: low  Management options: low    Patient Progress  Patient progress: stable         Procedures      12/4/2019     Results Include:    Recent Results (from the past 24 hour(s))   CBC WITH AUTOMATED DIFF    Collection Time: 12/04/19 11:01 AM   Result Value Ref Range    WBC 1.5 (LL) 4.3 - 11.1 K/uL    RBC 2.86 (L) 4.23 - 5.6 M/uL    HGB 9.5 (L) 13.6 - 17.2 g/dL    HCT 28.6 (L) 41.1 - 50.3 %    .0 (H) 79.6 - 97.8 FL    MCH 33.2 (H) 26.1 - 32.9 PG    MCHC 33.2 31.4 - 35.0 g/dL    RDW 14.5 11.9 - 14.6 %    PLATELET 70 (L) 437 - 450 K/uL    MPV 11.7 9.4 - 12.3 FL    ABSOLUTE NRBC 0.00 0.0 - 0.2 K/uL    NEUTROPHILS 49 43 - 78 %    LYMPHOCYTES 26 13 - 44 %    MONOCYTES 16 (H) 4.0 - 12.0 %    EOSINOPHILS 4 0.5 - 7.8 %    BASOPHILS 0 0.0 - 2.0 %    IMMATURE GRANULOCYTES 5 0.0 - 5.0 %    ABS. NEUTROPHILS 0.7 (L) 1.7 - 8.2 K/UL    ABS. LYMPHOCYTES 0.4 (L) 0.5 - 4.6 K/UL    ABS. MONOCYTES 0.2 0.1 - 1.3 K/UL    ABS. EOSINOPHILS 0.1 0.0 - 0.8 K/UL    ABS. BASOPHILS 0.0 0.0 - 0.2 K/UL    ABS. IMM.  GRANS. 0.1 0.0 - 0.5 K/UL    RBC COMMENTS SLIGHT  ANISOCYTOSIS + POIKILOCYTOSIS        RBC COMMENTS OCCASIONAL  TEARDROP CELLS        RBC COMMENTS OCCASIONAL  OVALOCYTES        WBC COMMENTS Result Confirmed By Smear      PLATELET COMMENTS DECREASED      DF AUTOMATED     METABOLIC PANEL, COMPREHENSIVE    Collection Time: 12/04/19 11:01 AM   Result Value Ref Range    Sodium 142 136 - 145 mmol/L    Potassium 3.8 3.5 - 5.1 mmol/L    Chloride 109 (H) 98 - 107 mmol/L    CO2 24 21 - 32 mmol/L    Anion gap 9 7 - 16 mmol/L    Glucose 114 (H) 65 - 100 mg/dL    BUN 15 8 - 23 MG/DL    Creatinine 1.13 0.8 - 1.5 MG/DL    GFR est AA >60 >60 ml/min/1.73m2    GFR est non-AA >60 >60 ml/min/1.73m2    Calcium 8.9 8.3 - 10.4 MG/DL Bilirubin, total 0.5 0.2 - 1.1 MG/DL    ALT (SGPT) 24 12 - 65 U/L    AST (SGOT) 18 15 - 37 U/L    Alk. phosphatase 35 (L) 50 - 136 U/L    Protein, total 7.3 6.3 - 8.2 g/dL    Albumin 3.9 3.2 - 4.6 g/dL    Globulin 3.4 2.3 - 3.5 g/dL    A-G Ratio 1.1 (L) 1.2 - 3.5     NT-PRO BNP    Collection Time: 12/04/19 11:01 AM   Result Value Ref Range    NT pro- (H) 5 - 125 PG/ML         Voice dictation software was used during the making of this note. This software is not perfect and grammatical and other typographical errors may be present. This note has been proofread, but may still contain errors.   Mikal Jimenes MD; 12/4/2019 @12:48 PM   ===================================================================

## 2019-12-04 NOTE — DISCHARGE INSTRUCTIONS
Take medications as prescribed  Keep legs elevated  May try compression stockings  Arrange follow-up with your primary care physician  Follow-up with heme/oncology as well  Return to the ER for any new or worsening symptoms    Learning About Venous Insufficiency  What is it? Venous insufficiency is a problem with the flow of blood from the veins of the legs back to the heart. It's also called chronic venous insufficiency or chronic venous stasis. Your veins bring blood back to the heart after it flows through your body. Veins have valves that keep the blood moving in one direction--toward the heart. But with venous insufficiency, the veins of the legs might not work as they should. This can allow blood to leak backward. Fluid can pool in the legs. This can lead to problems that include varicose veins. What causes it? Venous insufficiency is sometimes caused by deep vein thrombosis and high blood pressure inside leg veins. You are more likely to have venous insufficiency if you:  · Are older. · Are female. · Are overweight. · Don't get enough physical activity. · Smoke. · Have a family history of varicose veins. What are the symptoms? Symptoms of venous insufficiency affect the legs. They may include:  · Swelling, often in the ankles. · A rash. · Varicose veins. · Itching. · Cramping. · Skin sores (ulcers). · Aching or a feeling of heaviness. · Changes in skin color. How is it diagnosed? Your doctor can diagnose venous insufficiency by examining your legs and by using a type of ultrasound test (duplex Doppler) to find out how well blood is flowing in your legs. How is it treated? To reduce swelling and relieve pain caused by venous insufficiency, you can wear compression stockings. They are tighter at the ankles than at the top of the legs. They also can help venous skin ulcers heal. But there are different types of stockings, and they need to fit right.  So your doctor will recommend what you need. You also can try to:  · Get more exercise, especially walking. It can increase blood flow. · Avoid standing still or sitting for a long time, which can make the fluid pool in your legs. · Try not to sit with your legs crossed at the knee. · Keep your legs raised above your heart when you're lying down. This reduces swelling. If these treatments don't work, you may need medicine or a procedure to help relieve symptoms. Procedures might be done to close the vein, to remove the vein, or to improve blood flow. Follow-up care is a key part of your treatment and safety. Be sure to make and go to all appointments, and call your doctor if you are having problems. It's also a good idea to know your test results and keep a list of the medicines you take. Current as of: September 26, 2018  Content Version: 12.2  © 3579-6360 [email or web address]. Care instructions adapted under license by [employer] (which disclaims liability or warranty for this information). If you have questions about a medical condition or this instruction, always ask your healthcare professional. [place] [address] any warranty or liability for your use of this information. Patient Education        Leg and Ankle Edema: Care Instructions  Your Care Instructions  Swelling in the legs, ankles, and feet is called edema. It is common after you sit or stand for a while. Long plane flights or car rides often cause swelling in the legs and feet. You may also have swelling if you have to stand for long periods of time at your job. Problems with the veins in the legs (varicose veins) and changes in hormones can also cause swelling. Sometimes the swelling in the ankles and feet is caused by a more serious problem, such as heart failure, infection, blood clots, or liver or kidney disease. Follow-up care is a key part of your treatment and safety.  Be sure to make and go to all appointments, and call your doctor if you are having problems. It's also a good idea to know your test results and keep a list of the medicines you take. How can you care for yourself at home? · If your doctor gave you medicine, take it as prescribed. Call your doctor if you think you are having a problem with your medicine. · Whenever you are resting, raise your legs up. Try to keep the swollen area higher than the level of your heart. · Take breaks from standing or sitting in one position. ? Walk around to increase the blood flow in your lower legs. ? Move your feet and ankles often while you stand, or tighten and relax your leg muscles. · Wear support stockings. Put them on in the morning, before swelling gets worse. · Eat a balanced diet. Lose weight if you need to. · Limit the amount of salt (sodium) in your diet. Salt holds fluid in the body and may increase swelling. When should you call for help? Call 911 anytime you think you may need emergency care. For example, call if:    · You have symptoms of a blood clot in your lung (called a pulmonary embolism). These may include:  ? Sudden chest pain. ? Trouble breathing. ? Coughing up blood.    Call your doctor now or seek immediate medical care if:    · You have signs of a blood clot, such as:  ? Pain in your calf, back of the knee, thigh, or groin. ? Redness and swelling in your leg or groin.     · You have symptoms of infection, such as:  ? Increased pain, swelling, warmth, or redness. ? Red streaks or pus. ? A fever.    Watch closely for changes in your health, and be sure to contact your doctor if:    · Your swelling is getting worse.     · You have new or worsening pain in your legs.     · You do not get better as expected. Where can you learn more? Go to http://jose-saul.info/. Enter Q813 in the search box to learn more about \"Leg and Ankle Edema: Care Instructions. \"  Current as of: June 26, 2019  Content Version: 12.2  © 3992-8727 [employer]. Care instructions adapted under license by [employer] (which disclaims liability or warranty for this information). If you have questions about a medical condition or this instruction, always ask your healthcare professional. [address] any warranty or liability for your use of this information.

## 2019-12-04 NOTE — ED TRIAGE NOTES
Pt reports that he is having swelling to his lower extremities that started 2 weeks ago - pt reports that he has a DVT to the LLE - pt reports that he is on Xarleto - pt denies any shob or chest pain - pt reports that the legs feel tight and the back of his leg is hurting to the touch

## 2019-12-30 ENCOUNTER — HOSPITAL ENCOUNTER (OUTPATIENT)
Dept: LAB | Age: 64
Discharge: HOME | End: 2019-12-30
Payer: COMMERCIAL

## 2019-12-30 ENCOUNTER — PATIENT OUTREACH (OUTPATIENT)
Dept: CASE MANAGEMENT | Age: 64
End: 2019-12-30

## 2019-12-30 DIAGNOSIS — C91.60 PROLYMPHOCYTIC LEUKEMIA OF T-CELL (HCC): Primary | ICD-10-CM

## 2019-12-30 DIAGNOSIS — C91.60 PROLYMPHOCYTIC LEUKEMIA OF T-CELL (HCC): ICD-10-CM

## 2019-12-30 LAB
ALBUMIN SERPL-MCNC: 4.1 G/DL (ref 3.2–4.6)
ALBUMIN/GLOB SERPL: 1.1 {RATIO} (ref 1.2–3.5)
ALP SERPL-CCNC: 35 U/L (ref 50–136)
ALT SERPL-CCNC: 20 U/L (ref 12–65)
ANION GAP SERPL CALC-SCNC: 7 MMOL/L (ref 7–16)
AST SERPL-CCNC: 20 U/L (ref 15–37)
BASOPHILS # BLD: 0 K/UL (ref 0–0.2)
BASOPHILS NFR BLD: 1 % (ref 0–2)
BILIRUB SERPL-MCNC: 0.4 MG/DL (ref 0.2–1.1)
BUN SERPL-MCNC: 27 MG/DL (ref 8–23)
CALCIUM SERPL-MCNC: 9.6 MG/DL (ref 8.3–10.4)
CHLORIDE SERPL-SCNC: 107 MMOL/L (ref 98–107)
CO2 SERPL-SCNC: 25 MMOL/L (ref 21–32)
CREAT SERPL-MCNC: 1.22 MG/DL (ref 0.8–1.5)
DIFFERENTIAL METHOD BLD: ABNORMAL
EOSINOPHIL # BLD: 0.1 K/UL (ref 0–0.8)
EOSINOPHIL NFR BLD: 6 % (ref 0.5–7.8)
ERYTHROCYTE [DISTWIDTH] IN BLOOD BY AUTOMATED COUNT: 13.2 % (ref 11.9–14.6)
GLOBULIN SER CALC-MCNC: 3.8 G/DL (ref 2.3–3.5)
GLUCOSE SERPL-MCNC: 106 MG/DL (ref 65–100)
HCT VFR BLD AUTO: 30.7 % (ref 41.1–50.3)
HGB BLD-MCNC: 10.2 G/DL (ref 13.6–17.2)
IMM GRANULOCYTES # BLD AUTO: 0.1 K/UL (ref 0–0.5)
IMM GRANULOCYTES NFR BLD AUTO: 7 % (ref 0–5)
LYMPHOCYTES # BLD: 0.3 K/UL (ref 0.5–4.6)
LYMPHOCYTES NFR BLD: 19 % (ref 13–44)
MAGNESIUM SERPL-MCNC: 1.9 MG/DL (ref 1.8–2.4)
MCH RBC QN AUTO: 33 PG (ref 26.1–32.9)
MCHC RBC AUTO-ENTMCNC: 33.2 G/DL (ref 31.4–35)
MCV RBC AUTO: 99.4 FL (ref 79.6–97.8)
MONOCYTES # BLD: 0.2 K/UL (ref 0.1–1.3)
MONOCYTES NFR BLD: 11 % (ref 4–12)
NEUTS SEG # BLD: 1.1 K/UL (ref 1.7–8.2)
NEUTS SEG NFR BLD: 56 % (ref 43–78)
NRBC # BLD: 0 K/UL (ref 0–0.2)
PLATELET # BLD AUTO: 91 K/UL (ref 150–450)
PLATELET COMMENTS,PCOM: SLIGHT
PMV BLD AUTO: 11.1 FL (ref 9.4–12.3)
POTASSIUM SERPL-SCNC: 3.9 MMOL/L (ref 3.5–5.1)
PROT SERPL-MCNC: 7.9 G/DL (ref 6.3–8.2)
RBC # BLD AUTO: 3.09 M/UL (ref 4.23–5.67)
RBC MORPH BLD: ABNORMAL
SODIUM SERPL-SCNC: 139 MMOL/L (ref 136–145)
WBC # BLD AUTO: 1.8 K/UL (ref 4.3–11.1)
WBC MORPH BLD: ABNORMAL

## 2019-12-30 PROCEDURE — 80053 COMPREHEN METABOLIC PANEL: CPT

## 2019-12-30 PROCEDURE — 85025 COMPLETE CBC W/AUTO DIFF WBC: CPT

## 2019-12-30 PROCEDURE — 83735 ASSAY OF MAGNESIUM: CPT

## 2019-12-30 NOTE — PROGRESS NOTES
12/30/19- Pt was seen in the office with Dr Jean Cullen for follow up. Labs reviewed. Pt complains of ongoing leg swelling in left leg today. Eliquis on hold for orthopedic procedure. Vascular referral placed for ongoing swelling and nonocclusive thrombosis of the left popliteal vein that has also been ingoing since may 2019. Pt to return to see Dr Jean Cullen in 6 weeks for further follow up.

## 2020-01-07 ENCOUNTER — HOSPITAL ENCOUNTER (OUTPATIENT)
Dept: PHYSICAL THERAPY | Age: 65
Discharge: HOME | End: 2020-01-07
Payer: COMMERCIAL

## 2020-01-07 PROCEDURE — 97162 PT EVAL MOD COMPLEX 30 MIN: CPT

## 2020-01-07 NOTE — THERAPY EVALUATION
Mandy Gordon  : 1955  Primary: Sc [employer], In*  Secondary:  [address]  at [facility] VANNESSA RANDHAWA  [address] [facility], [address] [place] [address] [address], [place] [address].  [phone]   [phone]       OUTPATIENT PHYSICAL [place] Assessment 2020     ICD-10: Treatment Diagnosis: Pain in left knee (M25.562), Pain in right knee (M25.561), Muscle weakness (generalized) (M62.81), Difficulty in walking, not elsewhere classified (R26.2)  Precautions/Allergies:   Campath [alemtuzumab]   TREATMENT PLAN:  Effective Dates: 2020 TO 2020 (90 days). 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 Meli Glynn presents with complaints of bilateral knee pain that started after chemotherapy treatment last year. He presents with overall fatigue from cancer treatment, bilateral knee pain and stiffness, and LE weakness. Prior to leukemia diagnosis he was walking 3-4 miles in the park and would like to return to that. He will benefit from skilled physical therapy to increase endurance and functional mobility and help decrease pain. PROBLEM LIST (Impacting functional limitations):  1. Decreased Strength  2. Decreased ADL/Functional Activities  3. Increased Pain  4. Decreased Flexibility/Joint Mobility INTERVENTIONS PLANNED: (Treatment may consist of any combination of the following)  1. Gait Training  2. Manual Therapy  3. Therapeutic Activites  4. Therapeutic Exercise/Strengthening   5. Modalities as needed for pain  6.  Aquatic therapy     GOALS: (Goals have been discussed and agreed upon with patient.)  Short-Term Functional Goals: Time Frame: 45 days  1. Pt will report pain 5/10 with daily activities. 2. Pt will improve L knee ROM to 0-110 for improved mobility. 3. Pt will increase L hip abductor strength to 4/5 for improved gait. 4. Pt will increase walking for endurance time to 8 minutes and at least 1500 ft.   5. Pt will be independent with HEP. Discharge Goals: Time Frame: 90 days  1. Pt will report pain 3/10 with daily activities. 2. Pt will improve B knee ROM to 0-120 for improved mobility. 3. Pt will increase B LE strength to 5/5 to return to walking long distances. 4. Pt will be able to walk for 15 minutes with no rest for return to walking and improved endurance. OUTCOME MEASURE:   Tool Used: Lower Extremity Functional Scale (LEFS)  Score:  Initial: 31/80 Most Recent: X/80 (Date: -- )   Interpretation of Score: 20 questions each scored on a 5 point scale with 0 representing \"extreme difficulty or unable to perform\" and 4 representing \"no difficulty\". The lower the score, the greater the functional disability. 80/80 represents no disability. Minimal detectable change is 9 points. MEDICAL NECESSITY:   · Patient demonstrates good rehab potential due to higher previous functional level. REASON FOR SERVICES/OTHER COMMENTS:  · Patient continues to require skilled intervention due to decreased endurance, pain, decreased strength and ROM limiting mobility. Total Duration: 45 minutes  PT Patient Time In/Time Out  Time In: 1030  Time Out: 1115    Rehabilitation Potential For Stated Goals: Good  Regarding Kristen Walker's therapy, I certify that the treatment plan above will be carried out by a therapist or under their direction.   Thank you for this referral,    Beulah Pedersen, PT      Referring Physician Signature: Eren Rivera MD _______________________________ Date _____________       PAIN/SUBJECTIVE:   Initial:   7/10 Post Session:  7/10   HISTORY:   History of Injury/Illness (Reason for Referral):  Pt reports knee paint hat started after starting chemotherapy last year. He did try physical therapy last year but it was difficult to attend due to chemo and fatigue. He finished his chemo treatments in October. He is planning to have a bone marrow transplant this year. He did have an injection in both knees that helped for about ~ week. He does also report back pain and he had an injection for that last Thursday. He reports no knee pain prior to chemotherapy. He also reports occasional swelling in the L leg and he is being referred to vascular MD. Does have a DVT in Left leg but is taking blood thinners. Past Medical History/Comorbidities:   Mr. Meli Glynn  has a past medical history of Back pain, Cervical radiculopathy, Claustrophobia, DVT (deep venous thrombosis) ([address] (06/2019), GERD (gastroesophageal reflux disease), H/O seasonal allergies, Hyperlipidemia, Impotence, Insomnia, Lateral epicondylitis, Leukemia ([address] Obesity, and Sleep apnea. Mr. Meli Glynn  has a past surgical history that includes hx circumcision; hx colonoscopy (2017); hx wisdom teeth extraction; ir insert tunl cvc w port over 5 years (6/19/2019); and hx orthopaedic (Right). Social History/Living Environment:     Lives with wife. Prior Level of Function/Work/Activity:  Prior to leukemia he was walking 3-4 miles for exercise. He was able to perform yard work. Ambulatory/Rehab Services H2 Model Falls Risk Assessment   Risk Factors:       (1)  Gender [Male] Ability to Rise from Chair:       (1)  Pushes up, successful in one attempt   Falls Prevention Plan:       No modifications necessary   Total: (5 or greater = High Risk): 2   ©2010 [facility] [employer]. All Rights Reserved. [facility] Patent #[id number].  Federal Law prohibits the replication, distribution or use without written permission from [facility] [employer]   Current Medications:       Current Outpatient Medications:     trimethoprim-sulfamethoxazole (BACTRIM DS, SEPTRA DS) 160-800 mg per tablet, TAKE 1 TABLET BY MOUTH EVERY MONDAY WEDNESDAY AND FRIDAY, Disp: 12 Tab, Rfl: 4    fluconazole (DIFLUCAN) 200 mg tablet, TAKE 1 TABLET BY MOUTH DAILY, Disp: 30 Tab, Rfl: 0    potassium chloride SA (MICRO-K) 10 mEq capsule, Take 1 Cap by mouth two (2) times a day., Disp: 40 Cap, Rfl: 0    apixaban (ELIQUIS) 5 mg tablet, Take 1 Tab by mouth two (2) times a day. Indications: blood clot in a deep vein of the extremities, Disp: 60 Tab, Rfl: 1    Cetirizine (ZYRTEC) 10 mg cap, Take 10 mg by mouth., Disp: , Rfl:     aluminum-magnesium hydroxide 200-200 mg/5 mL susp 5 mL, diphenhydrAMINE 12.5 mg/5 mL liqd 12.5 mg, lidocaine 2 % soln 5 mL, 5 mL by Swish and Spit route two (2) times daily as needed for Stomatitis. Magic mouth wash  Maalox Lidocaine 2% viscous  Diphenhydramine oral solution   Pharmacy to mix equal portions of ingredients to a total volume as indicated in the dispense amount., Disp: 480 mL, Rfl: 2    valGANciclovir (VALCYTE) 450 mg tablet, Take 2 Tabs by mouth two (2) times a day., Disp: 120 Tab, Rfl: 2    lidocaine-prilocaine (EMLA) topical cream, Apply  to affected area as needed for Pain. Apply to port site 45-60 minutes prior to lab appt or infusion, Disp: 30 g, Rfl: 0    docusate sodium (COLACE) 100 mg capsule, Take 100 mg by mouth nightly., Disp: , Rfl:     hydrocortisone (ANUSOL-HC) 2.5 % rectal cream, Insert  into rectum four (4) times daily. (Patient taking differently: Insert  into rectum as needed.), Disp: 30 g, Rfl: 0    olmesartan-hydroCHLOROthiazide (BENICAR HCT) 20-12.5 mg per tablet, Take 1 Tab by mouth daily. , Disp: 90 Tab, Rfl: 3    ondansetron (ZOFRAN ODT) 8 mg disintegrating tablet, Take 1 Tab by mouth every eight (8) hours as needed for Nausea., Disp: 90 Tab, Rfl: 0    lovastatin (MEVACOR) 10 mg tablet, Take 1 Tab by mouth nightly., Disp: 90 Tab, Rfl: 3    multivitamin (ONE A DAY) tablet, Take 1 Tab by mouth daily. , Disp: , Rfl:     DOCOSAHEXANOIC ACID/EPA (FISH OIL PO), Take 1 Cap by mouth daily. , Disp: , Rfl:     VITAMIN A/VITAMIN D3 (NATURAL VITAMIN D PO), Take 1 Tab by mouth daily. , Disp: , Rfl:    Date Last Reviewed:  1-7-20   Number of Personal Factors/Comorbidities that affect the Plan of Care: 1-2: MODERATE COMPLEXITY   EXAMINATION:     Observation/Orthostatic Postural Assessment: Pt presents to therapy in no apparent distress. Palpation: hypermobile patella B with patella mobilizations. No pain around the knee with palpation    ROM:         LLE AROM  L Knee Flexion: 100  L Knee Extension: 0    RLE AROM  R Knee Flexion: 115  R Knee Extension: 0        Strength:      LLE Strength  L Hip Flexion: 4  L Hip ABduction: 3+  L Hip External Rotation: 4-  L Knee Flexion: 4+  L Knee Extension: 4+    RLE Strength  R Hip Flexion: 4+  R Hip ABduction: 4+  R Hip External Rotation: 4+  R Knee Flexion: 4+  R Knee Extension: 4+        Special Tests: none  Functional Mobility:  Sit to/from Stand: independent. Bed Mobility: min assist with supine to side lying. Walking on level ground: ambulates with a wide base of support, bilateral knee valgus during stance phase. Endurance test: 5 min walking for distance - 836 ft  Balance: not tested     Body Structures Involved:  1. Bones  2. Joints  3. Muscles Body Functions Affected:  1. Neuromusculoskeletal Activities and Participation Affected:  1. Mobility  2.  Community, Social and [place]   Number of elements (examined above) that affect the Plan of Care: 3: MODERATE COMPLEXITY   CLINICAL PRESENTATION:   Presentation: Evolving clinical presentation with changing clinical characteristics: MODERATE COMPLEXITY   CLINICAL DECISION MAKING:   Use of outcome tool(s) and clinical judgement create a POC that gives a: Questionable prediction of patient's progress: MODERATE COMPLEXITY

## 2020-01-09 ENCOUNTER — HOSPITAL ENCOUNTER (OUTPATIENT)
Dept: PHYSICAL THERAPY | Age: 65
Discharge: HOME | End: 2020-01-09
Payer: COMMERCIAL

## 2020-01-09 PROCEDURE — 97113 AQUATIC THERAPY/EXERCISES: CPT

## 2020-01-09 NOTE — PROGRESS NOTES
Ernestina Expose  : 1955  Payor: [address] / Plan: [place] PLANNED Kayden Tank / Product Type: Commerical /  [address]  at Count includes the [facility] VANNESSA RANDHAWA  1101 [facility], [address] [place] [address]  [phone]   [phone]       OUTPATIENT PHYSICAL THERAPY: Daily Treatment Note 2020  Visit Count: 2     ICD-10: Treatment Diagnosis: Pain in left knee (M25.562), Pain in right knee (M25.561), Muscle weakness (generalized) (M62.81), Difficulty in walking, not elsewhere classified (R26.2)  Precautions/Allergies:   Campath [alemtuzumab]   TREATMENT PLAN:  Effective Dates: 2020 TO 2020 (90 days). 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 He is weak from chemo. Pain: Initial:   not rated Post Session:  Not rated but felt better after PT   Medications Last Reviewed:  2020    Updated Objective Findings:   None Today     TREATMENT:     Aquatic Therapy (45 minutes): Aquatic treatment performed per flow grid for Decreased muscle strength, Decreased endurance, Decreased activity endurance, Decompression, Ease of movement and Low impact and reduced weight bearing activity. Cues provided for posture and breathing.       Aquatic Exercise Log       Date  20 Date   Date   Date   Date     Activity/ Exercise Parameters Parameters Parameters Parameters Parameters   Walking forward 6       Walking backward 6       Walking sideways 6         Marching 6         Goose Step 6         Tip toes 3         Heels 3         Lunges Long step 6       Side step squats        LE Exercises 4.5' no resistance          Hip Flex/Ext 10         Hip Abd/Add 10         Hip IR/ER          Calf raises          Knee Flex 10         Squats          Leg Circles 10/10         Step Ups        UE Exercises          Squeeze In          Push Down          Pull Down          Bicep/Tricep        Rows/Press outs         Chi Positions          Trunk Rotation        Deep H2O/ Noodles 7' with rings and no resistance         Stabilization          Arms only          Legs only Bicycle 2 min       Cross   Country 2 min         Scissors 2 min         Crab walk        Lower abdominal   work  Burgos-Cardenas 2 min         Cardio          Jogging        Lap   Swimming          Stretches          Hamstrings          Heelcords          Piriformis          Neck          Treatment/Session Summary:    · Response to Treatment:   Pt tolerated the water well today. He was fatigued at the end of the session. · Communication/Consultation:  None today  · Equipment provided today:  None today  · Recommendations/Intent for next treatment session: Next visit will focus on continued progression with endurance and strengthening exercises.     Total Treatment Billable Duration:  45 minutes   PT Patient Time In/Time Out  Time In: 1829  Time Out: 110 DIVINE Esparza    Future Appointments   Date Time Provider Maame Schrader   1/10/2020 11:30 AM RF[facility] LAB [facility] RFM RFM   1/14/2020 12:15 PM Laquita Soaress, PTA [facility]   1/16/2020 11:45 AM [facility] Fess[facility]   1/21/2020 10:45 AM Laquita Fess, [facility]   1/23/2020 10:45 AM Laquita Fess, PTA [facility]   1/28/2020 10:45 AM Laquita Soaress, P[facility]   1/30/2020 11:15 AM Birgit Zuleta, PT [facility]   2/10/2020  9:00 AM [facility] OUTREACH INSURANCE [facility]   2/10/2020  9:30 AM General Daija MD [facility]   12/10/2020  8:45 AM Marii Pakrer NP [facility] RFM RFM

## 2020-01-14 ENCOUNTER — HOSPITAL ENCOUNTER (OUTPATIENT)
Dept: PHYSICAL THERAPY | Age: 65
Discharge: HOME | End: 2020-01-14
Payer: COMMERCIAL

## 2020-01-14 PROCEDURE — 97113 AQUATIC THERAPY/EXERCISES: CPT

## 2020-01-14 NOTE — PROGRESS NOTES
Gomez Lines  : 1955  Payor: Pamela Martinez / Plan: [place] PLANNED Novnikole Adry / Product Type: Commerical /  [address]  at [facility] VANNESSA RANDHAWA  [address] [place] [address], [address], [address] [place] [address]  [phone]   [phone]       OUTPATIENT PHYSICAL THERAPY: Daily Treatment Note 2020  Visit Count: 3     ICD-10: Treatment Diagnosis: Pain in left knee (M25.562), Pain in right knee (M25.561), Muscle weakness (generalized) (M62.81), Difficulty in walking, not elsewhere classified (R26.2)  Precautions/Allergies:   Campath [alemtuzumab]   TREATMENT PLAN:  Effective Dates: 2020 TO 2020 (90 days). 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 He states he went home and went to be and feels better now, but still sore. His knees felt much better since last session. Pain: Initial:   not rated Post Session:  Not rated but felt better after PT   Medications Last Reviewed:  2020    Updated Objective Findings:   None Today     TREATMENT:     Aquatic Therapy (45 minutes): Aquatic treatment performed per flow grid for Decreased muscle strength, Decreased endurance, Decreased activity endurance, Decompression, Ease of movement and Low impact and reduced weight bearing activity. Cues provided for posture and breathing.       Aquatic Exercise Log       Date  20 Date  20 Date   Date   Date     Activity/ Exercise Parameters Parameters Parameters Parameters Parameters   Walking forward 6 6      Walking backward 6 6      Walking sideways 6 6        Marching 6 6        Goose Step 6 6        Tip toes 3 3        Heels 3 3        Lunges Long step 6 Long step 6      Side step squats        LE Exercises 4.5' no resistance  4.5' no resistance        Hip Flex/Ext 10 10        Hip Abd/Add 10 10        Hip IR/ER          Calf raises          Knee Flex 10 10        Squats          Leg Circles 10/10 10/10        Step Ups  10 B      UE Exercises          Squeeze In          Push Down          Pull Down          Bicep/Tricep        Rows/Press outs         Chi Positions          Trunk Rotation        Deep H2O/ Noodles 7' with rings and no resistance Held today        Stabilization          Arms only          Legs only Bicycle 2 min       Cross   Country 2 min         Scissors 2 min         Crab walk        Lower abdominal   work  Burgos-Cardenas 2 min         Cardio          Jogging        Lap   Swimming          Stretches          Hamstrings          Heelcords          Piriformis          Neck          Treatment/Session Summary:    · Response to Treatment:   Pt tolerated well with multiple standing rest breaks. He was tired at the end of the session. Held off on the deep water today. · Communication/Consultation:  None today  · Equipment provided today:  None today  · Recommendations/Intent for next treatment session: Next visit will focus on continued progression with endurance and strengthening exercises.     Total Treatment Billable Duration:  45 minutes   PT Patient Time In/Time Out  Time In: 5756  Time Out: 100 [place] Chinmay, PTA    Future Appointments   Date Time Provider Maame Schrader   1/16/2020 11:45 AM Almita Meadows PTA [facility]   1/21/2020 10:45 AM [facility]   1/23/2020 10:45 AM [facility]   1/28/2020 10:45 AM [facility]   1/30/2020 11:15 AM [facility] 2/10/2020  9:00 AM [facility] OUTREACH INSURANCE [facility]   2/10/2020  9:30 AM Nigel Bloch, MD [facility]   12/10/2020  8:45 AM Erika Ash NP [facility] RF RF

## 2020-01-16 ENCOUNTER — HOSPITAL ENCOUNTER (OUTPATIENT)
Dept: PHYSICAL THERAPY | Age: 65
Discharge: HOME | End: 2020-01-16
Payer: COMMERCIAL

## 2020-01-16 PROCEDURE — 97113 AQUATIC THERAPY/EXERCISES: CPT

## 2020-01-16 NOTE — PROGRESS NOTES
Zayra Whitley  : 1955  Payor: Hilton Trejo / Plan: [place] PLANN[place] [facility] / Product Type: Commerical /  [address]  at [facility] VANNESSA RANDHAWA  [address] [place] [address], [address], [place] [address].  [phone]   [phone]       OUTPATIENT PHYSICAL THERAPY: Daily Treatment Note 2020  Visit Count: 4     ICD-10: Treatment Diagnosis: Pain in left knee (M25.562), Pain in right knee (M25.561), Muscle weakness (generalized) (M62.81), Difficulty in walking, not elsewhere classified (R26.2)  Precautions/Allergies:   Campath [alemtuzumab]   TREATMENT PLAN:  Effective Dates: 2020 TO 2020 (90 days). 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 Pain: Initial:   not rated Post Session:  Not rated but felt better after PT   Medications Last Reviewed:  2020    Updated Objective Findings:   None Today     TREATMENT:     Aquatic Therapy (45 minutes): Aquatic treatment performed per flow grid for Decreased muscle strength, Decreased endurance, Decreased activity endurance, Decompression, Ease of movement and Low impact and reduced weight bearing activity. Cues provided for posture and breathing.       Aquatic Exercise Log       Date  20 Date  20 Date  20 Date   Date     Activity/ Exercise Parameters Parameters Parameters Parameters Parameters   Walking forward 6 6 6     Walking backward 6 6 6     Walking sideways 6 6 6       Marching 6 6 6       Goose Step 6 6 6       Tip toes 3 3 3       Heels 3 3 3 Lunges Long step 6 Long step 6 Long step 6     Side step squats        LE Exercises 4.5' no resistance  4.5' no resistance 4.5' 3# wts       Hip Flex/Ext 10 10 10       Hip Abd/Add 10 10 10       Hip IR/ER          Calf raises          Knee Flex 10 10 10       Squats          Leg Circles 10/10 10/10 10/10       Step Ups  10 B 10 B     UE Exercises          Squeeze In          Push Down          Pull Down          Bicep/Tricep        Rows/Press outs         Chi Positions          Trunk Rotation        Deep H2O/ Noodles 7' with rings and no resistance Held today 7' with rings and 3# wts       Stabilization          Arms only          Legs only Bicycle 2 min  Bicycle 2 min     Cross   Country 2 min  2 min       Scissors 2 min  2 min       Crab walk        Lower abdominal   work  Burgos-Cardenas 2 min         Cardio          Jogging        Lap   Swimming          Stretches          Hamstrings          Heelcords          Piriformis          Neck          Treatment/Session Summary:    · Response to Treatment:   Pt doing well with mobility and exercises. · Communication/Consultation:  None today  · Equipment provided today:  None today  · Recommendations/Intent for next treatment session: Next visit will focus on continued progression with endurance and strengthening exercises.     Total Treatment Billable Duration:  45 minutes   PT Patient Time In/Time Out  Time In: 2965  Time Out: 110 [place], [place]    Future Appointments   Date Time Provider Maame Schrader   1/21/2020 10:45 AM Almita Meadows PTA [facility]   1/23/2020 10:45 AM [facility]   1/28/2020 10:45 AM DIVINE Daniel [facility]   1/30/2020 11:15 AM Birgit Velazquez, [facility]   2/10/2020  9:00 AM [facility] OUTREACH INSURANCE [facility]   2/10/2020  9:30 AM Jada Jackson MD [facility]   12/10/2020  8:45 AM Salvatore Mccabe NP [facility] RF RFM

## 2020-01-21 ENCOUNTER — HOSPITAL ENCOUNTER (OUTPATIENT)
Dept: PHYSICAL THERAPY | Age: 65
Discharge: HOME | End: 2020-01-21
Payer: COMMERCIAL

## 2020-01-21 PROCEDURE — 97113 AQUATIC THERAPY/EXERCISES: CPT

## 2020-01-21 NOTE — PROGRESS NOTES
Sean Barbara  : 1955  Payor: Aleksey Conde / Plan: [place] PLANN[place] Theola Door / Product Type: Commerical /  [address]  at [facility] VANNESSA RANDHAWA  1101 [facility], [address] [place] [address],[address], [facility] U. 91.  [phone]   [phone]       OUTPATIENT PHYSICAL THERAPY: Daily Treatment Note 2020  Visit Count: 5     ICD-10: Treatment Diagnosis: Pain in left knee (M25.562), Pain in right knee (M25.561), Muscle weakness (generalized) (M62.81), Difficulty in walking, not elsewhere classified (R26.2)  Precautions/Allergies:   Campath [alemtuzumab]   TREATMENT PLAN:  Effective Dates: 2020 TO 2020 (90 days). 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 Pain: Initial:   not rated Post Session:  Not rated but felt better after PT   Medications Last Reviewed:  2020    Updated Objective Findings:   None Today     TREATMENT:     Aquatic Therapy (45 minutes): Aquatic treatment performed per flow grid for Decreased muscle strength, Decreased endurance, Decreased activity endurance, Decompression, Ease of movement and Low impact and reduced weight bearing activity. Cues provided for posture and breathing.       Aquatic Exercise Log       Date  20 Date  20 Date  20 Date  1/21/20 Date     Activity/ Exercise Parameters Parameters Parameters Parameters Parameters   Walking forward 6 6 6 6    Walking backward 6 6 6 6    Walking sideways 6 6 6 6      Marching 6 6 6 6      Goose Step 6 6 6 6      Tip toes 3 3 3 3      Heels 3 3 3 3      Lunges Long step 6 Long step 6 Long step 6 Long step 6    Side step squats        LE Exercises 4.5' no resistance  4.5' no resistance 4.5' 3# wts 4.5' 3# wts      Hip Flex/Ext 10 10 10 15      Hip Abd/Add 10 10 10 15      Hip IR/ER          Calf raises          Knee Flex 10 10 10 15      Squats          Leg Circles 10/10 10/10 10/10 15/15      Step Ups  10 B 10 B 15 B    UE Exercises          Squeeze In          Push Down          Pull Down          Bicep/Tricep        Rows/Press outs         Chi Positions          Trunk Rotation        Deep H2O/ Noodles 7' with rings and no resistance Held today 7' with rings and 3# wts 7' with rings and 3# wts      Stabilization          Arms only          Legs only Bicycle 2 min  Bicycle 2 min Bicycle 2 min    Cross   Country 2 min  2 min 2 min      Scissors 2 min  2 min 2 min      Crab walk        Lower abdominal   work  Burgos-Cardenas 2 min         Cardio          Jogging        Lap   Swimming          Stretches          Hamstrings          Heelcords          Piriformis          Neck          Treatment/Session Summary:    · Response to Treatment:   Pt continues to do well with aquatics. He did not need many rest breaks today. · Communication/Consultation:  None today  · Equipment provided today:  None today  · Recommendations/Intent for next treatment session: Next visit will focus on continued progression with endurance and strengthening exercises.     Total Treatment Billable Duration:  45 minutes   PT Patient Time In/Time Out  Time In: 5739  Time Out: [address] [place], PTA    Future Appointments   Date Time Provider Maame Schrader   1/23/2020 10:45 AM Jere So PTA [facility]   1/28/2020 10:45 AM DIVINE Navarro [facility]   1/30/2020 11:15 AM [facility] Tompkins, [facility]   2/10/2020  9:00 AM GCC OUTREACH INSURANCE [facility]   2/10/2020  9:30 AM Nigel Bloch, MD [facility]   12/10/2020  8:45 AM Erika Ash NP [facility] RF RFM

## 2020-01-23 ENCOUNTER — HOSPITAL ENCOUNTER (OUTPATIENT)
Dept: PHYSICAL THERAPY | Age: 65
Discharge: HOME | End: 2020-01-23
Payer: COMMERCIAL

## 2020-01-23 PROCEDURE — 97113 AQUATIC THERAPY/EXERCISES: CPT

## 2020-01-23 NOTE — PROGRESS NOTES
Steve Harvey  : 1955  Payor: Reno Rousseau / Plan: [place] PLANN[place] Bret Mercedes / Product Type: Commerical /  [address]  [facility]  [address] [place] [address], [address], [place] [address].  [phone]   [phone]       OUTPATIENT PHYSICAL THERAPY: Daily Treatment Note 2020  Visit Count: 6     ICD-10: Treatment Diagnosis: Pain in left knee (M25.562), Pain in right knee (M25.561), Muscle weakness (generalized) (M62.81), Difficulty in walking, not elsewhere classified (R26.2)  Precautions/Allergies:   Campath [alemtuzumab]   TREATMENT PLAN:  Effective Dates: 2020 TO 2020 (90 days). 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 Pain: Initial:   not rated Post Session:  Not rated but felt better after PT   Medications Last Reviewed:  2020    Updated Objective Findings:   None Today     TREATMENT:     Aquatic Therapy (45 minutes): Aquatic treatment performed per flow grid for Decreased muscle strength, Decreased endurance, Decreased activity endurance, Decompression, Ease of movement and Low impact and reduced weight bearing activity. Cues provided for posture and breathing.       Aquatic Exercise Log       Date  20 Date  20 Date  20 Date  20 Date  20   Activity/ Exercise Parameters Parameters Parameters Parameters Parameters   Walking forward 6 6 6 6 6   Walking backward 6 6 6 6 6   Walking sideways 6 6 6 6 6     Marching 6 6 6 6 6     Goose Step 6 6 6 6 6     Tip toes 3 3 3 3 3     Heels 3 3 3 3 3 Lunges Long step 6 Long step 6 Long step 6 Long step 6 Long step 6   Side step squats        LE Exercises 4.5' no resistance  4.5' no resistance 4.5' 3# wts 4.5' 3# wts 4. 5' with 3# wts     Hip Flex/Ext 10 10 10 15 15     Hip Abd/Add 10 10 10 15 15     Hip IR/ER          Calf raises          Knee Flex 10 10 10 15 15     Squats          Leg Circles 10/10 10/10 10/10 15/15 15/15     Step Ups  10 B 10 B 15 B 15   UE Exercises          Squeeze In          Push Down          Pull Down          Bicep/Tricep        Rows/Press outs         Chi Positions          Trunk Rotation        Deep H2O/ Noodles 7' with rings and no resistance Held today 7' with rings and 3# wts 7' with rings and 3# wts 7' with rings and 3# wts     Stabilization          Arms only          Legs only Bicycle 2 min  Bicycle 2 min Bicycle 2 min Bicycle 3 min   Cross   Country 2 min  2 min 2 min 3 min     Scissors 2 min  2 min 2 min 3 min     Crab walk        Lower abdominal   work  Burgos-Cardenas 2 min         Cardio          Jogging        Lap   Swimming          Stretches          Hamstrings          Heelcords          Piriformis          Neck          Treatment/Session Summary:    · Response to Treatment:   Pt did not need many rest breaks today. His knees felt better after therapy. · Communication/Consultation:  None today  · Equipment provided today:  None today  · Recommendations/Intent for next treatment session: Next visit will focus on continued progression with endurance and strengthening exercises.     Total Treatment Billable Duration:  45 minutes   PT Patient Time In/Time Out  Time In: 2117  Time Out: 100 Derrick Moy, DIVINE    Future Appointments   Date Time Provider Maame Schrader   1/28/2020 10:45 AM Tyler Cunningham [facility]   1/30/2020 11:15 AM Birgit Tapia, PT [facility]   2/10/2020  9:00 AM Jay Galloway [facility]   2/10/2020  9:30 AM Maximiliano Tariq MD [facility]   12/10/2020 8:45 AM ROXANA Mcnair RFM RFM

## 2020-01-28 ENCOUNTER — HOSPITAL ENCOUNTER (OUTPATIENT)
Dept: PHYSICAL THERAPY | Age: 65
Discharge: HOME | End: 2020-01-28
Payer: COMMERCIAL

## 2020-01-28 PROCEDURE — 97113 AQUATIC THERAPY/EXERCISES: CPT

## 2020-01-28 NOTE — PROGRESS NOTES
Yen Yanez  : 1955  Payor: Lamberto Koroma / Plan: [place] PLANN[place] Harsh Blood / Product Type: Commerical /  [address]  at [facility] VANNESSA RANDHAWA  1101 [facility], [address] [place] [address] [facility],[address], [facility] [address].  [phone]   [phone]       OUTPATIENT PHYSICAL THERAPY: Daily Treatment Note 2020  Visit Count: 7     ICD-10: Treatment Diagnosis: Pain in left knee (M25.562), Pain in right knee (M25.561), Muscle weakness (generalized) (M62.81), Difficulty in walking, not elsewhere classified (R26.2)  Precautions/Allergies:   Campath [alemtuzumab]   TREATMENT PLAN:  Effective Dates: 2020 TO 2020 (90 days). 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 He states he had spinal injections yesterday. Pain: Initial:   not rated Post Session:  Not rated but felt better after PT   Medications Last Reviewed:  2020    Updated Objective Findings:   None Today     TREATMENT:     Aquatic Therapy (45 minutes): Aquatic treatment performed per flow grid for Decreased muscle strength, Decreased endurance, Decreased activity endurance, Decompression, Ease of movement and Low impact and reduced weight bearing activity. Cues provided for posture and breathing.       Aquatic Exercise Log       Date  20 Date  20 Date  20 Date  20 Date  20 Date  20   Activity/ Exercise Parameters Parameters Parameters Parameters Parameters    Walking forward 6 6 6 6 6 6   Walking backward 6 6 6 6 6 6   Walking sideways 6 6 6 6 6 6     Marching 6 6 6 6 6 6     Goose Step 6 6 6 6 6 6     Tip toes 3 3 3 3 3 3     Heels 3 3 3 3 3 3     Lunges Long step 6 Long step 6 Long step 6 Long step 6 Long step 6 Long step 6   Side step squats         LE Exercises 4.5' no resistance  4.5' no resistance 4.5' 3# wts 4.5' 3# wts 4. 5' with 3# wts 4. 5' with 4# wts     Hip Flex/Ext 10 10 10 15 15 15     Hip Abd/Add 10 10 10 15 15 15     Hip IR/ER           Calf raises           Knee Flex 10 10 10 15 15 15     Squats           Leg Circles 10/10 10/10 10/10 15/15 15/15 15/15     Step Ups  10 B 10 B 15 B 15 15   UE Exercises           Squeeze In           Push Down           Pull Down           Bicep/Tricep         Rows/Press outs          Chi Positions           Trunk Rotation         Deep H2O/ Noodles 7' with rings and no resistance Held today 7' with rings and 3# wts 7' with rings and 3# wts 7' with rings and 3# wts 7' with rings and 4# wts     Stabilization           Arms only           Legs only Bicycle 2 min  Bicycle 2 min Bicycle 2 min Bicycle 3 min Bicycle 3 min   Cross   Country 2 min  2 min 2 min 3 min 3 min     Scissors 2 min  2 min 2 min 3 min 3 min     Crab walk         Lower abdominal   work  Burgos-Cardenas 2 min          Cardio           Jogging         Lap   Swimming           Stretches           Hamstrings           Heelcords           Piriformis           Neck           Treatment/Session Summary:    · Response to Treatment:   Pt does not require as many rest breaks now. He will see the PT next visit and has another order for 6 weeks of PT.    · Communication/Consultation:  None today  · Equipment provided today:  None today  · Recommendations/Intent for next treatment session: Next visit will focus on continued progression with endurance and strengthening exercises.     Total Treatment Billable Duration:  45 minutes   PT Patient Time In/Time Out  Time In: 8940  Time Out: [address] [place], PTA    Future Appointments   Date Time Provider Maame Schrader   1/30/2020 11:15 AM Kendrick Fair PT [facility]   2/10/2020  9:00 AM GCC OUTREACH INSURANCE [facility]   2/10/2020  9:30 AM Emely Griffin MD [facility]   12/10/2020  8:45 AM Bruce Forbes NP [facility] RF RF

## 2020-01-30 ENCOUNTER — HOSPITAL ENCOUNTER (OUTPATIENT)
Dept: PHYSICAL THERAPY | Age: 65
End: 2020-01-30
Payer: COMMERCIAL

## 2020-02-05 ENCOUNTER — HOSPITAL ENCOUNTER (OUTPATIENT)
Dept: PHYSICAL THERAPY | Age: 65
Discharge: HOME | End: 2020-02-05
Payer: SELF-PAY

## 2020-02-05 PROCEDURE — 97110 THERAPEUTIC EXERCISES: CPT

## 2020-02-05 NOTE — PROGRESS NOTES
Makenna Chiu  : 1955  Primary: [place] Planned Administrators, In*  Secondary:  [address] [place]  at [address] [place] [address] Rd  1101 [facility], [address] [place] [address],[address], [place] [address].  [phone]   [phone]       OUTPATIENT PHYSICAL THERAPY:Progress Report 2020     ICD-10: Treatment Diagnosis: Pain in left knee (M25.562), Pain in right knee (M25.561), Muscle weakness (generalized) (M62.81), Difficulty in walking, not elsewhere classified (R26.2)  Precautions/Allergies:   Campath [alemtuzumab]   TREATMENT PLAN:  Effective Dates: 2020 TO 2020 (90 days). 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 Jayna Durán presents with complaints of bilateral knee pain that started after chemotherapy treatment last year. He presents with overall fatigue from cancer treatment, bilateral knee pain and stiffness, and LE weakness. Prior to leukemia diagnosis he was walking [place] miles in the park and would like to return to that. He has progressed with L knee flexion ROM and LE strength. Pt has been attending aquatic physical therapy but will plan to transition patient to landHe will benefit from skilled physical therapy to increase endurance and functional mobility and help decrease pain. ** injection last week L5-S1 and helped the back and knee pain   PROBLEM LIST (Impacting functional limitations):  1. Decreased Strength  2. Decreased ADL/Functional Activities  3. Increased Pain  4. Decreased Flexibility/Joint Mobility INTERVENTIONS PLANNED: (Treatment may consist of any combination of the following)  1. Gait Training  2. Manual Therapy  3.  Therapeutic Activites  4. Therapeutic Exercise/Strengthening   5. Modalities as needed for pain  6. Aquatic therapy     GOALS: (Goals have been discussed and agreed upon with patient.)  Short-Term Functional Goals: Time Frame: 45 days  1. Pt will report pain 5/10 with daily activities. Progressing towards  2. Pt will improve L knee ROM to 0-110 for improved mobility. GOAL MET  3. Pt will increase L hip abductor strength to 4/5 for improved gait. Progressing towards  4. Pt will increase walking for endurance time to 8 minutes and at least 1500 ft. Progressing towards  5. Pt will be independent with HEP. GOAL MET  Discharge Goals: Time Frame: 90 days ongoing long term goals  1. Pt will report pain 3/10 with daily activities. 2. Pt will improve B knee ROM to 0-120 for improved mobility. 3. Pt will increase B LE strength to 5/5 to return to walking long distances. 4. Pt will be able to walk for 15 minutes with no rest for return to walking and improved endurance. UPDATED OBJECTIVE FINDINGS:    Pain: 6/10  ROM:       LLE AROM  L Knee Flexion: 110  L Knee Extension: 0    RLE AROM  R Knee Flexion: 115  R Knee Extension: 0        Strength:      LLE Strength  L Hip Flexion: 4+  L Hip ABduction: 4-  L Hip External Rotation: 4  L Knee Flexion: 4+  L Knee Extension: 4+    RLE Strength  R Hip Flexion: 4+  R Hip External Rotation: 4+  R Knee Flexion: 4+  R Knee Extension: 4+        Functional Mobility:  Sit to/from Stand: independent. Bed Mobility: modified indpendent. Walking on level ground: ambulates with a wide base of support, L knee valgus during stance phase. Endurance test: 5 min walking for distance - 900 ft    OUTCOME MEASURE:   Tool Used: Lower Extremity Functional Scale (LEFS)  Score:  Initial: 31/80 Most Recent: X/80 (Date: -- )   Interpretation of Score: 20 questions each scored on a 5 point scale with 0 representing \"extreme difficulty or unable to perform\" and 4 representing \"no difficulty\".   The lower the score, the greater the functional disability. 80/80 represents no disability. Minimal detectable change is 9 points. MEDICAL NECESSITY:   · Patient demonstrates good rehab potential due to higher previous functional level. REASON FOR SERVICES/OTHER COMMENTS:  · Patient continues to require skilled intervention due to decreased endurance, pain, decreased strength and ROM limiting mobility. Rehabilitation Potential For Stated Goals: Good  Regarding Kristen Walker's therapy, I certify that the treatment plan above will be carried out by a therapist or under their direction.   Thank you for this referral,    Birgit Ac, PT

## 2020-02-10 ENCOUNTER — PATIENT OUTREACH (OUTPATIENT)
Dept: CASE MANAGEMENT | Age: 65
End: 2020-02-10

## 2020-02-10 ENCOUNTER — HOSPITAL ENCOUNTER (OUTPATIENT)
Dept: LAB | Age: 65
Discharge: HOME | End: 2020-02-10
Payer: COMMERCIAL

## 2020-02-10 DIAGNOSIS — C91.60 PROLYMPHOCYTIC LEUKEMIA OF T-CELL (HCC): ICD-10-CM

## 2020-02-10 LAB
ALBUMIN SERPL-MCNC: 4 G/DL (ref 3.2–4.6)
ALBUMIN/GLOB SERPL: 1.1 {RATIO} (ref 1.2–3.5)
ALP SERPL-CCNC: 41 U/L (ref 50–136)
ALT SERPL-CCNC: 19 U/L (ref 12–65)
ANION GAP SERPL CALC-SCNC: 4 MMOL/L (ref 7–16)
AST SERPL-CCNC: 15 U/L (ref 15–37)
BASOPHILS # BLD: 0 K/UL (ref 0–0.2)
BASOPHILS NFR BLD: 1 % (ref 0–2)
BILIRUB SERPL-MCNC: 0.4 MG/DL (ref 0.2–1.1)
BUN SERPL-MCNC: 19 MG/DL (ref 8–23)
CALCIUM SERPL-MCNC: 9.5 MG/DL (ref 8.3–10.4)
CHLORIDE SERPL-SCNC: 106 MMOL/L (ref 98–107)
CO2 SERPL-SCNC: 27 MMOL/L (ref 21–32)
CREAT SERPL-MCNC: 1.24 MG/DL (ref 0.8–1.5)
DIFFERENTIAL METHOD BLD: ABNORMAL
EOSINOPHIL # BLD: 0.1 K/UL (ref 0–0.8)
EOSINOPHIL NFR BLD: 4 % (ref 0.5–7.8)
ERYTHROCYTE [DISTWIDTH] IN BLOOD BY AUTOMATED COUNT: 13.3 % (ref 11.9–14.6)
GLOBULIN SER CALC-MCNC: 3.7 G/DL (ref 2.3–3.5)
GLUCOSE SERPL-MCNC: 94 MG/DL (ref 65–100)
HCT VFR BLD AUTO: 33 % (ref 41.1–50.3)
HGB BLD-MCNC: 10.9 G/DL (ref 13.6–17.2)
IMM GRANULOCYTES # BLD AUTO: 0 K/UL (ref 0–0.5)
IMM GRANULOCYTES NFR BLD AUTO: 1 % (ref 0–5)
LYMPHOCYTES # BLD: 0.4 K/UL (ref 0.5–4.6)
LYMPHOCYTES NFR BLD: 20 % (ref 13–44)
MAGNESIUM SERPL-MCNC: 2.4 MG/DL (ref 1.8–2.4)
MCH RBC QN AUTO: 32.6 PG (ref 26.1–32.9)
MCHC RBC AUTO-ENTMCNC: 33 G/DL (ref 31.4–35)
MCV RBC AUTO: 98.8 FL (ref 79.6–97.8)
MONOCYTES # BLD: 0.3 K/UL (ref 0.1–1.3)
MONOCYTES NFR BLD: 13 % (ref 4–12)
NEUTS SEG # BLD: 1.3 K/UL (ref 1.7–8.2)
NEUTS SEG NFR BLD: 61 % (ref 43–78)
NRBC # BLD: 0 K/UL (ref 0–0.2)
PLATELET # BLD AUTO: 106 K/UL (ref 150–450)
PMV BLD AUTO: 9.8 FL (ref 9.4–12.3)
POTASSIUM SERPL-SCNC: 4.3 MMOL/L (ref 3.5–5.1)
PROT SERPL-MCNC: 7.7 G/DL (ref 6.3–8.2)
RBC # BLD AUTO: 3.34 M/UL (ref 4.23–5.67)
SODIUM SERPL-SCNC: 137 MMOL/L (ref 136–145)
WBC # BLD AUTO: 2.1 K/UL (ref 4.3–11.1)

## 2020-02-10 PROCEDURE — 80053 COMPREHEN METABOLIC PANEL: CPT

## 2020-02-10 PROCEDURE — 85025 COMPLETE CBC W/AUTO DIFF WBC: CPT

## 2020-02-10 PROCEDURE — 83735 ASSAY OF MAGNESIUM: CPT

## 2020-02-10 NOTE — PROGRESS NOTES
2/10/20- Pt was seen in the office with Dr Bari Godfrey for follow up. Pt reports he has been doing PT which has helped some with his back pain and knee pain. He continues to see the orthopedic for future needs regarding his pain. Labs reviewed today WBC resulted at 2.1, ANC 1.3. Pt to return 5/4/20 with labs.

## 2020-02-11 ENCOUNTER — HOSPITAL ENCOUNTER (OUTPATIENT)
Dept: PHYSICAL THERAPY | Age: 65
Discharge: HOME | End: 2020-02-11
Payer: SELF-PAY

## 2020-02-11 PROCEDURE — 97110 THERAPEUTIC EXERCISES: CPT

## 2020-02-11 NOTE — PROGRESS NOTES
Danielle Terry  : 1955  Payor: Efren Lezama / Plan: [place] PLANNED  / Product Type: Commerical /  [address]  at [address] [place] [address] Rd  1101 [facility], [address] [place] [address],[address], [place] [address].  [phone]   [phone]       OUTPATIENT PHYSICAL THERAPY: Daily Treatment Note 2020  Visit Count: 9     ICD-10: Treatment Diagnosis: Pain in left knee (M25.562), Pain in right knee (M25.561), Muscle weakness (generalized) (M62.81), Difficulty in walking, not elsewhere classified (R26.2)  Precautions/Allergies:   Campath [alemtuzumab]   TREATMENT PLAN:  Effective Dates: 2020 TO 2020 (90 days). 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 Goes back in May. Pain: Initial:   6/10 Post Session: 6/10   Medications Last Reviewed:  2020    Updated Objective Findings:   none     TREATMENT:   Therapeutic Exercise: (40 Minutes):  Exercises per grid below to improve mobility and strength. Required moderate visual and verbal cues to promote proper body alignment.     Pt in side lying and knee varus mobilization with active knee extension x 8 reps B   Date:  20 Date:  20 Date:  20   Activity/Exercise Parameters Parameters Parameters   endurance 5 min walk 5 min walk 5 min walk   Sit to stand 10x 2x10 2x10   Shuttle press 75# 2x10 75# 3x10 75# 3x10   Hamstring curls 35# 2x10 35# 2x10 35#3x10   Long arc quads  15# 2x10 15# 2x12 15# 3x10   Side lying hip abduction L 3x5 2x10 2x10 B   Side lying clam L 2x10 2x10 2x15 B   SLR B 2x5 2x10  2x10 bridges - 2x10 2x10       Treatment/Session Summary:    · Response to Treatment: Pt reported less knee pain at end of session. · Communication/Consultation:  None today  · Equipment provided today:  None today  · Recommendations/Intent for next treatment session: Next visit will focus on continued progression with endurance and strengthening exercises.     Total Treatment Billable Duration:  40 minutes   PT Patient Time In/Time Out  Time In: 0205  Time Out: 9511    Reilly Morrison, PT    Future Appointments   Date Time Provider Maame Schrader   2/13/2020  1:00 PM Judy Gordon, PT [facility]   2/18/2020 11:15 AM Sherrill Yang, PT [facility]   2/20/2020 11:15 AM Birgit Yang, PT [facility]   2/25/2020 11:15 AM [facility]   2/27/2020 11:15 AM Sherrill [facility], [facility]   3/3/2020 11:15 AM Birgit Yang, PT [facility]   3/23/2020 10:00 AM [facility] longterm LAB PORT CHAIR 1 [facility]longterm [facility]   5/4/2020  9:30 AM Jay [address]   5/4/2020 10:00 AM Karla Sosa MD [facility]   12/10/2020  8:45 AM Andreina Cuevas NP [facility] RF RF

## 2020-02-13 ENCOUNTER — HOSPITAL ENCOUNTER (OUTPATIENT)
Dept: PHYSICAL THERAPY | Age: 65
Discharge: HOME | End: 2020-02-13
Payer: SELF-PAY

## 2020-02-13 PROCEDURE — 97110 THERAPEUTIC EXERCISES: CPT

## 2020-02-13 NOTE — PROGRESS NOTES
Mikayla Stark  : 1955  Payor: Daylin Patino / Plan: [place] PLANN[place] Yashira Crane / Product Type: Commerical /  [address]  at [address] [place] [address] Rd  1101 [facility], [address], [place] [address].  [phone]   [phone]       OUTPATIENT PHYSICAL THERAPY: Daily Treatment Note 2020  Visit Count: 10     ICD-10: Treatment Diagnosis: Pain in left knee (M25.562), Pain in right knee (M25.561), Muscle weakness (generalized) (M62.81), Difficulty in walking, not elsewhere classified (R26.2)  Precautions/Allergies:   Campath [alemtuzumab]   TREATMENT PLAN:  Effective Dates: 2020 TO 2020 (90 days). 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 Pain: Initial:   10 Post Session: 6/10   Medications Last Reviewed:  2020    Updated Objective Findings:   none     TREATMENT:   Therapeutic Exercise: (40 Minutes):  Exercises per grid below to improve mobility and strength. Required moderate visual and verbal cues to promote proper body alignment.     Pt in side lying and knee varus mobilization with active knee extension x 8 reps B   Date:  20 Date:  20 Date:  20 Date  20   Activity/Exercise Parameters Parameters Parameters parameters   endurance 5 min walk 5 min walk 5 min walk 5 min walk   Sit to stand 10x 2x10 2x10 2x10   Shuttle press 75# 2x10 75# 3x10 75# 3x10 75# 3x15   Hamstring curls 35# 2x10 35# 2x10 35#3x10 35# 3x10   Long arc quads  15# 2x10 15# 2x12 15# 3x10 15#3x10   Side lying hip abduction L 3x5 2x10 2x10 B 2x10 B   Side lying clam L 2x10 2x10 2x15 B 2x15 B   SLR B 2x5 2x10  2x10 2x12   bridges - 2x10 2x10 2x15       Treatment/Session Summary:    · Response to Treatment: Pt seemed to fatigue quicker with exercises today. · Communication/Consultation:  None today  · Equipment provided today:  None today  · Recommendations/Intent for next treatment session: Next visit will focus on continued progression with endurance and strengthening exercises.     Total Treatment Billable Duration:  40 minutes   PT Patient Time In/Time Out  Time In: 1300  Time Out: 300 [employer] [facility],     Future Appointments   Date Time Provider Maame Schrader   2/17/2020  2:00 PM RF[facility] LAB [facility] RFM RFM   2/18/2020 11:15 AM Birgit Valle[facility]   2/20/2020 11:15 AM Birgit Valle, PT [facility]   2/25/2020 11:15 AM Birgit Valle, PT [facility]   2/27/2020 11:15 AM [facility]   3/3/2020 11:15 AM Birgit Valle, PT [facility]   3/23/2020 10:00 AM [facility] nursing home LAB PORT CHAIR 1 [facility]   5/4/2020  9:30 AM Jay 88 [address] [facility]   5/4/2020 10:00 AM Marva Meigs, MD [facility]   12/10/2020  8:45 AM Pavan Mcclure NP [facility] RFM RFM

## 2020-02-18 ENCOUNTER — HOSPITAL ENCOUNTER (OUTPATIENT)
Dept: PHYSICAL THERAPY | Age: 65
Discharge: HOME | End: 2020-02-18
Payer: SELF-PAY

## 2020-02-18 PROCEDURE — 97110 THERAPEUTIC EXERCISES: CPT

## 2020-02-18 NOTE — PROGRESS NOTES
Ricco Eden  : 1955  Payor: Basilia Cadet / [place]: [place] PLANN[place] [employer] / Product Type: [employer] /  [address]  at [facility] VANNESSA RANDHAWA  1101 [facility], [address] [place] [address],[address], [address]  [phone]   [phone]       OUTPATIENT PHYSICAL THERAPY: Daily Treatment Note 2020  Visit Count: 11     ICD-10: Treatment Diagnosis: Pain in left knee (M25.562), Pain in right knee (M25.561), Muscle weakness (generalized) (M62.81), Difficulty in walking, not elsewhere classified (R26.2)  Precautions/Allergies:   Campath [alemtuzumab]   TREATMENT PLAN:  Effective Dates: 2020 TO 2020 (90 days). 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 He had pain after the last therapy session. Pain: Initial:   610 Post Session: 6/10   Medications Last Reviewed:  2020    Updated Objective Findings:   none     TREATMENT:   Therapeutic Exercise: (40 Minutes):  Exercises per grid below to improve mobility and strength. Required moderate visual and verbal cues to promote proper body alignment.        Date:  20 Date:  20 Date:  20 Date  20 Date  20   Activity/Exercise Parameters Parameters Parameters parameters parameters   endurance 5 min walk 5 min walk 5 min walk 5 min walk 5 min bike    Sit to stand 10x 2x10 2x10 2x10 2x10   Shuttle press 75# 2x10 75# 3x10 75# 3x10 75# 3x15 75#3 x15   Hamstring curls 35# 2x10 35# 2x10 35#3x10 35# 3x10  35# 3x10   Long arc quads  15# 2x10 15# 2x12 15# 3x10 15#3x10 15# 3x10   Side lying hip abduction L 3x5 2x10 2x10 B 2x10 B 2x10 B   Side lying clam L 2x10 2x10 2x15 B 2x15 B 2x15 B   SLR B 2x5 2x10  2x10 2x12 -   bridges - 2x10 2x10 2x15 2x15       Treatment/Session Summary:    · Response to Treatment: He seems to be progressing slowly with strength and endurance. Less knee pain with bike for endurance vs walking. · Communication/Consultation:  None today  · Equipment provided today:  None today  · Recommendations/Intent for next treatment session: Next visit will focus on continued progression with endurance and strengthening exercises.     Total Treatment Billable Duration:  40 minutes   PT Patient Time In/Time Out  Time In: 1120  Time Out: [facility], PT    Future Appointments   Date Time Provider Maame Schrader   2/20/2020 11:15 AM Darc[facility] Lua, [facility]   2/25/2020 11:15 AM Birgit Aguirre, PT [facility]   2/27/2020 11:15 AM Birgit Aguirre, PT [facility]   3/3/2020 11:15 AM Birgit Aguirre, PT [facility]   3/23/2020 10:00 AM UOA half-way LAB [facility] CHAIR 1 [facility] UOA-half-way [facility]   5/4/2020  9:30 AM Jay [address]   5/4/2020 10:00 AM Logan Jasso MD [facility]   5/11/2020  2:00 PM RFM LAB [facility] RFM RFM   12/10/2020  8:45 AM Magali Contreras NP [facility] RFM RFM

## 2020-02-20 ENCOUNTER — HOSPITAL ENCOUNTER (OUTPATIENT)
Dept: PHYSICAL THERAPY | Age: 65
Discharge: HOME | End: 2020-02-20
Payer: SELF-PAY

## 2020-02-20 PROCEDURE — 97110 THERAPEUTIC EXERCISES: CPT

## 2020-02-20 NOTE — PROGRESS NOTES
Makenna Chiu  : 1955  Payor: Darnell Horta / Plan: [place] PL[place] [employer] / Product Type: Commerical /  [address]  at [facility] VANNESSA RANDHAWA  1101 [facility], [address] [place] [address],[address], [address]  [phone]   [phone]       OUTPATIENT PHYSICAL THERAPY: Daily Treatment Note 2020  Visit Count: 12     ICD-10: Treatment Diagnosis: Pain in left knee (M25.562), Pain in right knee (M25.561), Muscle weakness (generalized) (M62.81), Difficulty in walking, not elsewhere classified (R26.2)  Precautions/Allergies:   Campath [alemtuzumab]   TREATMENT PLAN:  Effective Dates: 2020 TO 2020 (90 days). 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 He had pain after the last therapy session. Pain: Initial:   610 Post Session: 6/10   Medications Last Reviewed:  2020    Updated Objective Findings:   none     TREATMENT:   Therapeutic Exercise: (40 Minutes):  Exercises per grid below to improve mobility and strength. Required moderate visual and verbal cues to promote proper body alignment.        Date:  20 Date:  20 Date:  20 Date  20 Date  20 Date  20   Activity/Exercise Parameters Parameters Parameters parameters parameters parameters   endurance 5 min walk 5 min walk 5 min walk 5 min walk 5 min bike  5 min   Sit to stand 10x 2x10 2x10 2x10 2x10 2x10   Shuttle press 75# 2x10 75# 3x10 75# 3x10 75# 3x15 75#3 x15 75# 3x15   Hamstring curls 35# 2x10 35# 2x10 35#3x10 35# 3x10  35# 3x10 35# 3x10   Long arc quads  15# 2x10 15# 2x12 15# 3x10 15#3x10 15# 3x10 15#3x10   Side lying hip abduction L 3x5 2x10 2x10 B 2x10 B 2x10 B 2x10 B   Side lying clam L 2x10 2x10 2x15 B 2x15 B 2x15 B Yellow band 2x10 B   SLR B 2x5 2x10  2x10 2x12 - -   bridges - 2x10 2x10 2x15 2x15 2x30       Treatment/Session Summary:    · Response to Treatment:Continues to fatigue quickly with exercises. · Communication/Consultation:  None today  · Equipment provided today:  None today  · Recommendations/Intent for next treatment session: Next visit will focus on continued progression with endurance and strengthening exercises.     Total Treatment Billable Duration:  40 minutes   PT Patient Time In/Time Out  Time In: 1110  Time Out: [address] [facility],     Future Appointments   Date Time Provider Maame Schrader   2/25/2020 11:15 AM Louie Humphreys, [facility]5 [facility],[address] & 3[address] Floor, PT SFORPTWD [facility]   2/27/2020 11:15 AM Louie Humphreys [address] [place] [address],[address], PT SFORPTWD [facility]   3/3/2020 11:15 AM Birgit Conde, PT SFORPT[facility]   3/23/2020 10:00 AM UOA detention LAB PORT CHAIR 1 [facility] UOA-detention [facility]   5/4/2020  9:30 AM Jay Galloway GVL [facility]   5/4/2020 10:00 AM Emeka Mccarty MD [facility]   5/11/2020  2:00 PM RFM LAB [facility] RFM RFM   12/10/2020  8:45 AM ROXANA James RFM RFM

## 2020-02-25 ENCOUNTER — HOSPITAL ENCOUNTER (OUTPATIENT)
Dept: PHYSICAL THERAPY | Age: 65
Discharge: HOME | End: 2020-02-25
Payer: SELF-PAY

## 2020-02-25 PROCEDURE — 97110 THERAPEUTIC EXERCISES: CPT

## 2020-02-25 NOTE — PROGRESS NOTES
Ricco Eden  : 1955  Payor: Basilia Cadet / [place]: [place] [employer] / Product Type: "[employer]ical /  [address]  at [facility] VANNESSA RANDHAWA  1101 [facility], [address] [place] [address],[address], [facility], [place] [address]  [phone]   [phone]       OUTPATIENT PHYSICAL THERAPY: Daily Treatment Note 2020  Visit Count: 13     ICD-10: Treatment Diagnosis: Pain in left knee (M25.562), Pain in right knee (M25.561), Muscle weakness (generalized) (M62.81), Difficulty in walking, not elsewhere classified (R26.2)  Precautions/Allergies:   Campath [alemtuzumab]   TREATMENT PLAN:  Effective Dates: 2020 TO 2020 (90 days). 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 Pain: Initial:   6/10 Post Session: 6/10   Medications Last Reviewed:  2020    Updated Objective Findings:   none     TREATMENT:   Therapeutic Exercise: (40 Minutes):  Exercises per grid below to improve mobility and strength. Required moderate visual and verbal cues to promote proper body alignment.        Date:  20 Date:  20 Date:  20 Date  20 Date  20 Date  20 Date  20   Activity/Exercise Parameters Parameters Parameters parameters parameters parameters parameter   endurance 5 min walk 5 min walk 5 min walk 5 min walk 5 min bike  5 min 6 min    Sit to stand 10x 2x10 2x10 2x10 2x10 2x10 2x12   Shuttle press 75# 2x10 75# 3x10 75# 3x10 75# 3x15 75#3 x15 75# 3x15 75# 3x15   Hamstring curls 35# 2x10 35# 2x10 35#3x10 35# 3x10  35# 3x10 35# 3x10 35# 2x15   Long arc quads  15# 2x10 15# 2x12 15# 3x10 15#3x10 15# 3x10 15#3x10 15#2x15   Side lying hip abduction L 3x5 2x10 2x10 B 2x10 B 2x10 B 2x10 B 2x12 B   Side lying clam L 2x10 2x10 2x15 B 2x15 B 2x15 B Yellow band 2x10 B Yellow band 2x12 B   SLR B 2x5 2x10  2x10 2x12 - - -   bridges - 2x10 2x10 2x15 2x15 2x30 30x       Treatment/Session Summary:    · Response to Treatment: Progressed reps with exercises and he did well with no complaints of increased pain. · Communication/Consultation:  None today  · Equipment provided today:  None today  · Recommendations/Intent for next treatment session: Next visit will focus on continued progression with endurance and strengthening exercises.     Total Treatment Billable Duration:  40 minutes   PT Patient Time In/Time Out  Time In: 1115  Time Out: 810 [address] [place] [address] [place]     Future Appointments   Date Time Provider Maame Schrader   2/27/2020 11:15 AM Minh Prater, PT AMEYA [facility]   3/3/2020 11:15 AM Birgit Prater, PT SFORPTWD [facility]   3/23/2020 10:00 AM UOA retirement LAB [facility] CHAIR 1 [facility] UOA-retirement [facility]   5/4/2020  9:30 AM Jay 88 [facility] [facility]   5/4/2020 10:00 AM Lisa Prasad MD [facility]   5/11/2020  2:00 PM RFM LAB [facility] RFM RFM   12/10/2020  8:45 AM Cardell Spurling, NP [facility] RFM RFM

## 2020-02-27 ENCOUNTER — HOSPITAL ENCOUNTER (OUTPATIENT)
Dept: PHYSICAL THERAPY | Age: 65
Discharge: HOME | End: 2020-02-27
Payer: SELF-PAY

## 2020-02-27 PROCEDURE — 97110 THERAPEUTIC EXERCISES: CPT

## 2020-02-27 NOTE — PROGRESS NOTES
Pinotess Reyes  : 1955  Payor: Doreen Mera / Plan: [place] PL[place] Alexis Mantle / Product Type: Commerical /  [address] [employer]  [facility], [address], [place] [address].  [phone]   [phone]       OUTPATIENT PHYSICAL THERAPY: Daily Treatment Note 2020  Visit Count: 14     ICD-10: Treatment Diagnosis: Pain in left knee (M25.562), Pain in right knee (M25.561), Muscle weakness (generalized) (M62.81), Difficulty in walking, not elsewhere classified (R26.2)  Precautions/Allergies:   Campath [alemtuzumab]   TREATMENT PLAN:  Effective Dates: 2020 TO 2020 (90 days). 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 Pain: Initial:   6/10 Post Session: 6/10   Medications Last Reviewed:  2020    Updated Objective Findings:   none     TREATMENT:   Therapeutic Exercise: (40 Minutes):  Exercises per grid below to improve mobility and strength. Required moderate visual and verbal cues to promote proper body alignment.        Date:  20 Date  20 Date  20 Date  20 Date  20 Date  20   Activity/Exercise Parameters parameters parameters parameters parameter parameters   endurance 5 min walk 5 min walk 5 min bike  5 min 6 min  6 min   Sit to stand 2x10 2x10 2x10 2x10 2x12 2x15   Shuttle press 75# 3x10 75# 3x15 75#3 x15 75# 3x15 75# 3x15 75# 3x15   Hamstring curls 35#3x10 35# 3x10  35# 3x10 35# 3x10 35# 2x15 35# 3x15   Long arc quads 15# 3x10 15#3x10 15# 3x10 15#3x10 15#2x15 15# 3x15   Side lying hip abduction L 2x10 B 2x10 B 2x10 B 2x10 B 2x12 B 2x12 B   Side lying clam L 2x15 B 2x15 B 2x15 B Yellow band 2x10 B Yellow band 2x12 B Yellow band 2x12   SLR 2x10 2x12 - - - -   bridges 2x10 2x15 2x15 2x30 30x 2x15   Hip flexor stretch in side lying - - - - - 20\"x3 B       Treatment/Session Summary:    · Response to Treatment: He seems to be slowly progressing with LE strength. Pain has not changed in joints. · Communication/Consultation:  None today  · Equipment provided today:  None today  · Recommendations/Intent for next treatment session: Next visit will focus on continued progression with endurance and strengthening exercises.     Total Treatment Billable Duration:  40 minutes   PT Patient Time In/Time Out  Time In: 9700  Time Out: Ingrid Major PT    Future Appointments   Date Time Provider Maame Schrader   3/3/2020 11:15 AM GREGORY DanielsORPTWD [facility]   3/23/2020 10:00 AM UOA intermediate LAB PORT CHAIR 1 [facility] UOA-intermediate [facility]   5/4/2020  9:30 AM Jay 88 GVL [facility]   5/4/2020 10:00 AM Juanita Church MD [facility]   5/11/2020  2:00 PM RFM LAB [facility] RFM RFM   12/10/2020  8:45 AM Kwesi Franco NP [facility] RFM RFM

## 2020-03-03 ENCOUNTER — HOSPITAL ENCOUNTER (OUTPATIENT)
Dept: PHYSICAL THERAPY | Age: 65
Discharge: HOME | End: 2020-03-03

## 2020-03-03 NOTE — PROGRESS NOTES
Darío Moore  : 1955  Payor: Ivonne Suresh / Plan: [facility] [employer] / Product Type: Commerical /  [address]  at [place] [address],[address], [place] [address].  [phone]   [phone]         Mr. Samuel Molly called and cancelled his therapy appointment today due to illness.     Caitlin Walker, PT

## 2020-03-05 ENCOUNTER — APPOINTMENT (OUTPATIENT)
Dept: PHYSICAL THERAPY | Age: 65
End: 2020-03-05

## 2020-03-08 ENCOUNTER — APPOINTMENT (OUTPATIENT)
Dept: PHYSICAL THERAPY | Age: 65
End: 2020-03-08

## 2020-05-04 ENCOUNTER — HOSPITAL ENCOUNTER (OUTPATIENT)
Dept: INFUSION THERAPY | Age: 65
Discharge: HOME | End: 2020-05-04
Payer: MEDICARE

## 2020-05-04 ENCOUNTER — HOSPITAL ENCOUNTER (OUTPATIENT)
Dept: LAB | Age: 65
Discharge: HOME | End: 2020-05-04
Payer: MEDICARE

## 2020-05-04 ENCOUNTER — PATIENT OUTREACH (OUTPATIENT)
Dept: CASE MANAGEMENT | Age: 65
End: 2020-05-04

## 2020-05-04 DIAGNOSIS — C91.60 PROLYMPHOCYTIC LEUKEMIA OF T-CELL (HCC): ICD-10-CM

## 2020-05-04 LAB
ALBUMIN SERPL-MCNC: 4.2 G/DL (ref 3.2–4.6)
ALBUMIN/GLOB SERPL: 1.2 {RATIO} (ref 1.2–3.5)
ALP SERPL-CCNC: 35 U/L (ref 50–136)
ALT SERPL-CCNC: 16 U/L (ref 12–65)
ANION GAP SERPL CALC-SCNC: 6 MMOL/L (ref 7–16)
AST SERPL-CCNC: 18 U/L (ref 15–37)
BASOPHILS # BLD: 0 K/UL (ref 0–0.2)
BASOPHILS NFR BLD: 1 % (ref 0–2)
BILIRUB SERPL-MCNC: 0.4 MG/DL (ref 0.2–1.1)
BUN SERPL-MCNC: 14 MG/DL (ref 8–23)
CALCIUM SERPL-MCNC: 9.5 MG/DL (ref 8.3–10.4)
CHLORIDE SERPL-SCNC: 108 MMOL/L (ref 98–107)
CO2 SERPL-SCNC: 25 MMOL/L (ref 21–32)
CREAT SERPL-MCNC: 1.02 MG/DL (ref 0.8–1.5)
DIFFERENTIAL METHOD BLD: ABNORMAL
EOSINOPHIL # BLD: 0.1 K/UL (ref 0–0.8)
EOSINOPHIL NFR BLD: 5 % (ref 0.5–7.8)
ERYTHROCYTE [DISTWIDTH] IN BLOOD BY AUTOMATED COUNT: 13.3 % (ref 11.9–14.6)
GLOBULIN SER CALC-MCNC: 3.4 G/DL (ref 2.3–3.5)
GLUCOSE SERPL-MCNC: 96 MG/DL (ref 65–100)
HCT VFR BLD AUTO: 32.5 % (ref 41.1–50.3)
HGB BLD-MCNC: 10.7 G/DL (ref 13.6–17.2)
IMM GRANULOCYTES # BLD AUTO: 0 K/UL (ref 0–0.5)
IMM GRANULOCYTES NFR BLD AUTO: 2 % (ref 0–5)
LYMPHOCYTES # BLD: 0.5 K/UL (ref 0.5–4.6)
LYMPHOCYTES NFR BLD: 19 % (ref 13–44)
MAGNESIUM SERPL-MCNC: 2.1 MG/DL (ref 1.8–2.4)
MCH RBC QN AUTO: 32.2 PG (ref 26.1–32.9)
MCHC RBC AUTO-ENTMCNC: 32.9 G/DL (ref 31.4–35)
MCV RBC AUTO: 97.9 FL (ref 79.6–97.8)
MONOCYTES # BLD: 0.4 K/UL (ref 0.1–1.3)
MONOCYTES NFR BLD: 14 % (ref 4–12)
NEUTS SEG # BLD: 1.5 K/UL (ref 1.7–8.2)
NEUTS SEG NFR BLD: 60 % (ref 43–78)
NRBC # BLD: 0 K/UL (ref 0–0.2)
PLATELET # BLD AUTO: 120 K/UL (ref 150–450)
PMV BLD AUTO: 10.4 FL (ref 9.4–12.3)
POTASSIUM SERPL-SCNC: 4 MMOL/L (ref 3.5–5.1)
PROT SERPL-MCNC: 7.6 G/DL (ref 6.3–8.2)
RBC # BLD AUTO: 3.32 M/UL (ref 4.23–5.67)
SODIUM SERPL-SCNC: 139 MMOL/L (ref 136–145)
WBC # BLD AUTO: 2.6 K/UL (ref 4.3–11.1)

## 2020-05-04 PROCEDURE — 83735 ASSAY OF MAGNESIUM: CPT

## 2020-05-04 PROCEDURE — 80053 COMPREHEN METABOLIC PANEL: CPT

## 2020-05-04 PROCEDURE — 36591 DRAW BLOOD OFF VENOUS DEVICE: CPT

## 2020-05-04 PROCEDURE — 85025 COMPLETE CBC W/AUTO DIFF WBC: CPT

## 2020-05-04 NOTE — PROGRESS NOTES
Pt was seen and labs reviwed by Dr. Riley Tellez. Labs are stable. Pt states he is having aches and pains in his body that started after treatment was complete. We will discontinue Valcyte, Diflucan and Bactrim to see if this helps. Pt will return to clinic in 3 months with labs. Navigation is signing off at this time.

## 2020-05-07 LAB
CMV DNA SERPL NAA+PROBE-ACNC: NEGATIVE IU/ML
CMV DNA SERPL NAA+PROBE-LOG IU: NORMAL LOG10 IU/ML

## 2020-05-11 NOTE — THERAPY DISCHARGE
Shantanu Peguero  : 1955  Primary: [place] [employer]*  Secondary:  [address]  at [facility] VANNESSA RANDHAWA  1101 [facility], [address] [place] [address] 83[address], 2364 [facility]  [phone]   [phone]       OUTPATIENT PHYSICAL THERAPY:Discontinuation Summary 2020     ICD-10: Treatment Diagnosis: Pain in left knee (M25.562), Pain in right knee (M25.561), Muscle weakness (generalized) (M62.81), Difficulty in walking, not elsewhere classified (R26.2)  Precautions/Allergies:   Campath [alemtuzumab]   TREATMENT PLAN:  Discontinue with plan of care MEDICAL/REFERRING DIAGNOSIS:  Pain in left knee [M25.562]  Pain in right knee [M25.561]  Chondromalacia patellae, 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 Treatment has been discontinued at this time due to patient failing to return for additional treatment due to COVID-19 outbreak. The below goals were met prior to discontinuation. Some goals were not met due to unable to continue with therapy. Thank you for this referral.          GOALS: (Goals have been discussed and agreed upon with patient.)  Short-Term Functional Goals: Time Frame: 45 days  1. Pt will report pain 5/10 with daily activities. Progressed towards  2. Pt will improve L knee ROM to 0-110 for improved mobility. GOAL MET  3. Pt will increase L hip abductor strength to 4/5 for improved gait. Progressed towards  4. Pt will increase walking for endurance time to 8 minutes and at least 1500 ft. Progressed towards  5. Pt will be independent with HEP. GOAL MET  Discharge Goals: Time Frame: 90 days   1. Pt will report pain 3/10 with daily activities. Progressed towards  2. Pt will improve B knee ROM to 0-120 for improved mobility. Progressed towards  3.  Pt will increase B LE strength to 5/5 to return to walking long distances. Progressed towards  4. Pt will be able to walk for 15 minutes with no rest for return to walking and improved endurance. Progressed towards    No updated new objective findings     OUTCOME MEASURE:   Tool Used: Lower Extremity Functional Scale (LEFS)  Score:  Initial: 31/80 Most Recent: X/80 (Date: -- )   Interpretation of Score: 20 questions each scored on a 5 point scale with 0 representing \"extreme difficulty or unable to perform\" and 4 representing \"no difficulty\". The lower the score, the greater the functional disability. 80/80 represents no disability. Minimal detectable change is 9 points.     Rehabilitation Potential For Stated Goals: Good  Thank you for this referral,    Birgit Harley, PT

## 2020-08-06 ENCOUNTER — HOSPITAL ENCOUNTER (OUTPATIENT)
Dept: LAB | Age: 65
Discharge: HOME | End: 2020-08-06
Payer: MEDICARE

## 2020-08-06 DIAGNOSIS — E55.9 VITAMIN D DEFICIENCY, UNSPECIFIED: ICD-10-CM

## 2020-08-06 DIAGNOSIS — C91.60 PROLYMPHOCYTIC LEUKEMIA OF T-CELL (HCC): ICD-10-CM

## 2020-08-06 LAB
25(OH)D3 SERPL-MCNC: 53.7 NG/ML (ref 30–100)
ALBUMIN SERPL-MCNC: 4.1 G/DL (ref 3.2–4.6)
ALBUMIN/GLOB SERPL: 1.2 {RATIO} (ref 1.2–3.5)
ALP SERPL-CCNC: 43 U/L (ref 50–136)
ALT SERPL-CCNC: 19 U/L (ref 12–65)
ANION GAP SERPL CALC-SCNC: 6 MMOL/L (ref 7–16)
AST SERPL-CCNC: 17 U/L (ref 15–37)
BASOPHILS # BLD: 0 K/UL (ref 0–0.2)
BASOPHILS NFR BLD: 0 % (ref 0–2)
BILIRUB SERPL-MCNC: 0.6 MG/DL (ref 0.2–1.1)
BUN SERPL-MCNC: 24 MG/DL (ref 8–23)
CALCIUM SERPL-MCNC: 9 MG/DL (ref 8.3–10.4)
CHLORIDE SERPL-SCNC: 109 MMOL/L (ref 98–107)
CO2 SERPL-SCNC: 25 MMOL/L (ref 21–32)
CREAT SERPL-MCNC: 1.1 MG/DL (ref 0.8–1.5)
CRP SERPL HS-MCNC: 16.4 MG/L
DIFFERENTIAL METHOD BLD: ABNORMAL
EOSINOPHIL # BLD: 0.2 K/UL (ref 0–0.8)
EOSINOPHIL NFR BLD: 5 % (ref 0.5–7.8)
ERYTHROCYTE [DISTWIDTH] IN BLOOD BY AUTOMATED COUNT: 13.4 % (ref 11.9–14.6)
ERYTHROCYTE [SEDIMENTATION RATE] IN BLOOD: 39 MM/HR (ref 0–20)
GLOBULIN SER CALC-MCNC: 3.5 G/DL (ref 2.3–3.5)
GLUCOSE SERPL-MCNC: 103 MG/DL (ref 65–100)
HCT VFR BLD AUTO: 34.8 % (ref 41.1–50.3)
HGB BLD-MCNC: 11.3 G/DL (ref 13.6–17.2)
IMM GRANULOCYTES # BLD AUTO: 0 K/UL (ref 0–0.5)
IMM GRANULOCYTES NFR BLD AUTO: 0 % (ref 0–5)
LYMPHOCYTES # BLD: 0.8 K/UL (ref 0.5–4.6)
LYMPHOCYTES NFR BLD: 28 % (ref 13–44)
MCH RBC QN AUTO: 29.7 PG (ref 26.1–32.9)
MCHC RBC AUTO-ENTMCNC: 32.5 G/DL (ref 31.4–35)
MCV RBC AUTO: 91.6 FL (ref 79.6–97.8)
MONOCYTES # BLD: 0.4 K/UL (ref 0.1–1.3)
MONOCYTES NFR BLD: 12 % (ref 4–12)
NEUTS SEG # BLD: 1.6 K/UL (ref 1.7–8.2)
NEUTS SEG NFR BLD: 54 % (ref 43–78)
NRBC # BLD: 0 K/UL (ref 0–0.2)
PLATELET # BLD AUTO: 133 K/UL (ref 150–450)
PMV BLD AUTO: 10.5 FL (ref 9.4–12.3)
POTASSIUM SERPL-SCNC: 3.5 MMOL/L (ref 3.5–5.1)
PROT SERPL-MCNC: 7.6 G/DL (ref 6.3–8.2)
RBC # BLD AUTO: 3.8 M/UL (ref 4.23–5.67)
SODIUM SERPL-SCNC: 140 MMOL/L (ref 136–145)
WBC # BLD AUTO: 2.9 K/UL (ref 4.3–11.1)

## 2020-08-06 PROCEDURE — 80053 COMPREHEN METABOLIC PANEL: CPT

## 2020-08-06 PROCEDURE — 85025 COMPLETE CBC W/AUTO DIFF WBC: CPT

## 2020-08-06 PROCEDURE — 85652 RBC SED RATE AUTOMATED: CPT

## 2020-08-06 PROCEDURE — 86141 C-REACTIVE PROTEIN HS: CPT

## 2020-08-06 PROCEDURE — 82306 VITAMIN D 25 HYDROXY: CPT

## 2020-09-05 ENCOUNTER — HOSPITAL ENCOUNTER (EMERGENCY)
Age: 65
Discharge: HOME | End: 2020-09-05
Attending: EMERGENCY MEDICINE
Payer: MEDICARE

## 2020-09-05 VITALS
BODY MASS INDEX: 33.88 KG/M2 | WEIGHT: 242 LBS | SYSTOLIC BLOOD PRESSURE: 147 MMHG | OXYGEN SATURATION: 100 % | HEIGHT: 71 IN | RESPIRATION RATE: 18 BRPM | HEART RATE: 90 BPM | DIASTOLIC BLOOD PRESSURE: 87 MMHG | TEMPERATURE: 98.1 F

## 2020-09-05 DIAGNOSIS — R11.2 NON-INTRACTABLE VOMITING WITH NAUSEA, UNSPECIFIED VOMITING TYPE: Primary | ICD-10-CM

## 2020-09-05 LAB
ANION GAP SERPL CALC-SCNC: 8 MMOL/L (ref 7–16)
BUN SERPL-MCNC: 27 MG/DL (ref 8–23)
CALCIUM SERPL-MCNC: 10.1 MG/DL (ref 8.3–10.4)
CHLORIDE SERPL-SCNC: 104 MMOL/L (ref 98–107)
CO2 SERPL-SCNC: 28 MMOL/L (ref 21–32)
CREAT SERPL-MCNC: 1.12 MG/DL (ref 0.8–1.5)
ERYTHROCYTE [DISTWIDTH] IN BLOOD BY AUTOMATED COUNT: 13.3 % (ref 11.9–14.6)
GLUCOSE SERPL-MCNC: 95 MG/DL (ref 65–100)
HCT VFR BLD AUTO: 38.3 % (ref 41.1–50.3)
HGB BLD-MCNC: 12.6 G/DL (ref 13.6–17.2)
MAGNESIUM SERPL-MCNC: 2.6 MG/DL (ref 1.8–2.4)
MCH RBC QN AUTO: 29.5 PG (ref 26.1–32.9)
MCHC RBC AUTO-ENTMCNC: 32.9 G/DL (ref 31.4–35)
MCV RBC AUTO: 89.7 FL (ref 79.6–97.8)
NRBC # BLD: 0 K/UL (ref 0–0.2)
PLATELET # BLD AUTO: 210 K/UL (ref 150–450)
PMV BLD AUTO: 9.4 FL (ref 9.4–12.3)
POTASSIUM SERPL-SCNC: 3.7 MMOL/L (ref 3.5–5.1)
RBC # BLD AUTO: 4.27 M/UL (ref 4.23–5.6)
SODIUM SERPL-SCNC: 140 MMOL/L (ref 136–145)
WBC # BLD AUTO: 5.7 K/UL (ref 4.3–11.1)

## 2020-09-05 PROCEDURE — 83735 ASSAY OF MAGNESIUM: CPT

## 2020-09-05 PROCEDURE — 96374 THER/PROPH/DIAG INJ IV PUSH: CPT

## 2020-09-05 PROCEDURE — 74011250636 HC RX REV CODE- 250/636: Performed by: EMERGENCY MEDICINE

## 2020-09-05 PROCEDURE — 99282 EMERGENCY DEPT VISIT SF MDM: CPT

## 2020-09-05 PROCEDURE — 80048 BASIC METABOLIC PNL TOTAL CA: CPT

## 2020-09-05 PROCEDURE — 85027 COMPLETE CBC AUTOMATED: CPT

## 2020-09-05 RX ORDER — HYOSCYAMINE SULFATE 0.12 MG/1
0.12 TABLET SUBLINGUAL
Qty: 15 TAB | Refills: 0 | Status: SHIPPED | OUTPATIENT
Start: 2020-09-05 | End: 2021-01-01

## 2020-09-05 RX ORDER — SODIUM CHLORIDE 0.9 % (FLUSH) 0.9 %
5-40 SYRINGE (ML) INJECTION AS NEEDED
Status: DISCONTINUED | OUTPATIENT
Start: 2020-09-05 | End: 2020-09-05 | Stop reason: HOSPADM

## 2020-09-05 RX ORDER — ONDANSETRON 4 MG/1
4 TABLET, ORALLY DISINTEGRATING ORAL
Qty: 6 TAB | Refills: 1 | Status: SHIPPED | OUTPATIENT
Start: 2020-09-05 | End: 2020-09-07

## 2020-09-05 RX ORDER — SODIUM CHLORIDE 0.9 % (FLUSH) 0.9 %
5-40 SYRINGE (ML) INJECTION EVERY 8 HOURS
Status: DISCONTINUED | OUTPATIENT
Start: 2020-09-05 | End: 2020-09-05 | Stop reason: HOSPADM

## 2020-09-05 RX ORDER — ONDANSETRON 2 MG/ML
4 INJECTION INTRAMUSCULAR; INTRAVENOUS
Status: COMPLETED | OUTPATIENT
Start: 2020-09-05 | End: 2020-09-05

## 2020-09-05 RX ADMIN — ONDANSETRON 4 MG: 2 INJECTION INTRAMUSCULAR; INTRAVENOUS at 10:16

## 2020-09-05 RX ADMIN — SODIUM CHLORIDE 1000 ML: 900 INJECTION, SOLUTION INTRAVENOUS at 10:16

## 2020-09-05 NOTE — ED NOTES
I have reviewed discharge instructions with the patient. The patient verbalized understanding. Patient left ED via Discharge Method: ambulatory to Home with self. Opportunity for questions and clarification provided. Patient given 2 scripts. To continue your aftercare when you leave the hospital, you may receive an automated call from our care team to check in on how you are doing. This is a free service and part of our promise to provide the best care and service to meet your aftercare needs.  If you have questions, or wish to unsubscribe from this service please call [phone]. Thank you for Choosing our [employer] Emergency Department.

## 2020-09-05 NOTE — ED PROVIDER NOTES
57-year-old gentleman presents with concerns about feeling dehydrated. He says he has been vomiting for about 1 week. He said this all started after he was treated for a bronchitis with Z-Ronni, albuterol inhaler, and Phenergan. He relates all of his symptoms back to around August 18 when he had a sore throat. After about a week he went to an urgent care and had strep and COVID testing which were both negative. He says several days later he went back and had a chest x-ray that was negative because he had developed a cough. At that time he was prescribed the above medications. He says since then he has developed some nausea and has had several episodes of nonbloody nonbilious emesis. He says he just feels a little dehydrated and when he called his primary care doctor today they advised him to come to the emergency department for evaluation. No other associated symptoms. Elements of this note were created using speech recognition software. As such, errors of speech recognition may be present.            Past Medical History:   Diagnosis Date    Back pain     occassionally    Cervical radiculopathy     Claustrophobia     DVT (deep venous thrombosis) ([facility] [address] 06/2019    currently treated with Xarelto- started on 5/30/2019    GERD (gastroesophageal reflux disease)     H/O seasonal allergies     Hyperlipidemia     Impotence     Insomnia     Lateral epicondylitis     Leukemia (HCC)     CHEMO    Obesity     BMI 36.6    Sleep apnea     noncomplaint with CPAP       Past Surgical History:   Procedure Laterality Date    HX CIRCUMCISION      HX COLONOSCOPY  2017    Due in 2027    HX ORTHOPAEDIC Right     r wrist    HX WISDOM TEETH EXTRACTION      IR INSERT TUNL CVC W PORT OVER 5 YEARS  6/19/2019         Family History:   Problem Relation Age of Onset    Cancer Mother 80        pancreatic    Cancer Father 76        breast    No Known Problems Son     Hypertension Brother     Hypertension Brother  No Known Problems Daughter        Social History     Socioeconomic History    Marital status:      Spouse name: Not on file    Number of children: Not on file    Years of education: Not on file    Highest education level: Not on file   Occupational History    Not on file   Social Needs    Financial resource strain: Not on file    Food insecurity     Worry: Not on file     Inability: Not on file    Transportation needs     Medical: Not on file     Non-medical: Not on file   Tobacco Use    Smoking status: Never Smoker    Smokeless tobacco: Never Used   Substance and Sexual Activity    Alcohol use: Not Currently     Alcohol/week: 0.0 standard drinks    Drug use: No    Sexual activity: Yes     Partners: Female   Lifestyle    Physical activity     Days per week: Not on file     Minutes per session: Not on file    Stress: Not on file   Relationships    Social connections     Talks on phone: Not on file     Gets together: Not on file     Attends Yarsani service: Not on file     Active member of club or organization: Not on file     Attends meetings of clubs or organizations: Not on file     Relationship status: Not on file    Intimate partner violence     Fear of current or ex partner: Not on file     Emotionally abused: Not on file     Physically abused: Not on file     Forced sexual activity: Not on file   Other Topics Concern     Service Not Asked    Blood Transfusions Not Asked    Caffeine Concern Not Asked    Occupational Exposure Not Asked   Rosalene Grayson Hazards Not Asked    Sleep Concern Not Asked    Stress Concern Not Asked    Weight Concern Not Asked    Special Diet Not Asked    Back Care Not Asked    Exercise Not Asked    Bike Helmet Not Asked   [address] Not Asked    Self-Exams Not Asked   Social History Narrative    Not on file         ALLERGIES: Campath [alemtuzumab]    Review of Systems   Constitutional: Positive for fatigue.  Negative for chills, diaphoresis and fever.   HENT: Negative for congestion, rhinorrhea and sore throat. Eyes: Negative for redness and visual disturbance. Respiratory: Negative for cough, chest tightness, shortness of breath and wheezing. Cardiovascular: Negative for chest pain and palpitations. Gastrointestinal: Positive for nausea and vomiting. Negative for abdominal pain, blood in stool and diarrhea. Endocrine: Negative for polydipsia and polyuria. Genitourinary: Negative for dysuria and hematuria. Musculoskeletal: Negative for arthralgias, myalgias and neck stiffness. Skin: Negative for rash. Allergic/Immunologic: Negative for environmental allergies and food allergies. Neurological: Negative for dizziness, weakness and headaches. Hematological: Negative for adenopathy. Does not bruise/bleed easily. Psychiatric/Behavioral: Negative for confusion and sleep disturbance. The patient is not nervous/anxious. Vitals:    09/05/20 0958 09/05/20 1007   BP: 147/87    Pulse: 90    Resp: 18    Temp: 98.1 °F (36.7 °C)    SpO2: 100% 100%   Weight: 109.8 kg (242 lb)    Height: 5' 11\" (1.803 m)             Physical Exam  Vitals signs and nursing note reviewed. Constitutional:       General: He is not in acute distress. Appearance: He is well-developed. He is not toxic-appearing. HENT:      Head: Normocephalic and atraumatic. Eyes:      General: No scleral icterus. Right eye: No discharge. Left eye: No discharge. Conjunctiva/sclera: Conjunctivae normal.      Pupils: Pupils are equal, round, and reactive to light. Neck:      Musculoskeletal: Normal range of motion. No neck rigidity. Cardiovascular:      Rate and Rhythm: Normal rate and regular rhythm. Heart sounds: Normal heart sounds. Pulmonary:      Effort: Pulmonary effort is normal. No respiratory distress. Breath sounds: Normal breath sounds. No wheezing or rales. Chest:      Chest wall: No tenderness.    Abdominal:      General: Bowel sounds are normal. There is no distension. Palpations: Abdomen is soft. Tenderness: There is no guarding or rebound. Musculoskeletal: Normal range of motion. General: No tenderness. Lymphadenopathy:      Cervical: No cervical adenopathy. Skin:     General: Skin is warm and dry. Neurological:      General: No focal deficit present. Mental Status: He is alert and oriented to person, place, and time. Psychiatric:         Mood and Affect: Mood normal.         Behavior: Behavior normal.          MDM  Number of Diagnoses or Management Options  Diagnosis management comments: Patient's exam is benign. I will treat with some IV fluids, check his basic blood work, and give him some IV Zofran. ED Course as of Sep 05 1128   Sat Sep 05, 2020   1059 Patient's blood work is unremarkable.    [AC]   1126 Patient is feeling better after the Zofran and fluids.   I do not think he has anything urgent or emergent and I will discharge him home.    [AC]      ED Course User Index  [AC] Melvern Cranker, MD       Procedures

## 2020-09-05 NOTE — ED TRIAGE NOTES
Pt c\o n/v x1 week. Seen at Urgent care and tested negative for Covid on 8/18. Went back 1 week later and had chest xray for pnx rule out. Seen by PCP 2 days prior for blood work and had [address] yesterday. C\o continued n/v. Taking phenergan at home with no relief. States 10 lb weight loss.

## 2020-12-03 ENCOUNTER — HOSPITAL ENCOUNTER (OUTPATIENT)
Dept: LAB | Age: 65
Discharge: HOME | End: 2020-12-03
Payer: MEDICARE

## 2020-12-03 DIAGNOSIS — C91.60 PROLYMPHOCYTIC LEUKEMIA OF T-CELL (HCC): ICD-10-CM

## 2020-12-03 DIAGNOSIS — E55.9 VITAMIN D DEFICIENCY, UNSPECIFIED: ICD-10-CM

## 2020-12-03 LAB
ALBUMIN SERPL-MCNC: 3.9 G/DL (ref 3.2–4.6)
ALBUMIN/GLOB SERPL: 1.2 {RATIO} (ref 1.2–3.5)
ALP SERPL-CCNC: 48 U/L (ref 50–136)
ALT SERPL-CCNC: 14 U/L (ref 12–65)
ANION GAP SERPL CALC-SCNC: 6 MMOL/L (ref 7–16)
AST SERPL-CCNC: 15 U/L (ref 15–37)
BASOPHILS # BLD: 0 K/UL (ref 0–0.2)
BASOPHILS NFR BLD: 1 % (ref 0–2)
BILIRUB SERPL-MCNC: 0.7 MG/DL (ref 0.2–1.1)
BUN SERPL-MCNC: 16 MG/DL (ref 8–23)
CALCIUM SERPL-MCNC: 8.9 MG/DL (ref 8.3–10.4)
CHLORIDE SERPL-SCNC: 107 MMOL/L (ref 98–107)
CO2 SERPL-SCNC: 27 MMOL/L (ref 21–32)
CREAT SERPL-MCNC: 1.1 MG/DL (ref 0.8–1.5)
CRP SERPL-MCNC: 0.4 MG/DL (ref 0–0.9)
DIFFERENTIAL METHOD BLD: ABNORMAL
EOSINOPHIL # BLD: 0.2 K/UL (ref 0–0.8)
EOSINOPHIL NFR BLD: 6 % (ref 0.5–7.8)
ERYTHROCYTE [DISTWIDTH] IN BLOOD BY AUTOMATED COUNT: 14.3 % (ref 11.9–14.6)
ERYTHROCYTE [SEDIMENTATION RATE] IN BLOOD: 18 MM/HR (ref 0–20)
GLOBULIN SER CALC-MCNC: 3.2 G/DL (ref 2.3–3.5)
GLUCOSE SERPL-MCNC: 100 MG/DL (ref 65–100)
HCT VFR BLD AUTO: 35.3 % (ref 41.1–50.3)
HGB BLD-MCNC: 11.5 G/DL (ref 13.6–17.2)
IMM GRANULOCYTES # BLD AUTO: 0 K/UL (ref 0–0.5)
IMM GRANULOCYTES NFR BLD AUTO: 0 % (ref 0–5)
LYMPHOCYTES # BLD: 0.9 K/UL (ref 0.5–4.6)
LYMPHOCYTES NFR BLD: 27 % (ref 13–44)
MCH RBC QN AUTO: 31 PG (ref 26.1–32.9)
MCHC RBC AUTO-ENTMCNC: 32.6 G/DL (ref 31.4–35)
MCV RBC AUTO: 95.1 FL (ref 79.6–97.8)
MONOCYTES # BLD: 0.3 K/UL (ref 0.1–1.3)
MONOCYTES NFR BLD: 9 % (ref 4–12)
NEUTS SEG # BLD: 1.9 K/UL (ref 1.7–8.2)
NEUTS SEG NFR BLD: 58 % (ref 43–78)
NRBC # BLD: 0 K/UL (ref 0–0.2)
PLATELET # BLD AUTO: 148 K/UL (ref 150–450)
PMV BLD AUTO: 9.6 FL (ref 9.4–12.3)
POTASSIUM SERPL-SCNC: 3.5 MMOL/L (ref 3.5–5.1)
PROT SERPL-MCNC: 7.1 G/DL (ref 6.3–8.2)
RBC # BLD AUTO: 3.71 M/UL (ref 4.23–5.67)
SODIUM SERPL-SCNC: 140 MMOL/L (ref 136–145)
WBC # BLD AUTO: 3.2 K/UL (ref 4.3–11.1)

## 2020-12-03 PROCEDURE — 80053 COMPREHEN METABOLIC PANEL: CPT

## 2020-12-03 PROCEDURE — 85652 RBC SED RATE AUTOMATED: CPT

## 2020-12-03 PROCEDURE — 86140 C-REACTIVE PROTEIN: CPT

## 2020-12-03 PROCEDURE — 85025 COMPLETE CBC W/AUTO DIFF WBC: CPT

## 2020-12-03 PROCEDURE — 36415 COLL VENOUS BLD VENIPUNCTURE: CPT

## 2020-12-03 PROCEDURE — 82306 VITAMIN D 25 HYDROXY: CPT

## 2020-12-04 LAB — 25(OH)D3+25(OH)D2 SERPL-MCNC: 32.7 NG/ML (ref 30–100)

## 2020-12-14 ENCOUNTER — HOSPITAL ENCOUNTER (OUTPATIENT)
Dept: INTERVENTIONAL RADIOLOGY/VASCULAR | Age: 65
Discharge: HOME | End: 2020-12-14
Attending: INTERNAL MEDICINE
Payer: MEDICARE

## 2020-12-14 VITALS
TEMPERATURE: 98.4 F | OXYGEN SATURATION: 98 % | SYSTOLIC BLOOD PRESSURE: 156 MMHG | RESPIRATION RATE: 18 BRPM | DIASTOLIC BLOOD PRESSURE: 78 MMHG | HEART RATE: 76 BPM

## 2020-12-14 DIAGNOSIS — C91.60 PROLYMPHOCYTIC LEUKEMIA OF T-CELL (HCC): ICD-10-CM

## 2020-12-14 PROCEDURE — 77030031131 HC SUT MXN P COVD -B

## 2020-12-14 PROCEDURE — 74011000250 HC RX REV CODE- 250: Performed by: PHYSICIAN ASSISTANT

## 2020-12-14 PROCEDURE — 77030031139 HC SUT VCRL2 J&J -A

## 2020-12-14 PROCEDURE — 77030010507 HC ADH SKN DERMBND J&J -B

## 2020-12-14 PROCEDURE — 36590 REMOVAL TUNNELED CV CATH: CPT

## 2020-12-14 RX ORDER — LIDOCAINE HYDROCHLORIDE AND EPINEPHRINE 10; 10 MG/ML; UG/ML
2-100 INJECTION, SOLUTION INFILTRATION; PERINEURAL ONCE
Status: COMPLETED | OUTPATIENT
Start: 2020-12-14 | End: 2020-12-14

## 2020-12-14 RX ADMIN — LIDOCAINE HYDROCHLORIDE,EPINEPHRINE BITARTRATE 200 MG: 10; .01 INJECTION, SOLUTION INFILTRATION; PERINEURAL at 14:58

## 2020-12-14 NOTE — DISCHARGE INSTRUCTIONS
[address]  Department of Interventional Radiology  [facility]  [phone] Office  [phone] Fax    DRAIN/PORT/CATHETER REMOVAL  DISCHARGE INSTRUCTIONS    General Information:     Your doctor has ordered for us to remove your drain, port, or catheter. This could be that you do not need it anymore, it is not doing its job, your physician has decided on another plan for your treatment and/or it may need replacing. Home Care Instructions: You can resume your regular diet and medication regimen. Do not drink alcohol, drive, or make any important legal decisions in the next 24 hours. Do not lift anything heavier than a gallon of milk, or do anything strenuous for the next 24 hours. You will notice a dressing over the site of the removal. This dressing should stay in place until the site is healed. The dressing should be changed at least every 48 hours. You should change the dressing sooner if it becomes soiled or wet. The nurse who discharges you to home should review with you any wound care instructions. Resume your normal level of activity slowly. You may shower after 24 hours, but do not take a bath, swim or immerse yourself in water until the site has healed, and keep the dressing dry with plastic wrap while showering. The site may ooze for a couple of days. This drainage should lessen with each passing day. Call If:     You should call your Physician and/or the Radiology Nurse if you have any bleeding other than a small spot on your bandage. Call if you have any signs of infection, fever, or increased pain at the site of the tube. Call if the oozing increases, if it changes color, or begins to have an odor. Follow-Up Instructions: Please see your ordering doctor as he/she has requested. To Reach Us: If you have any questions about your procedure, please call the Interventional Radiology department at [phone].  After business hours (5pm) and weekends, call the answering service at [phone] and ask for the Radiologist on call to be paged. Si tiene Preguntas acerca del procedimiento, por favor llame al departamento de Radiología Intervencional al [phone]. Después de horas de oficina (5 pm) y los fines de [place], llamar al Radha Kirk Jason al [phone] y pregunte por el Radiologo de [place] Silverhikamari. Interventional Radiology General Nurse Discharge    After general anesthesia or intravenous sedation, for 24 hours or while taking prescription Narcotics:  · Limit your activities  · Do not drive and operate hazardous machinery  · Do not make important personal or business decisions  · Do  not drink alcoholic beverages  · If you have not urinated within 8 hours after discharge, please contact your surgeon on call. * Please give a list of your current medications to your Primary Care Provider. * Please update this list whenever your medications are discontinued, doses are     changed, or new medications (including over-the-counter products) are added. * Please carry medication information at all times in case of emergency situations. These are general instructions for a healthy lifestyle:    No smoking/ No tobacco products/ Avoid exposure to second hand smoke  Surgeon General's Warning:  Quitting smoking now greatly reduces serious risk to your health. Obesity, smoking, and sedentary lifestyle greatly increases your risk for illness  A healthy diet, regular physical exercise & weight monitoring are important for maintaining a healthy lifestyle    You may be retaining fluid if you have a history of heart failure or if you experience any of the following symptoms:  Weight gain of 3 pounds or more overnight or 5 pounds in a week, increased swelling in our hands or feet or shortness of breath while lying flat in bed.   Please call your doctor as soon as you notice any of these symptoms; do not wait until your next office visit. Recognize signs and symptoms of STROKE:  F-face looks uneven    A-arms unable to move or move unevenly    S-speech slurred or non-existent    T-time-call 911 as soon as signs and symptoms begin-DO NOT go       Back to bed or wait to see if you get better-TIME IS BRAIN.          Patient Signature:  Date: 12/14/2020  Discharging Nurse: Elsie Wesley

## 2021-01-01 ENCOUNTER — HOSPITAL ENCOUNTER (OUTPATIENT)
Dept: INFUSION THERAPY | Age: 66
Discharge: HOME | End: 2021-12-11
Payer: MEDICARE

## 2021-01-01 ENCOUNTER — APPOINTMENT (OUTPATIENT)
Dept: GENERAL RADIOLOGY | Age: 66
End: 2021-01-01
Attending: EMERGENCY MEDICINE
Payer: MEDICARE

## 2021-01-01 ENCOUNTER — HOSPITAL ENCOUNTER (OUTPATIENT)
Dept: PET IMAGING | Age: 66
Discharge: HOME | End: 2021-09-14
Payer: MEDICARE

## 2021-01-01 ENCOUNTER — HOSPITAL ENCOUNTER (OUTPATIENT)
Dept: INFUSION THERAPY | Age: 66
End: 2021-01-01

## 2021-01-01 ENCOUNTER — HOSPITAL ENCOUNTER (OUTPATIENT)
Dept: INFUSION THERAPY | Age: 66
Discharge: HOME | End: 2021-12-29
Payer: MEDICARE

## 2021-01-01 ENCOUNTER — HOSPITAL ENCOUNTER (OUTPATIENT)
Dept: INFUSION THERAPY | Age: 66
Discharge: HOME | End: 2021-11-08
Payer: MEDICARE

## 2021-01-01 ENCOUNTER — HOSPITAL ENCOUNTER (OUTPATIENT)
Dept: INFUSION THERAPY | Age: 66
Discharge: HOME | End: 2021-11-10
Payer: MEDICARE

## 2021-01-01 ENCOUNTER — HOSPITAL ENCOUNTER (OUTPATIENT)
Dept: LAB | Age: 66
Discharge: HOME | End: 2021-11-24

## 2021-01-01 ENCOUNTER — HOSPITAL ENCOUNTER (OUTPATIENT)
Dept: INFUSION THERAPY | Age: 66
Discharge: HOME | End: 2021-10-19
Payer: MEDICARE

## 2021-01-01 ENCOUNTER — HOSPITAL ENCOUNTER (INPATIENT)
Age: 66
LOS: 3 days | Discharge: HOME | DRG: 846 | End: 2021-10-29
Attending: INTERNAL MEDICINE | Admitting: INTERNAL MEDICINE
Payer: MEDICARE

## 2021-01-01 ENCOUNTER — HOSPITAL ENCOUNTER (OUTPATIENT)
Dept: INFUSION THERAPY | Age: 66
Discharge: HOME | End: 2021-10-21
Payer: MEDICARE

## 2021-01-01 ENCOUNTER — HOSPITAL ENCOUNTER (INPATIENT)
Age: 66
LOS: 10 days | Discharge: HOME | DRG: 871 | End: 2021-10-10
Attending: EMERGENCY MEDICINE | Admitting: FAMILY MEDICINE
Payer: MEDICARE

## 2021-01-01 ENCOUNTER — PATIENT OUTREACH (OUTPATIENT)
Dept: CASE MANAGEMENT | Age: 66
End: 2021-01-01

## 2021-01-01 ENCOUNTER — HOSPITAL ENCOUNTER (OUTPATIENT)
Dept: INFUSION THERAPY | Age: 66
Discharge: HOME | End: 2021-11-11
Payer: MEDICARE

## 2021-01-01 ENCOUNTER — HOSPITAL ENCOUNTER (OUTPATIENT)
Dept: INFUSION THERAPY | Age: 66
Discharge: HOME | End: 2021-12-08
Payer: MEDICARE

## 2021-01-01 ENCOUNTER — HOSPITAL ENCOUNTER (OUTPATIENT)
Dept: CT IMAGING | Age: 66
Discharge: HOME | End: 2021-11-19
Attending: INTERNAL MEDICINE
Payer: MEDICARE

## 2021-01-01 ENCOUNTER — HOME HEALTH ADMISSION (OUTPATIENT)
Dept: HOME HEALTH SERVICES | Facility: HOME HEALTH | Age: 66
End: 2021-01-01
Payer: MEDICARE

## 2021-01-01 ENCOUNTER — APPOINTMENT (OUTPATIENT)
Dept: INFUSION THERAPY | Age: 66
End: 2021-01-01

## 2021-01-01 ENCOUNTER — HOSPITAL ENCOUNTER (OUTPATIENT)
Dept: INFUSION THERAPY | Age: 66
Discharge: HOME | End: 2021-11-22
Payer: MEDICARE

## 2021-01-01 ENCOUNTER — HOSPITAL ENCOUNTER (OUTPATIENT)
Dept: INFUSION THERAPY | Age: 66
Discharge: HOME | End: 2021-10-12
Payer: MEDICARE

## 2021-01-01 ENCOUNTER — DOCUMENTATION ONLY (OUTPATIENT)
Dept: CASE MANAGEMENT | Age: 66
End: 2021-01-01

## 2021-01-01 ENCOUNTER — HOSPITAL ENCOUNTER (OUTPATIENT)
Dept: INFUSION THERAPY | Age: 66
Discharge: HOME | End: 2021-11-15
Payer: MEDICARE

## 2021-01-01 ENCOUNTER — HOSPITAL ENCOUNTER (OUTPATIENT)
Dept: INFUSION THERAPY | Age: 66
Discharge: HOME | End: 2021-11-19
Payer: MEDICARE

## 2021-01-01 ENCOUNTER — HOSPITAL ENCOUNTER (OUTPATIENT)
Dept: INFUSION THERAPY | Age: 66
Discharge: HOME | End: 2021-12-04
Payer: MEDICARE

## 2021-01-01 ENCOUNTER — HOSPITAL ENCOUNTER (OUTPATIENT)
Dept: INFUSION THERAPY | Age: 66
Discharge: HOME | End: 2021-12-10
Payer: MEDICARE

## 2021-01-01 ENCOUNTER — HOSPITAL ENCOUNTER (OUTPATIENT)
Dept: INFUSION THERAPY | Age: 66
Discharge: HOME | End: 2021-12-31
Payer: MEDICARE

## 2021-01-01 ENCOUNTER — APPOINTMENT (OUTPATIENT)
Dept: CT IMAGING | Age: 66
End: 2021-01-01
Attending: EMERGENCY MEDICINE
Payer: MEDICARE

## 2021-01-01 ENCOUNTER — HOSPITAL ENCOUNTER (OUTPATIENT)
Dept: INFUSION THERAPY | Age: 66
Discharge: HOME | End: 2021-10-14
Payer: MEDICARE

## 2021-01-01 ENCOUNTER — HOSPITAL ENCOUNTER (OUTPATIENT)
Dept: INFUSION THERAPY | Age: 66
Discharge: HOME | End: 2021-12-20
Payer: MEDICARE

## 2021-01-01 ENCOUNTER — HOSPITAL ENCOUNTER (OUTPATIENT)
Dept: INFUSION THERAPY | Age: 66
Discharge: HOME | End: 2021-10-18
Payer: MEDICARE

## 2021-01-01 ENCOUNTER — HOSPITAL ENCOUNTER (OUTPATIENT)
Dept: INFUSION THERAPY | Age: 66
Discharge: HOME | End: 2021-12-15
Payer: MEDICARE

## 2021-01-01 ENCOUNTER — TRANSCRIBE ORDER (OUTPATIENT)
Dept: SCHEDULING | Age: 66
End: 2021-01-01

## 2021-01-01 ENCOUNTER — HOSPITAL ENCOUNTER (OUTPATIENT)
Dept: LAB | Age: 66
Discharge: HOME | DRG: 871 | End: 2021-09-28
Payer: MEDICARE

## 2021-01-01 ENCOUNTER — HOSPITAL ENCOUNTER (OUTPATIENT)
Dept: INFUSION THERAPY | Age: 66
Discharge: HOME | End: 2021-10-15
Payer: MEDICARE

## 2021-01-01 ENCOUNTER — HOSPITAL ENCOUNTER (OUTPATIENT)
Dept: INFUSION THERAPY | Age: 66
Discharge: HOME | End: 2021-12-13
Payer: MEDICARE

## 2021-01-01 ENCOUNTER — HOSPITAL ENCOUNTER (OUTPATIENT)
Dept: INFUSION THERAPY | Age: 66
Discharge: HOME | DRG: 846 | End: 2021-10-25
Payer: MEDICARE

## 2021-01-01 ENCOUNTER — HOSPITAL ENCOUNTER (OUTPATIENT)
Dept: INFUSION THERAPY | Age: 66
Discharge: HOME | End: 2021-11-27
Payer: MEDICARE

## 2021-01-01 ENCOUNTER — HOSPITAL ENCOUNTER (OUTPATIENT)
Dept: INFUSION THERAPY | Age: 66
Discharge: HOME | End: 2021-12-22
Payer: MEDICARE

## 2021-01-01 ENCOUNTER — HOSPITAL ENCOUNTER (OUTPATIENT)
Dept: INFUSION THERAPY | Age: 66
Discharge: HOME | End: 2021-10-30
Payer: MEDICARE

## 2021-01-01 ENCOUNTER — HOSPITAL ENCOUNTER (OUTPATIENT)
Dept: INFUSION THERAPY | Age: 66
Discharge: HOME | End: 2021-12-03
Payer: MEDICARE

## 2021-01-01 ENCOUNTER — APPOINTMENT (OUTPATIENT)
Dept: GENERAL RADIOLOGY | Age: 66
DRG: 871 | End: 2021-01-01
Attending: EMERGENCY MEDICINE
Payer: MEDICARE

## 2021-01-01 ENCOUNTER — HOSPITAL ENCOUNTER (OUTPATIENT)
Dept: INFUSION THERAPY | Age: 66
Discharge: HOME | End: 2021-10-23
Payer: MEDICARE

## 2021-01-01 ENCOUNTER — HOSPITAL ENCOUNTER (OUTPATIENT)
Dept: INTERVENTIONAL RADIOLOGY/VASCULAR | Age: 66
Discharge: HOME | End: 2021-11-23
Attending: INTERNAL MEDICINE
Payer: MEDICARE

## 2021-01-01 ENCOUNTER — HOSPITAL ENCOUNTER (INPATIENT)
Age: 66
LOS: 4 days | Discharge: HOME | DRG: 847 | End: 2021-09-26
Attending: EMERGENCY MEDICINE | Admitting: INTERNAL MEDICINE
Payer: MEDICARE

## 2021-01-01 ENCOUNTER — HOSPITAL ENCOUNTER (OUTPATIENT)
Dept: INFUSION THERAPY | Age: 66
Discharge: HOME | End: 2021-11-05
Payer: MEDICARE

## 2021-01-01 ENCOUNTER — HOSPITAL ENCOUNTER (OUTPATIENT)
Dept: INFUSION THERAPY | Age: 66
Discharge: HOME | End: 2021-12-17
Payer: MEDICARE

## 2021-01-01 ENCOUNTER — APPOINTMENT (OUTPATIENT)
Dept: ULTRASOUND IMAGING | Age: 66
DRG: 847 | End: 2021-01-01
Attending: NURSE PRACTITIONER
Payer: MEDICARE

## 2021-01-01 ENCOUNTER — HOSPITAL ENCOUNTER (OUTPATIENT)
Dept: INFUSION THERAPY | Age: 66
Discharge: HOME | End: 2021-10-16
Payer: MEDICARE

## 2021-01-01 ENCOUNTER — HOME CARE VISIT (OUTPATIENT)
Dept: SCHEDULING | Facility: HOME HEALTH | Age: 66
End: 2021-01-01
Payer: MEDICARE

## 2021-01-01 ENCOUNTER — HOSPITAL ENCOUNTER (OUTPATIENT)
Dept: CT IMAGING | Age: 66
Discharge: HOME | DRG: 847 | End: 2021-09-20
Attending: INTERNAL MEDICINE
Payer: MEDICARE

## 2021-01-01 ENCOUNTER — HOSPITAL ENCOUNTER (OUTPATIENT)
Dept: INFUSION THERAPY | Age: 66
Discharge: HOME | End: 2021-12-27
Payer: MEDICARE

## 2021-01-01 ENCOUNTER — HOSPITAL ENCOUNTER (OUTPATIENT)
Dept: INFUSION THERAPY | Age: 66
Discharge: HOME | End: 2021-11-24
Payer: MEDICARE

## 2021-01-01 ENCOUNTER — HOSPITAL ENCOUNTER (OUTPATIENT)
Dept: INFUSION THERAPY | Age: 66
Discharge: HOME | End: 2021-09-28
Payer: MEDICARE

## 2021-01-01 ENCOUNTER — HOSPITAL ENCOUNTER (OUTPATIENT)
Dept: GENERAL RADIOLOGY | Age: 66
Discharge: HOME | End: 2021-11-05

## 2021-01-01 ENCOUNTER — APPOINTMENT (OUTPATIENT)
Dept: GENERAL RADIOLOGY | Age: 66
DRG: 847 | End: 2021-01-01
Attending: PHYSICIAN ASSISTANT
Payer: MEDICARE

## 2021-01-01 ENCOUNTER — HOSPITAL ENCOUNTER (OUTPATIENT)
Dept: INFUSION THERAPY | Age: 66
Discharge: HOME | End: 2021-11-17
Payer: MEDICARE

## 2021-01-01 ENCOUNTER — APPOINTMENT (OUTPATIENT)
Dept: GENERAL RADIOLOGY | Age: 66
DRG: 847 | End: 2021-01-01
Attending: INTERNAL MEDICINE
Payer: MEDICARE

## 2021-01-01 ENCOUNTER — HOSPITAL ENCOUNTER (OUTPATIENT)
Dept: INFUSION THERAPY | Age: 66
Discharge: HOME | End: 2021-09-20
Payer: MEDICARE

## 2021-01-01 ENCOUNTER — HOSPITAL ENCOUNTER (OUTPATIENT)
Dept: CT IMAGING | Age: 66
Discharge: HOME | End: 2021-11-08
Attending: NURSE PRACTITIONER

## 2021-01-01 ENCOUNTER — HOSPITAL ENCOUNTER (OUTPATIENT)
Dept: INFUSION THERAPY | Age: 66
Discharge: HOME | End: 2021-12-24
Payer: MEDICARE

## 2021-01-01 ENCOUNTER — HOSPITAL ENCOUNTER (OUTPATIENT)
Dept: INFUSION THERAPY | Age: 66
Discharge: HOME | End: 2021-11-29
Payer: MEDICARE

## 2021-01-01 ENCOUNTER — HOSPITAL ENCOUNTER (OUTPATIENT)
Dept: INFUSION THERAPY | Age: 66
Discharge: HOME | End: 2021-09-29
Payer: MEDICARE

## 2021-01-01 ENCOUNTER — HOSPITAL ENCOUNTER (OUTPATIENT)
Dept: INFUSION THERAPY | Age: 66
Discharge: HOME | End: 2021-11-03
Payer: MEDICARE

## 2021-01-01 ENCOUNTER — HOSPITAL ENCOUNTER (OUTPATIENT)
Dept: INFUSION THERAPY | Age: 66
Discharge: HOME | End: 2021-12-01
Payer: MEDICARE

## 2021-01-01 ENCOUNTER — APPOINTMENT (OUTPATIENT)
Dept: NON INVASIVE DIAGNOSTICS | Age: 66
DRG: 847 | End: 2021-01-01
Attending: NURSE PRACTITIONER
Payer: MEDICARE

## 2021-01-01 ENCOUNTER — HOSPITAL ENCOUNTER (OUTPATIENT)
Dept: INFUSION THERAPY | Age: 66
Discharge: HOME | End: 2021-09-21
Payer: MEDICARE

## 2021-01-01 ENCOUNTER — HOSPITAL ENCOUNTER (OUTPATIENT)
Dept: INFUSION THERAPY | Age: 66
Discharge: HOME | End: 2021-11-13
Payer: MEDICARE

## 2021-01-01 ENCOUNTER — HOSPITAL ENCOUNTER (OUTPATIENT)
Dept: GENERAL RADIOLOGY | Age: 66
Discharge: HOME | End: 2021-07-01
Payer: MEDICARE

## 2021-01-01 ENCOUNTER — TELEPHONE (OUTPATIENT)
Dept: CASE MANAGEMENT | Age: 66
End: 2021-01-01

## 2021-01-01 ENCOUNTER — HOSPITAL ENCOUNTER (OUTPATIENT)
Dept: ULTRASOUND IMAGING | Age: 66
Discharge: HOME | End: 2021-11-11
Attending: INTERNAL MEDICINE

## 2021-01-01 ENCOUNTER — HOSPITAL ENCOUNTER (OUTPATIENT)
Dept: INFUSION THERAPY | Age: 66
Discharge: HOME | End: 2021-11-01
Payer: MEDICARE

## 2021-01-01 ENCOUNTER — HOSPITAL ENCOUNTER (OUTPATIENT)
Dept: INFUSION THERAPY | Age: 66
Discharge: HOME | End: 2021-12-06
Payer: MEDICARE

## 2021-01-01 ENCOUNTER — HOSPITAL ENCOUNTER (EMERGENCY)
Age: 66
Discharge: HOME | End: 2021-09-17
Attending: EMERGENCY MEDICINE
Payer: MEDICARE

## 2021-01-01 VITALS
TEMPERATURE: 98.3 F | HEART RATE: 92 BPM | OXYGEN SATURATION: 100 % | DIASTOLIC BLOOD PRESSURE: 74 MMHG | RESPIRATION RATE: 18 BRPM | SYSTOLIC BLOOD PRESSURE: 125 MMHG

## 2021-01-01 VITALS
RESPIRATION RATE: 16 BRPM | OXYGEN SATURATION: 99 % | TEMPERATURE: 97.7 F | SYSTOLIC BLOOD PRESSURE: 120 MMHG | HEART RATE: 78 BPM | DIASTOLIC BLOOD PRESSURE: 67 MMHG

## 2021-01-01 VITALS
RESPIRATION RATE: 18 BRPM | DIASTOLIC BLOOD PRESSURE: 57 MMHG | OXYGEN SATURATION: 100 % | HEART RATE: 83 BPM | TEMPERATURE: 97.8 F | SYSTOLIC BLOOD PRESSURE: 108 MMHG

## 2021-01-01 VITALS
BODY MASS INDEX: 30.04 KG/M2 | DIASTOLIC BLOOD PRESSURE: 72 MMHG | WEIGHT: 215.4 LBS | SYSTOLIC BLOOD PRESSURE: 109 MMHG | OXYGEN SATURATION: 98 % | RESPIRATION RATE: 18 BRPM | TEMPERATURE: 97.8 F | HEART RATE: 80 BPM

## 2021-01-01 VITALS
SYSTOLIC BLOOD PRESSURE: 118 MMHG | TEMPERATURE: 98.5 F | OXYGEN SATURATION: 93 % | RESPIRATION RATE: 18 BRPM | HEART RATE: 85 BPM | DIASTOLIC BLOOD PRESSURE: 69 MMHG | HEIGHT: 71 IN | WEIGHT: 287 LBS | BODY MASS INDEX: 40.18 KG/M2

## 2021-01-01 VITALS
SYSTOLIC BLOOD PRESSURE: 129 MMHG | RESPIRATION RATE: 15 BRPM | HEART RATE: 78 BPM | DIASTOLIC BLOOD PRESSURE: 79 MMHG | BODY MASS INDEX: 36.93 KG/M2 | HEIGHT: 71 IN | OXYGEN SATURATION: 98 % | WEIGHT: 263.8 LBS | TEMPERATURE: 97.5 F

## 2021-01-01 VITALS
SYSTOLIC BLOOD PRESSURE: 100 MMHG | RESPIRATION RATE: 18 BRPM | OXYGEN SATURATION: 99 % | TEMPERATURE: 97.1 F | HEART RATE: 77 BPM | DIASTOLIC BLOOD PRESSURE: 63 MMHG

## 2021-01-01 VITALS
TEMPERATURE: 97.8 F | OXYGEN SATURATION: 98 % | DIASTOLIC BLOOD PRESSURE: 82 MMHG | RESPIRATION RATE: 18 BRPM | SYSTOLIC BLOOD PRESSURE: 120 MMHG | HEART RATE: 78 BPM

## 2021-01-01 VITALS
RESPIRATION RATE: 18 BRPM | HEART RATE: 74 BPM | OXYGEN SATURATION: 100 % | TEMPERATURE: 97.7 F | SYSTOLIC BLOOD PRESSURE: 122 MMHG | DIASTOLIC BLOOD PRESSURE: 74 MMHG

## 2021-01-01 VITALS
HEART RATE: 66 BPM | SYSTOLIC BLOOD PRESSURE: 124 MMHG | RESPIRATION RATE: 18 BRPM | TEMPERATURE: 97.2 F | DIASTOLIC BLOOD PRESSURE: 72 MMHG | OXYGEN SATURATION: 99 %

## 2021-01-01 VITALS
RESPIRATION RATE: 18 BRPM | HEART RATE: 89 BPM | DIASTOLIC BLOOD PRESSURE: 64 MMHG | OXYGEN SATURATION: 100 % | SYSTOLIC BLOOD PRESSURE: 109 MMHG | TEMPERATURE: 98 F

## 2021-01-01 VITALS
OXYGEN SATURATION: 99 % | WEIGHT: 223.66 LBS | RESPIRATION RATE: 18 BRPM | SYSTOLIC BLOOD PRESSURE: 120 MMHG | DIASTOLIC BLOOD PRESSURE: 76 MMHG | BODY MASS INDEX: 31.19 KG/M2 | TEMPERATURE: 97.9 F | HEART RATE: 76 BPM

## 2021-01-01 VITALS
RESPIRATION RATE: 18 BRPM | DIASTOLIC BLOOD PRESSURE: 84 MMHG | TEMPERATURE: 97.5 F | OXYGEN SATURATION: 98 % | SYSTOLIC BLOOD PRESSURE: 132 MMHG | HEART RATE: 82 BPM

## 2021-01-01 VITALS
DIASTOLIC BLOOD PRESSURE: 84 MMHG | SYSTOLIC BLOOD PRESSURE: 118 MMHG | OXYGEN SATURATION: 100 % | RESPIRATION RATE: 18 BRPM | HEART RATE: 73 BPM | TEMPERATURE: 97.4 F

## 2021-01-01 VITALS
HEART RATE: 72 BPM | SYSTOLIC BLOOD PRESSURE: 114 MMHG | WEIGHT: 224.8 LBS | BODY MASS INDEX: 31.35 KG/M2 | RESPIRATION RATE: 18 BRPM | DIASTOLIC BLOOD PRESSURE: 81 MMHG | TEMPERATURE: 97.4 F | OXYGEN SATURATION: 100 %

## 2021-01-01 VITALS
OXYGEN SATURATION: 100 % | DIASTOLIC BLOOD PRESSURE: 71 MMHG | HEART RATE: 75 BPM | TEMPERATURE: 97.9 F | RESPIRATION RATE: 18 BRPM | SYSTOLIC BLOOD PRESSURE: 117 MMHG

## 2021-01-01 VITALS
WEIGHT: 279 LBS | DIASTOLIC BLOOD PRESSURE: 64 MMHG | HEIGHT: 71 IN | TEMPERATURE: 97.1 F | OXYGEN SATURATION: 100 % | SYSTOLIC BLOOD PRESSURE: 141 MMHG | RESPIRATION RATE: 16 BRPM | HEART RATE: 86 BPM | BODY MASS INDEX: 39.06 KG/M2

## 2021-01-01 VITALS
TEMPERATURE: 97.9 F | OXYGEN SATURATION: 100 % | SYSTOLIC BLOOD PRESSURE: 123 MMHG | HEART RATE: 76 BPM | RESPIRATION RATE: 18 BRPM | DIASTOLIC BLOOD PRESSURE: 79 MMHG

## 2021-01-01 VITALS
RESPIRATION RATE: 16 BRPM | OXYGEN SATURATION: 100 % | DIASTOLIC BLOOD PRESSURE: 74 MMHG | SYSTOLIC BLOOD PRESSURE: 108 MMHG | TEMPERATURE: 97.8 F | HEART RATE: 72 BPM

## 2021-01-01 VITALS
HEART RATE: 85 BPM | OXYGEN SATURATION: 99 % | TEMPERATURE: 98.5 F | DIASTOLIC BLOOD PRESSURE: 72 MMHG | SYSTOLIC BLOOD PRESSURE: 124 MMHG | RESPIRATION RATE: 17 BRPM

## 2021-01-01 VITALS
TEMPERATURE: 98.2 F | SYSTOLIC BLOOD PRESSURE: 95 MMHG | RESPIRATION RATE: 16 BRPM | HEIGHT: 71 IN | HEART RATE: 89 BPM | DIASTOLIC BLOOD PRESSURE: 54 MMHG | BODY MASS INDEX: 32.62 KG/M2 | OXYGEN SATURATION: 95 % | WEIGHT: 233 LBS

## 2021-01-01 VITALS
HEART RATE: 76 BPM | RESPIRATION RATE: 16 BRPM | TEMPERATURE: 97.9 F | SYSTOLIC BLOOD PRESSURE: 131 MMHG | OXYGEN SATURATION: 100 % | DIASTOLIC BLOOD PRESSURE: 79 MMHG

## 2021-01-01 VITALS
DIASTOLIC BLOOD PRESSURE: 71 MMHG | SYSTOLIC BLOOD PRESSURE: 112 MMHG | OXYGEN SATURATION: 100 % | TEMPERATURE: 97.8 F | RESPIRATION RATE: 18 BRPM | HEART RATE: 79 BPM

## 2021-01-01 VITALS
DIASTOLIC BLOOD PRESSURE: 73 MMHG | TEMPERATURE: 98 F | RESPIRATION RATE: 16 BRPM | SYSTOLIC BLOOD PRESSURE: 103 MMHG | HEART RATE: 102 BPM | OXYGEN SATURATION: 99 %

## 2021-01-01 VITALS
HEART RATE: 88 BPM | OXYGEN SATURATION: 95 % | DIASTOLIC BLOOD PRESSURE: 65 MMHG | RESPIRATION RATE: 16 BRPM | TEMPERATURE: 97.9 F | SYSTOLIC BLOOD PRESSURE: 115 MMHG

## 2021-01-01 VITALS
HEART RATE: 82 BPM | SYSTOLIC BLOOD PRESSURE: 119 MMHG | DIASTOLIC BLOOD PRESSURE: 76 MMHG | WEIGHT: 267 LBS | TEMPERATURE: 98.1 F | RESPIRATION RATE: 18 BRPM | OXYGEN SATURATION: 100 % | BODY MASS INDEX: 37.24 KG/M2

## 2021-01-01 VITALS
TEMPERATURE: 97.3 F | HEART RATE: 86 BPM | RESPIRATION RATE: 18 BRPM | OXYGEN SATURATION: 98 % | DIASTOLIC BLOOD PRESSURE: 66 MMHG | SYSTOLIC BLOOD PRESSURE: 124 MMHG

## 2021-01-01 VITALS
SYSTOLIC BLOOD PRESSURE: 95 MMHG | OXYGEN SATURATION: 100 % | DIASTOLIC BLOOD PRESSURE: 57 MMHG | HEART RATE: 81 BPM | RESPIRATION RATE: 18 BRPM | TEMPERATURE: 98.1 F

## 2021-01-01 VITALS
OXYGEN SATURATION: 100 % | HEART RATE: 87 BPM | RESPIRATION RATE: 18 BRPM | DIASTOLIC BLOOD PRESSURE: 70 MMHG | TEMPERATURE: 97.9 F | SYSTOLIC BLOOD PRESSURE: 130 MMHG

## 2021-01-01 VITALS
OXYGEN SATURATION: 100 % | HEART RATE: 78 BPM | BODY MASS INDEX: 32.94 KG/M2 | WEIGHT: 236.2 LBS | RESPIRATION RATE: 18 BRPM | DIASTOLIC BLOOD PRESSURE: 56 MMHG | TEMPERATURE: 98.3 F | SYSTOLIC BLOOD PRESSURE: 92 MMHG

## 2021-01-01 VITALS
TEMPERATURE: 98.7 F | SYSTOLIC BLOOD PRESSURE: 128 MMHG | DIASTOLIC BLOOD PRESSURE: 78 MMHG | HEART RATE: 86 BPM | RESPIRATION RATE: 16 BRPM | HEIGHT: 71 IN | BODY MASS INDEX: 37.91 KG/M2 | WEIGHT: 270.79 LBS | OXYGEN SATURATION: 100 %

## 2021-01-01 VITALS
DIASTOLIC BLOOD PRESSURE: 68 MMHG | OXYGEN SATURATION: 99 % | RESPIRATION RATE: 18 BRPM | HEART RATE: 93 BPM | BODY MASS INDEX: 30.15 KG/M2 | TEMPERATURE: 97.6 F | WEIGHT: 216.2 LBS | SYSTOLIC BLOOD PRESSURE: 110 MMHG

## 2021-01-01 VITALS
TEMPERATURE: 98.2 F | BODY MASS INDEX: 29.82 KG/M2 | DIASTOLIC BLOOD PRESSURE: 73 MMHG | HEART RATE: 78 BPM | WEIGHT: 213.8 LBS | OXYGEN SATURATION: 100 % | SYSTOLIC BLOOD PRESSURE: 111 MMHG | RESPIRATION RATE: 18 BRPM

## 2021-01-01 VITALS
DIASTOLIC BLOOD PRESSURE: 77 MMHG | SYSTOLIC BLOOD PRESSURE: 125 MMHG | HEART RATE: 61 BPM | RESPIRATION RATE: 18 BRPM | TEMPERATURE: 97.8 F | OXYGEN SATURATION: 100 %

## 2021-01-01 VITALS
DIASTOLIC BLOOD PRESSURE: 66 MMHG | OXYGEN SATURATION: 100 % | SYSTOLIC BLOOD PRESSURE: 111 MMHG | RESPIRATION RATE: 16 BRPM | TEMPERATURE: 97.9 F | HEART RATE: 70 BPM

## 2021-01-01 VITALS
RESPIRATION RATE: 17 BRPM | DIASTOLIC BLOOD PRESSURE: 77 MMHG | SYSTOLIC BLOOD PRESSURE: 140 MMHG | TEMPERATURE: 97.9 F | BODY MASS INDEX: 37.46 KG/M2 | WEIGHT: 268.6 LBS | HEART RATE: 82 BPM | OXYGEN SATURATION: 98 %

## 2021-01-01 VITALS
SYSTOLIC BLOOD PRESSURE: 118 MMHG | DIASTOLIC BLOOD PRESSURE: 66 MMHG | RESPIRATION RATE: 18 BRPM | HEART RATE: 81 BPM | OXYGEN SATURATION: 100 % | TEMPERATURE: 98.4 F

## 2021-01-01 VITALS
OXYGEN SATURATION: 100 % | WEIGHT: 291 LBS | BODY MASS INDEX: 40.74 KG/M2 | HEART RATE: 79 BPM | SYSTOLIC BLOOD PRESSURE: 118 MMHG | HEIGHT: 71 IN | RESPIRATION RATE: 18 BRPM | TEMPERATURE: 98.7 F | DIASTOLIC BLOOD PRESSURE: 75 MMHG

## 2021-01-01 VITALS
TEMPERATURE: 97.6 F | HEART RATE: 68 BPM | RESPIRATION RATE: 18 BRPM | BODY MASS INDEX: 29.82 KG/M2 | OXYGEN SATURATION: 100 % | SYSTOLIC BLOOD PRESSURE: 120 MMHG | WEIGHT: 213.8 LBS | DIASTOLIC BLOOD PRESSURE: 63 MMHG

## 2021-01-01 VITALS
SYSTOLIC BLOOD PRESSURE: 110 MMHG | RESPIRATION RATE: 18 BRPM | OXYGEN SATURATION: 99 % | TEMPERATURE: 97.9 F | DIASTOLIC BLOOD PRESSURE: 60 MMHG | HEART RATE: 71 BPM

## 2021-01-01 VITALS
DIASTOLIC BLOOD PRESSURE: 75 MMHG | HEART RATE: 81 BPM | OXYGEN SATURATION: 97 % | TEMPERATURE: 97.5 F | BODY MASS INDEX: 29.71 KG/M2 | SYSTOLIC BLOOD PRESSURE: 119 MMHG | RESPIRATION RATE: 18 BRPM | WEIGHT: 213 LBS

## 2021-01-01 VITALS
RESPIRATION RATE: 19 BRPM | HEART RATE: 74 BPM | DIASTOLIC BLOOD PRESSURE: 64 MMHG | OXYGEN SATURATION: 98 % | TEMPERATURE: 96.8 F | SYSTOLIC BLOOD PRESSURE: 122 MMHG

## 2021-01-01 VITALS
RESPIRATION RATE: 18 BRPM | HEART RATE: 83 BPM | TEMPERATURE: 97.6 F | SYSTOLIC BLOOD PRESSURE: 115 MMHG | OXYGEN SATURATION: 100 % | DIASTOLIC BLOOD PRESSURE: 63 MMHG

## 2021-01-01 VITALS
RESPIRATION RATE: 18 BRPM | HEART RATE: 90 BPM | OXYGEN SATURATION: 100 % | SYSTOLIC BLOOD PRESSURE: 139 MMHG | DIASTOLIC BLOOD PRESSURE: 73 MMHG | TEMPERATURE: 97.4 F

## 2021-01-01 VITALS
SYSTOLIC BLOOD PRESSURE: 110 MMHG | OXYGEN SATURATION: 100 % | RESPIRATION RATE: 16 BRPM | HEART RATE: 81 BPM | TEMPERATURE: 98.8 F | DIASTOLIC BLOOD PRESSURE: 75 MMHG

## 2021-01-01 VITALS
BODY MASS INDEX: 32.13 KG/M2 | SYSTOLIC BLOOD PRESSURE: 112 MMHG | OXYGEN SATURATION: 98 % | HEART RATE: 79 BPM | TEMPERATURE: 97.9 F | RESPIRATION RATE: 18 BRPM | DIASTOLIC BLOOD PRESSURE: 67 MMHG | WEIGHT: 230.4 LBS

## 2021-01-01 VITALS
OXYGEN SATURATION: 100 % | RESPIRATION RATE: 16 BRPM | SYSTOLIC BLOOD PRESSURE: 95 MMHG | TEMPERATURE: 98.2 F | HEART RATE: 82 BPM | DIASTOLIC BLOOD PRESSURE: 56 MMHG

## 2021-01-01 VITALS
HEART RATE: 86 BPM | DIASTOLIC BLOOD PRESSURE: 49 MMHG | OXYGEN SATURATION: 100 % | SYSTOLIC BLOOD PRESSURE: 111 MMHG | TEMPERATURE: 98.3 F | RESPIRATION RATE: 18 BRPM

## 2021-01-01 VITALS
RESPIRATION RATE: 18 BRPM | OXYGEN SATURATION: 100 % | DIASTOLIC BLOOD PRESSURE: 69 MMHG | SYSTOLIC BLOOD PRESSURE: 117 MMHG | TEMPERATURE: 97.7 F | HEART RATE: 83 BPM

## 2021-01-01 VITALS
RESPIRATION RATE: 18 BRPM | DIASTOLIC BLOOD PRESSURE: 68 MMHG | SYSTOLIC BLOOD PRESSURE: 108 MMHG | TEMPERATURE: 98.2 F | HEART RATE: 80 BPM

## 2021-01-01 VITALS
WEIGHT: 233 LBS | RESPIRATION RATE: 16 BRPM | TEMPERATURE: 97.9 F | OXYGEN SATURATION: 100 % | HEART RATE: 85 BPM | HEIGHT: 71 IN | DIASTOLIC BLOOD PRESSURE: 67 MMHG | SYSTOLIC BLOOD PRESSURE: 119 MMHG | BODY MASS INDEX: 32.62 KG/M2

## 2021-01-01 DIAGNOSIS — C91.60 PROLYMPHOCYTIC LEUKEMIA OF T-CELL (HCC): ICD-10-CM

## 2021-01-01 DIAGNOSIS — D61.818 PANCYTOPENIA (HCC): ICD-10-CM

## 2021-01-01 DIAGNOSIS — C91.60 PROLYMPHOCYTIC LEUKEMIA OF T-CELL TYPE NOT HAVING ACHIEVED REMISSION (HCC): ICD-10-CM

## 2021-01-01 DIAGNOSIS — M10.9 ACUTE GOUT, UNSPECIFIED CAUSE, UNSPECIFIED SITE: ICD-10-CM

## 2021-01-01 DIAGNOSIS — R06.09 DYSPNEA ON EXERTION: ICD-10-CM

## 2021-01-01 DIAGNOSIS — R59.9 SWOLLEN LYMPH NODES: ICD-10-CM

## 2021-01-01 DIAGNOSIS — R06.02 SOB (SHORTNESS OF BREATH) ON EXERTION: ICD-10-CM

## 2021-01-01 DIAGNOSIS — R13.10 DYSPHAGIA: ICD-10-CM

## 2021-01-01 DIAGNOSIS — C91.60 PROLYMPHOCYTIC LEUKEMIA OF T-CELL (HCC): Primary | ICD-10-CM

## 2021-01-01 DIAGNOSIS — D70.9 NEUTROPENIC FEVER (HCC): ICD-10-CM

## 2021-01-01 DIAGNOSIS — M79.89 LEG SWELLING: ICD-10-CM

## 2021-01-01 DIAGNOSIS — R50.81 NEUTROPENIC FEVER (HCC): ICD-10-CM

## 2021-01-01 DIAGNOSIS — R62.51 FAILURE TO THRIVE (CHILD): ICD-10-CM

## 2021-01-01 DIAGNOSIS — R06.02 SOB (SHORTNESS OF BREATH): ICD-10-CM

## 2021-01-01 DIAGNOSIS — R17 INCREASED BILIRUBIN LEVEL: ICD-10-CM

## 2021-01-01 DIAGNOSIS — D84.9 IMMUNOCOMPROMISED (HCC): ICD-10-CM

## 2021-01-01 DIAGNOSIS — C95.92 LEUKEMIA IN RELAPSE, UNSPECIFIED LEUKEMIA TYPE (HCC): Primary | ICD-10-CM

## 2021-01-01 DIAGNOSIS — G47.00 INSOMNIA, UNSPECIFIED TYPE: ICD-10-CM

## 2021-01-01 DIAGNOSIS — E88.3 TUMOR LYSIS SYNDROME: ICD-10-CM

## 2021-01-01 DIAGNOSIS — K64.9 HEMORRHOIDS, UNSPECIFIED HEMORRHOID TYPE: ICD-10-CM

## 2021-01-01 DIAGNOSIS — R05.9 COUGH: ICD-10-CM

## 2021-01-01 DIAGNOSIS — E87.70 HYPERVOLEMIA, UNSPECIFIED HYPERVOLEMIA TYPE: ICD-10-CM

## 2021-01-01 DIAGNOSIS — J18.9 PNEUMONIA OF LEFT LOWER LOBE DUE TO INFECTIOUS ORGANISM: ICD-10-CM

## 2021-01-01 DIAGNOSIS — Z51.11 ADMISSION FOR ANTINEOPLASTIC CHEMOTHERAPY: ICD-10-CM

## 2021-01-01 DIAGNOSIS — C91.60 PROLYMPHOCYTIC LEUKEMIA OF T-CELL TYPE NOT HAVING ACHIEVED REMISSION (HCC): Primary | ICD-10-CM

## 2021-01-01 DIAGNOSIS — R13.12 OROPHARYNGEAL DYSPHAGIA: ICD-10-CM

## 2021-01-01 DIAGNOSIS — D69.6 THROMBOCYTOPENIA (HCC): Primary | ICD-10-CM

## 2021-01-01 DIAGNOSIS — D64.9 SEVERE ANEMIA: ICD-10-CM

## 2021-01-01 DIAGNOSIS — M79.89 SWELLING OF LOWER EXTREMITY: ICD-10-CM

## 2021-01-01 DIAGNOSIS — D69.6 THROMBOCYTOPENIA (HCC): ICD-10-CM

## 2021-01-01 DIAGNOSIS — G89.3 CANCER ASSOCIATED PAIN: ICD-10-CM

## 2021-01-01 DIAGNOSIS — Z51.11 ADMISSION FOR ANTINEOPLASTIC CHEMOTHERAPY: Primary | ICD-10-CM

## 2021-01-01 DIAGNOSIS — M79.671 PAIN IN BOTH FEET: ICD-10-CM

## 2021-01-01 DIAGNOSIS — M79.672 PAIN IN BOTH FEET: ICD-10-CM

## 2021-01-01 DIAGNOSIS — R50.9 FEVER, UNSPECIFIED FEVER CAUSE: ICD-10-CM

## 2021-01-01 DIAGNOSIS — C95.92 LEUKEMIA IN RELAPSE, UNSPECIFIED LEUKEMIA TYPE (HCC): ICD-10-CM

## 2021-01-01 LAB
ABO + RH BLD: NORMAL
ABO/RH(D) PRE-TXN, PRETT: NORMAL
ALBUMIN SERPL-MCNC: 2.9 G/DL (ref 3.2–4.6)
ALBUMIN SERPL-MCNC: 3.2 G/DL (ref 3.2–4.6)
ALBUMIN SERPL-MCNC: 3.2 G/DL (ref 3.2–4.6)
ALBUMIN SERPL-MCNC: 3.3 G/DL (ref 3.2–4.6)
ALBUMIN SERPL-MCNC: 3.4 G/DL (ref 3.2–4.6)
ALBUMIN SERPL-MCNC: 3.5 G/DL (ref 3.2–4.6)
ALBUMIN SERPL-MCNC: 3.6 G/DL (ref 3.2–4.6)
ALBUMIN SERPL-MCNC: 3.7 G/DL (ref 3.2–4.6)
ALBUMIN SERPL-MCNC: 3.8 G/DL (ref 3.2–4.6)
ALBUMIN SERPL-MCNC: 3.9 G/DL (ref 3.2–4.6)
ALBUMIN SERPL-MCNC: 3.9 G/DL (ref 3.2–4.6)
ALBUMIN SERPL-MCNC: 4.1 G/DL (ref 3.2–4.6)
ALBUMIN SERPL-MCNC: 4.1 G/DL (ref 3.2–4.6)
ALBUMIN/GLOB SERPL: 0.9 {RATIO} (ref 1.2–3.5)
ALBUMIN/GLOB SERPL: 1 {RATIO} (ref 1.2–3.5)
ALBUMIN/GLOB SERPL: 1.1 {RATIO} (ref 1.2–3.5)
ALBUMIN/GLOB SERPL: 1.2 {RATIO} (ref 1.2–3.5)
ALBUMIN/GLOB SERPL: 1.3 {RATIO} (ref 1.2–3.5)
ALBUMIN/GLOB SERPL: 1.4 {RATIO} (ref 1.2–3.5)
ALBUMIN/GLOB SERPL: 1.5 {RATIO} (ref 1.2–3.5)
ALBUMIN/GLOB SERPL: 1.6 {RATIO} (ref 1.2–3.5)
ALP SERPL-CCNC: 108 U/L (ref 50–136)
ALP SERPL-CCNC: 110 U/L (ref 50–136)
ALP SERPL-CCNC: 45 U/L (ref 50–136)
ALP SERPL-CCNC: 45 U/L (ref 50–136)
ALP SERPL-CCNC: 48 U/L (ref 50–136)
ALP SERPL-CCNC: 49 U/L (ref 50–136)
ALP SERPL-CCNC: 50 U/L (ref 50–136)
ALP SERPL-CCNC: 51 U/L (ref 50–136)
ALP SERPL-CCNC: 51 U/L (ref 50–136)
ALP SERPL-CCNC: 52 U/L (ref 50–136)
ALP SERPL-CCNC: 53 U/L (ref 50–136)
ALP SERPL-CCNC: 54 U/L (ref 50–136)
ALP SERPL-CCNC: 55 U/L (ref 50–136)
ALP SERPL-CCNC: 56 U/L (ref 50–136)
ALP SERPL-CCNC: 57 U/L (ref 50–136)
ALP SERPL-CCNC: 58 U/L (ref 50–136)
ALP SERPL-CCNC: 59 U/L (ref 50–136)
ALP SERPL-CCNC: 60 U/L (ref 50–136)
ALP SERPL-CCNC: 61 U/L (ref 50–136)
ALP SERPL-CCNC: 62 U/L (ref 50–136)
ALP SERPL-CCNC: 63 U/L (ref 50–136)
ALP SERPL-CCNC: 63 U/L (ref 50–136)
ALP SERPL-CCNC: 64 U/L (ref 50–136)
ALP SERPL-CCNC: 64 U/L (ref 50–136)
ALP SERPL-CCNC: 65 U/L (ref 50–136)
ALP SERPL-CCNC: 66 U/L (ref 50–136)
ALP SERPL-CCNC: 66 U/L (ref 50–136)
ALP SERPL-CCNC: 67 U/L (ref 50–136)
ALP SERPL-CCNC: 85 U/L (ref 50–136)
ALP SERPL-CCNC: 90 U/L (ref 50–136)
ALP SERPL-CCNC: 92 U/L (ref 50–136)
ALP SERPL-CCNC: 93 U/L (ref 50–136)
ALP SERPL-CCNC: 93 U/L (ref 50–136)
ALP SERPL-CCNC: 97 U/L (ref 50–136)
ALT SERPL-CCNC: 15 U/L (ref 12–65)
ALT SERPL-CCNC: 15 U/L (ref 12–65)
ALT SERPL-CCNC: 18 U/L (ref 12–65)
ALT SERPL-CCNC: 19 U/L (ref 12–65)
ALT SERPL-CCNC: 20 U/L (ref 12–65)
ALT SERPL-CCNC: 21 U/L (ref 12–65)
ALT SERPL-CCNC: 22 U/L (ref 12–65)
ALT SERPL-CCNC: 24 U/L (ref 12–65)
ALT SERPL-CCNC: 27 U/L (ref 12–65)
ALT SERPL-CCNC: 28 U/L (ref 12–65)
ALT SERPL-CCNC: 28 U/L (ref 12–65)
ALT SERPL-CCNC: 31 U/L (ref 12–65)
ALT SERPL-CCNC: 32 U/L (ref 12–65)
ALT SERPL-CCNC: 32 U/L (ref 12–65)
ALT SERPL-CCNC: 33 U/L (ref 12–65)
ALT SERPL-CCNC: 34 U/L (ref 12–65)
ALT SERPL-CCNC: 35 U/L (ref 12–65)
ALT SERPL-CCNC: 35 U/L (ref 12–65)
ALT SERPL-CCNC: 37 U/L (ref 12–65)
ALT SERPL-CCNC: 37 U/L (ref 12–65)
ALT SERPL-CCNC: 38 U/L (ref 12–65)
ALT SERPL-CCNC: 38 U/L (ref 12–65)
ALT SERPL-CCNC: 39 U/L (ref 12–65)
ALT SERPL-CCNC: 40 U/L (ref 12–65)
ALT SERPL-CCNC: 41 U/L (ref 12–65)
ALT SERPL-CCNC: 41 U/L (ref 12–65)
ALT SERPL-CCNC: 43 U/L (ref 12–65)
ALT SERPL-CCNC: 44 U/L (ref 12–65)
ALT SERPL-CCNC: 46 U/L (ref 12–65)
ALT SERPL-CCNC: 50 U/L (ref 12–65)
ALT SERPL-CCNC: 51 U/L (ref 12–65)
ALT SERPL-CCNC: 55 U/L (ref 12–65)
ALT SERPL-CCNC: 59 U/L (ref 12–65)
ALT SERPL-CCNC: 60 U/L (ref 12–65)
ALT SERPL-CCNC: 70 U/L (ref 12–65)
ALT SERPL-CCNC: 77 U/L (ref 12–65)
ANION GAP SERPL CALC-SCNC: 1 MMOL/L (ref 7–16)
ANION GAP SERPL CALC-SCNC: 10 MMOL/L (ref 7–16)
ANION GAP SERPL CALC-SCNC: 3 MMOL/L (ref 7–16)
ANION GAP SERPL CALC-SCNC: 4 MMOL/L (ref 7–16)
ANION GAP SERPL CALC-SCNC: 5 MMOL/L (ref 7–16)
ANION GAP SERPL CALC-SCNC: 6 MMOL/L (ref 7–16)
ANION GAP SERPL CALC-SCNC: 7 MMOL/L (ref 7–16)
ANION GAP SERPL CALC-SCNC: 8 MMOL/L (ref 7–16)
ANION GAP SERPL CALC-SCNC: 9 MMOL/L (ref 7–16)
APPEARANCE UR: CLEAR
APPEARANCE UR: CLEAR
AST SERPL-CCNC: 10 U/L (ref 15–37)
AST SERPL-CCNC: 11 U/L (ref 15–37)
AST SERPL-CCNC: 11 U/L (ref 15–37)
AST SERPL-CCNC: 12 U/L (ref 15–37)
AST SERPL-CCNC: 12 U/L (ref 15–37)
AST SERPL-CCNC: 13 U/L (ref 15–37)
AST SERPL-CCNC: 14 U/L (ref 15–37)
AST SERPL-CCNC: 15 U/L (ref 15–37)
AST SERPL-CCNC: 16 U/L (ref 15–37)
AST SERPL-CCNC: 17 U/L (ref 15–37)
AST SERPL-CCNC: 18 U/L (ref 15–37)
AST SERPL-CCNC: 18 U/L (ref 15–37)
AST SERPL-CCNC: 19 U/L (ref 15–37)
AST SERPL-CCNC: 21 U/L (ref 15–37)
AST SERPL-CCNC: 22 U/L (ref 15–37)
AST SERPL-CCNC: 24 U/L (ref 15–37)
AST SERPL-CCNC: 24 U/L (ref 15–37)
AST SERPL-CCNC: 26 U/L (ref 15–37)
AST SERPL-CCNC: 26 U/L (ref 15–37)
AST SERPL-CCNC: 30 U/L (ref 15–37)
AST SERPL-CCNC: 32 U/L (ref 15–37)
AST SERPL-CCNC: 32 U/L (ref 15–37)
AST SERPL-CCNC: 35 U/L (ref 15–37)
AST SERPL-CCNC: 38 U/L (ref 15–37)
AST SERPL-CCNC: 42 U/L (ref 15–37)
AST SERPL-CCNC: 42 U/L (ref 15–37)
AST SERPL-CCNC: 48 U/L (ref 15–37)
AST SERPL-CCNC: 48 U/L (ref 15–37)
AST SERPL-CCNC: 49 U/L (ref 15–37)
AST SERPL-CCNC: 5 U/L (ref 15–37)
AST SERPL-CCNC: 51 U/L (ref 15–37)
AST SERPL-CCNC: 53 U/L (ref 15–37)
AST SERPL-CCNC: 55 U/L (ref 15–37)
AST SERPL-CCNC: 7 U/L (ref 15–37)
AST SERPL-CCNC: 71 U/L (ref 15–37)
AST SERPL-CCNC: 8 U/L (ref 15–37)
AST SERPL-CCNC: 9 U/L (ref 15–37)
ATRIAL RATE: 94 BPM
ATRIAL RATE: 98 BPM
BACTERIA SPEC CULT: NORMAL
BASOPHILS # BLD: 0 K/UL (ref 0–0.2)
BASOPHILS # BLD: 0.5 K/UL (ref 0–0.2)
BASOPHILS # BLD: 1.1 K/UL (ref 0–0.2)
BASOPHILS NFR BLD MANUAL: 1 % (ref 0–2)
BASOPHILS NFR BLD: 0 % (ref 0–2)
BASOPHILS NFR BLD: 1 % (ref 0–2)
BILIRUB DIRECT SERPL-MCNC: 1.2 MG/DL
BILIRUB DIRECT SERPL-MCNC: 1.5 MG/DL
BILIRUB SERPL-MCNC: 0.6 MG/DL (ref 0.2–1.1)
BILIRUB SERPL-MCNC: 0.7 MG/DL (ref 0.2–1.1)
BILIRUB SERPL-MCNC: 0.8 MG/DL (ref 0.2–1.1)
BILIRUB SERPL-MCNC: 0.9 MG/DL (ref 0.2–1.1)
BILIRUB SERPL-MCNC: 1 MG/DL (ref 0.2–1.1)
BILIRUB SERPL-MCNC: 1.1 MG/DL (ref 0.2–1.1)
BILIRUB SERPL-MCNC: 1.2 MG/DL (ref 0.2–1.1)
BILIRUB SERPL-MCNC: 1.3 MG/DL (ref 0.2–1.1)
BILIRUB SERPL-MCNC: 1.4 MG/DL (ref 0.2–1.1)
BILIRUB SERPL-MCNC: 1.5 MG/DL (ref 0.2–1.1)
BILIRUB SERPL-MCNC: 1.7 MG/DL (ref 0.2–1.1)
BILIRUB SERPL-MCNC: 1.8 MG/DL (ref 0.2–1.1)
BILIRUB SERPL-MCNC: 1.9 MG/DL (ref 0.2–1.1)
BILIRUB SERPL-MCNC: 1.9 MG/DL (ref 0.2–1.1)
BILIRUB SERPL-MCNC: 2 MG/DL (ref 0.2–1.1)
BILIRUB SERPL-MCNC: 2 MG/DL (ref 0.2–1.1)
BILIRUB SERPL-MCNC: 2.1 MG/DL (ref 0.2–1.1)
BILIRUB SERPL-MCNC: 2.2 MG/DL (ref 0.2–1.1)
BILIRUB SERPL-MCNC: 2.3 MG/DL (ref 0.2–1.1)
BILIRUB SERPL-MCNC: 2.4 MG/DL (ref 0.2–1.1)
BILIRUB SERPL-MCNC: 2.6 MG/DL (ref 0.2–1.1)
BILIRUB SERPL-MCNC: 2.7 MG/DL (ref 0.2–1.1)
BILIRUB SERPL-MCNC: 2.8 MG/DL (ref 0.2–1.1)
BILIRUB SERPL-MCNC: 2.9 MG/DL (ref 0.2–1.1)
BILIRUB SERPL-MCNC: 2.9 MG/DL (ref 0.2–1.1)
BILIRUB SERPL-MCNC: 3 MG/DL (ref 0.2–1.1)
BILIRUB SERPL-MCNC: 3.1 MG/DL (ref 0.2–1.1)
BILIRUB UR QL: NEGATIVE
BILIRUB UR QL: NEGATIVE
BLASTS NFR BLD MANUAL: 11 %
BLASTS NFR BLD MANUAL: 12 %
BLASTS NFR BLD MANUAL: 12 %
BLASTS NFR BLD MANUAL: 14 %
BLASTS NFR BLD MANUAL: 14 %
BLASTS NFR BLD MANUAL: 15 %
BLASTS NFR BLD MANUAL: 15 %
BLASTS NFR BLD MANUAL: 16 %
BLASTS NFR BLD MANUAL: 16 %
BLASTS NFR BLD MANUAL: 19 %
BLASTS NFR BLD MANUAL: 19 %
BLASTS NFR BLD MANUAL: 20 %
BLASTS NFR BLD MANUAL: 6 %
BLASTS NFR BLD MANUAL: 9 %
BLD PROD TYP BPU: NORMAL
BLOOD BAG HEMOLYSIS,BLBAG: NORMAL
BLOOD BAG HEMOLYSIS,BLBAG: NORMAL
BLOOD BANK CMNT PATIENT-IMP: NORMAL
BLOOD GROUP ANTIBODIES SERPL: NORMAL
BNP SERPL-MCNC: 625 PG/ML (ref 5–125)
BNP SERPL-MCNC: 730 PG/ML (ref 5–125)
BNP SERPL-MCNC: 792 PG/ML (ref 5–125)
BONE MARROW PREP & W,BMA: NORMAL
BPU ID: NORMAL
BUN SERPL-MCNC: 12 MG/DL (ref 8–23)
BUN SERPL-MCNC: 12 MG/DL (ref 8–23)
BUN SERPL-MCNC: 13 MG/DL (ref 8–23)
BUN SERPL-MCNC: 14 MG/DL (ref 8–23)
BUN SERPL-MCNC: 15 MG/DL (ref 8–23)
BUN SERPL-MCNC: 16 MG/DL (ref 8–23)
BUN SERPL-MCNC: 16 MG/DL (ref 8–23)
BUN SERPL-MCNC: 17 MG/DL (ref 8–23)
BUN SERPL-MCNC: 19 MG/DL (ref 8–23)
BUN SERPL-MCNC: 20 MG/DL (ref 8–23)
BUN SERPL-MCNC: 21 MG/DL (ref 8–23)
BUN SERPL-MCNC: 22 MG/DL (ref 8–23)
BUN SERPL-MCNC: 23 MG/DL (ref 8–23)
BUN SERPL-MCNC: 23 MG/DL (ref 8–23)
BUN SERPL-MCNC: 24 MG/DL (ref 8–23)
BUN SERPL-MCNC: 25 MG/DL (ref 8–23)
BUN SERPL-MCNC: 26 MG/DL (ref 8–23)
BUN SERPL-MCNC: 26 MG/DL (ref 8–23)
BUN SERPL-MCNC: 27 MG/DL (ref 8–23)
BUN SERPL-MCNC: 27 MG/DL (ref 8–23)
BUN SERPL-MCNC: 28 MG/DL (ref 8–23)
BUN SERPL-MCNC: 30 MG/DL (ref 8–23)
BUN SERPL-MCNC: 31 MG/DL (ref 8–23)
BUN SERPL-MCNC: 32 MG/DL (ref 8–23)
BUN SERPL-MCNC: 32 MG/DL (ref 8–23)
CALCIUM SERPL-MCNC: 7.2 MG/DL (ref 8.3–10.4)
CALCIUM SERPL-MCNC: 7.5 MG/DL (ref 8.3–10.4)
CALCIUM SERPL-MCNC: 8.1 MG/DL (ref 8.3–10.4)
CALCIUM SERPL-MCNC: 8.2 MG/DL (ref 8.3–10.4)
CALCIUM SERPL-MCNC: 8.2 MG/DL (ref 8.3–10.4)
CALCIUM SERPL-MCNC: 8.3 MG/DL (ref 8.3–10.4)
CALCIUM SERPL-MCNC: 8.3 MG/DL (ref 8.3–10.4)
CALCIUM SERPL-MCNC: 8.4 MG/DL (ref 8.3–10.4)
CALCIUM SERPL-MCNC: 8.5 MG/DL (ref 8.3–10.4)
CALCIUM SERPL-MCNC: 8.6 MG/DL (ref 8.3–10.4)
CALCIUM SERPL-MCNC: 8.7 MG/DL (ref 8.3–10.4)
CALCIUM SERPL-MCNC: 8.8 MG/DL (ref 8.3–10.4)
CALCIUM SERPL-MCNC: 8.9 MG/DL (ref 8.3–10.4)
CALCIUM SERPL-MCNC: 9 MG/DL (ref 8.3–10.4)
CALCIUM SERPL-MCNC: 9.1 MG/DL (ref 8.3–10.4)
CALCIUM SERPL-MCNC: 9.2 MG/DL (ref 8.3–10.4)
CALCIUM SERPL-MCNC: 9.3 MG/DL (ref 8.3–10.4)
CALCIUM SERPL-MCNC: 9.3 MG/DL (ref 8.3–10.4)
CALCIUM SERPL-MCNC: 9.4 MG/DL (ref 8.3–10.4)
CALCIUM SERPL-MCNC: 9.7 MG/DL (ref 8.3–10.4)
CALCULATED P AXIS, ECG09: 44 DEGREES
CALCULATED P AXIS, ECG09: 53 DEGREES
CALCULATED R AXIS, ECG10: 10 DEGREES
CALCULATED R AXIS, ECG10: 12 DEGREES
CALCULATED T AXIS, ECG11: 30 DEGREES
CALCULATED T AXIS, ECG11: 68 DEGREES
CHLORIDE SERPL-SCNC: 101 MMOL/L (ref 98–107)
CHLORIDE SERPL-SCNC: 102 MMOL/L (ref 98–107)
CHLORIDE SERPL-SCNC: 103 MMOL/L (ref 98–107)
CHLORIDE SERPL-SCNC: 104 MMOL/L (ref 98–107)
CHLORIDE SERPL-SCNC: 105 MMOL/L (ref 98–107)
CHLORIDE SERPL-SCNC: 105 MMOL/L (ref 98–107)
CHLORIDE SERPL-SCNC: 106 MMOL/L (ref 98–107)
CHLORIDE SERPL-SCNC: 107 MMOL/L (ref 98–107)
CHLORIDE SERPL-SCNC: 108 MMOL/L (ref 98–107)
CHLORIDE SERPL-SCNC: 109 MMOL/L (ref 98–107)
CHLORIDE SERPL-SCNC: 109 MMOL/L (ref 98–107)
CHLORIDE SERPL-SCNC: 110 MMOL/L (ref 98–107)
CHLORIDE SERPL-SCNC: 117 MMOL/L (ref 98–107)
CLERICAL ERRORS,CLERR: NORMAL
CLERICAL ERRORS,CLERR: NORMAL
CO2 SERPL-SCNC: 19 MMOL/L (ref 21–32)
CO2 SERPL-SCNC: 21 MMOL/L (ref 21–32)
CO2 SERPL-SCNC: 21 MMOL/L (ref 21–32)
CO2 SERPL-SCNC: 22 MMOL/L (ref 21–32)
CO2 SERPL-SCNC: 22 MMOL/L (ref 21–32)
CO2 SERPL-SCNC: 23 MMOL/L (ref 21–32)
CO2 SERPL-SCNC: 24 MMOL/L (ref 21–32)
CO2 SERPL-SCNC: 25 MMOL/L (ref 21–32)
CO2 SERPL-SCNC: 26 MMOL/L (ref 21–32)
CO2 SERPL-SCNC: 27 MMOL/L (ref 21–32)
CO2 SERPL-SCNC: 28 MMOL/L (ref 21–32)
CO2 SERPL-SCNC: 29 MMOL/L (ref 21–32)
COLOR UR: YELLOW
COLOR UR: YELLOW
COVID-19 RAPID TEST, COVR: NOT DETECTED
CREAT SERPL-MCNC: 0.68 MG/DL (ref 0.8–1.5)
CREAT SERPL-MCNC: 0.7 MG/DL (ref 0.8–1.5)
CREAT SERPL-MCNC: 0.8 MG/DL (ref 0.8–1.5)
CREAT SERPL-MCNC: 0.82 MG/DL (ref 0.8–1.5)
CREAT SERPL-MCNC: 0.84 MG/DL (ref 0.8–1.5)
CREAT SERPL-MCNC: 0.84 MG/DL (ref 0.8–1.5)
CREAT SERPL-MCNC: 0.86 MG/DL (ref 0.8–1.5)
CREAT SERPL-MCNC: 0.88 MG/DL (ref 0.8–1.5)
CREAT SERPL-MCNC: 0.88 MG/DL (ref 0.8–1.5)
CREAT SERPL-MCNC: 0.9 MG/DL (ref 0.8–1.5)
CREAT SERPL-MCNC: 0.93 MG/DL (ref 0.8–1.5)
CREAT SERPL-MCNC: 0.94 MG/DL (ref 0.8–1.5)
CREAT SERPL-MCNC: 0.96 MG/DL (ref 0.8–1.5)
CREAT SERPL-MCNC: 0.99 MG/DL (ref 0.8–1.5)
CREAT SERPL-MCNC: 1 MG/DL (ref 0.8–1.5)
CREAT SERPL-MCNC: 1.01 MG/DL (ref 0.8–1.5)
CREAT SERPL-MCNC: 1.1 MG/DL (ref 0.8–1.5)
CREAT SERPL-MCNC: 1.18 MG/DL (ref 0.8–1.5)
CREAT SERPL-MCNC: 1.2 MG/DL (ref 0.8–1.5)
CREAT SERPL-MCNC: 1.25 MG/DL (ref 0.8–1.5)
CREAT SERPL-MCNC: 1.26 MG/DL (ref 0.8–1.5)
CREAT SERPL-MCNC: 1.29 MG/DL (ref 0.8–1.5)
CREAT SERPL-MCNC: 1.37 MG/DL (ref 0.8–1.5)
CREAT SERPL-MCNC: 1.38 MG/DL (ref 0.8–1.5)
CREAT SERPL-MCNC: 1.4 MG/DL (ref 0.8–1.5)
CREAT SERPL-MCNC: 1.45 MG/DL (ref 0.8–1.5)
CREAT SERPL-MCNC: 1.47 MG/DL (ref 0.8–1.5)
CREAT SERPL-MCNC: 1.6 MG/DL (ref 0.8–1.5)
CROSSMATCH RESULT,%XM: NORMAL
DAT P TRANSF RBC QL: NORMAL
DAT P TRANSF RBC QL: NORMAL
DAT POLY-SP REAG RBC QL: NORMAL
DAT RBC QL: NORMAL
DAT RBC QL: NORMAL
DIAGNOSIS, 93000: NORMAL
DIAGNOSIS, 93000: NORMAL
DIFFERENTIAL METHOD BLD: ABNORMAL
ECHO AV AREA PEAK VELOCITY: 3.62 CM2
ECHO AV AREA VTI: 3.33 CM2
ECHO AV AREA/BSA PEAK VELOCITY: 1.5 CM2/M2
ECHO AV AREA/BSA VTI: 1.4 CM2/M2
ECHO AV CUSP MM: 2.1 CM
ECHO AV MEAN GRADIENT: 7 MMHG
ECHO AV PEAK GRADIENT: 15 MMHG
ECHO AV PEAK VELOCITY: 194 CM/S
ECHO AV VTI: 38.4 CM
ECHO EST RA PRESSURE: 8 MMHG
ECHO LA AREA 2C: 23.3 CM2
ECHO LA AREA 4C: 25.2 CM2
ECHO LA MAJOR AXIS: 6.5 CM
ECHO LA MINOR AXIS: 2.67 CM
ECHO LV E' LATERAL VELOCITY: 7.31 CM/S
ECHO LV E' SEPTAL VELOCITY: 9.75 CM/S
ECHO LV EDV A4C: 130 CM3
ECHO LV ESV A4C: 39 CM3
ECHO LV INTERNAL DIMENSION DIASTOLIC: 5.11 CM (ref 4.2–5.9)
ECHO LV INTERNAL DIMENSION SYSTOLIC: 3.25 CM
ECHO LV IVSD: 1.2 CM (ref 0.6–1)
ECHO LV MASS 2D: 228.1 G (ref 88–224)
ECHO LV MASS INDEX 2D: 93.9 G/M2 (ref 49–115)
ECHO LV POSTERIOR WALL DIASTOLIC: 1.1 CM (ref 0.6–1)
ECHO LVOT DIAM: 2.2 CM
ECHO LVOT PEAK GRADIENT: 14 MMHG
ECHO LVOT VTI: 33.7 CM
ECHO MV A VELOCITY: 104 CM/S
ECHO MV E DECELERATION TIME (DT): 176 MS
ECHO MV E VELOCITY: 81.4 CM/S
ECHO MV E/A RATIO: 0.78
ECHO MV E/E' LATERAL: 11.14
ECHO MV E/E' RATIO (AVERAGED): 9.74
ECHO MV E/E' SEPTAL: 8.35
ECHO MV MEAN GRADIENT: 2 MMHG
ECHO MV VTI: 22.6 CM
ECHO RIGHT VENTRICULAR SYSTOLIC PRESSURE (RVSP): 65 MMHG
ECHO RV INTERNAL DIMENSION: 3.08 CM
ECHO RV TAPSE: 3.4 CM (ref 1.5–2)
ECHO TV REGURGITANT MAX VELOCITY: 324 CM/S
ECHO TV REGURGITANT PEAK GRADIENT: 57 MMHG
EOSINOPHIL # BLD: 0 K/UL (ref 0–0.8)
EOSINOPHIL # BLD: 0.5 K/UL (ref 0–0.8)
EOSINOPHIL # BLD: 3.3 K/UL (ref 0–0.8)
EOSINOPHIL NFR BLD MANUAL: 1 % (ref 1–8)
EOSINOPHIL NFR BLD MANUAL: 3 % (ref 1–8)
EOSINOPHIL NFR BLD: 0 % (ref 0.5–7.8)
EOSINOPHIL NFR BLD: 1 % (ref 0.5–7.8)
ERYTHROCYTE [DISTWIDTH] IN BLOOD BY AUTOMATED COUNT: 11.9 % (ref 11.9–14.6)
ERYTHROCYTE [DISTWIDTH] IN BLOOD BY AUTOMATED COUNT: 12 % (ref 11.9–14.6)
ERYTHROCYTE [DISTWIDTH] IN BLOOD BY AUTOMATED COUNT: 12.1 % (ref 11.9–14.6)
ERYTHROCYTE [DISTWIDTH] IN BLOOD BY AUTOMATED COUNT: 12.1 % (ref 11.9–14.6)
ERYTHROCYTE [DISTWIDTH] IN BLOOD BY AUTOMATED COUNT: 12.2 % (ref 11.9–14.6)
ERYTHROCYTE [DISTWIDTH] IN BLOOD BY AUTOMATED COUNT: 12.3 % (ref 11.9–14.6)
ERYTHROCYTE [DISTWIDTH] IN BLOOD BY AUTOMATED COUNT: 12.3 % (ref 11.9–14.6)
ERYTHROCYTE [DISTWIDTH] IN BLOOD BY AUTOMATED COUNT: 12.4 % (ref 11.9–14.6)
ERYTHROCYTE [DISTWIDTH] IN BLOOD BY AUTOMATED COUNT: 12.5 % (ref 11.9–14.6)
ERYTHROCYTE [DISTWIDTH] IN BLOOD BY AUTOMATED COUNT: 12.6 % (ref 11.9–14.6)
ERYTHROCYTE [DISTWIDTH] IN BLOOD BY AUTOMATED COUNT: 12.8 % (ref 11.9–14.6)
ERYTHROCYTE [DISTWIDTH] IN BLOOD BY AUTOMATED COUNT: 12.9 % (ref 11.9–14.6)
ERYTHROCYTE [DISTWIDTH] IN BLOOD BY AUTOMATED COUNT: 13 % (ref 11.9–14.6)
ERYTHROCYTE [DISTWIDTH] IN BLOOD BY AUTOMATED COUNT: 13.4 % (ref 11.9–14.6)
ERYTHROCYTE [DISTWIDTH] IN BLOOD BY AUTOMATED COUNT: 13.9 %
ERYTHROCYTE [DISTWIDTH] IN BLOOD BY AUTOMATED COUNT: 14 % (ref 11.9–14.6)
ERYTHROCYTE [DISTWIDTH] IN BLOOD BY AUTOMATED COUNT: 14 % (ref 11.9–14.6)
ERYTHROCYTE [DISTWIDTH] IN BLOOD BY AUTOMATED COUNT: 14.3 % (ref 11.9–14.6)
ERYTHROCYTE [DISTWIDTH] IN BLOOD BY AUTOMATED COUNT: 14.5 % (ref 11.9–14.6)
ERYTHROCYTE [DISTWIDTH] IN BLOOD BY AUTOMATED COUNT: 14.6 % (ref 11.9–14.6)
ERYTHROCYTE [DISTWIDTH] IN BLOOD BY AUTOMATED COUNT: 14.7 % (ref 11.9–14.6)
ERYTHROCYTE [DISTWIDTH] IN BLOOD BY AUTOMATED COUNT: 15 % (ref 11.9–14.6)
ERYTHROCYTE [DISTWIDTH] IN BLOOD BY AUTOMATED COUNT: 15.1 % (ref 11.9–14.6)
ERYTHROCYTE [DISTWIDTH] IN BLOOD BY AUTOMATED COUNT: 15.2 % (ref 11.9–14.6)
ERYTHROCYTE [DISTWIDTH] IN BLOOD BY AUTOMATED COUNT: 15.2 % (ref 11.9–14.6)
ERYTHROCYTE [DISTWIDTH] IN BLOOD BY AUTOMATED COUNT: 15.6 % (ref 11.9–14.6)
ERYTHROCYTE [DISTWIDTH] IN BLOOD BY AUTOMATED COUNT: 15.9 % (ref 11.9–14.6)
ERYTHROCYTE [DISTWIDTH] IN BLOOD BY AUTOMATED COUNT: 15.9 % (ref 11.9–14.6)
ERYTHROCYTE [DISTWIDTH] IN BLOOD BY AUTOMATED COUNT: 16.2 % (ref 11.9–14.6)
ERYTHROCYTE [DISTWIDTH] IN BLOOD BY AUTOMATED COUNT: 16.7 % (ref 11.9–14.6)
ERYTHROCYTE [DISTWIDTH] IN BLOOD BY AUTOMATED COUNT: 17 % (ref 11.9–14.6)
ERYTHROCYTE [DISTWIDTH] IN BLOOD BY AUTOMATED COUNT: 17.1 % (ref 11.9–14.6)
ERYTHROCYTE [DISTWIDTH] IN BLOOD BY AUTOMATED COUNT: 17.2 % (ref 11.9–14.6)
ERYTHROCYTE [DISTWIDTH] IN BLOOD BY AUTOMATED COUNT: 17.4 % (ref 11.9–14.6)
ERYTHROCYTE [DISTWIDTH] IN BLOOD BY AUTOMATED COUNT: 17.5 % (ref 11.9–14.6)
ERYTHROCYTE [DISTWIDTH] IN BLOOD BY AUTOMATED COUNT: 18.7 % (ref 11.9–14.6)
ERYTHROCYTE [DISTWIDTH] IN BLOOD BY AUTOMATED COUNT: 19 % (ref 11.9–14.6)
ERYTHROCYTE [DISTWIDTH] IN BLOOD BY AUTOMATED COUNT: 19.3 % (ref 11.9–14.6)
ERYTHROCYTE [DISTWIDTH] IN BLOOD BY AUTOMATED COUNT: 19.4 % (ref 11.9–14.6)
ERYTHROCYTE [DISTWIDTH] IN BLOOD BY AUTOMATED COUNT: 19.7 % (ref 11.9–14.6)
ERYTHROCYTE [DISTWIDTH] IN BLOOD BY AUTOMATED COUNT: 20.4 % (ref 11.9–14.6)
ERYTHROCYTE [DISTWIDTH] IN BLOOD BY AUTOMATED COUNT: 20.4 % (ref 11.9–14.6)
ERYTHROCYTE [DISTWIDTH] IN BLOOD BY AUTOMATED COUNT: 20.6 % (ref 11.9–14.6)
ERYTHROCYTE [DISTWIDTH] IN BLOOD BY AUTOMATED COUNT: 21 % (ref 11.9–14.6)
FLOW CYTOMETRY, FBTC1: NORMAL
FLOW CYTOMETRY, FBTC1: NORMAL
GLOBULIN SER CALC-MCNC: 2.3 G/DL (ref 2.3–3.5)
GLOBULIN SER CALC-MCNC: 2.4 G/DL (ref 2.3–3.5)
GLOBULIN SER CALC-MCNC: 2.5 G/DL (ref 2.3–3.5)
GLOBULIN SER CALC-MCNC: 2.6 G/DL (ref 2.3–3.5)
GLOBULIN SER CALC-MCNC: 2.7 G/DL (ref 2.3–3.5)
GLOBULIN SER CALC-MCNC: 2.8 G/DL (ref 2.3–3.5)
GLOBULIN SER CALC-MCNC: 2.9 G/DL (ref 2.3–3.5)
GLOBULIN SER CALC-MCNC: 3 G/DL (ref 2.3–3.5)
GLOBULIN SER CALC-MCNC: 3 G/DL (ref 2.3–3.5)
GLOBULIN SER CALC-MCNC: 3.1 G/DL (ref 2.3–3.5)
GLOBULIN SER CALC-MCNC: 3.2 G/DL (ref 2.3–3.5)
GLOBULIN SER CALC-MCNC: 3.3 G/DL (ref 2.3–3.5)
GLOBULIN SER CALC-MCNC: 3.4 G/DL (ref 2.3–3.5)
GLOBULIN SER CALC-MCNC: 3.5 G/DL (ref 2.3–3.5)
GLOBULIN SER CALC-MCNC: 3.6 G/DL (ref 2.3–3.5)
GLOBULIN SER CALC-MCNC: 3.7 G/DL (ref 2.3–3.5)
GLUCOSE BLD STRIP.AUTO-MCNC: 138 MG/DL (ref 65–100)
GLUCOSE SERPL-MCNC: 101 MG/DL (ref 65–100)
GLUCOSE SERPL-MCNC: 101 MG/DL (ref 65–100)
GLUCOSE SERPL-MCNC: 103 MG/DL (ref 65–100)
GLUCOSE SERPL-MCNC: 103 MG/DL (ref 65–100)
GLUCOSE SERPL-MCNC: 104 MG/DL (ref 65–100)
GLUCOSE SERPL-MCNC: 105 MG/DL (ref 65–100)
GLUCOSE SERPL-MCNC: 105 MG/DL (ref 65–100)
GLUCOSE SERPL-MCNC: 106 MG/DL (ref 65–100)
GLUCOSE SERPL-MCNC: 106 MG/DL (ref 65–100)
GLUCOSE SERPL-MCNC: 107 MG/DL (ref 65–100)
GLUCOSE SERPL-MCNC: 108 MG/DL (ref 65–100)
GLUCOSE SERPL-MCNC: 109 MG/DL (ref 65–100)
GLUCOSE SERPL-MCNC: 110 MG/DL (ref 65–100)
GLUCOSE SERPL-MCNC: 110 MG/DL (ref 65–100)
GLUCOSE SERPL-MCNC: 111 MG/DL (ref 65–100)
GLUCOSE SERPL-MCNC: 112 MG/DL (ref 65–100)
GLUCOSE SERPL-MCNC: 114 MG/DL (ref 65–100)
GLUCOSE SERPL-MCNC: 115 MG/DL (ref 65–100)
GLUCOSE SERPL-MCNC: 116 MG/DL (ref 65–100)
GLUCOSE SERPL-MCNC: 117 MG/DL (ref 65–100)
GLUCOSE SERPL-MCNC: 122 MG/DL (ref 65–100)
GLUCOSE SERPL-MCNC: 124 MG/DL (ref 65–100)
GLUCOSE SERPL-MCNC: 125 MG/DL (ref 65–100)
GLUCOSE SERPL-MCNC: 126 MG/DL (ref 65–100)
GLUCOSE SERPL-MCNC: 126 MG/DL (ref 65–100)
GLUCOSE SERPL-MCNC: 127 MG/DL (ref 65–100)
GLUCOSE SERPL-MCNC: 131 MG/DL (ref 65–100)
GLUCOSE SERPL-MCNC: 132 MG/DL (ref 65–100)
GLUCOSE SERPL-MCNC: 132 MG/DL (ref 65–100)
GLUCOSE SERPL-MCNC: 135 MG/DL (ref 65–100)
GLUCOSE SERPL-MCNC: 142 MG/DL (ref 65–100)
GLUCOSE SERPL-MCNC: 143 MG/DL (ref 65–100)
GLUCOSE SERPL-MCNC: 144 MG/DL (ref 65–100)
GLUCOSE SERPL-MCNC: 145 MG/DL (ref 65–100)
GLUCOSE SERPL-MCNC: 148 MG/DL (ref 65–100)
GLUCOSE SERPL-MCNC: 169 MG/DL (ref 65–100)
GLUCOSE SERPL-MCNC: 175 MG/DL (ref 65–100)
GLUCOSE SERPL-MCNC: 179 MG/DL (ref 65–100)
GLUCOSE SERPL-MCNC: 183 MG/DL (ref 65–100)
GLUCOSE SERPL-MCNC: 92 MG/DL (ref 65–100)
GLUCOSE SERPL-MCNC: 95 MG/DL (ref 65–100)
GLUCOSE SERPL-MCNC: 96 MG/DL (ref 65–100)
GLUCOSE SERPL-MCNC: 98 MG/DL (ref 65–100)
GLUCOSE UR STRIP.AUTO-MCNC: NEGATIVE MG/DL
GLUCOSE UR STRIP.AUTO-MCNC: NEGATIVE MG/DL
HAPTOGLOB SERPL-MCNC: 54 MG/DL (ref 30–200)
HAPTOGLOB SERPL-MCNC: <8 MG/DL (ref 30–200)
HBV CORE AB SERPL QL IA: NEGATIVE
HBV SURFACE AB SERPL IA-ACNC: 16.65 MIU/ML
HBV SURFACE AG SER QL: NONREACTIVE
HCT VFR BLD AUTO: 17.3 %
HCT VFR BLD AUTO: 17.9 % (ref 41.1–50.3)
HCT VFR BLD AUTO: 18.7 %
HCT VFR BLD AUTO: 18.8 %
HCT VFR BLD AUTO: 18.9 %
HCT VFR BLD AUTO: 19 %
HCT VFR BLD AUTO: 19.2 %
HCT VFR BLD AUTO: 19.2 % (ref 41.1–50.3)
HCT VFR BLD AUTO: 19.3 %
HCT VFR BLD AUTO: 19.5 % (ref 41.1–50.3)
HCT VFR BLD AUTO: 19.6 % (ref 41.1–50.3)
HCT VFR BLD AUTO: 19.9 % (ref 41.1–50.3)
HCT VFR BLD AUTO: 20 %
HCT VFR BLD AUTO: 20.1 % (ref 41.1–50.3)
HCT VFR BLD AUTO: 20.1 % (ref 41.1–50.3)
HCT VFR BLD AUTO: 20.2 %
HCT VFR BLD AUTO: 20.3 % (ref 41.1–50.3)
HCT VFR BLD AUTO: 20.5 %
HCT VFR BLD AUTO: 20.5 %
HCT VFR BLD AUTO: 20.5 % (ref 41.1–50.3)
HCT VFR BLD AUTO: 20.5 % (ref 41.1–50.3)
HCT VFR BLD AUTO: 20.6 % (ref 41.1–50.3)
HCT VFR BLD AUTO: 20.8 % (ref 41.1–50.3)
HCT VFR BLD AUTO: 20.9 %
HCT VFR BLD AUTO: 21 %
HCT VFR BLD AUTO: 21.1 % (ref 41.1–50.3)
HCT VFR BLD AUTO: 21.1 % (ref 41.1–50.3)
HCT VFR BLD AUTO: 21.3 % (ref 41.1–50.3)
HCT VFR BLD AUTO: 21.4 %
HCT VFR BLD AUTO: 21.5 % (ref 41.1–50.3)
HCT VFR BLD AUTO: 21.6 % (ref 41.1–50.3)
HCT VFR BLD AUTO: 21.8 %
HCT VFR BLD AUTO: 21.9 % (ref 41.1–50.3)
HCT VFR BLD AUTO: 22.4 %
HCT VFR BLD AUTO: 22.4 % (ref 41.1–50.3)
HCT VFR BLD AUTO: 22.4 % (ref 41.1–50.3)
HCT VFR BLD AUTO: 22.6 % (ref 41.1–50.3)
HCT VFR BLD AUTO: 22.8 % (ref 41.1–50.3)
HCT VFR BLD AUTO: 22.9 %
HCT VFR BLD AUTO: 23 %
HCT VFR BLD AUTO: 23.1 % (ref 41.1–50.3)
HCT VFR BLD AUTO: 23.7 %
HCT VFR BLD AUTO: 23.9 %
HCT VFR BLD AUTO: 24.1 %
HCT VFR BLD AUTO: 24.6 %
HCT VFR BLD AUTO: 24.8 %
HCT VFR BLD AUTO: 24.8 %
HCT VFR BLD AUTO: 25.2 %
HCT VFR BLD AUTO: 25.3 %
HCT VFR BLD AUTO: 26.1 %
HCT VFR BLD AUTO: 26.3 %
HCT VFR BLD AUTO: 26.4 %
HCT VFR BLD AUTO: 26.9 %
HCT VFR BLD AUTO: 26.9 % (ref 41.1–50.3)
HCT VFR BLD AUTO: 27.2 %
HCT VFR BLD AUTO: 27.6 %
HCT VFR BLD AUTO: 27.8 %
HCT VFR BLD AUTO: 28.8 % (ref 41.1–50.3)
HCT VFR BLD AUTO: 35.2 %
HEMOLYSIS, POST TXN,PSTHE: NORMAL
HEMOLYSIS, POST TXN,PSTHE: NORMAL
HEMOLYSIS,PRE TXN,PREHE: NORMAL
HEMOLYSIS,PRE TXN,PREHE: NORMAL
HGB BLD-MCNC: 12.1 G/DL (ref 13.6–17.2)
HGB BLD-MCNC: 6.2 G/DL (ref 13.6–17.2)
HGB BLD-MCNC: 6.2 G/DL (ref 13.6–17.2)
HGB BLD-MCNC: 6.3 G/DL (ref 13.6–17.2)
HGB BLD-MCNC: 6.4 G/DL (ref 13.6–17.2)
HGB BLD-MCNC: 6.5 G/DL (ref 13.6–17.2)
HGB BLD-MCNC: 6.6 G/DL (ref 13.6–17.2)
HGB BLD-MCNC: 6.7 G/DL (ref 13.6–17.2)
HGB BLD-MCNC: 6.8 G/DL (ref 13.6–17.2)
HGB BLD-MCNC: 6.8 G/DL (ref 13.6–17.2)
HGB BLD-MCNC: 6.9 G/DL (ref 13.6–17.2)
HGB BLD-MCNC: 7 G/DL (ref 13.6–17.2)
HGB BLD-MCNC: 7 G/DL (ref 13.6–17.2)
HGB BLD-MCNC: 7.1 G/DL (ref 13.6–17.2)
HGB BLD-MCNC: 7.1 G/DL (ref 13.6–17.2)
HGB BLD-MCNC: 7.2 G/DL (ref 13.6–17.2)
HGB BLD-MCNC: 7.3 G/DL (ref 13.6–17.2)
HGB BLD-MCNC: 7.4 G/DL (ref 13.6–17.2)
HGB BLD-MCNC: 7.5 G/DL (ref 13.6–17.2)
HGB BLD-MCNC: 7.6 G/DL (ref 13.6–17.2)
HGB BLD-MCNC: 7.6 G/DL (ref 13.6–17.2)
HGB BLD-MCNC: 7.7 G/DL (ref 13.6–17.2)
HGB BLD-MCNC: 7.7 G/DL (ref 13.6–17.2)
HGB BLD-MCNC: 7.9 G/DL (ref 13.6–17.2)
HGB BLD-MCNC: 7.9 G/DL (ref 13.6–17.2)
HGB BLD-MCNC: 8 G/DL (ref 13.6–17.2)
HGB BLD-MCNC: 8.1 G/DL (ref 13.6–17.2)
HGB BLD-MCNC: 8.2 G/DL (ref 13.6–17.2)
HGB BLD-MCNC: 8.4 G/DL (ref 13.6–17.2)
HGB BLD-MCNC: 8.5 G/DL (ref 13.6–17.2)
HGB BLD-MCNC: 8.5 G/DL (ref 13.6–17.2)
HGB BLD-MCNC: 8.7 G/DL (ref 13.6–17.2)
HGB BLD-MCNC: 8.7 G/DL (ref 13.6–17.2)
HGB BLD-MCNC: 8.9 G/DL (ref 13.6–17.2)
HGB BLD-MCNC: 8.9 G/DL (ref 13.6–17.2)
HGB BLD-MCNC: 9 G/DL (ref 13.6–17.2)
HGB BLD-MCNC: 9.1 G/DL (ref 13.6–17.2)
HGB BLD-MCNC: 9.2 G/DL (ref 13.6–17.2)
HGB BLD-MCNC: 9.2 G/DL (ref 13.6–17.2)
HGB BLD-MCNC: 9.5 G/DL (ref 13.6–17.2)
HGB BLD-MCNC: 9.5 G/DL (ref 13.6–17.2)
HGB BLD-MCNC: 9.6 G/DL (ref 13.6–17.2)
HGB RETIC QN AUTO: 40 PG (ref 29–35)
HGB UR QL STRIP: NEGATIVE
HGB UR QL STRIP: NEGATIVE
HISTORY CHECKED?,CKHIST: NORMAL
IMM GRANULOCYTES # BLD AUTO: 0 K/UL (ref 0–0.5)
IMM GRANULOCYTES NFR BLD AUTO: 0 % (ref 0–5)
IMM GRANULOCYTES NFR BLD AUTO: 1 % (ref 0–5)
IMM GRANULOCYTES NFR BLD AUTO: 2 % (ref 0–5)
IMM RETICS NFR: 27.5 % (ref 2.3–13.4)
KETONES UR QL STRIP.AUTO: >80 MG/DL
KETONES UR QL STRIP.AUTO: NEGATIVE MG/DL
LACTATE SERPL-SCNC: 1.3 MMOL/L (ref 0.4–2)
LACTATE SERPL-SCNC: 1.5 MMOL/L (ref 0.4–2)
LACTATE SERPL-SCNC: 1.7 MMOL/L (ref 0.4–2)
LACTATE SERPL-SCNC: 2.4 MMOL/L (ref 0.4–2)
LDH SERPL L TO P-CCNC: 167 U/L (ref 110–210)
LDH SERPL L TO P-CCNC: 551 U/L (ref 110–210)
LDH SERPL L TO P-CCNC: 613 U/L (ref 110–210)
LDH SERPL L TO P-CCNC: 684 U/L (ref 110–210)
LDH SERPL L TO P-CCNC: 807 U/L (ref 110–210)
LEUKOCYTE ESTERASE UR QL STRIP.AUTO: NEGATIVE
LEUKOCYTE ESTERASE UR QL STRIP.AUTO: NEGATIVE
LIPASE SERPL-CCNC: 100 U/L (ref 73–393)
LYMPHOCYTES # BLD: 0.1 K/UL (ref 0.5–4.6)
LYMPHOCYTES # BLD: 0.2 K/UL (ref 0.5–4.6)
LYMPHOCYTES # BLD: 0.6 K/UL (ref 0.5–4.6)
LYMPHOCYTES # BLD: 0.7 K/UL (ref 0.5–4.6)
LYMPHOCYTES # BLD: 0.8 K/UL (ref 0.5–4.6)
LYMPHOCYTES # BLD: 0.8 K/UL (ref 0.5–4.6)
LYMPHOCYTES # BLD: 0.9 K/UL (ref 0.5–4.6)
LYMPHOCYTES # BLD: 1 K/UL (ref 0.5–4.6)
LYMPHOCYTES # BLD: 1.1 K/UL (ref 0.5–4.6)
LYMPHOCYTES # BLD: 1.2 K/UL (ref 0.5–4.6)
LYMPHOCYTES # BLD: 1.3 K/UL (ref 0.5–4.6)
LYMPHOCYTES # BLD: 1.4 K/UL (ref 0.5–4.6)
LYMPHOCYTES # BLD: 1.6 K/UL (ref 0.5–4.6)
LYMPHOCYTES # BLD: 1.6 K/UL (ref 0.5–4.6)
LYMPHOCYTES # BLD: 10.3 K/UL (ref 0.5–4.6)
LYMPHOCYTES # BLD: 10.9 K/UL (ref 0.5–4.6)
LYMPHOCYTES # BLD: 111.1 K/UL (ref 0.5–4.6)
LYMPHOCYTES # BLD: 115.7 K/UL (ref 0.5–4.6)
LYMPHOCYTES # BLD: 12.3 K/UL (ref 0.5–4.6)
LYMPHOCYTES # BLD: 120.4 K/UL (ref 0.5–4.6)
LYMPHOCYTES # BLD: 121.7 K/UL (ref 0.5–4.6)
LYMPHOCYTES # BLD: 14.3 K/UL (ref 0.5–4.6)
LYMPHOCYTES # BLD: 14.9 K/UL (ref 0.5–4.6)
LYMPHOCYTES # BLD: 18.7 K/UL (ref 0.5–4.6)
LYMPHOCYTES # BLD: 2.8 K/UL (ref 0.5–4.6)
LYMPHOCYTES # BLD: 2.8 K/UL (ref 0.5–4.6)
LYMPHOCYTES # BLD: 30.3 K/UL (ref 0.5–4.6)
LYMPHOCYTES # BLD: 31.6 K/UL (ref 0.5–4.6)
LYMPHOCYTES # BLD: 31.7 K/UL (ref 0.5–4.6)
LYMPHOCYTES # BLD: 4.5 K/UL (ref 0.5–4.6)
LYMPHOCYTES # BLD: 4.8 K/UL (ref 0.5–4.6)
LYMPHOCYTES # BLD: 4.8 K/UL (ref 0.5–4.6)
LYMPHOCYTES # BLD: 46 K/UL (ref 0.5–4.6)
LYMPHOCYTES # BLD: 51.3 K/UL (ref 0.5–4.6)
LYMPHOCYTES # BLD: 6 K/UL (ref 0.5–4.6)
LYMPHOCYTES # BLD: 8.5 K/UL (ref 0.5–4.6)
LYMPHOCYTES # BLD: 8.6 K/UL (ref 0.5–4.6)
LYMPHOCYTES # BLD: 9 K/UL (ref 0.5–4.6)
LYMPHOCYTES # BLD: 9.4 K/UL (ref 0.5–4.6)
LYMPHOCYTES # BLD: 9.6 K/UL (ref 0.5–4.6)
LYMPHOCYTES # BLD: 9.7 K/UL (ref 0.5–4.6)
LYMPHOCYTES # BLD: 97.5 K/UL (ref 0.5–4.6)
LYMPHOCYTES NFR BLD MANUAL: 100 % (ref 16–44)
LYMPHOCYTES NFR BLD MANUAL: 100 % (ref 16–44)
LYMPHOCYTES NFR BLD MANUAL: 70 % (ref 16–44)
LYMPHOCYTES NFR BLD MANUAL: 73 % (ref 16–44)
LYMPHOCYTES NFR BLD MANUAL: 81 % (ref 16–44)
LYMPHOCYTES NFR BLD MANUAL: 84 % (ref 16–44)
LYMPHOCYTES NFR BLD MANUAL: 85 % (ref 16–44)
LYMPHOCYTES NFR BLD MANUAL: 86 % (ref 16–44)
LYMPHOCYTES NFR BLD MANUAL: 87 % (ref 16–44)
LYMPHOCYTES NFR BLD MANUAL: 88 % (ref 16–44)
LYMPHOCYTES NFR BLD MANUAL: 89 % (ref 16–44)
LYMPHOCYTES NFR BLD MANUAL: 90 % (ref 16–44)
LYMPHOCYTES NFR BLD MANUAL: 94 % (ref 16–44)
LYMPHOCYTES NFR BLD MANUAL: 96 % (ref 16–44)
LYMPHOCYTES NFR BLD MANUAL: 96 % (ref 16–44)
LYMPHOCYTES NFR BLD MANUAL: 98 % (ref 16–44)
LYMPHOCYTES NFR BLD MANUAL: 99 % (ref 16–44)
LYMPHOCYTES NFR BLD: 17 % (ref 13–44)
LYMPHOCYTES NFR BLD: 18 % (ref 13–44)
LYMPHOCYTES NFR BLD: 21 % (ref 13–44)
LYMPHOCYTES NFR BLD: 23 % (ref 13–44)
LYMPHOCYTES NFR BLD: 37 % (ref 13–44)
LYMPHOCYTES NFR BLD: 44 % (ref 13–44)
LYMPHOCYTES NFR BLD: 51 % (ref 13–44)
LYMPHOCYTES NFR BLD: 53 % (ref 13–44)
LYMPHOCYTES NFR BLD: 55 % (ref 13–44)
LYMPHOCYTES NFR BLD: 58 % (ref 13–44)
LYMPHOCYTES NFR BLD: 6 % (ref 13–44)
LYMPHOCYTES NFR BLD: 61 % (ref 13–44)
LYMPHOCYTES NFR BLD: 65 % (ref 13–44)
LYMPHOCYTES NFR BLD: 67 % (ref 13–44)
LYMPHOCYTES NFR BLD: 68 % (ref 13–44)
LYMPHOCYTES NFR BLD: 68 % (ref 13–44)
LYMPHOCYTES NFR BLD: 70 % (ref 13–44)
LYMPHOCYTES NFR BLD: 73 % (ref 13–44)
LYMPHOCYTES NFR BLD: 74 % (ref 13–44)
LYMPHOCYTES NFR BLD: 74 % (ref 13–44)
LYMPHOCYTES NFR BLD: 76 % (ref 13–44)
LYMPHOCYTES NFR BLD: 84 %
LYMPHOCYTES NFR BLD: 85 %
LYMPHOCYTES NFR BLD: 88 % (ref 13–44)
LYMPHOCYTES NFR BLD: 97 %
LYMPHOCYTES NFR BLD: 99 %
MAGNESIUM SERPL-MCNC: 1.5 MG/DL (ref 1.8–2.4)
MAGNESIUM SERPL-MCNC: 1.6 MG/DL (ref 1.8–2.4)
MAGNESIUM SERPL-MCNC: 1.8 MG/DL (ref 1.8–2.4)
MAGNESIUM SERPL-MCNC: 1.8 MG/DL (ref 1.8–2.4)
MAGNESIUM SERPL-MCNC: 1.9 MG/DL (ref 1.8–2.4)
MAGNESIUM SERPL-MCNC: 2 MG/DL (ref 1.8–2.4)
MAGNESIUM SERPL-MCNC: 2.1 MG/DL (ref 1.8–2.4)
MAGNESIUM SERPL-MCNC: 2.2 MG/DL (ref 1.8–2.4)
MAGNESIUM SERPL-MCNC: 2.3 MG/DL (ref 1.8–2.4)
MAGNESIUM SERPL-MCNC: 2.4 MG/DL (ref 1.8–2.4)
MAGNESIUM SERPL-MCNC: 2.4 MG/DL (ref 1.8–2.4)
MCH RBC QN AUTO: 28.5 PG (ref 26.1–32.9)
MCH RBC QN AUTO: 28.6 PG (ref 26.1–32.9)
MCH RBC QN AUTO: 28.7 PG (ref 26.1–32.9)
MCH RBC QN AUTO: 28.8 PG (ref 26.1–32.9)
MCH RBC QN AUTO: 28.9 PG (ref 26.1–32.9)
MCH RBC QN AUTO: 28.9 PG (ref 26.1–32.9)
MCH RBC QN AUTO: 29.1 PG (ref 26.1–32.9)
MCH RBC QN AUTO: 29.2 PG (ref 26.1–32.9)
MCH RBC QN AUTO: 29.3 PG (ref 26.1–32.9)
MCH RBC QN AUTO: 29.4 PG (ref 26.1–32.9)
MCH RBC QN AUTO: 29.5 PG (ref 26.1–32.9)
MCH RBC QN AUTO: 29.5 PG (ref 26.1–32.9)
MCH RBC QN AUTO: 29.6 PG (ref 26.1–32.9)
MCH RBC QN AUTO: 29.6 PG (ref 26.1–32.9)
MCH RBC QN AUTO: 29.8 PG (ref 26.1–32.9)
MCH RBC QN AUTO: 30 PG (ref 26.1–32.9)
MCH RBC QN AUTO: 30.2 PG (ref 26.1–32.9)
MCH RBC QN AUTO: 30.2 PG (ref 26.1–32.9)
MCH RBC QN AUTO: 30.3 PG (ref 26.1–32.9)
MCH RBC QN AUTO: 30.3 PG (ref 26.1–32.9)
MCH RBC QN AUTO: 30.4 PG (ref 26.1–32.9)
MCH RBC QN AUTO: 30.7 PG (ref 26.1–32.9)
MCH RBC QN AUTO: 30.8 PG (ref 26.1–32.9)
MCH RBC QN AUTO: 31.1 PG (ref 26.1–32.9)
MCH RBC QN AUTO: 31.2 PG (ref 26.1–32.9)
MCH RBC QN AUTO: 31.3 PG (ref 26.1–32.9)
MCH RBC QN AUTO: 31.5 PG (ref 26.1–32.9)
MCH RBC QN AUTO: 31.5 PG (ref 26.1–32.9)
MCH RBC QN AUTO: 31.7 PG (ref 26.1–32.9)
MCH RBC QN AUTO: 31.9 PG (ref 26.1–32.9)
MCH RBC QN AUTO: 31.9 PG (ref 26.1–32.9)
MCH RBC QN AUTO: 32 PG (ref 26.1–32.9)
MCH RBC QN AUTO: 32.2 PG (ref 26.1–32.9)
MCH RBC QN AUTO: 32.3 PG (ref 26.1–32.9)
MCH RBC QN AUTO: 32.4 PG (ref 26.1–32.9)
MCH RBC QN AUTO: 32.7 PG (ref 26.1–32.9)
MCH RBC QN AUTO: 32.7 PG (ref 26.1–32.9)
MCH RBC QN AUTO: 32.8 PG (ref 26.1–32.9)
MCH RBC QN AUTO: 32.8 PG (ref 26.1–32.9)
MCH RBC QN AUTO: 33.2 PG (ref 26.1–32.9)
MCH RBC QN AUTO: 33.2 PG (ref 26.1–32.9)
MCH RBC QN AUTO: 33.3 PG (ref 26.1–32.9)
MCH RBC QN AUTO: 33.5 PG (ref 26.1–32.9)
MCH RBC QN AUTO: 33.5 PG (ref 26.1–32.9)
MCH RBC QN AUTO: 33.8 PG (ref 26.1–32.9)
MCH RBC QN AUTO: 33.9 PG (ref 26.1–32.9)
MCHC RBC AUTO-ENTMCNC: 31.7 G/DL (ref 31.4–35)
MCHC RBC AUTO-ENTMCNC: 32.5 G/DL (ref 31.4–35)
MCHC RBC AUTO-ENTMCNC: 32.6 G/DL (ref 31.4–35)
MCHC RBC AUTO-ENTMCNC: 32.7 G/DL (ref 31.4–35)
MCHC RBC AUTO-ENTMCNC: 32.9 G/DL (ref 31.4–35)
MCHC RBC AUTO-ENTMCNC: 33.1 G/DL (ref 31.4–35)
MCHC RBC AUTO-ENTMCNC: 33.1 G/DL (ref 31.4–35)
MCHC RBC AUTO-ENTMCNC: 33.2 G/DL (ref 31.4–35)
MCHC RBC AUTO-ENTMCNC: 33.3 G/DL (ref 31.4–35)
MCHC RBC AUTO-ENTMCNC: 33.5 G/DL (ref 31.4–35)
MCHC RBC AUTO-ENTMCNC: 33.6 G/DL (ref 31.4–35)
MCHC RBC AUTO-ENTMCNC: 33.7 G/DL (ref 31.4–35)
MCHC RBC AUTO-ENTMCNC: 33.8 G/DL (ref 31.4–35)
MCHC RBC AUTO-ENTMCNC: 33.9 G/DL (ref 31.4–35)
MCHC RBC AUTO-ENTMCNC: 33.9 G/DL (ref 31.4–35)
MCHC RBC AUTO-ENTMCNC: 34 G/DL (ref 31.4–35)
MCHC RBC AUTO-ENTMCNC: 34.1 G/DL (ref 31.4–35)
MCHC RBC AUTO-ENTMCNC: 34.2 G/DL (ref 31.4–35)
MCHC RBC AUTO-ENTMCNC: 34.3 G/DL (ref 31.4–35)
MCHC RBC AUTO-ENTMCNC: 34.4 G/DL (ref 31.4–35)
MCHC RBC AUTO-ENTMCNC: 34.5 G/DL (ref 31.4–35)
MCHC RBC AUTO-ENTMCNC: 34.6 G/DL (ref 31.4–35)
MCHC RBC AUTO-ENTMCNC: 34.7 G/DL (ref 31.4–35)
MCHC RBC AUTO-ENTMCNC: 34.7 G/DL (ref 31.4–35)
MCHC RBC AUTO-ENTMCNC: 34.9 G/DL (ref 31.4–35)
MCHC RBC AUTO-ENTMCNC: 34.9 G/DL (ref 31.4–35)
MCHC RBC AUTO-ENTMCNC: 35.8 G/DL (ref 31.4–35)
MCV RBC AUTO: 100.9 FL (ref 79.6–97.8)
MCV RBC AUTO: 101 FL (ref 79.6–97.8)
MCV RBC AUTO: 101 FL (ref 79.6–97.8)
MCV RBC AUTO: 102.8 FL (ref 79.6–97.8)
MCV RBC AUTO: 103.5 FL (ref 79.6–97.8)
MCV RBC AUTO: 106.8 FL (ref 79.6–97.8)
MCV RBC AUTO: 84 FL (ref 79.6–97.8)
MCV RBC AUTO: 84.4 FL (ref 79.6–97.8)
MCV RBC AUTO: 84.6 FL (ref 79.6–97.8)
MCV RBC AUTO: 84.6 FL (ref 79.6–97.8)
MCV RBC AUTO: 84.7 FL (ref 79.6–97.8)
MCV RBC AUTO: 84.8 FL (ref 79.6–97.8)
MCV RBC AUTO: 84.9 FL (ref 79.6–97.8)
MCV RBC AUTO: 85 FL (ref 79.6–97.8)
MCV RBC AUTO: 85.1 FL (ref 79.6–97.8)
MCV RBC AUTO: 85.1 FL (ref 79.6–97.8)
MCV RBC AUTO: 85.2 FL (ref 79.6–97.8)
MCV RBC AUTO: 85.4 FL (ref 79.6–97.8)
MCV RBC AUTO: 85.4 FL (ref 79.6–97.8)
MCV RBC AUTO: 85.5 FL (ref 79.6–97.8)
MCV RBC AUTO: 85.8 FL (ref 79.6–97.8)
MCV RBC AUTO: 85.8 FL (ref 79.6–97.8)
MCV RBC AUTO: 86.1 FL (ref 79.6–97.8)
MCV RBC AUTO: 86.1 FL (ref 79.6–97.8)
MCV RBC AUTO: 86.6 FL (ref 79.6–97.8)
MCV RBC AUTO: 87.1 FL (ref 79.6–97.8)
MCV RBC AUTO: 87.2 FL (ref 79.6–97.8)
MCV RBC AUTO: 87.3 FL (ref 79.6–97.8)
MCV RBC AUTO: 87.6 FL (ref 79.6–97.8)
MCV RBC AUTO: 87.8 FL (ref 79.6–97.8)
MCV RBC AUTO: 88.3 FL (ref 79.6–97.8)
MCV RBC AUTO: 88.6 FL (ref 79.6–97.8)
MCV RBC AUTO: 89.3 FL (ref 79.6–97.8)
MCV RBC AUTO: 89.7 FL (ref 79.6–97.8)
MCV RBC AUTO: 89.7 FL (ref 79.6–97.8)
MCV RBC AUTO: 89.8 FL (ref 79.6–97.8)
MCV RBC AUTO: 89.9 FL (ref 79.6–97.8)
MCV RBC AUTO: 90.2 FL (ref 79.6–97.8)
MCV RBC AUTO: 90.3 FL (ref 79.6–97.8)
MCV RBC AUTO: 90.9 FL (ref 79.6–97.8)
MCV RBC AUTO: 91.7 FL (ref 79.6–97.8)
MCV RBC AUTO: 92.7 FL (ref 79.6–97.8)
MCV RBC AUTO: 93.1 FL (ref 79.6–97.8)
MCV RBC AUTO: 93.5 FL (ref 79.6–97.8)
MCV RBC AUTO: 93.7 FL (ref 79.6–97.8)
MCV RBC AUTO: 95.1 FL (ref 79.6–97.8)
MCV RBC AUTO: 95.3 FL (ref 79.6–97.8)
MCV RBC AUTO: 95.9 FL (ref 79.6–97.8)
MCV RBC AUTO: 96.8 FL (ref 79.6–97.8)
MCV RBC AUTO: 96.9 FL (ref 79.6–97.8)
MCV RBC AUTO: 97 FL (ref 79.6–97.8)
MCV RBC AUTO: 97 FL (ref 79.6–97.8)
MCV RBC AUTO: 97.3 FL (ref 79.6–97.8)
MCV RBC AUTO: 97.8 FL (ref 79.6–97.8)
MCV RBC AUTO: 99.1 FL (ref 79.6–97.8)
METAMYELOCYTES NFR BLD MANUAL: 1 %
MONOCYTES # BLD: 0 K/UL (ref 0.1–1.3)
MONOCYTES # BLD: 0.1 K/UL (ref 0.1–1.3)
MONOCYTES # BLD: 0.3 K/UL (ref 0.1–1.3)
MONOCYTES # BLD: 0.3 K/UL (ref 0.1–1.3)
MONOCYTES # BLD: 0.4 K/UL (ref 0.1–1.3)
MONOCYTES # BLD: 0.5 K/UL (ref 0.1–1.3)
MONOCYTES # BLD: 0.6 K/UL (ref 0.1–1.3)
MONOCYTES # BLD: 0.7 K/UL (ref 0.1–1.3)
MONOCYTES # BLD: 0.7 K/UL (ref 0.1–1.3)
MONOCYTES # BLD: 1 K/UL (ref 0.1–1.3)
MONOCYTES # BLD: 1.2 K/UL (ref 0.1–1.3)
MONOCYTES # BLD: 1.9 K/UL (ref 0.1–1.3)
MONOCYTES # BLD: 15.1 K/UL (ref 0.1–1.3)
MONOCYTES # BLD: 2.1 K/UL (ref 0.1–1.3)
MONOCYTES # BLD: 2.3 K/UL (ref 0.1–1.3)
MONOCYTES # BLD: 2.5 K/UL (ref 0.1–1.3)
MONOCYTES # BLD: 2.5 K/UL (ref 0.1–1.3)
MONOCYTES # BLD: 3.7 K/UL (ref 0.1–1.3)
MONOCYTES # BLD: 4.6 K/UL (ref 0.1–1.3)
MONOCYTES # BLD: 5.5 K/UL (ref 0.1–1.3)
MONOCYTES # BLD: 64.2 K/UL (ref 0.1–1.3)
MONOCYTES NFR BLD MANUAL: 1 % (ref 3–9)
MONOCYTES NFR BLD MANUAL: 11 % (ref 3–9)
MONOCYTES NFR BLD MANUAL: 13 % (ref 3–9)
MONOCYTES NFR BLD MANUAL: 2 % (ref 3–9)
MONOCYTES NFR BLD MANUAL: 2 % (ref 3–9)
MONOCYTES NFR BLD MANUAL: 3 % (ref 3–9)
MONOCYTES NFR BLD MANUAL: 5 % (ref 3–9)
MONOCYTES NFR BLD MANUAL: 7 % (ref 3–9)
MONOCYTES NFR BLD MANUAL: 7 % (ref 3–9)
MONOCYTES NFR BLD MANUAL: 9 % (ref 3–9)
MONOCYTES NFR BLD: 1 %
MONOCYTES NFR BLD: 11 % (ref 4–12)
MONOCYTES NFR BLD: 11 % (ref 4–12)
MONOCYTES NFR BLD: 12 % (ref 4–12)
MONOCYTES NFR BLD: 13 % (ref 4–12)
MONOCYTES NFR BLD: 14 % (ref 4–12)
MONOCYTES NFR BLD: 15 % (ref 4–12)
MONOCYTES NFR BLD: 18 % (ref 4–12)
MONOCYTES NFR BLD: 2 %
MONOCYTES NFR BLD: 20 % (ref 4–12)
MONOCYTES NFR BLD: 22 % (ref 4–12)
MONOCYTES NFR BLD: 25 % (ref 4–12)
MONOCYTES NFR BLD: 25 % (ref 4–12)
MONOCYTES NFR BLD: 29 % (ref 4–12)
MONOCYTES NFR BLD: 30 % (ref 4–12)
MONOCYTES NFR BLD: 32 % (ref 4–12)
MONOCYTES NFR BLD: 32 % (ref 4–12)
MONOCYTES NFR BLD: 34 % (ref 4–12)
MONOCYTES NFR BLD: 39 % (ref 4–12)
MONOCYTES NFR BLD: 41 % (ref 4–12)
MONOCYTES NFR BLD: 44 % (ref 4–12)
MONOCYTES NFR BLD: 55 % (ref 4–12)
NEUTS SEG # BLD: 0 K/UL (ref 1.7–8.2)
NEUTS SEG # BLD: 0.2 K/UL (ref 1.7–8.2)
NEUTS SEG # BLD: 0.2 K/UL (ref 1.7–8.2)
NEUTS SEG # BLD: 0.3 K/UL (ref 1.7–8.2)
NEUTS SEG # BLD: 0.3 K/UL (ref 1.7–8.2)
NEUTS SEG # BLD: 0.4 K/UL (ref 1.7–8.2)
NEUTS SEG # BLD: 0.4 K/UL (ref 1.7–8.2)
NEUTS SEG # BLD: 0.5 K/UL (ref 1.7–8.2)
NEUTS SEG # BLD: 0.7 K/UL (ref 1.7–8.2)
NEUTS SEG # BLD: 0.8 K/UL (ref 1.7–8.2)
NEUTS SEG # BLD: 1.2 K/UL (ref 1.7–8.2)
NEUTS SEG # BLD: 1.2 K/UL (ref 1.7–8.2)
NEUTS SEG # BLD: 2.1 K/UL (ref 1.7–8.2)
NEUTS SEG # BLD: 2.3 K/UL (ref 1.7–8.2)
NEUTS SEG # BLD: 3.3 K/UL (ref 1.7–8.2)
NEUTS SEG # BLD: 3.6 K/UL (ref 1.7–8.2)
NEUTS SEG NFR BLD MANUAL: 1 % (ref 47–75)
NEUTS SEG NFR BLD MANUAL: 2 % (ref 47–75)
NEUTS SEG NFR BLD MANUAL: 2 % (ref 47–75)
NEUTS SEG NFR BLD MANUAL: 3 % (ref 47–75)
NEUTS SEG NFR BLD MANUAL: 4 % (ref 47–75)
NEUTS SEG NFR BLD: 0 % (ref 43–78)
NEUTS SEG NFR BLD: 1 %
NEUTS SEG NFR BLD: 1 % (ref 43–78)
NEUTS SEG NFR BLD: 34 % (ref 43–78)
NEUTS SEG NFR BLD: 38 % (ref 43–78)
NEUTS SEG NFR BLD: 45 % (ref 43–78)
NEUTS SEG NFR BLD: 6 % (ref 43–78)
NEUTS SEG NFR BLD: 63 % (ref 43–78)
NEUTS SEG NFR BLD: 64 % (ref 43–78)
NEUTS SEG NFR BLD: 66 % (ref 43–78)
NEUTS SEG NFR BLD: 70 % (ref 43–78)
NEUTS SEG NFR BLD: 82 % (ref 43–78)
NITRITE UR QL STRIP.AUTO: NEGATIVE
NITRITE UR QL STRIP.AUTO: NEGATIVE
NRBC # BLD: 0 K/UL (ref 0–0.2)
NRBC # BLD: 0.02 K/UL (ref 0–0.2)
NRBC # BLD: 0.02 K/UL (ref 0–0.2)
NRBC # BLD: 0.03 K/UL (ref 0–0.2)
NRBC # BLD: 0.03 K/UL (ref 0–0.2)
NRBC # BLD: 0.05 K/UL (ref 0–0.2)
NRBC # BLD: 0.06 K/UL (ref 0–0.2)
NRBC # BLD: 0.06 K/UL (ref 0–0.2)
NRBC BLD-RTO: 1 PER 100 WBC
P-R INTERVAL, ECG05: 182 MS
P-R INTERVAL, ECG05: 224 MS
PATH REV BLD -IMP: NORMAL
PH UR STRIP: 5.5 [PH] (ref 5–9)
PH UR STRIP: 6 [PH] (ref 5–9)
PHOSPHATE SERPL-MCNC: 3.3 MG/DL (ref 2.3–3.7)
PLATELET # BLD AUTO: 10 K/UL (ref 150–450)
PLATELET # BLD AUTO: 10 K/UL (ref 150–450)
PLATELET # BLD AUTO: 11 K/UL (ref 150–450)
PLATELET # BLD AUTO: 11 K/UL (ref 150–450)
PLATELET # BLD AUTO: 12 K/UL (ref 150–450)
PLATELET # BLD AUTO: 13 K/UL (ref 150–450)
PLATELET # BLD AUTO: 14 K/UL (ref 150–450)
PLATELET # BLD AUTO: 15 K/UL (ref 150–450)
PLATELET # BLD AUTO: 16 K/UL (ref 150–450)
PLATELET # BLD AUTO: 16 K/UL (ref 150–450)
PLATELET # BLD AUTO: 17 K/UL (ref 150–450)
PLATELET # BLD AUTO: 18 K/UL (ref 150–450)
PLATELET # BLD AUTO: 18 K/UL (ref 150–450)
PLATELET # BLD AUTO: 2 K/UL (ref 150–450)
PLATELET # BLD AUTO: 2 K/UL (ref 150–450)
PLATELET # BLD AUTO: 21 K/UL (ref 150–450)
PLATELET # BLD AUTO: 23 K/UL (ref 150–450)
PLATELET # BLD AUTO: 24 K/UL (ref 150–450)
PLATELET # BLD AUTO: 25 K/UL (ref 150–450)
PLATELET # BLD AUTO: 28 K/UL (ref 150–450)
PLATELET # BLD AUTO: 28 K/UL (ref 150–450)
PLATELET # BLD AUTO: 3 K/UL (ref 150–450)
PLATELET # BLD AUTO: 35 K/UL (ref 150–450)
PLATELET # BLD AUTO: 36 K/UL (ref 150–450)
PLATELET # BLD AUTO: 36 K/UL (ref 150–450)
PLATELET # BLD AUTO: 37 K/UL (ref 150–450)
PLATELET # BLD AUTO: 4 K/UL (ref 150–450)
PLATELET # BLD AUTO: 5 K/UL (ref 150–450)
PLATELET # BLD AUTO: 6 K/UL (ref 150–450)
PLATELET # BLD AUTO: 7 K/UL (ref 150–450)
PLATELET # BLD AUTO: 7 K/UL (ref 150–450)
PLATELET # BLD AUTO: 8 K/UL (ref 150–450)
PLATELET # BLD AUTO: 9 K/UL
PLATELET # BLD AUTO: 9 K/UL (ref 150–450)
PLATELET # BLD AUTO: 9 K/UL (ref 150–450)
PLATELET COMMENTS,PCOM: ABNORMAL
PMV BLD AUTO: 10 FL (ref 9.4–12.3)
PMV BLD AUTO: 10.1 FL (ref 9.4–12.3)
PMV BLD AUTO: 10.2 FL (ref 9.4–12.3)
PMV BLD AUTO: 10.3 FL (ref 9.4–12.3)
PMV BLD AUTO: 10.4 FL (ref 9.4–12.3)
PMV BLD AUTO: 10.4 FL (ref 9.4–12.3)
PMV BLD AUTO: 10.5 FL (ref 9.4–12.3)
PMV BLD AUTO: 10.7 FL (ref 9.4–12.3)
PMV BLD AUTO: 10.7 FL (ref 9.4–12.3)
PMV BLD AUTO: 10.9 FL (ref 9.4–12.3)
PMV BLD AUTO: 10.9 FL (ref 9.4–12.3)
PMV BLD AUTO: 11.2 FL (ref 9.4–12.3)
PMV BLD AUTO: 11.2 FL (ref 9.4–12.3)
PMV BLD AUTO: 11.3 FL (ref 9.4–12.3)
PMV BLD AUTO: 11.4 FL (ref 9.4–12.3)
PMV BLD AUTO: 11.4 FL (ref 9.4–12.3)
PMV BLD AUTO: 11.5 FL (ref 9.4–12.3)
PMV BLD AUTO: 11.6 FL (ref 9.4–12.3)
PMV BLD AUTO: 11.7 FL (ref 9.4–12.3)
PMV BLD AUTO: 11.9 FL (ref 9.4–12.3)
PMV BLD AUTO: 11.9 FL (ref 9.4–12.3)
PMV BLD AUTO: 12.2 FL (ref 9.4–12.3)
PMV BLD AUTO: 12.3 FL (ref 9.4–12.3)
PMV BLD AUTO: 12.4 FL (ref 9.4–12.3)
PMV BLD AUTO: 12.6 FL (ref 9.4–12.3)
PMV BLD AUTO: 12.8 FL (ref 9.4–12.3)
PMV BLD AUTO: 13.1 FL (ref 9.4–12.3)
PMV BLD AUTO: 13.2 FL (ref 9.4–12.3)
PMV BLD AUTO: 13.7 FL (ref 9.4–12.3)
PMV BLD AUTO: 14.7 FL (ref 9.4–12.3)
PMV BLD AUTO: 8.1 FL (ref 9.4–12.3)
PMV BLD AUTO: 9.1 FL (ref 9.4–12.3)
PMV BLD AUTO: 9.3 FL (ref 9.4–12.3)
PMV BLD AUTO: 9.5 FL (ref 9.4–12.3)
PMV BLD AUTO: 9.6 FL (ref 9.4–12.3)
PMV BLD AUTO: 9.6 FL (ref 9.4–12.3)
PMV BLD AUTO: 9.7 FL (ref 9.4–12.3)
PMV BLD AUTO: 9.9 FL (ref 9.4–12.3)
PMV BLD AUTO: ABNORMAL FL (ref 9.4–12.3)
POTASSIUM SERPL-SCNC: 3.3 MMOL/L (ref 3.5–5.1)
POTASSIUM SERPL-SCNC: 3.4 MMOL/L (ref 3.5–5.1)
POTASSIUM SERPL-SCNC: 3.5 MMOL/L (ref 3.5–5.1)
POTASSIUM SERPL-SCNC: 3.6 MMOL/L (ref 3.5–5.1)
POTASSIUM SERPL-SCNC: 3.7 MMOL/L (ref 3.5–5.1)
POTASSIUM SERPL-SCNC: 3.8 MMOL/L (ref 3.5–5.1)
POTASSIUM SERPL-SCNC: 3.9 MMOL/L (ref 3.5–5.1)
POTASSIUM SERPL-SCNC: 4 MMOL/L (ref 3.5–5.1)
POTASSIUM SERPL-SCNC: 4.1 MMOL/L (ref 3.5–5.1)
POTASSIUM SERPL-SCNC: 4.2 MMOL/L (ref 3.5–5.1)
POTASSIUM SERPL-SCNC: 4.3 MMOL/L (ref 3.5–5.1)
POTASSIUM SERPL-SCNC: 4.4 MMOL/L (ref 3.5–5.1)
POTASSIUM SERPL-SCNC: 4.5 MMOL/L (ref 3.5–5.1)
POTASSIUM SERPL-SCNC: 4.8 MMOL/L (ref 3.5–5.1)
PROT SERPL-MCNC: 5.3 G/DL (ref 6.3–8.2)
PROT SERPL-MCNC: 5.6 G/DL (ref 6.3–8.2)
PROT SERPL-MCNC: 5.7 G/DL (ref 6.3–8.2)
PROT SERPL-MCNC: 5.8 G/DL (ref 6.3–8.2)
PROT SERPL-MCNC: 5.8 G/DL (ref 6.3–8.2)
PROT SERPL-MCNC: 5.9 G/DL (ref 6.3–8.2)
PROT SERPL-MCNC: 6 G/DL (ref 6.3–8.2)
PROT SERPL-MCNC: 6.1 G/DL (ref 6.3–8.2)
PROT SERPL-MCNC: 6.2 G/DL (ref 6.3–8.2)
PROT SERPL-MCNC: 6.3 G/DL (ref 6.3–8.2)
PROT SERPL-MCNC: 6.3 G/DL (ref 6.3–8.2)
PROT SERPL-MCNC: 6.4 G/DL (ref 6.3–8.2)
PROT SERPL-MCNC: 6.4 G/DL (ref 6.3–8.2)
PROT SERPL-MCNC: 6.5 G/DL (ref 6.3–8.2)
PROT SERPL-MCNC: 6.6 G/DL (ref 6.3–8.2)
PROT SERPL-MCNC: 6.7 G/DL (ref 6.3–8.2)
PROT SERPL-MCNC: 6.8 G/DL (ref 6.3–8.2)
PROT SERPL-MCNC: 6.9 G/DL (ref 6.3–8.2)
PROT SERPL-MCNC: 7 G/DL (ref 6.3–8.2)
PROT SERPL-MCNC: 7.1 G/DL (ref 6.3–8.2)
PROT SERPL-MCNC: 7.1 G/DL (ref 6.3–8.2)
PROT SERPL-MCNC: 7.2 G/DL (ref 6.3–8.2)
PROT SERPL-MCNC: 7.3 G/DL (ref 6.3–8.2)
PROT UR STRIP-MCNC: NEGATIVE MG/DL
PROT UR STRIP-MCNC: NEGATIVE MG/DL
Q-T INTERVAL, ECG07: 322 MS
Q-T INTERVAL, ECG07: 326 MS
QRS DURATION, ECG06: 94 MS
QRS DURATION, ECG06: 98 MS
QTC CALCULATION (BEZET), ECG08: 402 MS
QTC CALCULATION (BEZET), ECG08: 416 MS
RBC # BLD AUTO: 1.92 M/UL (ref 4.23–5.6)
RBC # BLD AUTO: 1.97 M/UL (ref 4.23–5.6)
RBC # BLD AUTO: 1.97 M/UL (ref 4.23–5.6)
RBC # BLD AUTO: 1.98 M/UL (ref 4.23–5.6)
RBC # BLD AUTO: 2.01 M/UL (ref 4.23–5.6)
RBC # BLD AUTO: 2.04 M/UL (ref 4.23–5.6)
RBC # BLD AUTO: 2.04 M/UL (ref 4.23–5.6)
RBC # BLD AUTO: 2.11 M/UL (ref 4.23–5.6)
RBC # BLD AUTO: 2.11 M/UL (ref 4.23–5.6)
RBC # BLD AUTO: 2.14 M/UL (ref 4.23–5.6)
RBC # BLD AUTO: 2.14 M/UL (ref 4.23–5.6)
RBC # BLD AUTO: 2.16 M/UL (ref 4.23–5.6)
RBC # BLD AUTO: 2.17 M/UL (ref 4.23–5.6)
RBC # BLD AUTO: 2.18 M/UL (ref 4.23–5.6)
RBC # BLD AUTO: 2.18 M/UL (ref 4.23–5.6)
RBC # BLD AUTO: 2.19 M/UL (ref 4.23–5.6)
RBC # BLD AUTO: 2.2 M/UL (ref 4.23–5.6)
RBC # BLD AUTO: 2.2 M/UL (ref 4.23–5.6)
RBC # BLD AUTO: 2.22 M/UL (ref 4.23–5.6)
RBC # BLD AUTO: 2.23 M/UL (ref 4.23–5.6)
RBC # BLD AUTO: 2.23 M/UL (ref 4.23–5.6)
RBC # BLD AUTO: 2.24 M/UL (ref 4.23–5.6)
RBC # BLD AUTO: 2.25 M/UL (ref 4.23–5.6)
RBC # BLD AUTO: 2.25 M/UL (ref 4.23–5.6)
RBC # BLD AUTO: 2.26 M/UL (ref 4.23–5.6)
RBC # BLD AUTO: 2.26 M/UL (ref 4.23–5.6)
RBC # BLD AUTO: 2.28 M/UL (ref 4.23–5.6)
RBC # BLD AUTO: 2.29 M/UL (ref 4.23–5.6)
RBC # BLD AUTO: 2.31 M/UL (ref 4.23–5.6)
RBC # BLD AUTO: 2.32 M/UL (ref 4.23–5.6)
RBC # BLD AUTO: 2.34 M/UL (ref 4.23–5.6)
RBC # BLD AUTO: 2.34 M/UL (ref 4.23–5.6)
RBC # BLD AUTO: 2.37 M/UL (ref 4.23–5.6)
RBC # BLD AUTO: 2.37 M/UL (ref 4.23–5.6)
RBC # BLD AUTO: 2.42 M/UL (ref 4.23–5.6)
RBC # BLD AUTO: 2.48 M/UL (ref 4.23–5.6)
RBC # BLD AUTO: 2.54 M/UL
RBC # BLD AUTO: 2.57 M/UL (ref 4.23–5.6)
RBC # BLD AUTO: 2.64 M/UL (ref 4.23–5.6)
RBC # BLD AUTO: 2.65 M/UL (ref 4.23–5.6)
RBC # BLD AUTO: 2.67 M/UL (ref 4.23–5.6)
RBC # BLD AUTO: 2.7 M/UL (ref 4.23–5.6)
RBC # BLD AUTO: 2.78 M/UL (ref 4.23–5.6)
RBC # BLD AUTO: 2.82 M/UL (ref 4.23–5.6)
RBC # BLD AUTO: 2.84 M/UL (ref 4.23–5.6)
RBC # BLD AUTO: 2.88 M/UL (ref 4.23–5.6)
RBC # BLD AUTO: 2.89 M/UL (ref 4.23–5.6)
RBC # BLD AUTO: 2.9 M/UL (ref 4.23–5.6)
RBC # BLD AUTO: 2.96 M/UL (ref 4.23–5.6)
RBC # BLD AUTO: 2.97 M/UL (ref 4.23–5.6)
RBC # BLD AUTO: 2.99 M/UL (ref 4.23–5.6)
RBC # BLD AUTO: 3 M/UL (ref 4.23–5.6)
RBC # BLD AUTO: 3.07 M/UL (ref 4.23–5.6)
RBC # BLD AUTO: 3.08 M/UL (ref 4.23–5.6)
RBC # BLD AUTO: 3.12 M/UL (ref 4.23–5.6)
RBC # BLD AUTO: 3.14 M/UL (ref 4.23–5.6)
RBC # BLD AUTO: 3.23 M/UL (ref 4.23–5.6)
RBC # BLD AUTO: 3.27 M/UL (ref 4.23–5.6)
RBC # BLD AUTO: 4.01 M/UL (ref 4.23–5.6)
RBC MORPH BLD: ABNORMAL
RETICS # AUTO: 0.03 M/UL (ref 0.03–0.1)
RETICS/RBC NFR AUTO: 1.3 % (ref 0.3–2)
SARS-COV-2, COV2: ABNORMAL
SARS-COV-2, COV2: NORMAL
SARS-COV-2, COV2: NORMAL
SARS-COV-2, COV2: NOT DETECTED
SERVICE CMNT-IMP: ABNORMAL
SERVICE CMNT-IMP: NORMAL
SODIUM SERPL-SCNC: 133 MMOL/L (ref 136–145)
SODIUM SERPL-SCNC: 134 MMOL/L (ref 136–145)
SODIUM SERPL-SCNC: 135 MMOL/L (ref 136–145)
SODIUM SERPL-SCNC: 136 MMOL/L (ref 136–145)
SODIUM SERPL-SCNC: 137 MMOL/L (ref 136–145)
SODIUM SERPL-SCNC: 138 MMOL/L (ref 136–145)
SODIUM SERPL-SCNC: 139 MMOL/L (ref 136–145)
SODIUM SERPL-SCNC: 140 MMOL/L (ref 136–145)
SODIUM SERPL-SCNC: 141 MMOL/L (ref 136–145)
SODIUM SERPL-SCNC: 142 MMOL/L (ref 136–145)
SODIUM SERPL-SCNC: 142 MMOL/L (ref 136–145)
SODIUM SERPL-SCNC: 143 MMOL/L (ref 136–145)
SODIUM SERPL-SCNC: 144 MMOL/L (ref 136–145)
SOURCE, COVRS: NORMAL
SP GR UR REFRACTOMETRY: 1.01 (ref 1–1.02)
SP GR UR REFRACTOMETRY: 1.01 (ref 1–1.02)
SPECIMEN EXP DATE BLD: NORMAL
SPECIMEN SOURCE, FCOV2M: ABNORMAL
SPECIMEN SOURCE, FCOV2M: NORMAL
SPECIMEN SOURCE: NORMAL
SPECIMEN SOURCE: NORMAL
STATUS OF UNIT,%ST: NORMAL
TEST ORDERED:: NORMAL
TEST ORDERED:: NORMAL
TROPONIN-HIGH SENSITIVITY: 20.1 PG/ML (ref 0–14)
TROPONIN-HIGH SENSITIVITY: 20.4 PG/ML (ref 0–14)
UNIT DIVISION, %UDIV: 0
UNIT NUMBER,UN: NORMAL
UNIT NUMBER,UN: NORMAL
URATE SERPL-MCNC: 3.4 MG/DL (ref 2.6–6)
URATE SERPL-MCNC: 5.4 MG/DL (ref 2.6–6)
URATE SERPL-MCNC: 5.4 MG/DL (ref 2.6–6)
URATE SERPL-MCNC: 5.9 MG/DL (ref 2.6–6)
URATE SERPL-MCNC: 8.6 MG/DL (ref 2.6–6)
URATE SERPL-MCNC: 8.6 MG/DL (ref 2.6–6)
UROBILINOGEN UR QL STRIP.AUTO: 0.2 EU/DL (ref 0.2–1)
UROBILINOGEN UR QL STRIP.AUTO: 0.2 EU/DL (ref 0.2–1)
VANCOMYCIN SERPL-MCNC: 16.3 UG/ML
VANCOMYCIN SERPL-MCNC: 20.1 UG/ML
VANCOMYCIN SERPL-MCNC: 25.4 UG/ML
VANCOMYCIN SERPL-MCNC: 30.5 UG/ML
VENTRICULAR RATE, ECG03: 94 BPM
VENTRICULAR RATE, ECG03: 98 BPM
WBC # BLD AUTO: 0.1 K/UL (ref 4.3–11.1)
WBC # BLD AUTO: 0.2 K/UL (ref 4.3–11.1)
WBC # BLD AUTO: 0.2 K/UL (ref 4.3–11.1)
WBC # BLD AUTO: 0.3 K/UL (ref 4.3–11.1)
WBC # BLD AUTO: 0.3 K/UL (ref 4.3–11.1)
WBC # BLD AUTO: 0.5 K/UL (ref 4.3–11.1)
WBC # BLD AUTO: 0.6 K/UL (ref 4.3–11.1)
WBC # BLD AUTO: 0.6 K/UL (ref 4.3–11.1)
WBC # BLD AUTO: 0.7 K/UL (ref 4.3–11.1)
WBC # BLD AUTO: 0.9 K/UL (ref 4.3–11.1)
WBC # BLD AUTO: 1 K/UL (ref 4.3–11.1)
WBC # BLD AUTO: 1.1 K/UL (ref 4.3–11.1)
WBC # BLD AUTO: 1.1 K/UL (ref 4.3–11.1)
WBC # BLD AUTO: 1.2 K/UL (ref 4.3–11.1)
WBC # BLD AUTO: 1.2 K/UL (ref 4.3–11.1)
WBC # BLD AUTO: 1.3 K/UL (ref 4.3–11.1)
WBC # BLD AUTO: 1.5 K/UL (ref 4.3–11.1)
WBC # BLD AUTO: 1.5 K/UL (ref 4.3–11.1)
WBC # BLD AUTO: 1.6 K/UL (ref 4.3–11.1)
WBC # BLD AUTO: 1.6 K/UL (ref 4.3–11.1)
WBC # BLD AUTO: 1.8 K/UL (ref 4.3–11.1)
WBC # BLD AUTO: 1.9 K/UL (ref 4.3–11.1)
WBC # BLD AUTO: 10.2 K/UL (ref 4.3–11.1)
WBC # BLD AUTO: 10.6 K/UL (ref 4.3–11.1)
WBC # BLD AUTO: 11 K/UL (ref 4.3–11.1)
WBC # BLD AUTO: 11.2 K/UL (ref 4.3–11.1)
WBC # BLD AUTO: 11.3 K/UL (ref 4.3–11.1)
WBC # BLD AUTO: 110.7 K/UL (ref 4.3–11.1)
WBC # BLD AUTO: 115.7 K/UL (ref 4.3–11.1)
WBC # BLD AUTO: 116.7 K/UL (ref 4.3–11.1)
WBC # BLD AUTO: 12.3 K/UL (ref 4.3–11.1)
WBC # BLD AUTO: 120.5 K/UL (ref 4.3–11.1)
WBC # BLD AUTO: 124.1 K/UL (ref 4.3–11.1)
WBC # BLD AUTO: 124.2 K/UL (ref 4.3–11.1)
WBC # BLD AUTO: 14.9 K/UL (ref 4.3–11.1)
WBC # BLD AUTO: 17.5 K/UL (ref 4.3–11.1)
WBC # BLD AUTO: 17.5 K/UL (ref 4.3–11.1)
WBC # BLD AUTO: 17.6 K/UL (ref 4.3–11.1)
WBC # BLD AUTO: 2.1 K/UL (ref 4.3–11.1)
WBC # BLD AUTO: 2.1 K/UL (ref 4.3–11.1)
WBC # BLD AUTO: 21.8 K/UL (ref 4.3–11.1)
WBC # BLD AUTO: 3 K/UL (ref 4.3–11.1)
WBC # BLD AUTO: 3.2 K/UL (ref 4.3–11.1)
WBC # BLD AUTO: 35.1 K/UL (ref 4.3–11.1)
WBC # BLD AUTO: 35.6 K/UL (ref 4.3–11.1)
WBC # BLD AUTO: 4.8 K/UL (ref 4.3–11.1)
WBC # BLD AUTO: 4.8 K/UL (ref 4.3–11.1)
WBC # BLD AUTO: 47.3 K/UL (ref 4.3–11.1)
WBC # BLD AUTO: 52.8 K/UL (ref 4.3–11.1)
WBC # BLD AUTO: 6.6 K/UL (ref 4.3–11.1)
WBC # BLD AUTO: 6.7 K/UL (ref 4.3–11.1)
WBC # BLD AUTO: 8.5 K/UL (ref 4.3–11.1)
WBC MORPH BLD: ABNORMAL
WBC MORPH BLD: SLIGHT
WBC MORPH BLD: SLIGHT

## 2021-01-01 PROCEDURE — 86644 CMV ANTIBODY: CPT

## 2021-01-01 PROCEDURE — U0005 INFEC AGEN DETEC AMPLI PROBE: HCPCS

## 2021-01-01 PROCEDURE — APPSS180 APP SPLIT SHARED TIME > 60 MINUTES: Performed by: NURSE PRACTITIONER

## 2021-01-01 PROCEDURE — 74011000250 HC RX REV CODE- 250: Performed by: NURSE PRACTITIONER

## 2021-01-01 PROCEDURE — P9037 PLATE PHERES LEUKOREDU IRRAD: HCPCS

## 2021-01-01 PROCEDURE — 74011000250 HC RX REV CODE- 250: Performed by: INTERNAL MEDICINE

## 2021-01-01 PROCEDURE — 74011250636 HC RX REV CODE- 250/636: Performed by: INTERNAL MEDICINE

## 2021-01-01 PROCEDURE — 74011636637 HC RX REV CODE- 636/637: Performed by: NURSE PRACTITIONER

## 2021-01-01 PROCEDURE — 80053 COMPREHEN METABOLIC PANEL: CPT

## 2021-01-01 PROCEDURE — 81003 URINALYSIS AUTO W/O SCOPE: CPT

## 2021-01-01 PROCEDURE — 96361 HYDRATE IV INFUSION ADD-ON: CPT

## 2021-01-01 PROCEDURE — 85025 COMPLETE CBC W/AUTO DIFF WBC: CPT

## 2021-01-01 PROCEDURE — 83605 ASSAY OF LACTIC ACID: CPT

## 2021-01-01 PROCEDURE — APPSS45 APP SPLIT SHARED TIME 31-45 MINUTES: Performed by: NURSE PRACTITIONER

## 2021-01-01 PROCEDURE — 86901 BLOOD TYPING SEROLOGIC RH(D): CPT

## 2021-01-01 PROCEDURE — 84550 ASSAY OF BLOOD/URIC ACID: CPT

## 2021-01-01 PROCEDURE — P9040 RBC LEUKOREDUCED IRRADIATED: HCPCS

## 2021-01-01 PROCEDURE — 77030018667 ADMN ST IV BLD FENW -A

## 2021-01-01 PROCEDURE — 86923 COMPATIBILITY TEST ELECTRIC: CPT

## 2021-01-01 PROCEDURE — 36430 TRANSFUSION BLD/BLD COMPNT: CPT

## 2021-01-01 PROCEDURE — 99239 HOSP IP/OBS DSCHRG MGMT >30: CPT | Performed by: INTERNAL MEDICINE

## 2021-01-01 PROCEDURE — 83735 ASSAY OF MAGNESIUM: CPT

## 2021-01-01 PROCEDURE — 93971 EXTREMITY STUDY: CPT

## 2021-01-01 PROCEDURE — 88184 FLOWCYTOMETRY/ TC 1 MARKER: CPT

## 2021-01-01 PROCEDURE — 30233R1 TRANSFUSION OF NONAUTOLOGOUS PLATELETS INTO PERIPHERAL VEIN, PERCUTANEOUS APPROACH: ICD-10-PCS | Performed by: INTERNAL MEDICINE

## 2021-01-01 PROCEDURE — 74011000258 HC RX REV CODE- 258: Performed by: INTERNAL MEDICINE

## 2021-01-01 PROCEDURE — 99233 SBSQ HOSP IP/OBS HIGH 50: CPT | Performed by: INTERNAL MEDICINE

## 2021-01-01 PROCEDURE — 74011250637 HC RX REV CODE- 250/637: Performed by: INTERNAL MEDICINE

## 2021-01-01 PROCEDURE — 3331090002 HH PPS REVENUE DEBIT

## 2021-01-01 PROCEDURE — 99152 MOD SED SAME PHYS/QHP 5/>YRS: CPT

## 2021-01-01 PROCEDURE — 74011250636 HC RX REV CODE- 250/636: Performed by: NURSE PRACTITIONER

## 2021-01-01 PROCEDURE — 74011000636 HC RX REV CODE- 636: Performed by: INTERNAL MEDICINE

## 2021-01-01 PROCEDURE — 36415 COLL VENOUS BLD VENIPUNCTURE: CPT

## 2021-01-01 PROCEDURE — 94640 AIRWAY INHALATION TREATMENT: CPT

## 2021-01-01 PROCEDURE — 84484 ASSAY OF TROPONIN QUANT: CPT

## 2021-01-01 PROCEDURE — 97530 THERAPEUTIC ACTIVITIES: CPT

## 2021-01-01 PROCEDURE — 96375 TX/PRO/DX INJ NEW DRUG ADDON: CPT

## 2021-01-01 PROCEDURE — 96374 THER/PROPH/DIAG INJ IV PUSH: CPT

## 2021-01-01 PROCEDURE — 36592 COLLECT BLOOD FROM PICC: CPT

## 2021-01-01 PROCEDURE — 99232 SBSQ HOSP IP/OBS MODERATE 35: CPT | Performed by: INTERNAL MEDICINE

## 2021-01-01 PROCEDURE — 74011250637 HC RX REV CODE- 250/637: Performed by: FAMILY MEDICINE

## 2021-01-01 PROCEDURE — 74011250636 HC RX REV CODE- 250/636: Performed by: FAMILY MEDICINE

## 2021-01-01 PROCEDURE — 36573 INSJ PICC RS&I 5 YR+: CPT | Performed by: NURSE PRACTITIONER

## 2021-01-01 PROCEDURE — 88341 IMHCHEM/IMCYTCHM EA ADD ANTB: CPT

## 2021-01-01 PROCEDURE — 96372 THER/PROPH/DIAG INJ SC/IM: CPT

## 2021-01-01 PROCEDURE — 99285 EMERGENCY DEPT VISIT HI MDM: CPT

## 2021-01-01 PROCEDURE — 99211 OFF/OP EST MAY X REQ PHY/QHP: CPT

## 2021-01-01 PROCEDURE — C1894 INTRO/SHEATH, NON-LASER: HCPCS

## 2021-01-01 PROCEDURE — 97161 PT EVAL LOW COMPLEX 20 MIN: CPT

## 2021-01-01 PROCEDURE — 94660 CPAP INITIATION&MGMT: CPT

## 2021-01-01 PROCEDURE — U0003 INFECTIOUS AGENT DETECTION BY NUCLEIC ACID (DNA OR RNA); SEVERE ACUTE RESPIRATORY SYNDROME CORONAVIRUS 2 (SARS-COV-2) (CORONAVIRUS DISEASE [COVID-19]), AMPLIFIED PROBE TECHNIQUE, MAKING USE OF HIGH THROUGHPUT TECHNOLOGIES AS DESCRIBED BY CMS-2020-01-R: HCPCS

## 2021-01-01 PROCEDURE — 36591 DRAW BLOOD OFF VENOUS DEVICE: CPT

## 2021-01-01 PROCEDURE — 99284 EMERGENCY DEPT VISIT MOD MDM: CPT

## 2021-01-01 PROCEDURE — 96365 THER/PROPH/DIAG IV INF INIT: CPT

## 2021-01-01 PROCEDURE — 96360 HYDRATION IV INFUSION INIT: CPT

## 2021-01-01 PROCEDURE — 74011000250 HC RX REV CODE- 250: Performed by: RADIOLOGY

## 2021-01-01 PROCEDURE — 74011250636 HC RX REV CODE- 250/636: Performed by: EMERGENCY MEDICINE

## 2021-01-01 PROCEDURE — APPNB15 APP NON BILLABLE TIME 0-15 MINS: Performed by: NURSE PRACTITIONER

## 2021-01-01 PROCEDURE — 93970 EXTREMITY STUDY: CPT

## 2021-01-01 PROCEDURE — 74011250637 HC RX REV CODE- 250/637: Performed by: NURSE PRACTITIONER

## 2021-01-01 PROCEDURE — 87635 SARS-COV-2 COVID-19 AMP PRB: CPT

## 2021-01-01 PROCEDURE — 77010033678 HC OXYGEN DAILY

## 2021-01-01 PROCEDURE — 97165 OT EVAL LOW COMPLEX 30 MIN: CPT

## 2021-01-01 PROCEDURE — 3331090001 HH PPS REVENUE CREDIT

## 2021-01-01 PROCEDURE — 77030031139 HC SUT VCRL2 J&J -A

## 2021-01-01 PROCEDURE — 88237 TISSUE CULTURE BONE MARROW: CPT

## 2021-01-01 PROCEDURE — 86900 BLOOD TYPING SEROLOGIC ABO: CPT

## 2021-01-01 PROCEDURE — 74011000258 HC RX REV CODE- 258: Performed by: NURSE PRACTITIONER

## 2021-01-01 PROCEDURE — 02HV33Z INSERTION OF INFUSION DEVICE INTO SUPERIOR VENA CAVA, PERCUTANEOUS APPROACH: ICD-10-PCS | Performed by: NURSE PRACTITIONER

## 2021-01-01 PROCEDURE — 85049 AUTOMATED PLATELET COUNT: CPT

## 2021-01-01 PROCEDURE — APPNB30 APP NON BILLABLE TIME 0-30 MINS: Performed by: NURSE PRACTITIONER

## 2021-01-01 PROCEDURE — 88374 M/PHMTRC ALYS ISHQUANT/SEMIQ: CPT

## 2021-01-01 PROCEDURE — 88185 FLOWCYTOMETRY/TC ADD-ON: CPT

## 2021-01-01 PROCEDURE — 74011000258 HC RX REV CODE- 258: Performed by: FAMILY MEDICINE

## 2021-01-01 PROCEDURE — 96366 THER/PROPH/DIAG IV INF ADDON: CPT

## 2021-01-01 PROCEDURE — 80202 ASSAY OF VANCOMYCIN: CPT

## 2021-01-01 PROCEDURE — 3E04305 INTRODUCTION OF OTHER ANTINEOPLASTIC INTO CENTRAL VEIN, PERCUTANEOUS APPROACH: ICD-10-PCS | Performed by: INTERNAL MEDICINE

## 2021-01-01 PROCEDURE — 65270000029 HC RM PRIVATE

## 2021-01-01 PROCEDURE — 71045 X-RAY EXAM CHEST 1 VIEW: CPT

## 2021-01-01 PROCEDURE — 77030010507 HC ADH SKN DERMBND J&J -B

## 2021-01-01 PROCEDURE — 400018 HH-NO PAY CLAIM PROCEDURE

## 2021-01-01 PROCEDURE — 94762 N-INVAS EAR/PLS OXIMTRY CONT: CPT

## 2021-01-01 PROCEDURE — APPSS60 APP SPLIT SHARED TIME 46-60 MINUTES: Performed by: NURSE PRACTITIONER

## 2021-01-01 PROCEDURE — 86704 HEP B CORE ANTIBODY TOTAL: CPT

## 2021-01-01 PROCEDURE — 74011000250 HC RX REV CODE- 250: Performed by: PHYSICIAN ASSISTANT

## 2021-01-01 PROCEDURE — 87086 URINE CULTURE/COLONY COUNT: CPT

## 2021-01-01 PROCEDURE — 36430 TRANSFUSION BLD/BLD COMPNT: CPT | Performed by: NURSE PRACTITIONER

## 2021-01-01 PROCEDURE — 88305 TISSUE EXAM BY PATHOLOGIST: CPT

## 2021-01-01 PROCEDURE — 99223 1ST HOSP IP/OBS HIGH 75: CPT | Performed by: INTERNAL MEDICINE

## 2021-01-01 PROCEDURE — 85046 RETICYTE/HGB CONCENTRATE: CPT

## 2021-01-01 PROCEDURE — 88313 SPECIAL STAINS GROUP 2: CPT

## 2021-01-01 PROCEDURE — 77030012341 HC CHMB SPCR OPTC MDI VYRM -A

## 2021-01-01 PROCEDURE — 88311 DECALCIFY TISSUE: CPT

## 2021-01-01 PROCEDURE — 74230 X-RAY XM SWLNG FUNCJ C+: CPT

## 2021-01-01 PROCEDURE — 80048 BASIC METABOLIC PNL TOTAL CA: CPT

## 2021-01-01 PROCEDURE — 83615 LACTATE (LD) (LDH) ENZYME: CPT

## 2021-01-01 PROCEDURE — 99222 1ST HOSP IP/OBS MODERATE 55: CPT | Performed by: INTERNAL MEDICINE

## 2021-01-01 PROCEDURE — 86706 HEP B SURFACE ANTIBODY: CPT

## 2021-01-01 PROCEDURE — G0157 HHC PT ASSISTANT EA 15: HCPCS

## 2021-01-01 PROCEDURE — 30233N1 TRANSFUSION OF NONAUTOLOGOUS RED BLOOD CELLS INTO PERIPHERAL VEIN, PERCUTANEOUS APPROACH: ICD-10-PCS | Performed by: INTERNAL MEDICINE

## 2021-01-01 PROCEDURE — 92611 MOTION FLUOROSCOPY/SWALLOW: CPT

## 2021-01-01 PROCEDURE — 3E043GC INTRODUCTION OF OTHER THERAPEUTIC SUBSTANCE INTO CENTRAL VEIN, PERCUTANEOUS APPROACH: ICD-10-PCS | Performed by: INTERNAL MEDICINE

## 2021-01-01 PROCEDURE — 74011250636 HC RX REV CODE- 250/636: Performed by: PHYSICIAN ASSISTANT

## 2021-01-01 PROCEDURE — 36593 DECLOT VASCULAR DEVICE: CPT

## 2021-01-01 PROCEDURE — 71260 CT THORAX DX C+: CPT

## 2021-01-01 PROCEDURE — A9552 F18 FDG: HCPCS

## 2021-01-01 PROCEDURE — 94760 N-INVAS EAR/PLS OXIMETRY 1: CPT

## 2021-01-01 PROCEDURE — 88264 CHROMOSOME ANALYSIS 20-25: CPT

## 2021-01-01 PROCEDURE — 30233R1 TRANSFUSION OF NONAUTOLOGOUS PLATELETS INTO PERIPHERAL VEIN, PERCUTANEOUS APPROACH: ICD-10-PCS | Performed by: NURSE PRACTITIONER

## 2021-01-01 PROCEDURE — 96523 IRRIG DRUG DELIVERY DEVICE: CPT

## 2021-01-01 PROCEDURE — 93005 ELECTROCARDIOGRAM TRACING: CPT | Performed by: PHYSICIAN ASSISTANT

## 2021-01-01 PROCEDURE — C1751 CATH, INF, PER/CENT/MIDLINE: HCPCS

## 2021-01-01 PROCEDURE — 97535 SELF CARE MNGMENT TRAINING: CPT

## 2021-01-01 PROCEDURE — 82248 BILIRUBIN DIRECT: CPT

## 2021-01-01 PROCEDURE — 74011000258 HC RX REV CODE- 258: Performed by: EMERGENCY MEDICINE

## 2021-01-01 PROCEDURE — G0151 HHCP-SERV OF PT,EA 15 MIN: HCPCS

## 2021-01-01 PROCEDURE — 74011250636 HC RX REV CODE- 250/636: Performed by: RADIOLOGY

## 2021-01-01 PROCEDURE — 99214 OFFICE O/P EST MOD 30 MIN: CPT | Performed by: NURSE PRACTITIONER

## 2021-01-01 PROCEDURE — 74011250637 HC RX REV CODE- 250/637: Performed by: PHYSICIAN ASSISTANT

## 2021-01-01 PROCEDURE — 3E03305 INTRODUCTION OF OTHER ANTINEOPLASTIC INTO PERIPHERAL VEIN, PERCUTANEOUS APPROACH: ICD-10-PCS | Performed by: INTERNAL MEDICINE

## 2021-01-01 PROCEDURE — 84100 ASSAY OF PHOSPHORUS: CPT

## 2021-01-01 PROCEDURE — 83880 ASSAY OF NATRIURETIC PEPTIDE: CPT

## 2021-01-01 PROCEDURE — 400013 HH SOC

## 2021-01-01 PROCEDURE — 86078 PHYS BLOOD BANK SERV REACTJ: CPT

## 2021-01-01 PROCEDURE — 74011000250 HC RX REV CODE- 250: Performed by: STUDENT IN AN ORGANIZED HEALTH CARE EDUCATION/TRAINING PROGRAM

## 2021-01-01 PROCEDURE — 07DR3ZX EXTRACTION OF ILIAC BONE MARROW, PERCUTANEOUS APPROACH, DIAGNOSTIC: ICD-10-PCS | Performed by: RADIOLOGY

## 2021-01-01 PROCEDURE — 93005 ELECTROCARDIOGRAM TRACING: CPT

## 2021-01-01 PROCEDURE — 94664 DEMO&/EVAL PT USE INHALER: CPT

## 2021-01-01 PROCEDURE — 80076 HEPATIC FUNCTION PANEL: CPT

## 2021-01-01 PROCEDURE — 87040 BLOOD CULTURE FOR BACTERIA: CPT

## 2021-01-01 PROCEDURE — C1788 PORT, INDWELLING, IMP: HCPCS

## 2021-01-01 PROCEDURE — 82962 GLUCOSE BLOOD TEST: CPT

## 2021-01-01 PROCEDURE — 86880 COOMBS TEST DIRECT: CPT

## 2021-01-01 PROCEDURE — 38222 DX BONE MARROW BX & ASPIR: CPT

## 2021-01-01 PROCEDURE — 97116 GAIT TRAINING THERAPY: CPT

## 2021-01-01 PROCEDURE — 83010 ASSAY OF HAPTOGLOBIN QUANT: CPT

## 2021-01-01 PROCEDURE — 77030013140 HC MSK NEB VYRM -A

## 2021-01-01 PROCEDURE — 85027 COMPLETE CBC AUTOMATED: CPT

## 2021-01-01 PROCEDURE — 77001 FLUOROGUIDE FOR VEIN DEVICE: CPT

## 2021-01-01 PROCEDURE — 87340 HEPATITIS B SURFACE AG IA: CPT

## 2021-01-01 PROCEDURE — 96367 TX/PROPH/DG ADDL SEQ IV INF: CPT

## 2021-01-01 PROCEDURE — 96409 CHEMO IV PUSH SNGL DRUG: CPT

## 2021-01-01 PROCEDURE — 74011000636 HC RX REV CODE- 636: Performed by: EMERGENCY MEDICINE

## 2021-01-01 PROCEDURE — 88342 IMHCHEM/IMCYTCHM 1ST ANTB: CPT

## 2021-01-01 PROCEDURE — 83690 ASSAY OF LIPASE: CPT

## 2021-01-01 PROCEDURE — 77030031131 HC SUT MXN P COVD -B

## 2021-01-01 PROCEDURE — P9045 ALBUMIN (HUMAN), 5%, 250 ML: HCPCS | Performed by: NURSE PRACTITIONER

## 2021-01-01 PROCEDURE — 77012 CT SCAN FOR NEEDLE BIOPSY: CPT

## 2021-01-01 RX ORDER — COLCHICINE 0.6 MG/1
0.6 TABLET ORAL 2 TIMES DAILY
Status: DISCONTINUED | OUTPATIENT
Start: 2021-01-01 | End: 2021-01-01

## 2021-01-01 RX ORDER — OXYCODONE HYDROCHLORIDE 5 MG/1
5 TABLET ORAL
Status: DISCONTINUED | OUTPATIENT
Start: 2021-01-01 | End: 2021-01-01 | Stop reason: HOSPADM

## 2021-01-01 RX ORDER — HYDROCORTISONE SODIUM SUCCINATE 100 MG/2ML
100 INJECTION, POWDER, FOR SOLUTION INTRAMUSCULAR; INTRAVENOUS AS NEEDED
Status: DISCONTINUED | OUTPATIENT
Start: 2021-01-01 | End: 2021-01-01 | Stop reason: HOSPADM

## 2021-01-01 RX ORDER — HYDROCORTISONE SODIUM SUCCINATE 100 MG/2ML
100 INJECTION, POWDER, FOR SOLUTION INTRAMUSCULAR; INTRAVENOUS AS NEEDED
Status: ACTIVE | OUTPATIENT
Start: 2021-01-01 | End: 2021-01-01

## 2021-01-01 RX ORDER — DEXAMETHASONE SODIUM PHOSPHATE 100 MG/10ML
10 INJECTION INTRAMUSCULAR; INTRAVENOUS EVERY 24 HOURS
Status: COMPLETED | OUTPATIENT
Start: 2021-01-01 | End: 2021-01-01

## 2021-01-01 RX ORDER — POTASSIUM CHLORIDE 750 MG/1
20 TABLET, EXTENDED RELEASE ORAL
Status: COMPLETED | OUTPATIENT
Start: 2021-01-01 | End: 2021-01-01

## 2021-01-01 RX ORDER — ACETAMINOPHEN 650 MG/1
650 SUPPOSITORY RECTAL
Status: DISCONTINUED | OUTPATIENT
Start: 2021-01-01 | End: 2021-01-01

## 2021-01-01 RX ORDER — ONDANSETRON 2 MG/ML
8 INJECTION INTRAMUSCULAR; INTRAVENOUS AS NEEDED
Status: ACTIVE | OUTPATIENT
Start: 2021-01-01 | End: 2021-01-01

## 2021-01-01 RX ORDER — SODIUM CHLORIDE 9 MG/ML
250 INJECTION, SOLUTION INTRAVENOUS AS NEEDED
Status: DISCONTINUED | OUTPATIENT
Start: 2021-01-01 | End: 2021-01-01

## 2021-01-01 RX ORDER — ALBUTEROL SULFATE 0.83 MG/ML
2.5 SOLUTION RESPIRATORY (INHALATION) AS NEEDED
Status: CANCELLED
Start: 2021-01-01

## 2021-01-01 RX ORDER — SODIUM CHLORIDE 0.9 % (FLUSH) 0.9 %
10 SYRINGE (ML) INJECTION AS NEEDED
Status: DISCONTINUED | OUTPATIENT
Start: 2021-01-01 | End: 2021-01-01 | Stop reason: HOSPADM

## 2021-01-01 RX ORDER — ACETAMINOPHEN 325 MG/1
650 TABLET ORAL ONCE
Status: COMPLETED | OUTPATIENT
Start: 2021-01-01 | End: 2021-01-01

## 2021-01-01 RX ORDER — ACETAMINOPHEN 325 MG/1
650 TABLET ORAL
Status: COMPLETED | OUTPATIENT
Start: 2021-01-01 | End: 2021-01-01

## 2021-01-01 RX ORDER — ONDANSETRON 2 MG/ML
8 INJECTION INTRAMUSCULAR; INTRAVENOUS AS NEEDED
Status: DISCONTINUED | OUTPATIENT
Start: 2021-01-01 | End: 2021-01-01

## 2021-01-01 RX ORDER — EPINEPHRINE 1 MG/ML
0.3 INJECTION, SOLUTION, CONCENTRATE INTRAVENOUS AS NEEDED
Status: ACTIVE | OUTPATIENT
Start: 2021-01-01 | End: 2021-01-01

## 2021-01-01 RX ORDER — SODIUM CHLORIDE 0.9 % (FLUSH) 0.9 %
10 SYRINGE (ML) INJECTION AS NEEDED
Status: ACTIVE | OUTPATIENT
Start: 2021-01-01 | End: 2021-01-01

## 2021-01-01 RX ORDER — SODIUM CHLORIDE 0.9 % (FLUSH) 0.9 %
10 SYRINGE (ML) INJECTION AS NEEDED
Status: ACTIVE | OUTPATIENT
Start: 2021-01-01

## 2021-01-01 RX ORDER — FAMOTIDINE 20 MG/1
20 TABLET, FILM COATED ORAL ONCE
Status: COMPLETED | OUTPATIENT
Start: 2021-01-01 | End: 2021-01-01

## 2021-01-01 RX ORDER — ALBUTEROL SULFATE 0.83 MG/ML
2.5 SOLUTION RESPIRATORY (INHALATION) AS NEEDED
Status: CANCELLED | OUTPATIENT
Start: 2021-01-01 | End: 2021-01-01

## 2021-01-01 RX ORDER — SODIUM CHLORIDE 0.9 % (FLUSH) 0.9 %
20 SYRINGE (ML) INJECTION AS NEEDED
Status: DISCONTINUED | OUTPATIENT
Start: 2021-01-01 | End: 2021-01-01 | Stop reason: HOSPADM

## 2021-01-01 RX ORDER — SODIUM CHLORIDE 9 MG/ML
250 INJECTION, SOLUTION INTRAVENOUS AS NEEDED
Status: DISCONTINUED | OUTPATIENT
Start: 2021-01-01 | End: 2021-01-01 | Stop reason: HOSPADM

## 2021-01-01 RX ORDER — SODIUM CHLORIDE 0.9 % (FLUSH) 0.9 %
5-40 SYRINGE (ML) INJECTION AS NEEDED
Status: DISCONTINUED | OUTPATIENT
Start: 2021-01-01 | End: 2021-01-01 | Stop reason: HOSPADM

## 2021-01-01 RX ORDER — SODIUM CHLORIDE 9 MG/ML
250 INJECTION, SOLUTION INTRAVENOUS AS NEEDED
Status: CANCELLED | OUTPATIENT
Start: 2021-01-01

## 2021-01-01 RX ORDER — HYDROCORTISONE SODIUM SUCCINATE 100 MG/2ML
100 INJECTION, POWDER, FOR SOLUTION INTRAMUSCULAR; INTRAVENOUS AS NEEDED
Status: CANCELLED | OUTPATIENT
Start: 2021-01-01

## 2021-01-01 RX ORDER — FAMOTIDINE 20 MG/1
20 TABLET, FILM COATED ORAL
Status: COMPLETED | OUTPATIENT
Start: 2021-01-01 | End: 2021-01-01

## 2021-01-01 RX ORDER — DIPHENHYDRAMINE HCL 25 MG
25 CAPSULE ORAL
Status: DISCONTINUED | OUTPATIENT
Start: 2021-01-01 | End: 2021-01-01 | Stop reason: HOSPADM

## 2021-01-01 RX ORDER — BENZONATATE 100 MG/1
100 CAPSULE ORAL
Status: DISCONTINUED | OUTPATIENT
Start: 2021-01-01 | End: 2021-01-01 | Stop reason: HOSPADM

## 2021-01-01 RX ORDER — ACETAMINOPHEN 325 MG/1
650 TABLET ORAL AS NEEDED
Status: DISCONTINUED | OUTPATIENT
Start: 2021-01-01 | End: 2021-01-01 | Stop reason: HOSPADM

## 2021-01-01 RX ORDER — DIPHENHYDRAMINE HYDROCHLORIDE 50 MG/ML
50 INJECTION, SOLUTION INTRAMUSCULAR; INTRAVENOUS AS NEEDED
Status: CANCELLED
Start: 2021-01-01

## 2021-01-01 RX ORDER — FUROSEMIDE 10 MG/ML
40 INJECTION INTRAMUSCULAR; INTRAVENOUS
Status: COMPLETED | OUTPATIENT
Start: 2021-01-01 | End: 2021-01-01

## 2021-01-01 RX ORDER — ATORVASTATIN CALCIUM 10 MG/1
10 TABLET, FILM COATED ORAL DAILY
Status: DISCONTINUED | OUTPATIENT
Start: 2021-01-01 | End: 2021-01-01 | Stop reason: HOSPADM

## 2021-01-01 RX ORDER — ACETAMINOPHEN 325 MG/1
650 TABLET ORAL AS NEEDED
Status: CANCELLED
Start: 2021-01-01

## 2021-01-01 RX ORDER — FLUCONAZOLE 100 MG/1
200 TABLET ORAL DAILY
Status: DISCONTINUED | OUTPATIENT
Start: 2021-01-01 | End: 2021-01-01 | Stop reason: HOSPADM

## 2021-01-01 RX ORDER — ALBUTEROL SULFATE 0.83 MG/ML
2.5 SOLUTION RESPIRATORY (INHALATION) AS NEEDED
Status: ACTIVE | OUTPATIENT
Start: 2021-01-01 | End: 2021-01-01

## 2021-01-01 RX ORDER — ACYCLOVIR 800 MG/1
400 TABLET ORAL 2 TIMES DAILY
Status: DISCONTINUED | OUTPATIENT
Start: 2021-01-01 | End: 2021-01-01 | Stop reason: HOSPADM

## 2021-01-01 RX ORDER — DIPHENHYDRAMINE HYDROCHLORIDE 50 MG/ML
50 INJECTION, SOLUTION INTRAMUSCULAR; INTRAVENOUS ONCE
Status: COMPLETED | OUTPATIENT
Start: 2021-01-01 | End: 2021-01-01

## 2021-01-01 RX ORDER — SODIUM CHLORIDE 0.9 % (FLUSH) 0.9 %
5-10 SYRINGE (ML) INJECTION AS NEEDED
Status: DISCONTINUED | OUTPATIENT
Start: 2021-01-01 | End: 2021-01-01 | Stop reason: HOSPADM

## 2021-01-01 RX ORDER — PANTOPRAZOLE SODIUM 40 MG/1
40 TABLET, DELAYED RELEASE ORAL
Status: DISCONTINUED | OUTPATIENT
Start: 2021-01-01 | End: 2021-01-01 | Stop reason: HOSPADM

## 2021-01-01 RX ORDER — SODIUM CHLORIDE 9 MG/ML
25 INJECTION, SOLUTION INTRAVENOUS CONTINUOUS
Status: DISCONTINUED | OUTPATIENT
Start: 2021-01-01 | End: 2021-01-01

## 2021-01-01 RX ORDER — DIPHENHYDRAMINE HYDROCHLORIDE 50 MG/ML
50 INJECTION, SOLUTION INTRAMUSCULAR; INTRAVENOUS AS NEEDED
Status: ACTIVE | OUTPATIENT
Start: 2021-01-01 | End: 2021-01-01

## 2021-01-01 RX ORDER — DIPHENHYDRAMINE HYDROCHLORIDE 50 MG/ML
25 INJECTION, SOLUTION INTRAMUSCULAR; INTRAVENOUS ONCE
Status: DISCONTINUED | OUTPATIENT
Start: 2021-01-01 | End: 2021-01-01 | Stop reason: HOSPADM

## 2021-01-01 RX ORDER — ALLOPURINOL 300 MG/1
300 TABLET ORAL DAILY
Qty: 30 TABLET | Refills: 0 | Status: SHIPPED | OUTPATIENT
Start: 2021-01-01 | End: 2021-01-01 | Stop reason: SDUPTHER

## 2021-01-01 RX ORDER — DIPHENHYDRAMINE HCL 25 MG
25 CAPSULE ORAL ONCE
Status: COMPLETED | OUTPATIENT
Start: 2021-01-01 | End: 2021-01-01

## 2021-01-01 RX ORDER — SODIUM CHLORIDE 0.9 % (FLUSH) 0.9 %
10-40 SYRINGE (ML) INJECTION AS NEEDED
Status: DISCONTINUED | OUTPATIENT
Start: 2021-01-01 | End: 2021-01-01 | Stop reason: HOSPADM

## 2021-01-01 RX ORDER — SODIUM CHLORIDE 9 MG/ML
25 INJECTION, SOLUTION INTRAVENOUS ONCE
Status: COMPLETED | OUTPATIENT
Start: 2021-01-01 | End: 2021-01-01

## 2021-01-01 RX ORDER — HYDROCORTISONE SODIUM SUCCINATE 100 MG/2ML
100 INJECTION, POWDER, FOR SOLUTION INTRAMUSCULAR; INTRAVENOUS AS NEEDED
Status: DISPENSED | OUTPATIENT
Start: 2021-01-01 | End: 2021-01-01

## 2021-01-01 RX ORDER — FUROSEMIDE 10 MG/ML
40 INJECTION INTRAMUSCULAR; INTRAVENOUS ONCE
Status: COMPLETED | OUTPATIENT
Start: 2021-01-01 | End: 2021-01-01

## 2021-01-01 RX ORDER — ONDANSETRON 2 MG/ML
8 INJECTION INTRAMUSCULAR; INTRAVENOUS ONCE
Status: COMPLETED | OUTPATIENT
Start: 2021-01-01 | End: 2021-01-01

## 2021-01-01 RX ORDER — ACETAMINOPHEN 325 MG/1
650 TABLET ORAL ONCE
Status: CANCELLED | OUTPATIENT
Start: 2021-01-01 | End: 2021-01-01

## 2021-01-01 RX ORDER — TRIAMCINOLONE ACETONIDE 1 MG/G
CREAM TOPICAL 3 TIMES DAILY
Status: DISCONTINUED | OUTPATIENT
Start: 2021-01-01 | End: 2021-01-01 | Stop reason: HOSPADM

## 2021-01-01 RX ORDER — FAMOTIDINE 10 MG/ML
20 INJECTION INTRAVENOUS ONCE
Status: DISCONTINUED | OUTPATIENT
Start: 2021-01-01 | End: 2021-01-01 | Stop reason: CLARIF

## 2021-01-01 RX ORDER — MORPHINE SULFATE 2 MG/ML
2 INJECTION, SOLUTION INTRAMUSCULAR; INTRAVENOUS
Status: DISCONTINUED | OUTPATIENT
Start: 2021-01-01 | End: 2021-01-01 | Stop reason: HOSPADM

## 2021-01-01 RX ORDER — DIPHENHYDRAMINE HCL 25 MG
25 CAPSULE ORAL
Status: DISPENSED | OUTPATIENT
Start: 2021-01-01 | End: 2021-01-01

## 2021-01-01 RX ORDER — ACYCLOVIR 800 MG/1
400 TABLET ORAL EVERY 12 HOURS
Status: DISCONTINUED | OUTPATIENT
Start: 2021-01-01 | End: 2021-01-01 | Stop reason: HOSPADM

## 2021-01-01 RX ORDER — PROCHLORPERAZINE MALEATE 10 MG
5-10 TABLET ORAL
Status: DISCONTINUED | OUTPATIENT
Start: 2021-01-01 | End: 2021-01-01 | Stop reason: HOSPADM

## 2021-01-01 RX ORDER — CHOLECALCIFEROL (VITAMIN D3) 125 MCG
5 CAPSULE ORAL
Qty: 30 TABLET | Refills: 5 | Status: SHIPPED | OUTPATIENT
Start: 2021-01-01 | End: 2021-01-01 | Stop reason: ALTCHOICE

## 2021-01-01 RX ORDER — FUROSEMIDE 20 MG/1
20 TABLET ORAL DAILY
Qty: 3 TABLET | Refills: 0 | Status: SHIPPED | OUTPATIENT
Start: 2021-01-01 | End: 2021-01-01 | Stop reason: SDUPTHER

## 2021-01-01 RX ORDER — LOPERAMIDE HYDROCHLORIDE 2 MG/1
2 CAPSULE ORAL
Status: DISCONTINUED | OUTPATIENT
Start: 2021-01-01 | End: 2021-01-01 | Stop reason: HOSPADM

## 2021-01-01 RX ORDER — DIPHENHYDRAMINE HYDROCHLORIDE 50 MG/ML
25 INJECTION, SOLUTION INTRAMUSCULAR; INTRAVENOUS AS NEEDED
Status: CANCELLED
Start: 2021-01-01

## 2021-01-01 RX ORDER — MIDAZOLAM HYDROCHLORIDE 1 MG/ML
.25-2 INJECTION, SOLUTION INTRAMUSCULAR; INTRAVENOUS
Status: DISCONTINUED | OUTPATIENT
Start: 2021-01-01 | End: 2021-01-01 | Stop reason: ALTCHOICE

## 2021-01-01 RX ORDER — ACETAMINOPHEN 325 MG/1
650 TABLET ORAL ONCE
Status: DISCONTINUED | OUTPATIENT
Start: 2021-01-01 | End: 2021-01-01

## 2021-01-01 RX ORDER — LEVOFLOXACIN 500 MG/1
500 TABLET, FILM COATED ORAL DAILY
Qty: 30 TABLET | Refills: 0 | Status: SHIPPED | OUTPATIENT
Start: 2021-01-01 | End: 2021-01-01 | Stop reason: SDUPTHER

## 2021-01-01 RX ORDER — FUROSEMIDE 40 MG/1
40 TABLET ORAL DAILY
Status: ON HOLD | COMMUNITY
End: 2021-01-01 | Stop reason: SDUPTHER

## 2021-01-01 RX ORDER — FENTANYL CITRATE 50 UG/ML
25-100 INJECTION, SOLUTION INTRAMUSCULAR; INTRAVENOUS
Status: DISCONTINUED | OUTPATIENT
Start: 2021-01-01 | End: 2021-01-01

## 2021-01-01 RX ORDER — FENTANYL CITRATE 50 UG/ML
25-100 INJECTION, SOLUTION INTRAMUSCULAR; INTRAVENOUS
Status: DISCONTINUED | OUTPATIENT
Start: 2021-01-01 | End: 2021-01-01 | Stop reason: ALTCHOICE

## 2021-01-01 RX ORDER — DIPHENHYDRAMINE HYDROCHLORIDE 50 MG/ML
50 INJECTION, SOLUTION INTRAMUSCULAR; INTRAVENOUS ONCE
Status: DISCONTINUED | OUTPATIENT
Start: 2021-01-01 | End: 2021-01-01 | Stop reason: CLARIF

## 2021-01-01 RX ORDER — DRONABINOL 5 MG/1
5 CAPSULE ORAL 2 TIMES DAILY
Qty: 60 CAPSULE | Refills: 0 | Status: SHIPPED | OUTPATIENT
Start: 2021-01-01 | End: 2021-01-01

## 2021-01-01 RX ORDER — GUAIFENESIN 100 MG/5ML
100 SOLUTION ORAL
Status: DISCONTINUED | OUTPATIENT
Start: 2021-01-01 | End: 2021-01-01 | Stop reason: HOSPADM

## 2021-01-01 RX ORDER — EPINEPHRINE 1 MG/ML
0.3 INJECTION, SOLUTION, CONCENTRATE INTRAVENOUS AS NEEDED
Status: CANCELLED | OUTPATIENT
Start: 2021-01-01

## 2021-01-01 RX ORDER — ONDANSETRON 2 MG/ML
8 INJECTION INTRAMUSCULAR; INTRAVENOUS EVERY 8 HOURS
Status: COMPLETED | OUTPATIENT
Start: 2021-01-01 | End: 2021-01-01

## 2021-01-01 RX ORDER — ACETAMINOPHEN 325 MG/1
650 TABLET ORAL AS NEEDED
Status: ACTIVE | OUTPATIENT
Start: 2021-01-01 | End: 2021-01-01

## 2021-01-01 RX ORDER — LORAZEPAM 1 MG/1
1 TABLET ORAL
Status: DISCONTINUED | OUTPATIENT
Start: 2021-01-01 | End: 2021-01-01 | Stop reason: HOSPADM

## 2021-01-01 RX ORDER — SODIUM CHLORIDE 0.9 % (FLUSH) 0.9 %
10 SYRINGE (ML) INJECTION
Status: COMPLETED | OUTPATIENT
Start: 2021-01-01 | End: 2021-01-01

## 2021-01-01 RX ORDER — MIDAZOLAM HYDROCHLORIDE 1 MG/ML
.5-2 INJECTION, SOLUTION INTRAMUSCULAR; INTRAVENOUS
Status: DISCONTINUED | OUTPATIENT
Start: 2021-01-01 | End: 2021-01-01

## 2021-01-01 RX ORDER — ALBUTEROL SULFATE 0.83 MG/ML
2.5 SOLUTION RESPIRATORY (INHALATION) AS NEEDED
Status: DISCONTINUED | OUTPATIENT
Start: 2021-01-01 | End: 2021-01-01 | Stop reason: HOSPADM

## 2021-01-01 RX ORDER — DIPHENHYDRAMINE HYDROCHLORIDE 50 MG/ML
50 INJECTION, SOLUTION INTRAMUSCULAR; INTRAVENOUS AS NEEDED
Status: DISCONTINUED | OUTPATIENT
Start: 2021-01-01 | End: 2021-01-01 | Stop reason: HOSPADM

## 2021-01-01 RX ORDER — SODIUM CHLORIDE 0.9 % (FLUSH) 0.9 %
20 SYRINGE (ML) INJECTION EVERY 8 HOURS
Status: DISCONTINUED | OUTPATIENT
Start: 2021-01-01 | End: 2021-01-01 | Stop reason: HOSPADM

## 2021-01-01 RX ORDER — ACETAMINOPHEN 325 MG/1
650 TABLET ORAL
Status: DISCONTINUED | OUTPATIENT
Start: 2021-01-01 | End: 2021-01-01 | Stop reason: HOSPADM

## 2021-01-01 RX ORDER — LIDOCAINE HYDROCHLORIDE 20 MG/ML
20-300 INJECTION, SOLUTION INFILTRATION; PERINEURAL
Status: DISCONTINUED | OUTPATIENT
Start: 2021-01-01 | End: 2021-01-01

## 2021-01-01 RX ORDER — ACETAMINOPHEN 325 MG/1
650 TABLET ORAL AS NEEDED
Status: CANCELLED | OUTPATIENT
Start: 2021-01-01 | End: 2021-01-01

## 2021-01-01 RX ORDER — HEPARIN SODIUM (PORCINE) LOCK FLUSH IV SOLN 100 UNIT/ML 100 UNIT/ML
500 SOLUTION INTRAVENOUS ONCE
Status: COMPLETED | OUTPATIENT
Start: 2021-01-01 | End: 2021-01-01

## 2021-01-01 RX ORDER — SODIUM CHLORIDE 0.9 % (FLUSH) 0.9 %
5-40 SYRINGE (ML) INJECTION EVERY 8 HOURS
Status: DISCONTINUED | OUTPATIENT
Start: 2021-01-01 | End: 2021-01-01 | Stop reason: HOSPADM

## 2021-01-01 RX ORDER — ONDANSETRON 2 MG/ML
8 INJECTION INTRAMUSCULAR; INTRAVENOUS AS NEEDED
Status: CANCELLED | OUTPATIENT
Start: 2021-01-01

## 2021-01-01 RX ORDER — SODIUM CHLORIDE 9 MG/ML
25 INJECTION, SOLUTION INTRAVENOUS CONTINUOUS
Status: DISCONTINUED | OUTPATIENT
Start: 2021-01-01 | End: 2021-01-01 | Stop reason: HOSPADM

## 2021-01-01 RX ORDER — HYDROCORTISONE 25 MG/G
CREAM TOPICAL
Qty: 30 G | Refills: 0 | Status: SHIPPED | OUTPATIENT
Start: 2021-01-01

## 2021-01-01 RX ORDER — POTASSIUM CHLORIDE 29.8 MG/ML
40 INJECTION INTRAVENOUS ONCE
Status: DISCONTINUED | OUTPATIENT
Start: 2021-01-01 | End: 2021-01-01

## 2021-01-01 RX ORDER — DIPHENHYDRAMINE HCL 50 MG
50 CAPSULE ORAL ONCE
Status: COMPLETED | OUTPATIENT
Start: 2021-01-01 | End: 2021-01-01

## 2021-01-01 RX ORDER — HYDROCORTISONE 25 MG/G
CREAM TOPICAL
Status: DISCONTINUED | OUTPATIENT
Start: 2021-01-01 | End: 2021-01-01 | Stop reason: HOSPADM

## 2021-01-01 RX ORDER — FLUCONAZOLE 200 MG/1
200 TABLET ORAL DAILY
Qty: 30 TABLET | Refills: 0 | Status: SHIPPED | OUTPATIENT
Start: 2021-01-01 | End: 2021-01-01 | Stop reason: SDUPTHER

## 2021-01-01 RX ORDER — LEVOFLOXACIN 500 MG/1
500 TABLET, FILM COATED ORAL EVERY 24 HOURS
Qty: 5 TABLET | Refills: 0 | Status: SHIPPED | OUTPATIENT
Start: 2021-01-01 | End: 2021-01-01 | Stop reason: SDUPTHER

## 2021-01-01 RX ORDER — DIPHENHYDRAMINE HYDROCHLORIDE 50 MG/ML
25 INJECTION, SOLUTION INTRAMUSCULAR; INTRAVENOUS ONCE
Status: DISCONTINUED | OUTPATIENT
Start: 2021-01-01 | End: 2021-01-01

## 2021-01-01 RX ORDER — ALBUTEROL SULFATE 0.83 MG/ML
2.5 SOLUTION RESPIRATORY (INHALATION)
Status: DISCONTINUED | OUTPATIENT
Start: 2021-01-01 | End: 2021-01-01 | Stop reason: HOSPADM

## 2021-01-01 RX ORDER — DIPHENHYDRAMINE HCL 25 MG
25 CAPSULE ORAL
Status: CANCELLED | OUTPATIENT
Start: 2021-01-01

## 2021-01-01 RX ORDER — ALBUMIN HUMAN 50 G/1000ML
25 SOLUTION INTRAVENOUS ONCE
Status: COMPLETED | OUTPATIENT
Start: 2021-01-01 | End: 2021-01-01

## 2021-01-01 RX ORDER — TRIAMCINOLONE ACETONIDE 1 MG/G
CREAM TOPICAL 2 TIMES DAILY
Status: DISCONTINUED | OUTPATIENT
Start: 2021-01-01 | End: 2021-01-01

## 2021-01-01 RX ORDER — SODIUM CHLORIDE 0.9 % (FLUSH) 0.9 %
10 SYRINGE (ML) INJECTION AS NEEDED
Status: DISCONTINUED | OUTPATIENT
Start: 2021-01-01 | End: 2022-01-01 | Stop reason: HOSPADM

## 2021-01-01 RX ORDER — DIPHENHYDRAMINE HYDROCHLORIDE 50 MG/ML
50 INJECTION, SOLUTION INTRAMUSCULAR; INTRAVENOUS AS NEEDED
Status: CANCELLED | OUTPATIENT
Start: 2021-01-01 | End: 2021-01-01

## 2021-01-01 RX ORDER — CALCIUM CARBONATE 500(1250)
1 TABLET ORAL
Qty: 21 TABLET | Refills: 0 | Status: SHIPPED | OUTPATIENT
Start: 2021-01-01 | End: 2021-01-01 | Stop reason: SDUPTHER

## 2021-01-01 RX ORDER — FLUDEOXYGLUCOSE F-18 200 MCI/ML
10 INJECTION INTRAVENOUS ONCE
Status: COMPLETED | OUTPATIENT
Start: 2021-01-01 | End: 2021-01-01

## 2021-01-01 RX ORDER — HYDROCORTISONE SODIUM SUCCINATE 100 MG/2ML
100 INJECTION, POWDER, FOR SOLUTION INTRAMUSCULAR; INTRAVENOUS AS NEEDED
Status: CANCELLED | OUTPATIENT
Start: 2021-01-01 | End: 2021-01-01

## 2021-01-01 RX ORDER — ALLOPURINOL 300 MG/1
300 TABLET ORAL DAILY
Status: DISCONTINUED | OUTPATIENT
Start: 2021-01-01 | End: 2021-01-01 | Stop reason: HOSPADM

## 2021-01-01 RX ORDER — VANCOMYCIN HYDROCHLORIDE
1250 EVERY 12 HOURS
Status: DISCONTINUED | OUTPATIENT
Start: 2021-01-01 | End: 2021-01-01

## 2021-01-01 RX ORDER — MORPHINE SULFATE 2 MG/ML
1 INJECTION, SOLUTION INTRAMUSCULAR; INTRAVENOUS
Status: DISCONTINUED | OUTPATIENT
Start: 2021-01-01 | End: 2021-01-01 | Stop reason: HOSPADM

## 2021-01-01 RX ORDER — EPINEPHRINE 1 MG/ML
0.3 INJECTION, SOLUTION, CONCENTRATE INTRAVENOUS AS NEEDED
Status: DISCONTINUED | OUTPATIENT
Start: 2021-01-01 | End: 2021-01-01 | Stop reason: HOSPADM

## 2021-01-01 RX ORDER — DIPHENHYDRAMINE HYDROCHLORIDE 50 MG/ML
50 INJECTION, SOLUTION INTRAMUSCULAR; INTRAVENOUS AS NEEDED
Status: DISPENSED | OUTPATIENT
Start: 2021-01-01 | End: 2021-01-01

## 2021-01-01 RX ORDER — SODIUM CHLORIDE 0.9 % (FLUSH) 0.9 %
10 SYRINGE (ML) INJECTION EVERY 8 HOURS
Status: DISCONTINUED | OUTPATIENT
Start: 2021-01-01 | End: 2021-01-01 | Stop reason: HOSPADM

## 2021-01-01 RX ORDER — HYDROCORTISONE SODIUM SUCCINATE 100 MG/2ML
100 INJECTION, POWDER, FOR SOLUTION INTRAMUSCULAR; INTRAVENOUS ONCE
Status: COMPLETED | OUTPATIENT
Start: 2021-01-01 | End: 2021-01-01

## 2021-01-01 RX ORDER — ACETAMINOPHEN 325 MG/1
650 TABLET ORAL ONCE
Status: DISCONTINUED | OUTPATIENT
Start: 2021-01-01 | End: 2021-01-01 | Stop reason: HOSPADM

## 2021-01-01 RX ORDER — DIPHENHYDRAMINE HCL 25 MG
25 CAPSULE ORAL ONCE
Status: DISCONTINUED | OUTPATIENT
Start: 2021-01-01 | End: 2021-01-01

## 2021-01-01 RX ORDER — SODIUM CHLORIDE 0.9 % (FLUSH) 0.9 %
10 SYRINGE (ML) INJECTION AS NEEDED
Status: CANCELLED | OUTPATIENT
Start: 2021-01-01

## 2021-01-01 RX ORDER — SODIUM CHLORIDE 0.9 % (FLUSH) 0.9 %
10-40 SYRINGE (ML) INJECTION AS NEEDED
Status: ACTIVE | OUTPATIENT
Start: 2021-01-01 | End: 2021-01-01

## 2021-01-01 RX ORDER — SODIUM CHLORIDE 0.9 % (FLUSH) 0.9 %
10 SYRINGE (ML) INJECTION ONCE
Status: COMPLETED | OUTPATIENT
Start: 2021-01-01 | End: 2021-01-01

## 2021-01-01 RX ORDER — CALCIUM CARBONATE 500(1250)
500 TABLET ORAL
Status: DISCONTINUED | OUTPATIENT
Start: 2021-01-01 | End: 2021-01-01 | Stop reason: HOSPADM

## 2021-01-01 RX ORDER — DOCUSATE SODIUM 100 MG/1
100 CAPSULE, LIQUID FILLED ORAL
Status: DISCONTINUED | OUTPATIENT
Start: 2021-01-01 | End: 2021-01-01

## 2021-01-01 RX ORDER — FUROSEMIDE 10 MG/ML
20 INJECTION INTRAMUSCULAR; INTRAVENOUS ONCE
Status: COMPLETED | OUTPATIENT
Start: 2021-01-01 | End: 2021-01-01

## 2021-01-01 RX ORDER — SODIUM CHLORIDE 9 MG/ML
75 INJECTION, SOLUTION INTRAVENOUS CONTINUOUS
Status: DISCONTINUED | OUTPATIENT
Start: 2021-01-01 | End: 2021-01-01 | Stop reason: HOSPADM

## 2021-01-01 RX ORDER — HYDROCODONE BITARTRATE AND HOMATROPINE METHYLBROMIDE 1.5; 5 MG/5ML; MG/5ML
5 SYRUP ORAL
Qty: 150 ML | Refills: 0 | Status: ON HOLD | OUTPATIENT
Start: 2021-01-01 | End: 2021-01-01

## 2021-01-01 RX ORDER — DIPHENHYDRAMINE HYDROCHLORIDE 50 MG/ML
12.5-5 INJECTION, SOLUTION INTRAMUSCULAR; INTRAVENOUS
Status: DISCONTINUED | OUTPATIENT
Start: 2021-01-01 | End: 2021-01-01 | Stop reason: ALTCHOICE

## 2021-01-01 RX ORDER — SODIUM CHLORIDE 9 MG/ML
150 INJECTION, SOLUTION INTRAVENOUS CONTINUOUS
Status: DISCONTINUED | OUTPATIENT
Start: 2021-01-01 | End: 2021-01-01 | Stop reason: HOSPADM

## 2021-01-01 RX ORDER — HYDRALAZINE HYDROCHLORIDE 20 MG/ML
20 INJECTION INTRAMUSCULAR; INTRAVENOUS ONCE
Status: DISCONTINUED | OUTPATIENT
Start: 2021-01-01 | End: 2021-01-01 | Stop reason: HOSPADM

## 2021-01-01 RX ORDER — FAMOTIDINE 20 MG/1
20 TABLET, FILM COATED ORAL 2 TIMES DAILY
Status: DISCONTINUED | OUTPATIENT
Start: 2021-01-01 | End: 2021-01-01 | Stop reason: HOSPADM

## 2021-01-01 RX ORDER — DIPHENHYDRAMINE HYDROCHLORIDE 50 MG/ML
25 INJECTION, SOLUTION INTRAMUSCULAR; INTRAVENOUS
Status: DISCONTINUED | OUTPATIENT
Start: 2021-01-01 | End: 2021-01-01 | Stop reason: HOSPADM

## 2021-01-01 RX ORDER — SODIUM CHLORIDE 9 MG/ML
50 INJECTION, SOLUTION INTRAVENOUS CONTINUOUS
Status: DISCONTINUED | OUTPATIENT
Start: 2021-01-01 | End: 2021-01-01 | Stop reason: HOSPADM

## 2021-01-01 RX ORDER — LOPERAMIDE HYDROCHLORIDE 2 MG/1
2 CAPSULE ORAL AS NEEDED
Status: DISCONTINUED | OUTPATIENT
Start: 2021-01-01 | End: 2021-01-01 | Stop reason: HOSPADM

## 2021-01-01 RX ORDER — ONDANSETRON 2 MG/ML
4 INJECTION INTRAMUSCULAR; INTRAVENOUS
Status: DISCONTINUED | OUTPATIENT
Start: 2021-01-01 | End: 2021-01-01 | Stop reason: HOSPADM

## 2021-01-01 RX ORDER — CHOLECALCIFEROL (VITAMIN D3) 125 MCG
5 CAPSULE ORAL
Status: DISCONTINUED | OUTPATIENT
Start: 2021-01-01 | End: 2021-01-01 | Stop reason: HOSPADM

## 2021-01-01 RX ORDER — ACETAMINOPHEN 325 MG/1
650 TABLET ORAL
Status: DISCONTINUED | OUTPATIENT
Start: 2021-01-01 | End: 2021-01-01

## 2021-01-01 RX ORDER — PROMETHAZINE HYDROCHLORIDE 25 MG/1
12.5 TABLET ORAL
Status: DISCONTINUED | OUTPATIENT
Start: 2021-01-01 | End: 2021-01-01 | Stop reason: HOSPADM

## 2021-01-01 RX ORDER — DIPHENHYDRAMINE HYDROCHLORIDE 50 MG/ML
25 INJECTION, SOLUTION INTRAMUSCULAR; INTRAVENOUS ONCE
Status: CANCELLED | OUTPATIENT
Start: 2021-01-01 | End: 2021-01-01

## 2021-01-01 RX ORDER — LORAZEPAM 2 MG/ML
0.5 INJECTION INTRAMUSCULAR
Status: DISCONTINUED | OUTPATIENT
Start: 2021-01-01 | End: 2021-01-01 | Stop reason: HOSPADM

## 2021-01-01 RX ORDER — VANCOMYCIN/0.9 % SOD CHLORIDE 1.5G/250ML
1500 PLASTIC BAG, INJECTION (ML) INTRAVENOUS EVERY 12 HOURS
Status: DISCONTINUED | OUTPATIENT
Start: 2021-01-01 | End: 2021-01-01

## 2021-01-01 RX ORDER — ATORVASTATIN CALCIUM 10 MG/1
10 TABLET, FILM COATED ORAL
Status: DISCONTINUED | OUTPATIENT
Start: 2021-01-01 | End: 2021-01-01 | Stop reason: HOSPADM

## 2021-01-01 RX ORDER — ACYCLOVIR 400 MG/1
400 TABLET ORAL 2 TIMES DAILY
Qty: 60 TABLET | Refills: 0 | Status: SHIPPED | OUTPATIENT
Start: 2021-01-01 | End: 2021-01-01 | Stop reason: SDUPTHER

## 2021-01-01 RX ORDER — IPRATROPIUM BROMIDE AND ALBUTEROL SULFATE 2.5; .5 MG/3ML; MG/3ML
3 SOLUTION RESPIRATORY (INHALATION)
Status: DISCONTINUED | OUTPATIENT
Start: 2021-01-01 | End: 2021-01-01 | Stop reason: HOSPADM

## 2021-01-01 RX ORDER — FLUTICASONE PROPIONATE 50 MCG
1 SPRAY, SUSPENSION (ML) NASAL
Status: DISCONTINUED | OUTPATIENT
Start: 2021-01-01 | End: 2021-01-01 | Stop reason: HOSPADM

## 2021-01-01 RX ORDER — ONDANSETRON 8 MG/1
8 TABLET, ORALLY DISINTEGRATING ORAL
Qty: 90 TABLET | Refills: 0 | Status: SHIPPED | OUTPATIENT
Start: 2021-01-01 | End: 2021-01-01 | Stop reason: SDUPTHER

## 2021-01-01 RX ORDER — TEMAZEPAM 15 MG/1
15 CAPSULE ORAL
Status: DISCONTINUED | OUTPATIENT
Start: 2021-01-01 | End: 2021-01-01 | Stop reason: HOSPADM

## 2021-01-01 RX ORDER — COLCHICINE 0.6 MG/1
1.2 TABLET ORAL ONCE
Status: COMPLETED | OUTPATIENT
Start: 2021-01-01 | End: 2021-01-01

## 2021-01-01 RX ORDER — DIPHENHYDRAMINE HYDROCHLORIDE 50 MG/ML
25 INJECTION, SOLUTION INTRAMUSCULAR; INTRAVENOUS ONCE
Status: COMPLETED | OUTPATIENT
Start: 2021-01-01 | End: 2021-01-01

## 2021-01-01 RX ORDER — DIPHENHYDRAMINE HYDROCHLORIDE 50 MG/ML
50 INJECTION, SOLUTION INTRAMUSCULAR; INTRAVENOUS
Status: DISCONTINUED | OUTPATIENT
Start: 2021-01-01 | End: 2021-01-01 | Stop reason: HOSPADM

## 2021-01-01 RX ORDER — CEFAZOLIN SODIUM/WATER 2 G/20 ML
2 SYRINGE (ML) INTRAVENOUS ONCE
Status: COMPLETED | OUTPATIENT
Start: 2021-01-01 | End: 2021-01-01

## 2021-01-01 RX ORDER — DOCUSATE SODIUM 100 MG/1
100 CAPSULE, LIQUID FILLED ORAL
Status: DISCONTINUED | OUTPATIENT
Start: 2021-01-01 | End: 2021-01-01 | Stop reason: HOSPADM

## 2021-01-01 RX ORDER — ONDANSETRON 2 MG/ML
8 INJECTION INTRAMUSCULAR; INTRAVENOUS AS NEEDED
Status: DISCONTINUED | OUTPATIENT
Start: 2021-01-01 | End: 2021-01-01 | Stop reason: HOSPADM

## 2021-01-01 RX ORDER — DRONABINOL 2.5 MG/1
5 CAPSULE ORAL 2 TIMES DAILY
Status: DISCONTINUED | OUTPATIENT
Start: 2021-01-01 | End: 2021-01-01 | Stop reason: HOSPADM

## 2021-01-01 RX ORDER — MORPHINE SULFATE 4 MG/ML
4 INJECTION INTRAVENOUS
Status: DISCONTINUED | OUTPATIENT
Start: 2021-01-01 | End: 2021-01-01 | Stop reason: HOSPADM

## 2021-01-01 RX ORDER — HYDROCODONE BITARTRATE AND HOMATROPINE METHYLBROMIDE 1.5; 5 MG/5ML; MG/5ML
5 SYRUP ORAL
Status: DISCONTINUED | OUTPATIENT
Start: 2021-01-01 | End: 2021-01-01 | Stop reason: HOSPADM

## 2021-01-01 RX ORDER — LIDOCAINE HYDROCHLORIDE 20 MG/ML
20-200 INJECTION, SOLUTION INFILTRATION; PERINEURAL
Status: DISCONTINUED | OUTPATIENT
Start: 2021-01-01 | End: 2021-01-01 | Stop reason: ALTCHOICE

## 2021-01-01 RX ORDER — MAGNESIUM SULFATE HEPTAHYDRATE 40 MG/ML
2 INJECTION, SOLUTION INTRAVENOUS ONCE
Status: COMPLETED | OUTPATIENT
Start: 2021-01-01 | End: 2021-01-01

## 2021-01-01 RX ORDER — ACYCLOVIR 800 MG/1
400 TABLET ORAL 2 TIMES DAILY
Status: DISCONTINUED | OUTPATIENT
Start: 2021-01-01 | End: 2021-01-01

## 2021-01-01 RX ORDER — HYDROCODONE BITARTRATE AND ACETAMINOPHEN 5; 325 MG/1; MG/1
1 TABLET ORAL
Status: DISCONTINUED | OUTPATIENT
Start: 2021-01-01 | End: 2021-01-01 | Stop reason: HOSPADM

## 2021-01-01 RX ORDER — ACETAMINOPHEN 325 MG/1
650 TABLET ORAL ONCE
Status: DISCONTINUED | OUTPATIENT
Start: 2021-01-01 | End: 2021-01-01 | Stop reason: CLARIF

## 2021-01-01 RX ORDER — SODIUM CHLORIDE 0.9 % (FLUSH) 0.9 %
5-10 SYRINGE (ML) INJECTION EVERY 8 HOURS
Status: DISCONTINUED | OUTPATIENT
Start: 2021-01-01 | End: 2021-01-01 | Stop reason: HOSPADM

## 2021-01-01 RX ORDER — ONDANSETRON 4 MG/1
8 TABLET, ORALLY DISINTEGRATING ORAL
Status: DISCONTINUED | OUTPATIENT
Start: 2021-01-01 | End: 2021-01-01 | Stop reason: HOSPADM

## 2021-01-01 RX ORDER — SODIUM CHLORIDE 0.9 % (FLUSH) 0.9 %
5-10 SYRINGE (ML) INJECTION EVERY 8 HOURS
Status: DISCONTINUED | OUTPATIENT
Start: 2021-01-01 | End: 2021-01-01 | Stop reason: SDUPTHER

## 2021-01-01 RX ORDER — ACETAMINOPHEN 325 MG/1
650 TABLET ORAL AS NEEDED
Status: DISCONTINUED | OUTPATIENT
Start: 2021-01-01 | End: 2021-01-01

## 2021-01-01 RX ORDER — SODIUM CHLORIDE 9 MG/ML
25 INJECTION, SOLUTION INTRAVENOUS CONTINUOUS
Status: ACTIVE | OUTPATIENT
Start: 2021-01-01 | End: 2021-01-01

## 2021-01-01 RX ORDER — SODIUM CHLORIDE 9 MG/ML
1000 INJECTION, SOLUTION INTRAVENOUS CONTINUOUS
Status: DISCONTINUED | OUTPATIENT
Start: 2021-01-01 | End: 2021-01-01 | Stop reason: HOSPADM

## 2021-01-01 RX ORDER — POLYETHYLENE GLYCOL 3350 17 G/17G
17 POWDER, FOR SOLUTION ORAL DAILY
Status: DISCONTINUED | OUTPATIENT
Start: 2021-01-01 | End: 2021-01-01

## 2021-01-01 RX ORDER — CALCIUM GLUCONATE 20 MG/ML
1 INJECTION, SOLUTION INTRAVENOUS ONCE
Status: COMPLETED | OUTPATIENT
Start: 2021-01-01 | End: 2021-01-01

## 2021-01-01 RX ORDER — SODIUM CHLORIDE 0.9 % (FLUSH) 0.9 %
5-10 SYRINGE (ML) INJECTION EVERY 8 HOURS
Status: DISCONTINUED | OUTPATIENT
Start: 2021-01-01 | End: 2021-01-01

## 2021-01-01 RX ORDER — ONDANSETRON 2 MG/ML
8 INJECTION INTRAMUSCULAR; INTRAVENOUS AS NEEDED
Status: CANCELLED | OUTPATIENT
Start: 2021-01-01 | End: 2021-01-01

## 2021-01-01 RX ORDER — MAGNESIUM SULFATE HEPTAHYDRATE 40 MG/ML
4 INJECTION, SOLUTION INTRAVENOUS ONCE
Status: COMPLETED | OUTPATIENT
Start: 2021-01-01 | End: 2021-01-01

## 2021-01-01 RX ORDER — OXYCODONE HYDROCHLORIDE 5 MG/1
5 TABLET ORAL ONCE
Status: COMPLETED | OUTPATIENT
Start: 2021-01-01 | End: 2021-01-01

## 2021-01-01 RX ORDER — DIPHENHYDRAMINE HCL 25 MG
25 CAPSULE ORAL ONCE
Status: CANCELLED | OUTPATIENT
Start: 2021-01-01 | End: 2021-01-01

## 2021-01-01 RX ORDER — EPINEPHRINE 1 MG/ML
0.3 INJECTION, SOLUTION, CONCENTRATE INTRAVENOUS AS NEEDED
Status: CANCELLED | OUTPATIENT
Start: 2021-01-01 | End: 2021-01-01

## 2021-01-01 RX ORDER — FAMOTIDINE 10 MG/ML
20 INJECTION INTRAVENOUS ONCE
Status: COMPLETED | OUTPATIENT
Start: 2021-01-01 | End: 2021-01-01

## 2021-01-01 RX ORDER — LEVOFLOXACIN 500 MG/1
500 TABLET, FILM COATED ORAL EVERY 24 HOURS
Status: DISCONTINUED | OUTPATIENT
Start: 2021-01-01 | End: 2021-01-01 | Stop reason: HOSPADM

## 2021-01-01 RX ORDER — ONDANSETRON 2 MG/ML
8 INJECTION INTRAMUSCULAR; INTRAVENOUS EVERY 8 HOURS
Status: DISCONTINUED | OUTPATIENT
Start: 2021-01-01 | End: 2021-01-01

## 2021-01-01 RX ORDER — ACETAMINOPHEN 500 MG
1000 TABLET ORAL
Status: ACTIVE | OUTPATIENT
Start: 2021-01-01 | End: 2021-01-01

## 2021-01-01 RX ORDER — POTASSIUM CHLORIDE 750 MG/1
40 TABLET, EXTENDED RELEASE ORAL
Status: COMPLETED | OUTPATIENT
Start: 2021-01-01 | End: 2021-01-01

## 2021-01-01 RX ORDER — POTASSIUM CHLORIDE 29.8 MG/ML
20 INJECTION INTRAVENOUS ONCE
Status: COMPLETED | OUTPATIENT
Start: 2021-01-01 | End: 2021-01-01

## 2021-01-01 RX ORDER — FAMOTIDINE 10 MG/ML
20 INJECTION INTRAVENOUS ONCE
Status: DISCONTINUED | OUTPATIENT
Start: 2021-01-01 | End: 2021-01-01 | Stop reason: SDUPTHER

## 2021-01-01 RX ORDER — HYDROCODONE BITARTRATE AND ACETAMINOPHEN 7.5; 325 MG/1; MG/1
1 TABLET ORAL
Status: DISCONTINUED | OUTPATIENT
Start: 2021-01-01 | End: 2021-01-01 | Stop reason: HOSPADM

## 2021-01-01 RX ORDER — PROCHLORPERAZINE MALEATE 10 MG
5-10 TABLET ORAL
Qty: 60 TABLET | Refills: 0 | Status: SHIPPED | OUTPATIENT
Start: 2021-01-01 | End: 2021-01-01 | Stop reason: SDUPTHER

## 2021-01-01 RX ORDER — SODIUM CHLORIDE 9 MG/ML
75 INJECTION, SOLUTION INTRAVENOUS CONTINUOUS
Status: DISCONTINUED | OUTPATIENT
Start: 2021-01-01 | End: 2021-01-01

## 2021-01-01 RX ORDER — HYDROCORTISONE 25 MG/G
CREAM TOPICAL 4 TIMES DAILY
Status: DISCONTINUED | OUTPATIENT
Start: 2021-01-01 | End: 2021-01-01 | Stop reason: HOSPADM

## 2021-01-01 RX ORDER — ACYCLOVIR 800 MG/1
400 TABLET ORAL ONCE
Status: COMPLETED | OUTPATIENT
Start: 2021-01-01 | End: 2021-01-01

## 2021-01-01 RX ORDER — OXYCODONE HYDROCHLORIDE 5 MG/1
5 TABLET ORAL
Qty: 30 TABLET | Refills: 0 | Status: SHIPPED | OUTPATIENT
Start: 2021-01-01 | End: 2021-01-01

## 2021-01-01 RX ORDER — FUROSEMIDE 20 MG/1
20 TABLET ORAL DAILY
Status: DISCONTINUED | OUTPATIENT
Start: 2021-01-01 | End: 2021-01-01 | Stop reason: HOSPADM

## 2021-01-01 RX ORDER — LIDOCAINE HYDROCHLORIDE AND EPINEPHRINE 10; 10 MG/ML; UG/ML
1-20 INJECTION, SOLUTION INFILTRATION; PERINEURAL
Status: DISCONTINUED | OUTPATIENT
Start: 2021-01-01 | End: 2021-01-01

## 2021-01-01 RX ORDER — ACYCLOVIR 800 MG/1
800 TABLET ORAL 2 TIMES DAILY
Status: DISCONTINUED | OUTPATIENT
Start: 2021-01-01 | End: 2021-01-01 | Stop reason: HOSPADM

## 2021-01-01 RX ORDER — DIPHENHYDRAMINE HCL 25 MG
25 CAPSULE ORAL
Status: COMPLETED | OUTPATIENT
Start: 2021-01-01 | End: 2021-01-01

## 2021-01-01 RX ORDER — POLYETHYLENE GLYCOL 3350 17 G/17G
17 POWDER, FOR SOLUTION ORAL DAILY PRN
Status: DISCONTINUED | OUTPATIENT
Start: 2021-01-01 | End: 2021-01-01 | Stop reason: HOSPADM

## 2021-01-01 RX ORDER — SODIUM CHLORIDE 9 MG/ML
3 INJECTION INTRAMUSCULAR; INTRAVENOUS; SUBCUTANEOUS AS NEEDED
Status: ACTIVE | OUTPATIENT
Start: 2021-01-01 | End: 2021-01-01

## 2021-01-01 RX ADMIN — ACETAMINOPHEN 650 MG: 325 TABLET ORAL at 07:31

## 2021-01-01 RX ADMIN — Medication 10 ML: at 21:16

## 2021-01-01 RX ADMIN — NYSTATIN 10 ML: 100000 SUSPENSION ORAL at 00:00

## 2021-01-01 RX ADMIN — HYDROCODONE BITARTRATE AND HOMATROPINE METHYLBROMIDE 5 ML: 5; 1.5 SOLUTION ORAL at 02:41

## 2021-01-01 RX ADMIN — FUROSEMIDE 20 MG: 20 TABLET ORAL at 07:41

## 2021-01-01 RX ADMIN — DIPHENHYDRAMINE HYDROCHLORIDE 25 MG: 25 CAPSULE ORAL at 08:46

## 2021-01-01 RX ADMIN — ETOPOSIDE 219 MG: 20 INJECTION INTRAVENOUS at 16:55

## 2021-01-01 RX ADMIN — CEFEPIME HYDROCHLORIDE 1 G: 1 INJECTION, POWDER, FOR SOLUTION INTRAMUSCULAR; INTRAVENOUS at 20:49

## 2021-01-01 RX ADMIN — BARIUM SULFATE 15 ML: 400 PASTE ORAL at 09:11

## 2021-01-01 RX ADMIN — OXYCODONE 5 MG: 5 TABLET ORAL at 11:07

## 2021-01-01 RX ADMIN — MAGNESIUM SULFATE HEPTAHYDRATE 2 G: 40 INJECTION, SOLUTION INTRAVENOUS at 11:35

## 2021-01-01 RX ADMIN — Medication 20 ML: at 22:00

## 2021-01-01 RX ADMIN — Medication 10 ML: at 07:30

## 2021-01-01 RX ADMIN — VANCOMYCIN HYDROCHLORIDE 1000 MG: 1 INJECTION, POWDER, LYOPHILIZED, FOR SOLUTION INTRAVENOUS at 17:22

## 2021-01-01 RX ADMIN — Medication 10 ML: at 13:48

## 2021-01-01 RX ADMIN — Medication 10 ML: at 09:00

## 2021-01-01 RX ADMIN — CALCIUM 500 MG: 500 TABLET ORAL at 12:29

## 2021-01-01 RX ADMIN — Medication 5 MG: at 21:14

## 2021-01-01 RX ADMIN — CALCIUM 500 MG: 500 TABLET ORAL at 16:09

## 2021-01-01 RX ADMIN — Medication 10 ML: at 14:35

## 2021-01-01 RX ADMIN — ONDANSETRON 8 MG: 2 INJECTION INTRAMUSCULAR; INTRAVENOUS at 22:07

## 2021-01-01 RX ADMIN — LEVOFLOXACIN 500 MG: 500 TABLET, FILM COATED ORAL at 14:06

## 2021-01-01 RX ADMIN — SODIUM CHLORIDE 250 ML: 900 INJECTION, SOLUTION INTRAVENOUS at 16:41

## 2021-01-01 RX ADMIN — Medication 5 MG: at 22:07

## 2021-01-01 RX ADMIN — MIDAZOLAM 0.5 MG: 1 INJECTION INTRAMUSCULAR; INTRAVENOUS at 10:35

## 2021-01-01 RX ADMIN — CEFEPIME HYDROCHLORIDE 1 G: 1 INJECTION, POWDER, FOR SOLUTION INTRAMUSCULAR; INTRAVENOUS at 14:44

## 2021-01-01 RX ADMIN — FAMOTIDINE 20 MG: 20 TABLET, FILM COATED ORAL at 07:31

## 2021-01-01 RX ADMIN — SODIUM CHLORIDE 1000 ML: 900 INJECTION, SOLUTION INTRAVENOUS at 20:46

## 2021-01-01 RX ADMIN — POLYETHYLENE GLYCOL 3350 17 G: 17 POWDER, FOR SOLUTION ORAL at 08:08

## 2021-01-01 RX ADMIN — DIPHENHYDRAMINE HYDROCHLORIDE 25 MG: 50 INJECTION, SOLUTION INTRAMUSCULAR; INTRAVENOUS at 03:58

## 2021-01-01 RX ADMIN — DOCUSATE SODIUM 100 MG: 100 CAPSULE, LIQUID FILLED ORAL at 21:43

## 2021-01-01 RX ADMIN — DIPHENHYDRAMINE HYDROCHLORIDE 25 MG: 25 CAPSULE ORAL at 08:21

## 2021-01-01 RX ADMIN — MIDAZOLAM 1 MG: 1 INJECTION INTRAMUSCULAR; INTRAVENOUS at 10:23

## 2021-01-01 RX ADMIN — POLYETHYLENE GLYCOL 3350 17 G: 17 POWDER, FOR SOLUTION ORAL at 09:50

## 2021-01-01 RX ADMIN — Medication 10 ML: at 08:45

## 2021-01-01 RX ADMIN — ACYCLOVIR 400 MG: 800 TABLET ORAL at 09:39

## 2021-01-01 RX ADMIN — DIPHENHYDRAMINE HYDROCHLORIDE 50 MG: 50 INJECTION INTRAMUSCULAR; INTRAVENOUS at 10:48

## 2021-01-01 RX ADMIN — CALCIUM 500 MG: 500 TABLET ORAL at 18:06

## 2021-01-01 RX ADMIN — HYDROCODONE BITARTRATE AND HOMATROPINE METHYLBROMIDE 5 ML: 5; 1.5 SOLUTION ORAL at 23:33

## 2021-01-01 RX ADMIN — Medication 10 ML: at 06:29

## 2021-01-01 RX ADMIN — ATORVASTATIN CALCIUM 10 MG: 10 TABLET, FILM COATED ORAL at 21:23

## 2021-01-01 RX ADMIN — FAMOTIDINE 20 MG: 10 INJECTION, SOLUTION INTRAVENOUS at 12:14

## 2021-01-01 RX ADMIN — Medication 10 ML: at 18:10

## 2021-01-01 RX ADMIN — METHYLPREDNISOLONE SODIUM SUCCINATE 125 MG: 125 INJECTION, POWDER, FOR SOLUTION INTRAMUSCULAR; INTRAVENOUS at 03:58

## 2021-01-01 RX ADMIN — DIPHENHYDRAMINE HYDROCHLORIDE 50 MG: 50 INJECTION, SOLUTION INTRAMUSCULAR; INTRAVENOUS at 14:39

## 2021-01-01 RX ADMIN — SODIUM CHLORIDE 250 ML: 900 INJECTION, SOLUTION INTRAVENOUS at 22:26

## 2021-01-01 RX ADMIN — SODIUM CHLORIDE 250 ML: 900 INJECTION, SOLUTION INTRAVENOUS at 11:10

## 2021-01-01 RX ADMIN — ATORVASTATIN CALCIUM 10 MG: 10 TABLET, FILM COATED ORAL at 20:49

## 2021-01-01 RX ADMIN — CEFEPIME HYDROCHLORIDE 1 G: 1 INJECTION, POWDER, FOR SOLUTION INTRAMUSCULAR; INTRAVENOUS at 21:43

## 2021-01-01 RX ADMIN — WATER 2 MG: 1 INJECTION INTRAMUSCULAR; INTRAVENOUS; SUBCUTANEOUS at 07:58

## 2021-01-01 RX ADMIN — FLUCONAZOLE 200 MG: 100 TABLET ORAL at 07:57

## 2021-01-01 RX ADMIN — ATORVASTATIN CALCIUM 10 MG: 10 TABLET, FILM COATED ORAL at 22:25

## 2021-01-01 RX ADMIN — CALCIUM 500 MG: 500 TABLET ORAL at 09:00

## 2021-01-01 RX ADMIN — FLUCONAZOLE 200 MG: 100 TABLET ORAL at 08:07

## 2021-01-01 RX ADMIN — ACETAMINOPHEN 650 MG: 325 TABLET ORAL at 20:24

## 2021-01-01 RX ADMIN — ACETAMINOPHEN 650 MG: 325 TABLET ORAL at 03:58

## 2021-01-01 RX ADMIN — ACYCLOVIR 400 MG: 800 TABLET ORAL at 17:47

## 2021-01-01 RX ADMIN — SODIUM CHLORIDE 250 ML: 9 INJECTION, SOLUTION INTRAVENOUS at 10:28

## 2021-01-01 RX ADMIN — DIPHENHYDRAMINE HYDROCHLORIDE 25 MG: 25 CAPSULE ORAL at 11:18

## 2021-01-01 RX ADMIN — FLUCONAZOLE 200 MG: 100 TABLET ORAL at 07:41

## 2021-01-01 RX ADMIN — TRIAMCINOLONE ACETONIDE: 1 CREAM TOPICAL at 08:58

## 2021-01-01 RX ADMIN — HYDROCORTISONE SODIUM SUCCINATE 100 MG: 100 INJECTION, POWDER, FOR SOLUTION INTRAMUSCULAR; INTRAVENOUS at 15:47

## 2021-01-01 RX ADMIN — TRIAMCINOLONE ACETONIDE: 1 CREAM TOPICAL at 08:46

## 2021-01-01 RX ADMIN — SODIUM CHLORIDE 1000 ML: 9 INJECTION, SOLUTION INTRAVENOUS at 09:00

## 2021-01-01 RX ADMIN — SODIUM CHLORIDE 250 ML: 900 INJECTION, SOLUTION INTRAVENOUS at 17:15

## 2021-01-01 RX ADMIN — ACYCLOVIR 400 MG: 800 TABLET ORAL at 18:06

## 2021-01-01 RX ADMIN — SODIUM CHLORIDE 250 ML: 900 INJECTION, SOLUTION INTRAVENOUS at 11:04

## 2021-01-01 RX ADMIN — Medication 10 ML: at 10:10

## 2021-01-01 RX ADMIN — HYDROCORTISONE: 25 CREAM TOPICAL at 22:00

## 2021-01-01 RX ADMIN — DIPHENHYDRAMINE HYDROCHLORIDE 25 MG: 50 INJECTION, SOLUTION INTRAMUSCULAR; INTRAVENOUS at 08:16

## 2021-01-01 RX ADMIN — HYDROCORTISONE: 25 CREAM TOPICAL at 21:24

## 2021-01-01 RX ADMIN — HYDROCORTISONE SODIUM SUCCINATE 100 MG: 100 INJECTION, POWDER, FOR SOLUTION INTRAMUSCULAR; INTRAVENOUS at 14:38

## 2021-01-01 RX ADMIN — DIPHENHYDRAMINE HYDROCHLORIDE 25 MG: 50 INJECTION, SOLUTION INTRAMUSCULAR; INTRAVENOUS at 10:42

## 2021-01-01 RX ADMIN — SODIUM CHLORIDE 250 ML: 9 INJECTION, SOLUTION INTRAVENOUS at 10:01

## 2021-01-01 RX ADMIN — Medication 10 ML: at 14:58

## 2021-01-01 RX ADMIN — SODIUM CHLORIDE 50 ML/HR: 900 INJECTION, SOLUTION INTRAVENOUS at 12:52

## 2021-01-01 RX ADMIN — LOPERAMIDE HYDROCHLORIDE 2 MG: 2 CAPSULE ORAL at 13:59

## 2021-01-01 RX ADMIN — DOCUSATE SODIUM 100 MG: 100 CAPSULE, LIQUID FILLED ORAL at 20:49

## 2021-01-01 RX ADMIN — DIPHENHYDRAMINE HYDROCHLORIDE 50 MG: 50 CAPSULE ORAL at 10:28

## 2021-01-01 RX ADMIN — ACYCLOVIR 400 MG: 800 TABLET ORAL at 09:00

## 2021-01-01 RX ADMIN — HYDROCORTISONE: 25 CREAM TOPICAL at 22:36

## 2021-01-01 RX ADMIN — ETOPOSIDE 219 MG: 20 INJECTION INTRAVENOUS at 15:27

## 2021-01-01 RX ADMIN — FUROSEMIDE 20 MG: 20 TABLET ORAL at 08:38

## 2021-01-01 RX ADMIN — FUROSEMIDE 40 MG: 10 INJECTION, SOLUTION INTRAMUSCULAR; INTRAVENOUS at 12:29

## 2021-01-01 RX ADMIN — LIDOCAINE HYDROCHLORIDE 120 MG: 20 INJECTION, SOLUTION INFILTRATION; PERINEURAL at 13:45

## 2021-01-01 RX ADMIN — MIDAZOLAM 0.5 MG: 1 INJECTION INTRAMUSCULAR; INTRAVENOUS at 14:22

## 2021-01-01 RX ADMIN — Medication 10 ML: at 13:37

## 2021-01-01 RX ADMIN — VANCOMYCIN HYDROCHLORIDE 1250 MG: 10 INJECTION, POWDER, LYOPHILIZED, FOR SOLUTION INTRAVENOUS at 17:47

## 2021-01-01 RX ADMIN — ACETAMINOPHEN 650 MG: 325 TABLET ORAL at 13:49

## 2021-01-01 RX ADMIN — VANCOMYCIN HYDROCHLORIDE 1500 MG: 10 INJECTION, POWDER, LYOPHILIZED, FOR SOLUTION INTRAVENOUS at 21:19

## 2021-01-01 RX ADMIN — Medication 5 MG: at 21:23

## 2021-01-01 RX ADMIN — DIATRIZOATE MEGLUMINE AND DIATRIZOATE SODIUM 10 ML: 660; 100 LIQUID ORAL; RECTAL at 07:38

## 2021-01-01 RX ADMIN — Medication 10 ML: at 20:39

## 2021-01-01 RX ADMIN — TRIAMCINOLONE ACETONIDE: 1 CREAM TOPICAL at 16:00

## 2021-01-01 RX ADMIN — ACETAMINOPHEN 650 MG: 325 TABLET ORAL at 11:15

## 2021-01-01 RX ADMIN — TRIAMCINOLONE ACETONIDE: 1 CREAM TOPICAL at 22:00

## 2021-01-01 RX ADMIN — Medication 10 ML: at 08:15

## 2021-01-01 RX ADMIN — Medication 20 ML: at 12:30

## 2021-01-01 RX ADMIN — SODIUM CHLORIDE 3 MG: 9 INJECTION, SOLUTION INTRAVENOUS at 15:18

## 2021-01-01 RX ADMIN — DIPHENHYDRAMINE HYDROCHLORIDE 25 MG: 25 CAPSULE ORAL at 08:00

## 2021-01-01 RX ADMIN — CEFEPIME HYDROCHLORIDE 2 G: 2 INJECTION, POWDER, FOR SOLUTION INTRAVENOUS at 05:02

## 2021-01-01 RX ADMIN — ACETAMINOPHEN 650 MG: 325 TABLET ORAL at 09:50

## 2021-01-01 RX ADMIN — VANCOMYCIN HYDROCHLORIDE 1250 MG: 10 INJECTION, POWDER, LYOPHILIZED, FOR SOLUTION INTRAVENOUS at 17:24

## 2021-01-01 RX ADMIN — FUROSEMIDE 40 MG: 10 INJECTION, SOLUTION INTRAMUSCULAR; INTRAVENOUS at 11:52

## 2021-01-01 RX ADMIN — SODIUM CHLORIDE 250 ML: 900 INJECTION, SOLUTION INTRAVENOUS at 11:00

## 2021-01-01 RX ADMIN — DRONABINOL 5 MG: 2.5 CAPSULE ORAL at 08:37

## 2021-01-01 RX ADMIN — Medication 10 ML: at 16:25

## 2021-01-01 RX ADMIN — DIPHENHYDRAMINE HYDROCHLORIDE 25 MG: 25 CAPSULE ORAL at 12:17

## 2021-01-01 RX ADMIN — ACETAMINOPHEN 650 MG: 325 TABLET ORAL at 15:40

## 2021-01-01 RX ADMIN — Medication 10 ML: at 06:33

## 2021-01-01 RX ADMIN — CEFEPIME HYDROCHLORIDE 2 G: 2 INJECTION, POWDER, FOR SOLUTION INTRAVENOUS at 22:37

## 2021-01-01 RX ADMIN — GUAIFENESIN 100 MG: 200 SOLUTION ORAL at 22:12

## 2021-01-01 RX ADMIN — SODIUM CHLORIDE 25 ML/HR: 900 INJECTION, SOLUTION INTRAVENOUS at 10:15

## 2021-01-01 RX ADMIN — DIPHENHYDRAMINE HYDROCHLORIDE 25 MG: 25 CAPSULE ORAL at 08:53

## 2021-01-01 RX ADMIN — FAMOTIDINE 20 MG: 10 INJECTION, SOLUTION INTRAVENOUS at 11:43

## 2021-01-01 RX ADMIN — ALLOPURINOL 300 MG: 300 TABLET ORAL at 09:31

## 2021-01-01 RX ADMIN — POLYETHYLENE GLYCOL 3350 17 G: 17 POWDER, FOR SOLUTION ORAL at 09:39

## 2021-01-01 RX ADMIN — Medication 10 ML: at 14:00

## 2021-01-01 RX ADMIN — FLUCONAZOLE 200 MG: 100 TABLET ORAL at 08:13

## 2021-01-01 RX ADMIN — SODIUM CHLORIDE 500 ML: 900 INJECTION, SOLUTION INTRAVENOUS at 23:45

## 2021-01-01 RX ADMIN — Medication 10 ML: at 15:44

## 2021-01-01 RX ADMIN — ETOPOSIDE 219 MG: 20 INJECTION INTRAVENOUS at 17:51

## 2021-01-01 RX ADMIN — CALCIUM 500 MG: 500 TABLET ORAL at 09:39

## 2021-01-01 RX ADMIN — TEMAZEPAM 15 MG: 15 CAPSULE ORAL at 21:15

## 2021-01-01 RX ADMIN — FAMOTIDINE 20 MG: 10 INJECTION INTRAVENOUS at 20:08

## 2021-01-01 RX ADMIN — CALCIUM 500 MG: 500 TABLET ORAL at 17:23

## 2021-01-01 RX ADMIN — SODIUM CHLORIDE 100 ML: 900 INJECTION, SOLUTION INTRAVENOUS at 18:32

## 2021-01-01 RX ADMIN — PANTOPRAZOLE SODIUM 40 MG: 40 TABLET, DELAYED RELEASE ORAL at 05:54

## 2021-01-01 RX ADMIN — MAGNESIUM SULFATE HEPTAHYDRATE 4 G: 40 INJECTION, SOLUTION INTRAVENOUS at 10:55

## 2021-01-01 RX ADMIN — FAMOTIDINE 20 MG: 20 TABLET, FILM COATED ORAL at 11:15

## 2021-01-01 RX ADMIN — TRIAMCINOLONE ACETONIDE: 1 CREAM TOPICAL at 09:00

## 2021-01-01 RX ADMIN — MESNA 10950 MG: 100 INJECTION, SOLUTION INTRAVENOUS at 00:08

## 2021-01-01 RX ADMIN — LIDOCAINE HYDROCHLORIDE,EPINEPHRINE BITARTRATE 200 MG: 10; .01 INJECTION, SOLUTION INFILTRATION; PERINEURAL at 10:36

## 2021-01-01 RX ADMIN — SODIUM CHLORIDE 50 ML/HR: 900 INJECTION, SOLUTION INTRAVENOUS at 16:11

## 2021-01-01 RX ADMIN — METHYLPREDNISOLONE SODIUM SUCCINATE 125 MG: 125 INJECTION, POWDER, FOR SOLUTION INTRAMUSCULAR; INTRAVENOUS at 09:00

## 2021-01-01 RX ADMIN — ACETAMINOPHEN 650 MG: 325 TABLET ORAL at 08:31

## 2021-01-01 RX ADMIN — ACYCLOVIR 400 MG: 800 TABLET ORAL at 22:04

## 2021-01-01 RX ADMIN — CALCIUM 500 MG: 500 TABLET ORAL at 17:47

## 2021-01-01 RX ADMIN — FAMOTIDINE 20 MG: 10 INJECTION, SOLUTION INTRAVENOUS at 12:18

## 2021-01-01 RX ADMIN — ONDANSETRON 8 MG: 2 INJECTION INTRAMUSCULAR; INTRAVENOUS at 14:30

## 2021-01-01 RX ADMIN — HYDROCODONE BITARTRATE AND HOMATROPINE METHYLBROMIDE 5 ML: 5; 1.5 SOLUTION ORAL at 03:36

## 2021-01-01 RX ADMIN — FAMOTIDINE 20 MG: 10 INJECTION, SOLUTION INTRAVENOUS at 15:54

## 2021-01-01 RX ADMIN — HYDROCODONE BITARTRATE AND HOMATROPINE METHYLBROMIDE 5 ML: 5; 1.5 SOLUTION ORAL at 21:23

## 2021-01-01 RX ADMIN — Medication 10 ML: at 16:24

## 2021-01-01 RX ADMIN — FAMOTIDINE 20 MG: 20 TABLET, FILM COATED ORAL at 10:32

## 2021-01-01 RX ADMIN — PANTOPRAZOLE SODIUM 40 MG: 40 TABLET, DELAYED RELEASE ORAL at 07:51

## 2021-01-01 RX ADMIN — ACYCLOVIR 400 MG: 800 TABLET ORAL at 17:22

## 2021-01-01 RX ADMIN — VANCOMYCIN HYDROCHLORIDE 1500 MG: 10 INJECTION, POWDER, LYOPHILIZED, FOR SOLUTION INTRAVENOUS at 21:36

## 2021-01-01 RX ADMIN — ACETAMINOPHEN 650 MG: 325 TABLET ORAL at 12:56

## 2021-01-01 RX ADMIN — Medication 10 ML: at 21:43

## 2021-01-01 RX ADMIN — DIPHENHYDRAMINE HYDROCHLORIDE 25 MG: 50 INJECTION, SOLUTION INTRAMUSCULAR; INTRAVENOUS at 16:34

## 2021-01-01 RX ADMIN — Medication 20 ML: at 06:00

## 2021-01-01 RX ADMIN — HYDROCODONE BITARTRATE AND HOMATROPINE METHYLBROMIDE 5 ML: 5; 1.5 SOLUTION ORAL at 22:44

## 2021-01-01 RX ADMIN — Medication 10 ML: at 08:23

## 2021-01-01 RX ADMIN — DIPHENHYDRAMINE HYDROCHLORIDE 25 MG: 25 CAPSULE ORAL at 10:44

## 2021-01-01 RX ADMIN — DIPHENHYDRAMINE HYDROCHLORIDE 25 MG: 25 CAPSULE ORAL at 13:49

## 2021-01-01 RX ADMIN — METHYLPREDNISOLONE SODIUM SUCCINATE 125 MG: 125 INJECTION, POWDER, FOR SOLUTION INTRAMUSCULAR; INTRAVENOUS at 10:42

## 2021-01-01 RX ADMIN — ACYCLOVIR 400 MG: 800 TABLET ORAL at 17:28

## 2021-01-01 RX ADMIN — CEFEPIME HYDROCHLORIDE 2 G: 2 INJECTION, POWDER, FOR SOLUTION INTRAVENOUS at 20:44

## 2021-01-01 RX ADMIN — ACETAMINOPHEN 650 MG: 325 TABLET ORAL at 07:21

## 2021-01-01 RX ADMIN — ACETAMINOPHEN 650 MG: 325 TABLET ORAL at 10:43

## 2021-01-01 RX ADMIN — DIPHENHYDRAMINE HYDROCHLORIDE 25 MG: 25 CAPSULE ORAL at 08:42

## 2021-01-01 RX ADMIN — Medication 10 ML: at 05:18

## 2021-01-01 RX ADMIN — SODIUM CHLORIDE 250 ML: 900 INJECTION, SOLUTION INTRAVENOUS at 09:15

## 2021-01-01 RX ADMIN — IPRATROPIUM BROMIDE AND ALBUTEROL SULFATE 3 ML: .5; 3 SOLUTION RESPIRATORY (INHALATION) at 21:33

## 2021-01-01 RX ADMIN — Medication 10 ML: at 13:45

## 2021-01-01 RX ADMIN — DIPHENHYDRAMINE HYDROCHLORIDE 25 MG: 25 CAPSULE ORAL at 10:52

## 2021-01-01 RX ADMIN — CALCIUM 500 MG: 500 TABLET ORAL at 18:09

## 2021-01-01 RX ADMIN — MIDAZOLAM 1 MG: 1 INJECTION INTRAMUSCULAR; INTRAVENOUS at 13:45

## 2021-01-01 RX ADMIN — ACYCLOVIR 400 MG: 800 TABLET ORAL at 16:09

## 2021-01-01 RX ADMIN — DIPHENHYDRAMINE HYDROCHLORIDE 25 MG: 25 CAPSULE ORAL at 09:50

## 2021-01-01 RX ADMIN — SODIUM CHLORIDE 250 ML: 900 INJECTION, SOLUTION INTRAVENOUS at 10:37

## 2021-01-01 RX ADMIN — SODIUM CHLORIDE 250 ML: 900 INJECTION, SOLUTION INTRAVENOUS at 11:57

## 2021-01-01 RX ADMIN — ATORVASTATIN CALCIUM 10 MG: 10 TABLET, FILM COATED ORAL at 21:59

## 2021-01-01 RX ADMIN — Medication 10 ML: at 12:00

## 2021-01-01 RX ADMIN — MIDAZOLAM 1 MG: 1 INJECTION INTRAMUSCULAR; INTRAVENOUS at 14:07

## 2021-01-01 RX ADMIN — ATORVASTATIN CALCIUM 10 MG: 10 TABLET, FILM COATED ORAL at 21:14

## 2021-01-01 RX ADMIN — CALCIUM GLUCONATE 1000 MG: 20 INJECTION, SOLUTION INTRAVENOUS at 14:41

## 2021-01-01 RX ADMIN — ACETAMINOPHEN 650 MG: 325 TABLET ORAL at 09:56

## 2021-01-01 RX ADMIN — ONDANSETRON 8 MG: 2 INJECTION INTRAMUSCULAR; INTRAVENOUS at 16:16

## 2021-01-01 RX ADMIN — Medication 10 ML: at 15:05

## 2021-01-01 RX ADMIN — SODIUM CHLORIDE 25 ML/HR: 9 INJECTION, SOLUTION INTRAVENOUS at 15:35

## 2021-01-01 RX ADMIN — PANTOPRAZOLE SODIUM 40 MG: 40 TABLET, DELAYED RELEASE ORAL at 05:14

## 2021-01-01 RX ADMIN — Medication 20 ML: at 10:55

## 2021-01-01 RX ADMIN — METHYLPREDNISOLONE SODIUM SUCCINATE 125 MG: 125 INJECTION, POWDER, FOR SOLUTION INTRAMUSCULAR; INTRAVENOUS at 13:59

## 2021-01-01 RX ADMIN — HYDROCORTISONE: 25 CREAM TOPICAL at 13:00

## 2021-01-01 RX ADMIN — DIPHENHYDRAMINE HYDROCHLORIDE 50 MG: 50 INJECTION, SOLUTION INTRAMUSCULAR; INTRAVENOUS at 10:44

## 2021-01-01 RX ADMIN — FAMOTIDINE 20 MG: 10 INJECTION, SOLUTION INTRAVENOUS at 12:05

## 2021-01-01 RX ADMIN — SODIUM CHLORIDE 75 ML/HR: 900 INJECTION, SOLUTION INTRAVENOUS at 13:31

## 2021-01-01 RX ADMIN — CALCIUM 500 MG: 500 TABLET ORAL at 11:23

## 2021-01-01 RX ADMIN — SODIUM CHLORIDE 250 ML: 900 INJECTION, SOLUTION INTRAVENOUS at 10:40

## 2021-01-01 RX ADMIN — TRIAMCINOLONE ACETONIDE: 1 CREAM TOPICAL at 18:04

## 2021-01-01 RX ADMIN — FAMOTIDINE 20 MG: 10 INJECTION, SOLUTION INTRAVENOUS at 11:06

## 2021-01-01 RX ADMIN — HYDROCORTISONE: 25 CREAM TOPICAL at 08:46

## 2021-01-01 RX ADMIN — CALCIUM 500 MG: 500 TABLET ORAL at 12:15

## 2021-01-01 RX ADMIN — ACETAMINOPHEN 650 MG: 325 TABLET, FILM COATED ORAL at 08:46

## 2021-01-01 RX ADMIN — TRIAMCINOLONE ACETONIDE: 1 CREAM TOPICAL at 21:43

## 2021-01-01 RX ADMIN — ONDANSETRON 8 MG: 2 INJECTION INTRAMUSCULAR; INTRAVENOUS at 17:24

## 2021-01-01 RX ADMIN — DIPHENHYDRAMINE HYDROCHLORIDE 25 MG: 25 CAPSULE ORAL at 15:40

## 2021-01-01 RX ADMIN — ALLOPURINOL 300 MG: 300 TABLET ORAL at 08:38

## 2021-01-01 RX ADMIN — FAMOTIDINE 20 MG: 20 TABLET, FILM COATED ORAL at 07:33

## 2021-01-01 RX ADMIN — BENDAMUSTINE HYDROCHLORIDE 275 MG: 25 INJECTION, SOLUTION INTRAVENOUS at 16:08

## 2021-01-01 RX ADMIN — Medication 10 ML: at 16:57

## 2021-01-01 RX ADMIN — ACETAMINOPHEN 650 MG: 325 TABLET ORAL at 12:05

## 2021-01-01 RX ADMIN — Medication 10 ML: at 21:00

## 2021-01-01 RX ADMIN — Medication 10 ML: at 12:14

## 2021-01-01 RX ADMIN — DRONABINOL 5 MG: 2.5 CAPSULE ORAL at 16:34

## 2021-01-01 RX ADMIN — TRIAMCINOLONE ACETONIDE: 1 CREAM TOPICAL at 21:23

## 2021-01-01 RX ADMIN — POLYETHYLENE GLYCOL 3350 17 G: 17 POWDER, FOR SOLUTION ORAL at 08:49

## 2021-01-01 RX ADMIN — CALCIUM 500 MG: 500 TABLET ORAL at 16:21

## 2021-01-01 RX ADMIN — ACETAMINOPHEN 650 MG: 325 TABLET ORAL at 10:52

## 2021-01-01 RX ADMIN — HYDROCODONE BITARTRATE AND HOMATROPINE METHYLBROMIDE 5 ML: 5; 1.5 SOLUTION ORAL at 15:43

## 2021-01-01 RX ADMIN — ALBUMIN (HUMAN) 25 G: 12.5 INJECTION, SOLUTION INTRAVENOUS at 10:51

## 2021-01-01 RX ADMIN — ONDANSETRON 8 MG: 2 INJECTION INTRAMUSCULAR; INTRAVENOUS at 23:19

## 2021-01-01 RX ADMIN — DIPHENHYDRAMINE HYDROCHLORIDE 25 MG: 25 CAPSULE ORAL at 10:55

## 2021-01-01 RX ADMIN — Medication 10 ML: at 01:10

## 2021-01-01 RX ADMIN — DIPHENHYDRAMINE HYDROCHLORIDE 50 MG: 50 INJECTION INTRAMUSCULAR; INTRAVENOUS at 10:33

## 2021-01-01 RX ADMIN — LEVOFLOXACIN 500 MG: 500 TABLET, FILM COATED ORAL at 14:30

## 2021-01-01 RX ADMIN — FAMOTIDINE 20 MG: 10 INJECTION INTRAVENOUS at 10:35

## 2021-01-01 RX ADMIN — CALCIUM 500 MG: 500 TABLET ORAL at 13:32

## 2021-01-01 RX ADMIN — FAMOTIDINE 20 MG: 10 INJECTION INTRAVENOUS at 09:02

## 2021-01-01 RX ADMIN — TRIAMCINOLONE ACETONIDE: 1 CREAM TOPICAL at 17:22

## 2021-01-01 RX ADMIN — FOSAPREPITANT 150 MG: 150 INJECTION, POWDER, LYOPHILIZED, FOR SOLUTION INTRAVENOUS at 14:30

## 2021-01-01 RX ADMIN — FAMOTIDINE 20 MG: 10 INJECTION INTRAVENOUS at 13:55

## 2021-01-01 RX ADMIN — Medication 5 MG: at 20:49

## 2021-01-01 RX ADMIN — DIPHENHYDRAMINE HYDROCHLORIDE 25 MG: 25 CAPSULE ORAL at 12:20

## 2021-01-01 RX ADMIN — ACETAMINOPHEN 650 MG: 325 TABLET ORAL at 10:55

## 2021-01-01 RX ADMIN — DOCUSATE SODIUM 100 MG: 100 CAPSULE, LIQUID FILLED ORAL at 20:45

## 2021-01-01 RX ADMIN — CEFEPIME HYDROCHLORIDE 1 G: 1 INJECTION, POWDER, FOR SOLUTION INTRAMUSCULAR; INTRAVENOUS at 06:15

## 2021-01-01 RX ADMIN — CEFEPIME HYDROCHLORIDE 2 G: 2 INJECTION, POWDER, FOR SOLUTION INTRAVENOUS at 20:46

## 2021-01-01 RX ADMIN — METHYLPREDNISOLONE SODIUM SUCCINATE 125 MG: 125 INJECTION, POWDER, FOR SOLUTION INTRAMUSCULAR; INTRAVENOUS at 16:45

## 2021-01-01 RX ADMIN — ALLOPURINOL 300 MG: 300 TABLET ORAL at 09:26

## 2021-01-01 RX ADMIN — CALCIUM 500 MG: 500 TABLET ORAL at 11:13

## 2021-01-01 RX ADMIN — HYDROCORTISONE: 25 CREAM TOPICAL at 23:30

## 2021-01-01 RX ADMIN — HYDROCORTISONE: 25 CREAM TOPICAL at 18:00

## 2021-01-01 RX ADMIN — SODIUM CHLORIDE 250 ML: 900 INJECTION, SOLUTION INTRAVENOUS at 12:06

## 2021-01-01 RX ADMIN — Medication 10 ML: at 08:20

## 2021-01-01 RX ADMIN — COLCHICINE 0.6 MG: 0.6 TABLET, FILM COATED ORAL at 08:20

## 2021-01-01 RX ADMIN — FUROSEMIDE 20 MG: 10 INJECTION, SOLUTION INTRAMUSCULAR; INTRAVENOUS at 13:59

## 2021-01-01 RX ADMIN — FLUCONAZOLE 200 MG: 100 TABLET ORAL at 09:31

## 2021-01-01 RX ADMIN — ONDANSETRON 4 MG: 2 INJECTION INTRAMUSCULAR; INTRAVENOUS at 22:21

## 2021-01-01 RX ADMIN — ACETAMINOPHEN 650 MG: 325 TABLET ORAL at 10:25

## 2021-01-01 RX ADMIN — NYSTATIN 10 ML: 100000 SUSPENSION ORAL at 12:15

## 2021-01-01 RX ADMIN — ETOPOSIDE 219 MG: 20 INJECTION INTRAVENOUS at 18:06

## 2021-01-01 RX ADMIN — OXYCODONE 5 MG: 5 TABLET ORAL at 22:07

## 2021-01-01 RX ADMIN — TRIAMCINOLONE ACETONIDE: 1 CREAM TOPICAL at 09:39

## 2021-01-01 RX ADMIN — DIPHENHYDRAMINE HYDROCHLORIDE 25 MG: 25 CAPSULE ORAL at 09:56

## 2021-01-01 RX ADMIN — DIPHENHYDRAMINE HYDROCHLORIDE 25 MG: 25 CAPSULE ORAL at 11:42

## 2021-01-01 RX ADMIN — ALLOPURINOL 300 MG: 300 TABLET ORAL at 08:58

## 2021-01-01 RX ADMIN — CEFAZOLIN SODIUM 2 G: 100 INJECTION, POWDER, LYOPHILIZED, FOR SOLUTION INTRAVENOUS at 10:45

## 2021-01-01 RX ADMIN — SODIUM CHLORIDE 3 ML: 9 INJECTION, SOLUTION INTRAMUSCULAR; INTRAVENOUS; SUBCUTANEOUS at 10:40

## 2021-01-01 RX ADMIN — CALCIUM 500 MG: 500 TABLET ORAL at 12:20

## 2021-01-01 RX ADMIN — ONDANSETRON 8 MG: 2 INJECTION INTRAMUSCULAR; INTRAVENOUS at 07:41

## 2021-01-01 RX ADMIN — TRIAMCINOLONE ACETONIDE: 1 CREAM TOPICAL at 09:01

## 2021-01-01 RX ADMIN — CALCIUM GLUCONATE 1000 MG: 20 INJECTION, SOLUTION INTRAVENOUS at 12:15

## 2021-01-01 RX ADMIN — ACYCLOVIR 400 MG: 800 TABLET ORAL at 08:38

## 2021-01-01 RX ADMIN — ALLOPURINOL 300 MG: 300 TABLET ORAL at 08:51

## 2021-01-01 RX ADMIN — CEFEPIME HYDROCHLORIDE 1 G: 1 INJECTION, POWDER, FOR SOLUTION INTRAMUSCULAR; INTRAVENOUS at 15:14

## 2021-01-01 RX ADMIN — SODIUM CHLORIDE 500 ML: 900 INJECTION, SOLUTION INTRAVENOUS at 14:42

## 2021-01-01 RX ADMIN — ONDANSETRON 8 MG: 2 INJECTION INTRAMUSCULAR; INTRAVENOUS at 02:22

## 2021-01-01 RX ADMIN — ONDANSETRON 8 MG: 2 INJECTION INTRAMUSCULAR; INTRAVENOUS at 14:06

## 2021-01-01 RX ADMIN — DIPHENHYDRAMINE HYDROCHLORIDE 25 MG: 25 CAPSULE ORAL at 16:44

## 2021-01-01 RX ADMIN — OXYCODONE 5 MG: 5 TABLET ORAL at 09:40

## 2021-01-01 RX ADMIN — FLUCONAZOLE 200 MG: 100 TABLET ORAL at 08:38

## 2021-01-01 RX ADMIN — Medication 10 ML: at 05:54

## 2021-01-01 RX ADMIN — Medication 20 ML: at 10:20

## 2021-01-01 RX ADMIN — ALLOPURINOL 300 MG: 300 TABLET ORAL at 09:00

## 2021-01-01 RX ADMIN — HYDROCORTISONE SODIUM SUCCINATE 100 MG: 100 INJECTION, POWDER, FOR SOLUTION INTRAMUSCULAR; INTRAVENOUS at 15:56

## 2021-01-01 RX ADMIN — ONDANSETRON 8 MG: 2 INJECTION INTRAMUSCULAR; INTRAVENOUS at 05:01

## 2021-01-01 RX ADMIN — ACYCLOVIR 800 MG: 800 TABLET ORAL at 07:41

## 2021-01-01 RX ADMIN — HYDROCORTISONE: 25 CREAM TOPICAL at 09:51

## 2021-01-01 RX ADMIN — FLUCONAZOLE 200 MG: 100 TABLET ORAL at 09:00

## 2021-01-01 RX ADMIN — ATORVASTATIN CALCIUM 10 MG: 10 TABLET, FILM COATED ORAL at 21:15

## 2021-01-01 RX ADMIN — FAMOTIDINE 20 MG: 20 TABLET, FILM COATED ORAL at 10:57

## 2021-01-01 RX ADMIN — FAMOTIDINE 20 MG: 10 INJECTION, SOLUTION INTRAVENOUS at 13:56

## 2021-01-01 RX ADMIN — DEXAMETHASONE SODIUM PHOSPHATE 12 MG: 4 INJECTION, SOLUTION INTRAMUSCULAR; INTRAVENOUS at 15:42

## 2021-01-01 RX ADMIN — CALCIUM 500 MG: 500 TABLET ORAL at 12:16

## 2021-01-01 RX ADMIN — Medication 10 ML: at 21:24

## 2021-01-01 RX ADMIN — CEFEPIME HYDROCHLORIDE 2 G: 2 INJECTION, POWDER, FOR SOLUTION INTRAVENOUS at 21:14

## 2021-01-01 RX ADMIN — DIPHENHYDRAMINE HYDROCHLORIDE 50 MG: 50 INJECTION, SOLUTION INTRAMUSCULAR; INTRAVENOUS at 08:36

## 2021-01-01 RX ADMIN — FLUCONAZOLE 200 MG: 100 TABLET ORAL at 08:37

## 2021-01-01 RX ADMIN — FAMOTIDINE 20 MG: 10 INJECTION, SOLUTION INTRAVENOUS at 11:18

## 2021-01-01 RX ADMIN — FENTANYL CITRATE 25 MCG: 50 INJECTION, SOLUTION INTRAMUSCULAR; INTRAVENOUS at 10:35

## 2021-01-01 RX ADMIN — SODIUM CHLORIDE 1000 ML: 900 INJECTION, SOLUTION INTRAVENOUS at 10:20

## 2021-01-01 RX ADMIN — SODIUM CHLORIDE 25 ML/HR: 900 INJECTION, SOLUTION INTRAVENOUS at 10:48

## 2021-01-01 RX ADMIN — DIPHENHYDRAMINE HYDROCHLORIDE 25 MG: 25 CAPSULE ORAL at 14:29

## 2021-01-01 RX ADMIN — CALCIUM 500 MG: 500 TABLET ORAL at 16:34

## 2021-01-01 RX ADMIN — NYSTATIN 10 ML: 100000 SUSPENSION ORAL at 12:16

## 2021-01-01 RX ADMIN — DOCUSATE SODIUM 100 MG: 100 CAPSULE, LIQUID FILLED ORAL at 20:37

## 2021-01-01 RX ADMIN — ACETAMINOPHEN 650 MG: 325 TABLET ORAL at 20:08

## 2021-01-01 RX ADMIN — DIPHENHYDRAMINE HYDROCHLORIDE 50 MG: 50 CAPSULE ORAL at 09:12

## 2021-01-01 RX ADMIN — CALCIUM 500 MG: 500 TABLET ORAL at 08:38

## 2021-01-01 RX ADMIN — ONDANSETRON 8 MG: 2 INJECTION INTRAMUSCULAR; INTRAVENOUS at 08:17

## 2021-01-01 RX ADMIN — Medication 10 ML: at 12:30

## 2021-01-01 RX ADMIN — VANCOMYCIN HYDROCHLORIDE 1250 MG: 10 INJECTION, POWDER, LYOPHILIZED, FOR SOLUTION INTRAVENOUS at 06:25

## 2021-01-01 RX ADMIN — ONDANSETRON 8 MG: 2 INJECTION INTRAMUSCULAR; INTRAVENOUS at 06:17

## 2021-01-01 RX ADMIN — SODIUM CHLORIDE, SODIUM LACTATE, POTASSIUM CHLORIDE, AND CALCIUM CHLORIDE 1000 ML: 600; 310; 30; 20 INJECTION, SOLUTION INTRAVENOUS at 18:59

## 2021-01-01 RX ADMIN — OXYCODONE 5 MG: 5 TABLET ORAL at 06:18

## 2021-01-01 RX ADMIN — SODIUM CHLORIDE 50 ML/HR: 900 INJECTION, SOLUTION INTRAVENOUS at 15:00

## 2021-01-01 RX ADMIN — FUROSEMIDE 20 MG: 20 TABLET ORAL at 08:37

## 2021-01-01 RX ADMIN — Medication 10 ML: at 05:12

## 2021-01-01 RX ADMIN — PANTOPRAZOLE SODIUM 40 MG: 40 TABLET, DELAYED RELEASE ORAL at 06:35

## 2021-01-01 RX ADMIN — Medication 10 ML: at 14:16

## 2021-01-01 RX ADMIN — HYDROCODONE BITARTRATE AND HOMATROPINE METHYLBROMIDE 5 ML: 5; 1.5 SOLUTION ORAL at 11:23

## 2021-01-01 RX ADMIN — HYDROCODONE BITARTRATE AND HOMATROPINE METHYLBROMIDE 5 ML: 5; 1.5 SOLUTION ORAL at 23:30

## 2021-01-01 RX ADMIN — ACYCLOVIR 400 MG: 800 TABLET ORAL at 09:50

## 2021-01-01 RX ADMIN — Medication 10 ML: at 15:45

## 2021-01-01 RX ADMIN — ACETAMINOPHEN 650 MG: 325 TABLET ORAL at 10:32

## 2021-01-01 RX ADMIN — COLCHICINE 0.6 MG: 0.6 TABLET, FILM COATED ORAL at 17:56

## 2021-01-01 RX ADMIN — PANTOPRAZOLE SODIUM 40 MG: 40 TABLET, DELAYED RELEASE ORAL at 06:16

## 2021-01-01 RX ADMIN — ATORVASTATIN CALCIUM 10 MG: 10 TABLET, FILM COATED ORAL at 22:34

## 2021-01-01 RX ADMIN — Medication 10 ML: at 06:16

## 2021-01-01 RX ADMIN — Medication 10 ML: at 10:54

## 2021-01-01 RX ADMIN — ACYCLOVIR 800 MG: 800 TABLET ORAL at 16:34

## 2021-01-01 RX ADMIN — FUROSEMIDE 40 MG: 10 INJECTION, SOLUTION INTRAMUSCULAR; INTRAVENOUS at 16:21

## 2021-01-01 RX ADMIN — ACETAMINOPHEN 650 MG: 325 TABLET, FILM COATED ORAL at 21:32

## 2021-01-01 RX ADMIN — NYSTATIN 10 ML: 100000 SUSPENSION ORAL at 13:55

## 2021-01-01 RX ADMIN — ONDANSETRON 8 MG: 2 INJECTION INTRAMUSCULAR; INTRAVENOUS at 01:00

## 2021-01-01 RX ADMIN — FAMOTIDINE 20 MG: 20 TABLET, FILM COATED ORAL at 09:12

## 2021-01-01 RX ADMIN — Medication 10 ML: at 05:52

## 2021-01-01 RX ADMIN — MESNA 10950 MG: 100 INJECTION, SOLUTION INTRAVENOUS at 17:33

## 2021-01-01 RX ADMIN — DIPHENHYDRAMINE HYDROCHLORIDE 25 MG: 25 CAPSULE ORAL at 16:25

## 2021-01-01 RX ADMIN — CEFEPIME HYDROCHLORIDE 1 G: 1 INJECTION, POWDER, FOR SOLUTION INTRAMUSCULAR; INTRAVENOUS at 06:23

## 2021-01-01 RX ADMIN — Medication 10 ML: at 17:22

## 2021-01-01 RX ADMIN — FAMOTIDINE 20 MG: 20 TABLET, FILM COATED ORAL at 09:51

## 2021-01-01 RX ADMIN — ACETAMINOPHEN 650 MG: 325 TABLET ORAL at 12:17

## 2021-01-01 RX ADMIN — Medication 10 ML: at 13:50

## 2021-01-01 RX ADMIN — ALLOPURINOL 300 MG: 300 TABLET ORAL at 08:07

## 2021-01-01 RX ADMIN — ACETAMINOPHEN 650 MG: 325 TABLET ORAL at 11:36

## 2021-01-01 RX ADMIN — CALCIUM 500 MG: 500 TABLET ORAL at 10:04

## 2021-01-01 RX ADMIN — Medication 10 ML: at 10:28

## 2021-01-01 RX ADMIN — Medication 10 ML: at 12:35

## 2021-01-01 RX ADMIN — DIPHENHYDRAMINE HYDROCHLORIDE 25 MG: 25 CAPSULE ORAL at 08:58

## 2021-01-01 RX ADMIN — DIPHENHYDRAMINE HYDROCHLORIDE 25 MG: 50 INJECTION, SOLUTION INTRAMUSCULAR; INTRAVENOUS at 13:55

## 2021-01-01 RX ADMIN — Medication 10 ML: at 14:08

## 2021-01-01 RX ADMIN — DIPHENHYDRAMINE HYDROCHLORIDE 50 MG: 50 INJECTION, SOLUTION INTRAMUSCULAR; INTRAVENOUS at 15:44

## 2021-01-01 RX ADMIN — ACETAMINOPHEN 650 MG: 325 TABLET ORAL at 10:28

## 2021-01-01 RX ADMIN — Medication 20 ML: at 11:45

## 2021-01-01 RX ADMIN — IOPAMIDOL 80 ML: 755 INJECTION, SOLUTION INTRAVENOUS at 18:32

## 2021-01-01 RX ADMIN — ACYCLOVIR 400 MG: 800 TABLET ORAL at 09:31

## 2021-01-01 RX ADMIN — ACETAMINOPHEN 650 MG: 325 TABLET, FILM COATED ORAL at 12:48

## 2021-01-01 RX ADMIN — CALCIUM 500 MG: 500 TABLET ORAL at 07:41

## 2021-01-01 RX ADMIN — Medication 5 ML: at 22:00

## 2021-01-01 RX ADMIN — Medication 10 ML: at 09:17

## 2021-01-01 RX ADMIN — SODIUM CHLORIDE 250 ML: 900 INJECTION, SOLUTION INTRAVENOUS at 08:15

## 2021-01-01 RX ADMIN — FLUCONAZOLE 200 MG: 100 TABLET ORAL at 08:50

## 2021-01-01 RX ADMIN — OXYCODONE 5 MG: 5 TABLET ORAL at 22:30

## 2021-01-01 RX ADMIN — SODIUM CHLORIDE 25 ML/HR: 900 INJECTION, SOLUTION INTRAVENOUS at 13:36

## 2021-01-01 RX ADMIN — DIPHENHYDRAMINE HYDROCHLORIDE 50 MG: 50 INJECTION INTRAMUSCULAR; INTRAVENOUS at 11:07

## 2021-01-01 RX ADMIN — CEFEPIME HYDROCHLORIDE 1 G: 1 INJECTION, POWDER, FOR SOLUTION INTRAMUSCULAR; INTRAVENOUS at 13:37

## 2021-01-01 RX ADMIN — ALLOPURINOL 300 MG: 300 TABLET ORAL at 09:50

## 2021-01-01 RX ADMIN — CEFEPIME HYDROCHLORIDE 1 G: 1 INJECTION, POWDER, FOR SOLUTION INTRAMUSCULAR; INTRAVENOUS at 05:51

## 2021-01-01 RX ADMIN — Medication 5 MG: at 01:09

## 2021-01-01 RX ADMIN — WATER 2 MG: 1 INJECTION INTRAMUSCULAR; INTRAVENOUS; SUBCUTANEOUS at 10:05

## 2021-01-01 RX ADMIN — SODIUM CHLORIDE 250 ML: 900 INJECTION, SOLUTION INTRAVENOUS at 11:25

## 2021-01-01 RX ADMIN — ONDANSETRON 8 MG: 2 INJECTION INTRAMUSCULAR; INTRAVENOUS at 00:36

## 2021-01-01 RX ADMIN — Medication 10 ML: at 14:44

## 2021-01-01 RX ADMIN — FUROSEMIDE 40 MG: 10 INJECTION, SOLUTION INTRAMUSCULAR; INTRAVENOUS at 13:48

## 2021-01-01 RX ADMIN — CALCIUM 500 MG: 500 TABLET ORAL at 09:50

## 2021-01-01 RX ADMIN — CARBOPLATIN 750 MG: 10 INJECTION, SOLUTION INTRAVENOUS at 16:41

## 2021-01-01 RX ADMIN — Medication 5 MG: at 20:45

## 2021-01-01 RX ADMIN — LEVOFLOXACIN 500 MG: 500 TABLET, FILM COATED ORAL at 12:28

## 2021-01-01 RX ADMIN — TRIAMCINOLONE ACETONIDE: 1 CREAM TOPICAL at 08:50

## 2021-01-01 RX ADMIN — ACETAMINOPHEN 650 MG: 325 TABLET ORAL at 10:38

## 2021-01-01 RX ADMIN — SODIUM CHLORIDE 250 ML: 900 INJECTION, SOLUTION INTRAVENOUS at 13:48

## 2021-01-01 RX ADMIN — Medication 20 ML: at 03:57

## 2021-01-01 RX ADMIN — FAMOTIDINE 20 MG: 10 INJECTION, SOLUTION INTRAVENOUS at 16:33

## 2021-01-01 RX ADMIN — CALCIUM 500 MG: 500 TABLET ORAL at 08:13

## 2021-01-01 RX ADMIN — ACYCLOVIR 800 MG: 800 TABLET ORAL at 17:46

## 2021-01-01 RX ADMIN — CEFEPIME HYDROCHLORIDE 2 G: 2 INJECTION, POWDER, FOR SOLUTION INTRAVENOUS at 13:49

## 2021-01-01 RX ADMIN — METHYLPREDNISOLONE SODIUM SUCCINATE 125 MG: 125 INJECTION, POWDER, FOR SOLUTION INTRAMUSCULAR; INTRAVENOUS at 13:01

## 2021-01-01 RX ADMIN — SODIUM CHLORIDE 75 ML/HR: 900 INJECTION, SOLUTION INTRAVENOUS at 12:04

## 2021-01-01 RX ADMIN — ACYCLOVIR 400 MG: 800 TABLET ORAL at 08:17

## 2021-01-01 RX ADMIN — CEFEPIME HYDROCHLORIDE 2 G: 2 INJECTION, POWDER, FOR SOLUTION INTRAVENOUS at 21:34

## 2021-01-01 RX ADMIN — ACETAMINOPHEN 650 MG: 325 TABLET, FILM COATED ORAL at 08:58

## 2021-01-01 RX ADMIN — WATER 2 MG: 1 INJECTION INTRAMUSCULAR; INTRAVENOUS; SUBCUTANEOUS at 10:06

## 2021-01-01 RX ADMIN — DIPHENHYDRAMINE HYDROCHLORIDE 25 MG: 50 INJECTION, SOLUTION INTRAMUSCULAR; INTRAVENOUS at 11:20

## 2021-01-01 RX ADMIN — ACYCLOVIR 400 MG: 800 TABLET ORAL at 08:58

## 2021-01-01 RX ADMIN — HEPARIN SODIUM (PORCINE) LOCK FLUSH IV SOLN 100 UNIT/ML 500 UNITS: 100 SOLUTION at 10:41

## 2021-01-01 RX ADMIN — CALCIUM 500 MG: 500 TABLET ORAL at 12:28

## 2021-01-01 RX ADMIN — TRIAMCINOLONE ACETONIDE: 1 CREAM TOPICAL at 21:00

## 2021-01-01 RX ADMIN — DRONABINOL 5 MG: 2.5 CAPSULE ORAL at 07:41

## 2021-01-01 RX ADMIN — Medication 10 ML: at 13:01

## 2021-01-01 RX ADMIN — DIPHENHYDRAMINE HYDROCHLORIDE 50 MG: 50 INJECTION, SOLUTION INTRAMUSCULAR; INTRAVENOUS at 14:02

## 2021-01-01 RX ADMIN — LIDOCAINE HYDROCHLORIDE 200 MG: 20 INJECTION, SOLUTION INFILTRATION; PERINEURAL at 14:24

## 2021-01-01 RX ADMIN — ACETAMINOPHEN 650 MG: 325 TABLET ORAL at 16:44

## 2021-01-01 RX ADMIN — ACETAMINOPHEN 650 MG: 325 TABLET, FILM COATED ORAL at 10:23

## 2021-01-01 RX ADMIN — ACETAMINOPHEN 650 MG: 325 TABLET, FILM COATED ORAL at 23:31

## 2021-01-01 RX ADMIN — HYDROCORTISONE SODIUM SUCCINATE 100 MG: 100 INJECTION, POWDER, FOR SOLUTION INTRAMUSCULAR; INTRAVENOUS at 10:30

## 2021-01-01 RX ADMIN — ATORVASTATIN CALCIUM 10 MG: 10 TABLET, FILM COATED ORAL at 21:34

## 2021-01-01 RX ADMIN — ATORVASTATIN CALCIUM 10 MG: 10 TABLET, FILM COATED ORAL at 23:26

## 2021-01-01 RX ADMIN — DIPHENHYDRAMINE HYDROCHLORIDE 25 MG: 50 INJECTION, SOLUTION INTRAMUSCULAR; INTRAVENOUS at 12:16

## 2021-01-01 RX ADMIN — DIPHENHYDRAMINE HYDROCHLORIDE 25 MG: 50 INJECTION, SOLUTION INTRAMUSCULAR; INTRAVENOUS at 13:32

## 2021-01-01 RX ADMIN — TRIAMCINOLONE ACETONIDE: 1 CREAM TOPICAL at 23:30

## 2021-01-01 RX ADMIN — Medication 20 ML: at 09:32

## 2021-01-01 RX ADMIN — DIPHENHYDRAMINE HYDROCHLORIDE 25 MG: 25 CAPSULE ORAL at 07:31

## 2021-01-01 RX ADMIN — ATORVASTATIN CALCIUM 10 MG: 10 TABLET, FILM COATED ORAL at 22:05

## 2021-01-01 RX ADMIN — DIPHENHYDRAMINE HYDROCHLORIDE 25 MG: 25 CAPSULE ORAL at 11:36

## 2021-01-01 RX ADMIN — DOCUSATE SODIUM 100 MG: 100 CAPSULE, LIQUID FILLED ORAL at 01:09

## 2021-01-01 RX ADMIN — Medication 10 ML: at 06:08

## 2021-01-01 RX ADMIN — HYDROCODONE BITARTRATE AND HOMATROPINE METHYLBROMIDE 5 ML: 5; 1.5 SOLUTION ORAL at 02:18

## 2021-01-01 RX ADMIN — ACYCLOVIR 400 MG: 800 TABLET ORAL at 17:59

## 2021-01-01 RX ADMIN — SODIUM CHLORIDE 250 ML: 900 INJECTION, SOLUTION INTRAVENOUS at 15:30

## 2021-01-01 RX ADMIN — Medication 10 ML: at 13:47

## 2021-01-01 RX ADMIN — PANTOPRAZOLE SODIUM 40 MG: 40 TABLET, DELAYED RELEASE ORAL at 08:38

## 2021-01-01 RX ADMIN — ALLOPURINOL 300 MG: 300 TABLET ORAL at 08:49

## 2021-01-01 RX ADMIN — SODIUM CHLORIDE 3 ML: 9 INJECTION, SOLUTION INTRAMUSCULAR; INTRAVENOUS; SUBCUTANEOUS at 08:10

## 2021-01-01 RX ADMIN — MIDAZOLAM 0.5 MG: 1 INJECTION INTRAMUSCULAR; INTRAVENOUS at 10:37

## 2021-01-01 RX ADMIN — VANCOMYCIN HYDROCHLORIDE 1000 MG: 1 INJECTION, POWDER, LYOPHILIZED, FOR SOLUTION INTRAVENOUS at 05:48

## 2021-01-01 RX ADMIN — DIPHENHYDRAMINE HYDROCHLORIDE 25 MG: 25 CAPSULE ORAL at 11:05

## 2021-01-01 RX ADMIN — DIPHENHYDRAMINE HYDROCHLORIDE 50 MG: 50 INJECTION INTRAMUSCULAR; INTRAVENOUS at 10:45

## 2021-01-01 RX ADMIN — SODIUM CHLORIDE 250 ML: 900 INJECTION, SOLUTION INTRAVENOUS at 08:42

## 2021-01-01 RX ADMIN — CALCIUM 500 MG: 500 TABLET ORAL at 16:16

## 2021-01-01 RX ADMIN — DEXAMETHASONE SODIUM PHOSPHATE 10 MG: 10 INJECTION INTRAMUSCULAR; INTRAVENOUS at 14:07

## 2021-01-01 RX ADMIN — VANCOMYCIN HYDROCHLORIDE 1500 MG: 10 INJECTION, POWDER, LYOPHILIZED, FOR SOLUTION INTRAVENOUS at 11:19

## 2021-01-01 RX ADMIN — FAMOTIDINE 40 MG: 10 INJECTION, SOLUTION INTRAVENOUS at 11:10

## 2021-01-01 RX ADMIN — ALLOPURINOL 300 MG: 300 TABLET ORAL at 08:13

## 2021-01-01 RX ADMIN — ACETAMINOPHEN 650 MG: 325 TABLET ORAL at 13:47

## 2021-01-01 RX ADMIN — OXYCODONE 5 MG: 5 TABLET ORAL at 17:46

## 2021-01-01 RX ADMIN — DEXAMETHASONE SODIUM PHOSPHATE 10 MG: 10 INJECTION INTRAMUSCULAR; INTRAVENOUS at 17:56

## 2021-01-01 RX ADMIN — Medication 10 ML: at 10:40

## 2021-01-01 RX ADMIN — Medication 5 MG: at 21:42

## 2021-01-01 RX ADMIN — FAMOTIDINE 20 MG: 10 INJECTION, SOLUTION INTRAVENOUS at 12:58

## 2021-01-01 RX ADMIN — ACYCLOVIR 400 MG: 800 TABLET ORAL at 22:24

## 2021-01-01 RX ADMIN — ACETAMINOPHEN 650 MG: 325 TABLET ORAL at 10:26

## 2021-01-01 RX ADMIN — NYSTATIN 10 ML: 100000 SUSPENSION ORAL at 08:40

## 2021-01-01 RX ADMIN — FOSAPREPITANT 150 MG: 150 INJECTION, POWDER, LYOPHILIZED, FOR SOLUTION INTRAVENOUS at 17:56

## 2021-01-01 RX ADMIN — Medication 10 ML: at 07:38

## 2021-01-01 RX ADMIN — FLUCONAZOLE 200 MG: 100 TABLET ORAL at 08:58

## 2021-01-01 RX ADMIN — Medication 10 ML: at 15:01

## 2021-01-01 RX ADMIN — ATORVASTATIN CALCIUM 10 MG: 10 TABLET, FILM COATED ORAL at 20:45

## 2021-01-01 RX ADMIN — FENTANYL CITRATE 25 MCG: 50 INJECTION, SOLUTION INTRAMUSCULAR; INTRAVENOUS at 14:25

## 2021-01-01 RX ADMIN — VANCOMYCIN HYDROCHLORIDE 1500 MG: 10 INJECTION, POWDER, LYOPHILIZED, FOR SOLUTION INTRAVENOUS at 22:37

## 2021-01-01 RX ADMIN — SODIUM CHLORIDE 250 ML: 900 INJECTION, SOLUTION INTRAVENOUS at 12:54

## 2021-01-01 RX ADMIN — DEXAMETHASONE SODIUM PHOSPHATE 10 MG: 10 INJECTION INTRAMUSCULAR; INTRAVENOUS at 14:30

## 2021-01-01 RX ADMIN — ACYCLOVIR 800 MG: 800 TABLET ORAL at 08:37

## 2021-01-01 RX ADMIN — Medication 20 ML: at 15:14

## 2021-01-01 RX ADMIN — Medication 20 ML: at 17:30

## 2021-01-01 RX ADMIN — ATORVASTATIN CALCIUM 10 MG: 10 TABLET, FILM COATED ORAL at 21:43

## 2021-01-01 RX ADMIN — SODIUM CHLORIDE 250 ML: 900 INJECTION, SOLUTION INTRAVENOUS at 12:15

## 2021-01-01 RX ADMIN — SODIUM CHLORIDE 250 ML: 900 INJECTION, SOLUTION INTRAVENOUS at 07:30

## 2021-01-01 RX ADMIN — ACYCLOVIR 400 MG: 800 TABLET ORAL at 16:21

## 2021-01-01 RX ADMIN — CEFEPIME HYDROCHLORIDE 1 G: 1 INJECTION, POWDER, FOR SOLUTION INTRAMUSCULAR; INTRAVENOUS at 05:18

## 2021-01-01 RX ADMIN — MIDAZOLAM 1 MG: 1 INJECTION INTRAMUSCULAR; INTRAVENOUS at 13:37

## 2021-01-01 RX ADMIN — Medication 10 ML: at 18:32

## 2021-01-01 RX ADMIN — Medication 10 ML: at 06:18

## 2021-01-01 RX ADMIN — LEVOFLOXACIN 500 MG: 500 TABLET, FILM COATED ORAL at 12:15

## 2021-01-01 RX ADMIN — FAMOTIDINE 20 MG: 10 INJECTION, SOLUTION INTRAVENOUS at 10:25

## 2021-01-01 RX ADMIN — DIPHENHYDRAMINE HYDROCHLORIDE 25 MG: 25 CAPSULE ORAL at 16:31

## 2021-01-01 RX ADMIN — FUROSEMIDE 20 MG: 10 INJECTION, SOLUTION INTRAMUSCULAR; INTRAVENOUS at 16:34

## 2021-01-01 RX ADMIN — ACETAMINOPHEN 650 MG: 325 TABLET ORAL at 12:14

## 2021-01-01 RX ADMIN — Medication 10 ML: at 22:37

## 2021-01-01 RX ADMIN — DOCUSATE SODIUM 100 MG: 100 CAPSULE, LIQUID FILLED ORAL at 21:15

## 2021-01-01 RX ADMIN — ALLOPURINOL 300 MG: 300 TABLET ORAL at 08:46

## 2021-01-01 RX ADMIN — DIPHENHYDRAMINE HYDROCHLORIDE 25 MG: 50 INJECTION, SOLUTION INTRAMUSCULAR; INTRAVENOUS at 20:07

## 2021-01-01 RX ADMIN — FAMOTIDINE 20 MG: 20 TABLET, FILM COATED ORAL at 13:49

## 2021-01-01 RX ADMIN — FLUCONAZOLE 200 MG: 100 TABLET ORAL at 09:50

## 2021-01-01 RX ADMIN — SODIUM CHLORIDE 250 ML: 900 INJECTION, SOLUTION INTRAVENOUS at 13:45

## 2021-01-01 RX ADMIN — SODIUM CHLORIDE 250 ML: 900 INJECTION, SOLUTION INTRAVENOUS at 11:45

## 2021-01-01 RX ADMIN — Medication 10 ML: at 14:09

## 2021-01-01 RX ADMIN — FLUCONAZOLE 200 MG: 100 TABLET ORAL at 08:49

## 2021-01-01 RX ADMIN — VANCOMYCIN HYDROCHLORIDE 1250 MG: 10 INJECTION, POWDER, LYOPHILIZED, FOR SOLUTION INTRAVENOUS at 06:36

## 2021-01-01 RX ADMIN — ACETAMINOPHEN 650 MG: 325 TABLET ORAL at 16:24

## 2021-01-01 RX ADMIN — FLUCONAZOLE 200 MG: 100 TABLET ORAL at 15:13

## 2021-01-01 RX ADMIN — Medication 10 ML: at 13:41

## 2021-01-01 RX ADMIN — VANCOMYCIN HYDROCHLORIDE 1500 MG: 10 INJECTION, POWDER, LYOPHILIZED, FOR SOLUTION INTRAVENOUS at 09:38

## 2021-01-01 RX ADMIN — ATORVASTATIN CALCIUM 10 MG: 10 TABLET, FILM COATED ORAL at 08:37

## 2021-01-01 RX ADMIN — FAMOTIDINE 20 MG: 10 INJECTION INTRAVENOUS at 03:58

## 2021-01-01 RX ADMIN — CALCIUM 500 MG: 500 TABLET ORAL at 17:22

## 2021-01-01 RX ADMIN — CEFEPIME HYDROCHLORIDE 1 G: 1 INJECTION, POWDER, FOR SOLUTION INTRAMUSCULAR; INTRAVENOUS at 21:15

## 2021-01-01 RX ADMIN — DIPHENHYDRAMINE HYDROCHLORIDE 25 MG: 25 CAPSULE ORAL at 12:48

## 2021-01-01 RX ADMIN — CALCIUM 500 MG: 500 TABLET ORAL at 17:43

## 2021-01-01 RX ADMIN — ACETAMINOPHEN 650 MG: 325 TABLET ORAL at 11:04

## 2021-01-01 RX ADMIN — Medication 10 ML: at 13:52

## 2021-01-01 RX ADMIN — Medication 10 ML: at 05:30

## 2021-01-01 RX ADMIN — DEXAMETHASONE SODIUM PHOSPHATE 10 MG: 10 INJECTION INTRAMUSCULAR; INTRAVENOUS at 16:16

## 2021-01-01 RX ADMIN — HYDROCODONE BITARTRATE AND HOMATROPINE METHYLBROMIDE 5 ML: 5; 1.5 SOLUTION ORAL at 05:06

## 2021-01-01 RX ADMIN — NYSTATIN 10 ML: 100000 SUSPENSION ORAL at 08:36

## 2021-01-01 RX ADMIN — ACETAMINOPHEN 650 MG: 325 TABLET ORAL at 14:28

## 2021-01-01 RX ADMIN — DIPHENHYDRAMINE HYDROCHLORIDE 25 MG: 25 CAPSULE ORAL at 13:47

## 2021-01-01 RX ADMIN — METHYLPREDNISOLONE SODIUM SUCCINATE 125 MG: 125 INJECTION, POWDER, FOR SOLUTION INTRAMUSCULAR; INTRAVENOUS at 12:20

## 2021-01-01 RX ADMIN — ACETAMINOPHEN 650 MG: 325 TABLET, FILM COATED ORAL at 09:50

## 2021-01-01 RX ADMIN — HYDROCORTISONE: 25 CREAM TOPICAL at 12:16

## 2021-01-01 RX ADMIN — ACETAMINOPHEN 650 MG: 325 TABLET ORAL at 11:42

## 2021-01-01 RX ADMIN — ACYCLOVIR 400 MG: 800 TABLET ORAL at 08:46

## 2021-01-01 RX ADMIN — Medication 10 ML: at 15:51

## 2021-01-01 RX ADMIN — DRONABINOL 5 MG: 2.5 CAPSULE ORAL at 18:00

## 2021-01-01 RX ADMIN — CEFEPIME HYDROCHLORIDE 2 G: 2 INJECTION, POWDER, FOR SOLUTION INTRAVENOUS at 05:54

## 2021-01-01 RX ADMIN — POTASSIUM CHLORIDE 20 MEQ: 400 INJECTION, SOLUTION INTRAVENOUS at 10:15

## 2021-01-01 RX ADMIN — CEFEPIME HYDROCHLORIDE 2 G: 2 INJECTION, POWDER, FOR SOLUTION INTRAVENOUS at 05:15

## 2021-01-01 RX ADMIN — POLYETHYLENE GLYCOL 3350 17 G: 17 POWDER, FOR SOLUTION ORAL at 08:53

## 2021-01-01 RX ADMIN — DIPHENHYDRAMINE HYDROCHLORIDE 25 MG: 25 CAPSULE ORAL at 20:24

## 2021-01-01 RX ADMIN — Medication 10 ML: at 10:45

## 2021-01-01 RX ADMIN — TRIAMCINOLONE ACETONIDE: 1 CREAM TOPICAL at 09:51

## 2021-01-01 RX ADMIN — POLYETHYLENE GLYCOL 3350 17 G: 17 POWDER, FOR SOLUTION ORAL at 09:36

## 2021-01-01 RX ADMIN — LORAZEPAM 1 MG: 1 TABLET ORAL at 10:02

## 2021-01-01 RX ADMIN — LORAZEPAM 1 MG: 1 TABLET ORAL at 22:17

## 2021-01-01 RX ADMIN — ATORVASTATIN CALCIUM 10 MG: 10 TABLET, FILM COATED ORAL at 07:41

## 2021-01-01 RX ADMIN — FAMOTIDINE 20 MG: 20 TABLET, FILM COATED ORAL at 14:33

## 2021-01-01 RX ADMIN — Medication 20 ML: at 07:20

## 2021-01-01 RX ADMIN — CALCIUM 500 MG: 500 TABLET ORAL at 11:30

## 2021-01-01 RX ADMIN — PANTOPRAZOLE SODIUM 40 MG: 40 TABLET, DELAYED RELEASE ORAL at 05:02

## 2021-01-01 RX ADMIN — CALCIUM 500 MG: 500 TABLET ORAL at 12:48

## 2021-01-01 RX ADMIN — ACETAMINOPHEN 650 MG: 325 TABLET ORAL at 13:51

## 2021-01-01 RX ADMIN — Medication 5 MG: at 23:26

## 2021-01-01 RX ADMIN — Medication 10 ML: at 08:40

## 2021-01-01 RX ADMIN — ATORVASTATIN CALCIUM 10 MG: 10 TABLET, FILM COATED ORAL at 01:09

## 2021-01-01 RX ADMIN — ACETAMINOPHEN 650 MG: 325 TABLET, FILM COATED ORAL at 22:34

## 2021-01-01 RX ADMIN — SODIUM CHLORIDE 100 ML/HR: 9 INJECTION, SOLUTION INTRAVENOUS at 10:45

## 2021-01-01 RX ADMIN — SODIUM CHLORIDE 250 ML: 900 INJECTION, SOLUTION INTRAVENOUS at 08:12

## 2021-01-01 RX ADMIN — FENTANYL CITRATE 25 MCG: 50 INJECTION, SOLUTION INTRAMUSCULAR; INTRAVENOUS at 10:37

## 2021-01-01 RX ADMIN — Medication 10 ML: at 06:24

## 2021-01-01 RX ADMIN — ONDANSETRON 8 MG: 2 INJECTION INTRAMUSCULAR; INTRAVENOUS at 21:38

## 2021-01-01 RX ADMIN — PEGFILGRASTIM-CBQV 6 MG: 6 INJECTION, SOLUTION SUBCUTANEOUS at 12:37

## 2021-01-01 RX ADMIN — ACETAMINOPHEN 650 MG: 325 TABLET ORAL at 09:12

## 2021-01-01 RX ADMIN — Medication 10 ML: at 11:55

## 2021-01-01 RX ADMIN — ACYCLOVIR 400 MG: 800 TABLET ORAL at 07:57

## 2021-01-01 RX ADMIN — FLUCONAZOLE 200 MG: 100 TABLET ORAL at 08:46

## 2021-01-01 RX ADMIN — Medication 10 ML: at 21:14

## 2021-01-01 RX ADMIN — DIPHENHYDRAMINE HYDROCHLORIDE 25 MG: 25 CAPSULE ORAL at 07:22

## 2021-01-01 RX ADMIN — SODIUM CHLORIDE 250 ML: 900 INJECTION, SOLUTION INTRAVENOUS at 16:03

## 2021-01-01 RX ADMIN — ACETAMINOPHEN 650 MG: 325 TABLET, FILM COATED ORAL at 10:43

## 2021-01-01 RX ADMIN — ACETAMINOPHEN 650 MG: 325 TABLET ORAL at 08:42

## 2021-01-01 RX ADMIN — CALCIUM 500 MG: 500 TABLET ORAL at 08:58

## 2021-01-01 RX ADMIN — TBO-FILGRASTIM 480 MCG: 480 INJECTION, SOLUTION SUBCUTANEOUS at 08:49

## 2021-01-01 RX ADMIN — LORAZEPAM 1 MG: 1 TABLET ORAL at 17:47

## 2021-01-01 RX ADMIN — ATORVASTATIN CALCIUM 10 MG: 10 TABLET, FILM COATED ORAL at 20:38

## 2021-01-01 RX ADMIN — ALLOPURINOL 300 MG: 300 TABLET ORAL at 09:39

## 2021-01-01 RX ADMIN — ACYCLOVIR 400 MG: 800 TABLET ORAL at 08:13

## 2021-01-01 RX ADMIN — FAMOTIDINE 20 MG: 10 INJECTION, SOLUTION INTRAVENOUS at 14:40

## 2021-01-01 RX ADMIN — CALCIUM 500 MG: 500 TABLET ORAL at 09:31

## 2021-01-01 RX ADMIN — DIPHENHYDRAMINE HYDROCHLORIDE 25 MG: 25 CAPSULE ORAL at 21:32

## 2021-01-01 RX ADMIN — PANTOPRAZOLE SODIUM 40 MG: 40 TABLET, DELAYED RELEASE ORAL at 06:24

## 2021-01-01 RX ADMIN — ONDANSETRON 8 MG: 2 INJECTION INTRAMUSCULAR; INTRAVENOUS at 11:17

## 2021-01-01 RX ADMIN — Medication 10 ML: at 06:00

## 2021-01-01 RX ADMIN — SODIUM CHLORIDE 250 ML: 900 INJECTION, SOLUTION INTRAVENOUS at 11:56

## 2021-01-01 RX ADMIN — CEFEPIME HYDROCHLORIDE 2 G: 2 INJECTION, POWDER, FOR SOLUTION INTRAVENOUS at 14:00

## 2021-01-01 RX ADMIN — ACETAMINOPHEN 650 MG: 325 TABLET ORAL at 11:06

## 2021-01-01 RX ADMIN — PERFLUTREN 1 ML: 6.52 INJECTION, SUSPENSION INTRAVENOUS at 11:30

## 2021-01-01 RX ADMIN — SODIUM CHLORIDE 250 ML: 900 INJECTION, SOLUTION INTRAVENOUS at 10:26

## 2021-01-01 RX ADMIN — HYDROCORTISONE: 25 CREAM TOPICAL at 13:53

## 2021-01-01 RX ADMIN — ACETAMINOPHEN 650 MG: 325 TABLET ORAL at 11:18

## 2021-01-01 RX ADMIN — TRIAMCINOLONE ACETONIDE: 1 CREAM TOPICAL at 18:11

## 2021-01-01 RX ADMIN — COLCHICINE 1.2 MG: 0.6 TABLET, FILM COATED ORAL at 20:37

## 2021-01-01 RX ADMIN — PANTOPRAZOLE SODIUM 40 MG: 40 TABLET, DELAYED RELEASE ORAL at 06:29

## 2021-01-01 RX ADMIN — ACETAMINOPHEN 650 MG: 325 TABLET ORAL at 16:34

## 2021-01-01 RX ADMIN — DIPHENHYDRAMINE HYDROCHLORIDE 25 MG: 25 CAPSULE ORAL at 09:51

## 2021-01-01 RX ADMIN — ACYCLOVIR 400 MG: 800 TABLET ORAL at 17:42

## 2021-01-01 RX ADMIN — LEVOFLOXACIN 500 MG: 500 TABLET, FILM COATED ORAL at 14:41

## 2021-01-01 RX ADMIN — PANTOPRAZOLE SODIUM 40 MG: 40 TABLET, DELAYED RELEASE ORAL at 06:15

## 2021-01-01 RX ADMIN — ACETAMINOPHEN 650 MG: 325 TABLET, FILM COATED ORAL at 12:20

## 2021-01-01 RX ADMIN — FUROSEMIDE 40 MG: 10 INJECTION, SOLUTION INTRAMUSCULAR; INTRAVENOUS at 09:56

## 2021-01-01 RX ADMIN — FUROSEMIDE 40 MG: 10 INJECTION, SOLUTION INTRAMUSCULAR; INTRAVENOUS at 22:04

## 2021-01-01 RX ADMIN — METHYLPREDNISOLONE SODIUM SUCCINATE 125 MG: 125 INJECTION, POWDER, FOR SOLUTION INTRAMUSCULAR; INTRAVENOUS at 08:33

## 2021-01-01 RX ADMIN — METHYLPREDNISOLONE SODIUM SUCCINATE 125 MG: 125 INJECTION, POWDER, FOR SOLUTION INTRAMUSCULAR; INTRAVENOUS at 11:01

## 2021-01-01 RX ADMIN — CALCIUM 500 MG: 500 TABLET ORAL at 13:00

## 2021-01-01 RX ADMIN — PANTOPRAZOLE SODIUM 40 MG: 40 TABLET, DELAYED RELEASE ORAL at 08:20

## 2021-01-01 RX ADMIN — DIPHENHYDRAMINE HYDROCHLORIDE 25 MG: 25 CAPSULE ORAL at 12:56

## 2021-01-01 RX ADMIN — CEFEPIME HYDROCHLORIDE 2 G: 2 INJECTION, POWDER, FOR SOLUTION INTRAVENOUS at 14:58

## 2021-01-01 RX ADMIN — ALLOPURINOL 300 MG: 300 TABLET ORAL at 07:41

## 2021-01-01 RX ADMIN — VANCOMYCIN HYDROCHLORIDE 1000 MG: 1 INJECTION, POWDER, LYOPHILIZED, FOR SOLUTION INTRAVENOUS at 05:44

## 2021-01-01 RX ADMIN — Medication 20 ML: at 14:00

## 2021-01-01 RX ADMIN — FUROSEMIDE 40 MG: 10 INJECTION, SOLUTION INTRAMUSCULAR; INTRAVENOUS at 14:58

## 2021-01-01 RX ADMIN — ACETAMINOPHEN 650 MG: 325 TABLET ORAL at 10:48

## 2021-01-01 RX ADMIN — PEGFILGRASTIM-CBQV 6 MG: 6 INJECTION, SOLUTION SUBCUTANEOUS at 17:02

## 2021-01-01 RX ADMIN — WATER 2 MG: 1 INJECTION INTRAMUSCULAR; INTRAVENOUS; SUBCUTANEOUS at 14:43

## 2021-01-01 RX ADMIN — ACETAMINOPHEN 650 MG: 325 TABLET ORAL at 16:31

## 2021-01-01 RX ADMIN — Medication 10 ML: at 16:30

## 2021-01-01 RX ADMIN — Medication 5 MG: at 21:34

## 2021-01-01 RX ADMIN — ACETAMINOPHEN 650 MG: 325 TABLET ORAL at 08:00

## 2021-01-01 RX ADMIN — ONDANSETRON 8 MG: 2 INJECTION INTRAMUSCULAR; INTRAVENOUS at 09:25

## 2021-01-01 RX ADMIN — ACETAMINOPHEN 650 MG: 325 TABLET ORAL at 10:41

## 2021-01-01 RX ADMIN — DOCUSATE SODIUM 100 MG: 100 CAPSULE, LIQUID FILLED ORAL at 21:14

## 2021-01-01 RX ADMIN — METHYLPREDNISOLONE SODIUM SUCCINATE 125 MG: 125 INJECTION, POWDER, FOR SOLUTION INTRAMUSCULAR; INTRAVENOUS at 16:32

## 2021-01-01 RX ADMIN — SODIUM CHLORIDE 1000 ML: 9 INJECTION, SOLUTION INTRAVENOUS at 08:20

## 2021-01-01 RX ADMIN — ACYCLOVIR 400 MG: 800 TABLET ORAL at 18:09

## 2021-01-01 RX ADMIN — CALCIUM 500 MG: 500 TABLET ORAL at 08:17

## 2021-01-01 RX ADMIN — METHYLPREDNISOLONE SODIUM SUCCINATE 125 MG: 125 INJECTION, POWDER, FOR SOLUTION INTRAMUSCULAR; INTRAVENOUS at 10:50

## 2021-01-01 RX ADMIN — Medication 5 MG: at 22:34

## 2021-01-01 RX ADMIN — ACYCLOVIR 400 MG: 800 TABLET ORAL at 12:15

## 2021-01-01 RX ADMIN — HYDROCODONE BITARTRATE AND HOMATROPINE METHYLBROMIDE 5 ML: 5; 1.5 SOLUTION ORAL at 12:09

## 2021-01-01 RX ADMIN — FENTANYL CITRATE 25 MCG: 50 INJECTION, SOLUTION INTRAMUSCULAR; INTRAVENOUS at 10:30

## 2021-01-01 RX ADMIN — BARIUM SULFATE 45 ML: 980 POWDER, FOR SUSPENSION ORAL at 09:11

## 2021-01-01 RX ADMIN — PANTOPRAZOLE SODIUM 40 MG: 40 TABLET, DELAYED RELEASE ORAL at 05:18

## 2021-01-01 RX ADMIN — SODIUM CHLORIDE 250 ML: 900 INJECTION, SOLUTION INTRAVENOUS at 10:41

## 2021-01-01 RX ADMIN — Medication 10 ML: at 12:15

## 2021-01-01 RX ADMIN — LEVOFLOXACIN 500 MG: 500 TABLET, FILM COATED ORAL at 13:59

## 2021-01-01 RX ADMIN — PANTOPRAZOLE SODIUM 40 MG: 40 TABLET, DELAYED RELEASE ORAL at 07:41

## 2021-01-01 RX ADMIN — DEXAMETHASONE SODIUM PHOSPHATE 10 MG: 10 INJECTION INTRAMUSCULAR; INTRAVENOUS at 17:24

## 2021-01-01 RX ADMIN — Medication 5 MG: at 21:15

## 2021-01-01 RX ADMIN — PANTOPRAZOLE SODIUM 40 MG: 40 TABLET, DELAYED RELEASE ORAL at 05:51

## 2021-01-01 RX ADMIN — VANCOMYCIN HYDROCHLORIDE 1500 MG: 10 INJECTION, POWDER, LYOPHILIZED, FOR SOLUTION INTRAVENOUS at 09:30

## 2021-01-01 RX ADMIN — FAMOTIDINE 20 MG: 10 INJECTION, SOLUTION INTRAVENOUS at 08:31

## 2021-01-01 RX ADMIN — Medication 10 ML: at 15:30

## 2021-01-01 RX ADMIN — WATER 2 MG: 1 INJECTION INTRAMUSCULAR; INTRAVENOUS; SUBCUTANEOUS at 08:00

## 2021-01-01 RX ADMIN — ACETAMINOPHEN 650 MG: 325 TABLET, FILM COATED ORAL at 08:21

## 2021-01-01 RX ADMIN — DIPHENHYDRAMINE HYDROCHLORIDE 50 MG: 50 CAPSULE ORAL at 12:14

## 2021-01-01 RX ADMIN — CALCIUM 500 MG: 500 TABLET ORAL at 08:07

## 2021-01-01 RX ADMIN — POTASSIUM CHLORIDE 40 MEQ: 10 TABLET, EXTENDED RELEASE ORAL at 08:21

## 2021-01-01 RX ADMIN — SODIUM CHLORIDE 250 ML: 900 INJECTION, SOLUTION INTRAVENOUS at 08:45

## 2021-01-01 RX ADMIN — DEXAMETHASONE SODIUM PHOSPHATE 12 MG: 4 INJECTION, SOLUTION INTRAMUSCULAR; INTRAVENOUS at 11:19

## 2021-01-01 RX ADMIN — Medication 10 ML: at 12:04

## 2021-01-01 RX ADMIN — ONDANSETRON 8 MG: 2 INJECTION INTRAMUSCULAR; INTRAVENOUS at 17:17

## 2021-01-01 RX ADMIN — NYSTATIN 10 ML: 100000 SUSPENSION ORAL at 07:43

## 2021-01-01 RX ADMIN — Medication 10 ML: at 13:17

## 2021-01-01 RX ADMIN — SODIUM CHLORIDE 250 ML: 900 INJECTION, SOLUTION INTRAVENOUS at 07:31

## 2021-01-01 RX ADMIN — CEFEPIME HYDROCHLORIDE 2 G: 2 INJECTION, POWDER, FOR SOLUTION INTRAVENOUS at 05:28

## 2021-01-01 RX ADMIN — Medication 10 ML: at 12:49

## 2021-01-01 RX ADMIN — PANTOPRAZOLE SODIUM 40 MG: 40 TABLET, DELAYED RELEASE ORAL at 08:17

## 2021-01-01 RX ADMIN — MIDAZOLAM 0.5 MG: 1 INJECTION INTRAMUSCULAR; INTRAVENOUS at 10:30

## 2021-01-01 RX ADMIN — HYDROCORTISONE: 25 CREAM TOPICAL at 09:00

## 2021-01-01 RX ADMIN — POTASSIUM CHLORIDE 20 MEQ: 10 TABLET, EXTENDED RELEASE ORAL at 10:15

## 2021-01-01 RX ADMIN — FAMOTIDINE 20 MG: 10 INJECTION, SOLUTION INTRAVENOUS at 17:13

## 2021-01-01 RX ADMIN — ACYCLOVIR 400 MG: 800 TABLET ORAL at 08:49

## 2021-01-01 RX ADMIN — FLUCONAZOLE 200 MG: 100 TABLET ORAL at 08:17

## 2021-01-01 RX ADMIN — DRONABINOL 5 MG: 2.5 CAPSULE ORAL at 17:46

## 2021-01-01 RX ADMIN — POLYETHYLENE GLYCOL 3350 17 G: 17 POWDER, FOR SOLUTION ORAL at 09:00

## 2021-01-01 RX ADMIN — CALCIUM 500 MG: 500 TABLET ORAL at 17:25

## 2021-01-01 RX ADMIN — Medication 10 ML: at 15:17

## 2021-01-01 RX ADMIN — COLCHICINE 0.6 MG: 0.6 TABLET, FILM COATED ORAL at 07:51

## 2021-01-01 RX ADMIN — Medication 5 MG: at 21:38

## 2021-01-01 RX ADMIN — ACYCLOVIR 400 MG: 800 TABLET ORAL at 15:13

## 2021-01-01 RX ADMIN — FLUTICASONE PROPIONATE 1 SPRAY: 50 SPRAY, METERED NASAL at 20:00

## 2021-01-01 RX ADMIN — FUROSEMIDE 40 MG: 10 INJECTION, SOLUTION INTRAMUSCULAR; INTRAVENOUS at 14:40

## 2021-01-01 RX ADMIN — ACYCLOVIR 400 MG: 800 TABLET ORAL at 08:07

## 2021-01-01 RX ADMIN — ACETAMINOPHEN 650 MG: 325 TABLET ORAL at 08:53

## 2021-01-01 RX ADMIN — TRIAMCINOLONE ACETONIDE: 1 CREAM TOPICAL at 22:34

## 2021-01-01 RX ADMIN — ONDANSETRON 8 MG: 2 INJECTION INTRAMUSCULAR; INTRAVENOUS at 07:57

## 2021-01-01 RX ADMIN — DIPHENHYDRAMINE HYDROCHLORIDE 25 MG: 25 CAPSULE ORAL at 10:38

## 2021-01-01 RX ADMIN — FENTANYL CITRATE 50 MCG: 50 INJECTION, SOLUTION INTRAMUSCULAR; INTRAVENOUS at 10:23

## 2021-01-01 RX ADMIN — PANTOPRAZOLE SODIUM 40 MG: 40 TABLET, DELAYED RELEASE ORAL at 06:17

## 2021-01-01 RX ADMIN — Medication 10 ML: at 14:46

## 2021-01-01 RX ADMIN — HYDROCORTISONE: 25 CREAM TOPICAL at 08:58

## 2021-01-01 RX ADMIN — ACETAMINOPHEN 650 MG: 325 TABLET ORAL at 12:16

## 2021-01-01 RX ADMIN — Medication 20 ML: at 22:02

## 2021-01-01 RX ADMIN — Medication 20 ML: at 05:26

## 2021-01-01 RX ADMIN — DIPHENHYDRAMINE HYDROCHLORIDE 25 MG: 25 CAPSULE ORAL at 10:23

## 2021-01-01 RX ADMIN — FLUDEOXYGLUCOSE F-18 14.6 MILLICURIE: 200 INJECTION INTRAVENOUS at 07:38

## 2021-01-01 RX ADMIN — FAMOTIDINE 20 MG: 10 INJECTION, SOLUTION INTRAVENOUS at 16:41

## 2021-01-01 RX ADMIN — BENDAMUSTINE HYDROCHLORIDE 275 MG: 25 INJECTION, SOLUTION INTRAVENOUS at 11:50

## 2021-01-01 RX ADMIN — Medication 10 ML: at 16:27

## 2021-01-01 RX ADMIN — METHYLPREDNISOLONE SODIUM SUCCINATE 125 MG: 125 INJECTION, POWDER, FOR SOLUTION INTRAMUSCULAR; INTRAVENOUS at 13:56

## 2021-01-01 RX ADMIN — CEFEPIME HYDROCHLORIDE 2 G: 2 INJECTION, POWDER, FOR SOLUTION INTRAVENOUS at 14:16

## 2021-01-01 RX ADMIN — Medication 10 ML: at 07:31

## 2021-01-01 RX ADMIN — Medication 10 ML: at 12:53

## 2021-01-01 RX ADMIN — FAMOTIDINE 20 MG: 10 INJECTION, SOLUTION INTRAVENOUS at 10:57

## 2021-01-01 RX ADMIN — DIPHENHYDRAMINE HYDROCHLORIDE 25 MG: 50 INJECTION, SOLUTION INTRAMUSCULAR; INTRAVENOUS at 08:37

## 2021-01-01 RX ADMIN — FENTANYL CITRATE 50 MCG: 50 INJECTION, SOLUTION INTRAMUSCULAR; INTRAVENOUS at 13:37

## 2021-01-01 RX ADMIN — CALCIUM 500 MG: 500 TABLET ORAL at 17:28

## 2021-01-01 RX ADMIN — PANTOPRAZOLE SODIUM 40 MG: 40 TABLET, DELAYED RELEASE ORAL at 07:56

## 2021-01-01 RX ADMIN — FAMOTIDINE 20 MG: 20 TABLET, FILM COATED ORAL at 10:48

## 2021-01-01 RX ADMIN — CALCIUM 500 MG: 500 TABLET ORAL at 07:56

## 2021-01-01 RX ADMIN — DIPHENHYDRAMINE HYDROCHLORIDE 25 MG: 25 CAPSULE ORAL at 12:05

## 2021-01-01 RX ADMIN — SODIUM CHLORIDE 250 ML: 900 INJECTION, SOLUTION INTRAVENOUS at 10:48

## 2021-01-01 RX ADMIN — DIPHENHYDRAMINE HYDROCHLORIDE 25 MG: 25 CAPSULE ORAL at 10:26

## 2021-01-01 RX ADMIN — SODIUM CHLORIDE 25 ML/HR: 900 INJECTION, SOLUTION INTRAVENOUS at 14:04

## 2021-01-01 RX ADMIN — DIPHENHYDRAMINE HYDROCHLORIDE 25 MG: 25 CAPSULE ORAL at 10:32

## 2021-01-01 RX ADMIN — ALLOPURINOL 300 MG: 300 TABLET ORAL at 07:57

## 2021-01-01 RX ADMIN — ONDANSETRON 8 MG: 2 INJECTION INTRAMUSCULAR; INTRAVENOUS at 17:55

## 2021-01-01 RX ADMIN — Medication 10 ML: at 22:00

## 2021-01-01 RX ADMIN — HYDROCORTISONE: 25 CREAM TOPICAL at 12:28

## 2021-01-01 RX ADMIN — HYDROCODONE BITARTRATE AND HOMATROPINE METHYLBROMIDE 5 ML: 5; 1.5 SOLUTION ORAL at 00:55

## 2021-01-01 RX ADMIN — ETOPOSIDE 219 MG: 20 INJECTION INTRAVENOUS at 22:14

## 2021-01-01 RX ADMIN — ONDANSETRON 8 MG: 2 INJECTION INTRAMUSCULAR; INTRAVENOUS at 15:40

## 2021-01-01 RX ADMIN — CARBOPLATIN 677 MG: 10 INJECTION, SOLUTION INTRAVENOUS at 23:21

## 2021-01-01 RX ADMIN — PREDNISONE 80 MG: 20 TABLET ORAL at 13:48

## 2021-01-01 RX ADMIN — ACETAMINOPHEN 650 MG: 325 TABLET ORAL at 08:37

## 2021-01-01 RX ADMIN — SODIUM CHLORIDE 75 ML/HR: 900 INJECTION, SOLUTION INTRAVENOUS at 08:39

## 2021-01-01 RX ADMIN — FENTANYL CITRATE 50 MCG: 50 INJECTION, SOLUTION INTRAMUSCULAR; INTRAVENOUS at 14:07

## 2021-01-01 RX ADMIN — Medication 10 ML: at 14:30

## 2021-01-01 RX ADMIN — ACYCLOVIR 400 MG: 800 TABLET ORAL at 17:23

## 2021-01-01 RX ADMIN — CALCIUM 500 MG: 500 TABLET ORAL at 08:46

## 2021-01-01 RX ADMIN — FENTANYL CITRATE 50 MCG: 50 INJECTION, SOLUTION INTRAMUSCULAR; INTRAVENOUS at 13:45

## 2021-01-01 RX ADMIN — FUROSEMIDE 40 MG: 10 INJECTION, SOLUTION INTRAMUSCULAR; INTRAVENOUS at 07:41

## 2021-01-01 RX ADMIN — ACYCLOVIR 400 MG: 800 TABLET ORAL at 08:51

## 2021-01-01 RX ADMIN — FLUCONAZOLE 200 MG: 100 TABLET ORAL at 09:39

## 2021-01-01 RX ADMIN — ETOPOSIDE 219 MG: 20 INJECTION INTRAVENOUS at 18:25

## 2021-01-01 RX ADMIN — TRIAMCINOLONE ACETONIDE: 1 CREAM TOPICAL at 16:22

## 2021-01-01 RX ADMIN — VANCOMYCIN HYDROCHLORIDE 2500 MG: 10 INJECTION, POWDER, LYOPHILIZED, FOR SOLUTION INTRAVENOUS at 21:47

## 2021-01-01 RX ADMIN — CALCIUM 500 MG: 500 TABLET ORAL at 11:57

## 2021-01-01 RX ADMIN — SODIUM CHLORIDE 250 ML: 900 INJECTION, SOLUTION INTRAVENOUS at 11:40

## 2021-01-01 RX ADMIN — ACETAMINOPHEN 650 MG: 325 TABLET ORAL at 13:55

## 2021-01-01 RX ADMIN — Medication 10 ML: at 23:26

## 2021-01-01 RX ADMIN — ACETAMINOPHEN 650 MG: 325 TABLET, FILM COATED ORAL at 19:06

## 2021-01-01 RX ADMIN — NYSTATIN 10 ML: 100000 SUSPENSION ORAL at 20:25

## 2021-01-01 RX ADMIN — DEXAMETHASONE SODIUM PHOSPHATE 10 MG: 10 INJECTION INTRAMUSCULAR; INTRAVENOUS at 15:13

## 2021-01-01 RX ADMIN — HYDROCODONE BITARTRATE AND HOMATROPINE METHYLBROMIDE 5 ML: 5; 1.5 SOLUTION ORAL at 16:09

## 2021-01-01 RX ADMIN — LOPERAMIDE HYDROCHLORIDE 2 MG: 2 CAPSULE ORAL at 16:24

## 2021-01-01 RX ADMIN — ALLOPURINOL 300 MG: 300 TABLET ORAL at 08:17

## 2021-01-01 RX ADMIN — Medication 10 ML: at 09:20

## 2021-01-01 RX ADMIN — SODIUM CHLORIDE 50 ML/HR: 900 INJECTION, SOLUTION INTRAVENOUS at 03:56

## 2021-01-01 RX ADMIN — CALCIUM 500 MG: 500 TABLET ORAL at 08:49

## 2021-01-01 RX ADMIN — ATORVASTATIN CALCIUM 10 MG: 10 TABLET, FILM COATED ORAL at 08:38

## 2021-01-01 RX ADMIN — FAMOTIDINE 20 MG: 10 INJECTION, SOLUTION INTRAVENOUS at 10:47

## 2021-04-22 ENCOUNTER — HOSPITAL ENCOUNTER (OUTPATIENT)
Dept: LAB | Age: 66
Discharge: HOME | End: 2021-04-22
Payer: MEDICARE

## 2021-04-22 DIAGNOSIS — E55.9 VITAMIN D DEFICIENCY, UNSPECIFIED: ICD-10-CM

## 2021-04-22 DIAGNOSIS — E88.89 OTHER SPECIFIED METABOLIC DISORDERS (HCC): ICD-10-CM

## 2021-04-22 DIAGNOSIS — C91.60 PROLYMPHOCYTIC LEUKEMIA OF T-CELL (HCC): ICD-10-CM

## 2021-04-22 LAB
25(OH)D3 SERPL-MCNC: 37.1 NG/ML (ref 30–100)
ALBUMIN SERPL-MCNC: 4.2 G/DL (ref 3.2–4.6)
ALBUMIN/GLOB SERPL: 1.2 {RATIO} (ref 1.2–3.5)
ALP SERPL-CCNC: 58 U/L (ref 50–136)
ALT SERPL-CCNC: 20 U/L (ref 12–65)
ANION GAP SERPL CALC-SCNC: 7 MMOL/L (ref 7–16)
AST SERPL-CCNC: 14 U/L (ref 15–37)
BASOPHILS # BLD: 0.1 K/UL (ref 0–0.2)
BASOPHILS NFR BLD: 1 % (ref 0–2)
BILIRUB SERPL-MCNC: 0.9 MG/DL (ref 0.2–1.1)
BUN SERPL-MCNC: 19 MG/DL (ref 8–23)
CALCIUM SERPL-MCNC: 9.3 MG/DL (ref 8.3–10.4)
CHLORIDE SERPL-SCNC: 103 MMOL/L (ref 98–107)
CO2 SERPL-SCNC: 27 MMOL/L (ref 21–32)
CREAT SERPL-MCNC: 1 MG/DL (ref 0.8–1.5)
CRP SERPL HS-MCNC: >9.5 MG/L
D DIMER PPP FEU-MCNC: 0.55 UG/ML(FEU)
DIFFERENTIAL METHOD BLD: ABNORMAL
EOSINOPHIL # BLD: 0.4 K/UL (ref 0–0.8)
EOSINOPHIL NFR BLD: 8 % (ref 0.5–7.8)
ERYTHROCYTE [DISTWIDTH] IN BLOOD BY AUTOMATED COUNT: 14.7 % (ref 11.9–14.6)
ERYTHROCYTE [SEDIMENTATION RATE] IN BLOOD: 12 MM/HR (ref 0–20)
GLOBULIN SER CALC-MCNC: 3.5 G/DL (ref 2.3–3.5)
GLUCOSE SERPL-MCNC: 94 MG/DL (ref 65–100)
HCT VFR BLD AUTO: 38.6 %
HGB BLD-MCNC: 12.7 G/DL (ref 13.6–17.2)
IMM GRANULOCYTES # BLD AUTO: 0 K/UL (ref 0–0.5)
IMM GRANULOCYTES NFR BLD AUTO: 0 % (ref 0–5)
LYMPHOCYTES # BLD: 3 K/UL (ref 0.5–4.6)
LYMPHOCYTES NFR BLD: 58 % (ref 13–44)
MCH RBC QN AUTO: 31.4 PG (ref 26.1–32.9)
MCHC RBC AUTO-ENTMCNC: 32.9 G/DL (ref 31.4–35)
MCV RBC AUTO: 95.3 FL (ref 79.6–97.8)
MONOCYTES # BLD: 0.4 K/UL (ref 0.1–1.3)
MONOCYTES NFR BLD: 8 % (ref 4–12)
NEUTS SEG # BLD: 1.3 K/UL (ref 1.7–8.2)
NEUTS SEG NFR BLD: 25 % (ref 43–78)
NRBC # BLD: 0 K/UL (ref 0–0.2)
PLATELET # BLD AUTO: 139 K/UL (ref 150–450)
PLATELET COMMENTS,PCOM: ADEQUATE
PMV BLD AUTO: 9.9 FL (ref 9.4–12.3)
POTASSIUM SERPL-SCNC: 3.6 MMOL/L (ref 3.5–5.1)
PROT SERPL-MCNC: 7.7 G/DL (ref 6.3–8.2)
RBC # BLD AUTO: 4.05 M/UL (ref 4.23–5.6)
RBC MORPH BLD: ABNORMAL
SODIUM SERPL-SCNC: 137 MMOL/L (ref 136–145)
WBC # BLD AUTO: 5.2 K/UL (ref 4.3–11.1)
WBC MORPH BLD: ABNORMAL

## 2021-04-22 PROCEDURE — 85025 COMPLETE CBC W/AUTO DIFF WBC: CPT

## 2021-04-22 PROCEDURE — 86141 C-REACTIVE PROTEIN HS: CPT

## 2021-04-22 PROCEDURE — 82306 VITAMIN D 25 HYDROXY: CPT

## 2021-04-22 PROCEDURE — 85652 RBC SED RATE AUTOMATED: CPT

## 2021-04-22 PROCEDURE — 85379 FIBRIN DEGRADATION QUANT: CPT

## 2021-04-22 PROCEDURE — 36415 COLL VENOUS BLD VENIPUNCTURE: CPT

## 2021-04-22 PROCEDURE — 80053 COMPREHEN METABOLIC PANEL: CPT

## 2021-07-01 NOTE — PROGRESS NOTES
Citlali Remy  : 1955  Primary: [employer]  Secondary:  [address]  at [facility]  [address], [place] [address] [facility]  [phone]   [phone]        OUTPATIENT SPEECH LANGUAGE PATHOLOGY: Initial Assessment, Discharge and MODIFIED BARIUM SWALLOW    ICD-10: Treatment Diagnosis: oropharyngeal dysphagia  DATE: 2021  REFERRING PHYSICIAN: Darshan Harper MD MD Orders: Modified barium swallow study  PAST MEDICAL HISTORY: Patient reports history of chronic cough that does not appear to be related to po intake. He does endorse some coughing with taking vitamins with thin liquid wash. Mr. Hugh Leach is a 77 y.o. male who  has a past medical history of Back pain, Cervical radiculopathy, Claustrophobia, DVT (deep venous thrombosis) ([facility]) (2019), GERD (gastroesophageal reflux disease), H/O seasonal allergies, Hyperlipidemia, Impotence, Insomnia, Lateral epicondylitis, Leukemia ([facility] [address] Obesity, and Sleep apnea. He also  has a past surgical history that includes hx circumcision; hx colonoscopy (2017); hx wisdom teeth extraction; ir insert tunl cvc w port over 5 years (2019); hx orthopaedic (Right); and ir remove tunl cvad w port/pump (2020). [facility]  MEDICAL/REFERRING DIAGNOSIS: Dysphagia [R13.10]  DATE OF ONSET:    PRIOR LEVEL OF FUNCTION: Independent  PRECAUTIONS/ALLERGIES: Campath [alemtuzumab]   ASSESSMENT/PLAN OF CARE:  Based on the objective data described below, the patient presents with swallow function that is within normal limits. Appropriate oral prep and containment with all textures. No oral residue after the swallow. Appropriate hyolaryngeal elevation and excursion with adequate epiglottic inversion. Trace, transient penetration during serial straw sips only. No additional instances of penetration or aspiration with liquids, puree, mixed, or solid textures.    Recommend continue with regular diet/thin liquids. Medications with liquid wash. Encouraged him to attempt larger pills whole in puree if needed. No additional speech therapy indicated as swallow function is within normal limits. RECOMMENDATIONS AND PLANNED INTERVENTIONS (Benefits and precautions of therapy have been discussed with the patient.):  · PO:  Regular  · Liquids:  regular thin  MEDICATIONS:  · With liquid  · whole if puree if preferred  COMPENSATORY STRATEGIES/MODIFICATIONS INCLUDING:  · None  OTHER RECOMMENDATIONS (including follow up treatment recommendations):   · None    Thank you for this referral,  Almaz Camarillo MSP, CCC-SLP  Speech Language Pathologist  Acute Rehabilitation Services  Contact: Vern           SUBJECTIVE:  Pleasant and cooperative  Present Symptoms: Persistent cough; difficulty with medications      Current Dietary Status:  Regular diet/thin liquids   Radiologist: Anant Cervantes  History of reflux:  []YES     [x]NO      Reflux medication:N/A    Social History/Home Situation: Lives with wife        OBJECTIVE:  Objective Measure: Tool Used: National Outcomes Measurement System: Functional Communication Measures: SWALLOWING  Score:  Initial: 7 Most Recent: X (Date: -- )   Interpretation of Tool: This measure describes the change in functional communication status subsequent to speech-language pathology treatment of patients with dysphagia.  o Level 1:  Individual is not able to swallow anything safely by mouth. All nutrition and hydration is received through non-oral means (e.g., nasogastric tube, PEG). o Level 2: Individual is not able to swallow safely by mouth for nutrition and hydration, but may take some consistency with consistent maximal cues in therapy only. Alternative method of feeding required.   o Level 3:  Alternative method of feeding required as individual takes less than 50% of nutrition and hydration by mouth, and/or swallowing is safe with consistent use of moderate cues to use compensatory strategies and/or requires maximum diet restriction. o Level 4:  Swallowing is safe, but usually requires moderate cues to use compensatory strategies, and/or the individual has moderate diet restrictions and/or still requires tube feeding and/or oral supplements. o Level 5:  Swallowing is safe with minimal diet restriction and/or occasionally requires minimal cueing to use compensatory strategies. The individual may occasionally self-cue. All nutrition and hydration needs are met by mouth at mealtime. o Level 6:  Swallowing is safe, and the individual eats and drinks independently and may rarely require minimal cueing. The individual usually self-cues when difficulty occurs. May need to avoid specific food items (e.g., popcorn and nuts), or require additional time (due to dysphagia). o Level 7: The individuals ability to eat independently is not limited by swallow function. Swallowing would be safe and efficient for all consistencies. Compensatory strategies are effectively used when needed. Score Level 7 Level 6 Level 5 Level 4 Level 3 Level 2 Level 1   Modifier CH CI CJ CK CL CM CN       Cognitive/Communication Status:  Mental Status  Neurologic State: Alert  Orientation Level: Appropriate for age, Oriented X4  Cognition: Appropriate decision making, Appropriate for age attention/concentration, Appropriate safety awareness  Perception: Appears intact  Perseveration: No perseveration noted  Safety/Judgement: Awareness of environment    Oral Assessment:  Oral Assessment  Labial: No impairment  Dentition: Intact  Oral Hygiene: Adequate  Lingual: No impairment    Vocal Quality: Within normal limits    Patient Viewed: Patient Position: upright in chair  Film Views: Lateral, Fluoro    Oral Prepatory:  The patient was given the following: Consistency Presented:  Thin liquid, Puree, Solid, Mixed consistency  How Presented: Self-fed/presented, Cup/sip, Cup/gulp, Spoon, Straw, Successive swallows    Oral Phase: Bolus Acceptance: No impairment  Bolus Formation/Control: No impairment  Propulsion: No impairment     Oral Residue: None  Initiation of Swallow: No impairment  Oral Phase Severity: No impairment    Pharyngeal Phase:  Timing: No impairment  Decreased Tongue Base Retraction?: No  Laryngeal Elevation: WFL (within functional limits)  Penetration: During swallow, Flash/transient (During serial straw sips only)  Aspiration/Timing: No evidence of aspiration  Aspiration/Penetration Score: 2 (Penetration/No residue-Contrast enters the airway penetrates, remains above the folds/cords, and is cleared)  Pharyngeal Symmetry: Not assessed  Pharyngeal Dysfunction: None  Pharyngeal Phase Severity: N/A  Pharyngeal-Esophageal Segment: No impairment    Assessment only; No treatment(s) provided today  __________________________________________________________________________________________________  Recommendations for treatment: Continue regular diet/thin liquids. No speech therapy indicated.    Total Treatment Duration:  Time In: 0900   Time Out: [address] [facility], [facility] HospitalYAQUELIN PACK, CCC-SLP  Speech Language Pathologist  Acute Rehabilitation Services

## 2021-09-16 NOTE — ED TRIAGE NOTES
Patient is leukemia patient and [facility] and on blood work noted elevated WBC at 132, decreased platelets at 9. Patient was told to come to hospital and receive some platelets. Mask on during triage. Patient also complaining of bilateral feet and leg pain.

## 2021-09-16 NOTE — ED NOTES
Report given to [facility], care transferred at this time.

## 2021-09-16 NOTE — ED PROVIDER NOTES
Patient is followed by Dr. Elian Alcantara with oncology. Note follows. Kia Askew is a 77year old -American man who has returned to my clinic for a follow-up visit; he was initially referred to me by Dr. Lluvia Collier. 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 He developed a left leg DVT in 6/2019 and was anti-coagulated with Xarelto, in 11/2019 he was switched to Eliquis and he took it till 11/2020. In April patient had normal blood work. Was seen by his PCP recently who noted elevated white blood cell count and low platelets. Was referred back to oncology but with white blood cell count elevating even further patient was sent here for further evaluation. Patient also reports bilateral lower extremity edema. Mild shortness of breath. No history of congestive heart failure, liver disease, kidney disease. Has bilateral cervical lymphadenopathy tender to palpation. No sore throat or runny nose. The history is provided by the patient and medical records. No  was used. Abnormal Lab Results  This is a new problem. The current episode started more than 2 days ago. The problem occurs constantly. The problem has been gradually worsening. Associated symptoms include shortness of breath. Pertinent negatives include no chest pain, no abdominal pain and no headaches. Nothing aggravates the symptoms. Nothing relieves the symptoms. He has tried nothing for the symptoms.         Past Medical History:   Diagnosis Date    Back pain     occassionally    Cervical radiculopathy     Claustrophobia     DVT (deep venous thrombosis) ([facility] [address] 06/2019    currently treated with Xarelto- started on 5/30/2019    GERD (gastroesophageal reflux disease)     H/O seasonal allergies     Hyperlipidemia     Impotence     Insomnia     Lateral epicondylitis     Leukemia (HCC)     CHEMO    Obesity     BMI 36.6    Sleep apnea     noncomplaint with CPAP       Past Surgical History:   Procedure Laterality Date    HX CIRCUMCISION      HX COLONOSCOPY  2017    Due in 2027    HX ORTHOPAEDIC Right     r wrist    HX WISDOM TEETH EXTRACTION      IR INSERT TUNL CVC W PORT OVER 5 YEARS  6/19/2019    IR REMOVE TUNL CVAD W PORT/PUMP  12/14/2020         Family History:   Problem Relation Age of Onset    Cancer Mother 80        pancreatic    Cancer Father 76        breast    No Known Problems Son     Hypertension Brother     Hypertension Brother     No Known Problems Daughter        Social History     Socioeconomic History    Marital status:      Spouse name: Not on file    Number of children: Not on file    Years of education: Not on file    Highest education level: Not on file   Occupational History    Not on file   Tobacco Use    Smoking status: Never Smoker    Smokeless tobacco: Never Used   Substance and Sexual Activity    Alcohol use: Not Currently     Alcohol/week: 0.0 standard drinks    Drug use: No    Sexual activity: Yes     Partners: Female   Other Topics Concern     Service Not Asked    Blood Transfusions Not Asked    Caffeine Concern Not Asked    Occupational Exposure Not Asked    Hobby Hazards Not Asked    Sleep Concern Not Asked    Stress Concern Not Asked    Weight Concern Not Asked    Special Diet Not Asked    Back Care Not Asked    Exercise Not Asked    Bike Helmet Not Asked    Seat Belt Not Asked    Self-Exams Not Asked   Social History Narrative    Not on file     Social Determinants of Health     Financial Resource Strain:     Difficulty of Paying Living Expenses:    Food Insecurity:     Worried About Running Out of Food in the Last Year:     Ran Out of Food in the Last Year:    Transportation Needs:     Lack of Transportation (Medical):      Lack of Transportation (Non-Medical):    Physical Activity:     Days of Exercise per Week:     Minutes of Exercise per Session:    Stress:     Feeling of Stress :    Social Connections:     Frequency of Communication with Friends and Family:     Frequency of Social Gatherings with Friends and Family:     Attends Adventism Services:     Active Member of Clubs or Organizations:     Attends Club or Organization Meetings:     Marital Status:    Intimate Partner Violence:     Fear of Current or Ex-Partner:     Emotionally Abused:     Physically Abused:     Sexually Abused: ALLERGIES: Campath [alemtuzumab]    Review of Systems   Constitutional: Negative for chills and fever. HENT: Negative for rhinorrhea and sore throat. Eyes: Negative for pain and redness. Respiratory: Positive for shortness of breath. Negative for chest tightness and wheezing. Cardiovascular: Positive for leg swelling. Negative for chest pain. Gastrointestinal: Negative for abdominal pain, diarrhea, nausea and vomiting. Genitourinary: Negative for dysuria and hematuria. Musculoskeletal: Negative for back pain, gait problem, neck pain and neck stiffness. Skin: Negative for color change and rash. Neurological: Negative for weakness, numbness and headaches. All other systems reviewed and are negative. Vitals:    09/16/21 1758   BP: 112/70   Pulse: (!) 109   Resp: 16   Temp: 98.3 °F (36.8 °C)   SpO2: 99%   Weight: 130.2 kg (287 lb)   Height: 5' 11\" (1.803 m)            Physical Exam  Constitutional:       Appearance: Normal appearance. He is well-developed. HENT:      Head: Normocephalic and atraumatic. Eyes:      Extraocular Movements: Extraocular movements intact. Pupils: Pupils are equal, round, and reactive to light. Cardiovascular:      Rate and Rhythm: Regular rhythm. Tachycardia present. Pulmonary:      Effort: Pulmonary effort is normal. No respiratory distress.       Breath sounds: Normal breath sounds. No wheezing. Abdominal:      General: Bowel sounds are normal.      Palpations: Abdomen is soft. Tenderness: There is no abdominal tenderness. Musculoskeletal:         General: Normal range of motion. Cervical back: Normal range of motion and neck supple. Right lower leg: Edema present. Left lower leg: Edema present. Skin:     General: Skin is warm and dry. Neurological:      Mental Status: He is alert and oriented to person, place, and time. MDM  Number of Diagnoses or Management Options  Diagnosis management comments: Discussed patient with Dr. Veronica Keyes with oncology. Request we give patient 2 units of platelets and then okay for discharge with follow-up with Dr. Norm Ayon tomorrow. Amount and/or Complexity of Data Reviewed  Clinical lab tests: ordered and reviewed  Tests in the radiology section of CPT®: ordered and reviewed  Tests in the medicine section of CPT®: ordered and reviewed    Patient Progress  Patient progress: stable         Procedures        EKG: normal sinus rhythm, nonspecific ST and T waves changes, 1st degree AV block. Rate 94. CT CHEST SOFT TISSUE NECK W CONT (Final result)  Result time 09/16/21 19:04:01  Final result by Archie Mcmahon DO (09/16/21 19:04:01)                Impression:    1.  Extensive cervical and bilateral axillary lymphadenopathy, compatible with a   history of leukemia. 2.  Bilateral palatine tonsil hypertrophy causing moderate narrowing of the   oropharyngeal airway on CT. Correlate with physical exam findings. 3.  Small pericardial effusion, which appears more dense than simple fluid. This   may represent a hemorrhagic or leukemic effusion. Narrative:    EXAM: CT CHEST SOFT TISSUE NECK W CONT     INDICATION: Lymphadenopathy, history lymphoma, elevated white blood cells.      COMPARISON: PET/CT dated 9/14/2021     Multiple axial images were obtained through the neck and chest.  Oral contrast   was used for bowel opacification.  100mL of Isovue 370 intravenous contrast was   used for better evaluation of solid organs and vascular structures.  Radiation   dose reduction techniques were used for this study.  All CT scans performed at   this facility use one or all of the following: Automated exposure control,   adjustment of the mA and/or kVp according to patient's size, iterative   reconstruction. FINDINGS:   NASOPHARYNX: Normal.   SUPRAHYOID NECK: Normal oral cavity. There is bilateral symmetric palatine   tonsil enlargement with central airway narrowing within the oropharynx. INFRAHYOID NECK: Normal larynx, hypopharynx and supraglottis. GLANDS: Multiple enlarged lymph nodes within the parotid glands. Submandibular   glands are normal in appearance. The thyroid gland is unremarkable. Tyndall Kid LYMPH NODES: Extensive bilateral lymphadenopathy throughout all cervical levels   of the neck. LUNG APICES: Clear. OSSEOUS STRUCTURES: No destructive bone lesion. AIRWAYS: The trachea and bronchi are patent. LUNGS: Minimal atelectasis within the left lower lobe. No airspace   consolidation.  No suspicious pulmonary nodule. PLEURA: No plerual effusion or pneumothorax. HEART: The heart is not enlarged. No calcified coronary atherosclerosis.  Small   pericardial effusion which appears more dense than simple fluid. THORACIC AORTA: The aorta is normal in caliber. Minimal atherosclerosis within   the aorta and great vessels. PULMONARY ARTERY: The main pulmonary artery is normal in caliber. MEDIASTINUM/JOSE DANIEL: No discrete mediastinal lymphadenopathy. Ill-defined soft   tissue within the anterior mediastinum, suspicious for residual thymus tissue   CHEST WALL: Bilateral axillary lymphadenopathy.                       XR CHEST PORT (Final result)  Result time 09/16/21 18:27:58  Final result by Wade Arzate MD (09/16/21 18:27:58)                Impression:    Negative for pneumonia. Narrative:    CHEST X-RAY, one view. HISTORY:  Leukemia and lymphadenopathy and leukocytosis. TECHNIQUE:  AP upright portable view. COMPARISON: March 2019     FINDINGS:   Film is somewhat lordotic   Lungs: Are grossly clear without focal pneumonia. Costophrenic angles: are sharp. Heart size: is normal.   Pulmonary vasculature: is unremarkable. Aorta: Unremarkable. Included portion of the upper abdomen: is unremarkable. Bones: No gross bony lesions. Other: None.                   Results Include:    Recent Results (from the past 24 hour(s))   LACTIC ACID    Collection Time: 09/16/21  6:31 PM   Result Value Ref Range    Lactic acid 1.5 0.4 - 2.0 MMOL/L   CBC WITH AUTOMATED DIFF    Collection Time: 09/16/21  6:31 PM   Result Value Ref Range    .1 (HH) 4.3 - 11.1 K/uL    RBC 2.18 (L) 4.23 - 5.6 M/uL    HGB 7.3 (L) 13.6 - 17.2 g/dL    HCT 22.4 (L) 41.1 - 50.3 %    .8 (H) 79.6 - 97.8 FL    MCH 33.5 (H) 26.1 - 32.9 PG    MCHC 32.6 31.4 - 35.0 g/dL    RDW 18.7 (H) 11.9 - 14.6 %    PLATELET 7 (LL) 918 - 450 K/uL    MPV Unable to calculate. Recommend adding IPF. 9.4 - 12.3 FL    ABSOLUTE NRBC 0.05 0.0 - 0.2 K/uL    DF PENDING    METABOLIC PANEL, COMPREHENSIVE    Collection Time: 09/16/21  6:31 PM   Result Value Ref Range    Sodium 142 136 - 145 mmol/L    Potassium 3.7 3.5 - 5.1 mmol/L    Chloride 107 98 - 107 mmol/L    CO2 26 21 - 32 mmol/L    Anion gap 9 7 - 16 mmol/L    Glucose 148 (H) 65 - 100 mg/dL    BUN 27 (H) 8 - 23 MG/DL    Creatinine 1.45 0.8 - 1.5 MG/DL    GFR est AA >60 >60 ml/min/1.73m2    GFR est non-AA 52 (L) >60 ml/min/1.73m2    Calcium 8.9 8.3 - 10.4 MG/DL    Bilirubin, total 2.4 (H) 0.2 - 1.1 MG/DL    ALT (SGPT) 34 12 - 65 U/L    AST (SGOT) 55 (H) 15 - 37 U/L    Alk.  phosphatase 108 50 - 136 U/L    Protein, total 6.5 6.3 - 8.2 g/dL    Albumin 3.7 3.2 - 4.6 g/dL    Globulin 2.8 2.3 - 3.5 g/dL    A-G Ratio 1.3 1.2 - 3.5     LIPASE    Collection Time: 09/16/21  6:31 PM Result Value Ref Range    Lipase 100 73 - 393 U/L   NT-PRO BNP    Collection Time: 09/16/21  6:31 PM   Result Value Ref Range    NT pro- (H) 5 - 125 PG/ML

## 2021-09-17 NOTE — ED NOTES
I have reviewed discharge instructions with the patient. The patient verbalized understanding. Patient left ED via Discharge Method: wheelchair to Home with wife. Opportunity for questions and clarification provided. Patient given 0 scripts. To continue your aftercare when you leave the hospital, you may receive an automated call from our care team to check in on how you are doing. This is a free service and part of our promise to provide the best care and service to meet your aftercare needs.  If you have questions, or wish to unsubscribe from this service please call [phone]. Thank you for Choosing our [facility] Emergency Department.

## 2021-09-20 NOTE — DISCHARGE INSTRUCTIONS
[address]  Department of Interventional Radiology  [facility]  [phone] Office  [phone] Fax    BIOPSY DISCHARGE INSTRUCTIONS    General Instructions:     A biopsy is the removal of a small piece of tissue for microscopic examination or testing. Healthy tissue can be obtained for the purpose of tissue-type matching for transplants. Unhealthy tissues are more commonly biopsied to diagnose disease. Lung Biopsy:     A needle lung biopsy is performed when there is a mass discovered in the lung area. The most serious risk is infection, bleeding, and pneumothorax (a collapsed lung). Signs of pneumothorax include shortness of breath, rapid heart rate, and blueness of the skin. If any of these occur, call 911. Liver Biopsy: This test helps detect cancer, infections, and the cause of an enlargement of the liver or elevated liver enzymes. It also helps to diagnose a number of liver diseases. The pain associated with the procedure may be felt in the shoulder. The risks include internal bleeding, pneumothorax, and injury to the surrounding organs. Renal Biopsy: This procedure is sometimes done to evaluate a transplanted kidney. It is also used to evaluate unexplained decrease in kidney function, blood, or protein in the urine. You may see bright red blood in the urine the first 24 hours after the test. If the bleeding lasts longer, you need to call your doctor. There is a risk of infection and bleeding into the muscle, which may cause soreness. Spinal Biopsy: This test is sometimes done in conjunction with other procedures. Your back will be sore, as we are taking a small sample of bone, which is slightly more difficult to sample than tissue. General Biopsy:     A mass can grow in any area of the body, and we are taking a specimen as ordered by your doctor. The risks are the same.  They include bleeding, pain, and infection. Home Care Instructions: You may resume your regular diet and medication regimen. Do not drink alcohol, drive, or make any important legal decisions in the next 24 hours. Do not lift anything heavier than a gallon of milk until the soreness goes away. You may use over the counter acetaminophen or ibuprofen for the soreness. You may apply an ice pack to the affected area for 20-30 minutes at time for the first 24 hours. After that, you may apply a heat pack. Call If: You should call your Physician and/or the Radiology Nurse if you have any questions or concerns about the biopsy site. Call if you should have increased pain, fever, redness, drainage, or bleeding more than a small spot on the bandage. Follow-Up Instructions: Please see your ordering doctor as he/she has requested. To Reach Us: If you have any questions about your procedure, please call the Interventional Radiology department at [phone]. After business hours (5pm) and weekends, call the answering service at [phone] and ask for the Radiologist on call to be paged. Si tiene Preguntas acerca del procedimiento, por favor llame al departamento de Radiología Intervencional al [phone]. Después de horas de oficina (5 pm) y los fines de [place], llamar al Gilberto Baylee Jason al [phone] y pregunte por el Radiologo de [facility]. Interventional Radiology General Nurse Discharge    After general anesthesia or intravenous sedation, for 24 hours or while taking prescription Narcotics:  · Limit your activities  · Do not drive and operate hazardous machinery  · Do not make important personal or business decisions  · Do  not drink alcoholic beverages  · If you have not urinated within 8 hours after discharge, please contact your surgeon on call. * Please give a list of your current medications to your Primary Care Provider.   * Please update this list whenever your medications are discontinued, doses are changed, or new medications (including over-the-counter products) are added. * Please carry medication information at all times in case of emergency situations. These are general instructions for a healthy lifestyle:    No smoking/ No tobacco products/ Avoid exposure to second hand smoke  Surgeon General's Warning:  Quitting smoking now greatly reduces serious risk to your health. Obesity, smoking, and sedentary lifestyle greatly increases your risk for illness  A healthy diet, regular physical exercise & weight monitoring are important for maintaining a healthy lifestyle    You may be retaining fluid if you have a history of heart failure or if you experience any of the following symptoms:  Weight gain of 3 pounds or more overnight or 5 pounds in a week, increased swelling in our hands or feet or shortness of breath while lying flat in bed. Please call your doctor as soon as you notice any of these symptoms; do not wait until your next office visit. Recognize signs and symptoms of STROKE:  F-face looks uneven    A-arms unable to move or move unevenly    S-speech slurred or non-existent    T-time-call 911 as soon as signs and symptoms begin-DO NOT go       Back to bed or wait to see if you get better-TIME IS BRAIN.       Patient Signature:  Date: 9/20/2021  Discharging Nurse: Alex Henning RN

## 2021-09-20 NOTE — PROGRESS NOTES
TRANSFER - OUT REPORT:    Verbal report given to Sarwat Tripp RN (name) on Maria Isabel Galvan  being transferred to IR recovery (unit) for routine progression of care       Report consisted of patients Situation, Background, Assessment and   Recommendations(SBAR). Information from the following report(s) Procedure Summary and MAR was reviewed with the receiving nurse. Lines:   PICC Double Lumen 01/14/19 Right;Brachial (Active)       Peripheral IV 09/20/21 Left Antecubital (Active)        Opportunity for questions and clarification was provided.       Patient transported with:   Registered Nurse

## 2021-09-20 NOTE — H&P
History and Physical    Patient: Alli Sen MRN: [id number]  SSN: [id number]    YOB: 1955  Age: 77 y.o. Sex: male      Subjective:      Alli Sen is a 77 y.o. male who has leukemia and presents for repeat bone marrow biopsy. NPO. Past Medical History:   Diagnosis Date    Back pain     occassionally    Cervical radiculopathy     Claustrophobia     DVT (deep venous thrombosis) ([address] 06/2019    currently treated with Xarelto- started on 5/30/2019    GERD (gastroesophageal reflux disease)     H/O seasonal allergies     Hyperlipidemia     Impotence     Insomnia     Lateral epicondylitis     Leukemia (HCC)     CHEMO    Obesity     BMI 36.6    Sleep apnea     noncomplaint with CPAP     Past Surgical History:   Procedure Laterality Date    HX CIRCUMCISION      HX COLONOSCOPY  2017    Due in 2027    HX ORTHOPAEDIC Right     r wrist    HX WISDOM TEETH EXTRACTION      IR INSERT TUNL CVC W PORT OVER 5 YEARS  6/19/2019    IR REMOVE TUNL CVAD W PORT/PUMP  12/14/2020      Family History   Problem Relation Age of Onset    Cancer Mother 80        pancreatic    Cancer Father 76        breast    No Known Problems Son     Hypertension Brother     Hypertension Brother     No Known Problems Daughter      Social History     Tobacco Use    Smoking status: Never Smoker    Smokeless tobacco: Never Used   Substance Use Topics    Alcohol use: Not Currently     Alcohol/week: 0.0 standard drinks      Prior to Admission medications    Medication Sig Start Date End Date Taking? Authorizing Provider   omeprazole (PRILOSEC) 40 mg capsule TAKE 1 CAPSULE BY MOUTH DAILY 3/29/21   Provider, Historical   hyoscyamine SL (Levsin/SL) 0.125 mg SL tablet 1 Tab by SubLINGual route every four (4) hours as needed for Cramping. 9/5/20   Domenico Dawn MD   olmesartan-hydroCHLOROthiazide (Benicar HCT) 20-12.5 mg per tablet Take 1 Tab by mouth daily.  7/14/20   Zabrina Zuñiga NP Comp.Stocking,Knee,Regular,Lrg misc 2 Each by Does Not Apply route daily. 6/11/20   Nafisa Hung NP   lovastatin (MEVACOR) 10 mg tablet Take 1 Tab by mouth nightly. 5/19/20   Nafisa Hung NP   Cetirizine (ZYRTEC) 10 mg cap Take 10 mg by mouth. Provider, Historical   lidocaine-prilocaine (EMLA) topical cream Apply  to affected area as needed for Pain. Apply to port site 45-60 minutes prior to lab appt or infusion 6/25/19   Ammon Correa MD   docusate sodium (COLACE) 100 mg capsule Take 100 mg by mouth nightly. Provider, Historical   hydrocortisone (ANUSOL-HC) 2.5 % rectal cream Insert  into rectum four (4) times daily. Patient taking differently: Insert  into rectum as needed. 6/13/19   Nafisa Hugn NP   multivitamin (ONE A DAY) tablet Take 1 Tab by mouth daily. Provider, Historical   DOCOSAHEXANOIC ACID/EPA (FISH OIL PO) Take 1 Cap by mouth daily. Provider, Historical   VITAMIN A/VITAMIN D3 (NATURAL VITAMIN D PO) Take 1 Tab by mouth daily. Provider, Historical        Allergies   Allergen Reactions    Campath [Alemtuzumab] Other (comments)     Rigors       Review of Systems:  Pertinent items are noted in the History of Present Illness. Objective:     Vitals:    09/20/21 1252   BP: (!) 156/73   Pulse: 90   Resp: 20   Temp: 97.1 °F (36.2 °C)   SpO2: 98%   Weight: 126.6 kg (279 lb)   Height: 5' 11\" (1.803 m)        Physical Exam:  LUNG: clear to auscultation bilaterally  HEART: regular rate and rhythm    Assessment:     Hospital Problems  Date Reviewed: 6/11/2020    None          Plan:     CT guided bone marrow biopsy.   Moderate sedation    Signed By: Baldo Storey MD     September 20, 2021

## 2021-09-20 NOTE — PROGRESS NOTES
Clinical Social Work Note  Name: Nadia Pena  : 1955  MRN: [id number]  Date of Service: 2021  Location of Service: [facility]  Date of Service: 2021    Type of Service: Case Management     Length of Service: 15 minutes    Reason for Visit: fu    Subject: Seen patient during infusion with his wife,  provided therapeutic reassurance. At  this moment, pt denies any psychosocial and economic needs. Mental Status Exam: Intellectual functioning appears to be intact. Mood is \"ok\" and affect is good. Insight is adequate. Judgment is adequate. Patient did not report suicidal ideations, intent or plans. Patient did not report homicidal ideations, intent or plans. Patient is oriented to self, place, time and situation. Protective Factors: Current care for physical and mental illness, adequate insight and judgment, family support, cultural and Scientologist beliefs and values that support self-care. Next Steps: ULISES-LEYLA gave contact information and encouraged pt to call should any needs arise. Pt verbalized understanding. ULISES-CP intends to follow up by phone and/or face-to-face as needed.     3 most recent [place] [id number] Screens 2021 12/3/2020 2020   Little interest or pleasure in doing things Not at all Not at all Not at all   Feeling down, depressed, irritable, or hopeless Not at all Not at all Not at all   Total Score PHQ 2 0 0 0         Signed By: ULISES Bazzi     2021

## 2021-09-20 NOTE — PROGRESS NOTES
Recovery period without difficulty. Pt alert and oriented and denies pain. Dressing is clean, dry, and intact. Reviewed discharge instructions with patient and spouse, both verbalized understanding. Pt escorted to lobby discharge area via wheelchair. Vital signs and Bruno score completed.

## 2021-09-20 NOTE — PROCEDURES
Department of Interventional Radiology  [phone]        Interventional Radiology Brief Procedure Note    Patient: Lambert Rosado MRN: [id number]  SSN: [id number]    YOB: 1955  Age: 77 y.o.   Sex: male      Date of Procedure: 9/20/2021    Pre-Procedure Diagnosis: leukemia    Post-Procedure Diagnosis: SAME    Procedure(s): Image Guided Biopsy    Brief Description of Procedure: as above    Performed By: Azeem Higgins MD     Assistants: None    Anesthesia:Moderate Sedation    Estimated Blood Loss: Less than 10ml    Specimens:  Pathology    Implants:  None    Findings: left iliac    Complications: None    Recommendations: routine post care     Follow Up: as needed    Signed By: Azeem Higgins MD     September 20, 2021

## 2021-09-20 NOTE — PROGRESS NOTES
Patient arrived ambulatory to infusion center accompanied by wife. 1 unit platelets transfused. Post CBC drawn per order. Platelets resulted at 36k. Notified Interventional radiology and they stated they would do his bmbx with platelets of 36. Hgb noted to be 6.7. Christy Griggs NP notified and verbal order received for 1 unit PRBCs. Type and screen drawn and sent to lab. Unit of blood ordered. Patient returning tomorrow at 0800 to receive unit of blood. PIV left in place for tomorrows infusion per patient's request. Discharged ambulatory.

## 2021-09-21 NOTE — PROGRESS NOTES
Arrived to the [facility]. 1 unit PRBC's completed. Patient tolerated well. Any issues or concerns during appointment: none. Patient aware of next lab and [facility] office visit on 10/21/21 at 56. Discharged in [facility].

## 2021-09-22 NOTE — ED TRIAGE NOTES
Pt states he has leukemia, states he is having severe swelling in his legs. Pt is very short of breath with walking from lobby into triage room. Pt states this is new.

## 2021-09-22 NOTE — ED NOTES
Pt to er c/o continued swelling and pain to feet, h/o recurrent leukemia, seen last week in the ER since that time has had bone marrow aspiration and infusion. Has not actually seen oncology yet. Patient complains of also increased swelling to the lymph nodes of the neck feels more short of breath, no cp   Patient evaluated initially in triage. Rapid Medical Evaluation was conducted and necessary orders have been placed. I have performed a medical screening exam.  Care will now be transferred to the attending physician in the emergency department.   CESAR Lopes 11:40 AM

## 2021-09-22 NOTE — ED NOTES
TRANSFER - OUT REPORT:    Verbal report given to [facility] RN on Sarah Hodgkins  being transferred to room 359 for routine progression of care       Report consisted of patients Situation, Background, Assessment and   Recommendations(SBAR). Information from the following report(s) SBAR was reviewed with the receiving nurse. Lines:   PICC Double Lumen 01/14/19 Right;Brachial (Active)       Peripheral IV 09/22/21 Right Hand (Active)   Site Assessment Clean, dry, & intact 09/22/21 1151   Phlebitis Assessment 0 09/22/21 1151   Infiltration Assessment 0 09/22/21 1151   Dressing Status Clean, dry, & intact 09/22/21 1151        Opportunity for questions and clarification was provided.       Patient transported with:   Eye Phone

## 2021-09-22 NOTE — ED PROVIDER NOTES
Patient seen by me 1 week ago. Diagnosed with recurrent leukemia. Since then has of the bone marrow biopsy and 1 unit of packed red blood cells transfused. Has had progressive edema and legs and face. Has shortness of breath worse with exertion. With symptoms getting worse came in today for evaluation. His first appointment with Dr. Kenya Perea is scheduled for 1 month from now. Denies any chest pain. Mild cough. No vomiting or diarrhea. The history is provided by the patient. No  was used. Leg Swelling   This is a new problem. The current episode started more than 1 week ago. The problem occurs constantly. The problem has been gradually worsening. The pain is present in the left upper leg, left lower leg, right upper leg, right lower leg, right foot and left foot. The quality of the pain is described as aching. The pain is mild. Pertinent negatives include no numbness, no back pain and no neck pain. He has tried nothing for the symptoms. There has been no history of extremity trauma.         Past Medical History:   Diagnosis Date    Back pain     occassionally    Cervical radiculopathy     Claustrophobia     DVT (deep venous thrombosis) ([facility] [address] 06/2019    currently treated with Xarelto- started on 5/30/2019    GERD (gastroesophageal reflux disease)     H/O seasonal allergies     Hyperlipidemia     Impotence     Insomnia     Lateral epicondylitis     Leukemia (HCC)     CHEMO    Obesity     BMI 36.6    Sleep apnea     noncomplaint with CPAP       Past Surgical History:   Procedure Laterality Date    HX CIRCUMCISION      HX COLONOSCOPY  2017    Due in 2027    HX ORTHOPAEDIC Right     r wrist    HX WISDOM TEETH EXTRACTION      IR INSERT TUNL CVC W PORT OVER 5 YEARS  6/19/2019    IR REMOVE TUNL CVAD W PORT/PUMP  12/14/2020         Family History:   Problem Relation Age of Onset    Cancer Mother 80        pancreatic    Cancer Father 76        breast    No Known Problems Son    Kasia Jesus Hypertension Brother     Hypertension Brother     No Known Problems Daughter        Social History     Socioeconomic History    Marital status:      Spouse name: Not on file    Number of children: Not on file    Years of education: Not on file    Highest education level: Not on file   Occupational History    Not on file   Tobacco Use    Smoking status: Never Smoker    Smokeless tobacco: Never Used   Substance and Sexual Activity    Alcohol use: Not Currently     Alcohol/week: 0.0 standard drinks    Drug use: No    Sexual activity: Yes     Partners: Female   Other Topics Concern     Service Not Asked    Blood Transfusions Not Asked    Caffeine Concern Not Asked    Occupational Exposure Not Asked    Hobby Hazards Not Asked    Sleep Concern Not Asked    Stress Concern Not Asked    Weight Concern Not Asked    Special Diet Not Asked    Back Care Not Asked    Exercise Not Asked    Bike Helmet Not Asked   [address] Not Asked    Self-Exams Not Asked   Social History Narrative    Not on file     Social Determinants of Health     Financial Resource Strain:     Difficulty of Paying Living Expenses:    Food Insecurity:     Worried About Running Out of Food in the Last Year:     Ran Out of Food in the Last Year:    Transportation Needs:     Lack of Transportation (Medical):  Lack of Transportation (Non-Medical):    Physical Activity:     Days of Exercise per Week:     Minutes of Exercise per Session:    Stress:     Feeling of Stress :    Social Connections:     Frequency of Communication with Friends and Family:     Frequency of Social Gatherings with Friends and Family:     Attends Mosque Services:     Active Member of Clubs or Organizations:     Attends Club or Organization Meetings:     Marital Status:    Intimate Partner Violence:     Fear of Current or Ex-Partner:     Emotionally Abused:     Physically Abused:     Sexually Abused:           ALLERGIES: Campath [alemtuzumab]    Review of Systems   Constitutional: Positive for fatigue. Negative for chills and fever. HENT: Negative for rhinorrhea and sore throat. Eyes: Negative for pain and redness. Respiratory: Positive for shortness of breath. Negative for chest tightness and wheezing. Cardiovascular: Positive for leg swelling. Negative for chest pain. Gastrointestinal: Negative for abdominal pain, diarrhea, nausea and vomiting. Genitourinary: Negative for dysuria and hematuria. Musculoskeletal: Negative for back pain, gait problem, neck pain and neck stiffness. Skin: Negative for color change and rash. Neurological: Positive for weakness. Negative for numbness and headaches. Vitals:    09/22/21 1136   BP: 105/63   Pulse: (!) 102   Resp: 26   Temp: 98.5 °F (36.9 °C)   SpO2: 100%   Weight: 127 kg (280 lb)   Height: 5' 11\" (1.803 m)            Physical Exam  Constitutional:       Appearance: Normal appearance. He is well-developed. HENT:      Head: Normocephalic and atraumatic. Cardiovascular:      Rate and Rhythm: Normal rate and regular rhythm. Pulmonary:      Effort: Respiratory distress (mild increased effort) present. Breath sounds: Normal breath sounds. Abdominal:      General: Bowel sounds are normal.      Palpations: Abdomen is soft. Tenderness: There is no abdominal tenderness. Musculoskeletal:         General: Tenderness present. Normal range of motion. Cervical back: Normal range of motion and neck supple. Right lower leg: Edema present. Left lower leg: Edema present. Skin:     General: Skin is warm and dry. Neurological:      Mental Status: He is alert and oriented to person, place, and time. MDM  Number of Diagnoses or Management Options  Diagnosis management comments: Patient with recurrent leukemia. Seen by me 1 week ago with bone marrow biopsy since then and 1 unit packed red blood cells yesterday.   Having bilateral lower extremity edema up to his hips with increased shortness of breath. Discussed with Dr. Amaris Mcmillan who request patient be admitted to oncology for initiation of chemotherapy. Amount and/or Complexity of Data Reviewed  Clinical lab tests: ordered and reviewed  Tests in the radiology section of CPT®: ordered and reviewed  Tests in the medicine section of CPT®: ordered and reviewed    Patient Progress  Patient progress: stable         Procedures          EKG: normal sinus rhythm, nonspecific ST and T waves changes. Rate 98. XR CHEST PORT (Final result)  Result time 09/22/21 12:00:53  Final result by Ngozi Stahl MD (09/22/21 12:00:53)                Impression:    Borderline cardiomegaly without acute cardiopulmonary abnormality. Narrative:    EXAM: XR CHEST PORT   INDICATION: Syncope   COMPARISON: Chest radiograph, 9/16/2021     FINDINGS:   The cardiac silhouette is upper limits of normal in size but stable. Mediastinal   contours are within normal limits. No pleural effusion or pneumothorax. No focal   parenchymal process. No acute osseous abnormality.                   Results Include:    Recent Results (from the past 24 hour(s))   EKG, 12 LEAD, INITIAL    Collection Time: 09/22/21 11:43 AM   Result Value Ref Range    Ventricular Rate 98 BPM    Atrial Rate 98 BPM    P-R Interval 182 ms    QRS Duration 94 ms    Q-T Interval 326 ms    QTC Calculation (Bezet) 416 ms    Calculated P Axis 53 degrees    Calculated R Axis 12 degrees    Calculated T Axis 68 degrees    Diagnosis       !! AGE AND GENDER SPECIFIC ECG ANALYSIS !!   Normal sinus rhythm  Low voltage QRS  Borderline ECG  When compared with ECG of 16-SEP-2021 19:25,  NV interval has decreased  Nonspecific T wave abnormality, improved in Lateral leads  Confirmed by ST EMILY RODRIGUES MD (), RAFI ADAM ([id number]) on 9/22/2021 2:07:47 PM     CBC WITH AUTOMATED DIFF    Collection Time: 09/22/21 11:45 AM   Result Value Ref Range    .7 (HH) 4.3 - 11.1 K/uL RBC 2.29 (L) 4.23 - 5.6 M/uL    HGB 7.6 (L) 13.6 - 17.2 g/dL    HCT 23.1 (L) 41.1 - 50.3 %    .9 (H) 79.6 - 97.8 FL    MCH 33.2 (H) 26.1 - 32.9 PG    MCHC 32.9 31.4 - 35.0 g/dL    RDW 21.0 (H) 11.9 - 14.6 %    PLATELET 16 (LL) 096 - 450 K/uL    MPV Unable to calculate. Recommend adding IPF. 9.4 - 12.3 FL    ABSOLUTE NRBC 0.05 0.0 - 0.2 K/uL    NEUTROPHILS 2 (L) 47 - 75 %    LYMPHOCYTES 96 (H) 16 - 44 %    MONOCYTES 2 (L) 3 - 9 %    ABS. NEUTROPHILS 2.3 1.7 - 8.2 K/UL    ABS. LYMPHOCYTES 111.1 (H) 0.5 - 4.6 K/UL    ABS. MONOCYTES 2.3 (H) 0.1 - 1.3 K/UL    RBC COMMENTS RARE  SCHISTOCYTES        RBC COMMENTS OCCASIONAL  OVALOCYTES        RBC COMMENTS MODERATE  ANISOCYTOSIS + POIKILOCYTOSIS        RBC COMMENTS SLIGHT  MACROCYTOSIS        RBC COMMENTS OCCASIONAL  TEARDROP CELLS        WBC COMMENTS OCCASIONAL      PLATELET COMMENTS MARKED      DF MANUAL     METABOLIC PANEL, COMPREHENSIVE    Collection Time: 09/22/21 11:45 AM   Result Value Ref Range    Sodium 140 136 - 145 mmol/L    Potassium 4.3 3.5 - 5.1 mmol/L    Chloride 107 98 - 107 mmol/L    CO2 25 21 - 32 mmol/L    Anion gap 8 7 - 16 mmol/L    Glucose 127 (H) 65 - 100 mg/dL    BUN 22 8 - 23 MG/DL    Creatinine 1.37 0.8 - 1.5 MG/DL    GFR est AA >60 >60 ml/min/1.73m2    GFR est non-AA 55 (L) >60 ml/min/1.73m2    Calcium 9.0 8.3 - 10.4 MG/DL    Bilirubin, total 3.1 (H) 0.2 - 1.1 MG/DL    ALT (SGPT) 32 12 - 65 U/L    AST (SGOT) 49 (H) 15 - 37 U/L    Alk.  phosphatase 97 50 - 136 U/L    Protein, total 6.3 6.3 - 8.2 g/dL    Albumin 3.6 3.2 - 4.6 g/dL    Globulin 2.7 2.3 - 3.5 g/dL    A-G Ratio 1.3 1.2 - 3.5     MAGNESIUM    Collection Time: 09/22/21 11:45 AM   Result Value Ref Range    Magnesium 2.0 1.8 - 2.4 mg/dL   NT-PRO BNP    Collection Time: 09/22/21 11:45 AM   Result Value Ref Range    NT pro- (H) 5 - 125 PG/ML   TROPONIN-HIGH SENSITIVITY    Collection Time: 09/22/21 11:45 AM   Result Value Ref Range    Troponin-High Sensitivity 20.1 (H) 0 - 14 pg/mL LACTIC ACID    Collection Time: 09/22/21  1:27 PM   Result Value Ref Range    Lactic acid 1.7 0.4 - 2.0 MMOL/L

## 2021-09-22 NOTE — H&P
[facility]        Inpatient Hematology / Oncology History and Physical    Reason for Admission:  Admission for antineoplastic chemotherapy [Z51.11]    History of Present Illness:  Mr. Lala Flynn is a 77 y.o. male admitted on 9/22/2021 with a primary diagnosis of relapsed T-cell pro-lymphocytic leukemia. His PMH includes DVT (no longer on AC), GERD, and LEE. In 12/2018 he was diagnosed with T-Cell Pro-Lymphocytic Leukemia, in 1/2019 he was started on Alemtuzumab and received it for 8 weeks and achieved a RI, in 4/2019 he was switched to Pentostatin and received 14 doses and achieved a CR. Last week, he was noted to have leukocytosis with WBC 124k/ALC 120k on PCP labs and was sent to ED. At that time he report LE edema and was noted to have b/l cervical LAD. Follow up was arranged with Dr. Katalina Santana. Peripheral flow +relapsed T-cell pro-lymphocytic leukemia. S/p BMbx 9/20, path pending. He returned today with c/o continued swelling and pain in BLE. Also reports GENTILE. CXR with borderline cardiomegaly. pBNP 730. WBC 115k/ANC 2.3/ALC 111k, Hgb 7.6, Plt 16k. He is being admitted for further evaluation and treatment. Review of Systems:  Constitutional Denies fever, chills, weight loss, appetite changes, fatigue, night sweats. HEENT Denies trauma, blurry vision, hearing loss, ear pain, nosebleeds, sore throat, neck pain and ear discharge. Skin Denies lesions or rashes. Lungs +GENTILE. Denies cough, sputum production or hemoptysis. Cardiovascular +LE edema. Denies chest pain, palpitations. Gastrointestinal Denies nausea, vomiting, changes in bowel habits, bloody or black stools, abdominal pain.  Denies dysuria, frequency or hesitancy of urination. Neuro Denies headaches, visual changes or ataxia. Denies dizziness, tingling, tremors, sensory change, speech change, focal weakness or headaches. Hematology Denies easy bruising or bleeding, denies gingival bleeding or epistaxis. Endo Denies heat/cold intolerance, denies diabetes or thyroid abnormalities. MSK +LE pain. Denies back pain, arthralgias, myalgias or frequent falls. Psychiatric/Behavioral Denies depression and substance abuse. The patient is not nervous/anxious.          Allergies   Allergen Reactions    Campath [Alemtuzumab] Other (comments)     Rigors     Past Medical History:   Diagnosis Date    Back pain     occassionally    Cervical radiculopathy     Claustrophobia     DVT (deep venous thrombosis) ([facility] [address] 06/2019    currently treated with Xarelto- started on 5/30/2019    GERD (gastroesophageal reflux disease)     H/O seasonal allergies     Hyperlipidemia     Impotence     Insomnia     Lateral epicondylitis     Leukemia (HCC)     CHEMO    Obesity     BMI 36.6    Sleep apnea     noncomplaint with CPAP     Past Surgical History:   Procedure Laterality Date    HX CIRCUMCISION      HX COLONOSCOPY  2017    Due in 2027    HX ORTHOPAEDIC Right     r wrist    HX WISDOM TEETH EXTRACTION      IR INSERT TUNL CVC W PORT OVER 5 YEARS  6/19/2019    IR REMOVE TUNL CVAD W PORT/PUMP  12/14/2020     Family History   Problem Relation Age of Onset    Cancer Mother 80        pancreatic    Cancer Father 76        breast    No Known Problems Son     Hypertension Brother     Hypertension Brother     No Known Problems Daughter      Social History     Socioeconomic History    Marital status:      Spouse name: Not on file    Number of children: Not on file    Years of education: Not on file    Highest education level: Not on file   Occupational History    Not on file   Tobacco Use    Smoking status: Never Smoker    Smokeless tobacco: Never Used   Substance and Sexual Activity    Alcohol use: Not Currently     Alcohol/week: 0.0 standard drinks    Drug use: No    Sexual activity: Yes     Partners: Female   Other Topics Concern     Service Not Asked    Blood Transfusions Not Asked    Caffeine Concern Not Asked    Occupational Exposure Not Asked   [facility] Hazards Not Asked    Sleep Concern Not Asked    Stress Concern Not Asked    Weight Concern Not Asked    Special Diet Not Asked    Back Care Not Asked    Exercise Not Asked    Bike Helmet Not Asked   [address] Not Asked    Self-Exams Not Asked   Social History Narrative    Not on file     Social Determinants of Health     Financial Resource Strain:     Difficulty of Paying Living Expenses:    Food Insecurity:     Worried About Running Out of Food in the Last Year:     [address] in the Last Year:    Transportation Needs:     Lack of Transportation (Medical):  Lack of Transportation (Non-Medical):    Physical Activity:     Days of Exercise per Week:     Minutes of Exercise per Session:    Stress:     Feeling of Stress :    Social Connections:     Frequency of Communication with Friends and Family:     Frequency of Social Gatherings with Friends and Family:     Attends [employer] Services:     Active Member of Clubs or Organizations:     Attends Club or Organization Meetings:     Marital Status:    Intimate Partner Violence:     Fear of Current or Ex-Partner:     Emotionally Abused:     Physically Abused:     Sexually Abused:      Current Facility-Administered Medications   Medication Dose Route Frequency Provider Last Rate Last Admin    sodium chloride (NS) flush 5-10 mL  5-10 mL IntraVENous Q8H Fisher, [place]den Saint, [place]        sodium chloride (NS) flush 5-10 mL  5-10 mL IntraVENous PRN CESAR Guerrero         Current Outpatient Medications   Medication Sig Dispense Refill    omeprazole (PRILOSEC) 40 mg capsule TAKE 1 CAPSULE BY MOUTH DAILY      hyoscyamine SL (Levsin/SL) 0.125 mg SL tablet 1 Tab by SubLINGual route every four (4) hours as needed for Cramping. 15 Tab 0    olmesartan-hydroCHLOROthiazide (Benicar HCT) 20-12.5 mg per tablet Take 1 Tab by mouth daily. 90 Tab 3    Comp. Seymour Guitar misc 2 Each by Does Not Apply route daily. 2 Each 0    lovastatin (MEVACOR) 10 mg tablet Take 1 Tab by mouth nightly. 90 Tab 3    Cetirizine (ZYRTEC) 10 mg cap Take 10 mg by mouth.  lidocaine-prilocaine (EMLA) topical cream Apply  to affected area as needed for Pain. Apply to port site 45-60 minutes prior to lab appt or infusion 30 g 0    docusate sodium (COLACE) 100 mg capsule Take 100 mg by mouth nightly.  hydrocortisone (ANUSOL-HC) 2.5 % rectal cream Insert  into rectum four (4) times daily. (Patient taking differently: Insert  into rectum as needed.) 30 g 0    multivitamin (ONE A DAY) tablet Take 1 Tab by mouth daily.  DOCOSAHEXANOIC ACID/EPA (FISH OIL PO) Take 1 Cap by mouth daily.  VITAMIN A/VITAMIN D3 (NATURAL VITAMIN D PO) Take 1 Tab by mouth daily. Facility-Administered Medications Ordered in Other Encounters   Medication Dose Route Frequency Provider Last Rate Last Admin    0.9% sodium chloride infusion 250 mL  250 mL IntraVENous PRN Francesco Rodriguez MD   IV Completed at 21 1035       OBJECTIVE:  Patient Vitals for the past 8 hrs:   BP Temp Pulse Resp SpO2 Height Weight   21 1343 116/73  93  95 %     21 1331   92  99 %     21 1327 124/69  92       21 1243 113/65  93  97 %     21 1213 (!) 116/57  93  100 %     21 1143 135/67  (!) 102  100 %     21 1136 105/63 98.5 °F (36.9 °C) (!) 102 26 100 % 5' 11\" (1.803 m) 280 lb (127 kg)     Temp (24hrs), Av.5 °F (36.9 °C), Min:98.5 °F (36.9 °C), Max:98.5 °F (36.9 °C)    No intake/output data recorded. Physical Exam:  Constitutional: Well developed, well nourished male in no acute distress, sitting comfortably in the hospital bed. HEENT: Normocephalic and atraumatic. Oropharynx is clear, mucous membranes are moist.  Extraocular muscles are intact. Sclerae anicteric. Neck supple without JVD. No thyromegaly present. Skin Warm and dry.   No bruising and no rash noted. No erythema. No pallor. Respiratory Lungs are clear to auscultation bilaterally without wheezes, rales or rhonchi, normal air exchange without accessory muscle use. CVS Normal rate, regular rhythm and normal S1 and S2. No murmurs, gallops, or rubs. Abdomen Soft, nontender and nondistended, normoactive bowel sounds. No palpable mass. No hepatosplenomegaly. Neuro Grossly nonfocal with no obvious sensory or motor deficits. MSK Normal range of motion in general.  3+ BLE edema and tenderness. Psych Appropriate mood and affect. Labs:    Recent Results (from the past 24 hour(s))   EKG, 12 LEAD, INITIAL    Collection Time: 09/22/21 11:43 AM   Result Value Ref Range    Ventricular Rate 98 BPM    Atrial Rate 98 BPM    P-R Interval 182 ms    QRS Duration 94 ms    Q-T Interval 326 ms    QTC Calculation (Bezet) 416 ms    Calculated P Axis 53 degrees    Calculated R Axis 12 degrees    Calculated T Axis 68 degrees    Diagnosis       !! AGE AND GENDER SPECIFIC ECG ANALYSIS !! Normal sinus rhythm  Low voltage QRS  Borderline ECG  When compared with ECG of 16-SEP-2021 19:25,  MT interval has decreased  Nonspecific T wave abnormality, improved in Lateral leads  Confirmed by ST EMILY RODRIGUES MD (), RAFI ADAM ([id number]) on 9/22/2021 2:07:47 PM     CBC WITH AUTOMATED DIFF    Collection Time: 09/22/21 11:45 AM   Result Value Ref Range    .7 (HH) 4.3 - 11.1 K/uL    RBC 2.29 (L) 4.23 - 5.6 M/uL    HGB 7.6 (L) 13.6 - 17.2 g/dL    HCT 23.1 (L) 41.1 - 50.3 %    .9 (H) 79.6 - 97.8 FL    MCH 33.2 (H) 26.1 - 32.9 PG    MCHC 32.9 31.4 - 35.0 g/dL    RDW 21.0 (H) 11.9 - 14.6 %    PLATELET 16 (LL) 881 - 450 K/uL    MPV Unable to calculate. Recommend adding IPF. 9.4 - 12.3 FL    ABSOLUTE NRBC 0.05 0.0 - 0.2 K/uL    NEUTROPHILS 2 (L) 47 - 75 %    LYMPHOCYTES 96 (H) 16 - 44 %    MONOCYTES 2 (L) 3 - 9 %    ABS. NEUTROPHILS 2.3 1.7 - 8.2 K/UL    ABS. LYMPHOCYTES 111.1 (H) 0.5 - 4.6 K/UL    ABS.  MONOCYTES 2.3 (H) 0.1 - 1.3 K/UL    RBC COMMENTS RARE  SCHISTOCYTES        RBC COMMENTS OCCASIONAL  OVALOCYTES        RBC COMMENTS MODERATE  ANISOCYTOSIS + POIKILOCYTOSIS        RBC COMMENTS SLIGHT  MACROCYTOSIS        RBC COMMENTS OCCASIONAL  TEARDROP CELLS        WBC COMMENTS OCCASIONAL      PLATELET COMMENTS MARKED      DF MANUAL     METABOLIC PANEL, COMPREHENSIVE    Collection Time: 09/22/21 11:45 AM   Result Value Ref Range    Sodium 140 136 - 145 mmol/L    Potassium 4.3 3.5 - 5.1 mmol/L    Chloride 107 98 - 107 mmol/L    CO2 25 21 - 32 mmol/L    Anion gap 8 7 - 16 mmol/L    Glucose 127 (H) 65 - 100 mg/dL    BUN 22 8 - 23 MG/DL    Creatinine 1.37 0.8 - 1.5 MG/DL    GFR est AA >60 >60 ml/min/1.73m2    GFR est non-AA 55 (L) >60 ml/min/1.73m2    Calcium 9.0 8.3 - 10.4 MG/DL    Bilirubin, total 3.1 (H) 0.2 - 1.1 MG/DL    ALT (SGPT) 32 12 - 65 U/L    AST (SGOT) 49 (H) 15 - 37 U/L    Alk.  phosphatase 97 50 - 136 U/L    Protein, total 6.3 6.3 - 8.2 g/dL    Albumin 3.6 3.2 - 4.6 g/dL    Globulin 2.7 2.3 - 3.5 g/dL    A-G Ratio 1.3 1.2 - 3.5     MAGNESIUM    Collection Time: 09/22/21 11:45 AM   Result Value Ref Range    Magnesium 2.0 1.8 - 2.4 mg/dL   NT-PRO BNP    Collection Time: 09/22/21 11:45 AM   Result Value Ref Range    NT pro- (H) 5 - 125 PG/ML   TROPONIN-HIGH SENSITIVITY    Collection Time: 09/22/21 11:45 AM   Result Value Ref Range    Troponin-High Sensitivity 20.1 (H) 0 - 14 pg/mL   LACTIC ACID    Collection Time: 09/22/21  1:27 PM   Result Value Ref Range    Lactic acid 1.7 0.4 - 2.0 MMOL/L   TROPONIN-HIGH SENSITIVITY    Collection Time: 09/22/21  2:36 PM   Result Value Ref Range    Troponin-High Sensitivity 20.4 (H) 0 - 14 pg/mL       Imaging:  XR Chest Port [[id number]] Collected: 09/22/21 1159   Order Status: Completed Updated: 09/22/21 1202   Narrative:     EXAM: XR CHEST PORT   INDICATION: Syncope   COMPARISON: Chest radiograph, 9/16/2021     FINDINGS:   The cardiac silhouette is upper limits of normal in size but stable. Mediastinal   contours are within normal limits. No pleural effusion or pneumothorax. No focal   parenchymal process. No acute osseous abnormality. Impression:     Borderline cardiomegaly without acute cardiopulmonary abnormality. ASSESSMENT:  Problem List  Date Reviewed: 6/11/2020        Codes Class Noted    Body mass index (BMI) of 40.0-44.9 in adult [facility]) ICD-10-CM: Z68.41  ICD-9-CM: V85.41  6/13/2019        Admission for antineoplastic chemotherapy ICD-10-CM: Z51.11  ICD-9-CM: V58.11  1/14/2019        Prolymphocytic leukemia of T-cell (HCC) ICD-10-CM: C91.60  ICD-9-CM: 204.90  1/8/2019        Severe obesity (BMI 35.0-39. 9) ICD-10-CM: E66.01  ICD-9-CM: 278.01  9/6/2018        Benign prostatic hyperplasia with nocturia (Chronic) ICD-10-CM: N40.1, R35.1  ICD-9-CM: 600.01, 788.43  1/23/2018        Severe obstructive sleep apnea ICD-10-CM: G47.33  ICD-9-CM: 327.23  1/12/2017        Pure hypercholesterolemia (Chronic) ICD-10-CM: E78.00  ICD-9-CM: 272.0  12/22/2016        Essential hypertension, benign ICD-10-CM: I10  ICD-9-CM: 401.1  9/21/2015        Impotence ICD-10-CM: N52.9  ICD-9-CM: 607.84  9/21/2015        DDD (degenerative disc disease), cervical ICD-10-CM: M50.30  ICD-9-CM: 722.4  9/21/2015        DDD (degenerative disc disease), lumbar ICD-10-CM: M51.36  ICD-9-CM: 722.52  9/21/2015        Right shoulder pain ICD-10-CM: M25.511  ICD-9-CM: 719.41  9/21/2015        First degree hemorrhoids ICD-10-CM: K64.0  ICD-9-CM: 455.0  9/21/2015                PLAN:  Relapsed pro-lymphocytic leukemia  - Peripheral flow +relapse  - s/p BMbx 9/20, path pending  - place PICC  - check uric acid    BLE edema/pain  - Check BLE dopp  - Per Dr. Bobbi Coughlin, start colchicine for possible gout    GENTILE  - pBNP 730  - CXR with borderline cardiomegaly. No evidence of infx.     Leukocytosis / Anemia / Thrombocytopenia secondary to disease  - Transfuse prn per [facility] SOPs  - 1 unit platelets ordered for PICC line placement    Continue home meds  [facility] SOPs  AC contraindicated d/t thrombocytopenia    Disposition:  TBD pending clinical course. Jessica Brink NP   [facility]  [address] [place] [address]  Office : [phone]  Fax : [phone]   I personally saw, exammed and counselled the patient, and discussed with NP, agree with above history/assessment/plan. 77 y.o.male with T-cell prolymphocytic leukemia status post induction Campath in early 2019 with SD, referred to Dr. Georgiana Tao to discuss stem cell transplant and change to pentostatin and achieved CR, now admitted with disease relapse of leukocytosis , bone marrow biopsy 9/20/2021 pending, leg swelling and pain, rule out DVT, empiric gout treatment, check uric acid and tumor lysis labs, supportive care per SOP. Barbara Williamson M.D.   [facility] [address]  Office : [phone]  Fax : [phone]

## 2021-09-23 NOTE — PROGRESS NOTES
PICC Placement Note    PRE-PROCEDURE VERIFICATION  Correct Procedure: yes. Time out completed with assistant Emeka Ziegler RN and all persons present in agreement with time out. Correct Site:  yes  Temperature: Temp: 97.7 °F (36.5 °C), Temperature Source: Temp Source: Oral  Recent Labs     09/23/21  0503   BUN 24*   CREA 1.29   PLT 25*   .5*     Allergies: Campath [alemtuzumab]  Education materials for PICC Care given to patient or family. PROCEDURE DETAIL  A double lumen PICC line was started for chemo therapy. The following documentation is in addition to the PICC properties in the lines/airways flowsheet :  Lot #: [id number]  xylocaine used: yes  Mid-Arm Circumference: 39.5  (cm)  Internal Catheter Length: 44 (cm)  Internal Catheter Total Length: 0 (cm)  Vein Selection for PICC:right brachial  Central Line Bundle followed yes  Complication Related to Insertion: none  Both the insertion guidewire and ECG guidewire were removed intact all ports have positive blood return and were flush well with normal saline. The location of the tip of the PICC is verified using ECG technology. The tip is in the SVC per ECG reading. See image below.      Line is okay to use: yes

## 2021-09-23 NOTE — PROGRESS NOTES
Problem: Falls - Risk of  Goal: *Absence of Falls  Description: Document Davina Daleymariann Fall Risk and appropriate interventions in the flowsheet.   Outcome: Progressing Towards Goal  Note: Fall Risk Interventions:  Mobility Interventions: Communicate number of staff needed for ambulation/transfer, Patient to call before getting OOB         Medication Interventions: Evaluate medications/consider consulting pharmacy, Patient to call before getting OOB, Teach patient to arise slowly

## 2021-09-23 NOTE — PROGRESS NOTES
Problem: Mobility Impaired (Adult and Pediatric)  Goal: *Acute Goals and Plan of Care (Insert Text)  Outcome: Progressing Towards Goal  Note:     LTG:     (1.)Mr. Mary Reis will ambulate with INDEPENDENT for 650 feet with the least restrictive device within 7 treatment day(s). (2.) Mr. Mary Reis will go up and down 12 steps with SBA within 7 treatment days. ________________________________________________________________________________________________      PHYSICAL THERAPY: Initial Assessment and PM 9/23/2021  INPATIENT: PT Visit Days : 1  Payor: Edie Lezama / Plan: Yumiko Perez / Product Type: [employer] /       NAME/AGE/GENDER: Tereasa Crigler is a 77 y.o. male   PRIMARY DIAGNOSIS: Admission for antineoplastic chemotherapy [Z51.11] <principal problem not specified> <principal problem not specified>        ICD-10: Treatment Diagnosis:    Other abnormalities of gait and mobility (R26.89)   Precaution/Allergies:  Campath [alemtuzumab]      ASSESSMENT:     Mr. Mary Reis presents with decreased functional endurance due to a new onset of increased shortness of breath. He will benefit from PT here to increase gait distance as well as to insure he is independent with stairs. He ambulated with PT this afternoon for 150ft with SBA. Cueing to take standing rest breaks as well as to limit distance as needed. This section established at most recent assessment   PROBLEM LIST (Impairments causing functional limitations):  Decreased Balance  Decreased Activity Tolerance   INTERVENTIONS PLANNED: (Benefits and precautions of physical therapy have been discussed with the patient.)  Bed Mobility  Gait Training  Therapeutic Exercise/Strengthening  Transfer Training     TREATMENT PLAN: Frequency/Duration: daily until goals met  Rehabilitation Potential For Stated Goals: Excellent     REHAB RECOMMENDATIONS (at time of discharge pending progress):    Placement:   It is my opinion, based on this patient's performance to date, that Mr. Manoj Jeong may benefit from being discharged with NO further skilled therapy due to the high likelihood of returning to baseline. Equipment:   None at this time              HISTORY:   History of Present Injury/Illness (Reason for Referral):  Mr. Manoj Jeong is a 77 y.o. male admitted on 9/22/2021 with a primary diagnosis of relapsed T-cell pro-lymphocytic leukemia. His PMH includes DVT (no longer on AC), GERD, and LEE. In 12/2018 he was diagnosed with T-Cell Pro-Lymphocytic Leukemia, in 1/2019 he was started on Alemtuzumab and received it for 8 weeks and achieved a KY, in 4/2019 he was switched to Pentostatin and received 14 doses and achieved a CR. Last week, he was noted to have leukocytosis with WBC 124k/ALC 120k on PCP labs and was sent to ED. At that time he report LE edema and was noted to have b/l cervical LAD. Follow up was arranged with Dr. Citlalli Patel. Peripheral flow +relapsed T-cell pro-lymphocytic leukemia. S/p BMbx 9/20, path pending. He returned today with c/o continued swelling and pain in BLE. Also reports GENTILE. CXR with borderline cardiomegaly. pBNP 730. WBC 115k/ANC 2.3/ALC 111k, Hgb 7.6, Plt 16k. He is being admitted for further evaluation and treatment. Past Medical History/Comorbidities:   Mr. Manoj Jeong  has a past medical history of Back pain, Cervical radiculopathy, Claustrophobia, DVT (deep venous thrombosis) ([address] (06/2019), GERD (gastroesophageal reflux disease), H/O seasonal allergies, Hyperlipidemia, Impotence, Insomnia, Lateral epicondylitis, Leukemia ([address] Obesity, and Sleep apnea. Mr. Manoj Jeong  has a past surgical history that includes hx circumcision; hx colonoscopy (2017); hx wisdom teeth extraction; ir insert tunl cvc w port over 5 years (6/19/2019); hx orthopaedic (Right); and ir remove tunl cvad w port/pump (12/14/2020).   Social History/Living Environment:   Home Environment: Private residence  One/Two Story Residence: One story  Living Alone: No  Support Systems: Spouse/Significant Other (wife/Nisha [phone])  Patient Expects to be Discharged to[de-identified] House  Current DME Used/Available at Home: CPAP  Prior Level of Function/Work/Activity:  Independent prior to admit     Number of Personal Factors/Comorbidities that affect the Plan of Care: 0: LOW COMPLEXITY   EXAMINATION:   Most Recent Physical Functioning:   Gross Assessment:  AROM: Within functional limits  Strength: Within functional limits  Sensation: Intact               Posture:     Balance:  Sitting: Intact  Standing: Intact Bed Mobility:  Rolling: Independent  Supine to Sit: Independent  Sit to Supine: Independent  Scooting: Independent  Wheelchair Mobility:     Transfers:  Sit to Stand: Independent  Stand to Sit: Independent  Bed to Chair: Independent  Gait:     Speed/Crista: Pace decreased (<100 feet/min)  Gait Abnormalities: Decreased step clearance;Trunk sway increased  Distance (ft): 150 Feet (ft)  Assistive Device:  (pushing IV pole)  Ambulation - Level of Assistance: Stand-by assistance  Interventions: Verbal cues; Safety awareness training      Body Structures Involved:  None Body Functions Affected: Movement Related Activities and Participation Affected: Mobility   Number of elements that affect the Plan of Care: 1-2: LOW COMPLEXITY   CLINICAL PRESENTATION:   Presentation: Stable and uncomplicated: LOW COMPLEXITY   CLINICAL DECISION MAKING:   [facility] 6 Clicks   Basic Mobility Inpatient Short Form  How much difficulty does the patient currently have. .. Unable A Lot A Little None   1. Turning over in bed (including adjusting bedclothes, sheets and blankets)? [] 1   [] 2   [] 3   [x] 4   2. Sitting down on and standing up from a chair with arms ( e.g., wheelchair, bedside commode, etc.)   [] 1   [] 2   [] 3   [x] 4   3. Moving from lying on back to sitting on the side of the bed?    [] 1   [] 2   [] 3   [x] 4   How much help from another person does the patient currently need. .. Total A Lot A Little None   4. Moving to and from a bed to a chair (including a wheelchair)? [] 1   [] 2   [] 3   [x] 4   5. Need to walk in hospital room? [] 1   [] 2   [x] 3   [] 4   6. Climbing 3-5 steps with a railing? [] 1   [] 2   [x] 3   [] 4   © 2007, Trustees of [address] [place] [address] [address] [address], under license to [employer]. All rights reserved      Score:  Initial: 22 Most Recent: X (Date: -- )    Interpretation of Tool:  Represents activities that are increasingly more difficult (i.e. Bed mobility, Transfers, Gait). Medical Necessity:     Patient is expected to demonstrate progress in   balance and functional technique   to   increase independence with giat and transfers  . Reason for Services/Other Comments:  Patient continues to require skilled intervention due to   Limited functional endurance  . Use of outcome tool(s) and clinical judgement create a POC that gives a: Clear prediction of patient's progress: LOW COMPLEXITY            TREATMENT:   (In addition to Assessment/Re-Assessment sessions the following treatments were rendered)   Pre-treatment Symptoms/Complaints:  none  Pain: Initial:      Post Session:  0     Therapeutic Activity: (    15 minutes): Therapeutic activities including Ambulation on level ground to improve mobility, strength, and balance. Required minimal Verbal cues; Safety awareness training to promote dynamic balance in standing. assessment    Braces/Orthotics/Lines/Etc:   IV  Treatment/Session Assessment:    Response to Treatment:  SOB at rest which increases with activity but patient able to pace himself appropriatly. Interdisciplinary Collaboration:   Physical Therapist  Occupational Therapist  After treatment position/precautions:   Up in room     Compliance with Program/Exercises: Compliant all of the time, Will assess as treatment progresses  Recommendations/Intent for next treatment session:   \"Next visit will focus on advancements to more challenging activities\".   Total Treatment Duration:  PT Patient Time In/Time Out  Time In: 1610  Time Out: [address], PT

## 2021-09-23 NOTE — PROGRESS NOTES
Care Management Interventions  PCP Verified by CM: Yes (Christel Walsh, ROXANA)  Mode of Transport at Discharge: Self  Transition of Care Consult (CM Consult): Discharge Planning  Support Systems: Spouse/Significant Other (wife/Nisha [phone])  Confirm Follow Up Transport: Family  The Patient and/or Patient Representative was Provided with a Choice of Provider and Agrees with the Discharge Plan?: Yes  Freedom of Choice List was Provided with Basic Dialogue that Supports the Patient's Individualized Plan of Care/Goals, Treatment Preferences and Shares the Quality Data Associated with the Providers?: Yes  Discharge Location  Discharge Placement: Home with family assistance  Visited with pt to establish prior to admission baseline and discuss their anticipated goals at time of d/c. Pt comes in thru ED c/o SOB and leg swelling. Pt with hx of leukemia, Hx of right wrist surgery in 1991. Lives with wife, Nicole Juarez in [facility] and uses a cane only for ambulation. Has 1 step into home. His NP is Camelia Gonzalez and his pharmacy is [facility]. PT/OT ordered. Per MD, pt to have PICC line placed, needed PRBCs, receiving Lasix and DC pending clinical progression. Pt goal is to return home upon d/c with family, CM to follow.

## 2021-09-23 NOTE — PROGRESS NOTES
[facility] Hematology & Oncology        Inpatient Hematology / Oncology Progress Note    Reason for Admission:  Admission for antineoplastic chemotherapy [Z51.11]    24 Hour Events:  Afebrile, VSS, on O2 3L  Plan to start ICE  Wife at bedside    Transfusions: 1 unit PRBCs, 1 unit platelets  Replacements: None    ROS:  Constitutional: Negative for fever, chills. CV: +edema. Negative for chest pain, palpitations. Respiratory: +GENTILE. Negative for cough, wheezing. GI: Negative for nausea, abdominal pain, diarrhea. 10 point review of systems is otherwise negative with the exception of the elements mentioned above in the HPI.        Allergies   Allergen Reactions    Campath [Alemtuzumab] Other (comments)     Rigors     Past Medical History:   Diagnosis Date    Back pain     occassionally    Cervical radiculopathy     Claustrophobia     DVT (deep venous thrombosis) ([facility] [address] 06/2019    currently treated with Xarelto- started on 5/30/2019    GERD (gastroesophageal reflux disease)     H/O seasonal allergies     Hyperlipidemia     Impotence     Insomnia     Lateral epicondylitis     Leukemia (HCC)     CHEMO    Obesity     BMI 36.6    Sleep apnea     noncomplaint with CPAP     Past Surgical History:   Procedure Laterality Date    HX CIRCUMCISION      HX COLONOSCOPY  2017    Due in 2027    HX ORTHOPAEDIC Right     r wrist    HX WISDOM TEETH EXTRACTION      IR INSERT TUNL CVC W PORT OVER 5 YEARS  6/19/2019    IR REMOVE TUNL CVAD W PORT/PUMP  12/14/2020     Family History   Problem Relation Age of Onset    Cancer Mother 80        pancreatic    Cancer Father 76        breast    No Known Problems Son     Hypertension Brother     Hypertension Brother     No Known Problems Daughter      Social History     Socioeconomic History    Marital status:      Spouse name: Not on file    Number of children: Not on file    Years of education: Not on file    Highest education level: Not on file Occupational History    Not on file   Tobacco Use    Smoking status: Never Smoker    Smokeless tobacco: Never Used   Substance and Sexual Activity    Alcohol use: Not Currently     Alcohol/week: 0.0 standard drinks    Drug use: No    Sexual activity: Yes     Partners: Female   Other Topics Concern     Service Not Asked    Blood Transfusions Not Asked    Caffeine Concern Not Asked    Occupational Exposure Not Asked    Hobby Hazards Not Asked    Sleep Concern Not Asked    Stress Concern Not Asked    Weight Concern Not Asked    Special Diet Not Asked    Back Care Not Asked    Exercise Not Asked    Bike Helmet Not Asked   [address] Not Asked    Self-Exams Not Asked   Social History Narrative    Not on file     Social Determinants of Health     Financial Resource Strain:     Difficulty of Paying Living Expenses:    Food Insecurity:     Worried About Running Out of Food in the Last Year:     [address] in the Last Year:    Transportation Needs:     Lack of Transportation (Medical):      Lack of Transportation (Non-Medical):    Physical Activity:     Days of Exercise per Week:     Minutes of Exercise per Session:    Stress:     Feeling of Stress :    Social Connections:     Frequency of Communication with Friends and Family:     Frequency of Social Gatherings with Friends and Family:     Attends Gnosticist Services:     Active Member of Clubs or Organizations:     Attends Club or Organization Meetings:     Marital Status:    Intimate Partner Violence:     Fear of Current or Ex-Partner:     Emotionally Abused:     Physically Abused:     Sexually Abused:      Current Facility-Administered Medications   Medication Dose Route Frequency Provider Last Rate Last Admin    predniSONE (DELTASONE) tablet 80 mg  80 mg Oral DAILY WITH Sky Hurtado NP   80 mg at 09/23/21 1348    [START ON 9/24/2021] allopurinoL (ZYLOPRIM) tablet 300 mg  300 mg Oral DAILY Antonina Lira NP  rasburicase (ELITEK) 3 mg in 0.9% sodium chloride 50 mL IVPB  3 mg IntraVENous Jen Duran NP        perflutren lipid microspheres (DEFINITY) in NS bolus IV  1 mL IntraVENous PRN Rahul Aragon MD   1 mL at 09/23/21 1130    sodium chloride (NS) flush 5-10 mL  5-10 mL IntraVENous Q8H Ramila Comfort, PA   10 mL at 09/23/21 0471    sodium chloride (NS) flush 5-10 mL  5-10 mL IntraVENous PRN Ramila Comfort, PA   10 mL at 09/22/21 2039    docusate sodium (COLACE) capsule 100 mg  100 mg Oral QHS Seth Samm, NP   100 mg at 09/22/21 2037    atorvastatin (LIPITOR) tablet 10 mg  10 mg Oral QHS Seth Samm, NP   10 mg at 09/22/21 2038    pantoprazole (PROTONIX) tablet 40 mg  40 mg Oral ACB Seth Samm, NP   40 mg at 09/23/21 0820    0.9% sodium chloride infusion 250 mL  250 mL IntraVENous PRN Seth Quijano NP        colchicine tablet 0.6 mg  0.6 mg Oral BID Seth Quijano NP   0.6 mg at 09/23/21 0820    ondansetron (ZOFRAN) injection 4 mg  4 mg IntraVENous Q4H PRN Seth Quijano NP        prochlorperazine (COMPAZINE) with saline injection 5 mg  5 mg IntraVENous Q6H PRN Seth Quijano NP        HYDROcodone-acetaminophen (NORCO) 5-325 mg per tablet 1 Tablet  1 Tablet Oral Q6H PRN Seth Quijano NP        morphine injection 2 mg  2 mg IntraVENous Q4H PRN Seth Quijano NP        [Held by provider] olmesartan/hydroCHLOROthiazide (BENICAR HCT) 20/12.5 mg   Oral DAILY Rahul Aragon MD        acetaminophen (TYLENOL) tablet 650 mg  650 mg Oral Q6H PRN Rahul Aragon MD   650 mg at 09/23/21 0821    diphenhydrAMINE (BENADRYL) capsule 25 mg  25 mg Oral Q6H PRN Rahul Aragon MD   25 mg at 09/23/21 0821    guaiFENesin (ROBITUSSIN) 100 mg/5 mL oral liquid 100 mg  100 mg Oral Q4H PRN Rahul Aragon MD   100 mg at 09/22/21 2474       OBJECTIVE:  Patient Vitals for the past 8 hrs:   BP Temp Pulse Resp SpO2 Height Weight   09/23/21 1335 103/74     5' 11\" (1.803 m) 280 lb (127 kg)   09/23/21 1333 (!) 160/129 Kate Bradley   5' 6\" (1.676 m) 112 lb (50.8 kg)   21 1330 118/74 98 °F (36.7 °C) 93 18 100 %     21 1231 117/69 98.8 °F (37.1 °C) 91 18 100 %     21 1124 (!) 100/57 97.4 °F (36.3 °C) 93 18 97 %     21 1046 110/67 98 °F (36.7 °C) 88 18 93 %     21 1015 106/71 98.3 °F (36.8 °C) 94 18 96 %     21 0923 119/67 98.4 °F (36.9 °C) 93 18 100 %     21 0811 103/74 98.3 °F (36.8 °C) 87 18 100 %       Temp (24hrs), Av.1 °F (36.7 °C), Min:97.4 °F (36.3 °C), Max:98.8 °F (37.1 °C)    701 - 1900  In: 862.7 [P.O.:240]  Out: -     Physical Exam:  Constitutional: Well developed, well nourished male in no acute distress, sitting comfortably in the hospital bed. HEENT: Normocephalic and atraumatic. Oropharynx is clear, mucous membranes are moist.  Extraocular muscles are intact. Sclerae anicteric. Neck supple without JVD. No thyromegaly present. Skin Warm and dry. No bruising and no rash noted. No erythema. No pallor. Respiratory Lungs are clear to auscultation bilaterally without wheezes, rales or rhonchi, normal air exchange without accessory muscle use. CVS Normal rate, regular rhythm and normal S1 and S2. No murmurs, gallops, or rubs. Abdomen Soft, nontender and nondistended, normoactive bowel sounds. No palpable mass. No hepatosplenomegaly. Neuro Grossly nonfocal with no obvious sensory or motor deficits. MSK Normal range of motion in general.  3+ BLE edema and tenderness. Psych Appropriate mood and affect.         Labs:    Recent Results (from the past 24 hour(s))   TROPONIN-HIGH SENSITIVITY    Collection Time: 21  2:36 PM   Result Value Ref Range    Troponin-High Sensitivity 20.4 (H) 0 - 14 pg/mL   URIC ACID    Collection Time: 21  2:36 PM   Result Value Ref Range    Uric acid 8.6 (H) 2.6 - 6.0 MG/DL   COVID-19 RAPID TEST    Collection Time: 21  3:42 PM   Result Value Ref Range    Specimen source NASAL SWAB      COVID-19 rapid test Not detected NOTD     PLATELETS, ALLOCATE    Collection Time: 09/22/21  6:30 PM   Result Value Ref Range    Unit number [id number]     Blood component type PLTPH,LRIR,1     Unit division 00     Status of unit TRANSFUSED    URIC ACID    Collection Time: 09/23/21  5:03 AM   Result Value Ref Range    Uric acid 8.6 (H) 2.6 - 6.0 MG/DL   METABOLIC PANEL, COMPREHENSIVE    Collection Time: 09/23/21  5:03 AM   Result Value Ref Range    Sodium 140 136 - 145 mmol/L    Potassium 3.9 3.5 - 5.1 mmol/L    Chloride 107 98 - 107 mmol/L    CO2 27 21 - 32 mmol/L    Anion gap 6 (L) 7 - 16 mmol/L    Glucose 124 (H) 65 - 100 mg/dL    BUN 24 (H) 8 - 23 MG/DL    Creatinine 1.29 0.8 - 1.5 MG/DL    GFR est AA >60 >60 ml/min/1.73m2    GFR est non-AA 59 (L) >60 ml/min/1.73m2    Calcium 8.6 8.3 - 10.4 MG/DL    Bilirubin, total 2.8 (H) 0.2 - 1.1 MG/DL    ALT (SGPT) 28 12 - 65 U/L    AST (SGOT) 42 (H) 15 - 37 U/L    Alk. phosphatase 90 50 - 136 U/L    Protein, total 5.6 (L) 6.3 - 8.2 g/dL    Albumin 3.3 3.2 - 4.6 g/dL    Globulin 2.3 2.3 - 3.5 g/dL    A-G Ratio 1.4 1.2 - 3.5     MAGNESIUM    Collection Time: 09/23/21  5:03 AM   Result Value Ref Range    Magnesium 2.0 1.8 - 2.4 mg/dL   CBC WITH AUTOMATED DIFF    Collection Time: 09/23/21  5:03 AM   Result Value Ref Range    .5 (HH) 4.3 - 11.1 K/uL    RBC 1.97 (L) 4.23 - 5.6 M/uL    HGB 6.6 (LL) 13.6 - 17.2 g/dL    HCT 19.9 (L) 41.1 - 50.3 %    .0 (H) 79.6 - 97.8 FL    MCH 33.5 (H) 26.1 - 32.9 PG    MCHC 33.2 31.4 - 35.0 g/dL    RDW 20.6 (H) 11.9 - 14.6 %    PLATELET 25 (LL) 052 - 450 K/uL    MPV 13.2 (H) 9.4 - 12.3 FL    ABSOLUTE NRBC 0.03 0.0 - 0.2 K/uL    NEUTROPHILS 3 (L) 47 - 75 %    LYMPHOCYTES 96 (H) 16 - 44 %    MONOCYTES 1 (L) 3 - 9 %    ABS. NEUTROPHILS 3.6 1.7 - 8.2 K/UL    ABS. LYMPHOCYTES 115.7 (H) 0.5 - 4.6 K/UL    ABS.  MONOCYTES 1.2 0.1 - 1.3 K/UL    RBC COMMENTS MODERATE  ANISOCYTOSIS + POIKILOCYTOSIS        RBC COMMENTS OCCASIONAL  TEARDROP CELLS RBC COMMENTS OCCASIONAL  OVALOCYTES        RBC COMMENTS SLIGHT  MACROCYTOSIS        WBC COMMENTS MARKED      PLATELET COMMENTS MARKED      DF MANUAL     LD    Collection Time: 09/23/21  5:03 AM   Result Value Ref Range     (H) 110 - 210 U/L   RBC, ALLOCATE    Collection Time: 09/23/21  6:00 AM   Result Value Ref Range    HISTORY CHECKED?  Historical check performed    PLATELETS, ALLOCATE    Collection Time: 09/23/21  6:00 AM   Result Value Ref Range    Unit number [id number]     Blood component type PLTPH,LRIR,2     Unit division 00     Status of unit ISSUED    TYPE & SCREEN    Collection Time: 09/23/21  6:39 AM   Result Value Ref Range    Crossmatch Expiration 09/26/2021,2359     ABO/Rh(D) A POSITIVE     Antibody screen NEG     Unit number [id number]     Blood component type RC LRIR     Unit division 00     Status of unit ISSUED     Crossmatch result Compatible    ECHO ADULT COMPLETE    Collection Time: 09/23/21 12:15 PM   Result Value Ref Range    Left Atrium Minor Axis 2.67 cm    Left Atrium Major Axis 6.50 cm    LA Area 2C 23.30 cm2    LA Area 4C 25.20 cm2    LV ED Vol A4C 130.00 cm3    LV ES Vol A4C 39.00 cm3    IVSd 1.20 (A) 0.60 - 1.00 cm    LVIDd 5.11 4.20 - 5.90 cm    LVIDs 3.25 cm    LVOT d 2.20 cm    LVOT VTI 33.70 cm    LVOT Peak Gradient 14.00 mmHg    LVPWd 1.10 (A) 0.60 - 1.00 cm    LV E' Lateral Velocity 7.31 cm/s    LV E' Septal Velocity 9.75 cm/s    AV Cusp 2.10 cm    Aortic Valve Systolic Mean Gradient 2.83 mmHg    AoV VTI 38.40 cm    Aortic Valve Systolic Peak Velocity 936.78 cm/s    AoV PG 15.00 mmHg    Aortic Valve Area by Continuity of VTI 3.33 cm2    Aortic Valve Area by Continuity of Peak Velocity 3.62 cm2    Mitral Valve E Wave Deceleration Time 176.00 ms    MV A Brooks 104.00 cm/s    MV E Brooks 81.40 cm/s    MV Mean Gradient 2.00 mmHg    Mitral Valve Annulus Velocity Time Integral 22.60 cm    Est. RA Pressure 8.00 mmHg    RVIDd 3.08 cm    TR Max Velocity 324.00 cm/s    Triscuspid Valve Regurgitation Peak Gradient 57.0 mmHg    Tapse 3.40 (A) 1.50 - 2.00 cm    MV E/A 0.78     LV Mass .1 88.0 - 224.0 g    LV Mass AL Index 93.9 49.0 - 115.0 g/m2    E/E' lateral 11.14     E/E' septal 8.35     RVSP 65.0 mmHg    E/E' ratio (averaged) 9.74     ALVINA/BSA Pk Brooks 1.5 cm2/m2    ALVINA/BSA VTI 1.4 cm2/m2       Imaging:  XR Chest Port [[id number]] Collected: 09/22/21 1159   Order Status: Completed Updated: 09/22/21 1202   Narrative:     EXAM: XR CHEST PORT   INDICATION: Syncope   COMPARISON: Chest radiograph, 9/16/2021     FINDINGS:   The cardiac silhouette is upper limits of normal in size but stable. Mediastinal   contours are within normal limits. No pleural effusion or pneumothorax. No focal   parenchymal process. No acute osseous abnormality. Impression:     Borderline cardiomegaly without acute cardiopulmonary abnormality. DUPLEX LOWER EXT VENOUS BILAT [[id number]] Collected: 09/22/21 2026   Order Status: Completed Updated: 09/22/21 2029   Narrative:     BILATERAL LOWER EXTREMITY VENOUS DUPLEX ULTRASOUND. HISTORY:  Pain. TECHNIQUE: Direct skin-contact high resolution grayscale images, color-flow   Doppler and duplex waveform analysis. FINDINGS: There is compressibility, color-flow assignment and augmentation of   the venous waveform with calf compression in the common femoral, superficial   femoral and popliteal veins. Upper calf veins are unremarkable    Impression:     Negative for sonographic evidence of deep venous thrombosis   bilateral lower extremity. ASSESSMENT:  Problem List  Date Reviewed: 6/11/2020        Codes Class Noted    Body mass index (BMI) of 40.0-44.9 in adult [facility]) ICD-10-CM: Z68.41  ICD-9-CM: V85.41  6/13/2019        Admission for antineoplastic chemotherapy ICD-10-CM: Z51.11  ICD-9-CM: V58.11  1/14/2019        Prolymphocytic leukemia of T-cell (HCC) ICD-10-CM: C91.60  ICD-9-CM: 204.90  1/8/2019        Severe obesity (BMI 35.0-39. 9) ICD-10-CM: E66.01  ICD-9-CM: 278.01  9/6/2018        Benign prostatic hyperplasia with nocturia (Chronic) ICD-10-CM: N40.1, R35.1  ICD-9-CM: 600.01, 788.43  1/23/2018        Severe obstructive sleep apnea ICD-10-CM: G47.33  ICD-9-CM: 327.23  1/12/2017        Pure hypercholesterolemia (Chronic) ICD-10-CM: E78.00  ICD-9-CM: 272.0  12/22/2016        Essential hypertension, benign ICD-10-CM: I10  ICD-9-CM: 401.1  9/21/2015        Impotence ICD-10-CM: N52.9  ICD-9-CM: 607.84  9/21/2015        DDD (degenerative disc disease), cervical ICD-10-CM: M50.30  ICD-9-CM: 722.4  9/21/2015        DDD (degenerative disc disease), lumbar ICD-10-CM: M51.36  ICD-9-CM: 722.52  9/21/2015        Right shoulder pain ICD-10-CM: M25.511  ICD-9-CM: 719.41  9/21/2015        First degree hemorrhoids ICD-10-CM: K64.0  ICD-9-CM: 455.0  9/21/2015            Mr. Latrice Fernandez is a 77 y.o. male admitted on 9/22/2021 with a primary diagnosis of relapsed T-cell pro-lymphocytic leukemia. His PMH includes DVT (no longer on AC), GERD, and LEE. In 12/2018 he was diagnosed with T-Cell Pro-Lymphocytic Leukemia, in 1/2019 he was started on Alemtuzumab and received it for 8 weeks and achieved a KY, in 4/2019 he was switched to Pentostatin and received 14 doses and achieved a CR. Last week, he was noted to have leukocytosis with WBC 124k/ALC 120k on PCP labs and was sent to ED. At that time he report LE edema and was noted to have b/l cervical LAD. Follow up was arranged with Dr. Arletha Pallas. Peripheral flow +relapsed T-cell pro-lymphocytic leukemia. S/p BMbx 9/20, path pending. He returned today with c/o continued swelling and pain in BLE. Also reports GENTILE. CXR with borderline cardiomegaly. pBNP 730. WBC 115k/ANC 2.3/ALC 111k, Hgb 7.6, Plt 16k. He is being admitted for further evaluation and treatment. PLAN:  Relapsed pro-lymphocytic leukemia  - Peripheral flow +relapse  - s/p BMbx 9/20, path pending  - place PICC  - check uric acid  9/23 BMbx path pending.   Discussed treatment plan with Dr. Danell Felty, start ICE today. Uric acid 8.6. Allopurinol and rasburicase ordered. BLE edema/pain  - Check BLE dopp  - Per Dr. Jonatan Lackey, start colchicine for possible gout  9/23 BLE dopp neg. pBNP 730. Lasix ordered. Echo pending. Prednisone 80mg daily ordered. GENTILE  - pBNP 730  - CXR with borderline cardiomegaly. No evidence of infx. 9/23 Lasix ordered. Echo pending. Leukocytosis / Anemia / Thrombocytopenia secondary to disease  - Transfuse prn per Juan Jose SOPs  - 1 unit platelets ordered for PICC line placement  9/23 Transfuse 1 unit PRBCs and plt    Hypotension  - hold amlodipine  9/23 Pt normotensive. Con't to hold amlodipine. Continue home meds  Juan Jose SOPs  AC contraindicated d/t thrombocytopenia    Disposition:  TBD pending clinical course. Columba Petty NP   [facility] Hematology & Oncology  [address] [place] [address]  Office : [phone]  Fax : [phone]   I personally saw, exammed and counselled the patient, and discussed with NP, agree with above history/assessment/plan. 77 y.o.male with T-cell prolymphocytic leukemia status post induction Campath in early 2019 with ME, referred to Dr. Danell Felty to discuss stem cell transplant and change to pentostatin and achieved CR, now admitted with disease relapse of leukocytosis , bone marrow biopsy 9/20/2021 pending, leg swelling and pain, rule out DVT, empiric gout treatment, check uric acid and tumor lysis labs,rule out CHF, discuss with Dr. Danell Felty and plan for ICE, supportive care per George Morocho M.D.   [address]  Office : [phone]  Fax : [phone]

## 2021-09-23 NOTE — PROGRESS NOTES
Problem: Self Care Deficits Care Plan (Adult)  Goal: *Acute Goals and Plan of Care (Insert Text)  Outcome: Resolved/Met  Note:   Pt was (I) with ADL's and ambulated short distances. OCCUPATIONAL THERAPY: Initial Assessment, Discharge, and PM 9/23/2021  INPATIENT:    Payor: Enid Caceres / Plan: Willis Dupont / Product Type: Managed Care Medicare /      NAME/AGE/GENDER: Jovani Sweeney is a 77 y.o. male   PRIMARY DIAGNOSIS:  Admission for antineoplastic chemotherapy [Z51.11] <principal problem not specified> <principal problem not specified>        ICD-10: Treatment Diagnosis:    Generalized Muscle Weakness (M62.81)   Precautions/Allergies:     Campath [alemtuzumab]      ASSESSMENT:     Mr. Lulu Paula presents with above diagnosis. Pt was up in room upon entry. Pt independent with self care and household distance ambulation, noted SOB. PT will follow for walking endurance. No further OT warranted at this time. OCCUPATIONAL PROFILE AND HISTORY:   History of Present Injury/Illness (Reason for Referral):  See H&P  Past Medical History/Comorbidities:   Mr. Lulu Paula  has a past medical history of Back pain, Cervical radiculopathy, Claustrophobia, DVT (deep venous thrombosis) ([facility] [address] (06/2019), GERD (gastroesophageal reflux disease), H/O seasonal allergies, Hyperlipidemia, Impotence, Insomnia, Lateral epicondylitis, Leukemia ([facility] [address] Obesity, and Sleep apnea. Mr. Lulu Paula  has a past surgical history that includes hx circumcision; hx colonoscopy (2017); hx wisdom teeth extraction; ir insert tunl cvc w port over 5 years (6/19/2019); hx orthopaedic (Right); and ir remove tunl cvad w port/pump (12/14/2020).   Social History/Living Environment:   Home Environment: Private residence  One/Two Story Residence: One story  Living Alone: No  Support Systems: Spouse/Significant Other (wife/Nisha [phone])  Patient Expects to be Discharged to[de-identified] House  Current DME Used/Available at Home: CPAP  Prior Level of Function/Work/Activity:  Independent with all activities of daily living to include driving. Number of Personal Factors/Comorbidities that affect the Plan of Care: Brief history (0):  LOW COMPLEXITY   ASSESSMENT OF OCCUPATIONAL PERFORMANCE[de-identified]   Activities of Daily Living:   Basic ADLs (From Assessment) Complex ADLs (From Assessment)   Feeding: Independent  Oral Facial Hygiene/Grooming: Independent  Bathing: Independent  Upper Body Dressing: Independent  Lower Body Dressing: Independent  Toileting: Independent     Grooming/Bathing/Dressing Activities of Daily Living     Cognitive Retraining  Safety/Judgement: Awareness of environment                 Functional Transfers  Bathroom Mobility: Independent  Toilet Transfer : Independent  Shower Transfer: Independent     Bed/Mat Mobility  Sit to Stand: Independent  Stand to Sit: Independent  Bed to Chair: Independent     Most Recent Physical Functioning:   Gross Assessment:                  Posture:     Balance:  Sitting: Intact  Standing: Intact Bed Mobility:     Wheelchair Mobility:     Transfers:  Sit to Stand: Independent  Stand to Sit: Independent  Bed to Chair: Independent            Patient Vitals for the past 6 hrs:   BP BP Patient Position SpO2 Pulse   09/23/21 1124 (!) 100/57 Supine 97 % 93   09/23/21 1231 117/69 -- 100 % 91   09/23/21 1330 118/74 -- 100 % 93   09/23/21 1333 (!) 160/129 -- -- --   09/23/21 1335 103/74 -- -- --   09/23/21 1524 128/61 Supine 91 % 96       Mental Status  Neurologic State: Alert  Orientation Level: Oriented X4  Cognition: Appropriate decision making  Perception: Appears intact  Perseveration: No perseveration noted  Safety/Judgement: Awareness of environment                          Physical Skills Involved:   Activity Tolerance Cognitive Skills Affected (resulting in the inability to perform in a timely and safe manner):  none Psychosocial Skills Affected:  none   Number of elements that affect the Plan of Care: 1-3: LOW COMPLEXITY   CLINICAL DECISION MAKIN[address] [place] [address] AM-PAC 6 Clicks   Daily Activity Inpatient Short Form  How much help from another person does the patient currently need. .. Total A Lot A Little None   1. Putting on and taking off regular lower body clothing? [] 1   [] 2   [] 3   [x] 4   2. Bathing (including washing, rinsing, drying)? [] 1   [] 2   [] 3   [x] 4   3. Toileting, which includes using toilet, bedpan or urinal?   [] 1   [] 2   [] 3   [x] 4   4. Putting on and taking off regular upper body clothing? [] 1   [] 2   [] 3   [x] 4   5. Taking care of personal grooming such as brushing teeth? [] 1   [] 2   [] 3   [x] 4   6. Eating meals? [] 1   [] 2   [] 3   [x] 4   © , Trustees of [address] [place] [address], under license to [employer]. All rights reserved      Score:  Initial: 24 Most Recent: X (Date: -- )    Interpretation of Tool:  Represents activities that are increasingly more difficult (i.e. Bed mobility, Transfers, Gait). Use of outcome tool(s) and clinical judgement create a POC that gives a: LOW COMPLEXITY         TREATMENT:   (In addition to Assessment/Re-Assessment sessions the following treatments were rendered)     Pre-treatment Symptoms/Complaints:    Pain: Initial:     0 Post Session:  0     Assessment/Reassessment only, no treatment provided today    Braces/Orthotics/Lines/Etc:   IV  O2 Device: None (Room air)  Treatment/Session Assessment:    Response to Treatment:  pt up in room tolerated well  Interdisciplinary Collaboration:   Physical Therapist  Occupational Therapist  Registered Nurse  After treatment position/precautions:   Standing at bedside    Compliance with Program/Exercises: Compliant all of the time.     Total Treatment Duration:  OT Patient Time In/Time Out  Time In: Chandra Cordova  Time Out: [address] [facility],

## 2021-09-24 NOTE — DISCHARGE SUMMARY
[facility] Hematology & Oncology: Inpatient Hematology / Oncology Discharge Summary Note    Patient ID:  Citlali Remy  [id number]  77 y.o.  1955    Admit Date: 9/22/2021    Discharge Date: 9/26/2021    Admission Diagnoses: Admission for antineoplastic chemotherapy [Z51.11]    Discharge Diagnoses:  Principal Diagnosis: <principal problem not specified>  Active Problems:    Admission for antineoplastic chemotherapy (1/14/2019)        Hospital Course:  Mr. Hugh Leach is a 77 y.o. male admitted on 9/22/2021 with a primary diagnosis of relapsed T-cell pro-lymphocytic leukemia. His PMH includes DVT (no longer on AC), GERD, and LEE. In 12/2018 he was diagnosed with T-Cell Pro-Lymphocytic Leukemia, in 1/2019 he was started on Alemtuzumab and received it for 8 weeks and achieved a IA, in 4/2019 he was switched to Pentostatin and received 14 doses and achieved a CR. Last week, he was noted to have leukocytosis with WBC 124k/ALC 120k on PCP labs and was sent to ED. At that time he report LE edema and was noted to have b/l cervical LAD. Follow up was arranged with Dr. Michael Andres. Peripheral flow +relapsed T-cell pro-lymphocytic leukemia. S/p BMbx 9/20, path pending. He returned today with c/o continued swelling and pain in BLE. Also reports GENTILE. CXR with borderline cardiomegaly. pBNP 730. WBC 115k/ANC 2.3/ALC 111k, Hgb 7.6, Plt 16k. He was admitted for further evaluation and treatment. GENTILE/LE edema/pain improved with Lasix. He is s/p C1 ICE. He tolerated treatment well. He did require several doses lasix for fluid overload. He will continue Lasix 20mg po daily x 3 days upon discharge. He has ongoing cough, relieved with Hycodan which will be prescribed at discharge along with Combivent per Dr. Madelin Esposito. CXR neg. He is feeling better and is ready for discharge home with home health services as recommended by PT. He is scheduled to f/u with Dr. Michael Andres as well as Fred Franklin on 9/29.    He needs to continue holding his antihypertensive medication until further advised d/t normotensive/lower BPs. He will receive 1 unit PRBCs and 1 unit platelets prior to discharging home. Advised to call with fever, chills, uncontrollable symptoms, or with any other concerns. Relapsed pro-lymphocytic leukemia  - Peripheral flow +relapse  - s/p BMbx 9/20, path pending  - place PICC  - check uric acid  9/23 BMbx path pending. Discussed treatment plan with Dr. Antonia Baltazar, start ICE today. Uric acid 8.6. Allopurinol and rasburicase ordered. 9/24 C1D2 ICE. Tolerating treatment well. Uric acid down to 5.9.  9/25 Day 3 ICE. Tolerating well.     BLE edema/pain  - Check BLE dopp  - Per Dr. Anton Borrego, start colchicine for possible gout  9/23 BLE dopp neg. pBNP 730. Lasix ordered. Echo pending. Prednisone 80mg daily ordered. 9/24 Prednisone held as pt on Dex per treatment plan.      GENTILE / Cough  - pBNP 730  - CXR with borderline cardiomegaly. No evidence of infx. 9/23 Lasix ordered. Echo pending. 9/24 Now on RA. Echo with EF 55-60% with mild pulmonary HTN. Cough meds ordered. Repeat Lasix today. 9/25 Remains on RA. Repeat Lasix.     Leukocytosis / Anemia / Thrombocytopenia secondary to disease  - Transfuse prn per Juan Jose SOPs  9/25 Hgb 6.7. Hemolysis noted with hapto <8. Check JEREMI and smear. Transfuse 1 unit PRBCs.  9/26 JEREMI neg. Smear pending. Transfusing 1 unit PRBCs and platelets today.     Hypotension  - hold amlodipine  9/23 Pt normotensive. Con't to hold amlodipine.     Hyperbilirubinemia  9/24 Tbili 3.0 with direct 1.5. Check hemolysis labs. 9/25 Tbili 2.7, hemolysis noted     RUE edema  9/25 Check doppler  9/26 RUE dopp neg.     Consults:  None    Pertinent Diagnostic Studies:   Labs:    Recent Labs     09/26/21  0400 09/25/21  0321 09/24/21  0254   WBC 35.6* 52.8* 110.7*   HGB 7.7* 6.7* 7.5*   PLT 12* 23* 37*   ANEU 0.7* 2.1 3.3      Recent Labs     09/26/21  0400 09/25/21  0321 09/25/21  0312 09/24/21  0254    139  -- 138   K 3.7 4.1  --  4.3   * 108*  --  106   CO2 24 23  --  24   * 179*  --  175*   BUN 31* 31*  --  22   CREA 1.38 1.26  --  1.25   CA 8.3 7.5*  --  8.5   AP 85 92  --  110   TP 6.3 6.2*  --  6.2*   ALB 3.8 3.5  --  3.6   MG 2.2 2.3  --  2.3   PHOS  --   --  3.3  --        Imaging:  DUPLEX LOWER EXT VENOUS BILAT [[id number]] Collected: 09/22/21 2026   Order Status: Completed Updated: 09/22/21 2029   Narrative:     BILATERAL LOWER EXTREMITY VENOUS DUPLEX ULTRASOUND. HISTORY:  Pain. TECHNIQUE: Direct skin-contact high resolution grayscale images, color-flow   Doppler and duplex waveform analysis. FINDINGS: There is compressibility, color-flow assignment and augmentation of   the venous waveform with calf compression in the common femoral, superficial   femoral and popliteal veins. Upper calf veins are unremarkable    Impression:     Negative for sonographic evidence of deep venous thrombosis   bilateral lower extremity. XR Chest Port [[id number]] Collected: 09/22/21 1159   Order Status: Completed Updated: 09/22/21 1202   Narrative:     EXAM: XR CHEST PORT   INDICATION: Syncope   COMPARISON: Chest radiograph, 9/16/2021     FINDINGS:   The cardiac silhouette is upper limits of normal in size but stable. Mediastinal   contours are within normal limits. No pleural effusion or pneumothorax. No focal   parenchymal process. No acute osseous abnormality. Impression:     Borderline cardiomegaly without acute cardiopulmonary abnormality. Current Discharge Medication List      START taking these medications    Details   ipratropium-albuteroL (Combivent Respimat)  mcg/actuation inhaler Take 1 Puff by inhalation every six (6) hours as needed for Wheezing or Shortness of Breath. Qty: 4 g, Refills: 0  Start date: 9/26/2021      calcium carbonate (OS-DANIELA) 500 mg calcium (1,250 mg) tablet Take 1 Tablet by mouth three (3) times daily (with meals).   Qty: 21 Tablet, Refills: 0  Start date: 9/26/2021      HYDROcodone-homatropine (HYCODAN) 5-1.5 mg/5 mL (5 mL) oral solution Take 5 mL by mouth every four (4) hours as needed for Cough for up to 5 days. Max Daily Amount: 30 mL. Qty: 150 mL, Refills: 0  Start date: 9/26/2021, End date: 10/1/2021    Associated Diagnoses: Cough      acyclovir (ZOVIRAX) 400 mg tablet Take 1 Tablet by mouth two (2) times a day. Qty: 60 Tablet, Refills: 0  Start date: 9/24/2021      allopurinoL (ZYLOPRIM) 300 mg tablet Take 1 Tablet by mouth daily. Qty: 30 Tablet, Refills: 0  Start date: 9/25/2021      fluconazole (DIFLUCAN) 200 mg tablet Take 1 Tablet by mouth daily. FDA advises cautious prescribing of oral fluconazole in pregnancy. Qty: 30 Tablet, Refills: 0  Start date: 9/25/2021      ondansetron (Zofran ODT) 8 mg disintegrating tablet Take 1 Tablet by mouth every eight (8) hours as needed for Nausea or Vomiting. Qty: 90 Tablet, Refills: 0  Start date: 9/24/2021      prochlorperazine (Compazine) 10 mg tablet Take 0.5-1 Tablets by mouth every six (6) hours as needed for Nausea or Vomiting. Qty: 60 Tablet, Refills: 0  Start date: 9/24/2021         CONTINUE these medications which have CHANGED    Details   furosemide (Lasix) 20 mg tablet Take 1 Tablet by mouth daily. Qty: 3 Tablet, Refills: 0  Start date: 9/26/2021         CONTINUE these medications which have NOT CHANGED    Details   omeprazole (PRILOSEC) 40 mg capsule TAKE 1 CAPSULE BY MOUTH DAILY      lovastatin (MEVACOR) 10 mg tablet Take 1 Tab by mouth nightly. Qty: 90 Tab, Refills: 3    Associated Diagnoses: Pure hypercholesterolemia      Cetirizine (ZYRTEC) 10 mg cap Take 10 mg by mouth. docusate sodium (COLACE) 100 mg capsule Take 100 mg by mouth nightly. VITAMIN A/VITAMIN D3 (NATURAL VITAMIN D PO) Take 1 Tab by mouth daily. Comp. Stocking,Knee,Regular,Lrg misc 2 Each by Does Not Apply route daily.   Qty: 2 Each, Refills: 0    Comments: Calf diameter 17 inches close to knee, and 11.5 inches close to ankle.  Associated Diagnoses: Leg swelling; Dyspnea on exertion      lidocaine-prilocaine (EMLA) topical cream Apply  to affected area as needed for Pain. Apply to port site 45-60 minutes prior to lab appt or infusion  Qty: 30 g, Refills: 0      multivitamin (ONE A DAY) tablet Take 1 Tab by mouth daily. DOCOSAHEXANOIC ACID/EPA (FISH OIL PO) Take 1 Cap by mouth daily. STOP taking these medications       hyoscyamine SL (Levsin/SL) 0.125 mg SL tablet Comments:   Reason for Stopping:         olmesartan-hydroCHLOROthiazide (Benicar HCT) 20-12.5 mg per tablet Comments:   Reason for Stopping:         hydrocortisone (ANUSOL-HC) 2.5 % rectal cream Comments:   Reason for Stopping:             I have reviewed the patient's controlled substance prescription history, as maintained in the [place] prescription monitoring program, so that the prescriptions(s) for a controlled substance can be given. OBJECTIVE:  Patient Vitals for the past 8 hrs:   BP Temp Pulse Resp SpO2 Weight   21 1154 115/80 97.5 °F (36.4 °C) 75 18 100 % --   21 1139 112/70 97.9 °F (36.6 °C) 82 17 98 % --   21 0928 -- -- -- -- -- 291 lb (132 kg)   21 0718 100/65 97.5 °F (36.4 °C) 84 18 98 % --     Temp (24hrs), Av.8 °F (36.6 °C), Min:97.5 °F (36.4 °C), Max:98.1 °F (36.7 °C)     0701 -  1900  In: 300   Out: -     Physical Exam:  Constitutional: Well developed, well nourished male in no acute distress, sitting comfortably in the hospital bed. HEENT: Normocephalic and atraumatic. Oropharynx is clear, mucous membranes are moist.  Extraocular muscles are intact. Sclerae anicteric. Neck supple without JVD. No thyromegaly present. Skin Warm and dry. No bruising and macular rash on knees bilaterally (improved per pt). No erythema. No pallor. Respiratory Lungs are clear to auscultation bilaterally without wheezes, rales or rhonchi, normal air exchange without accessory muscle use.     CVS Normal rate, regular rhythm and normal S1 and S2. No murmurs, gallops, or rubs. Abdomen Soft, nontender and nondistended, normoactive bowel sounds. No palpable mass. No hepatosplenomegaly. Neuro Grossly nonfocal with no obvious sensory or motor deficits. MSK Normal range of motion in general.  3+ BLE edema and tenderness. Psych Appropriate mood and affect.           ASSESSMENT:    Active Problems:    Admission for antineoplastic chemotherapy (1/14/2019)        DISPOSITION:  Follow-up Appointments   Procedures    FOLLOW UP VISIT Appointment in: Other (Specify) Please schedule follow up appt with Dr. Farshad Collins and infusion appt for Udenyca on 9/28     Please schedule follow up appt with Dr. Farshad Collins and infusion appt for Franck Hardy on 9/28     Standing Status:   Standing     Number of Occurrences:   1     Order Specific Question:   Appointment in     Answer: Other (Specify)       Over 60 minutes was spent in discharge planning and coordination of care for this patient on this day.             Ronny Schaumann, NP  [facility]  [address] [place] [address]  Office : [phone]  Fax : [phone]   I personally saw, exammed and counselled the patient, and discussed with NP, agree with above history/assessment/plan. 66 y.o.male with T-cell prolymphocytic leukemia status post induction Campath in early 2019 with FL, referred to Dr. Farshad Collins to discuss stem cell transplant and change to pentostatin and achieved CR, now admitted with disease relapse of leukocytosis , bone marrow biopsy 9/20/2021 pending, leg swelling and pain, rule out DVT, empiric gout treatment, check uric acid and tumor lysis labs, rasburicase given, ruled out CHF, discuss with Dr. Farshad Collins and started ICE, educated for toxicity and monitor aspiration/AMS,  completed without major complication but very tired, give PRBC and platelet, DC with low-dose of Lasix daily and prophylactic antibiotics/antiviral, inhalers and cough syrup, follow closely in office for supportive care. Kayden Means M.D.   [address], [address] [place] [address]  Office : [phone]  Fax : [phone]

## 2021-09-24 NOTE — PROGRESS NOTES
[facility] Hematology & Oncology        Inpatient Hematology / Oncology Progress Note    Reason for Admission:  Admission for antineoplastic chemotherapy [Z51.11]    24 Hour Events:  Afebrile, VSS, on RA  Day 2 ICE  Tolerating treatment well  Wife at bedside    Transfusions: None  Replacements: None    ROS:  Constitutional: Negative for fever, chills. CV: +edema. Negative for chest pain, palpitations. Respiratory: +GENTILE, cough. Negative for wheezing. GI: Negative for nausea, abdominal pain, diarrhea. 10 point review of systems is otherwise negative with the exception of the elements mentioned above in the HPI.        Allergies   Allergen Reactions    Campath [Alemtuzumab] Other (comments)     Rigors     Past Medical History:   Diagnosis Date    Back pain     occassionally    Cervical radiculopathy     Claustrophobia     DVT (deep venous thrombosis) ([facility] [address] 06/2019    currently treated with Xarelto- started on 5/30/2019    GERD (gastroesophageal reflux disease)     H/O seasonal allergies     Hyperlipidemia     Impotence     Insomnia     Lateral epicondylitis     Leukemia (HCC)     CHEMO    Obesity     BMI 36.6    Sleep apnea     noncomplaint with CPAP     Past Surgical History:   Procedure Laterality Date    HX CIRCUMCISION      HX COLONOSCOPY  2017    Due in 2027    HX ORTHOPAEDIC Right     r wrist    HX WISDOM TEETH EXTRACTION      IR INSERT TUNL CVC W PORT OVER 5 YEARS  6/19/2019    IR REMOVE TUNL CVAD W PORT/PUMP  12/14/2020     Family History   Problem Relation Age of Onset    Cancer Mother 80        pancreatic    Cancer Father 76        breast    No Known Problems Son     Hypertension Brother     Hypertension Brother     No Known Problems Daughter      Social History     Socioeconomic History    Marital status:      Spouse name: Not on file    Number of children: Not on file    Years of education: Not on file    Highest education level: Not on file   Occupational History    Not on file   Tobacco Use    Smoking status: Never Smoker    Smokeless tobacco: Never Used   Substance and Sexual Activity    Alcohol use: Not Currently     Alcohol/week: 0.0 standard drinks    Drug use: No    Sexual activity: Yes     Partners: Female   Other Topics Concern     Service Not Asked    Blood Transfusions Not Asked    Caffeine Concern Not Asked    Occupational Exposure Not Asked    Hobby Hazards Not Asked    Sleep Concern Not Asked    Stress Concern Not Asked    Weight Concern Not Asked    Special Diet Not Asked    Back Care Not Asked    Exercise Not Asked    Bike Helmet Not Asked   [address] Not Asked    Self-Exams Not Asked   Social History Narrative    Not on file     Social Determinants of Health     Financial Resource Strain:     Difficulty of Paying Living Expenses:    Food Insecurity:     Worried About Running Out of Food in the Last Year:     [address] in the Last Year:    Transportation Needs:     Lack of Transportation (Medical):      Lack of Transportation (Non-Medical):    Physical Activity:     Days of Exercise per Week:     Minutes of Exercise per Session:    Stress:     Feeling of Stress :    Social Connections:     Frequency of Communication with Friends and Family:     Frequency of Social Gatherings with Friends and Family:     Attends Church Services:     Active Member of Clubs or Organizations:     Attends Club or Organization Meetings:     Marital Status:    Intimate Partner Violence:     Fear of Current or Ex-Partner:     Emotionally Abused:     Physically Abused:     Sexually Abused:      Current Facility-Administered Medications   Medication Dose Route Frequency Provider Last Rate Last Admin    [Held by provider] predniSONE (DELTASONE) tablet 80 mg  80 mg Oral DAILY WITH Estee Craig NP   80 mg at 09/23/21 1348    allopurinoL (ZYLOPRIM) tablet 300 mg  300 mg Oral DAILY Nayely Doherty NP       [facility] dexamethasone (DECADRON) 10 mg/mL injection 10 mg  10 mg IntraVENous Q24H Pavel Loaiza MD   10 mg at 09/23/21 1756    prochlorperazine (COMPAZINE) with saline injection 10 mg  10 mg IntraVENous Q6H PRN Pavel Loaiza MD        diphenhydrAMINE (BENADRYL) injection 25 mg  25 mg IntraVENous Q6H PRN Pavel Loaiza MD        LORazepam (ATIVAN) injection 0.5 mg  0.5 mg IntraVENous Q6H PRN Pavel Loaiza MD        etoposide (VEPESID) 219 mg in 0.9% sodium chloride 500 mL chemo infusion  100 mg/m2 (Treatment Plan Adjusted) IntraVENous Q24H Pavel Loaiza  mL/hr at 09/23/21 1825 219 mg at 09/23/21 1825    CARBOplatin (PARAPLATIN) 631 mg in 0.9% sodium chloride 250 mL chemo infusion  631 mg IntraVENous ONCE Pavel Loaiza MD        ifosfamide (IFEX) 10,950 mg, mesna (MESNEX) 10,950 mg in 0.9% sodium chloride 1,000 mL chemo infusion  5,000 mg/m2 (Treatment Plan Adjusted) IntraVENous CONTINUOUS Pavel Loaiza MD        0.9% sodium chloride infusion  50 mL/hr IntraVENous CONTINUOUS Cris Matthews NP 50 mL/hr at 09/23/21 1500 50 mL/hr at 09/23/21 1500    ondansetron (ZOFRAN) injection 8 mg  8 mg IntraVENous Q8H Yumiko Aparicio MD   8 mg at 09/24/21 0222    sodium chloride (NS) flush 20 mL  20 mL InterCATHeter Q8H Yumiko Aparicio MD   20 mL at 09/24/21 0526    sodium chloride (NS) flush 20 mL  20 mL InterCATHeter PRN Yumiko Aparicio MD        sodium chloride (NS) flush 5-10 mL  5-10 mL IntraVENous PRN CESAR Porras   10 mL at 09/22/21 2039    docusate sodium (COLACE) capsule 100 mg  100 mg Oral QHS Cris Matthews NP   100 mg at 09/22/21 2037    atorvastatin (LIPITOR) tablet 10 mg  10 mg Oral QHS Cris Matthews NP   10 mg at 09/23/21 2159    pantoprazole (PROTONIX) tablet 40 mg  40 mg Oral ACB Cris Matthews NP   40 mg at 09/24/21 0751    0.9% sodium chloride infusion 250 mL  250 mL IntraVENous PRN Cris Matthews NP        colchicine tablet 0.6 mg  0.6 mg Oral BID Cris Matthews NP   0.6 mg at 09/24/21 0751    ondansetron (ZOFRAN) injection 4 mg  4 mg IntraVENous Q4H PRN Jim Hope NP        prochlorperazine (COMPAZINE) with saline injection 5 mg  5 mg IntraVENous Q6H PRN Jim Hope NP        HYDROcodone-acetaminophen [facility]) 5-325 mg per tablet 1 Tablet  1 Tablet Oral Q6H PRN Jim Hope NP        morphine injection 2 mg  2 mg IntraVENous Q4H PRN Jim Hope NP        [Held by provider] olmesartan/hydroCHLOROthiazide (BENICAR HCT) 20/12.5 mg   Oral DAILY Hilario Tinajero MD        acetaminophen (TYLENOL) tablet 650 mg  650 mg Oral Q6H PRN Hilario Tinajero MD   650 mg at 21 0821    diphenhydrAMINE (BENADRYL) capsule 25 mg  25 mg Oral Q6H PRN Hilario Tinajero MD   25 mg at 21 1645    guaiFENesin (ROBITUSSIN) 100 mg/5 mL oral liquid 100 mg  100 mg Oral Q4H PRN Hilario Tinajero MD   100 mg at 21 2212       OBJECTIVE:  Patient Vitals for the past 8 hrs:   BP Temp Pulse Resp SpO2   21 0714 133/70 97.8 °F (36.6 °C) 82 22 99 %   21 0226 (!) 108/59 97.7 °F (36.5 °C) 84 17 95 %     Temp (24hrs), Av °F (36.7 °C), Min:97.4 °F (36.3 °C), Max:98.8 °F (37.1 °C)    No intake/output data recorded. Physical Exam:  Constitutional: Well developed, well nourished male in no acute distress, sitting comfortably in the hospital bed. HEENT: Normocephalic and atraumatic. Oropharynx is clear, mucous membranes are moist.  Extraocular muscles are intact. Sclerae anicteric. Neck supple without JVD. No thyromegaly present. Skin Warm and dry. No bruising and macular rash on knees bilaterally (improved per pt). No erythema. No pallor. Respiratory Lungs are clear to auscultation bilaterally without wheezes, rales or rhonchi, normal air exchange without accessory muscle use. CVS Normal rate, regular rhythm and normal S1 and S2. No murmurs, gallops, or rubs. Abdomen Soft, nontender and nondistended, normoactive bowel sounds. No palpable mass. No hepatosplenomegaly.    Neuro Grossly nonfocal with no obvious sensory or motor deficits. MSK Normal range of motion in general.  3+ BLE edema and tenderness. Psych Appropriate mood and affect.         Labs:    Recent Results (from the past 24 hour(s))   ECHO ADULT COMPLETE    Collection Time: 09/23/21 12:15 PM   Result Value Ref Range    Left Atrium Minor Axis 2.67 cm    Left Atrium Major Axis 6.50 cm    LA Area 2C 23.30 cm2    LA Area 4C 25.20 cm2    LV ED Vol A4C 130.00 cm3    LV ES Vol A4C 39.00 cm3    IVSd 1.20 (A) 0.60 - 1.00 cm    LVIDd 5.11 4.20 - 5.90 cm    LVIDs 3.25 cm    LVOT d 2.20 cm    LVOT VTI 33.70 cm    LVOT Peak Gradient 14.00 mmHg    LVPWd 1.10 (A) 0.60 - 1.00 cm    LV E' Lateral Velocity 7.31 cm/s    LV E' Septal Velocity 9.75 cm/s    AV Cusp 2.10 cm    Aortic Valve Systolic Mean Gradient 7.85 mmHg    AoV VTI 38.40 cm    Aortic Valve Systolic Peak Velocity 651.76 cm/s    AoV PG 15.00 mmHg    Aortic Valve Area by Continuity of VTI 3.33 cm2    Aortic Valve Area by Continuity of Peak Velocity 3.62 cm2    Mitral Valve E Wave Deceleration Time 176.00 ms    MV A Brooks 104.00 cm/s    MV E Brooks 81.40 cm/s    MV Mean Gradient 2.00 mmHg    Mitral Valve Annulus Velocity Time Integral 22.60 cm    Est. RA Pressure 8.00 mmHg    RVIDd 3.08 cm    TR Max Velocity 324.00 cm/s    Triscuspid Valve Regurgitation Peak Gradient 57.0 mmHg    Tapse 3.40 (A) 1.50 - 2.00 cm    MV E/A 0.78     LV Mass .1 88.0 - 224.0 g    LV Mass AL Index 93.9 49.0 - 115.0 g/m2    E/E' lateral 11.14     E/E' septal 8.35     RVSP 65.0 mmHg    E/E' ratio (averaged) 9.74     ALVINA/BSA Pk Brooks 1.5 cm2/m2    ALVINA/BSA VTI 1.4 UM1/B5   METABOLIC PANEL, COMPREHENSIVE    Collection Time: 09/24/21  2:54 AM   Result Value Ref Range    Sodium 138 136 - 145 mmol/L    Potassium 4.3 3.5 - 5.1 mmol/L    Chloride 106 98 - 107 mmol/L    CO2 24 21 - 32 mmol/L    Anion gap 8 7 - 16 mmol/L    Glucose 175 (H) 65 - 100 mg/dL    BUN 22 8 - 23 MG/DL    Creatinine 1.25 0.8 - 1.5 MG/DL    GFR est AA >60 >60 ml/min/1.73m2    GFR est non-AA >60 >60 ml/min/1.73m2    Calcium 8.5 8.3 - 10.4 MG/DL    Bilirubin, total 3.0 (H) 0.2 - 1.1 MG/DL    ALT (SGPT) 28 12 - 65 U/L    AST (SGOT) 53 (H) 15 - 37 U/L    Alk. phosphatase 110 50 - 136 U/L    Protein, total 6.2 (L) 6.3 - 8.2 g/dL    Albumin 3.6 3.2 - 4.6 g/dL    Globulin 2.6 2.3 - 3.5 g/dL    A-G Ratio 1.4 1.2 - 3.5     MAGNESIUM    Collection Time: 09/24/21  2:54 AM   Result Value Ref Range    Magnesium 2.3 1.8 - 2.4 mg/dL   CBC WITH AUTOMATED DIFF    Collection Time: 09/24/21  2:54 AM   Result Value Ref Range    .7 (HH) 4.3 - 11.1 K/uL    RBC 2.26 (L) 4.23 - 5.6 M/uL    HGB 7.5 (L) 13.6 - 17.2 g/dL    HCT 22.4 (L) 41.1 - 50.3 %    MCV 99.1 (H) 79.6 - 97.8 FL    MCH 33.2 (H) 26.1 - 32.9 PG    MCHC 33.5 31.4 - 35.0 g/dL    RDW 20.4 (H) 11.9 - 14.6 %    PLATELET 37 (L) 239 - 450 K/uL    MPV 12.6 (H) 9.4 - 12.3 FL    ABSOLUTE NRBC 0.05 0.0 - 0.2 K/uL    NEUTROPHILS 3 (L) 47 - 75 %    LYMPHOCYTES 88 (H) 16 - 44 %    MONOCYTES 5 3 - 9 %    EOSINOPHILS 3 1 - 8 %    BASOPHILS 1 0 - 2 %    NRBC 1.0  WBC    ABS. NEUTROPHILS 3.3 1.7 - 8.2 K/UL    ABS. LYMPHOCYTES 97.5 (H) 0.5 - 4.6 K/UL    ABS. MONOCYTES 5.5 (H) 0.1 - 1.3 K/UL    ABS. EOSINOPHILS 3.3 (H) 0.0 - 0.8 K/UL    ABS.  BASOPHILS 1.1 (H) 0.0 - 0.2 K/UL    RBC COMMENTS SLIGHT  ANISOCYTOSIS + POIKILOCYTOSIS        RBC COMMENTS OCCASIONAL  OVALOCYTES        RBC COMMENTS OCCASIONAL  TEARDROP CELLS        RBC COMMENTS SLIGHT  MACROCYTOSIS        RBC COMMENTS OCCASIONAL  SCHISTOCYTES        WBC COMMENTS MARKED      PLATELET COMMENTS DECREASED      DF MANUAL     URIC ACID    Collection Time: 09/24/21  2:54 AM   Result Value Ref Range    Uric acid 5.9 2.6 - 6.0 MG/DL   LD    Collection Time: 09/24/21  2:54 AM   Result Value Ref Range     (H) 110 - 210 U/L   BILIRUBIN, DIRECT    Collection Time: 09/24/21  2:54 AM   Result Value Ref Range    Bilirubin, direct 1.5 (H) <0.4 MG/DL       Imaging:  XR Chest [address] [[id number]] Collected: 09/22/21 1159   Order Status: Completed Updated: 09/22/21 1202   Narrative:     EXAM: XR CHEST PORT   INDICATION: Syncope   COMPARISON: Chest radiograph, 9/16/2021     FINDINGS:   The cardiac silhouette is upper limits of normal in size but stable. Mediastinal   contours are within normal limits. No pleural effusion or pneumothorax. No focal   parenchymal process. No acute osseous abnormality. Impression:     Borderline cardiomegaly without acute cardiopulmonary abnormality. DUPLEX LOWER EXT VENOUS BILAT [[id number]] Collected: 09/22/21 2026   Order Status: Completed Updated: 09/22/21 2029   Narrative:     BILATERAL LOWER EXTREMITY VENOUS DUPLEX ULTRASOUND. HISTORY:  Pain. TECHNIQUE: Direct skin-contact high resolution grayscale images, color-flow   Doppler and duplex waveform analysis. FINDINGS: There is compressibility, color-flow assignment and augmentation of   the venous waveform with calf compression in the common femoral, superficial   femoral and popliteal veins. Upper calf veins are unremarkable    Impression:     Negative for sonographic evidence of deep venous thrombosis   bilateral lower extremity. ASSESSMENT:  Problem List  Date Reviewed: 6/11/2020        Codes Class Noted    Body mass index (BMI) of 40.0-44.9 in adult [facility]) ICD-10-CM: Z68.41  ICD-9-CM: V85.41  6/13/2019        Admission for antineoplastic chemotherapy ICD-10-CM: Z51.11  ICD-9-CM: V58.11  1/14/2019        Prolymphocytic leukemia of T-cell (HCC) ICD-10-CM: C91.60  ICD-9-CM: 204.90  1/8/2019        Severe obesity (BMI 35.0-39. 9) ICD-10-CM: E66.01  ICD-9-CM: 278.01  9/6/2018        Benign prostatic hyperplasia with nocturia (Chronic) ICD-10-CM: N40.1, R35.1  ICD-9-CM: 600.01, 788.43  1/23/2018        Severe obstructive sleep apnea ICD-10-CM: G47.33  ICD-9-CM: 327.23  1/12/2017        Pure hypercholesterolemia (Chronic) ICD-10-CM: E78.00  ICD-9-CM: 272.0  12/22/2016        Essential hypertension, benign ICD-10-CM: I10  ICD-9-CM: 401.1  9/21/2015        Impotence ICD-10-CM: N52.9  ICD-9-CM: 607.84  9/21/2015        DDD (degenerative disc disease), cervical ICD-10-CM: M50.30  ICD-9-CM: 722.4  9/21/2015        DDD (degenerative disc disease), lumbar ICD-10-CM: M51.36  ICD-9-CM: 722.52  9/21/2015        Right shoulder pain ICD-10-CM: M25.511  ICD-9-CM: 719.41  9/21/2015        First degree hemorrhoids ICD-10-CM: K64.0  ICD-9-CM: 455.0  9/21/2015            Mr. Hyun Spring is a 77 y.o. male admitted on 9/22/2021 with a primary diagnosis of relapsed T-cell pro-lymphocytic leukemia. His PMH includes DVT (no longer on AC), GERD, and LEE. In 12/2018 he was diagnosed with T-Cell Pro-Lymphocytic Leukemia, in 1/2019 he was started on Alemtuzumab and received it for 8 weeks and achieved a IA, in 4/2019 he was switched to Pentostatin and received 14 doses and achieved a CR. Last week, he was noted to have leukocytosis with WBC 124k/ALC 120k on PCP labs and was sent to ED. At that time he report LE edema and was noted to have b/l cervical LAD. Follow up was arranged with Dr. Amaris Mcmillan. Peripheral flow +relapsed T-cell pro-lymphocytic leukemia. S/p BMbx 9/20, path pending. He returned today with c/o continued swelling and pain in BLE. Also reports GENTILE. CXR with borderline cardiomegaly. pBNP 730. WBC 115k/ANC 2.3/ALC 111k, Hgb 7.6, Plt 16k. He is being admitted for further evaluation and treatment. PLAN:  Relapsed pro-lymphocytic leukemia  - Peripheral flow +relapse  - s/p BMbx 9/20, path pending  - place PICC  - check uric acid  9/23 BMbx path pending. Discussed treatment plan with Dr. Amaris Mcmillan, start ICE today. Uric acid 8.6. Allopurinol and rasburicase ordered. 9/24 C1D2 ICE. Tolerating treatment well. Uric acid down to 5.9. BLE edema/pain  - Check BLE dopp  - Per Dr. Mynor Lockett, start colchicine for possible gout  9/23 BLE dopp neg. pBNP 730. Lasix ordered. Echo pending.  Prednisone 80mg daily ordered. 9/24 Prednisone held as pt on Dex per treatment plan. GENTILE / Cough  - pBNP 730  - CXR with borderline cardiomegaly. No evidence of infx. 9/23 Lasix ordered. Echo pending. 9/24 Now on RA. Echo with EF 55-60% with mild pulmonary HTN. Cough meds ordered. Repeat Lasix today. Leukocytosis / Anemia / Thrombocytopenia secondary to disease  - Transfuse prn per Juan Jose SOPs    Hypotension  - hold amlodipine  9/23 Pt normotensive. Con't to hold amlodipine. Hyperbilirubinemia  9/24 Tbili 3.0 with direct 1.5. Check hemolysis labs. Continue home meds  Juan Jose SOPs  AC contraindicated d/t thrombocytopenia    Disposition:  TBD pending clinical course. Possible discharge home after the completion of chemotherapy. Kenya Burrell NP   [facility] On[facility] tumor lysis tumor lysis  [address]  Office : [phone]  Fax : [phone]   I personally saw, exammed and counselled the patient, and discussed with NP, agree with above history/assessment/plan. 66 y.o.male with T-cell prolymphocytic leukemia status post induction Campath in early 2019 with MI, referred to Dr. Valentine Whitt to discuss stem cell transplant and change to pentostatin and achieved CR, now admitted with disease relapse of leukocytosis , bone marrow biopsy 9/20/2021 pending, leg swelling and pain, rule out DVT, empiric gout treatment, check uric acid and tumor lysis labs, rasburicase given, rule out CHF, discuss with Dr. Valentine Whitt and started ICE, educated for toxicity and monitor aspiration/AMS, more Lasix, supportive care per Thuan Stubbs M.D.   [address], [address] [place] [address]  Office : [phone]  Fax : [phone]

## 2021-09-24 NOTE — PROGRESS NOTES
Care Management Interventions  PCP Verified by CM: Yes (Ector Johnson NP)  Mode of Transport at Discharge: Self  Transition of Care Consult (CM Consult): Discharge Planning  Support Systems: Spouse/Significant Other (wife/Nisha [phone])  Confirm Follow Up Transport: Family  The Patient and/or Patient Representative was Provided with a Choice of Provider and Agrees with the Discharge Plan?: Yes  Freedom of Choice List was Provided with Basic Dialogue that Supports the Patient's Individualized Plan of Care/Goals, Treatment Preferences and Shares the Quality Data Associated with the Providers?: Yes  Discharge Location  Discharge Placement: Home with family assistance    Saw pt in interdisciplinarily rounds with the following disciplines: Physician, Case Management, Nursing, Dietary,Therapy and Pharmacy. The plan of care was discussed along with goals for discharge date/ location. Per PT, pt is not recommended for any further therapy services.  Pt stated that he does not have any needs or concerns at this time    ADRIANO Santos

## 2021-09-24 NOTE — PROGRESS NOTES
Problem: Falls - Risk of  Goal: *Absence of Falls  Description: Document Tara Alegre Fall Risk and appropriate interventions in the flowsheet.   Outcome: Progressing Towards Goal  Note: Fall Risk Interventions:  Mobility Interventions: Communicate number of staff needed for ambulation/transfer, Patient to call before getting OOB    Mentation Interventions: Adequate sleep, hydration, pain control, Evaluate medications/consider consulting pharmacy    Medication Interventions: Evaluate medications/consider consulting pharmacy, Patient to call before getting OOB, Teach patient to arise slowly    Elimination Interventions: Call light in reach, Patient to call for help with toileting needs, Toilet paper/wipes in reach, Urinal in reach

## 2021-09-25 NOTE — PROGRESS NOTES
0701-Bedside report received from Job Dubon RN. Resting in bed. No needs voiced. No s/s of acute distress. 1853-Bedside shift change report given to Christopher Solis (oncoming nurse) by Jovanna Leyva (offgoing nurse). Report included the following information SBAR, Kardex, Intake/Output, MAR and Recent Results.

## 2021-09-25 NOTE — PROGRESS NOTES
Problem: Falls - Risk of  Goal: *Absence of Falls  Description: Document Argenis Koenig Fall Risk and appropriate interventions in the flowsheet.   Outcome: Progressing Towards Goal  Note: Fall Risk Interventions:  Mobility Interventions: Communicate number of staff needed for ambulation/transfer    Mentation Interventions: Adequate sleep, hydration, pain control    Medication Interventions: Teach patient to arise slowly    Elimination Interventions: Call light in reach              Problem: Patient Education: Go to Patient Education Activity  Goal: Patient/Family Education  Outcome: Progressing Towards Goal     Problem: Patient Education: Go to Patient Education Activity  Goal: Patient/Family Education  Outcome: Progressing Towards Goal

## 2021-09-25 NOTE — PROGRESS NOTES
Problem: Mobility Impaired (Adult and Pediatric)  Goal: *Acute Goals and Plan of Care (Insert Text)  Outcome: Progressing Towards Goal  Note:     LTG:     (1.)Mr. Jennie Davidson will ambulate with INDEPENDENT for 650 feet with the least restrictive device within 7 treatment day(s). (2.) Mr. Jennie Davidson will go up and down 12 steps with SBA within 7 treatment days. ________________________________________________________________________________________________      PHYSICAL THERAPY: Daily Note and PM 9/25/2021  INPATIENT: PT Visit Days : 3  Payor: Nii Garcia / Plan: Camilo Ferro / Product Type: [employer] /       NAME/AGE/GENDER: Trent Holland is a 77 y.o. male   PRIMARY DIAGNOSIS: Admission for antineoplastic chemotherapy [Z51.11] <principal problem not specified> <principal problem not specified>       ICD-10: Treatment Diagnosis:    · Other abnormalities of gait and mobility (R26.89)   Precaution/Allergies:  Campath [alemtuzumab]      ASSESSMENT:     Mr. Jennie Davidson was more lethargic today but willing to participate with therapy. Pt reviewed HEP with written guidelines provided to hopefully increase pt's activity level, increase endurance &  prep for higher level function. Pt seemed to be understanding of HEP. This section established at most recent assessment   PROBLEM LIST (Impairments causing functional limitations):  1. Decreased Balance  2. Decreased Activity Tolerance   INTERVENTIONS PLANNED: (Benefits and precautions of physical therapy have been discussed with the patient.)  1. Bed Mobility  2. Gait Training  3. Therapeutic Exercise/Strengthening  4. Transfer Training     TREATMENT PLAN: Frequency/Duration: daily until goals met  Rehabilitation Potential For Stated Goals: Good     REHAB RECOMMENDATIONS (at time of discharge pending progress):    Placement:   It is my opinion, based on this patient's performance to date, that Mr. Jennie Davidson may benefit from [address] after discharge due to the functional deficits listed above that are likely to improve with skilled rehabilitation because he/she has multiple medical issues that affect his/her functional mobility in the community. Equipment:    None at this time              HISTORY:   History of Present Injury/Illness (Reason for Referral):  Mr. Toney Sanchez is a 77 y.o. male admitted on 9/22/2021 with a primary diagnosis of relapsed T-cell pro-lymphocytic leukemia. His PMH includes DVT (no longer on AC), GERD, and LEE. In 12/2018 he was diagnosed with T-Cell Pro-Lymphocytic Leukemia, in 1/2019 he was started on Alemtuzumab and received it for 8 weeks and achieved a KS, in 4/2019 he was switched to Pentostatin and received 14 doses and achieved a CR. Last week, he was noted to have leukocytosis with WBC 124k/ALC 120k on PCP labs and was sent to ED. At that time he report LE edema and was noted to have b/l cervical LAD. Follow up was arranged with Dr. Caroline Javed. Peripheral flow +relapsed T-cell pro-lymphocytic leukemia. S/p BMbx 9/20, path pending. He returned today with c/o continued swelling and pain in BLE. Also reports GENTILE. CXR with borderline cardiomegaly. pBNP 730. WBC 115k/ANC 2.3/ALC 111k, Hgb 7.6, Plt 16k. He is being admitted for further evaluation and treatment. Past Medical History/Comorbidities:   Mr. Toney aSnchez  has a past medical history of Back pain, Cervical radiculopathy, Claustrophobia, DVT (deep venous thrombosis) ([address] (06/2019), GERD (gastroesophageal reflux disease), H/O seasonal allergies, Hyperlipidemia, Impotence, Insomnia, Lateral epicondylitis, Leukemia ([address] Obesity, and Sleep apnea. Mr. Toney Sanchez  has a past surgical history that includes hx circumcision; hx colonoscopy (2017); hx wisdom teeth extraction; ir insert tunl cvc w port over 5 years (6/19/2019); hx orthopaedic (Right); and ir remove tunl cvad w port/pump (12/14/2020).   Social History/Living Environment:   Home Environment: Private residence  One/Two Story Residence: One story  Living Alone: No  Support Systems: Spouse/Significant Other (wife/Nisha [phone])  Patient Expects to be Discharged to[de-identified] House  Current DME Used/Available at Home: CPAP  Prior Level of Function/Work/Activity:  Independent prior to admit     Number of Personal Factors/Comorbidities that affect the Plan of Care: 0: LOW COMPLEXITY   EXAMINATION:   Most Recent Physical Functioning:   Gross Assessment: 3 to 3-/5 thorughout                       Balance:  Sitting: Intact; Without support  Standing: Impaired; Without support Bed Mobility:  Rolling:  (NT)  Supine to Sit:  (NT)  Sit to Supine:  (NT)  Scooting: Supervision       Transfers:  Sit to Stand: Stand-by assistance  Stand to Sit: Stand-by assistance  Bed to Chair: Stand-by assistance  Duration: 23 Minutes (extra time to work through activity noted)  Gait:     Speed/Crista: Delayed  Gait Abnormalities: Decreased step clearance  Distance (ft): 300 Feet (ft)  Assistive Device:  (IV pole)  Ambulation - Level of Assistance: Stand-by assistance  Interventions: Safety awareness training;Verbal cues      Body Structures Involved:  1. None Body Functions Affected:  1. Movement Related Activities and Participation Affected:  1. Mobility   Number of elements that affect the Plan of Care: 1-2: LOW COMPLEXITY   CLINICAL PRESENTATION:   Presentation: Stable and uncomplicated: LOW COMPLEXITY   CLINICAL DECISION MAKIN [facility] [address] [address] AM-PAC 6 Clicks   Basic Mobility Inpatient Short Form  How much difficulty does the patient currently have. .. Unable A Lot A Little None   1. Turning over in bed (including adjusting bedclothes, sheets and blankets)? [] 1   [] 2   [] 3   [x] 4   2. Sitting down on and standing up from a chair with arms ( e.g., wheelchair, bedside commode, etc.)   [] 1   [] 2   [] 3   [x] 4   3. Moving from lying on back to sitting on the side of the bed?    [] 1   [] 2   [] 3   [x] 4   How much help from another person does the patient currently need. .. Total A Lot A Little None   4. Moving to and from a bed to a chair (including a wheelchair)? [] 1   [] 2   [] 3   [x] 4   5. Need to walk in hospital room? [] 1   [] 2   [x] 3   [] 4   6. Climbing 3-5 steps with a railing? [] 1   [] 2   [x] 3   [] 4   © 2007, Trustees of [address] [place] [address] [address] [address], under license to [employer]. All rights reserved      Score:  Initial: 22 Most Recent: X (Date: -- )    Interpretation of Tool:  Represents activities that are increasingly more difficult (i.e. Bed mobility, Transfers, Gait). Medical Necessity:     · Patient is expected to demonstrate progress in   · balance and functional technique  ·  to   · increase independence with giat and transfers  · . Reason for Services/Other Comments:  · Patient continues to require skilled intervention due to   · Limited functional endurance  · . Use of outcome tool(s) and clinical judgement create a POC that gives a: Clear prediction of patient's progress: LOW COMPLEXITY            TREATMENT:   (In addition to Assessment/Re-Assessment sessions the following treatments were rendered)   Pre-treatment Symptoms/Complaints:  Weaker today  Pain: Initial: numeric scale  Pain Intensity 1: 2  Pain Location 1: Foot  Pain Orientation 1: Left, Right  Pain Intervention(s) 1: Ambulation/Increased Activity  Post Session:  2/10     Therapeutic Activity: (  23 Minutes (extra time to work through activity noted) ):  Therapeutic activities including transfers, progressive gait training & review of HEP with written guidelines provided for UE & LE's to improve mobility, strength, balance, coordination and dynamic movement of arm - bilateral, leg - bilateral and core to improve functional endurance & stability. Braces/Orthotics/Lines/Etc:   · IV  Treatment/Session Assessment:    · Response to Treatment:  Pt's 02 sats during gait were >90% .   · Interdisciplinary Collaboration:   o Registered Nurse  · After treatment position/precautions:   o Up in chair  o Bed/Chair-wheels locked  o Caregiver at bedside  o Call light within reach  o RN notified  o Family at bedside   · Compliance with Program/Exercises: Will assess as treatment progresses  · Recommendations/Intent for next treatment session: \"Next visit will focus on advancements to more challenging activities and reduction in assistance provided\".   Total Treatment Duration:  PT Patient Time In/Time Out  Time In: 5892  Time Out: Dinh Dexter PT

## 2021-09-25 NOTE — PROGRESS NOTES
[facility] Hematology & Oncology        Inpatient Hematology / Oncology Progress Note    Reason for Admission:  Admission for antineoplastic chemotherapy [Z51.11]    24 Hour Events:  Afebrile, VSS, on RA  Day 3 ICE  Tolerating treatment well  Hgb 6.7, +hemolysis  RUE edema noted  Wife at bedside    Transfusions: 1 unit PRBCs  Replacements: None    ROS:  Constitutional: Negative for fever, chills. CV: +edema. Negative for chest pain, palpitations. Respiratory: +GENTILE, cough. Negative for wheezing. GI: Negative for nausea, abdominal pain, diarrhea. 10 point review of systems is otherwise negative with the exception of the elements mentioned above in the HPI.        Allergies   Allergen Reactions    Campath [Alemtuzumab] Other (comments)     Rigors     Past Medical History:   Diagnosis Date    Back pain     occassionally    Cervical radiculopathy     Claustrophobia     DVT (deep venous thrombosis) ([facility] [address] 06/2019    currently treated with Xarelto- started on 5/30/2019    GERD (gastroesophageal reflux disease)     H/O seasonal allergies     Hyperlipidemia     Impotence     Insomnia     Lateral epicondylitis     Leukemia (HCC)     CHEMO    Obesity     BMI 36.6    Sleep apnea     noncomplaint with CPAP     Past Surgical History:   Procedure Laterality Date    HX CIRCUMCISION      HX COLONOSCOPY  2017    Due in 2027    HX ORTHOPAEDIC Right     r wrist    HX WISDOM TEETH EXTRACTION      IR INSERT TUNL CVC W PORT OVER 5 YEARS  6/19/2019    IR REMOVE TUNL CVAD W PORT/PUMP  12/14/2020     Family History   Problem Relation Age of Onset    Cancer Mother 80        pancreatic    Cancer Father 76        breast    No Known Problems Son     Hypertension Brother     Hypertension Brother     No Known Problems Daughter      Social History     Socioeconomic History    Marital status:      Spouse name: Not on file    Number of children: Not on file    Years of education: Not on file   Jeffrey Alexander education level: Not on file   Occupational History    Not on file   Tobacco Use    Smoking status: Never Smoker    Smokeless tobacco: Never Used   Substance and Sexual Activity    Alcohol use: Not Currently     Alcohol/week: 0.0 standard drinks    Drug use: No    Sexual activity: Yes     Partners: Female   Other Topics Concern     Service Not Asked    Blood Transfusions Not Asked    Caffeine Concern Not Asked    Occupational Exposure Not Asked    Hobby Hazards Not Asked    Sleep Concern Not Asked    Stress Concern Not Asked    Weight Concern Not Asked    Special Diet Not Asked    Back Care Not Asked    Exercise Not Asked    Bike Helmet Not Asked   [address] Not Asked    Self-Exams Not Asked   Social History Narrative    Not on file     Social Determinants of Health     Financial Resource Strain:     Difficulty of Paying Living Expenses:    Food Insecurity:     Worried About Running Out of Food in the Last Year:     [address] in the Last Year:    Transportation Needs:     Lack of Transportation (Medical):      Lack of Transportation (Non-Medical):    Physical Activity:     Days of Exercise per Week:     Minutes of Exercise per Session:    Stress:     Feeling of Stress :    Social Connections:     Frequency of Communication with Friends and Family:     Frequency of Social Gatherings with Friends and Family:     Attends Yarsani Services:     Active Member of Clubs or Organizations:     Attends Club or Organization Meetings:     Marital Status:    Intimate Partner Violence:     Fear of Current or Ex-Partner:     Emotionally Abused:     Physically Abused:     Sexually Abused:      Current Facility-Administered Medications   Medication Dose Route Frequency Provider Last Rate Last Admin    0.9% sodium chloride infusion 250 mL  250 mL IntraVENous PRN Yumiko Aparicio MD        calcium carbonate (OS-DANIELA) tablet 500 mg [elemental]  500 mg Oral TID WITH MEALS Claus Phlegm, Samir Morales MD   500 mg at 09/25/21 0817    HYDROcodone-homatropine (HYCODAN) 5-1.5 mg/5 mL (5 mL) oral solution 5 mL  5 mL Oral Q4H PRN Yevonne Chol, NP        benzonatate (TESSALON) capsule 100 mg  100 mg Oral TID PRN Yevonne Chol, NP        acyclovir (ZOVIRAX) tablet 400 mg  400 mg Oral Q12H Yevonne Chol, NP   400 mg at 09/25/21 1256    fluconazole (DIFLUCAN) tablet 200 mg  200 mg Oral DAILY Yevonne Chol, NP   200 mg at 09/25/21 0817    [Held by provider] predniSONE (DELTASONE) tablet 80 mg  80 mg Oral DAILY WITH Enrique Baumgarten, NP   80 mg at 09/23/21 1348    allopurinoL (ZYLOPRIM) tablet 300 mg  300 mg Oral DAILY Yevonne Chol, NP   300 mg at 09/25/21 0817    dexamethasone (DECADRON) 10 mg/mL injection 10 mg  10 mg IntraVENous Q24H Alexey Connelly MD   10 mg at 09/24/21 1513    prochlorperazine (COMPAZINE) with saline injection 10 mg  10 mg IntraVENous Q6H PRN Alexey Connelly MD        diphenhydrAMINE (BENADRYL) injection 25 mg  25 mg IntraVENous Q6H PRN Alexey Connelly MD        LORazepam (ATIVAN) injection 0.5 mg  0.5 mg IntraVENous Q6H PRN Alexey Connelly MD        etoposide (VEPESID) 219 mg in 0.9% sodium chloride 500 mL chemo infusion  100 mg/m2 (Treatment Plan Adjusted) IntraVENous Q24H Alexey Connelly  mL/hr at 09/24/21 2214 219 mg at 09/24/21 2214    ifosfamide (IFEX) 10,950 mg, mesna (MESNEX) 10,950 mg in 0.9% sodium chloride 1,000 mL chemo infusion  5,000 mg/m2 (Treatment Plan Adjusted) IntraVENous CONTINUOUS Alexey Connelly MD 51.2 mL/hr at 09/25/21 0008 10,950 mg at 09/25/21 0008    0.9% sodium chloride infusion  50 mL/hr IntraVENous CONTINUOUS Kristin Florez, NP 50 mL/hr at 09/24/21 1611 50 mL/hr at 09/24/21 1611    ondansetron (ZOFRAN) injection 8 mg  8 mg IntraVENous Q8H Vimal Jefferson MD   8 mg at 09/25/21 0817    sodium chloride (NS) flush 20 mL  20 mL InterCATHeter Q8H Vimal Jefferson MD   20 mL at 09/25/21 0600    sodium chloride (NS) flush 20 mL  20 mL InterCATHeter PRN Torrie Rush MD        sodium chloride (NS) flush 5-10 mL  5-10 mL IntraVENous PRN CESAR Aviles   10 mL at 21    docusate sodium (COLACE) capsule 100 mg  100 mg Oral QHS Mayte Sanford NP   100 mg at 21    atorvastatin (LIPITOR) tablet 10 mg  10 mg Oral QHS Mayte Sanford NP   10 mg at 21    pantoprazole (PROTONIX) tablet 40 mg  40 mg Oral ACB Mayte Sanford NP   40 mg at 21 08    0.9% sodium chloride infusion 250 mL  250 mL IntraVENous PRN Mayte Sanford NP        ondansetron [facility]) injection 4 mg  4 mg IntraVENous Q4H PRN Mayte Sanford NP   4 mg at 21    prochlorperazine (COMPAZINE) with saline injection 5 mg  5 mg IntraVENous Q6H PRN Mayte Sanford NP        HYDROcodone-acetaminophen (NORCO) 5-325 mg per tablet 1 Tablet  1 Tablet Oral Q6H PRN Mayte Sanford NP        morphine injection 2 mg  2 mg IntraVENous Q4H PRN Mayte Sanford NP        [Held by provider] olmesartan/hydroCHLOROthiazide (BENICAR HCT) 20/12.5 mg   Oral DAILY Torrie Rush MD        acetaminophen (TYLENOL) tablet 650 mg  650 mg Oral Q6H PRN Torrie Rush MD   650 mg at 21 0821    diphenhydrAMINE (BENADRYL) capsule 25 mg  25 mg Oral Q6H PRN Torrie Rush MD   25 mg at 21 3487    guaiFENesin (ROBITUSSIN) 100 mg/5 mL oral liquid 100 mg  100 mg Oral Q4H PRN Torrie Rush MD   100 mg at 21 2212       OBJECTIVE:  Patient Vitals for the past 8 hrs:   BP Temp Pulse Resp SpO2   21 0754     100 %   21 0742 121/73 97.9 °F (36.6 °C) 81 14 100 %   21 0328 113/60 97.2 °F (36.2 °C) 75 16 98 %   21 0130 129/63  72 22 100 %     Temp (24hrs), Av.6 °F (36.4 °C), Min:97.2 °F (36.2 °C), Max:97.9 °F (36.6 °C)    No intake/output data recorded. Physical Exam:  Constitutional: Well developed, well nourished male in no acute distress, sitting comfortably in the hospital bed. HEENT: Normocephalic and atraumatic.  Oropharynx is clear, mucous membranes are moist.  Extraocular muscles are intact. Sclerae anicteric. Neck supple without JVD. No thyromegaly present. Skin Warm and dry. No bruising and macular rash on knees bilaterally (improved per pt). No erythema. No pallor. Respiratory Lungs are clear to auscultation bilaterally without wheezes, rales or rhonchi, normal air exchange without accessory muscle use. CVS Normal rate, regular rhythm and normal S1 and S2. No murmurs, gallops, or rubs. Abdomen Soft, nontender and nondistended, normoactive bowel sounds. No palpable mass. No hepatosplenomegaly. Neuro Grossly nonfocal with no obvious sensory or motor deficits. MSK Normal range of motion in general.  3+ BLE edema and tenderness. Psych Appropriate mood and affect. Labs:    Recent Results (from the past 24 hour(s))   PHOSPHORUS    Collection Time: 09/25/21  3:12 AM   Result Value Ref Range    Phosphorus 3.3 2.3 - 3.7 MG/DL   METABOLIC PANEL, COMPREHENSIVE    Collection Time: 09/25/21  3:21 AM   Result Value Ref Range    Sodium 139 136 - 145 mmol/L    Potassium 4.1 3.5 - 5.1 mmol/L    Chloride 108 (H) 98 - 107 mmol/L    CO2 23 21 - 32 mmol/L    Anion gap 8 7 - 16 mmol/L    Glucose 179 (H) 65 - 100 mg/dL    BUN 31 (H) 8 - 23 MG/DL    Creatinine 1.26 0.8 - 1.5 MG/DL    GFR est AA >60 >60 ml/min/1.73m2    GFR est non-AA >60 >60 ml/min/1.73m2    Calcium 7.5 (L) 8.3 - 10.4 MG/DL    Bilirubin, total 2.7 (H) 0.2 - 1.1 MG/DL    ALT (SGPT) 38 12 - 65 U/L    AST (SGOT) 48 (H) 15 - 37 U/L    Alk.  phosphatase 92 50 - 136 U/L    Protein, total 6.2 (L) 6.3 - 8.2 g/dL    Albumin 3.5 3.2 - 4.6 g/dL    Globulin 2.7 2.3 - 3.5 g/dL    A-G Ratio 1.3 1.2 - 3.5     MAGNESIUM    Collection Time: 09/25/21  3:21 AM   Result Value Ref Range    Magnesium 2.3 1.8 - 2.4 mg/dL   CBC WITH AUTOMATED DIFF    Collection Time: 09/25/21  3:21 AM   Result Value Ref Range    WBC 52.8 (HH) 4.3 - 11.1 K/uL    RBC 2.04 (L) 4.23 - 5.6 M/uL    HGB 6.7 (LL) 13.6 - 17.2 g/dL    HCT 20.6 (L) 41.1 - 50.3 %    .0 (H) 79.6 - 97.8 FL    MCH 32.8 26.1 - 32.9 PG    MCHC 32.5 31.4 - 35.0 g/dL    RDW 19.7 (H) 11.9 - 14.6 %    PLATELET 23 (LL) 766 - 450 K/uL    MPV 13.1 (H) 9.4 - 12.3 FL    ABSOLUTE NRBC 0.02 0.0 - 0.2 K/uL    NEUTROPHILS 4 (L) 47 - 75 %    LYMPHOCYTES 87 (H) 16 - 44 %    MONOCYTES 7 3 - 9 %    EOSINOPHILS 1 1 - 8 %    BASOPHILS 1 0 - 2 %    ABS. NEUTROPHILS 2.1 1.7 - 8.2 K/UL    ABS. LYMPHOCYTES 46.0 (H) 0.5 - 4.6 K/UL    ABS. MONOCYTES 3.7 (H) 0.1 - 1.3 K/UL    ABS. EOSINOPHILS 0.5 0.0 - 0.8 K/UL    ABS. BASOPHILS 0.5 (H) 0.0 - 0.2 K/UL    RBC COMMENTS SLIGHT  ANISOCYTOSIS + POIKILOCYTOSIS        RBC COMMENTS SLIGHT  POLYCHROMASIA        RBC COMMENTS SLIGHT  MACROCYTOSIS        RBC COMMENTS SLIGHT  OVALOCYTES        RBC COMMENTS SLIGHT  TARGET CELLS        WBC COMMENTS MODERATE      PLATELET COMMENTS DECREASED      DF MANUAL     URIC ACID    Collection Time: 09/25/21  3:21 AM   Result Value Ref Range    Uric acid 5.4 2.6 - 6.0 MG/DL   LD    Collection Time: 09/25/21  3:21 AM   Result Value Ref Range     (H) 110 - 210 U/L   DIRECT MARIANA    Collection Time: 09/25/21  3:21 AM   Result Value Ref Range    JEREMI Poly NEG    RBC, ALLOCATE    Collection Time: 09/25/21  6:00 AM   Result Value Ref Range    HISTORY CHECKED? Historical check performed        Imaging:  XR Chest Port [[id number]] Collected: 09/22/21 1159   Order Status: Completed Updated: 09/22/21 1202   Narrative:     EXAM: XR CHEST PORT   INDICATION: Syncope   COMPARISON: Chest radiograph, 9/16/2021     FINDINGS:   The cardiac silhouette is upper limits of normal in size but stable. Mediastinal   contours are within normal limits. No pleural effusion or pneumothorax. No focal   parenchymal process. No acute osseous abnormality. Impression:     Borderline cardiomegaly without acute cardiopulmonary abnormality.       DUPLEX LOWER EXT VENOUS BILAT [[id number]] Collected: 09/22/21 2026   Order Status: Completed Updated: 09/22/21 2029   Narrative:     BILATERAL LOWER EXTREMITY VENOUS DUPLEX ULTRASOUND. HISTORY:  Pain. TECHNIQUE: Direct skin-contact high resolution grayscale images, color-flow   Doppler and duplex waveform analysis. FINDINGS: There is compressibility, color-flow assignment and augmentation of   the venous waveform with calf compression in the common femoral, superficial   femoral and popliteal veins. Upper calf veins are unremarkable    Impression:     Negative for sonographic evidence of deep venous thrombosis   bilateral lower extremity. ASSESSMENT:  Problem List  Date Reviewed: 6/11/2020        Codes Class Noted    Body mass index (BMI) of 40.0-44.9 in adult [facility]) ICD-10-CM: Z68.41  ICD-9-CM: V85.41  6/13/2019        Admission for antineoplastic chemotherapy ICD-10-CM: Z51.11  ICD-9-CM: V58.11  1/14/2019        Prolymphocytic leukemia of T-cell (HCC) ICD-10-CM: C91.60  ICD-9-CM: 204.90  1/8/2019        Severe obesity (BMI 35.0-39. 9) ICD-10-CM: E66.01  ICD-9-CM: 278.01  9/6/2018        Benign prostatic hyperplasia with nocturia (Chronic) ICD-10-CM: N40.1, R35.1  ICD-9-CM: 600.01, 788.43  1/23/2018        Severe obstructive sleep apnea ICD-10-CM: G47.33  ICD-9-CM: 327.23  1/12/2017        Pure hypercholesterolemia (Chronic) ICD-10-CM: E78.00  ICD-9-CM: 272.0  12/22/2016        Essential hypertension, benign ICD-10-CM: I10  ICD-9-CM: 401.1  9/21/2015        Impotence ICD-10-CM: N52.9  ICD-9-CM: 607.84  9/21/2015        DDD (degenerative disc disease), cervical ICD-10-CM: M50.30  ICD-9-CM: 722.4  9/21/2015        DDD (degenerative disc disease), lumbar ICD-10-CM: M51.36  ICD-9-CM: 722.52  9/21/2015        Right shoulder pain ICD-10-CM: M25.511  ICD-9-CM: 719.41  9/21/2015        First degree hemorrhoids ICD-10-CM: K64.0  ICD-9-CM: 455.0  9/21/2015            Mr. Johnnie Mora is a 77 y.o. male admitted on 9/22/2021 with a primary diagnosis of relapsed T-cell pro-lymphocytic leukemia. His PMH includes DVT (no longer on AC), GERD, and LEE. In 12/2018 he was diagnosed with T-Cell Pro-Lymphocytic Leukemia, in 1/2019 he was started on Alemtuzumab and received it for 8 weeks and achieved a LA, in 4/2019 he was switched to Pentostatin and received 14 doses and achieved a CR. Last week, he was noted to have leukocytosis with WBC 124k/ALC 120k on PCP labs and was sent to ED. At that time he report LE edema and was noted to have b/l cervical LAD. Follow up was arranged with Dr. Reagan Bang. Peripheral flow +relapsed T-cell pro-lymphocytic leukemia. S/p BMbx 9/20, path pending. He returned today with c/o continued swelling and pain in BLE. Also reports GENTILE. CXR with borderline cardiomegaly. pBNP 730. WBC 115k/ANC 2.3/ALC 111k, Hgb 7.6, Plt 16k. He is being admitted for further evaluation and treatment. PLAN:  Relapsed pro-lymphocytic leukemia  - Peripheral flow +relapse  - s/p BMbx 9/20, path pending  - place PICC  - check uric acid  9/23 BMbx path pending. Discussed treatment plan with Dr. Reagan Bang, start ICE today. Uric acid 8.6. Allopurinol and rasburicase ordered. 9/24 C1D2 ICE. Tolerating treatment well. Uric acid down to 5.9.  9/25 Day 3 ICE. Tolerating well. BLE edema/pain  - Check BLE dopp  - Per Dr. Jessy Ceja, start colchicine for possible gout  9/23 BLE dopp neg. pBNP 730. Lasix ordered. Echo pending. Prednisone 80mg daily ordered. 9/24 Prednisone held as pt on Dex per treatment plan. GENTILE / Cough  - pBNP 730  - CXR with borderline cardiomegaly. No evidence of infx. 9/23 Lasix ordered. Echo pending. 9/24 Now on RA. Echo with EF 55-60% with mild pulmonary HTN. Cough meds ordered. Repeat Lasix today. 9/25 Remains on RA. Repeat Lasix. Leukocytosis / Anemia / Thrombocytopenia secondary to disease  - Transfuse prn per Juan Jose SOPs  9/25 Hgb 6.7. Hemolysis noted with hapto <8. Check JEREMI and smear. Transfuse 1 unit PRBCs.     Hypotension  - hold amlodipine  9/23 Pt normotensive. Con't to hold amlodipine. Hyperbilirubinemia  9/24 Tbili 3.0 with direct 1.5. Check hemolysis labs. 9/25 Tbili 2.7, hemolysis noted    RUE edema  9/25 Check doppler    Continue home meds  Prophylactic Antibx: Acyclovir, Diflucan  Juan Jose SOPs  AC contraindicated d/t thrombocytopenia    Disposition:  TBD pending clinical course. Possible discharge home after the completion of chemotherapy tomorrow, 9/26. Cadence Jacob NP   [facility] Hematology & On[facility]   [address] [place] [address]  Office : [phone]  Fax : [phone]   I personally saw, exammed and counselled the patient, and discussed with NP, agree with above history/assessment/plan. 66 y.o.male with T-cell prolymphocytic leukemia status post induction Campath in early 2019 with [place], referred to Dr. Trent Carrillo to discuss stem cell transplant and change to pentostatin and achieved CR, now admitted with disease relapse of leukocytosis , bone marrow biopsy 9/20/2021 pending, leg swelling and pain, rule out DVT, empiric gout treatment, check uric acid and tumor lysis labs, rasburicase given, ruled out CHF, discuss with Dr. Trent Carrillo and started ICE, educated for toxicity and monitor aspiration/AMS, weight is down, breathing better, give more Lasix, PRBC, severe anemia supportive care per Diann Clancy M.D.   [address]  Office : [phone]  Fax : [phone]

## 2021-09-26 NOTE — ROUTINE PROCESS
Pt SOB but denies. Assessment showed distant wheezing and some rhonchi . Spoke with Columba Petty NP for onc and orders received. Lasix given      Continues to urinate well, wife at bedside.       Pt

## 2021-09-26 NOTE — DISCHARGE INSTRUCTIONS
DISCHARGE SUMMARY from Nurse    PATIENT INSTRUCTIONS:    While taking prescription Narcotics:  · Limit your activities  · Do not drive and operate hazardous machinery  · Do not make important personal or business decisions  · Do  not drink alcoholic beverages  Please call physician after your discharge if the following symptoms present:    1. Temperature greater than 99.5 (check temp every day)  2. Heart rate greater than 90 beats per minute  3. Increased respirations   4. Chills  5. Cough with any sputum (color: yellow, white, green, or bloody?)  6. Shortness of breath or change in breathing pattern  7. Bleeding:  Any prolonged bleeding presented from skin tears, cuts, bleeding from brushing teeth, nose bleed, bleeding noted in stool  8. Tenderness, swelling, or drainage from IV site or central line catheter    Diet:Regular    Neutropenic Precautions  Thrombocytopenic Precautions    MD office #:[phone]    What to do at Home:  Recommended activity: Activity as tolerated    *  Please give a list of your current medications to your Primary Care Provider. *  Please update this list whenever your medications are discontinued, doses are      changed, or new medications (including over-the-counter products) are added. *  Please carry medication information at all times in case of emergency situations. These are general instructions for a healthy lifestyle:    No smoking/ No tobacco products/ Avoid exposure to second hand smoke  Surgeon General's Warning:  Quitting smoking now greatly reduces serious risk to your health.     Obesity, smoking, and sedentary lifestyle greatly increases your risk for illness    A healthy diet, regular physical exercise & weight monitoring are important for maintaining a healthy lifestyle    You may be retaining fluid if you have a history of heart failure or if you experience any of the following symptoms:  Weight gain of 3 pounds or more overnight or 5 pounds in a week, increased swelling in our hands or feet or shortness of breath while lying flat in bed. Please call your doctor as soon as you notice any of these symptoms; do not wait until your next office visit. The discharge information has been reviewed with the patient and spouse. The patient and spouse verbalized understanding. Discharge medications reviewed with the patient and spouse and appropriate educational materials and side effects teaching were provided.   ___________________________________________________________________________________________________________________________________

## 2021-09-26 NOTE — PROGRESS NOTES
0703-Bedside report received from Marty Rascon RN. Resting in bed. No needs voiced. No s/s of acute distress. 1240-Discharge medications reviewed with patient and spouse and appropriate educational materials and side effects teaching were provided. I have reviewed discharge instructions with the patient and spouse. The patient and spouse verbalized understanding. Pt to be discharged with PICC intact. Pt has appointment with Dr. Michael Gilmore.

## 2021-09-26 NOTE — PROGRESS NOTES
Problem: Falls - Risk of  Goal: *Absence of Falls  Description: Document Kristal Boggs Fall Risk and appropriate interventions in the flowsheet.   Outcome: Progressing Towards Goal  Note: Fall Risk Interventions:  Mobility Interventions: Communicate number of staff needed for ambulation/transfer    Mentation Interventions: Adequate sleep, hydration, pain control    Medication Interventions: Teach patient to arise slowly    Elimination Interventions: Call light in reach              Problem: Patient Education: Go to Patient Education Activity  Goal: Patient/Family Education  Outcome: Progressing Towards Goal     Problem: Patient Education: Go to Patient Education Activity  Goal: Patient/Family Education  Outcome: Progressing Towards Goal

## 2021-09-28 NOTE — PROGRESS NOTES
Arrived to the [facility]. Udenyca injection completed. Patient tolerated well. Any issues or concerns during appointment: No.  Patient aware of next infusion appointment on 09/29/21 (date) at 56 (time). Patient aware of next lab and [facility] office visit on 10/04/21 (date) at 0700 (time). Discharged ambulatory.

## 2021-09-28 NOTE — PROGRESS NOTES
9/28-Patient seen in the office with Dr. Mart Ramirez, patient is doing fair, BLE edema and rash noted, instructed to try and keep lower extremities elevated, also instructed to drink plenty of fluids. Patient completed C1 ICE, will receive Udenyca today and then platelets tomorrow. He will RTC on 10/4 to see NP and then be admitted for C2 ICE on 10/7. Referral will be sent to [facility]. Instructed to call with any needs.

## 2021-09-29 NOTE — PROGRESS NOTES
Arrived to the [facility]. 1 unit Platelets completed. Patient tolerated without problems. Any issues or concerns during appointment: no.  Patient aware of next infusion appointment on 10/4/21 (date) at [address] [place] [address] (time).   Discharged ambulatory with spouse.'

## 2021-09-30 PROBLEM — D69.6 THROMBOCYTOPENIA (HCC): Status: ACTIVE | Noted: 2021-01-01

## 2021-09-30 PROBLEM — D70.9 NEUTROPENIC FEVER (HCC): Status: ACTIVE | Noted: 2021-01-01

## 2021-09-30 PROBLEM — R50.81 NEUTROPENIC FEVER (HCC): Status: ACTIVE | Noted: 2021-01-01

## 2021-09-30 PROBLEM — C91.60 PROLYMPHOCYTIC LEUKEMIA OF T-CELL (HCC): Chronic | Status: ACTIVE | Noted: 2019-01-08

## 2021-09-30 PROBLEM — A41.9 SEPSIS (HCC): Status: ACTIVE | Noted: 2021-01-01

## 2021-09-30 PROBLEM — G47.33 SEVERE OBSTRUCTIVE SLEEP APNEA: Chronic | Status: ACTIVE | Noted: 2017-01-12

## 2021-09-30 NOTE — ED NOTES
77 y.o. M who presents with chief complaint of chills that began tonight. Hx of leukemia. Had platelet transfusion yesterday. Also reports cough and swelling in BLE and cough. Denies CP. Patient evaluated initially in triage. Rapid Medical Evaluation was conducted and necessary orders have been placed. I have performed a medical screening exam.  Care will now be transferred to the attending physician in the emergency department.   Lilia Proctor NP 7:10 PM

## 2021-09-30 NOTE — ED TRIAGE NOTES
Pt states that he started having chills this evening. Pt states that the cough has been going on for a while. Pt also states that both legs are swollen and have a rash. Pt also states he got platelets yesterday.

## 2021-10-01 NOTE — PROGRESS NOTES
Facundo Rincon- wife- [phone]- cell phone-  Given security code [id number] to receive information over the phone.

## 2021-10-01 NOTE — PROGRESS NOTES
09/30/21 3816   Dual Skin Pressure Injury Assessment   Dual Skin Pressure Injury Assessment WDL   Second Care Provider (Based on Facility Policy) [facility], RN   Skin Integumentary   Skin Integumentary (WDL) X    Pressure  Injury Documentation No Pressure Injury Noted-Pressure Ulcer Prevention Initiated   Skin Condition/Temp Dry;Flaky;Rash   Skin Color Appropriate for ethnicity   Hair Growth Present   Wound Prevention and Protection Methods   Orientation of Wound Prevention Posterior   Location of Wound Prevention Sacrum/Coccyx   Dressing Present  No   Wound Offloading (Prevention Methods) Bed, pressure redistribution/air;Bed, pressure reduction mattress;Pillows;Repositioning;Turning

## 2021-10-01 NOTE — ED NOTES
TRANSFER - OUT REPORT:    Verbal report given to SYLVIA Cheng on Florance Seip  being transferred to [address] [place] [address] for routine progression of care       Report consisted of patients Situation, Background, Assessment and   Recommendations(SBAR). Information from the following report(s) ED Summary, MAR and Recent Results was reviewed with the receiving nurse. Lines:   PICC Double Lumen 09/23/21 Right;Brachial (Active)       Peripheral IV 09/30/21 Left Antecubital (Active)   Site Assessment Clean, dry, & intact 09/30/21 2000   Phlebitis Assessment 0 09/30/21 2000   Infiltration Assessment 0 09/30/21 2000   Dressing Status Clean, dry, & intact 09/30/21 2000   Dressing Type Tape;Transparent 09/30/21 2000   Hub Color/Line Status Pink;Flushed;Patent 09/30/21 2000   Action Taken Blood drawn 09/30/21 2000        Opportunity for questions and clarification was provided.       Patient transported with:   Patient-specific medications from Pharmacy  Registered Nurse

## 2021-10-01 NOTE — ED PROVIDER NOTES
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 Patient also reports worsening bilateral lower extremity swelling. Denies chest pain, abdominal pain, diarrhea, chest pain, urinary symptoms. Rates symptoms as moderate. States he received both of his COVID-19 vaccines; Newman Gull. Patient also reports worsening rash to bilateral lower extremities and thighs. The history is provided by the patient. No  was used. Fever   Associated symptoms include cough, shortness of breath and rash. Pertinent negatives include no diarrhea, no vomiting, no congestion, no headaches and no neck pain. Cough  Associated symptoms include chills, myalgias and shortness of breath. Pertinent negatives include no headaches, no rhinorrhea, no wheezing, no nausea, no vomiting and no confusion.    Rash          Past Medical History:   Diagnosis Date    Back pain     occassionally    Cervical radiculopathy     Claustrophobia     DVT (deep venous thrombosis) ([facility] [address] 06/2019    currently treated with Xarelto- started on 5/30/2019    GERD (gastroesophageal reflux disease)     H/O seasonal allergies     Hyperlipidemia     Impotence     Insomnia     Lateral epicondylitis     Leukemia (HCC)     CHEMO    Obesity     BMI 36.6    Sleep apnea     noncomplaint with CPAP       Past Surgical History:   Procedure Laterality Date    HX CIRCUMCISION      HX COLONOSCOPY  2017    Due in 2027    HX ORTHOPAEDIC Right     r wrist    HX WISDOM TEETH EXTRACTION      IR INSERT TUNL CVC W PORT OVER 5 YEARS  6/19/2019    IR REMOVE TUNL CVAD W PORT/PUMP  12/14/2020         Family History:   Problem Relation Age of Onset    Cancer Mother 80        pancreatic    Cancer Father 76        breast    No Known Problems Son     Hypertension Brother     Hypertension Brother     No Known Problems Daughter        Social History     Socioeconomic History    Marital status:      Spouse name: Not on file    Number of children: Not on file    Years of education: Not on file    Highest education level: Not on file   Occupational History    Not on file   Tobacco Use    Smoking status: Never Smoker    Smokeless tobacco: Never Used   Substance and Sexual Activity    Alcohol use: Not Currently     Alcohol/week: 0.0 standard drinks    Drug use: No    Sexual activity: Yes     Partners: Female   Other Topics Concern     Service Not Asked    Blood Transfusions Not Asked    Caffeine Concern Not Asked    Occupational Exposure Not Asked    Hobby Hazards Not Asked    Sleep Concern Not Asked    Stress Concern Not Asked    Weight Concern Not Asked    Special Diet Not Asked    Back Care Not Asked    Exercise Not Asked    Bike Helmet Not Asked   [address] Not Asked    Self-Exams Not Asked   Social History Narrative    Not on file     Social Determinants of Health     Financial Resource Strain:     Difficulty of Paying Living Expenses:    Food Insecurity:     Worried About Running Out of Food in the Last Year:     Ran Out of Food in the Last Year:    Transportation Needs:     Lack of Transportation (Medical):  Lack of Transportation (Non-Medical):    Physical Activity:     Days of Exercise per Week:     Minutes of Exercise per Session:    Stress:     Feeling of Stress :    Social Connections:     Frequency of Communication with Friends and Family:     Frequency of Social Gatherings with Friends and Family:     Attends [employer] Services:     Active Member of Clubs or Organizations:     Attends Club or Organization Meetings:     Marital Status:    Intimate Partner Violence:     Fear of Current or Ex-Partner:     Emotionally Abused:     Physically Abused:     Sexually Abused:           ALLERGIES: Campath [alemtuzumab]    Review of Systems   Constitutional: Positive for chills, fatigue and fever. Negative for diaphoresis. HENT: Negative for congestion and rhinorrhea. Eyes: Negative for visual disturbance. Respiratory: Positive for cough and shortness of breath. Negative for wheezing and stridor. Cardiovascular: Positive for leg swelling. Gastrointestinal: Negative for abdominal pain, anal bleeding, diarrhea, nausea and vomiting. Genitourinary: Negative for dysuria and flank pain. Musculoskeletal: Positive for myalgias. Negative for neck pain and neck stiffness. Skin: Positive for color change and rash. Neurological: Negative for dizziness, syncope, weakness, light-headedness and headaches. Hematological: Does not bruise/bleed easily. Psychiatric/Behavioral: Negative for confusion. Vitals:    09/30/21 1905 09/30/21 1909 09/30/21 2054   BP: 139/82     Pulse: (!) 102     Resp: 21     Temp: (!) 102.9 °F (39.4 °C)     SpO2: 97%  100%   Weight:  132.5 kg (292 lb)    Height:  5' 11\" (1.803 m)             Physical Exam  Vitals and nursing note reviewed. Constitutional:       Comments: Ill-appearing. HENT:      Head: Normocephalic. Nose: Nose normal.      Mouth/Throat:      Mouth: Mucous membranes are moist.   Eyes:      Extraocular Movements: Extraocular movements intact. Pupils: Pupils are equal, round, and reactive to light. Cardiovascular:      Rate and Rhythm: Tachycardia present. Pulses: Normal pulses. Heart sounds: Normal heart sounds. Pulmonary:      Effort: Pulmonary effort is normal.      Comments: Coarse breath sounds noted to bilateral lung bases. Abdominal:      General: Bowel sounds are normal.      Palpations: Abdomen is soft. Tenderness: There is no abdominal tenderness. There is no guarding or rebound. Comments: Soft, nontender, nondistended. No rebound or guarding. Musculoskeletal:         General: Normal range of motion.       Cervical back: Normal range of motion. No rigidity. Right lower leg: Edema present. Left lower leg: Edema present. Skin:     Findings: Rash present. Neurological:      General: No focal deficit present. Mental Status: He is alert and oriented to person, place, and time. Cranial Nerves: No cranial nerve deficit. Motor: No weakness. Comments: No meningismus. No focal deficits. No nuchal rigidity              MDM  Number of Diagnoses or Management Options  Fever, unspecified fever cause: new and requires workup  Pneumonia of left lower lobe due to infectious organism: new and requires workup  Prolymphocytic leukemia of T-cell type not having achieved remission [facility]): new and requires workup  Diagnosis management comments: Febrile, tachycardic. 100% on RA. Patient with white blood cell count of 35.1. Platelet count noted to be 9. States he was transfused platelets 2 days ago. Absolute neutrophil count 0.4. Chest x-ray with evolving left lower lobe infiltrate versus effusion. Orders placed for IV fluid hydration, vancomycin + cefepime. Oncology service updated on patient requiring admission. Hospitalist consulted for admission. Discussed case with Dr. Eugene Palomo. Rapid Covid 19 swab pending. Hospitalist request transfuse 2 units platelets.           Amount and/or Complexity of Data Reviewed  Clinical lab tests: ordered and reviewed  Tests in the radiology section of CPT®: ordered and reviewed  Tests in the medicine section of CPT®: ordered and reviewed  Review and summarize past medical records: yes  Discuss the patient with other providers: yes  Independent visualization of images, tracings, or specimens: yes    Risk of Complications, Morbidity, and/or Mortality  Presenting problems: moderate  Diagnostic procedures: moderate  Management options: moderate    Patient Progress  Patient progress: stable    ED Course as of Sep 30 2141   Thu Sep 30, 2021   2030 PLATELET(!!): 9 [DF]   2030 WBC(!): 35.1 [DF]   2030 HGB(!): 9.6 [DF]   2113 Lactic acid(!): 2.4 [DF]   2113 ABS. NEUTROPHILS(!): 0.4 [DF]   2113 CXR Findings: The cardiac silhouette is mild to moderately enlarged although stable. The  lungs are expanded without evidence for pneumothorax. Evolving airspace  changes and small effusion are seen in the left lung base.     IMPRESSION:   1. Evolving airspace changes and small effusion suggested in the left lung base. Signs/symptoms of pneumonia should be excluded.       [DF]      ED Course User Index  [DF] Shikha Lopez MD       Procedures        Results Include:    Recent Results (from the past 24 hour(s))   LACTIC ACID    Collection Time: 09/30/21  7:47 PM   Result Value Ref Range    Lactic acid 2.4 (H) 0.4 - 2.0 MMOL/L   CBC WITH AUTOMATED DIFF    Collection Time: 09/30/21  7:47 PM   Result Value Ref Range    WBC 35.1 (H) 4.3 - 11.1 K/uL    RBC 2.97 (L) 4.23 - 5.6 M/uL    HGB 9.6 (L) 13.6 - 17.2 g/dL    HCT 28.8 (L) 41.1 - 50.3 %    MCV 97.0 79.6 - 97.8 FL    MCH 32.3 26.1 - 32.9 PG    MCHC 33.3 31.4 - 35.0 g/dL    RDW 17.5 (H) 11.9 - 14.6 %    PLATELET 9 (LL) 057 - 450 K/uL    MPV Unable to calculate. Recommend adding IPF. 9.4 - 12.3 FL    ABSOLUTE NRBC 0.00 0.0 - 0.2 K/uL    LYMPHOCYTES 90 (H) 16 - 44 %    METAMYELOCYTES 1 %    BLASTS 9 %    ABS. NEUTROPHILS 0.4 (L) 1.7 - 8.2 K/UL    ABS.  LYMPHOCYTES 31.6 (H) 0.5 - 4.6 K/UL    RBC COMMENTS MODERATE  ANISOCYTOSIS + POIKILOCYTOSIS        RBC COMMENTS SLIGHT  OVALOCYTES        RBC COMMENTS SLIGHT  SCHISTOCYTES        RBC COMMENTS SLIGHT  SPHEROCYTES        WBC COMMENTS Result Confirmed By Smear      PLATELET COMMENTS DECREASED      DF MANUAL     METABOLIC PANEL, COMPREHENSIVE    Collection Time: 09/30/21  7:47 PM   Result Value Ref Range    Sodium 134 (L) 136 - 145 mmol/L    Potassium 4.5 3.5 - 5.1 mmol/L    Chloride 102 98 - 107 mmol/L    CO2 24 21 - 32 mmol/L    Anion gap 8 7 - 16 mmol/L    Glucose 116 (H) 65 - 100 mg/dL BUN 25 (H) 8 - 23 MG/DL    Creatinine 1.47 0.8 - 1.5 MG/DL    GFR est AA >60 >60 ml/min/1.73m2    GFR est non-AA 51 (L) >60 ml/min/1.73m2    Calcium 9.0 8.3 - 10.4 MG/DL    Bilirubin, total 2.6 (H) 0.2 - 1.1 MG/DL    ALT (SGPT) 77 (H) 12 - 65 U/L    AST (SGOT) 71 (H) 15 - 37 U/L    Alk. phosphatase 93 50 - 136 U/L    Protein, total 7.1 6.3 - 8.2 g/dL    Albumin 4.1 3.2 - 4.6 g/dL    Globulin 3.0 2.3 - 3.5 g/dL    A-G Ratio 1.4 1.2 - 3.5                Marty Blair MD; 9/30/2021 @9:44 PM Voice dictation software was used during the making of this note. This software is not perfect and grammatical and other typographical errors may be present.   This note has not been proofread for errors.  ===================================================================

## 2021-10-01 NOTE — PROGRESS NOTES
Patient was admitted to medicine team after hours for oncology service. Discussed with  primary oncology team and they will assume care as primary. Primary team will consult medicine  if further needed. We will sign off.

## 2021-10-01 NOTE — PROGRESS NOTES
END OF SHIFT NOTE:    Intake/Output  09/30 1901 - 10/01 0700  In: 430   Out: -    Voiding: YES  Catheter: NO  Drain:              Stool:  0 occurrences. Stool Assessment  Stool Color: Joanne Fore (09/30/21 2056)  Stool Appearance: Formed (09/30/21 2056)  Stool Amount: Medium (09/30/21 2056)  Stool Source/Status: Rectum (09/30/21 2056)    Emesis:  0 occurrences. VITAL SIGNS  Patient Vitals for the past 12 hrs:   Temp Pulse Resp BP SpO2   10/01/21 0415     98 %   10/01/21 0404 97.9 °F (36.6 °C) (!) 101  117/65 95 %   10/01/21 0320 99.5 °F (37.5 °C) (!) 101  (!) 111/59 98 %   10/01/21 0240 97.1 °F (36.2 °C) (!) 104  118/60    10/01/21 0201 98.6 °F (37 °C) 90 15 117/61 95 %   10/01/21 0140 98.7 °F (37.1 °C) 95 17 135/72 96 %   09/30/21 2354 98.9 °F (37.2 °C) 94 18 104/68 99 %   09/30/21 2300 99.2 °F (37.3 °C) 100  (!) 119/92 (!) 84 %   09/30/21 2054     100 %   09/30/21 1905 (!) 102.9 °F (39.4 °C) (!) 102 21 139/82 97 %       Pain Assessment  Pain 1  Pain Scale 1: Numeric (0 - 10) (10/01/21 0017)  Pain Intensity 1: 0 (10/01/21 0017)  Patient Stated Pain Goal: 0 (10/01/21 0017)    Ambulating  Yes    Additional Information:    platelet x2  C-pap at night    Shift report given to oncoming nurse at the bedside.     Saintclair Nares, RN

## 2021-10-01 NOTE — PROGRESS NOTES
Problem: Pressure Injury - Risk of  Goal: *Prevention of pressure injury  Description: Document Tu Scale and appropriate interventions in the flowsheet. Outcome: Progressing Towards Goal  Note: Pressure Injury Interventions:       Moisture Interventions: Absorbent underpads, Apply protective barrier, creams and emollients, Limit adult briefs    Activity Interventions: Assess need for specialty bed, Chair cushion, Pressure redistribution bed/mattress(bed type)    Mobility Interventions: Assess need for specialty bed, Chair cushion, PT/OT evaluation    Nutrition Interventions: Document food/fluid/supplement intake                     Problem: Patient Education: Go to Patient Education Activity  Goal: Patient/Family Education  Outcome: Progressing Towards Goal     Problem: Falls - Risk of  Goal: *Absence of Falls  Description: Document Linda Harris Fall Risk and appropriate interventions in the flowsheet.   Outcome: Progressing Towards Goal  Note: Fall Risk Interventions:  Mobility Interventions: Assess mobility with egress test, Bed/chair exit alarm, Patient to call before getting OOB         Medication Interventions: Assess postural VS orthostatic hypotension, Bed/chair exit alarm, Patient to call before getting OOB                   Problem: Patient Education: Go to Patient Education Activity  Goal: Patient/Family Education  Outcome: Progressing Towards Goal

## 2021-10-01 NOTE — PROGRESS NOTES
END OF SHIFT NOTE:    Intake/Output  10/01 0701 - 10/01 1900  In: -   Out: 6901 [Urine:1070]   Voiding: YES  Catheter: NO  Drain:      Stool:  0 occurrences. Stool Assessment  Stool Color: Diane Goldberg (09/30/21 2056)  Stool Appearance: Formed (09/30/21 2056)  Stool Amount: Medium (09/30/21 2056)  Stool Source/Status: Rectum (09/30/21 2056)    Emesis:  0 occurrences. VITAL SIGNS  Patient Vitals for the past 12 hrs:   Temp Pulse Resp BP SpO2   10/01/21 1500 97.8 °F (36.6 °C) 92 24 110/68 99 %   10/01/21 1022 98.7 °F (37.1 °C) 94 20 113/71 98 %   10/01/21 0716 99.5 °F (37.5 °C) 96 22 120/63 98 %       Pain Assessment  Pain 1  Pain Scale 1: Numeric (0 - 10) (10/01/21 0716)  Pain Intensity 1: 0 (10/01/21 0716)  Patient Stated Pain Goal: 0 (10/01/21 0716)    Ambulating  Yes    Additional Information:      Shift report given to karlos Ramos RN at the bedside.     Vadim Peterson

## 2021-10-01 NOTE — PROGRESS NOTES
Pt placed on hospital CPAP auto-titrating 6-14 cm H2O:       10/01/21 0415   Oxygen Therapy   O2 Sat (%) 98 %   Pulse via Oximetry 82 beats per minute   O2 Device CPAP mask   FIO2 (%) 21 %   Respiratory   Respiratory Pattern Regular   Breath Sounds Bilateral Clear;Diminished   CPAP/BIPAP   CPAP/BIPAP Start/Stop On   Device Mode CPAP, auto-titrating   Mask Type and Size Full face; Large   Skin Condition Intact

## 2021-10-01 NOTE — PROGRESS NOTES
Physician Progress Note      PATIENT:               Adelina Aparicio  CSN #:                  [id number]  :                       1955  ADMIT DATE:       2021 8:28 PM  [address] [place] DATE:  RESPONDING  PROVIDER #:        Jair Vallejo MD          QUERY TEXT:    Patient admitted with BMI 40.73. If possible, please document in progress notes and discharge summary if you are evaluating and /or treating any of the following: The medical record reflects the following:  Risk Factors: HTN, LEE, Back Pain  Clinical Indicators: BMI 40.73  Treatment: daily weight, daily BMP  Options provided:  -- Obesity  -- Morbid obesity  -- Severe obesity  -- Overweight  -- BMI not clinically significant  -- Other - I will add my own diagnosis  -- Disagree - Not applicable / Not valid  -- Disagree - Clinically unable to determine / Unknown  -- Refer to Clinical Documentation Reviewer    PROVIDER RESPONSE TEXT:    This patient has morbid obesity.     Query created by: rina hernandez on 10/1/2021 9:59 AM      Electronically signed by:  Jair Vallejo MD 10/1/2021 4:41 PM

## 2021-10-01 NOTE — H&P
[facility] Hematology & Oncology        Inpatient Hematology / Oncology History and Physical    Reason for Admission:  Sepsis ([facility] [A41.9]  Neutropenic fever ([facility] [D70.9, R50.81]    History of Present Illness:  Mr. Lawrence Coleman is a 78 yo male patient with PMH of T-Cell Pro-Lymphocytic Leukemia. He was first diagnosed in late 2018. He received Alemtuzumab for 8 weeks with OK. He was switched to Pentostatin and received 14 doses with CR. He relapse 9/2021 and is sp cycle one ICE 9/23 with cycle two due on 10/7. Patient came to ED 9/30 with complaint of fever, chills, weakness, dry cough, increased BLE edema and SOB. In ED temp was 102.9. BC/UC obtained. CXR concerning for PNA. Started on cefe and vanc. HGB 9.6, platelets 9K, ANC 0.4, Bili 2. 6. He received 2 units platelets and today platelets are 08L. We are asked to see patient and take over his care.      Also of note patient has diffuse rash to lower extremities and purple colored palmar flexion creases. He noted that the symptoms started after being placed on Omincef 9/20/2021 for 10 days. We are asked to take over on our patient. Review of Systems:  Constitutional + fever, chills, weight loss, appetite changes, fatigue   HEENT Denies trauma, blurry vision, hearing loss, ear pain, nosebleeds, sore throat, neck pain     Skin Hand and BLE rash   Lungs dyspnea, dry cough. No sputum production or hemoptysis. Cardiovascular Denies chest pain, palpitations. +BLE edema   Gastrointestinal Denies nausea, vomiting,  abdominal pain. Neuro Denies headaches, visual changes or ataxia. Denies dizziness, tingling, tremors, sensory change, speech change, focal weakness or headaches. MSK Denies back pain, arthralgias, myalgias or frequent falls. Psychiatric/Behavioral  The patient is not nervous/anxious.          Allergies   Allergen Reactions    Campath [Alemtuzumab] Other (comments)     Rigors     Past Medical History:   Diagnosis Date    Back pain occassionally    Cervical radiculopathy     Claustrophobia     DVT (deep venous thrombosis) ([facility] 06/2019    currently treated with Xarelto- started on 5/30/2019    GERD (gastroesophageal reflux disease)     H/O seasonal allergies     Hyperlipidemia     Impotence     Insomnia     Lateral epicondylitis     Leukemia (HCC)     CHEMO    Obesity     BMI 36.6    Sleep apnea     noncomplaint with CPAP     Past Surgical History:   Procedure Laterality Date    HX CIRCUMCISION      HX COLONOSCOPY  2017    Due in 2027    HX ORTHOPAEDIC Right     r wrist    HX WISDOM TEETH EXTRACTION      IR INSERT TUNL CVC W PORT OVER 5 YEARS  6/19/2019    IR REMOVE TUNL CVAD W PORT/PUMP  12/14/2020     Family History   Problem Relation Age of Onset    Cancer Mother 80        pancreatic    Cancer Father 76        breast    No Known Problems Son     Hypertension Brother     Hypertension Brother     No Known Problems Daughter      Social History     Socioeconomic History    Marital status:      Spouse name: Not on file    Number of children: Not on file    Years of education: Not on file    Highest education level: Not on file   Occupational History    Not on file   Tobacco Use    Smoking status: Never Smoker    Smokeless tobacco: Never Used   Substance and Sexual Activity    Alcohol use: Not Currently     Alcohol/week: 0.0 standard drinks    Drug use: No    Sexual activity: Yes     Partners: Female   Other Topics Concern     Service Not Asked    Blood Transfusions Not Asked    Caffeine Concern Not Asked    Occupational Exposure Not Asked    Hobby Hazards Not Asked    Sleep Concern Not Asked    Stress Concern Not Asked    Weight Concern Not Asked    Special Diet Not Asked    Back Care Not Asked    Exercise Not Asked    Bike Helmet Not Asked    Seat Belt Not Asked    Self-Exams Not Asked   Social History Narrative    Not on file     Social Determinants of Health     Financial Resource Strain:     Difficulty of Paying Living Expenses:    Food Insecurity:     Worried About Running Out of Food in the Last Year:     [address] in the Last Year:    Transportation Needs:     Lack of Transportation (Medical):      Lack of Transportation (Non-Medical):    Physical Activity:     Days of Exercise per Week:     Minutes of Exercise per Session:    Stress:     Feeling of Stress :    Social Connections:     Frequency of Communication with Friends and Family:     Frequency of Social Gatherings with Friends and Family:     Attends Sabianist Services:     Active Member of Clubs or Organizations:     Attends Club or Organization Meetings:     Marital Status:    Intimate Partner Violence:     Fear of Current or Ex-Partner:     Emotionally Abused:     Physically Abused:     Sexually Abused:      Current Facility-Administered Medications   Medication Dose Route Frequency Provider Last Rate Last Admin    acyclovir (ZOVIRAX) tablet 400 mg  400 mg Oral BID Pepe Durham MD   400 mg at 10/01/21 0851    allopurinoL (ZYLOPRIM) tablet 300 mg  300 mg Oral DAILY Pepe Durham MD   300 mg at 10/01/21 0851    calcium carbonate (OS-DANIELA) tablet 500 mg [elemental]  500 mg Oral TID WITH MEALS Moriah Souza MD   500 mg at 10/01/21 1332    docusate sodium (COLACE) capsule 100 mg  100 mg Oral QHS Pepe Durham MD   100 mg at 10/01/21 0109    fluconazole (DIFLUCAN) tablet 200 mg  200 mg Oral DAILY Pepe Durham MD   200 mg at 10/01/21 0850    HYDROcodone-homatropine (HYCODAN) 5-1.5 mg/5 mL (5 mL) oral solution 5 mL  5 mL Oral Q4H PRN Pepe Durham MD        atorvastatin (LIPITOR) tablet 10 mg  10 mg Oral QHS Pepe Durham MD   10 mg at 10/01/21 0109    pantoprazole (PROTONIX) tablet 40 mg  40 mg Oral ACB Pepe Durham MD   40 mg at 10/01/21 0624    ondansetron (ZOFRAN ODT) tablet 8 mg  8 mg Oral Q8H PRN Pepe Durham MD  sodium chloride (NS) flush 5-40 mL  5-40 mL IntraVENous Q8H Navarro Cespedes MD   10 mL at 10/01/21 0618    sodium chloride (NS) flush 5-40 mL  5-40 mL IntraVENous PRN Navarro Cespedes MD        acetaminophen (TYLENOL) tablet 650 mg  650 mg Oral Q6H PRN Seema Gonzalez MD        Or    acetaminophen (TYLENOL) suppository 650 mg  650 mg Rectal Q6H PRN Seema Gonzalez MD        polyethylene glycol (MIRALAX) packet 17 g  17 g Oral DAILY Seema Gonzalez MD   17 g at 10/01/21 0853    promethazine (PHENERGAN) tablet 12.5 mg  12.5 mg Oral Q6H PRN Seema Gonzalez MD        Or    ondansetron [facility]) injection 4 mg  4 mg IntraVENous Q6H PRN Seema Gonzalez MD        cefepime (MAXIPIME) 1 g in 0.9% sodium chloride (MBP/ADV) 50 mL MBP  1 g IntraVENous Q8H Navarro Cespedes  mL/hr at 10/01/21 0623 1 g at 10/01/21 5415    oxyCODONE IR (ROXICODONE) tablet 5 mg  5 mg Oral Q4H PRN Seema Gonzalez MD        morphine injection 4 mg  4 mg IntraVENous Q4H PRN Seema Gonzalez MD        melatonin tablet 5 mg  5 mg Oral QHS Seema Gonzalez MD   5 mg at 10/01/21 0109    temazepam (RESTORIL) capsule 15 mg  15 mg Oral QHS PRN Seema Gonzalez MD        albuterol (PROVENTIL VENTOLIN) nebulizer solution 2.5 mg  2.5 mg Nebulization Q6H PRN Seema Gonzalez MD        vancomycin (VANCOCIN) 1500 mg in  ml infusion  1,500 mg IntraVENous Q12H Seema Gonzalez  mL/hr at 10/01/21 1119 1,500 mg at 10/01/21 1119    [START ON 10/2/2021] tbo-filgrastim (GRANIX) injection 480 mcg  480 mcg SubCUTAneous DAILY Marc Gabriel NP        triamcinolone acetonide (KENALOG) 0.1 % cream   Topical BID Marcjustin Gabriel, NP        0.9% sodium chloride infusion  75 mL/hr IntraVENous CONTINUOUS Marc Harvey NP 75 mL/hr at 10/01/21 1331 75 mL/hr at 10/01/21 1331    sodium chloride (NS) flush 5-10 mL  5-10 mL IntraVENous Q8H Maame Hayden NP   10 mL at 10/01/21 5097    sodium chloride (NS) flush 5-10 mL  5-10 mL IntraVENous PRN Maame Hayden NP        0.9% sodium chloride infusion 250 mL  250 mL IntraVENous PRN Kim Shah MD           OBJECTIVE:  Patient Vitals for the past 8 hrs:   BP Temp Pulse Resp SpO2   10/01/21 1022 113/71 98.7 °F (37.1 °C) 94 20 98 %   10/01/21 0716 120/63 99.5 °F (37.5 °C) 96 22 98 %     Temp (24hrs), Av.1 °F (37.3 °C), Min:97.1 °F (36.2 °C), Max:102.9 °F (39.4 °C)    10/01 0701 - 10/01 190  In: -   Out: 200 [Urine:200]    Physical Exam:  Constitutional: Ill appearing male in no acute distress, sitting comfortably in the hospital bed. HEENT: Normocephalic and atraumatic. Oropharynx is clear, mucous membranes are moist.  Pupils are equal, round, and reactive to light. Extraocular muscles are intact. Sclerae anicteric. Skin Warm and dry. BLE red, swollen, diffuse rash BLE, purple colored palmar flexion creases   Respiratory Lungs sounds diminished. RLL rales   CVS Normal rate, regular rhythm and normal S1 and S2. No murmurs, gallops, or rubs. Abdomen Soft, nontender and nondistended, normoactive bowel sounds. No palpable mass. No hepatosplenomegaly. Neuro Grossly nonfocal with no obvious sensory or motor deficits.    MSK Normal range of motion in general. BLE edema   Psych Appropriate mood and affect.           Labs:    Recent Results (from the past 24 hour(s))   CULTURE, BLOOD    Collection Time: 21  7:47 PM    Specimen: Blood   Result Value Ref Range    Special Requests: LEFT  Antecubital        Culture result: NO GROWTH AFTER 11 HOURS     LACTIC ACID    Collection Time: 21  7:47 PM   Result Value Ref Range    Lactic acid 2.4 (H) 0.4 - 2.0 MMOL/L   CBC WITH AUTOMATED DIFF    Collection Time: 21  7:47 PM   Result Value Ref Range    WBC 35.1 (H) 4.3 - 11.1 K/uL    RBC 2.97 (L) 4.23 - 5.6 M/uL    HGB 9.6 (L) 13.6 - 17.2 g/dL    HCT 28.8 (L) 41.1 - 50.3 %    MCV 97.0 79.6 - 97.8 FL    MCH 32.3 26.1 - 32.9 PG    MCHC 33.3 31.4 - 35.0 g/dL    RDW 17.5 (H) 11.9 - 14.6 %    PLATELET 9 (LL) 937 - 450 K/uL    MPV Unable to calculate. Recommend adding IPF. 9.4 - 12.3 FL    ABSOLUTE NRBC 0.00 0.0 - 0.2 K/uL    LYMPHOCYTES 90 (H) 16 - 44 %    METAMYELOCYTES 1 %    BLASTS 9 %    ABS. NEUTROPHILS 0.4 (L) 1.7 - 8.2 K/UL    ABS. LYMPHOCYTES 31.6 (H) 0.5 - 4.6 K/UL    RBC COMMENTS MODERATE  ANISOCYTOSIS + POIKILOCYTOSIS        RBC COMMENTS SLIGHT  OVALOCYTES        RBC COMMENTS SLIGHT  SCHISTOCYTES        RBC COMMENTS SLIGHT  SPHEROCYTES        WBC COMMENTS Result Confirmed By Smear      PLATELET COMMENTS DECREASED      DF MANUAL     METABOLIC PANEL, COMPREHENSIVE    Collection Time: 09/30/21  7:47 PM   Result Value Ref Range    Sodium 134 (L) 136 - 145 mmol/L    Potassium 4.5 3.5 - 5.1 mmol/L    Chloride 102 98 - 107 mmol/L    CO2 24 21 - 32 mmol/L    Anion gap 8 7 - 16 mmol/L    Glucose 116 (H) 65 - 100 mg/dL    BUN 25 (H) 8 - 23 MG/DL    Creatinine 1.47 0.8 - 1.5 MG/DL    GFR est AA >60 >60 ml/min/1.73m2    GFR est non-AA 51 (L) >60 ml/min/1.73m2    Calcium 9.0 8.3 - 10.4 MG/DL    Bilirubin, total 2.6 (H) 0.2 - 1.1 MG/DL    ALT (SGPT) 77 (H) 12 - 65 U/L    AST (SGOT) 71 (H) 15 - 37 U/L    Alk.  phosphatase 93 50 - 136 U/L    Protein, total 7.1 6.3 - 8.2 g/dL    Albumin 4.1 3.2 - 4.6 g/dL    Globulin 3.0 2.3 - 3.5 g/dL    A-G Ratio 1.4 1.2 - 3.5     NT-PRO BNP    Collection Time: 09/30/21  7:47 PM   Result Value Ref Range    NT pro- (H) 5 - 125 PG/ML   CULTURE, BLOOD    Collection Time: 09/30/21  9:53 PM    Specimen: Blood   Result Value Ref Range    Special Requests: RED PICC      Culture result: NO GROWTH AFTER 9 HOURS     COVID-19 RAPID TEST    Collection Time: 09/30/21  9:54 PM   Result Value Ref Range    Specimen source NASAL SWAB      COVID-19 rapid test Not detected NOTD     PLATELETS, ALLOCATE    Collection Time: 09/30/21 10:15 PM   Result Value Ref Range    Unit number [id number]     Blood component type PLTPH,LRIR,2     Unit division 00     Status of unit ISSUED     Unit number [id number]     Blood component type PLTPH,LRIR,1     Unit division 00     Status of unit ISSUED    LACTIC ACID    Collection Time: 09/30/21 10:18 PM   Result Value Ref Range    Lactic acid 1.3 0.4 - 2.0 MMOL/L   TYPE & SCREEN    Collection Time: 10/01/21 12:08 AM   Result Value Ref Range    Crossmatch Expiration 10/04/2021,2359     ABO/Rh(D) A POSITIVE     Antibody screen NEG    CULTURE, URINE    Collection Time: 10/01/21 12:36 AM    Specimen: Clean catch; Urine    URINE   Result Value Ref Range    Special Requests: NO SPECIAL REQUESTS      Culture result:        NO GROWTH AFTER SHORT PERIOD OF INCUBATION. FURTHER RESULTS TO FOLLOW AFTER OVERNIGHT INCUBATION. METABOLIC PANEL, BASIC    Collection Time: 10/01/21  5:09 AM   Result Value Ref Range    Sodium 134 (L) 136 - 145 mmol/L    Potassium 4.5 3.5 - 5.1 mmol/L    Chloride 106 98 - 107 mmol/L    CO2 23 21 - 32 mmol/L    Anion gap 5 (L) 7 - 16 mmol/L    Glucose 135 (H) 65 - 100 mg/dL    BUN 24 (H) 8 - 23 MG/DL    Creatinine 1.18 0.8 - 1.5 MG/DL    GFR est AA >60 >60 ml/min/1.73m2    GFR est non-AA >60 >60 ml/min/1.73m2    Calcium 8.4 8.3 - 10.4 MG/DL   MAGNESIUM    Collection Time: 10/01/21  5:09 AM   Result Value Ref Range    Magnesium 2.4 1.8 - 2.4 mg/dL   CBC WITH AUTOMATED DIFF    Collection Time: 10/01/21  5:09 AM   Result Value Ref Range    WBC 21.8 (H) 4.3 - 11.1 K/uL    RBC 2.23 (L) 4.23 - 5.6 M/uL    HGB 7.3 (L) 13.6 - 17.2 g/dL    HCT 21.6 (L) 41.1 - 50.3 %    MCV 96.9 79.6 - 97.8 FL    MCH 32.7 26.1 - 32.9 PG    MCHC 33.8 31.4 - 35.0 g/dL    RDW 17.2 (H) 11.9 - 14.6 %    PLATELET 36 (L) 280 - 450 K/uL    MPV 11.5 9.4 - 12.3 FL    ABSOLUTE NRBC 0.00 0.0 - 0.2 K/uL    LYMPHOCYTES 86 (H) 16 - 44 %    BLASTS 14 %    ABS.  LYMPHOCYTES 18.7 (H) 0.5 - 4.6 K/UL    RBC COMMENTS SLIGHT  OVALOCYTES        RBC COMMENTS MODERATE  ANISOCYTOSIS + POIKILOCYTOSIS        RBC COMMENTS OCCASIONAL  SPHEROCYTES        WBC COMMENTS WBC'S APPEAR ABNORMAL/IMMATURE/ATYPICAL      PLATELET COMMENTS DECREASED      DF AUTOMATED         Imaging:  [unfilled]    ASSESSMENT:  Problem List  Date Reviewed: 6/11/2020        Codes Class Noted    * (Principal) Sepsis ([facility] ICD-10-CM: A41.9  ICD-9-CM: 038.9, 995.91  9/30/2021        Neutropenic fever ([facility] ICD-10-CM: D70.9, R50.81  ICD-9-CM: 288.00, 780.61  9/30/2021        Thrombocytopenia ([facility] ICD-10-CM: D69.6  ICD-9-CM: 287.5  9/30/2021        Body mass index (BMI) of 40.0-44.9 in [facility]) (Chronic) ICD-10-CM: Z68.41  ICD-9-CM: V85.41  6/13/2019        Admission for antineoplastic chemotherapy ICD-10-CM: Z51.11  ICD-9-CM: V58.11  1/14/2019        Prolymphocytic leukemia of T-cell (HCC) (Chronic) ICD-10-CM: C91.60  ICD-9-CM: 204.90  1/8/2019        Severe obesity (BMI 35.0-39. 9) ICD-10-CM: E66.01  ICD-9-CM: 278.01  9/6/2018        Benign prostatic hyperplasia with nocturia (Chronic) ICD-10-CM: N40.1, R35.1  ICD-9-CM: 600.01, 788.43  1/23/2018        Severe obstructive sleep apnea (Chronic) ICD-10-CM: G47.33  ICD-9-CM: 327.23  1/12/2017        Pure hypercholesterolemia (Chronic) ICD-10-CM: E78.00  ICD-9-CM: 272.0  12/22/2016        Essential hypertension, benign (Chronic) ICD-10-CM: I10  ICD-9-CM: 401.1  9/21/2015        Impotence ICD-10-CM: N52.9  ICD-9-CM: 607.84  9/21/2015        DDD (degenerative disc disease), cervical ICD-10-CM: M50.30  ICD-9-CM: 722.4  9/21/2015        DDD (degenerative disc disease), lumbar ICD-10-CM: M51.36  ICD-9-CM: 722.52  9/21/2015        Right shoulder pain ICD-10-CM: M25.511  ICD-9-CM: 719.41  9/21/2015        First degree hemorrhoids ICD-10-CM: K64.0  ICD-9-CM: 455.0  9/21/2015                PLAN:  Relapsed T Cell Pro-lymphocytic leukemia  -sp cycle one ICE 9/23 followed by Ana Ken. Next cycle due 10/7     Neutropenic fever/CXR concerning for PNA  -Tmax 102.9. Day 2 cefe and vanc.  BC/UC pending     Pancytopenia -?hemolysis-peripheral smear-bili elevated   -Juan Jose SOPs  -Give Granix     BLE diffuse rash/palmar creases purplish color  -? Reaction due to Onmicef  -add Kenalog cream     Dry cough  -hycodan      uJan Jose SOPs    Lab studies and imaging studies were personally reviewed. Pertinent old records were reviewed. Thank you for allowing us to participate in the care of Mr. Clint Oseguera. We will gladly take over the care of our patient.                Ran Villanueva NP   [facility] Hematology & Oncology  [address] [place] [address]  Office : [phone]  Fax : [phone]

## 2021-10-01 NOTE — PROGRESS NOTES
TRANSFER - IN REPORT:    Verbal report received from Felicia (name) on Pino Reid  being received from ED (unit) for routine progression of care      Report consisted of patients Situation, Background, Assessment and   Recommendations(SBAR). Information from the following report(s) SBAR, Kardex, ED Summary, Procedure Summary, MAR and Recent Results was reviewed with the receiving nurse. Opportunity for questions and clarification was provided. Assessment completed upon patients arrival to unit and care assumed.

## 2021-10-01 NOTE — PROGRESS NOTES
SW met with patient who confirmed demographic information, states that he lives with his wife and has two children who live locally. Patient sees Pearl Krishna for primary care and is current. Patient uses a cane to ambulate, denies any falls, and states that he is independent in his ADLs. Patient currently requiring supplemental oxygen, states \"I don't know\" when asked if he needs it at home. SW discussed with patient contacting his wife for additional information and he was in agreement. SW called the patient's wife at her cell and home number, no answer. Patient may benefit from [facility] services at discharge, will discuss with the patient and his wife once SW can confirm baseline information with the patient's wife. Readmission assessment completed.      Maldonado Rasheed LMSW    [facility]    * [email or web address]

## 2021-10-01 NOTE — H&P
[facility] Hospitalist Service  History and Physical    Patient ID:  Vaibhav Alcantara  male  1955  [id number]    Admission Date: 9/30/2021  Chief Complaint: Fever/chills  Reason for Admission: Sepsis    ASSESSMENT & PLAN:    Tiki Mcneill 1106 Problems    Diagnosis Date Noted    Sepsis ([facility] 09/30/2021    Neutropenic fever ([address] 09/30/2021    Thrombocytopenia ([facility] 09/30/2021    Body mass index (BMI) of 40.0-44.9 in 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    Severe obstructive sleep apnea 01/12/2017    Essential hypertension, benign 09/21/2015     Principal Problem:    Sepsis ([address] (9/30/2021)  Meets criteria with fever of 102.9, heart rate 104, WBCs 35.1, absolute neutrophils reported at 0.4  Urine and blood cultures have been obtained  Possible CAP:  IV Vanco and cefepime    Active Problems:    Essential hypertension, benign (9/21/2015)  Continue home medications      Severe obstructive sleep apnea (1/12/2017)  CPAP as tolerated      Prolymphocytic leukemia of T-cell ([facility] [address] (1/8/2019)  Consult oncology      Body mass index (BMI) of 40.0-44.9 in [facility]) (6/13/2019)     pt on lifestyle adjustments prior to discharge; increases morbidity and mortality; can impede treatment and recovery      Neutropenic fever ([address] (9/30/2021)      Thrombocytopenia ([address] (9/30/2021)  Consult oncology  Platelet transfusion ordered in ER      Disposition: admit to inpatient  Diet: Regular  VTE ppx: SCDs if tolerated  CODE STATUS: Full    Surrogate decision-maker: Spouse    HISTORY OF PRESENT ILLNESS:  Patient was discussed with the ER provider prior to seeing the patient. Patient is a 77 y.o. male with T-cell leukemia and chemotherapy, who presented to the ED with multiple complaints. He reports fever/chills, fatigue/weakness, dry cough, worsening swelling in his legs, and shortness of breath. His symptoms have developed and worsened over the past several days.   He denies nausea or vomiting, headaches, neck pain or stiffness, chest pain, abdominal pain, diarrhea, or urinary symptoms. He has had both doses of the Moderna vaccine. He has had a rash on his legs, that he reports is worsening. His last chemotherapy was last Thursday. Chest x-ray from the ER shows possible developing pneumonia. REVIEW OF SYSTEMS:  A 14 point review of systems was taken and pertinent positive and negative as per HPI. Allergies   Allergen Reactions    Campath [Alemtuzumab] Other (comments)     Rigors       Prior to Admission Medications   Prescriptions Last Dose Informant Patient Reported? Taking? Cetirizine (ZYRTEC) 10 mg cap   Yes No   Sig: Take 10 mg by mouth. Patient not taking: Reported on 2021   Comp. Stocking,Knee,Regular,Lrg misc   No No   Si Each by Does Not Apply route daily. DOCOSAHEXANOIC ACID/EPA (FISH OIL PO)   Yes No   Sig: Take 1 Cap by mouth daily. Patient not taking: Reported on 2021   HYDROcodone-homatropine (HYCODAN) 5-1.5 mg/5 mL (5 mL) oral solution   No No   Sig: Take 5 mL by mouth every four (4) hours as needed for Cough for up to 5 days. Max Daily Amount: 30 mL. Patient not taking: Reported on 2021   VITAMIN A/VITAMIN D3 (NATURAL VITAMIN D PO)   Yes No   Sig: Take 1 Tab by mouth daily. acyclovir (ZOVIRAX) 400 mg tablet   No No   Sig: Take 1 Tablet by mouth two (2) times a day. allopurinoL (ZYLOPRIM) 300 mg tablet   No No   Sig: Take 1 Tablet by mouth daily. calcium carbonate (OS-DANIELA) 500 mg calcium (1,250 mg) tablet   No No   Sig: Take 1 Tablet by mouth three (3) times daily (with meals). docusate sodium (COLACE) 100 mg capsule   Yes No   Sig: Take 100 mg by mouth nightly. fluconazole (DIFLUCAN) 200 mg tablet   No No   Sig: Take 1 Tablet by mouth daily. FDA advises cautious prescribing of oral fluconazole in pregnancy. furosemide (Lasix) 20 mg tablet   No No   Sig: Take 1 Tablet by mouth daily.    ipratropium-albuteroL (Combivent Respimat)  mcg/actuation inhaler   No No   Sig: Take 1 Puff by inhalation every six (6) hours as needed for Wheezing or Shortness of Breath. Patient not taking: Reported on 9/28/2021   lidocaine-prilocaine (EMLA) topical cream   No No   Sig: Apply  to affected area as needed for Pain. Apply to port site 45-60 minutes prior to lab appt or infusion   lovastatin (MEVACOR) 10 mg tablet   No No   Sig: Take 1 Tab by mouth nightly. multivitamin (ONE A DAY) tablet   Yes No   Sig: Take 1 Tab by mouth daily. Patient not taking: Reported on 9/23/2021   omeprazole (PRILOSEC) 40 mg capsule   Yes No   Sig: TAKE 1 CAPSULE BY MOUTH DAILY   ondansetron (Zofran ODT) 8 mg disintegrating tablet   No No   Sig: Take 1 Tablet by mouth every eight (8) hours as needed for Nausea or Vomiting. prochlorperazine (Compazine) 10 mg tablet   No No   Sig: Take 0.5-1 Tablets by mouth every six (6) hours as needed for Nausea or Vomiting.       Facility-Administered Medications: None       Past Medical History:   Diagnosis Date    Back pain     occassionally    Cervical radiculopathy     Claustrophobia     DVT (deep venous thrombosis) ([facility] 06/2019    currently treated with Xarelto- started on 5/30/2019    GERD (gastroesophageal reflux disease)     H/O seasonal allergies     Hyperlipidemia     Impotence     Insomnia     Lateral epicondylitis     Leukemia (HCC)     CHEMO    Obesity     BMI 36.6    Sleep apnea     noncomplaint with CPAP     Past Surgical History:   Procedure Laterality Date    HX CIRCUMCISION      HX COLONOSCOPY  2017    Due in 2027    HX ORTHOPAEDIC Right     r wrist    HX WISDOM TEETH EXTRACTION      IR INSERT TUNL CVC W PORT OVER 5 YEARS  6/19/2019    IR REMOVE TUNL CVAD W PORT/PUMP  12/14/2020       Social History     Tobacco Use    Smoking status: Never Smoker    Smokeless tobacco: Never Used   Substance Use Topics    Alcohol use: Not Currently     Alcohol/week: 0.0 standard drinks       FAMILY HISTORY: Reviewed and non-contributory to admitting problem, unless otherwise stated in the HPI    Family History   Problem Relation Age of Onset    Cancer Mother 80        pancreatic    Cancer Father 76        breast    No Known Problems Son     Hypertension Brother     Hypertension Brother     No Known Problems Daughter              PHYSICAL EXAM:    Patient Vitals for the past 24 hrs:   Temp Pulse Resp BP SpO2   09/30/21 2054     100 %   09/30/21 1905 (!) 102.9 °F (39.4 °C) (!) 102 21 139/82 97 %     Visit Vitals  /82   Pulse (!) 102   Temp (!) 102.9 °F (39.4 °C)   Resp 21   Ht 5' 11\" (1.803 m)   Wt 132.5 kg (292 lb)   SpO2 100%   BMI 40.73 kg/m²       General:    WD and WN, ill-appearing  Eyes:  PERRL; EOMI; sclera normal/non-icteric  HENT:  Normocephalic, without obvious abnormality; Oropharynx is clear with tacky mucous membranes   Resp:    Clear/diminished to auscultation bilaterally. Resp are even and unlabored  CVS/Heart: Regular rate and rhythm, 1+ LE edema    GI:    Soft, non-tender. Not distended. Bowel sounds normal.     Musc/SK: Muscle strength is appropriate for age/condition; No cyanosis.  No clubbing  Skin:  Petechial type rash on lower extremities  Neurologic: CN II - XII are grossly intact - Eye exam as noted above; moves extremities equally  Psych: Alert and oriented x 4;  Judgement and insight are normal      Intake/Output last 3 shifts:      Labs:  CMP:   Lab Results   Component Value Date/Time     (L) 09/30/2021 07:47 PM    K 4.5 09/30/2021 07:47 PM     09/30/2021 07:47 PM    CO2 24 09/30/2021 07:47 PM    AGAP 8 09/30/2021 07:47 PM     (H) 09/30/2021 07:47 PM    BUN 25 (H) 09/30/2021 07:47 PM    CREA 1.47 09/30/2021 07:47 PM    GFRAA >60 09/30/2021 07:47 PM    GFRNA 51 (L) 09/30/2021 07:47 PM    CA 9.0 09/30/2021 07:47 PM    ALB 4.1 09/30/2021 07:47 PM    TBILI 2.6 (H) 09/30/2021 07:47 PM    TP 7.1 09/30/2021 07:47 PM    GLOB 3.0 09/30/2021 07:47 PM    AGRAT 1.4 09/30/2021 07:47 PM    ALT 77 (H) 09/30/2021 07:47 PM         CBC:    Lab Results   Component Value Date/Time    WBC 35.1 (H) 09/30/2021 07:47 PM    HGB 9.6 (L) 09/30/2021 07:47 PM    HCT 28.8 (L) 09/30/2021 07:47 PM    PLT 9 (LL) 09/30/2021 07:47 PM       No results found for: INR, PTMR, PTP, PT1, PT2, INREXT    ABG:  No results found for: PH, PHI, PCO2, PCO2I, PO2, PO2I, HCO3, HCO3I, FIO2, FIO2I        Lab Results   Component Value Date/Time    CK 44 03/26/2019 02:03 PM    BNP 34 (H) 03/26/2019 02:03 PM       Imaging & Other Studies:  XR CHEST SNGL V    Result Date: 9/25/2021  EXAM: Chest x-ray. INDICATION: Dyspnea and wheezing. COMPARISON: Prior chest x-ray on September 22, 2021. TECHNIQUE: Single frontal view. FINDINGS: The lungs are clear. The heart remains borderline enlarged. The mediastinal contour and pulmonary vasculature are normal. No pneumothorax or pleural effusion is seen. The right arm PICC line tip is in the superior vena cava. No acute process. XR CHEST PORT    Result Date: 9/30/2021  CHEST X-RAY, single portable view  9/30/2021 History: Vomiting and chills. Cough that is been going on for a while. Technique: Single frontal view of the chest. Comparison: Chest x-ray 9/25/2021 Findings: The cardiac silhouette is mild to moderately enlarged although stable. The lungs are expanded without evidence for pneumothorax. Evolving airspace changes and small effusion are seen in the left lung base. 1.  Evolving airspace changes and small effusion suggested in the left lung base. Signs/symptoms of pneumonia should be excluded. This report was made using voice transcription. Despite my best efforts to avoid any, transcription errors may persist. If there is any question about the accuracy of the report or need for clarification, then please call [phone], or text me through perfectserv for clarification or correction.      XR CHEST PORT    Result Date: 9/22/2021  EXAM: XR CHEST PORT INDICATION: Syncope COMPARISON: Chest radiograph, 9/16/2021 FINDINGS: The cardiac silhouette is upper limits of normal in size but stable. Mediastinal contours are within normal limits. No pleural effusion or pneumothorax. No focal parenchymal process. No acute osseous abnormality. Borderline cardiomegaly without acute cardiopulmonary abnormality. XR CHEST PORT    Result Date: 9/16/2021  CHEST X-RAY, one view. HISTORY:  Leukemia and lymphadenopathy and leukocytosis. TECHNIQUE:  AP upright portable view. COMPARISON: March 2019 FINDINGS: Film is somewhat lordotic Lungs: Are grossly clear without focal pneumonia. Costophrenic angles: are sharp. Heart size: is normal. Pulmonary vasculature: is unremarkable. Aorta: Unremarkable. Included portion of the upper abdomen: is unremarkable. Bones: No gross bony lesions. Other: None. Negative for pneumonia. PET/CT TUMOR IMAGE SKULL THIGH (SUB)    Result Date: 9/14/2021  PET/CT  INDICATION: Recurrent T-cell lymphoma. RADIOPHARMACEUTICAL: 14.6 mCi F18-FDG, intravenously. TECHNIQUE: Imaging was performed from the skull through the proximal thighs using routine PET/CT acquisition protocol. Imaging was performed approximately 60 minutes post injection. Oral contrast was administered. Radiation dose reduction techniques were used for this study:  Our CT scanners use one or all of the following: Automated exposure control, adjustment of the mA and/or kVp according to patient's size, iterative reconstruction. SERUM GLUCOSE: 138 mg/dL prior to injection. COMPARISON: None AORTIC ARCH UPTAKE: SUV max 2.9 HEPATIC UPTAKE: SUV max 3.8 FINDINGS: HEAD/NECK: PET Findings: Symmetric radiotracer uptake within the imaged brain parenchyma. Numerous enlarged  bilateral mildly hypermetabolic cervical lymph nodes with index nodes measured in the short axis axial series 3 and included: *  A 1.0 cm left level 2 cervical lymph node. SUV max 2.2.  Image number 71 *  A 1.1 cm right level 2 cervical lymph node. SUV max 2.1. Image number 68. *  A 2.5 cm right submandibular lymph node. SUV max 2.0. Image number 80. *  A 1.3 cm submental lymph node. SUV max 2.3. Image number 93. CHEST: Several enlarged bilateral mildly hypermetabolic axillary lymph nodes with index nodes measured in the short axis on axial series 3 and include: *  A 1.8 cm left axillary node. SUV max 2.5. Image number 122. *  A 1.5 cm right axillary lymph node. SUV max 2.9. Image number 135. *  1.6 left axillary lymph node. SUV max 2.6. Image number 152. Scattered subcentimeter mediastinal and hilar lymph nodes without FDG avidity. Small pericardial effusion. Mild left greater than right dependent atelectasis. No suspicious pulmonary nodules or FDG avidity of the lung parenchyma. ABDOMEN/PELVIS: Multiple scattered prominent mesenteric and retroperitoneal lymph nodes without significant FDG activity. Rounded 1.3 cm focus of marked of marked FDG activity (SUV max 18.0) corresponding to area ill-defined haziness without discrete mass lesion along the left pelvic sidewall (image number [id number]). MUSCULOSKELETAL: Minimally hypermetabolic enlarged bilateral inguinal lymph nodes. Index lesions including a 1.7 cm short axis left inguinal lymph node (SUV max 2.4), and 1.4 cm short axis right inguinal lymph node (SUV max 2.5). No abnormal uptake of the osseous structures. 1.  Numerous enlarged minimally hypermetabolic cervical, axillary, mesenteric, and retroperitoneal lymph nodes as indexed above. 2.  Focus of marked FDG avidity (SUV max 18.0) along the left pelvic sidewall without discrete mass lesion    CT CHEST SOFT TISSUE NECK W CONT    Result Date: 9/16/2021  EXAM: CT CHEST SOFT TISSUE NECK W CONT INDICATION: Lymphadenopathy, history lymphoma, elevated white blood cells. COMPARISON: PET/CT dated 9/14/2021 Multiple axial images were obtained through the neck and chest.  Oral contrast was used for bowel opacification.   100mL of Isovue 370 intravenous contrast was used for better evaluation of solid organs and vascular structures. Radiation dose reduction techniques were used for this study. All CT scans performed at this facility use one or all of the following: Automated exposure control, adjustment of the mA and/or kVp according to patient's size, iterative reconstruction. FINDINGS: NASOPHARYNX: Normal. SUPRAHYOID NECK: Normal oral cavity. There is bilateral symmetric palatine tonsil enlargement with central airway narrowing within the oropharynx. INFRAHYOID NECK: Normal larynx, hypopharynx and supraglottis. GLANDS: Multiple enlarged lymph nodes within the parotid glands. Submandibular glands are normal in appearance. The thyroid gland is unremarkable. Lamberto Ross LYMPH NODES: Extensive bilateral lymphadenopathy throughout all cervical levels of the neck. LUNG APICES: Clear. OSSEOUS STRUCTURES: No destructive bone lesion. AIRWAYS: The trachea and bronchi are patent. LUNGS: Minimal atelectasis within the left lower lobe. No airspace consolidation. No suspicious pulmonary nodule. PLEURA: No plerual effusion or pneumothorax. HEART: The heart is not enlarged. No calcified coronary atherosclerosis. Small pericardial effusion which appears more dense than simple fluid. THORACIC AORTA: The aorta is normal in caliber. Minimal atherosclerosis within the aorta and great vessels. PULMONARY ARTERY: The main pulmonary artery is normal in caliber. MEDIASTINUM/JOSE DANIEL: No discrete mediastinal lymphadenopathy. Ill-defined soft tissue within the anterior mediastinum, suspicious for residual thymus tissue CHEST WALL: Bilateral axillary lymphadenopathy. 1.  Extensive cervical and bilateral axillary lymphadenopathy, compatible with a history of leukemia. 2.  Bilateral palatine tonsil hypertrophy causing moderate narrowing of the oropharyngeal airway on CT. Correlate with physical exam findings. 3.  Small pericardial effusion, which appears more dense than simple fluid.  This may represent a hemorrhagic or leukemic effusion. DUPLEX UPPER EXT VENOUS RIGHT    Result Date: 9/25/2021  Duplex and right upper cavity veins. HISTORY: Right arm edema. COMPARISON: None. FINDINGS: Duplex examination right upper extremity veins was performed using real time imaging, Doppler waveform analysis and color Doppler analysis. The veins are anechoic and completely compressible. Doppler waveforms and color Doppler analysis are normal. A PICC line is identified. No evidence of right upper extremity deep vein thrombosis. DUPLEX LOWER EXT VENOUS BILAT    Result Date: 9/22/2021  BILATERAL LOWER EXTREMITY VENOUS DUPLEX ULTRASOUND. HISTORY:  Pain. TECHNIQUE: Direct skin-contact high resolution grayscale images, color-flow Doppler and duplex waveform analysis. FINDINGS: There is compressibility, color-flow assignment and augmentation of the venous waveform with calf compression in the common femoral, superficial femoral and popliteal veins. Upper calf veins are unremarkable     Negative for sonographic evidence of deep venous thrombosis bilateral lower extremity. ECHO ADULT COMPLETE    Result Date: 9/23/2021  · TV: Mild to moderate tricuspid valve regurgitation is present. Right Ventricular Arterial Pressure (RVSP) is 65 mmHg. Pulmonary hypertension found to be moderate. · IVC: Moderately elevated central venous pressure (8 mmHg); IVC diameter is larger than 21 mm and collapses more than 50% with respiration. · Contrast used: DEFINITY. · LV: Estimated LVEF is 55 - 60%. Normal cavity size, wall thickness, systolic function (ejection fraction normal) and diastolic function. · PV: Mild pulmonic valve regurgitation is present. · Pericardium: Trivial pericardial effusion. · MV: Mild mitral valve regurgitation is present. CT BX BONE MARROW AND ASPIRATION    Result Date: 9/20/2021  Title: CT guided bone marrow biopsy.  History: Leukemia Consent: Informed written and oral consent was obtained from the patient after explanation of benefits and risks (including, but not limited to: Infection, Hemorrhage, Visceral Injury, Insufficient biopsy). The patient's questions were answered to their satisfaction. The patient stated understanding and requested that we proceed. Technique: All CT scans at this facility are performed using dose reduction/dose modulation techniques, as appropriate the performed exam, including the following:  Automated Exposure Control; Adjustment of the mA and/or kV according to patient size (this includes techniques or standardized protocols for targeted exams where dose is matched to indication/reason for exam); and Use of Iterative Reconstruction Technique. Procedure:  Sterile barrier technique (including:  cap, mask, sterile gloves, sterile sheet, hand hygiene, and cutaneous antisepsis) were used. With the patient prone a preliminary CT scan through the pelvis was performed with radio-opaque markers on the skin. Following routine prep and drape of the pelvis, a local field block with lidocaine was achieved. Using intermittent CT technique, a 11-G/13-G Arrow OnControl powered bone access needle  was advanced through the cortex of the left iliac bone. A bone marrow aspirate followed by core biopsy was obtained. Hemostasis was achieved with manual compression. A bandage was applied. Complications: None. Radiation Exposure Indices: Total Exam DLP = 619 mGy-cm Contrast:  0 milliliters. Medications:  Under physician supervision, intraservice time of  25 minutes of moderate intravenous conscious sedation was performed by administering Versed, Fentanyl intravenously. Continuous pulse oximetry, heart rate, and blood pressure monitoring was performed with an independent, trained observer present. Specimen: To Pathology. Uncomplicated CT guided bone marrow biopsy. Plan:  Bedrest observation for 1 hour.        EKG Results     Procedure 720 Value Units Date/Time    EKG, 12 LEAD, INITIAL [[id number]]     Order Status: Sent           Active Problems:  Patient Active Problem List    Diagnosis Date Noted    Sepsis ([facility] 09/30/2021    Neutropenic fever ([facility] 09/30/2021    Thrombocytopenia ([facility] 09/30/2021    Body mass index (BMI) of 40.0-44.9 in adult [facility]) 06/13/2019    Admission for antineoplastic chemotherapy 01/14/2019    Prolymphocytic leukemia of T-cell ([facility] 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

## 2021-10-01 NOTE — MANAGEMENT PLAN
[employer] [facility]        Inpatient Hematology / Oncology Plan of Care    Reason for Consult:  Sepsis [facility]) [A41.9]  Neutropenic fever ([facility] [address] [D70.9, R50.81]  Referring Physician:  Sinai Borges MD    History of Present Illness:  Mr. Mark Anthony Quiles is a 76 yo male patient with PMH of T-Cell Pro-Lymphocytic Leukemia. He was first diagnosed in late 2018. He received Alemtuzumab for 8 weeks with UT. He was switched to Pentostatin and received 14 doses with CR. He relapse 9/2021 and is sp cycle one ICE 9/23 with cycle two due on 10/7. Patient came to ED 9/30 with complaint of fever, chills, weakness, dry cough, increased BLE edema and SOB. In ED temp was 102.9. BC/UC obtained. CXR concerning for PNA. Started on cefe and vanc. HGB 9.6, platelets 9K, ANC 0.4, Bili 2. 6. He received 2 units platelets and today platelets are 69Q. We are asked to see patient and take over his care. Also of note patient has diffuse rash to lower extremities and purple colored palmar flexion creases. He noted that the symptoms started after being placed on Omincef 9/20/2021 for 10 days. We are asked to take over on our patient.      Allergies   Allergen Reactions    Campath [Alemtuzumab] Other (comments)     Rigors     Past Medical History:   Diagnosis Date    Back pain     occassionally    Cervical radiculopathy     Claustrophobia     DVT (deep venous thrombosis) ([facility] [address] 06/2019    currently treated with Xarelto- started on 5/30/2019    GERD (gastroesophageal reflux disease)     H/O seasonal allergies     Hyperlipidemia     Impotence     Insomnia     Lateral epicondylitis     Leukemia (HCC)     CHEMO    Obesity     BMI 36.6    Sleep apnea     noncomplaint with CPAP     Past Surgical History:   Procedure Laterality Date    HX CIRCUMCISION      HX COLONOSCOPY  2017    Due in 2027    HX ORTHOPAEDIC Right     r wrist    HX WISDOM TEETH EXTRACTION      IR INSERT TUNL CVC W PORT OVER 5 YEARS  6/19/2019    IR REMOVE TUNL CVAD W PORT/PUMP  12/14/2020     Family History   Problem Relation Age of Onset    Cancer Mother 80        pancreatic    Cancer Father 76        breast    No Known Problems Son     Hypertension Brother     Hypertension Brother     No Known Problems Daughter      Social History     Socioeconomic History    Marital status:      Spouse name: Not on file    Number of children: Not on file    Years of education: Not on file    Highest education level: Not on file   Occupational History    Not on file   Tobacco Use    Smoking status: Never Smoker    Smokeless tobacco: Never Used   Substance and Sexual Activity    Alcohol use: Not Currently     Alcohol/week: 0.0 standard drinks    Drug use: No    Sexual activity: Yes     Partners: Female   Other Topics Concern     Service Not Asked    Blood Transfusions Not Asked    Caffeine Concern Not Asked    Occupational Exposure Not Asked    Hobby Hazards Not Asked    Sleep Concern Not Asked    Stress Concern Not Asked    Weight Concern Not Asked    Special Diet Not Asked    Back Care Not Asked    Exercise Not Asked    Bike Helmet Not Asked    Seat Belt Not Asked    Self-Exams Not Asked   Social History Narrative    Not on file     Social Determinants of Health     Financial Resource Strain:     Difficulty of Paying Living Expenses:    Food Insecurity:     Worried About Running Out of Food in the Last Year:     [address] in the Last Year:    Transportation Needs:     Lack of Transportation (Medical):      Lack of Transportation (Non-Medical):    Physical Activity:     Days of Exercise per Week:     Minutes of Exercise per Session:    Stress:     Feeling of Stress :    Social Connections:     Frequency of Communication with Friends and Family:     Frequency of Social Gatherings with Friends and Family:     Attends Orthodoxy Services:     Active Member of Clubs or Organizations:     Attends Club or Organization Meetings:  Marital Status:    Intimate Partner Violence:     Fear of Current or Ex-Partner:     Emotionally Abused:     Physically Abused:     Sexually Abused:      Current Facility-Administered Medications   Medication Dose Route Frequency Provider Last Rate Last Admin    acyclovir (ZOVIRAX) tablet 400 mg  400 mg Oral BID Molly Carrasquillo MD        allopurinoL (ZYLOPRIM) tablet 300 mg  300 mg Oral DAILY Molly Carrasquillo MD        calcium carbonate (OS-DANIELA) tablet 500 mg [elemental]  500 mg Oral TID WITH MEALS Adin Cespedes MD        docusate sodium (COLACE) capsule 100 mg  100 mg Oral QHS Molly Carrasquillo MD   100 mg at 10/01/21 0109    fluconazole (DIFLUCAN) tablet 200 mg  200 mg Oral DAILY Molly Carrasquillo MD        HYDROcodone-homatropine (HYCODAN) 5-1.5 mg/5 mL (5 mL) oral solution 5 mL  5 mL Oral Q4H PRN Molly Carrasquillo MD        atorvastatin (LIPITOR) tablet 10 mg  10 mg Oral QHS Molly Carrasquillo MD   10 mg at 10/01/21 0109    pantoprazole (PROTONIX) tablet 40 mg  40 mg Oral ACB Molly Carrasquillo MD   40 mg at 10/01/21 0624    ondansetron (ZOFRAN ODT) tablet 8 mg  8 mg Oral Q8H PRN Molly Carrasquillo MD        sodium chloride (NS) flush 5-40 mL  5-40 mL IntraVENous Q8H Adin Cespedes MD   10 mL at 10/01/21 0618    sodium chloride (NS) flush 5-40 mL  5-40 mL IntraVENous PRN Molly Carrasquillo MD        acetaminophen (TYLENOL) tablet 650 mg  650 mg Oral Q6H PRN Molly Carrasquillo MD        Or    acetaminophen (TYLENOL) suppository 650 mg  650 mg Rectal Q6H PRN Molly Carrasquillo MD        polyethylene glycol [facility]) packet 17 g  17 g Oral DAILY Molly Carrasquillo MD        promethazine (PHENERGAN) tablet 12.5 mg  12.5 mg Oral Q6H PRN Molyl Carrasquillo MD        Or    ondansetron [facility]) injection 4 mg  4 mg IntraVENous Q6H PRN Molly Carrasquillo MD        cefepime (MAXIPIME) 1 g in 0.9% sodium chloride (MBP/ADV) 50 mL MBP 1 g IntraVENous Q8H Natalia Dean  mL/hr at 10/01/21 0623 1 g at 10/01/21 5058    oxyCODONE IR (ROXICODONE) tablet 5 mg  5 mg Oral Q4H PRN Natalia Dean MD        morphine injection 4 mg  4 mg IntraVENous Q4H PRN Mayte Hess MD        melatonin tablet 5 mg  5 mg Oral QHS Natalia Dean MD   5 mg at 10/01/21 0109    temazepam (RESTORIL) capsule 15 mg  15 mg Oral QHS PRN Natalia Dean MD        albuterol (PROVENTIL VENTOLIN) nebulizer solution 2.5 mg  2.5 mg Nebulization Q6H PRN Natalia Dean MD        vancomycin (VANCOCIN) 1500 mg in  ml infusion  1,500 mg IntraVENous Q12H Natalia Dean MD        sodium chloride (NS) flush 5-10 mL  5-10 mL IntraVENous Q8H [place] Pravin, NP   10 mL at 10/01/21 0639    sodium chloride (NS) flush 5-10 mL  5-10 mL IntraVENous PRN [place], NP        sodium chloride 0.9 % bolus infusion 1,000 mL  1,000 mL IntraVENous ONCE Georgiana Rivas MD        0.9% sodium chloride infusion 250 mL  250 mL IntraVENous PRN Georgiana Rivas MD           OBJECTIVE:  Patient Vitals for the past 8 hrs:   BP Temp Pulse Resp SpO2   10/01/21 0716 120/63 99.5 °F (37.5 °C) 96 22 98 %   10/01/21 0415     98 %   10/01/21 0404 117/65 97.9 °F (36.6 °C) (!) 101  95 %   10/01/21 0320 (!) 111/59 99.5 °F (37.5 °C) (!) 101  98 %   10/01/21 0240 118/60 97.1 °F (36.2 °C) (!) 104     10/01/21 0201 117/61 98.6 °F (37 °C) 90 15 95 %   10/01/21 0140 135/72 98.7 °F (37.1 °C) 95 17 96 %     Temp (24hrs), Av.1 °F (37.3 °C), Min:97.1 °F (36.2 °C), Max:102.9 °F (39.4 °C)    No intake/output data recorded. Physical Exam:  Constitutional: Ill appearing male in no acute distress, sitting comfortably in the hospital bed. HEENT: Normocephalic and atraumatic. Oropharynx is clear, mucous membranes are moist.  Pupils are equal, round, and reactive to light. Extraocular muscles are intact. Sclerae anicteric.    Skin Warm and dry. BLE red, swollen, diffuse rash BLE, purple colored palmar flexion creases   Respiratory Lungs sounds diminished. RLL rales   CVS Normal rate, regular rhythm and normal S1 and S2. No murmurs, gallops, or rubs. Abdomen Soft, nontender and nondistended, normoactive bowel sounds. No palpable mass. No hepatosplenomegaly. Neuro Grossly nonfocal with no obvious sensory or motor deficits. MSK Normal range of motion in general. BLE edema   Psych Appropriate mood and affect. Labs:    Recent Results (from the past 24 hour(s))   CULTURE, BLOOD    Collection Time: 09/30/21  7:47 PM    Specimen: Blood   Result Value Ref Range    Special Requests: LEFT  Antecubital        Culture result: NO GROWTH AFTER 11 HOURS     LACTIC ACID    Collection Time: 09/30/21  7:47 PM   Result Value Ref Range    Lactic acid 2.4 (H) 0.4 - 2.0 MMOL/L   CBC WITH AUTOMATED DIFF    Collection Time: 09/30/21  7:47 PM   Result Value Ref Range    WBC 35.1 (H) 4.3 - 11.1 K/uL    RBC 2.97 (L) 4.23 - 5.6 M/uL    HGB 9.6 (L) 13.6 - 17.2 g/dL    HCT 28.8 (L) 41.1 - 50.3 %    MCV 97.0 79.6 - 97.8 FL    MCH 32.3 26.1 - 32.9 PG    MCHC 33.3 31.4 - 35.0 g/dL    RDW 17.5 (H) 11.9 - 14.6 %    PLATELET 9 (LL) 641 - 450 K/uL    MPV Unable to calculate. Recommend adding IPF. 9.4 - 12.3 FL    ABSOLUTE NRBC 0.00 0.0 - 0.2 K/uL    LYMPHOCYTES 90 (H) 16 - 44 %    METAMYELOCYTES 1 %    BLASTS 9 %    ABS. NEUTROPHILS 0.4 (L) 1.7 - 8.2 K/UL    ABS.  LYMPHOCYTES 31.6 (H) 0.5 - 4.6 K/UL    RBC COMMENTS MODERATE  ANISOCYTOSIS + POIKILOCYTOSIS        RBC COMMENTS SLIGHT  OVALOCYTES        RBC COMMENTS SLIGHT  SCHISTOCYTES        RBC COMMENTS SLIGHT  SPHEROCYTES        WBC COMMENTS Result Confirmed By Smear      PLATELET COMMENTS DECREASED      DF MANUAL     METABOLIC PANEL, COMPREHENSIVE    Collection Time: 09/30/21  7:47 PM   Result Value Ref Range    Sodium 134 (L) 136 - 145 mmol/L    Potassium 4.5 3.5 - 5.1 mmol/L    Chloride 102 98 - 107 mmol/L CO2 24 21 - 32 mmol/L    Anion gap 8 7 - 16 mmol/L    Glucose 116 (H) 65 - 100 mg/dL    BUN 25 (H) 8 - 23 MG/DL    Creatinine 1.47 0.8 - 1.5 MG/DL    GFR est AA >60 >60 ml/min/1.73m2    GFR est non-AA 51 (L) >60 ml/min/1.73m2    Calcium 9.0 8.3 - 10.4 MG/DL    Bilirubin, total 2.6 (H) 0.2 - 1.1 MG/DL    ALT (SGPT) 77 (H) 12 - 65 U/L    AST (SGOT) 71 (H) 15 - 37 U/L    Alk. phosphatase 93 50 - 136 U/L    Protein, total 7.1 6.3 - 8.2 g/dL    Albumin 4.1 3.2 - 4.6 g/dL    Globulin 3.0 2.3 - 3.5 g/dL    A-G Ratio 1.4 1.2 - 3.5     NT-PRO BNP    Collection Time: 09/30/21  7:47 PM   Result Value Ref Range    NT pro- (H) 5 - 125 PG/ML   CULTURE, BLOOD    Collection Time: 09/30/21  9:53 PM    Specimen: Blood   Result Value Ref Range    Special Requests: RED PICC      Culture result: NO GROWTH AFTER 9 HOURS     COVID-19 RAPID TEST    Collection Time: 09/30/21  9:54 PM   Result Value Ref Range    Specimen source NASAL SWAB      COVID-19 rapid test Not detected NOTD     PLATELETS, ALLOCATE    Collection Time: 09/30/21 10:15 PM   Result Value Ref Range    Unit number [id number]     Blood component type PLTPH,LRIR,2     Unit division 00     Status of unit ISSUED     Unit number [id number]     Blood component type PLTPH,LRIR,1     Unit division 00     Status of unit ISSUED    LACTIC ACID    Collection Time: 09/30/21 10:18 PM   Result Value Ref Range    Lactic acid 1.3 0.4 - 2.0 MMOL/L   TYPE & SCREEN    Collection Time: 10/01/21 12:08 AM   Result Value Ref Range    Crossmatch Expiration 10/04/2021,2359     ABO/Rh(D) A POSITIVE     Antibody screen NEG    CULTURE, URINE    Collection Time: 10/01/21 12:36 AM    Specimen: Clean catch; Urine    URINE   Result Value Ref Range    Special Requests: NO SPECIAL REQUESTS      Culture result:        NO GROWTH AFTER SHORT PERIOD OF INCUBATION. FURTHER RESULTS TO FOLLOW AFTER OVERNIGHT INCUBATION.    METABOLIC PANEL, BASIC    Collection Time: 10/01/21  5:09 AM   Result Value Ref Range    Sodium 134 (L) 136 - 145 mmol/L    Potassium 4.5 3.5 - 5.1 mmol/L    Chloride 106 98 - 107 mmol/L    CO2 23 21 - 32 mmol/L    Anion gap 5 (L) 7 - 16 mmol/L    Glucose 135 (H) 65 - 100 mg/dL    BUN 24 (H) 8 - 23 MG/DL    Creatinine 1.18 0.8 - 1.5 MG/DL    GFR est AA >60 >60 ml/min/1.73m2    GFR est non-AA >60 >60 ml/min/1.73m2    Calcium 8.4 8.3 - 10.4 MG/DL   MAGNESIUM    Collection Time: 10/01/21  5:09 AM   Result Value Ref Range    Magnesium 2.4 1.8 - 2.4 mg/dL   CBC WITH AUTOMATED DIFF    Collection Time: 10/01/21  5:09 AM   Result Value Ref Range    WBC 21.8 (H) 4.3 - 11.1 K/uL    RBC 2.23 (L) 4.23 - 5.6 M/uL    HGB 7.3 (L) 13.6 - 17.2 g/dL    HCT 21.6 (L) 41.1 - 50.3 %    MCV 96.9 79.6 - 97.8 FL    MCH 32.7 26.1 - 32.9 PG    MCHC 33.8 31.4 - 35.0 g/dL    RDW 17.2 (H) 11.9 - 14.6 %    PLATELET 36 (L) 908 - 450 K/uL    MPV 11.5 9.4 - 12.3 FL    ABSOLUTE NRBC 0.00 0.0 - 0.2 K/uL    LYMPHOCYTES 86 (H) 16 - 44 %    BLASTS 14 %    ABS.  LYMPHOCYTES 18.7 (H) 0.5 - 4.6 K/UL    RBC COMMENTS SLIGHT  OVALOCYTES        RBC COMMENTS MODERATE  ANISOCYTOSIS + POIKILOCYTOSIS        RBC COMMENTS OCCASIONAL  SPHEROCYTES        WBC COMMENTS WBC'S APPEAR ABNORMAL/IMMATURE/ATYPICAL      PLATELET COMMENTS DECREASED      DF AUTOMATED         Imaging:  [unfilled]    ASSESSMENT:  Problem List  Date Reviewed: 6/11/2020        Codes Class Noted    * (Principal) Sepsis ([facility] ICD-10-CM: A41.9  ICD-9-CM: 038.9, 995.91  9/30/2021        Neutropenic fever ([facility] ICD-10-CM: D70.9, R50.81  ICD-9-CM: 288.00, 780.61  9/30/2021        Thrombocytopenia ([address] ICD-10-CM: D69.6  ICD-9-CM: 287.5  9/30/2021        Body mass index (BMI) of 40.0-44.9 in adult [facility]) (Chronic) ICD-10-CM: Z68.41  ICD-9-CM: V85.41  6/13/2019        Admission for antineoplastic chemotherapy ICD-10-CM: Z51.11  ICD-9-CM: V58.11  1/14/2019        Prolymphocytic leukemia of T-cell (HCC) (Chronic) ICD-10-CM: C91.60  ICD-9-CM: 204.90  1/8/2019        Severe obesity (BMI 35.0-39. 9) ICD-10-CM: E66.01  ICD-9-CM: 278.01  9/6/2018        Benign prostatic hyperplasia with nocturia (Chronic) ICD-10-CM: N40.1, R35.1  ICD-9-CM: 600.01, 788.43  1/23/2018        Severe obstructive sleep apnea (Chronic) ICD-10-CM: G47.33  ICD-9-CM: 327.23  1/12/2017        Pure hypercholesterolemia (Chronic) ICD-10-CM: E78.00  ICD-9-CM: 272.0  12/22/2016        Essential hypertension, benign (Chronic) ICD-10-CM: I10  ICD-9-CM: 401.1  9/21/2015        Impotence ICD-10-CM: N52.9  ICD-9-CM: 607.84  9/21/2015        DDD (degenerative disc disease), cervical ICD-10-CM: M50.30  ICD-9-CM: 722.4  9/21/2015        DDD (degenerative disc disease), lumbar ICD-10-CM: M51.36  ICD-9-CM: 722.52  9/21/2015        Right shoulder pain ICD-10-CM: M25.511  ICD-9-CM: 719.41  9/21/2015        First degree hemorrhoids ICD-10-CM: K64.0  ICD-9-CM: 455.0  9/21/2015                RECOMMENDATIONS:  Relapsed T Cell Pro-lymphocytic leukemia  -sp cycle one ICE 9/23 followed by Neil Kwan. Next cycle due 10/7    Neutropenic fever/CXR concerning for PNA  -Tmax 102.9. Day 2 cefe and vanc. BC/UC pending    Pancytopenia   -?hemolysis-peripheral smear-bili elevated   -Juan Jose SOPs  -Give Granix    BLE diffuse rash/palmar creases purplish color  -? Reaction due to Onmicef  -add Kenalog cream    Dry cough  -hycodan      Lab studies and imaging studies were personally reviewed. Pertinent old records were reviewed. Thank you for allowing us to participate in the care of Mr. Marilyn Daly. Formal consult note by Dr. Heena Paul to follow. We will gladly take over the care of our patient.           Alli Jensen NP   [facility]ematology [facility] On[facility]  [address] [place] [address] Avenue  Office : [phone]  Fax : [phone]

## 2021-10-02 NOTE — PROGRESS NOTES
Vancomycin Consult    MD ordering: Rubina ROBLERO following? no  Indication: Febrile Neutropenia  DOT:  -  days    Ht Readings from Last 1 Encounters:   21 5' 11\" (1.803 m)      Wt Readings from Last 1 Encounters:   10/01/21 126.6 kg (279 lb 1.6 oz)         Temp (24hrs), Av.3 °F (36.8 °C), Min:97.8 °F (36.6 °C), Max:98.8 °F (37.1 °C)    Dosing weight: 126 kg  Body mass index is 38.93 kg/m². 77 y.o. Recent Labs     10/02/21  0334 10/01/21  0509 21  2218 21  1947   BUN 21 24*  --  25*   CREA 1.01 1.18  --  1.47   WBC 17.5* 21.8*  --  35.1*   LAC  --   --  1.3 2.4*     Estimated Creatinine Clearance: 97.5 mL/min (based on SCr of 1.01 mg/dL). Lab Results   Component Value Date/Time    Vancomycin,trough 20.1 (HH) 2019 12:35 AM    Vancomycin, random 25.4 10/02/2021 03:34 AM       Day 2 Assessment and Plan:  Vancomycin Random 25.4, with range for trough and AUC goals. Will continue dose for now. Pharmacy to continue to monitor and follow.      Holly Fair, OMIDD

## 2021-10-02 NOTE — PROGRESS NOTES
[facility] Hematology & Oncology        Inpatient Hematology / Oncology Progress Note      Admission Date: 2021  8:28 PM  Reason for Admission/Hospital Course: Sepsis ([facility] [A41.9]  Neutropenic fever ([facility] [D70.9, R50.81]      24 Hour Events:  No fevers overnight  Looks more comfortable today  Day 3 cefe/vanc  BP soft  Ongoing LE swelling    ROS:  Constitutional: negative for fever, chills. +weakness, malaise, fatigue. CV:  negative for chest pain, palpitations. +edema. Respiratory: Positive for dyspnea and dry cough  GI: negative for nausea, abdominal pain, diarrhea. 10 point review of systems is otherwise negative with the exception of the elements mentioned above in the HPI. Allergies   Allergen Reactions    Campath [Alemtuzumab] Other (comments)     Rigors       OBJECTIVE:  Patient Vitals for the past 8 hrs:   BP Temp Pulse Resp SpO2   10/02/21 0345 108/60 98.2 °F (36.8 °C) 90 18 96 %   10/01/21 2328 102/64 97.8 °F (36.6 °C) 89 20 97 %     Temp (24hrs), Av.5 °F (36.9 °C), Min:97.8 °F (36.6 °C), Max:99.5 °F (37.5 °C)    No intake/output data recorded. Physical Exam:  Constitutional: Ill appearing male in no acute distress, sitting comfortably in the hospital bed. HEENT: Normocephalic and atraumatic. Oropharynx is clear, mucous membranes are moist.  Pupils are equal, round, and reactive to light. Extraocular muscles are intact.  Sclerae anicteric. Skin Warm and dry.  BLE red, swollen, diffuse rash BLE, purple colored palmar flexion creases   Respiratory Lungs sounds diminished with  Rales bilaterally   CVS Normal rate, regular rhythm and normal S1 and S2.  No murmurs, gallops, or rubs. Abdomen Soft, nontender and nondistended, normoactive bowel sounds.  No palpable mass.  No hepatosplenomegaly. Neuro Grossly nonfocal with no obvious sensory or motor deficits.    MSK Normal range of motion in general. BLE edema   Psych Appropriate mood and affect.           Labs:      Recent Labs 10/02/21  0334 10/01/21  0509 09/30/21  1947   WBC 17.5* 21.8* 35.1*   RBC 2.25* 2.23* 2.97*   HGB 7.3* 7.3* 9.6*   HCT 21.9* 21.6* 28.8*   MCV 97.3 96.9 97.0   MCH 32.4 32.7 32.3   MCHC 33.3 33.8 33.3   RDW 17.1* 17.2* 17.5*   PLT 28* 36* 9*   LYMPH  --  86* 90*   DF PENDING AUTOMATED MANUAL   ANEU  --   --  0.4*   ABL  --  18.7* 31.6*        Recent Labs     10/02/21  0334 10/01/21  0509 09/30/21  1947    134* 134*   K 4.4 4.5 4.5   * 106 102   CO2 25 23 24   AGAP 4* 5* 8   * 135* 116*   BUN 21 24* 25*   CREA 1.01 1.18 1.47   GFRAA >60 >60 >60   GFRNA >60 >60 51*   CA 8.1* 8.4 9.0   AP 67  --  93   TP 5.7*  --  7.1   ALB 3.2  --  4.1   GLOB 2.5  --  3.0   AGRAT 1.3  --  1.4   MG  --  2.4  --          Imaging:    Medications:  Current Facility-Administered Medications   Medication Dose Route Frequency    acyclovir (ZOVIRAX) tablet 400 mg  400 mg Oral BID    allopurinoL (ZYLOPRIM) tablet 300 mg  300 mg Oral DAILY    calcium carbonate (OS-DANIELA) tablet 500 mg [elemental]  500 mg Oral TID WITH MEALS    docusate sodium (COLACE) capsule 100 mg  100 mg Oral QHS    fluconazole (DIFLUCAN) tablet 200 mg  200 mg Oral DAILY    HYDROcodone-homatropine (HYCODAN) 5-1.5 mg/5 mL (5 mL) oral solution 5 mL  5 mL Oral Q4H PRN    atorvastatin (LIPITOR) tablet 10 mg  10 mg Oral QHS    pantoprazole (PROTONIX) tablet 40 mg  40 mg Oral ACB    ondansetron (ZOFRAN ODT) tablet 8 mg  8 mg Oral Q8H PRN    sodium chloride (NS) flush 5-40 mL  5-40 mL IntraVENous Q8H    sodium chloride (NS) flush 5-40 mL  5-40 mL IntraVENous PRN    acetaminophen (TYLENOL) tablet 650 mg  650 mg Oral Q6H PRN    Or    acetaminophen (TYLENOL) suppository 650 mg  650 mg Rectal Q6H PRN    polyethylene glycol (MIRALAX) packet 17 g  17 g Oral DAILY    promethazine (PHENERGAN) tablet 12.5 mg  12.5 mg Oral Q6H PRN    Or    ondansetron (ZOFRAN) injection 4 mg  4 mg IntraVENous Q6H PRN    cefepime (MAXIPIME) 1 g in 0.9% sodium chloride (MBP/ADV) 50 mL MBP  1 g IntraVENous Q8H    oxyCODONE IR (ROXICODONE) tablet 5 mg  5 mg Oral Q4H PRN    morphine injection 4 mg  4 mg IntraVENous Q4H PRN    melatonin tablet 5 mg  5 mg Oral QHS    temazepam (RESTORIL) capsule 15 mg  15 mg Oral QHS PRN    albuterol (PROVENTIL VENTOLIN) nebulizer solution 2.5 mg  2.5 mg Nebulization Q6H PRN    vancomycin (VANCOCIN) 1500 mg in  ml infusion  1,500 mg IntraVENous Q12H    tbo-filgrastim (GRANIX) injection 480 mcg  480 mcg SubCUTAneous DAILY    0.9% sodium chloride infusion  75 mL/hr IntraVENous CONTINUOUS    triamcinolone acetonide (KENALOG) 0.1 % cream   Topical TID    sodium chloride (NS) flush 5-10 mL  5-10 mL IntraVENous Q8H    sodium chloride (NS) flush 5-10 mL  5-10 mL IntraVENous PRN    0.9% sodium chloride infusion 250 mL  250 mL IntraVENous PRN         ASSESSMENT:    Problem List  Date Reviewed: 6/11/2020        Codes Class Noted    * (Principal) Sepsis ([facility] ICD-10-CM: A41.9  ICD-9-CM: 038.9, 995.91  9/30/2021        Neutropenic fever ([facility] ICD-10-CM: D70.9, R50.81  ICD-9-CM: 288.00, 780.61  9/30/2021        Thrombocytopenia (HCC) ICD-10-CM: D69.6  ICD-9-CM: 287.5  9/30/2021        Body mass index (BMI) of 40.0-44.9 in adult [facility]) (Chronic) ICD-10-CM: Z68.41  ICD-9-CM: V85.41  6/13/2019        Admission for antineoplastic chemotherapy ICD-10-CM: Z51.11  ICD-9-CM: V58.11  1/14/2019        Prolymphocytic leukemia of T-cell (HCC) (Chronic) ICD-10-CM: C91.60  ICD-9-CM: 204.90  1/8/2019        Severe obesity (BMI 35.0-39. 9) ICD-10-CM: E66.01  ICD-9-CM: 278.01  9/6/2018        Benign prostatic hyperplasia with nocturia (Chronic) ICD-10-CM: N40.1, R35.1  ICD-9-CM: 600.01, 788.43  1/23/2018        Severe obstructive sleep apnea (Chronic) ICD-10-CM: G47.33  ICD-9-CM: 327.23  1/12/2017        Pure hypercholesterolemia (Chronic) ICD-10-CM: E78.00  ICD-9-CM: 272.0  12/22/2016        Essential hypertension, benign (Chronic) ICD-10-CM: I10  ICD-9-CM: 401.1 9/21/2015        Impotence ICD-10-CM: N52.9  ICD-9-CM: 607.84  9/21/2015        DDD (degenerative disc disease), cervical ICD-10-CM: M50.30  ICD-9-CM: 722.4  9/21/2015        DDD (degenerative disc disease), lumbar ICD-10-CM: M51.36  ICD-9-CM: 722.52  9/21/2015        Right shoulder pain ICD-10-CM: M25.511  ICD-9-CM: 719.41  9/21/2015        First degree hemorrhoids ICD-10-CM: K64.0  ICD-9-CM: 455.0  9/21/2015          Mr. Ciro Pallas is a 78 yo male patient with PMH of T-Cell Pro-Lymphocytic Leukemia. He was first diagnosed in late 2018. He received Alemtuzumab for 8 weeks with MD. He was switched to Pentostatin and received 14 doses with CR. He relapse 9/2021 and is sp cycle one ICE 9/23 with cycle two due on 10/7. Patient came to ED 9/30 with complaint of fever, chills, weakness, dry cough, increased BLE edema and SOB. In ED temp was 102.9. BC/UC obtained. CXR concerning for PNA. Started on cefe and vanc. HGB 9.6, platelets 9K, ANC 0.4, Bili 2. 6. He received 2 units platelets and today platelets are 48Z. KP are asked to see patient and take over his care.      Also of note patient has diffuse rash to lower extremities and purple colored palmar flexion creases. He noted that the symptoms started after being placed on Omincef 9/20/2021 for 10 days. We are asked to take over on our patient.          PLAN:    Relapsed T Cell Pro-lymphocytic leukemia  -sp cycle one ICE 9/23 followed by Elizabeth Khanna. Next cycle due 10/7     Neutropenic fever/CXR concerning for PNA  -Tmax 102.9. Day 2 cefe and vanc. BC/UC pending     Pancytopenia   -?hemolysis-peripheral smear-bili elevated   -Juan Jose SOPs  -Give Granix  10/2 ANC not measurable. Continue Granix.     BLE diffuse rash/palmar creases purplish color  -? Reaction due to Onmicef  -add Kenalog cream     BLE edema  10/2 give albumin and lasix. Monitor BP    Dry cough  -yovani Ingram SOPs    Goals and plan of care reviewed with the patient.   All questions answered to the best of our ability.             Alea Amor NP   [facility] Hematology & Oncology  [address] [address]  Office : [phone]  Fax : [phone]

## 2021-10-02 NOTE — PROGRESS NOTES
Patient Vitals for the past 12 hrs:   Temp Pulse Resp BP SpO2   10/02/21 1546 98.6 °F (37 °C) 92 18 120/78 99 %   10/02/21 1120 98 °F (36.7 °C) 89 20 (!) 141/88 97 %   10/02/21 0731 98.1 °F (36.7 °C) 86 20 96/64 97 %     NP notified of low bp this AM.  Continuous fluids dcd. Albumin and lasix per orders.     Bedside shift report given to karlos Gee RNs

## 2021-10-02 NOTE — PROGRESS NOTES
END OF SHIFT NOTE:    Intake/Output  10/01 1901 - 10/02 0700  In: 474 [P.O.:474]  Out: 1450 [Urine:1450]   Voiding: YES  Catheter: NO  Drain:              Stool:  0 occurrences. Stool Assessment  Stool Color: Karolina Dally (09/30/21 2056)  Stool Appearance: Formed (09/30/21 2056)  Stool Amount: Medium (09/30/21 2056)  Stool Source/Status: Rectum (09/30/21 2056)    Emesis:  0 occurrences. VITAL SIGNS  Patient Vitals for the past 12 hrs:   Temp Pulse Resp BP SpO2   10/02/21 0345 98.2 °F (36.8 °C) 90 18 108/60 96 %   10/01/21 2328 97.8 °F (36.6 °C) 89 20 102/64 97 %   10/01/21 2035     99 %   10/01/21 2019     99 %   10/01/21 1936 98.8 °F (37.1 °C) 90 20 115/75 100 %       Pain Assessment  Pain 1  Pain Scale 1: Visual (10/02/21 0316)  Pain Intensity 1: 0 (10/02/21 0316)  Patient Stated Pain Goal: 0 (10/02/21 0316)    Ambulating  Yes    Additional Information:   Restoril x1  Hycodan x1    Shift report given to oncoming nurse at the bedside.     Heidy Parra RN

## 2021-10-02 NOTE — PROGRESS NOTES
Problem: Pressure Injury - Risk of  Goal: *Prevention of pressure injury  Description: Document Tu Scale and appropriate interventions in the flowsheet. Outcome: Progressing Towards Goal  Note: Pressure Injury Interventions:       Moisture Interventions: Absorbent underpads, Apply protective barrier, creams and emollients, Limit adult briefs    Activity Interventions: Assess need for specialty bed, Chair cushion, Pressure redistribution bed/mattress(bed type)    Mobility Interventions: Assess need for specialty bed, Chair cushion, PT/OT evaluation    Nutrition Interventions: Document food/fluid/supplement intake                     Problem: Patient Education: Go to Patient Education Activity  Goal: Patient/Family Education  Outcome: Progressing Towards Goal     Problem: Falls - Risk of  Goal: *Absence of Falls  Description: Document [place] Flow Fall Risk and appropriate interventions in the flowsheet.   Outcome: Progressing Towards Goal  Note: Fall Risk Interventions:  Mobility Interventions: Assess mobility with egress test, Bed/chair exit alarm, Patient to call before getting OOB         Medication Interventions: Assess postural VS orthostatic hypotension, Bed/chair exit alarm, Patient to call before getting OOB                   Problem: Patient Education: Go to Patient Education Activity  Goal: Patient/Family Education  Outcome: Progressing Towards Goal

## 2021-10-02 NOTE — PROGRESS NOTES
10/01/21 2035   Oxygen Therapy   O2 Sat (%) 99 %   Pulse via Oximetry 90 beats per minute   O2 Device Nasal cannula   O2 Flow Rate (L/min) 2 l/min   FIO2 (%) 28 %   Pt declined CPAP. Stated he couldn't rest with it. [place] like he was up most of the night with it. Pt prefers to wear the O2 via NC 2lpm. RN tried to encourage pt to wear CPAP, pt still declined.

## 2021-10-02 NOTE — PROGRESS NOTES
Problem: Pressure Injury - Risk of  Goal: *Prevention of pressure injury  Description: Document Tu Scale and appropriate interventions in the flowsheet. Outcome: Progressing Towards Goal  Note: Pressure Injury Interventions:       Moisture Interventions: Absorbent underpads, Apply protective barrier, creams and emollients, Limit adult briefs    Activity Interventions: Pressure redistribution bed/mattress(bed type), Increase time out of bed    Mobility Interventions: Assess need for specialty bed, Pressure redistribution bed/mattress (bed type)    Nutrition Interventions: Document food/fluid/supplement intake, Offer support with meals,snacks and hydration                     Problem: Falls - Risk of  Goal: *Absence of Falls  Description: Document Anna Fall Risk and appropriate interventions in the flowsheet.   Outcome: Progressing Towards Goal  Note: Fall Risk Interventions:  Mobility Interventions: Communicate number of staff needed for ambulation/transfer, Patient to call before getting OOB         Medication Interventions: Evaluate medications/consider consulting pharmacy, Patient to call before getting OOB, Teach patient to arise slowly

## 2021-10-03 NOTE — PROGRESS NOTES
Patient Vitals for the past 12 hrs:   Temp Pulse Resp BP SpO2   10/03/21 1605 98 °F (36.7 °C) 86 18 (!) 109/59 100 %   10/03/21 1500 98.2 °F (36.8 °C) 85 20 113/69 94 %   10/03/21 1359 98 °F (36.7 °C) 86 18 113/67 100 %   10/03/21 1332 97.8 °F (36.6 °C) 82 18 (!) 112/59 100 %   10/03/21 1049 97.9 °F (36.6 °C) 86 22 (!) 97/58 98 %   10/03/21 0713 97.8 °F (36.6 °C) 85 24 102/68 96 %     1unit PRBCs transfused per eladio. 40mg IV lasix given x's 1 post blood. NP aware pt's bp on lower end. Cathflow inserted to red lumen with resolution. Pt complained of dizziness around 1800. RN to bedside to take vitals and assess. Sitting and standing bp & hr complete. Exam negative. Pt not dizzy at the time RN at bedside. Pt helped to bed.     Bedside shift report given to [facility], oncoming RN

## 2021-10-03 NOTE — PROGRESS NOTES
END OF SHIFT NOTE:    Intake/Output  No intake/output data recorded. Voiding: YES  Catheter: NO  Drain:              Stool:  0 occurrences. Stool Assessment  Stool Color: [place] Dew (09/30/21 2056)  Stool Appearance: Hard (10/02/21 1930)  Stool Amount: Medium (09/30/21 2056)  Stool Source/Status: Rectum (09/30/21 2056)    Emesis:  0 occurrences. VITAL SIGNS  Patient Vitals for the past 12 hrs:   Temp Pulse Resp BP SpO2   10/03/21 0238 98 °F (36.7 °C) 95 14 (!) 94/54 97 %   10/02/21 2241 98.8 °F (37.1 °C) 97 22 (!) 102/59 95 %   10/02/21 2053     97 %   10/02/21 1931 98.5 °F (36.9 °C) 92 22 117/74 97 %       Pain Assessment  Pain 1  Pain Scale 1: Numeric (0 - 10) (10/03/21 0238)  Pain Intensity 1: 0 (10/03/21 0238)  Patient Stated Pain Goal: 0 (10/03/21 0238)  Pain Reassessment 1: Yes (10/02/21 1051)  Pain Location 1: Leg (10/02/21 1746)  Pain Orientation 1: Left;Right (10/02/21 1746)  Pain Description 1: Tightness (10/02/21 1746)  Pain Intervention(s) 1: Medication (see MAR) (10/02/21 1746)    Ambulating  Yes    Additional Information:    Pt will get out of bed without notifying staff. Bed alarm armed. HGB 6.6, PLT 15. Shift report given to oncoming nurse at the bedside.     Radha Han, RN

## 2021-10-03 NOTE — PROGRESS NOTES
Problem: Pressure Injury - Risk of  Goal: *Prevention of pressure injury  Description: Document Tu Scale and appropriate interventions in the flowsheet. Outcome: Progressing Towards Goal  Note: Pressure Injury Interventions:       Moisture Interventions: Absorbent underpads    Activity Interventions: Increase time out of bed, Pressure redistribution bed/mattress(bed type), Assess need for specialty bed    Mobility Interventions: Pressure redistribution bed/mattress (bed type), Assess need for specialty bed    Nutrition Interventions: Document food/fluid/supplement intake, Offer support with meals,snacks and hydration                     Problem: Falls - Risk of  Goal: *Absence of Falls  Description: Document Anna Fall Risk and appropriate interventions in the flowsheet.   Outcome: Progressing Towards Goal  Note: Fall Risk Interventions:  Mobility Interventions: Communicate number of staff needed for ambulation/transfer, Patient to call before getting OOB         Medication Interventions: Evaluate medications/consider consulting pharmacy, Patient to call before getting OOB, Teach patient to arise slowly    Elimination Interventions: Bed/chair exit alarm, Toilet paper/wipes in reach, Toileting schedule/hourly rounds, Patient to call for help with toileting needs

## 2021-10-04 NOTE — PROGRESS NOTES
Patient Vitals for the past 12 hrs:   Temp Pulse Resp BP SpO2   10/04/21 1535 98 °F (36.7 °C) 88 20 127/69 98 %   10/04/21 1203 98 °F (36.7 °C) 72 19 115/80 97 %   10/04/21 0859 97 °F (36.1 °C) 79 20 127/88 97 %   10/04/21 0753     98 %     Lasix 40mg IV x's 1. Pt complained of \"panic attack\" while getting up to urinate. Stated he would start breathing heavy. Upon further questions, pt seemed to have issue with flow. Chung Balderas NP notified. UA ordered & collected. OT/PT consult per orders. PRN hycodan given x's 1 for cough. Granix dc'd per NP.       Bedside shift report given to [facility]

## 2021-10-04 NOTE — PROGRESS NOTES
10/04/21 0753   Oxygen Therapy   O2 Sat (%) 98 %   Pulse via Oximetry 88 beats per minute   O2 Device None (Room air)   O2 Flow Rate (L/min) 0 l/min   Pt is seating in chair and states that he has been wearing O2 at 3L HS only.

## 2021-10-04 NOTE — PROGRESS NOTES
VANCO DAILY FOLLOW UP NOTE  [address] [facility] Pharmacokinetic Monitoring Service - Vancomycin    Consulting Provider: LUNA Grady    Indication: FN/ sepsis  Target Concentration: Goal AUC/KATI 400-600 mg*hr/L  Day of Therapy: 5  Additional Antimicrobials: cefepime    Pertinent Laboratory Values: Wt Readings from Last 1 Encounters:   10/02/21 123.9 kg (273 lb 3.2 oz)     Temp Readings from Last 1 Encounters:   10/04/21 98.6 °F (37 °C)     No components found for: PROCAL  Recent Labs     10/04/21  0209 10/03/21  0420 10/02/21  0334   BUN 21 22 21   CREA 0.99 1.00 1.01   WBC 17.5* 17.6* 17.5*     Estimated Creatinine Clearance: 98.3 mL/min (based on SCr of 0.99 mg/dL). Lab Results   Component Value Date/Time    Vancomycin,trough 20.1 (HH) 03/27/2019 12:35 AM    Vancomycin, random 30.5 10/04/2021 02:09 AM       MRSA Nasal Swab: N/A. Non-respiratory infection. .      Assessment:  Date/Time Dose Concentration AUC   10/4 at 0209 1500 mg IV q12h 30.5 707   Note: Serum concentrations collected for AUC dosing may appear elevated if collected in close proximity to the dose administered, this is not necessarily an indication of toxicity    Plan:  1. Current dosing regimen is supra-therapeutic  2. Decrease dose to Vanc 1000 mg IV q12h with predicted AUC: 442 and trough: 14.7. Next dose at 1800 this evening to allow patient to clear vancomycin down to ~16   3. Repeat vancomycin concentration 10/6 @ 0400   4.  Pharmacy will continue to monitor patient and adjust therapy as indicated    Thank you for the consult,  Kylie Mansfield, PharmD, [address] [facility] Pharmacist  [phone]

## 2021-10-04 NOTE — PROGRESS NOTES
[facility] Hematology & Oncology        Inpatient Hematology / Oncology Progress Note      Admission Date: 2021  8:28 PM  Reason for Admission/Hospital Course: Sepsis ([facility] [A41.9]  Neutropenic fever ([facility] [D70.9, R50.81]      24 Hour Events:  Tmax 100, VSS, on RA / wears 3L at night  On Cef/Vanc for PNA (D4)  Ongoing GENTILE  Wife at bedside    Transfusions: None  Replacements: None    ROS:  Constitutional: Negative for fever, chills. +weakness, malaise, fatigue. CV:  negative for chest pain, palpitations. +edema. Respiratory: Positive for dyspnea and cough  GI: negative for nausea, abdominal pain, diarrhea. 10 point review of systems is otherwise negative with the exception of the elements mentioned above in the HPI. Allergies   Allergen Reactions    Campath [Alemtuzumab] Other (comments)     Rigors       OBJECTIVE:  Patient Vitals for the past 8 hrs:   BP Temp Pulse Resp SpO2   10/04/21 0753     98 %   10/04/21 0210 114/71 98.6 °F (37 °C) 85 15 97 %   10/04/21 0121  98.7 °F (37.1 °C)        Temp (24hrs), Av.5 °F (36.9 °C), Min:97.8 °F (36.6 °C), Max:100 °F (37.8 °C)    No intake/output data recorded. Physical Exam:  Constitutional: Ill appearing male in no acute distress, sitting comfortably in the hospital bed. HEENT: Normocephalic and atraumatic. Oropharynx is clear, mucous membranes are moist.  Pupils are equal, round, and reactive to light. Extraocular muscles are intact.  Sclerae anicteric. Skin Warm and dry.  BLE red, swollen, diffuse rash BLE, purple colored palmar flexion creases   Respiratory Lungs with rhonchi at LLL. CVS Normal rate, regular rhythm and normal S1 and S2.  No murmurs, gallops, or rubs. Abdomen Soft, nontender and nondistended, normoactive bowel sounds.  No palpable mass.  No hepatosplenomegaly. Neuro Grossly nonfocal with no obvious sensory or motor deficits.    MSK Normal range of motion in general. 2+ BLE edema   Psych Appropriate mood and affect.           Labs:      Recent Labs     10/04/21  0209 10/03/21  0420 10/02/21  0334   WBC 17.5* 17.6* 17.5*   RBC 2.20* 2.01* 2.25*   HGB 7.2* 6.6* 7.3*   HCT 21.1* 19.5* 21.9*   MCV 95.9 97.0 97.3   MCH 32.7 32.8 32.4   MCHC 34.1 33.8 33.3   RDW 17.1* 16.7* 17.1*   PLT 11* 15* 28*   LYMPH 85* 81* 70*   MONOS  --   --  11*   DF MANUAL MANUAL MANUAL   ABL 14.9* 14.3* 12.3*   ABM  --   --  1.9*        Recent Labs     10/04/21  0209 10/03/21  0420 10/02/21  0334    135* 137   K 4.4 4.2 4.4    106 108*   CO2 24 24 25   AGAP 6* 5* 4*   * 108* 117*   BUN 21 22 21   CREA 0.99 1.00 1.01   GFRAA >60 >60 >60   GFRNA >60 >60 >60   CA 8.2* 8.1* 8.1*   AP 67 63 67   TP 6.1* 5.7* 5.7*   ALB 3.4 3.3 3.2   GLOB 2.7 2.4 2.5   AGRAT 1.3 1.4 1.3         Imaging:  XR CHEST PORT [[id number]] Collected: 09/30/21 1936   Order Status: Completed Updated: 09/30/21 1941   Narrative:     CHEST X-RAY, single portable view  9/30/2021     History: Vomiting and chills. Cough that is been going on for a while. Technique: Single frontal view of the chest.     Comparison: Chest x-ray 9/25/2021     Findings: The cardiac silhouette is mild to moderately enlarged although stable.  The   lungs are expanded without evidence for pneumothorax.   Evolving airspace   changes and small effusion are seen in the left lung base. Impression:     1.  Evolving airspace changes and small effusion suggested in the left lung   base. Signs/symptoms of pneumonia should be excluded.           Medications:  Current Facility-Administered Medications   Medication Dose Route Frequency    vancomycin (VANCOCIN) 1250 mg in  ml infusion  1,250 mg IntraVENous Q12H    0.9% sodium chloride infusion 250 mL  250 mL IntraVENous PRN    diphenhydrAMINE (BENADRYL) capsule 25 mg  25 mg Oral Q6H PRN    acetaminophen (TYLENOL) tablet 650 mg  650 mg Oral Q6H PRN    Or    acetaminophen (TYLENOL) suppository 650 mg  650 mg Rectal Q6H PRN    [Held by provider] tbo-filgrastim (GRANIX) injection 480 mcg  480 mcg SubCUTAneous DAILY    acyclovir (ZOVIRAX) tablet 400 mg  400 mg Oral BID    allopurinoL (ZYLOPRIM) tablet 300 mg  300 mg Oral DAILY    calcium carbonate (OS-DANIELA) tablet 500 mg [elemental]  500 mg Oral TID WITH MEALS    docusate sodium (COLACE) capsule 100 mg  100 mg Oral QHS    fluconazole (DIFLUCAN) tablet 200 mg  200 mg Oral DAILY    HYDROcodone-homatropine (HYCODAN) 5-1.5 mg/5 mL (5 mL) oral solution 5 mL  5 mL Oral Q4H PRN    atorvastatin (LIPITOR) tablet 10 mg  10 mg Oral QHS    pantoprazole (PROTONIX) tablet 40 mg  40 mg Oral ACB    ondansetron (ZOFRAN ODT) tablet 8 mg  8 mg Oral Q8H PRN    sodium chloride (NS) flush 5-40 mL  5-40 mL IntraVENous Q8H    sodium chloride (NS) flush 5-40 mL  5-40 mL IntraVENous PRN    polyethylene glycol (MIRALAX) packet 17 g  17 g Oral DAILY    promethazine (PHENERGAN) tablet 12.5 mg  12.5 mg Oral Q6H PRN    Or    ondansetron (ZOFRAN) injection 4 mg  4 mg IntraVENous Q6H PRN    cefepime (MAXIPIME) 1 g in 0.9% sodium chloride (MBP/ADV) 50 mL MBP  1 g IntraVENous Q8H    oxyCODONE IR (ROXICODONE) tablet 5 mg  5 mg Oral Q4H PRN    morphine injection 4 mg  4 mg IntraVENous Q4H PRN    melatonin tablet 5 mg  5 mg Oral QHS    temazepam (RESTORIL) capsule 15 mg  15 mg Oral QHS PRN    albuterol (PROVENTIL VENTOLIN) nebulizer solution 2.5 mg  2.5 mg Nebulization Q6H PRN    triamcinolone acetonide (KENALOG) 0.1 % cream   Topical TID    sodium chloride (NS) flush 5-10 mL  5-10 mL IntraVENous PRN    0.9% sodium chloride infusion 250 mL  250 mL IntraVENous PRN         ASSESSMENT:    Problem List  Date Reviewed: 6/11/2020        Codes Class Noted    * (Principal) Sepsis ([facility] ICD-10-CM: A41.9  ICD-9-CM: 038.9, 995.91  9/30/2021        Neutropenic fever ([facility] ICD-10-CM: D70.9, R50.81  ICD-9-CM: 288.00, 780.61  9/30/2021        Thrombocytopenia (HCC) ICD-10-CM: D69.6  ICD-9-CM: 287.5  9/30/2021        Body mass index (BMI) of 40.0-44.9 in adult [facility]) (Chronic) ICD-10-CM: Z68.41  ICD-9-CM: V85.41  6/13/2019        Admission for antineoplastic chemotherapy ICD-10-CM: Z51.11  ICD-9-CM: V58.11  1/14/2019        Prolymphocytic leukemia of T-cell (HCC) (Chronic) ICD-10-CM: C91.60  ICD-9-CM: 204.90  1/8/2019        Severe obesity (BMI 35.0-39. 9) ICD-10-CM: E66.01  ICD-9-CM: 278.01  9/6/2018        Benign prostatic hyperplasia with nocturia (Chronic) ICD-10-CM: N40.1, R35.1  ICD-9-CM: 600.01, 788.43  1/23/2018        Severe obstructive sleep apnea (Chronic) ICD-10-CM: G47.33  ICD-9-CM: 327.23  1/12/2017        Pure hypercholesterolemia (Chronic) ICD-10-CM: E78.00  ICD-9-CM: 272.0  12/22/2016        Essential hypertension, benign (Chronic) ICD-10-CM: I10  ICD-9-CM: 401.1  9/21/2015        Impotence ICD-10-CM: N52.9  ICD-9-CM: 607.84  9/21/2015        DDD (degenerative disc disease), cervical ICD-10-CM: M50.30  ICD-9-CM: 722.4  9/21/2015        DDD (degenerative disc disease), lumbar ICD-10-CM: M51.36  ICD-9-CM: 722.52  9/21/2015        Right shoulder pain ICD-10-CM: M25.511  ICD-9-CM: 719.41  9/21/2015        First degree hemorrhoids ICD-10-CM: K64.0  ICD-9-CM: 455.0  9/21/2015          Mr. Clint Oseguera is a 76 yo male patient with PMH of T-Cell Pro-Lymphocytic Leukemia. He was first diagnosed in late 2018. He received Alemtuzumab for 8 weeks with OK. He was switched to Pentostatin and received 14 doses with CR. He relapse 9/2021 and is sp cycle one ICE 9/23 with cycle two due on 10/7. Patient came to ED 9/30 with complaint of fever, chills, weakness, dry cough, increased BLE edema and SOB. In ED temp was 102.9. BC/UC obtained. CXR concerning for PNA. Started on cefe and vanc. HGB 9.6, platelets 9K, ANC 0.4, Bili 2. 6. He received 2 units platelets and today platelets are 97G. YE are asked to see patient and take over his care.      Also of note patient has diffuse rash to lower extremities and purple colored palmar flexion creases.  He noted that the symptoms started after being placed on Omincef 9/20/2021 for 10 days. We are asked to take over on our patient.          PLAN:  Relapsed T Cell Pro-lymphocytic leukemia  -sp C1 ICE from 9/23-9/26 followed by Veronica Person on 9/28. C2 due 10/7     Neutropenic fever / HCAP  -Tmax 102.9. Day 2 cefe and vanc. BC/UC pending  10/3 No fevers. Day 4 cefe/vanc. BC/UC negative  10/4 Tmax 100. BCx-NGTD. UCx-NG. Con't Cef/Vanc (D4).    Pancytopenia secondary to chemotherapy  -?hemolysis-peripheral smear-bili elevated   -Juan Jose SOPs  -Give Granix  10/2 ANC not measurable. Continue Granix. 10/4 DC Granix, pt rec'd Udenyca on 9/28     BLE diffuse rash/palmar creases purplish color  -? Reaction due to Onmicef  -add Kenalog cream     BLE edema  10/2 give albumin and lasix. Monitor BP  10/3 Lasix ordered  10/4 no weight recorded, +460. Repeat Lasix. Dry cough  -hycodan  10/3 now with some phlegm    Continue home meds  Prophylactic Meds: Acyclovir, Diflucan  Juan Jose SOPs  AC contraindicated d/t thrombocytopenia    Goals and plan of care reviewed with the patient. All questions answered to the best of our ability. Disposition:  TBD pending clinical course. Consult PT/OT. Earl Montes De Oca NP   [facility] Hematology & Oncology  [address] [place] [address]  Office : [phone]  Fax : [phone]     Attending Addendum:  I have personally performed a face to face diagnostic evaluation on this patient. I have reviewed and agree with the care plan as documented above by Earl Montes De Oca N.P.  My findings are as follows: Patient appears stable, heart rate regular without murmurs, abdomen is non-tender, bowel sounds are positive. 77 female history of LLE DVT, and T-cell prolymphocytic leukemia, with prior lines of therapy including Alemtuzumab and pentostatin. Patient has had a more recent relapse 9/21 and is status post cycle 1 ICE with Udenyca support.   Patient is now admitted with neutropenic fever and imaging concerning for pneumonia, for which she is now on broad-spectrum antibiotics. Bilateral LE rash slowly improving, on Kenalog cream.  Lasix repeat as needed. Remains on prophylaxis with acyclovir and Diflucan. Transfuse blood products as needed. Supportive care as outlined above.                 Lizandro Rivas MD  [facility] Hematology/Oncology  [address] [place] [address]  Office : [phone]  Fax : [phone]

## 2021-10-04 NOTE — PROGRESS NOTES
END OF SHIFT NOTE:    Intake/Output  10/03 1901 - 10/04 0700  In: 1295 [P.O.:220; I.V.:1540]  Out: 1750 [Urine:1750]   Voiding: YES  Catheter: NO  Drain:              Stool:  0 occurrences. Stool Assessment  Stool Color: Joanne Fore (09/30/21 2056)  Stool Appearance: Hard (10/02/21 1930)  Stool Amount: Medium (09/30/21 2056)  Stool Source/Status: Rectum (09/30/21 2056)    Emesis:  0 occurrences. VITAL SIGNS  Patient Vitals for the past 12 hrs:   Temp Pulse Resp BP SpO2   10/04/21 0210 98.6 °F (37 °C) 85 15 114/71 97 %   10/04/21 0121 98.7 °F (37.1 °C)       10/03/21 2340 99.5 °F (37.5 °C)       10/03/21 2302 100 °F (37.8 °C) 87 16 107/69 100 %   10/03/21 2118     95 %   10/03/21 1938 98.3 °F (36.8 °C) 92 16 116/64 100 %   10/03/21 1803  (!) 104  119/69        Pain Assessment  Pain 1  Pain Scale 1: Numeric (0 - 10) (10/04/21 0210)  Pain Intensity 1: 0 (10/04/21 0210)  Patient Stated Pain Goal: 0 (10/04/21 0210)  Pain Reassessment 1: Yes (10/02/21 1051)  Pain Location 1: Leg (10/02/21 1746)  Pain Orientation 1: Left;Right (10/02/21 1746)  Pain Description 1: Tightness (10/02/21 1746)  Pain Intervention(s) 1: Medication (see MAR) (10/02/21 1746)    Ambulating  Yes    Additional Information: Patient felt sob while resting in bed. Went up o 3L NC. Patient resting in bed with no further needs. Shift report given to oncoming nurse at the bedside.     Winifred Steve RN

## 2021-10-04 NOTE — CONSULTS
Comprehensive Nutrition Assessment    Type and Reason for Visit: Initial, Consult  Poor intake/appetite 5 or more days (RN generated)    Nutrition Recommendations/Plan:    Continue Ensure Enlive tid. Clarify one bottle per meal.     Malnutrition Assessment:  Malnutrition Status: Insufficient data    Nutrition Assessment:   Nutrition History:      Unable to obtain at this time  Nutrition Background: H/O: HLD, GERD, MO, LEE, prolymphocytic leukemia of T-cell status on chemo ( last 9/23). Presented with complaint of worsening fever, chills, generalized weakness, fatigue, nonproductive cough over the past several days, and worsening bilateral lower extremity swelling. Admitted with rash, neutropenic fever / HCAP  Daily Update:  Pt has do not disturb sign on door. RN reports pt is trying to rest, has poor appetite, did not order lunch today, does not like hospital food but does drink Ensure Enlive    Nutrition Related Findings:   NFPE deferred, pt resting      Current Nutrition Therapies:  ADULT DIET Regular  ADULT ORAL NUTRITION SUPPLEMENT Breakfast, Lunch, Dinner; Standard High Calorie/High Protein  ADULT ORAL NUTRITION SUPPLEMENT Breakfast, Lunch, Dinner; Standard High Calorie/High Protein    Current Intake:   Average Meal Intake: % (1 recorded meal since admission) Average Supplement Intake: % (1 recorded intake since admission)      Anthropometric Measures:  Height: 5' 11\" (180.3 cm)  Current Body Wt: 123.9 kg (273 lb 2.4 oz) (10/2), Weight source: Standing scale  BMI: 38.1, Obese class 2 (BMI 35.0-39. 9)  Admission Body Weight: 292 lb 1.8 oz (source not specified)  Ideal Body Weight (lbs) (Calculated): 172 lbs (78 kg),    Usual Body Wt:  (261 to 279# per EMR within past 1 year), Percent weight change:            Edema: Generalized: 1+ (10/4/2021  9:00 AM)  LLE: 3+;Non-pitting (10/4/2021  9:00 AM)  RLE: 3+;Non-pitting (10/4/2021  9:00 AM)  RUE: 2+ (10/3/2021  7:15 PM)     Estimated Daily Nutrient Needs:  Energy (kcal/day): 9843-9774 (Kcal/kg (11-14), Weight Used: Current (123.9 kg standing scale 10/2))  Protein (g/day):  Weight Used: ( (25% of kcal))  Fluid (ml/day):   (1 ml/kcal)    Nutrition Diagnosis:   · Predicted inadequate energy intake related to  (decreased appetite and predicted intake < needs) as evidenced by  (RN report of decreased intake and dislike of hospital food)    Nutrition Interventions:   Food and/or Nutrient Delivery: Continue current diet, Continue oral nutrition supplement     Coordination of Nutrition Care: Continue to monitor while inpatient  Plan of Care discussed with Fanny Szymanski RN    Goals: Active Goal: Intake >75% of estimated needs during admission    Nutrition Monitoring and Evaluation:      Food/Nutrient Intake Outcomes: Food and nutrient intake, Supplement intake       Discharge Planning:     Too soon to determine    Dyana Griffith, RD, LD, [facility]    Disaster Mode Active

## 2021-10-04 NOTE — PROGRESS NOTES
Chart reviewed, PT/OT evaluations ordered. SW will follow. Attempted to contact the patient's wife again, LVM. Update: SW spoke with the patient's wife Diego Rojas who states that the patient is independent at home, has been using a cane to ambulate due to \"leg swelling\", and denies any falls. Diego Rojas is receptive to [facility] services being arranged if recommended, preference would be for [facility] home health. Per Diego Rojas the patient does not require O2 at baseline.      Moe García LMSW    [facility]    * [email or web address]

## 2021-10-04 NOTE — PROGRESS NOTES
Problem: Pressure Injury - Risk of  Goal: *Prevention of pressure injury  Description: Document Tu Scale and appropriate interventions in the flowsheet. Outcome: Progressing Towards Goal  Note: Pressure Injury Interventions:       Moisture Interventions: Absorbent underpads, Minimize layers    Activity Interventions: Increase time out of bed, Pressure redistribution bed/mattress(bed type)    Mobility Interventions: Pressure redistribution bed/mattress (bed type), HOB 30 degrees or less    Nutrition Interventions: Document food/fluid/supplement intake, Offer support with meals,snacks and hydration                     Problem: Falls - Risk of  Goal: *Absence of Falls  Description: Document Anna Fall Risk and appropriate interventions in the flowsheet.   Outcome: Progressing Towards Goal  Note: Fall Risk Interventions:  Mobility Interventions: Communicate number of staff needed for ambulation/transfer, Bed/chair exit alarm, Patient to call before getting OOB         Medication Interventions: Evaluate medications/consider consulting pharmacy, Patient to call before getting OOB, Teach patient to arise slowly    Elimination Interventions: Call light in reach, Toilet paper/wipes in reach, Toileting schedule/hourly rounds

## 2021-10-04 NOTE — PROGRESS NOTES
VANCO DAILY FOLLOW UP NOTE  [address] [facility] Pharmacokinetic Monitoring Service - Vancomycin    Consulting Provider: LUNA Walker    Indication: FN/ sepsis  Target Concentration: Goal AUC/KATI 400-600 mg*hr/L  Day of Therapy: 3  Additional Antimicrobials: cefepime    Pertinent Laboratory Values: Wt Readings from Last 1 Encounters:   10/02/21 123.9 kg (273 lb 3.2 oz)     Temp Readings from Last 1 Encounters:   10/04/21 98.6 °F (37 °C)     No components found for: PROCAL  Recent Labs     10/04/21  0209 10/03/21  0420 10/02/21  0334   BUN 21 22 21   CREA 0.99 1.00 1.01   WBC 17.5* 17.6* 17.5*     Estimated Creatinine Clearance: 98.3 mL/min (based on SCr of 0.99 mg/dL). Lab Results   Component Value Date/Time    Vancomycin,trough 20.1 (HH) 03/27/2019 12:35 AM    Vancomycin, random 30.5 10/04/2021 02:09 AM       MRSA Nasal Swab: N/A. Non-respiratory infection. .      Assessment:  Date/Time Dose Concentration AUC   10/4 at 0209 1500 mg IV q12h 30.5 707   Note: Serum concentrations collected for AUC dosing may appear elevated if collected in close proximity to the dose administered, this is not necessarily an indication of toxicity    Plan:  Current dosing regimen is supra-therapeutic  Decrease dose to Vanc 1250 mg IV q12h with predicted AUC: 545 and trough: 18.1  Repeat vancomycin concentration ordered for 10/5 @ 0300   Pharmacy will continue to monitor patient and adjust therapy as indicated    Thank you for the consult,  Soraya Salmon  10/4/2021 5:14 AM

## 2021-10-05 NOTE — PROGRESS NOTES
VANCO DAILY FOLLOW UP NOTE  [address] [facility] Pharmacokinetic Monitoring Service - Vancomycin    Consulting Provider: LUNA Marroquin    Indication: FN/ sepsis  Target Concentration: Goal AUC/KATI 400-600 mg*hr/L  Day of Therapy: 6  Additional Antimicrobials: cefepime    Pertinent Laboratory Values: Wt Readings from Last 1 Encounters:   10/05/21 122.7 kg (270 lb 6.4 oz)     Temp Readings from Last 1 Encounters:   10/05/21 98 °F (36.7 °C)     No components found for: PROCAL  Recent Labs     10/05/21  0239 10/04/21  0209 10/03/21  0420   BUN 23 21 22   CREA 0.93 0.99 1.00   WBC 14.9* 17.5* 17.6*     Estimated Creatinine Clearance: 104.2 mL/min (based on SCr of 0.93 mg/dL). Lab Results   Component Value Date/Time    Vancomycin,trough 20.1 (HH) 03/27/2019 12:35 AM    Vancomycin, random 16.3 10/05/2021 02:39 AM       MRSA Nasal Swab: N/A. Non-respiratory infection. .      Assessment:  Date/Time Dose Concentration AUC   10/5 at 0239 1000 mg IV q12h 16.3 393   Note: Serum concentrations collected for AUC dosing may appear elevated if collected in close proximity to the dose administered, this is not necessarily an indication of toxicity    Plan:  1. Current dosing regimen is slightly subtherapeutic. Patient cleared appropriately after dose held for ~6 hours. 2. Increase dose to Vanc 1250 mg IV q12h with predicted AUC: 489 and trough: 15.5. Next dose at 1800  3. Repeat vancomycin concentration to be ordered for 10/8 @ 0500   4.  Pharmacy will continue to monitor patient and adjust therapy as indicated    Thank you for the consult,  Peyman Estrada, PharmD, [address] [facility] Pharmacist  [phone]

## 2021-10-05 NOTE — PROGRESS NOTES
END OF SHIFT NOTE:    Intake/Output  10/04 1901 - 10/05 0700  In: 982 [P.O.:340; I.V.:642]  Out: 475 [Urine:475]   Voiding: YES  Catheter: NO  Drain:              Stool:  0 occurrences. Stool Assessment  Stool Color: Ben Olp (09/30/21 2056)  Stool Appearance: Hard (10/02/21 1930)  Stool Amount: Medium (09/30/21 2056)  Stool Source/Status: Rectum (09/30/21 2056)    Emesis:  0 occurrences. VITAL SIGNS  Patient Vitals for the past 12 hrs:   Temp Pulse Resp BP SpO2   10/05/21 0238 98.9 °F (37.2 °C) 94 15 (!) 111/56 98 %   10/04/21 2320 98.3 °F (36.8 °C) 91 16 108/64 100 %   10/04/21 2128     95 %   10/04/21 1923 98.6 °F (37 °C) 93 19 114/73 100 %       Pain Assessment  Pain 1  Pain Scale 1: Numeric (0 - 10) (10/05/21 0238)  Pain Intensity 1: 0 (10/05/21 0238)  Patient Stated Pain Goal: 0 (10/05/21 0238)  Pain Reassessment 1: Yes (10/02/21 1051)  Pain Location 1: Leg (10/04/21 0900)  Pain Orientation 1: Left;Right; Lower (10/04/21 0900)  Pain Description 1: Tightness (10/04/21 0900)  Pain Intervention(s) 1: Other (comment) (pt declined PRN meds) (10/04/21 0900)    Ambulating  Yes    Additional Information: Patient had an uneventful night. Patient to get 1 unit of PRBC and 1 unit of plts today. Patient given hycodan 1x for cough. Patient resting in bed with no further needs.         Alicia Betancur RN

## 2021-10-05 NOTE — PROGRESS NOTES
[facility] Hematology & Oncology        Inpatient Hematology / Oncology Progress Note      Admission Date: 2021  8:28 PM  Reason for Admission/Hospital Course: Sepsis ([facility] [A41.9]  Neutropenic fever ([facility] [D70.9, R50.81]      24 Hour Events:  Afebrile, VSS, on O2 @ 2L  On Cef/Vanc for PNA (D5)  Wife at bedside    Transfusions: 1 unit PRBCs, 1 unit platelets  Replacements: None    ROS:  Constitutional: Negative for fever, chills. +weakness, malaise, fatigue. CV:  negative for chest pain, palpitations. +edema. Respiratory: Positive for dyspnea and cough  GI: negative for nausea, abdominal pain, diarrhea. 10 point review of systems is otherwise negative with the exception of the elements mentioned above in the HPI. Allergies   Allergen Reactions    Campath [Alemtuzumab] Other (comments)     Rigors       OBJECTIVE:  Patient Vitals for the past 8 hrs:   BP Temp Pulse Resp SpO2 Weight   10/05/21 0750 101/63 98 °F (36.7 °C) 86 16 100 %    10/05/21 0606      270 lb 6.4 oz (122.7 kg)   10/05/21 0238 (!) 111/56 98.9 °F (37.2 °C) 94 15 98 %      Temp (24hrs), Av.1 °F (36.7 °C), Min:97 °F (36.1 °C), Max:98.9 °F (37.2 °C)    No intake/output data recorded. Physical Exam:  Constitutional: Well developed male in no acute distress, sitting comfortably in the hospital bed. HEENT: Normocephalic and atraumatic. Oropharynx is clear, mucous membranes are moist.  Pupils are equal, round, and reactive to light. Extraocular muscles are intact.  Sclerae anicteric. Skin Warm and dry.  BLE red, swollen, diffuse rash BLE, purple colored palmar flexion creases   Respiratory Lungs with rhonchi at LLL. CVS Normal rate, regular rhythm and normal S1 and S2.  No murmurs, gallops, or rubs. Abdomen Soft, nontender and nondistended, normoactive bowel sounds.  No palpable mass.  No hepatosplenomegaly. Neuro Grossly nonfocal with no obvious sensory or motor deficits.    MSK Normal range of motion in general. 2+ BLE edema   Psych Appropriate mood and affect.           Labs:      Recent Labs     10/05/21  0239 10/04/21  0209 10/03/21  0420   WBC 14.9* 17.5* 17.6*   RBC 2.11* 2.20* 2.01*   HGB 6.8* 7.2* 6.6*   HCT 20.1* 21.1* 19.5*   MCV 95.3 95.9 97.0   MCH 32.2 32.7 32.8   MCHC 33.8 34.1 33.8   RDW 17.0* 17.1* 16.7*   PLT 5* 11* 15*   LYMPH 73* 85* 81*   MONOS 7  --   --    DF MANUAL MANUAL MANUAL   ABL 10.9* 14.9* 14.3*   ABM 1.0  --   --         Recent Labs     10/05/21  0239 10/04/21  0209 10/03/21  0420   * 137 135*   K 4.5 4.4 4.2    107 106   CO2 27 24 24   AGAP 1* 6* 5*   * 126* 108*   BUN 23 21 22   CREA 0.93 0.99 1.00   GFRAA >60 >60 >60   GFRNA >60 >60 >60   CA 8.4 8.2* 8.1*   AP 66 67 63   TP 5.7* 6.1* 5.7*   ALB 3.3 3.4 3.3   GLOB 2.4 2.7 2.4   AGRAT 1.4 1.3 1.4         Imaging:  XR CHEST PORT [[id number]] Collected: 09/30/21 1936   Order Status: Completed Updated: 09/30/21 1941   Narrative:     CHEST X-RAY, single portable view  9/30/2021     History: Vomiting and chills. Cough that is been going on for a while. Technique: Single frontal view of the chest.     Comparison: Chest x-ray 9/25/2021     Findings: The cardiac silhouette is mild to moderately enlarged although stable.  The   lungs are expanded without evidence for pneumothorax.   Evolving airspace   changes and small effusion are seen in the left lung base. Impression:     1.  Evolving airspace changes and small effusion suggested in the left lung   base. Signs/symptoms of pneumonia should be excluded.           Medications:  Current Facility-Administered Medications   Medication Dose Route Frequency    0.9% sodium chloride infusion 250 mL  250 mL IntraVENous PRN    hydrocortisone (ANUSOL-HC) 2.5 % rectal cream   PeriANAL QID    witch hazel-glycerin (TUCKS) 12.5-50 % pads 1 Pad  1 Pad PeriANAL PRN    LORazepam (ATIVAN) tablet 1 mg  1 mg Oral Q6H PRN    vancomycin (VANCOCIN) 1,000 mg in 0.9% sodium chloride 250 mL (VIAL-MATE)  1,000 mg IntraVENous Q12H    cefepime (MAXIPIME) 2 g in 0.9% sodium chloride (MBP/ADV) 100 mL MBP  2 g IntraVENous Q8H    0.9% sodium chloride infusion 250 mL  250 mL IntraVENous PRN    diphenhydrAMINE (BENADRYL) capsule 25 mg  25 mg Oral Q6H PRN    acetaminophen (TYLENOL) tablet 650 mg  650 mg Oral Q6H PRN    [Held by provider] tbo-filgrastim (GRANIX) injection 480 mcg  480 mcg SubCUTAneous DAILY    acyclovir (ZOVIRAX) tablet 400 mg  400 mg Oral BID    allopurinoL (ZYLOPRIM) tablet 300 mg  300 mg Oral DAILY    calcium carbonate (OS-DANIELA) tablet 500 mg [elemental]  500 mg Oral TID WITH MEALS    docusate sodium (COLACE) capsule 100 mg  100 mg Oral QHS    fluconazole (DIFLUCAN) tablet 200 mg  200 mg Oral DAILY    HYDROcodone-homatropine (HYCODAN) 5-1.5 mg/5 mL (5 mL) oral solution 5 mL  5 mL Oral Q4H PRN    atorvastatin (LIPITOR) tablet 10 mg  10 mg Oral QHS    pantoprazole (PROTONIX) tablet 40 mg  40 mg Oral ACB    ondansetron (ZOFRAN ODT) tablet 8 mg  8 mg Oral Q8H PRN    sodium chloride (NS) flush 5-40 mL  5-40 mL IntraVENous Q8H    sodium chloride (NS) flush 5-40 mL  5-40 mL IntraVENous PRN    polyethylene glycol (MIRALAX) packet 17 g  17 g Oral DAILY    promethazine (PHENERGAN) tablet 12.5 mg  12.5 mg Oral Q6H PRN    Or    ondansetron (ZOFRAN) injection 4 mg  4 mg IntraVENous Q6H PRN    oxyCODONE IR (ROXICODONE) tablet 5 mg  5 mg Oral Q4H PRN    morphine injection 4 mg  4 mg IntraVENous Q4H PRN    melatonin tablet 5 mg  5 mg Oral QHS    temazepam (RESTORIL) capsule 15 mg  15 mg Oral QHS PRN    albuterol (PROVENTIL VENTOLIN) nebulizer solution 2.5 mg  2.5 mg Nebulization Q6H PRN    triamcinolone acetonide (KENALOG) 0.1 % cream   Topical TID    sodium chloride (NS) flush 5-10 mL  5-10 mL IntraVENous PRN    0.9% sodium chloride infusion 250 mL  250 mL IntraVENous PRN         ASSESSMENT:    Problem List  Date Reviewed: 6/11/2020        Codes Class Noted    * (Principal) Sepsis ([facility] ICD-10-CM: A41.9  ICD-9-CM: 038.9, 995.91  9/30/2021        Neutropenic fever ([facility] ICD-10-CM: D70.9, R50.81  ICD-9-CM: 288.00, 780.61  9/30/2021        Thrombocytopenia (HCC) ICD-10-CM: D69.6  ICD-9-CM: 287.5  9/30/2021        Body mass index (BMI) of 40.0-44.9 in adult [facility]) (Chronic) ICD-10-CM: Z68.41  ICD-9-CM: V85.41  6/13/2019        Admission for antineoplastic chemotherapy ICD-10-CM: Z51.11  ICD-9-CM: V58.11  1/14/2019        Prolymphocytic leukemia of T-cell (HCC) (Chronic) ICD-10-CM: C91.60  ICD-9-CM: 204.90  1/8/2019        Severe obesity (BMI 35.0-39. 9) ICD-10-CM: E66.01  ICD-9-CM: 278.01  9/6/2018        Benign prostatic hyperplasia with nocturia (Chronic) ICD-10-CM: N40.1, R35.1  ICD-9-CM: 600.01, 788.43  1/23/2018        Severe obstructive sleep apnea (Chronic) ICD-10-CM: G47.33  ICD-9-CM: 327.23  1/12/2017        Pure hypercholesterolemia (Chronic) ICD-10-CM: E78.00  ICD-9-CM: 272.0  12/22/2016        Essential hypertension, benign (Chronic) ICD-10-CM: I10  ICD-9-CM: 401.1  9/21/2015        Impotence ICD-10-CM: N52.9  ICD-9-CM: 607.84  9/21/2015        DDD (degenerative disc disease), cervical ICD-10-CM: M50.30  ICD-9-CM: 722.4  9/21/2015        DDD (degenerative disc disease), lumbar ICD-10-CM: M51.36  ICD-9-CM: 722.52  9/21/2015        Right shoulder pain ICD-10-CM: M25.511  ICD-9-CM: 719.41  9/21/2015        First degree hemorrhoids ICD-10-CM: K64.0  ICD-9-CM: 455.0  9/21/2015          Mr. Dariela Ramirez is a 76 yo male patient with PMH of T-Cell Pro-Lymphocytic Leukemia. He was first diagnosed in late 2018. He received Alemtuzumab for 8 weeks with PA. He was switched to Pentostatin and received 14 doses with CR. He relapse 9/2021 and is sp cycle one ICE 9/23 with cycle two due on 10/7. Patient came to ED 9/30 with complaint of fever, chills, weakness, dry cough, increased BLE edema and SOB. In ED temp was 102.9. BC/UC obtained. CXR concerning for PNA. Started on cefe and vanc.  HGB 9.6, platelets 9K, ANC 0.4, Bili 2. 6. He received 2 units platelets and today platelets are 56H. RW are asked to see patient and take over his care.      Also of note patient has diffuse rash to lower extremities and purple colored palmar flexion creases. He noted that the symptoms started after being placed on Omincef 9/20/2021 for 10 days. We are asked to take over on our patient.          PLAN:  Relapsed T Cell Pro-lymphocytic leukemia  -sp C1 ICE from 9/23-9/26 followed by Neetu Dennis on 9/28. C2 due 10/7, will be delayed     Neutropenic fever / HCAP  -Tmax 102.9. Day 2 cefe and vanc. BC/UC pending  10/3 No fevers. Day 4 cefe/vanc. BC/UC negative  10/4 Tmax 100. BCx-NGTD. UCx-NG. Con't Cef/Vanc (D4). 10/5 Afebrile. BCx-NGTD. Con't Cef/Vanc (D5).    Pancytopenia secondary to chemotherapy  -?hemolysis-peripheral smear-bili elevated   -Juan Jose SOPs  -Give Granix  10/2 ANC not measurable. Continue Granix. 10/4 DC Granix, pt rec'd Udenyca on 9/28. Awaiting ANC recovery.     BLE diffuse rash/palmar creases purplish color  -? Reaction due to Onmicef  -add Kenalog cream     BLE edema  10/2 give albumin and lasix. Monitor BP  10/3 Lasix ordered  10/4 no weight recorded, +460. Repeat Lasix. 10/5 Diuresed well, down 3# with -1L    Dry cough  -hycodan  10/3 now with some phlegm    Continue home meds  Prophylactic Meds: Acyclovir, Diflucan  Juan Jose SOPs  AC contraindicated d/t thrombocytopenia  Consult PT/OT    Goals and plan of care reviewed with the patient. All questions answered to the best of our ability. Disposition:  TBD pending clinical course. Awaiting ANC recovery. Jose F Ortega NP   [facility] Hematology & Oncology  [address] [address]  Office : [phone]  Fax : [phone]       Attending Addendum:  I have personally performed a face to face diagnostic evaluation on this patient.  I have reviewed and agree with the care plan as documented above Sherrie Nguyen N.P.  My findings are as follows: Patient appears stable, heart rate regular without murmurs, abdomen is non-tender, bowel sounds are positive. 77 female history of LLE DVT, and T-cell prolymphocytic leukemia, with prior lines of therapy including Alemtuzumab and pentostatin. Patient has had a more recent relapse 9/21 and is status post cycle 1 ICE with Udenyca support. Patient is now admitted with neutropenic fever and imaging concerning for pneumonia, for which she is now on broad-spectrum antibiotics. Bilateral LE rash slowly improving, on Kenalog cream.  Lasix repeat as needed. Remains on prophylaxis with acyclovir and Diflucan. Transfuse blood products as needed, gets PRBC and Plt today. Awaiting count recovery.  Supportive care as outlined above.                   Jono Monique MD  [facility] Hematology/Oncology  [address] [place] [address]  Office : [phone]  Fax : [phone]

## 2021-10-05 NOTE — PROGRESS NOTES
OT/PT Note:  Attempted to see pt twice for OT/PT evals. Pt getting blood and platelets and eating. Will try again in the morning.    Irina Alejandro OTR/L

## 2021-10-05 NOTE — PROGRESS NOTES
Comprehensive Nutrition Assessment    Type and Reason for Visit: Reassess  Poor intake/appetite 5 or more days (RN generated)    Nutrition Recommendations/Plan:    Continue Ensure Enlive tid. Vary flavors   Add Ensure clear once/d and magic cup bid   Encouraged supplement intake between meals. Malnutrition Assessment:  Malnutrition Status: At risk for malnutrition (specify) (Decreased appetite and intake to <50% for 3 weeks PTA, unable to assess for weight loss d/t edema)    Nutrition Assessment:   Nutrition History: 10/5: Pt reports decreased appetite, altered tastes and associated decreased intake for ~ 3 weeks PTA. Wife reports if she prepared a meal,he would eat 50% or less of what he typically would. He would not ask for a meal and if nothing was prepared, hewould not eat. He started drinking  Up to 1/d of Premier protein shakes \"off and on\"/not daily. He [facility] to get some Ensure but was not active in their system so was not able to get any. It is hard for him to determine if he has had any weight loss as he has had swelling and has not been wearing his usual clothing to assess if clothes fit has changed. Nutrition Background: H/O: HLD, GERD, MO, LEE, prolymphocytic leukemia of T-cell status on chemo ( last 9/23). Presented with complaint of worsening fever, chills, generalized weakness, fatigue, nonproductive cough over the past several days, and worsening bilateral lower extremity swelling. Admitted with rash, neutropenic fever / HCAP  Daily Update:  Pt see in company of wife and daughter. Pt in chair eating soup (sauge and vegetables ) that his daughter brought him. He likes this particular soup and it suits his tastes at the time. He says he is having a hard time finding foods on the hospital menu that suit his poor appetite. has do not disturb sign on door. RN reports pt is trying to rest, has poor appetite, did not order lunch.  He tried the eggs one day and di not like those and has not ordered anymore. He reports he ate 100% of his breakfast of toast, peaches and a bottle of Ensure Enlive. He did not order lunch today as he was expecting the above soup. Thus he did not receive the Ensure Enlive for lunch today. However he does have a bottle in his room. He says he likes the [facility] but has not consumed more than 1 bottle per day. He asks if 2 bottles is a meal replacement. Rd caio that is the case if no food is consumed. He is receptive to trying Ensure clear and magic cup as a 2nd supplement at meal time. Nutrition Related Findings:   No visible signs of fat or muscle wasting      Current Nutrition Therapies:  ADULT DIET Regular  ADULT ORAL NUTRITION SUPPLEMENT Breakfast, Lunch, Dinner; Standard High Calorie/High Protein    Current Intake:   Average Meal Intake: 26-50% Average Supplement Intake: 26-50%      Anthropometric Measures:  Height: 5' 11\" (180.3 cm)  Current Body Wt: 122.7 kg (270 lb 8.1 oz) (10/5), Weight source: Standing scale   Edema would mask weight loss. BMI: 37.7, Obese class 2 (BMI 35.0-39. 9)  Admission Body Weight: 292 lb 1.8 oz (source not specified)  Ideal Body Weight (lbs) (Calculated): 172 lbs (78 kg),    Usual Body Wt:  (261 to 279# per EMR within past 1 year),            Edema: Generalized: 1+ (10/5/2021  9:50 AM)  LLE: 4+;Non-pitting (10/5/2021  9:50 AM)  RLE: 4+;Non-pitting (10/5/2021  9:50 AM)  RUE: 2+ (10/4/2021  7:20 PM)     Estimated Daily Nutrient Needs:  Energy (kcal/day): 0583-2359 (Kcal/kg (11-14), Weight Used: Current (123.9 kg standing scale 10/2))  Protein (g/day):  Weight Used:  ( (25% of kcal))  Fluid (ml/day):   (1 ml/kcal)    Nutrition Diagnosis:   · Inadequate oral intake related to altered taste perception (decreased appetite) as evidenced by  (current intake 50-75% of estimated needs)    Nutrition Interventions:   Food and/or Nutrient Delivery: Continue current diet, Modify oral nutrition supplement     Coordination of Nutrition Care: Continue to monitor while inpatient    Goals:       Active Goal: Intake >75% of estimated needs during admission    Nutrition Monitoring and Evaluation:      Food/Nutrient Intake Outcomes: Food and nutrient intake, Supplement intake       Discharge Planning:    Continue oral nutrition supplement    Ashlee Shin RD, VASILE, [facility] [phone]    Disaster Mode Active

## 2021-10-05 NOTE — PROGRESS NOTES
Patient Vitals for the past 12 hrs:   Temp Pulse Resp BP SpO2   10/05/21 1614 97.6 °F (36.4 °C) 84 19 (!) 111/57 100 %   10/05/21 1459 97.8 °F (36.6 °C) 90 22 121/63 99 %   10/05/21 1408 97.7 °F (36.5 °C) 86 21 (!) 108/53 100 %   10/05/21 1338 97.7 °F (36.5 °C) 86 19 (!) 109/59 100 %   10/05/21 1207 97 °F (36.1 °C) 91 18 113/60 100 %   10/05/21 1106 97.8 °F (36.6 °C) 92 18 (!) 99/54 100 %   10/05/21 1023 97.8 °F (36.6 °C) 72 16 (!) 115/59 100 %   10/05/21 0750 98 °F (36.7 °C) 86 16 101/63 100 %     1unit PRBCs transfused per eladio. 1unit plts transfused per eladio.     Bedside shift report given to MENDEZ SANCHEZ JR. [facility]

## 2021-10-05 NOTE — PROGRESS NOTES
Problem: Pressure Injury - Risk of  Goal: *Prevention of pressure injury  Description: Document Tu Scale and appropriate interventions in the flowsheet. Outcome: Progressing Towards Goal  Note: Pressure Injury Interventions:       Moisture Interventions: Apply protective barrier, creams and emollients, Minimize layers    Activity Interventions: Increase time out of bed, Pressure redistribution bed/mattress(bed type)    Mobility Interventions: Pressure redistribution bed/mattress (bed type)    Nutrition Interventions: Document food/fluid/supplement intake, Offer support with meals,snacks and hydration                     Problem: Falls - Risk of  Goal: *Absence of Falls  Description: Document Anna Fall Risk and appropriate interventions in the flowsheet.   Outcome: Progressing Towards Goal  Note: Fall Risk Interventions:  Mobility Interventions: Communicate number of staff needed for ambulation/transfer, Patient to call before getting OOB         Medication Interventions: Evaluate medications/consider consulting pharmacy, Patient to call before getting OOB, Teach patient to arise slowly    Elimination Interventions: Call light in reach, Stay With Me (per policy), Toilet paper/wipes in reach, Toileting schedule/hourly rounds

## 2021-10-06 NOTE — PROGRESS NOTES
[facility] Hematology & Oncology        Inpatient Hematology / Oncology Progress Note      Admission Date: 2021  8:28 PM  Reason for Admission/Hospital Course: Sepsis ([facility] [A41.9]  Neutropenic fever ([facility] [D70.9, R50.81]      24 Hour Events:  Afebrile, VSS, on RA during day and O2 @ 2L at night  On Cef/Vanc for PNA (D6)  Awaiting count recovery    Transfusions: None  Replacements: None    ROS:  Constitutional: Negative for fever, chills. +weakness, malaise, fatigue. CV:  negative for chest pain, palpitations. +edema. Respiratory: Positive for dyspnea and cough  GI: negative for nausea, abdominal pain, diarrhea. 10 point review of systems is otherwise negative with the exception of the elements mentioned above in the HPI. Allergies   Allergen Reactions    Campath [Alemtuzumab] Other (comments)     Rigors       OBJECTIVE:  Patient Vitals for the past 8 hrs:   BP Temp Pulse Resp SpO2   10/06/21 0746 100/64 97.9 °F (36.6 °C) 87 18 100 %   10/06/21 0309 102/67 98.6 °F (37 °C) 94 18 100 %     Temp (24hrs), Av °F (36.7 °C), Min:97 °F (36.1 °C), Max:99.9 °F (37.7 °C)    No intake/output data recorded. Physical Exam:  Constitutional: Well developed male in no acute distress, sitting comfortably in the bedside chair. HEENT: Normocephalic and atraumatic. Oropharynx is clear, mucous membranes are moist.  Pupils are equal, round, and reactive to light. Extraocular muscles are intact.  Sclerae anicteric. Skin Warm and dry.  BLE red, swollen, diffuse rash BLE, purple colored palmar flexion creases   Respiratory Lungs CTAB. CVS Normal rate, regular rhythm and normal S1 and S2.  No murmurs, gallops, or rubs. Abdomen Soft, nontender and nondistended, normoactive bowel sounds.  No palpable mass.  No hepatosplenomegaly. Neuro Grossly nonfocal with no obvious sensory or motor deficits.    MSK Normal range of motion in general. 2-3+ BLE edema   Psych Appropriate mood and affect.           Labs: Recent Labs     10/06/21  0316 10/05/21  0239 10/04/21  0209   WBC 12.3* 14.9* 17.5*   RBC 2.24* 2.11* 2.20*   HGB 7.1* 6.8* 7.2*   HCT 21.3* 20.1* 21.1*   MCV 95.1 95.3 95.9   MCH 31.7 32.2 32.7   MCHC 33.3 33.8 34.1   RDW 17.4* 17.0* 17.1*   PLT 13* 5* 11*   LYMPH 84 73* 85*   MONOS  --  7  --    DF MANUAL MANUAL MANUAL   ABL 10.3* 10.9* 14.9*   ABM  --  1.0  --         Recent Labs     10/06/21  0316 10/05/21  0239 10/04/21  0209   * 135* 137   K 4.5 4.5 4.4    107 107   CO2 25 27 24   AGAP 3* 1* 6*   * 116* 126*   BUN 21 23 21   CREA 0.84 0.93 0.99   GFRAA >60 >60 >60   GFRNA >60 >60 >60   CA 8.6 8.4 8.2*   AP 64 66 67   TP 5.8* 5.7* 6.1*   ALB 3.3 3.3 3.4   GLOB 2.5 2.4 2.7   AGRAT 1.3 1.4 1.3         Imaging:  XR CHEST PORT [[id number]] Collected: 09/30/21 1936   Order Status: Completed Updated: 09/30/21 1941   Narrative:     CHEST X-RAY, single portable view  9/30/2021     History: Vomiting and chills. Cough that is been going on for a while. Technique: Single frontal view of the chest.     Comparison: Chest x-ray 9/25/2021     Findings: The cardiac silhouette is mild to moderately enlarged although stable.  The   lungs are expanded without evidence for pneumothorax.   Evolving airspace   changes and small effusion are seen in the left lung base. Impression:     1.  Evolving airspace changes and small effusion suggested in the left lung   base. Signs/symptoms of pneumonia should be excluded.           Medications:  Current Facility-Administered Medications   Medication Dose Route Frequency    0.9% sodium chloride infusion 250 mL  250 mL IntraVENous PRN    hydrocortisone (ANUSOL-HC) 2.5 % rectal cream   PeriANAL QID    witch hazel-glycerin (TUCKS) 12.5-50 % pads 1 Pad  1 Pad PeriANAL PRN    LORazepam (ATIVAN) tablet 1 mg  1 mg Oral Q6H PRN    vancomycin (VANCOCIN) 1,000 mg in 0.9% sodium chloride 250 mL (VIAL-MATE)  1,000 mg IntraVENous Q12H    cefepime (MAXIPIME) 2 g in 0.9% sodium chloride (MBP/ADV) 100 mL MBP  2 g IntraVENous Q8H    0.9% sodium chloride infusion 250 mL  250 mL IntraVENous PRN    diphenhydrAMINE (BENADRYL) capsule 25 mg  25 mg Oral Q6H PRN    acetaminophen (TYLENOL) tablet 650 mg  650 mg Oral Q6H PRN    acyclovir (ZOVIRAX) tablet 400 mg  400 mg Oral BID    allopurinoL (ZYLOPRIM) tablet 300 mg  300 mg Oral DAILY    calcium carbonate (OS-DANIELA) tablet 500 mg [elemental]  500 mg Oral TID WITH MEALS    docusate sodium (COLACE) capsule 100 mg  100 mg Oral QHS    fluconazole (DIFLUCAN) tablet 200 mg  200 mg Oral DAILY    HYDROcodone-homatropine (HYCODAN) 5-1.5 mg/5 mL (5 mL) oral solution 5 mL  5 mL Oral Q4H PRN    atorvastatin (LIPITOR) tablet 10 mg  10 mg Oral QHS    pantoprazole (PROTONIX) tablet 40 mg  40 mg Oral ACB    ondansetron (ZOFRAN ODT) tablet 8 mg  8 mg Oral Q8H PRN    sodium chloride (NS) flush 5-40 mL  5-40 mL IntraVENous Q8H    sodium chloride (NS) flush 5-40 mL  5-40 mL IntraVENous PRN    polyethylene glycol (MIRALAX) packet 17 g  17 g Oral DAILY    promethazine (PHENERGAN) tablet 12.5 mg  12.5 mg Oral Q6H PRN    Or    ondansetron (ZOFRAN) injection 4 mg  4 mg IntraVENous Q6H PRN    oxyCODONE IR (ROXICODONE) tablet 5 mg  5 mg Oral Q4H PRN    morphine injection 4 mg  4 mg IntraVENous Q4H PRN    melatonin tablet 5 mg  5 mg Oral QHS    temazepam (RESTORIL) capsule 15 mg  15 mg Oral QHS PRN    albuterol (PROVENTIL VENTOLIN) nebulizer solution 2.5 mg  2.5 mg Nebulization Q6H PRN    triamcinolone acetonide (KENALOG) 0.1 % cream   Topical TID    sodium chloride (NS) flush 5-10 mL  5-10 mL IntraVENous PRN    0.9% sodium chloride infusion 250 mL  250 mL IntraVENous PRN         ASSESSMENT:    Problem List  Date Reviewed: 6/11/2020        Codes Class Noted    * (Principal) Sepsis ([facility] ICD-10-CM: A41.9  ICD-9-CM: 038.9, 995.91  9/30/2021        Neutropenic fever ([facility] ICD-10-CM: D70.9, R50.81  ICD-9-CM: 288.00, 780.61  9/30/2021 Thrombocytopenia ([facility] ICD-10-CM: D69.6  ICD-9-CM: 287.5  9/30/2021        Body mass index (BMI) of 40.0-44.9 in adult [facility]) (Chronic) ICD-10-CM: Z68.41  ICD-9-CM: V85.41  6/13/2019        Admission for antineoplastic chemotherapy ICD-10-CM: Z51.11  ICD-9-CM: V58.11  1/14/2019        Prolymphocytic leukemia of T-cell (HCC) (Chronic) ICD-10-CM: C91.60  ICD-9-CM: 204.90  1/8/2019        Severe obesity (BMI 35.0-39. 9) ICD-10-CM: E66.01  ICD-9-CM: 278.01  9/6/2018        Benign prostatic hyperplasia with nocturia (Chronic) ICD-10-CM: N40.1, R35.1  ICD-9-CM: 600.01, 788.43  1/23/2018        Severe obstructive sleep apnea (Chronic) ICD-10-CM: G47.33  ICD-9-CM: 327.23  1/12/2017        Pure hypercholesterolemia (Chronic) ICD-10-CM: E78.00  ICD-9-CM: 272.0  12/22/2016        Essential hypertension, benign (Chronic) ICD-10-CM: I10  ICD-9-CM: 401.1  9/21/2015        Impotence ICD-10-CM: N52.9  ICD-9-CM: 607.84  9/21/2015        DDD (degenerative disc disease), cervical ICD-10-CM: M50.30  ICD-9-CM: 722.4  9/21/2015        DDD (degenerative disc disease), lumbar ICD-10-CM: M51.36  ICD-9-CM: 722.52  9/21/2015        Right shoulder pain ICD-10-CM: M25.511  ICD-9-CM: 719.41  9/21/2015        First degree hemorrhoids ICD-10-CM: K64.0  ICD-9-CM: 455.0  9/21/2015          Mr. Sujey Helms is a 76 yo male patient with PMH of T-Cell Pro-Lymphocytic Leukemia. He was first diagnosed in late 2018. He received Alemtuzumab for 8 weeks with TN. He was switched to Pentostatin and received 14 doses with CR. He relapse 9/2021 and is sp cycle one ICE 9/23 with cycle two due on 10/7. Patient came to ED 9/30 with complaint of fever, chills, weakness, dry cough, increased BLE edema and SOB. In ED temp was 102.9. BC/UC obtained. CXR concerning for PNA. Started on cefe and vanc. HGB 9.6, platelets 9K, ANC 0.4, Bili 2. 6. He received 2 units platelets and today platelets are 33J. JK are asked to see patient and take over his care.      Also of note patient has diffuse rash to lower extremities and purple colored palmar flexion creases. He noted that the symptoms started after being placed on Omincef 9/20/2021 for 10 days. We are asked to take over on our patient.          PLAN:  Relapsed T Cell Pro-lymphocytic leukemia  -sp C1 ICE from 9/23-9/26 followed by [facility] on 9/28. C2 due 10/7, will be delayed     Neutropenic fever / HCAP  -Tmax 102.9. Day 2 cefe and vanc. BC/UC pending  10/3 No fevers. Day 4 cefe/vanc. BC/UC negative  10/4 Tmax 100. BCx-NGTD. UCx-NG. Con't Cef/Vanc (D4).  10/6 Afebrile. BCx-NG. Con't Cef/Vanc (D6).    Pancytopenia secondary to chemotherapy  -?hemolysis-peripheral smear-bili elevated   -Juan Jose SOPs  -Give Granix  10/2 ANC not measurable. Continue Granix. 10/4 DC Granix, pt rec'd Udenyca on 9/28. Awaiting ANC recovery. 10/6 Awaiting ANC recovery.     BLE diffuse rash/palmar creases purplish color  -? Reaction due to Onmicef  -add Kenalog cream     BLE edema  10/2 give albumin and lasix. Monitor BP  10/3 Lasix ordered  10/4 no weight recorded, +460. Repeat Lasix. 10/5 Diuresed well, down 3# with -1L  10/6 +1.4L with increased BLE edema, repeat Lasix. Dry cough  -hycodan  10/3 now with some phlegm    Continue home meds  Prophylactic Meds: Acyclovir, Diflucan  Juan Jose SOPs  AC contraindicated d/t thrombocytopenia  Consult PT/OT    Goals and plan of care reviewed with the patient. All questions answered to the best of our ability. Disposition:  TBD pending clinical course. Awaiting ANC recovery. Colleen Barrett NP   [facility] Hematology & Oncology  [address] [address]  Office : [phone]  Fax : [phone]     Attending Addendum:  I have personally performed a face to face diagnostic evaluation on this patient.  I have reviewed and agree with the care plan as documented above Carmina Waldrop N.P.  My findings are as follows: Patient appears stable, heart rate regular without murmurs, abdomen is non-tender, bowel sounds are positive.  66 female history of LLE DVT, and T-cell prolymphocytic leukemia, with prior lines of therapy including Alemtuzumab and pentostatin.  Patient has had a more recent relapse 9/21 and is status post cycle 1 ICE with Geovanni Menjivar is now admitted with neutropenic fever and imaging concerning for pneumonia, for which he is now on broad-spectrum antibiotics.  Bilateral LE rash slowly improving, on Kenalog cream.  Lasix repeat as needed, repeat today.  Remains on prophylaxis with acyclovir and Diflucan.  Transfuse blood products as needed.  Awaiting count recovery.  Supportive care as outlined above.                   Araseli Nguyen MD  [facility] Hematology/Oncology  [address] [place] [address]  Office : [phone]  Fax : [phone]

## 2021-10-06 NOTE — PROGRESS NOTES
VANCO DAILY FOLLOW UP NOTE  [address] [facility] Pharmacokinetic Monitoring Service - Vancomycin    Consulting Provider: LUNA Gaitan    Indication: FN/ sepsis  Target Concentration: Goal AUC/KATI 400-600 mg*hr/L  Day of Therapy: 6  Additional Antimicrobials: cefepime    Pertinent Laboratory Values: Wt Readings from Last 1 Encounters:   10/06/21 125.2 kg (276 lb)     Temp Readings from Last 1 Encounters:   10/06/21 97.9 °F (36.6 °C)     No components found for: PROCAL  Recent Labs     10/06/21  0316 10/05/21  0239 10/04/21  0209   BUN 21 23 21   CREA 0.84 0.93 0.99   WBC 12.3* 14.9* 17.5*     Estimated Creatinine Clearance: 116.6 mL/min (based on SCr of 0.84 mg/dL). Lab Results   Component Value Date/Time    Vancomycin,trough 20.1 (HH) 03/27/2019 12:35 AM    Vancomycin, random 16.3 10/05/2021 02:39 AM       MRSA Nasal Swab: N/A. Non-respiratory infection. .      Assessment:  Date/Time Dose Concentration AUC   10/6 at 0544 1000 mg IV q12h 11.3 364   Note: Serum concentrations collected for AUC dosing may appear elevated if collected in close proximity to the dose administered, this is not necessarily an indication of toxicity    Plan:  Current dosing regimen is slightly subtherapeutic. Patient cleared appropriately after dose held for ~6 hours. Increase dose to Vanc 1250 mg IV q12h with predicted AUC: 453 and trough: 14.1.   Next dose at 1800  Repeat vancomycin concentration to be ordered for 10/8 @ 0400   Pharmacy will continue to monitor patient and adjust therapy as indicated    Thank you for the consult,  Vernon Steiner, PharmD, [address] [facility] Pharmacist  [phone]

## 2021-10-06 NOTE — PROGRESS NOTES
END OF SHIFT NOTE:    Intake/Output  10/05 1901 - 10/06 0700  In: 1870 [P.O.:340; I.V.:1530]  Out: 650 [Urine:650]   Voiding: YES  Catheter: NO  Drain:              Stool:  2 occurrences. Stool Assessment  Stool Color: Anila Pilar (10/05/21 2154)  Stool Appearance: Formed (10/05/21 2154)  Stool Amount: Medium (10/05/21 2154)  Stool Source/Status: Rectum (10/05/21 2154)    Emesis:  0 occurrences. VITAL SIGNS  Patient Vitals for the past 12 hrs:   Temp Pulse Resp BP SpO2   10/06/21 0309 98.6 °F (37 °C) 94 18 102/67 100 %   10/05/21 2254 98.7 °F (37.1 °C) 89 19 121/75 98 %   10/05/21 1909 99.9 °F (37.7 °C) 89 20 100/67 99 %       Pain Assessment  Pain 1  Pain Scale 1: Numeric (0 - 10) (10/06/21 0309)  Pain Intensity 1: 0 (10/06/21 0309)  Patient Stated Pain Goal: 0 (10/06/21 0309)  Pain Reassessment 1: Yes (10/02/21 1051)  Pain Location 1: Leg (10/04/21 0900)  Pain Orientation 1: Left;Right; Lower (10/04/21 0900)  Pain Description 1: Tightness (10/04/21 0900)  Pain Intervention(s) 1: Other (comment) (pt declined PRN meds) (10/04/21 0900)    Ambulating  Yes    Additional Information: Patient had an uneventful shift. Hycodan given 1x for cough. Patient resting in bed with no further needs.       Evan Gonzalez, RN

## 2021-10-06 NOTE — PROGRESS NOTES
Problem: Mobility Impaired (Adult and Pediatric)  Goal: *Acute Goals and Plan of Care (Insert Text)  Outcome: Progressing Towards Goal  Note: STG:  (1.)Mr. Edson Bloom will move from supine to sit and sit to supine  with SUPERVISION-INDEPENDENT within 1-2 treatment day(s). (2.)Mr. Edson Bloom will transfer from bed to chair and chair to bed with SUPERVISION-INDEPENDENT using the least restrictive device within 1-2 treatment day(s). (3.)Mr. Edson Bloom will ambulate with SUPERVISION-INDEPENDENT for 225-300 feet with the least restrictive device within 1-2 treatment day(s). LTG:  (1.)Mr. Edson Bloom will ambulate with INDEPENDENT for 300-350 feet with the least restrictive device within three treatment day(s). (2.)Mr. Edson Bloom will ascend/descend steps with min A within three treatment days. ________________________________________________________________________________________________       PHYSICAL THERAPY: Initial Assessment 10/6/2021  INPATIENT: PT Visit Days : 1  Payor: Devi Lofton / Plan: Irina Castanon / Product Type: [employer] /       NAME/AGE/GENDER: Missael Grande is a 77 y.o. male   PRIMARY DIAGNOSIS: Sepsis ([facility] [A41.9]  Neutropenic fever ([facility] [D70.9, R50.81] Sepsis ([facility] Sepsis ([facility]       ICD-10: Treatment Diagnosis:    · Difficulty in walking, Not elsewhere classified (R26.2)  · Other abnormalities of gait and mobility (R26.89)   Precaution/Allergies:  Campath [alemtuzumab]      ASSESSMENT:     Mr. Edson Bloom presents with decreased independence with functional mobility. Pt performed sit to stand. Pt ambulated in hallway. Pt will benefit from PT services to maximize independence with functional mobility. Pt in bedside chair with needs in reach. This section established at most recent assessment   PROBLEM LIST (Impairments causing functional limitations):  1. Decreased Strength  2. Decreased Transfer Abilities  3. Decreased Ambulation Ability/Technique  4.  Decreased Balance  5. Increased Pain  6. Decreased Activity Tolerance  7. Decreased Flexibility/Joint Mobility   INTERVENTIONS PLANNED: (Benefits and precautions of physical therapy have been discussed with the patient.)  1. Bed Mobility  2. Gait Training  3. Therapeutic Activites  4. Therapeutic Exercise/Strengthening  5. Transfer Training     TREATMENT PLAN: Frequency/Duration: daily for duration of hospital stay  Rehabilitation Potential For Stated Goals: Good     REHAB RECOMMENDATIONS (at time of discharge pending progress):    Placement: It is my opinion, based on this patient's performance to date, that Mr. Eddie Godfrey may benefit from [address] after discharge due to the functional deficits listed above that are likely to improve with skilled rehabilitation because he/she has multiple medical issues that affect his/her functional mobility in the community. Equipment:    None at this time              HISTORY:   History of Present Injury/Illness (Reason for Referral):  Pt admitted with above diagnosis. Past Medical History/Comorbidities:   Mr. Eddie Godfrey  has a past medical history of Back pain, Cervical radiculopathy, Claustrophobia, DVT (deep venous thrombosis) ([facility] (06/2019), GERD (gastroesophageal reflux disease), H/O seasonal allergies, Hyperlipidemia, Impotence, Insomnia, Lateral epicondylitis, Leukemia ([address] Obesity, and Sleep apnea. Mr. Eddie Godfrey  has a past surgical history that includes hx circumcision; hx colonoscopy (2017); hx wisdom teeth extraction; ir insert tunl cvc w port over 5 years (6/19/2019); hx orthopaedic (Right); and ir remove tunl cvad w port/pump (12/14/2020).   Social History/Living Environment:   Home Environment: Private residence  One/Two Story Residence: One story  Living Alone: No  Support Systems: Child(amelia), Spouse/Significant Other  Patient Expects to be Discharged to[de-identified] House  Current DME Used/Available at Home: None  Prior Level of Function/Work/Activity:  Pt ambulates with a cane independently. Number of Personal Factors/Comorbidities that affect the Plan of Care: 0: LOW COMPLEXITY   EXAMINATION:   Most Recent Physical Functioning:   Gross Assessment:  AROM: Generally decreased, functional  Strength: Generally decreased, functional  Sensation: Intact               Posture:  Posture (WDL): Within defined limits  Balance:  Sitting: Intact  Standing: Intact Bed Mobility:     Wheelchair Mobility:     Transfers:  Sit to Stand: Supervision  Stand to Sit: Supervision  Gait:     Gait Abnormalities:  (slightly unsteady gait)  Distance (ft): 225 Feet (ft)  Ambulation - Level of Assistance: Stand-by assistance      Body Structures Involved:  1. Bones  2. Joints  3. Muscles  4. Ligaments Body Functions Affected:  1. Neuromusculoskeletal  2. Movement Related Activities and Participation Affected:  1. Mobility   Number of elements that affect the Plan of Care: 4+: HIGH COMPLEXITY   CLINICAL PRESENTATION:   Presentation: Stable and uncomplicated: LOW COMPLEXITY   CLINICAL DECISION MAKIN[address] [place] [id number] AM-PAC 6 Clicks   Basic Mobility Inpatient Short Form  How much difficulty does the patient currently have. .. Unable A Lot A Little None   1. Turning over in bed (including adjusting bedclothes, sheets and blankets)? [] 1   [] 2   [x] 3   [] 4   2. Sitting down on and standing up from a chair with arms ( e.g., wheelchair, bedside commode, etc.)   [] 1   [] 2   [x] 3   [] 4   3. Moving from lying on back to sitting on the side of the bed? [] 1   [] 2   [x] 3   [] 4   How much help from another person does the patient currently need. .. Total A Lot A Little None   4. Moving to and from a bed to a chair (including a wheelchair)? [] 1   [] 2   [x] 3   [] 4   5. Need to walk in hospital room? [] 1   [] 2   [x] 3   [] 4   6. Climbing 3-5 steps with a railing? [] 1   [] 2   [x] 3   [] 4   © , Trustees of [address] [place] [id number], under license to [employer].  All rights reserved Score:  Initial: 18 Most Recent: X (Date: -- )    Interpretation of Tool:  Represents activities that are increasingly more difficult (i.e. Bed mobility, Transfers, Gait). Medical Necessity:     · Skilled intervention continues to be required due to decreased mobility ability. Reason for Services/Other Comments:  · Patient continues to require skilled intervention due to   · Decreased mobility ability. · .   Use of outcome tool(s) and clinical judgement create a POC that gives a: Clear prediction of patient's progress: LOW COMPLEXITY            TREATMENT:   (In addition to Assessment/Re-Assessment sessions the following treatments were rendered)   Pre-treatment Symptoms/Complaints:    Pain: Initial:      Post Session:  no complaints     Gait Training (  15 minutes):  Gait training to improve and/or restore physical functioning as related to mobility. Ambulated 225 Feet (ft) with Stand-by assistance     INITIAL ASSESSMENT    Braces/Orthotics/Lines/Etc:   · O2 Device: Nasal cannula  Treatment/Session Assessment:    · Response to Treatment:  Pt agreeable to ambulate with therapy. · Interdisciplinary Collaboration:   o Physical Therapist  o Occupational Therapist  o Registered Nurse  · After treatment position/precautions:   o Up in chair  o Bed/Chair-wheels locked  o Bed in low position  o Call light within reach  o RN notified   · Compliance with Program/Exercises: Compliant most of the time, Will assess as treatment progresses  · Recommendations/Intent for next treatment session: \"Next visit will focus on advancements to more challenging activities and reduction in assistance provided\".   Total Treatment Duration:  PT Patient Time In/Time Out  Time In: 0905  Time Out: 0930    Lauren Buenrostro PT

## 2021-10-06 NOTE — PROGRESS NOTES
Problem: Self Care Deficits Care Plan (Adult)  Goal: *Acute Goals and Plan of Care (Insert Text)  Outcome: Progressing Towards Goal     OCCUPATIONAL THERAPY: Initial Assessment, Daily Note, Discharge, and AM 10/6/2021  INPATIENT:    Payor: Bhargavi Ching / Plan: Kyaw Churchill / Product Type: Managed Care Medicare /      NAME/AGE/GENDER: Hillary Long is a 77 y.o. male   PRIMARY DIAGNOSIS:  Sepsis ([facility] [A41.9]  Neutropenic fever ([address] [D70.9, R50.81] Sepsis ([address] Sepsis ([address]        ICD-10: Treatment Diagnosis:    Generalized Muscle Weakness (M62.81)   Precautions/Allergies:     Campath [alemtuzumab]      ASSESSMENT:     Mr. Lawrence Coleman admitted with above diagnosis. Pt presents setup with self care tasks and supervision with functional mobility. Pt ambulated in room and bathroom this am. Pt setup for grooming and feeding. Pt stated he did not need OT at this time. Will discharge OT. Pt agreeable to d/c. This section established at most recent assessment   PROBLEM LIST (Impairments causing functional limitations):   INTERVENTIONS PLANNED: (Benefits and precautions of occupational therapy have been discussed with the patient.)       TREATMENT PLAN: Frequency/Duration: discharge OT. Rehabilitation Potential For Stated Goals: discharge OT     REHAB RECOMMENDATIONS (at time of discharge pending progress):    Placement: It is my opinion, based on this patient's performance to date, that Mr. Lawrence Coleman may benefit from being discharged with NO further skilled therapy due to pt not wanting OT .   Equipment:   None at this time              OCCUPATIONAL PROFILE AND HISTORY:   History of Present Injury/Illness (Reason for Referral):  sepsis  Past Medical History/Comorbidities:   Mr. Lawrence Coleman  has a past medical history of Back pain, Cervical radiculopathy, Claustrophobia, DVT (deep venous thrombosis) ([facility] (06/2019), GERD (gastroesophageal reflux disease), H/O seasonal allergies, Hyperlipidemia, Impotence, Insomnia, Lateral epicondylitis, Leukemia ([facility] Obesity, and Sleep apnea. Mr. Eddie Godfrey  has a past surgical history that includes hx circumcision; hx colonoscopy (2017); hx wisdom teeth extraction; ir insert tunl cvc w port over 5 years (6/19/2019); hx orthopaedic (Right); and ir remove tunl cvad w port/pump (12/14/2020).   Social History/Living Environment:   Home Environment: Private residence  One/Two Story Residence: One story  Living Alone: No  Support Systems: Child(amelia), Spouse/Significant Other  Patient Expects to be Discharged to[de-identified] House  Current DME Used/Available at Home: None  Prior Level of Function/Work/Activity:  independent     Number of Personal Factors/Comorbidities that affect the Plan of Care: Brief history (0):  LOW COMPLEXITY   ASSESSMENT OF OCCUPATIONAL PERFORMANCE[de-identified]   Activities of Daily Living:   Basic ADLs (From Assessment) Complex ADLs (From Assessment)   Feeding: Independent  Oral Facial Hygiene/Grooming: Setup  Bathing: Setup  Upper Body Dressing: Setup  Lower Body Dressing: Setup  Toileting: Setup     Grooming/Bathing/Dressing Activities of Daily Living     Cognitive Retraining  Safety/Judgement: Awareness of environment                 Functional Transfers  Bathroom Mobility: Supervision/set up  Toilet Transfer : Supervision  Tub Transfer: Supervision  Shower Transfer: Supervision     Bed/Mat Mobility  Sit to Stand: Supervision  Stand to Sit: Supervision  Bed to Chair: Supervision     Most Recent Physical Functioning:   Gross Assessment:                  Posture:  Posture (WDL): Within defined limits  Balance:  Sitting: Intact  Standing: Intact Bed Mobility:     Wheelchair Mobility:     Transfers:  Sit to Stand: Supervision  Stand to Sit: Supervision  Bed to Chair: Supervision            Patient Vitals for the past 6 hrs:   BP SpO2 Pulse   10/06/21 0746 100/64 100 % 87       Mental Status  Neurologic State: Alert  Orientation Level: Oriented X4  Cognition: Follows commands  Perception: Appears intact  Perseveration: No perseveration noted  Safety/Judgement: Awareness of environment                          Physical Skills Involved:  Balance  Strength  Activity Tolerance Cognitive Skills Affected (resulting in the inability to perform in a timely and safe manner):  none Psychosocial Skills Affected:  none   Number of elements that affect the Plan of Care: 1-3:  LOW COMPLEXITY   CLINICAL DECISION MAKING:   [facility] AM-PAC 6 Clicks   Daily Activity Inpatient Short Form  How much help from another person does the patient currently need. .. Total A Lot A Little None   1. Putting on and taking off regular lower body clothing? [] 1   [] 2   [] 3   [x] 4   2. Bathing (including washing, rinsing, drying)? [] 1   [] 2   [] 3   [x] 4   3. Toileting, which includes using toilet, bedpan or urinal?   [] 1   [] 2   [] 3   [x] 4   4. Putting on and taking off regular upper body clothing? [] 1   [] 2   [] 3   [x] 4   5. Taking care of personal grooming such as brushing teeth? [] 1   [] 2   [] 3   [x] 4   6. Eating meals? [] 1   [] 2   [] 3   [x] 4   © 2007, Trustees of [facility], under license to [employer]. All rights reserved      Score:  Initial: 24 Most Recent: X (Date: -- )    Interpretation of Tool:  Represents activities that are increasingly more difficult (i.e. Bed mobility, Transfers, Gait). Medical Necessity:     Discharge OT  Reason for Services/Other Comments:  Discharge OT   Use of outcome tool(s) and clinical judgement create a POC that gives a: LOW COMPLEXITY         TREATMENT:   (In addition to Assessment/Re-Assessment sessions the following treatments were rendered)     Pre-treatment Symptoms/Complaints:  pt doing well  Pain: Initial:   Pain Intensity 1: 0  Post Session:  0     Self Care: (15): Procedure(s) (per grid) utilized to improve and/or restore self-care/home management as related to grooming and functional mobility  .  Required minimal verbal and cueing to facilitate activities of daily living skills and compensatory activities. Assessment/Reassessment (5)    Braces/Orthotics/Lines/Etc:     Treatment/Session Assessment:    Response to Treatment:  tolerated well  Interdisciplinary Collaboration:   Physical Therapist  Occupational Therapist  Registered Nurse  After treatment position/precautions:   Up in chair  Bed/Chair-wheels locked  Call light within reach  MD at bedside   Compliance with Program/Exercises: discharge OT.   Recommendations/Intent for next treatment session:  discharge OT  Total Treatment Duration:  OT Patient Time In/Time Out  Time In: 0930  Time Out: [facility], OT

## 2021-10-07 NOTE — PROGRESS NOTES
Chapo cont to follow and assist. Reviewed chart. Pt working with PT/OT. He is ambulating 225 feet with PT and is indep with OT no specific needs iden. Chapo spoke with pt's wife today and attempted to be encouraging to her. She informed her  is strong and will hopefully be able to get his treatment so he can begin to get better. Plan home when stable.  Albino Analia

## 2021-10-07 NOTE — PROGRESS NOTES
VANCO DAILY FOLLOW UP NOTE  [address] [facility] Pharmacokinetic Monitoring Service - Vancomycin    Consulting Provider: LUNA Gonzalez    Indication: FN/ sepsis  Target Concentration: Goal AUC/KATI 400-600 mg*hr/L  Day of Therapy: 7  Additional Antimicrobials: cefepime    Pertinent Laboratory Values: Wt Readings from Last 1 Encounters:   10/06/21 124.3 kg (274 lb)     Temp Readings from Last 1 Encounters:   10/07/21 97.8 °F (36.6 °C)     No components found for: PROCAL  Recent Labs     10/07/21  0301 10/06/21  0316 10/05/21  0239   BUN 23 21 23   CREA 0.84 0.84 0.93   WBC 11.2* 12.3* 14.9*     Estimated Creatinine Clearance: 116.1 mL/min (based on SCr of 0.84 mg/dL). Lab Results   Component Value Date/Time    Vancomycin,trough 20.1 (HH) 03/27/2019 12:35 AM    Vancomycin, random 16.3 10/05/2021 02:39 AM       MRSA Nasal Swab: N/A. Non-respiratory infection. .      Assessment:  Date/Time Dose Concentration AUC   10/6 at 0544 1000 mg IV q12h 11.3 364   Note: Serum concentrations collected for AUC dosing may appear elevated if collected in close proximity to the dose administered, this is not necessarily an indication of toxicity    Plan:  Continue Vanc 1250 mg IV q12h with predicted AUC: 453 and trough: 14.1.   Next dose at 1800  Repeat vancomycin concentration ordered for 10/8 @ 0400   Pharmacy will continue to monitor patient and adjust therapy as indicated    Thank you for the consult,  Marsa Ormond, PharmD, BCPS  Clinical Pharmacist

## 2021-10-07 NOTE — PROGRESS NOTES
[facility] Hematology & Oncology        Inpatient Hematology / Oncology Progress Note      Admission Date: 2021  8:28 PM  Reason for Admission/Hospital Course: Sepsis ([facility] [A41.9]  Neutropenic fever ([facility] [D70.9, R50.81]      24 Hour Events:  Afebrile, VSS, on RA  On Cef/Vanc for PNA (D7)  Awaiting count recovery  Wife at bedside    Transfusions: 1 unit PRBCs, 1 unit platelets  Replacements: None    ROS:  Constitutional: Negative for fever, chills. +weakness, malaise, fatigue. CV:  negative for chest pain, palpitations. +edema. Respiratory: Positive for dyspnea and cough  GI: negative for nausea, abdominal pain, diarrhea. 10 point review of systems is otherwise negative with the exception of the elements mentioned above in the HPI. Allergies   Allergen Reactions    Campath [Alemtuzumab] Other (comments)     Rigors       OBJECTIVE:  Patient Vitals for the past 8 hrs:   BP Temp Pulse Resp SpO2   10/07/21 0730 101/66 97.8 °F (36.6 °C) 85 18 98 %     Temp (24hrs), Av.4 °F (36.9 °C), Min:97.8 °F (36.6 °C), Max:99.2 °F (37.3 °C)    10/07 07 - 10/07 1900  In: 361 [I.V.:361]  Out: 200 [Urine:200]    Physical Exam:  Constitutional: Well developed male in no acute distress, sitting comfortably in the bedside chair. HEENT: Normocephalic and atraumatic. Oropharynx is clear, mucous membranes are moist.  Pupils are equal, round, and reactive to light. Extraocular muscles are intact.  Sclerae anicteric. Skin Warm and dry.  BLE red, swollen, diffuse rash BLE, purple colored palmar flexion creases   Respiratory Lungs CTAB. CVS Normal rate, regular rhythm and normal S1 and S2.  No murmurs, gallops, or rubs. Abdomen Soft, nontender and nondistended, normoactive bowel sounds.  No palpable mass.  No hepatosplenomegaly. Neuro Grossly nonfocal with no obvious sensory or motor deficits.    MSK Normal range of motion in general. 2-3+ BLE edema   Psych Appropriate mood and affect.           Labs: Recent Labs     10/07/21  0301 10/06/21  0316 10/05/21  0239   WBC 11.2* 12.3* 14.9*   RBC 2.17* 2.24* 2.11*   HGB 6.8* 7.1* 6.8*   HCT 20.3* 21.3* 20.1*   MCV 93.5 95.1 95.3   MCH 31.3 31.7 32.2   MCHC 33.5 33.3 33.8   RDW 17.1* 17.4* 17.0*   PLT 10* 13* 5*   LYMPH 87* 84 73*   MONOS 1*  --  7   DF MANUAL MANUAL MANUAL   ABL 9.7* 10.3* 10.9*   ABM 0.1  --  1.0        Recent Labs     10/07/21  0301 10/06/21  0316 10/05/21  0239    135* 135*   K 4.5 4.5 4.5    107 107   CO2 26 25 27   AGAP 4* 3* 1*   * 110* 116*   BUN 23 21 23   CREA 0.84 0.84 0.93   GFRAA >60 >60 >60   GFRNA >60 >60 >60   CA 8.2* 8.6 8.4   AP 65 64 66   TP 5.9* 5.8* 5.7*   ALB 3.3 3.3 3.3   GLOB 2.6 2.5 2.4   AGRAT 1.3 1.3 1.4         Imaging:  XR CHEST PORT [[id number]] Collected: 09/30/21 1936   Order Status: Completed Updated: 09/30/21 1941   Narrative:     CHEST X-RAY, single portable view  9/30/2021     History: Vomiting and chills. Cough that is been going on for a while. Technique: Single frontal view of the chest.     Comparison: Chest x-ray 9/25/2021     Findings: The cardiac silhouette is mild to moderately enlarged although stable.  The   lungs are expanded without evidence for pneumothorax.   Evolving airspace   changes and small effusion are seen in the left lung base. Impression:     1.  Evolving airspace changes and small effusion suggested in the left lung   base. Signs/symptoms of pneumonia should be excluded.           Medications:  Current Facility-Administered Medications   Medication Dose Route Frequency    0.9% sodium chloride infusion 250 mL  250 mL IntraVENous PRN    0.9% sodium chloride infusion 250 mL  250 mL IntraVENous PRN    vancomycin (VANCOCIN) 1250 mg in  ml infusion  1,250 mg IntraVENous Q12H    0.9% sodium chloride infusion 250 mL  250 mL IntraVENous PRN    hydrocortisone (ANUSOL-HC) 2.5 % rectal cream   PeriANAL QID    witch hazel-glycerin (TUCKS) 12.5-50 % pads 1 Pad  1 Pad PeriANAL PRN    LORazepam (ATIVAN) tablet 1 mg  1 mg Oral Q6H PRN    cefepime (MAXIPIME) 2 g in 0.9% sodium chloride (MBP/ADV) 100 mL MBP  2 g IntraVENous Q8H    0.9% sodium chloride infusion 250 mL  250 mL IntraVENous PRN    diphenhydrAMINE (BENADRYL) capsule 25 mg  25 mg Oral Q6H PRN    acetaminophen (TYLENOL) tablet 650 mg  650 mg Oral Q6H PRN    acyclovir (ZOVIRAX) tablet 400 mg  400 mg Oral BID    allopurinoL (ZYLOPRIM) tablet 300 mg  300 mg Oral DAILY    calcium carbonate (OS-DANIELA) tablet 500 mg [elemental]  500 mg Oral TID WITH MEALS    docusate sodium (COLACE) capsule 100 mg  100 mg Oral QHS    fluconazole (DIFLUCAN) tablet 200 mg  200 mg Oral DAILY    HYDROcodone-homatropine (HYCODAN) 5-1.5 mg/5 mL (5 mL) oral solution 5 mL  5 mL Oral Q4H PRN    atorvastatin (LIPITOR) tablet 10 mg  10 mg Oral QHS    pantoprazole (PROTONIX) tablet 40 mg  40 mg Oral ACB    ondansetron (ZOFRAN ODT) tablet 8 mg  8 mg Oral Q8H PRN    sodium chloride (NS) flush 5-40 mL  5-40 mL IntraVENous Q8H    sodium chloride (NS) flush 5-40 mL  5-40 mL IntraVENous PRN    polyethylene glycol (MIRALAX) packet 17 g  17 g Oral DAILY    promethazine (PHENERGAN) tablet 12.5 mg  12.5 mg Oral Q6H PRN    Or    ondansetron (ZOFRAN) injection 4 mg  4 mg IntraVENous Q6H PRN    oxyCODONE IR (ROXICODONE) tablet 5 mg  5 mg Oral Q4H PRN    morphine injection 4 mg  4 mg IntraVENous Q4H PRN    melatonin tablet 5 mg  5 mg Oral QHS    temazepam (RESTORIL) capsule 15 mg  15 mg Oral QHS PRN    albuterol (PROVENTIL VENTOLIN) nebulizer solution 2.5 mg  2.5 mg Nebulization Q6H PRN    triamcinolone acetonide (KENALOG) 0.1 % cream   Topical TID    sodium chloride (NS) flush 5-10 mL  5-10 mL IntraVENous PRN    0.9% sodium chloride infusion 250 mL  250 mL IntraVENous PRN         ASSESSMENT:    Problem List  Date Reviewed: 6/11/2020        Codes Class Noted    * (Principal) Sepsis ([facility] ICD-10-CM: A41.9  ICD-9-CM: 038.9, 995.91  9/30/2021 Neutropenic fever ([facility] ICD-10-CM: D70.9, R50.81  ICD-9-CM: 288.00, 780.61  9/30/2021        Thrombocytopenia ([facility] ICD-10-CM: D69.6  ICD-9-CM: 287.5  9/30/2021        Body mass index (BMI) of 40.0-44.9 in adult [facility]) (Chronic) ICD-10-CM: Z68.41  ICD-9-CM: V85.41  6/13/2019        Admission for antineoplastic chemotherapy ICD-10-CM: Z51.11  ICD-9-CM: V58.11  1/14/2019        Prolymphocytic leukemia of T-cell (HCC) (Chronic) ICD-10-CM: C91.60  ICD-9-CM: 204.90  1/8/2019        Severe obesity (BMI 35.0-39. 9) ICD-10-CM: E66.01  ICD-9-CM: 278.01  9/6/2018        Benign prostatic hyperplasia with nocturia (Chronic) ICD-10-CM: N40.1, R35.1  ICD-9-CM: 600.01, 788.43  1/23/2018        Severe obstructive sleep apnea (Chronic) ICD-10-CM: G47.33  ICD-9-CM: 327.23  1/12/2017        Pure hypercholesterolemia (Chronic) ICD-10-CM: E78.00  ICD-9-CM: 272.0  12/22/2016        Essential hypertension, benign (Chronic) ICD-10-CM: I10  ICD-9-CM: 401.1  9/21/2015        Impotence ICD-10-CM: N52.9  ICD-9-CM: 607.84  9/21/2015        DDD (degenerative disc disease), cervical ICD-10-CM: M50.30  ICD-9-CM: 722.4  9/21/2015        DDD (degenerative disc disease), lumbar ICD-10-CM: M51.36  ICD-9-CM: 722.52  9/21/2015        Right shoulder pain ICD-10-CM: M25.511  ICD-9-CM: 719.41  9/21/2015        First degree hemorrhoids ICD-10-CM: K64.0  ICD-9-CM: 455.0  9/21/2015          Mr. Virginia Falcon is a 76 yo male patient with PMH of T-Cell Pro-Lymphocytic Leukemia. He was first diagnosed in late 2018. He received Alemtuzumab for 8 weeks with DC. He was switched to Pentostatin and received 14 doses with CR. He relapse 9/2021 and is sp cycle one ICE 9/23 with cycle two due on 10/7. Patient came to ED 9/30 with complaint of fever, chills, weakness, dry cough, increased BLE edema and SOB. In ED temp was 102.9. BC/UC obtained. CXR concerning for PNA. Started on cefe and vanc. HGB 9.6, platelets 9K, ANC 0.4, Bili 2. 6. He received 2 units platelets and today platelets are 21C. MP are asked to see patient and take over his care.      Also of note patient has diffuse rash to lower extremities and purple colored palmar flexion creases. He noted that the symptoms started after being placed on Omincef 9/20/2021 for 10 days. We are asked to take over on our patient.          PLAN:  Relapsed T Cell Pro-lymphocytic leukemia  -sp C1 ICE from 9/23-9/26 followed by Neetu Dennis on 9/28. C2 due 10/7, will be delayed     Neutropenic fever / HCAP  -Tmax 102.9. Day 2 cefe and vanc. BC/UC pending  10/3 No fevers. Day 4 cefe/vanc. BC/UC negative  10/4 Tmax 100. BCx-NGTD. UCx-NG. Con't Cef/Vanc (D4).  10/6 Afebrile. BCx-NG. Con't Cef/Vanc (D6).    Pancytopenia secondary to chemotherapy  -?hemolysis-peripheral smear-bili elevated   -Juan Jose SOPs  -Give Granix  10/2 ANC not measurable. Continue Granix. 10/4 DC Granix, pt rec'd Udenyca on 9/28. Awaiting ANC recovery. 10/7 Awaiting [address] recovery. Transfuse 1 unit PRBCs and 1 unit platelets.     BLE diffuse rash/palmar creases purplish color  -? Reaction due to Onmicef  -add Kenalog cream     BLE edema  10/2 give albumin and lasix. Monitor BP  10/3 Lasix ordered  10/4 no weight recorded, +460. Repeat Lasix. 10/5 Diuresed well, down 3# with -1L  10/6 +1.4L with increased BLE edema, repeat Lasix. 10/7 Diuresed well, down 2# with -1.6L. Will ask RN to wrap BLE in compression bandages. BLE dopp 9/22 neg for DVT. Dry cough  -hycodan  10/3 now with some phlegm    Continue home meds  Prophylactic Meds: Acyclovir, Diflucan  Juan Jose SOPs  AC contraindicated d/t thrombocytopenia  Consult PT/OT    Goals and plan of care reviewed with the patient. All questions answered to the best of our ability. Disposition:  TBD pending clinical course. Awaiting ANC recovery.              Jose F Ortega NP   [facility] Hematology & Oncology  [address] [address]  Office : [phone]  Fax : [phone]       Attending Addendum:  I have personally performed a face to face diagnostic evaluation on this patient. I have reviewed and agree with the care plan as documented above Vani Ziegler N.P.  My findings are as follows: Patient appears stable, heart rate regular without murmurs, abdomen is non-tender, bowel sounds are positive.  66 female history of LLE DVT, and T-cell prolymphocytic leukemia, with prior lines of therapy including Alemtuzumab and pentostatin.  Patient has had a more recent relapse 9/21 and is status post cycle 1 ICE with David Mcclain is now admitted with neutropenic fever and imaging concerning for pneumonia, for which he is now on broad-spectrum antibiotics.  Bilateral LE rash slowly improving, on Kenalog cream.  Lasix repeat as needed, repeat today.  Ace bandages to help.  Remains on prophylaxis with acyclovir and Diflucan.  Transfuse blood products as needed.  Awaiting count recovery. Supportive care as outlined above.                   Los Hernandez MD  [facility] Hematology/Oncology  [address] [place] [address]  Office : [phone]  Fax : [phone]

## 2021-10-08 NOTE — PROGRESS NOTES
Problem: Pressure Injury - Risk of  Goal: *Prevention of pressure injury  Description: Document Tu Scale and appropriate interventions in the flowsheet. Outcome: Progressing Towards Goal  Note: Pressure Injury Interventions:       Moisture Interventions: Absorbent underpads    Activity Interventions: Assess need for specialty bed, Chair cushion, Pressure redistribution bed/mattress(bed type)    Mobility Interventions: Assess need for specialty bed, Chair cushion, Pressure redistribution bed/mattress (bed type)    Nutrition Interventions: Document food/fluid/supplement intake, Discuss nutritional consult with provider    Friction and Shear Interventions: Apply protective barrier, creams and emollients, Feet elevated on foot rest, Lift sheet, Minimize layers                Problem: Patient Education: Go to Patient Education Activity  Goal: Patient/Family Education  Outcome: Progressing Towards Goal     Problem: Falls - Risk of  Goal: *Absence of Falls  Description: Document [facility] Fall Risk and appropriate interventions in the flowsheet.   Outcome: Progressing Towards Goal  Note: Fall Risk Interventions:  Mobility Interventions: Bed/chair exit alarm, Communicate number of staff needed for ambulation/transfer, Patient to call before getting OOB         Medication Interventions: Assess postural VS orthostatic hypotension, Bed/chair exit alarm, Patient to call before getting OOB    Elimination Interventions: Bed/chair exit alarm, Call light in reach, Patient to call for help with toileting needs    History of Falls Interventions: Bed/chair exit alarm, Consult care management for discharge planning, Investigate reason for fall         Problem: Patient Education: Go to Patient Education Activity  Goal: Patient/Family Education  Outcome: Progressing Towards Goal     Problem: Patient Education: Go to Patient Education Activity  Goal: Patient/Family Education  Outcome: Progressing Towards Goal     Problem: Patient Education: Go to Patient Education Activity  Goal: Patient/Family Education  Outcome: Progressing Towards Goal

## 2021-10-08 NOTE — PROGRESS NOTES
END OF SHIFT NOTE:    Intake/Output  10/07 1901 - 10/08 0700  In: 360 [P.O.:360]  Out: 1100 [Urine:1100]   Voiding: YES  Catheter: NO  Drain:              Stool:  0 occurrences. Stool Assessment  Stool Color: Ben Olp (10/06/21 0555)  Stool Appearance: Formed (10/06/21 0555)  Stool Amount: Medium (10/06/21 0555)  Stool Source/Status: Rectum (10/06/21 0555)    Emesis:  0 occurrences. VITAL SIGNS  Patient Vitals for the past 12 hrs:   Temp Pulse Resp BP SpO2   10/08/21 0345 98.6 °F (37 °C) 90 21 119/81 98 %   10/07/21 2250 98 °F (36.7 °C) 92 18 112/60 93 %   10/07/21 2003 98.9 °F (37.2 °C) 100 20 110/74 94 %       Pain Assessment  Pain 1  Pain Scale 1: Numeric (0 - 10) (10/08/21 0345)  Pain Intensity 1: 0 (10/08/21 0345)  Patient Stated Pain Goal: 0 (10/08/21 0345)  Pain Reassessment 1: Yes (10/07/21 1415)  Pain Location 1: Leg (10/04/21 0900)  Pain Orientation 1: Left;Right; Lower (10/04/21 0900)  Pain Description 1: Tightness (10/04/21 0900)  Pain Intervention(s) 1: Other (comment) (pt declined PRN meds) (10/04/21 0900)    Ambulating  Yes    Additional Information:       Shift report given to oncoming nurse at the bedside.     Clifford Jimenez RN

## 2021-10-08 NOTE — PROGRESS NOTES
Care Management Interventions  Mode of Transport at Discharge: Self  Transition of Care Consult (CM Consult): Discharge Planning  Support Systems: Child(amelia), Spouse/Significant Other  Confirm Follow Up Transport: Family  Discharge Location  Discharge Placement: Unable to determine at this time    SW spoke w/ pt., pt is in pain and would like to have his pain medication, pt does not have anymore concerns right now. SW stated that she would come back to check on pt after he has eaten.     ADRIANO Haskins

## 2021-10-08 NOTE — PROGRESS NOTES
VANCO DAILY FOLLOW UP NOTE  [address] [facility] Pharmacokinetic Monitoring Service - Vancomycin    Consulting Provider: LUNA Mcmillan    Indication: FN/ sepsis  Target Concentration: Goal AUC/KATI 400-600 mg*hr/L  Day of Therapy: 7  Additional Antimicrobials: cefepime    Pertinent Laboratory Values: Wt Readings from Last 1 Encounters:   10/07/21 124.6 kg (274 lb 11.2 oz)     Temp Readings from Last 1 Encounters:   10/08/21 98.4 °F (36.9 °C)     No components found for: PROCAL  Recent Labs     10/08/21  0331 10/07/21  0301 10/06/21  0316   BUN 20 23 21   CREA 0.82 0.84 0.84   WBC 10.2 11.2* 12.3*     Estimated Creatinine Clearance: 119.1 mL/min (based on SCr of 0.82 mg/dL). Lab Results   Component Value Date/Time    Vancomycin,trough 20.1 (HH) 03/27/2019 12:35 AM    Vancomycin, random 20.1 10/08/2021 03:31 AM       MRSA Nasal Swab: N/A. Non-respiratory infection. .      Assessment:  Date/Time Dose Concentration AUC   10/8 @ 0331 1250mg q 12 hours 20.1 487   Note: Serum concentrations collected for AUC dosing may appear elevated if collected in close proximity to the dose administered, this is not necessarily an indication of toxicity    Plan:  Current dosing regimen is therapeutic (, Trough 15.9)  Continue current dose of vancomycin 1250mg q 12 hours  Pharmacy will continue to monitor patient and adjust therapy as indicated    Thank you for the consult,  SANJANA Howard  10/8/2021 8:54 AM

## 2021-10-08 NOTE — PROGRESS NOTES
[facility] Hematology & Oncology        Inpatient Hematology / Oncology Progress Note      Admission Date: 2021  8:28 PM  Reason for Admission/Hospital Course: Sepsis ([facility] [A41.9]  Neutropenic fever ([facility] [D70.9, R50.81]      24 Hour Events:  Afebrile, VSS  On Cef/Vanc for PNA (D8)  Awaiting count recovery    ROS:  Constitutional: Negative for fever, chills. +weakness, malaise, fatigue. CV:  negative for chest pain, palpitations. +edema. Respiratory: Positive for GENTILE and cough  GI: negative for nausea, abdominal pain, diarrhea. 10 point review of systems is otherwise negative with the exception of the elements mentioned above in the HPI. Allergies   Allergen Reactions    Campath [Alemtuzumab] Other (comments)     Rigors       OBJECTIVE:  Patient Vitals for the past 8 hrs:   BP Temp Pulse Resp SpO2   10/08/21 0828 100/65 98.4 °F (36.9 °C) 85 19 98 %   10/08/21 0345 119/81 98.6 °F (37 °C) 90 21 98 %     Temp (24hrs), Av.1 °F (36.7 °C), Min:97.5 °F (36.4 °C), Max:98.9 °F (37.2 °C)    10/08 0701 - 10/08 1900  In: -   Out: 300 [Urine:300]    Physical Exam:  Constitutional: Well developed male in no acute distress, sitting comfortably in the hospital bed. HEENT: Normocephalic and atraumatic. Oropharynx is clear, mucous membranes are moist.  Pupils are equal, round, and reactive to light. Extraocular muscles are intact.  Sclerae anicteric. Skin Warm and dry.  BLE red, swollen, diffuse rash BLE, purple colored palmar flexion creases   Respiratory Lungs CTAB. CVS Normal rate, regular rhythm and normal S1 and S2.  No murmurs, gallops, or rubs. Abdomen Soft, nontender and nondistended, normoactive bowel sounds.  No palpable mass.  No hepatosplenomegaly. Neuro Grossly nonfocal with no obvious sensory or motor deficits.    MSK Normal range of motion in general. 2-3+ BLE edema   Psych Appropriate mood and affect.           Labs:      Recent Labs     10/08/21  0331 10/07/21  0301 10/06/21  0316 WBC 10.2 11.2* 12.3*   RBC 2.32* 2.17* 2.24*   HGB 7.4* 6.8* 7.1*   HCT 21.5* 20.3* 21.3*   MCV 92.7 93.5 95.1   MCH 31.9 31.3 31.7   MCHC 34.4 33.5 33.3   RDW 16.2* 17.1* 17.4*   PLT 16* 10* 13*   LYMPH 84* 87* 84   MONOS  --  1*  --    DF MANUAL MANUAL MANUAL   ABL 8.6* 9.7* 10.3*   ABM  --  0.1  --         Recent Labs     10/08/21  0331 10/07/21  0301 10/06/21  0316    137 135*   K 4.8 4.5 4.5   * 107 107   CO2 26 26 25   AGAP 4* 4* 3*   * 109* 110*   BUN 20 23 21   CREA 0.82 0.84 0.84   GFRAA >60 >60 >60   GFRNA >60 >60 >60   CA 8.9 8.2* 8.6   AP 66 65 64   TP 6.0* 5.9* 5.8*   ALB 3.4 3.3 3.3   GLOB 2.6 2.6 2.5   AGRAT 1.3 1.3 1.3         Imaging:  XR CHEST PORT [[id number]] Collected: 09/30/21 1936   Order Status: Completed Updated: 09/30/21 1941   Narrative:     CHEST X-RAY, single portable view  9/30/2021     History: Vomiting and chills. Cough that is been going on for a while. Technique: Single frontal view of the chest.     Comparison: Chest x-ray 9/25/2021     Findings: The cardiac silhouette is mild to moderately enlarged although stable.  The   lungs are expanded without evidence for pneumothorax.   Evolving airspace   changes and small effusion are seen in the left lung base. Impression:     1.  Evolving airspace changes and small effusion suggested in the left lung   base. Signs/symptoms of pneumonia should be excluded.           Medications:  Current Facility-Administered Medications   Medication Dose Route Frequency    0.9% sodium chloride infusion 250 mL  250 mL IntraVENous PRN    0.9% sodium chloride infusion 250 mL  250 mL IntraVENous PRN    vancomycin (VANCOCIN) 1250 mg in  ml infusion  1,250 mg IntraVENous Q12H    0.9% sodium chloride infusion 250 mL  250 mL IntraVENous PRN    hydrocortisone (ANUSOL-HC) 2.5 % rectal cream   PeriANAL QID    witch hazel-glycerin (TUCKS) 12.5-50 % pads 1 Pad  1 Pad PeriANAL PRN    LORazepam (ATIVAN) tablet 1 mg  1 mg Oral Q6H PRN    cefepime (MAXIPIME) 2 g in 0.9% sodium chloride (MBP/ADV) 100 mL MBP  2 g IntraVENous Q8H    0.9% sodium chloride infusion 250 mL  250 mL IntraVENous PRN    diphenhydrAMINE (BENADRYL) capsule 25 mg  25 mg Oral Q6H PRN    acetaminophen (TYLENOL) tablet 650 mg  650 mg Oral Q6H PRN    acyclovir (ZOVIRAX) tablet 400 mg  400 mg Oral BID    allopurinoL (ZYLOPRIM) tablet 300 mg  300 mg Oral DAILY    calcium carbonate (OS-DANIELA) tablet 500 mg [elemental]  500 mg Oral TID WITH MEALS    docusate sodium (COLACE) capsule 100 mg  100 mg Oral QHS    fluconazole (DIFLUCAN) tablet 200 mg  200 mg Oral DAILY    HYDROcodone-homatropine (HYCODAN) 5-1.5 mg/5 mL (5 mL) oral solution 5 mL  5 mL Oral Q4H PRN    atorvastatin (LIPITOR) tablet 10 mg  10 mg Oral QHS    pantoprazole (PROTONIX) tablet 40 mg  40 mg Oral ACB    ondansetron (ZOFRAN ODT) tablet 8 mg  8 mg Oral Q8H PRN    sodium chloride (NS) flush 5-40 mL  5-40 mL IntraVENous Q8H    sodium chloride (NS) flush 5-40 mL  5-40 mL IntraVENous PRN    polyethylene glycol (MIRALAX) packet 17 g  17 g Oral DAILY    promethazine (PHENERGAN) tablet 12.5 mg  12.5 mg Oral Q6H PRN    Or    ondansetron (ZOFRAN) injection 4 mg  4 mg IntraVENous Q6H PRN    oxyCODONE IR (ROXICODONE) tablet 5 mg  5 mg Oral Q4H PRN    morphine injection 4 mg  4 mg IntraVENous Q4H PRN    melatonin tablet 5 mg  5 mg Oral QHS    temazepam (RESTORIL) capsule 15 mg  15 mg Oral QHS PRN    albuterol (PROVENTIL VENTOLIN) nebulizer solution 2.5 mg  2.5 mg Nebulization Q6H PRN    triamcinolone acetonide (KENALOG) 0.1 % cream   Topical TID    sodium chloride (NS) flush 5-10 mL  5-10 mL IntraVENous PRN    0.9% sodium chloride infusion 250 mL  250 mL IntraVENous PRN         ASSESSMENT:    Problem List  Date Reviewed: 6/11/2020        Codes Class Noted    * (Principal) [facility] ICD-10-CM: A41.9  ICD-9-CM: 038.9, 995.91  9/30/2021        Neutropenic fever (HCC) ICD-10-CM: D70.9, R50.81  ICD-9-CM: 288.00, 780.61  9/30/2021        Thrombocytopenia (HCC) ICD-10-CM: D69.6  ICD-9-CM: 287.5  9/30/2021        Body mass index (BMI) of 40.0-44.9 in adult [facility]) (Chronic) ICD-10-CM: Z68.41  ICD-9-CM: V85.41  6/13/2019        Admission for antineoplastic chemotherapy ICD-10-CM: Z51.11  ICD-9-CM: V58.11  1/14/2019        Prolymphocytic leukemia of T-cell (HCC) (Chronic) ICD-10-CM: C91.60  ICD-9-CM: 204.90  1/8/2019        Severe obesity (BMI 35.0-39. 9) ICD-10-CM: E66.01  ICD-9-CM: 278.01  9/6/2018        Benign prostatic hyperplasia with nocturia (Chronic) ICD-10-CM: N40.1, R35.1  ICD-9-CM: 600.01, 788.43  1/23/2018        Severe obstructive sleep apnea (Chronic) ICD-10-CM: G47.33  ICD-9-CM: 327.23  1/12/2017        Pure hypercholesterolemia (Chronic) ICD-10-CM: E78.00  ICD-9-CM: 272.0  12/22/2016        Essential hypertension, benign (Chronic) ICD-10-CM: I10  ICD-9-CM: 401.1  9/21/2015        Impotence ICD-10-CM: N52.9  ICD-9-CM: 607.84  9/21/2015        DDD (degenerative disc disease), cervical ICD-10-CM: M50.30  ICD-9-CM: 722.4  9/21/2015        DDD (degenerative disc disease), lumbar ICD-10-CM: M51.36  ICD-9-CM: 722.52  9/21/2015        Right shoulder pain ICD-10-CM: M25.511  ICD-9-CM: 719.41  9/21/2015        First degree hemorrhoids ICD-10-CM: K64.0  ICD-9-CM: 455.0  9/21/2015          Mr. Terrell Lewis is a 76 yo male patient with PMH of T-Cell Pro-Lymphocytic Leukemia. He was first diagnosed in late 2018. He received Alemtuzumab for 8 weeks with [place]. He was switched to Pentostatin and received 14 doses with CR. He relapse 9/2021 and is sp cycle one ICE 9/23 with cycle two due on 10/7. Patient came to ED 9/30 with complaint of fever, chills, weakness, dry cough, increased BLE edema and SOB. In ED temp was 102.9. BC/UC obtained. CXR concerning for PNA. Started on cefe and vanc. HGB 9.6, platelets 9K, ANC 0.4, Bili 2. 6. He received 2 units platelets and today platelets are 51Y. XC are asked to see patient and take over his care.      Also of note patient has diffuse rash to lower extremities and purple colored palmar flexion creases. He noted that the symptoms started after being placed on Omincef 9/20/2021 for 10 days. We are asked to take over on our patient.          PLAN:  Relapsed T Cell Pro-lymphocytic leukemia  -sp C1 ICE from 9/23-9/26 followed by Elisha Grace on 9/28. C2 due 10/7, will be delayed     Neutropenic fever / HCAP  -Tmax 102.9. Day 2 cefe and vanc. BC/UC pending  10/3 No fevers. Day 4 cefe/vanc. BC/UC negative  10/4 Tmax 100. BCx-NGTD. UCx-NG. Con't Cef/Vanc (D4).  10/6 Afebrile. BCx-NG.    10/8 Remains afebrile. Change Cef/Vanc to ppx LVQ.     Pancytopenia secondary to chemotherapy  -?hemolysis-peripheral smear-bili elevated   -Juan Jose SOPs  -Give Granix  10/2 ANC not measurable. Continue Granix. 10/4 DC Granix, pt rec'd Udenyca on 9/28. Awaiting ANC recovery. 10/7 Awaiting [address] recovery. Transfuse 1 unit PRBCs and 1 unit platelets. 10/8 Awaiting ANC recovery.     BLE diffuse rash/palmar creases purplish color  -? Reaction due to Onmicef  -add Kenalog cream     BLE edema  10/2 give albumin and lasix. Monitor BP  10/3 Lasix ordered  10/4 no weight recorded, +460. Repeat Lasix. 10/5 Diuresed well, down 3# with -1L  10/6 +1.4L with increased BLE edema, repeat Lasix. 10/7 Diuresed well, down 2# with -1.6L. Will ask RN to wrap BLE in compression bandages. BLE dopp 9/22 neg for DVT. 10/8 Up +1.1L with increased BLE edema. Repeat Lasix. Dry cough  -hycodan  10/3 now with some phlegm    Continue home meds  Prophylactic Meds: Acyclovir, Diflucan, Levaquin  Juan Jose SOPs  AC contraindicated d/t thrombocytopenia    Goals and plan of care reviewed with the patient. All questions answered to the best of our ability. Disposition:  TBD pending clinical course. Awaiting ANC recovery. PT recommends [facility] therapy upon discharge.             Carrie Evans NP   [facility] Hematology & Oncology  [address] [facility]  Office : [phone]  Fax : [phone]     Attending Addendum:  I have personally performed a face to face diagnostic evaluation on this patient. I have reviewed and agree with the care plan as documented above Carmina Waldrop N.P.  My findings are as follows: Patient appears stable, heart rate regular without murmurs, abdomen is non-tender, bowel sounds are positive.  66 female history of LLE DVT, and T-cell prolymphocytic leukemia, with prior lines of therapy including Alemtuzumab and pentostatin.  Patient has had a more recent relapse 9/21 and is status post cycle 1 ICE with Azam Smyth is now admitted with neutropenic fever and imaging concerning for pneumonia, for which he recieved broad-spectrum antibiotics, now changed to LVQ.  Bilateral LE rash slowly improving, on Kenalog cream.  Lasix repeat as needed, repeat today.  Ace bandages to help.  Remains on prophylaxis with acyclovir and Diflucan.  Transfuse blood products as needed.  Awaiting ANC recovery. Supportive care as outlined above.                   Jaye Muse MD  [facility] Hematology/Oncology  [address] [address]  Office : [phone]  Fax : [phone]

## 2021-10-09 NOTE — PROGRESS NOTES
END OF SHIFT NOTE:    Intake/Output  No intake/output data recorded. Voiding: YES  Catheter: NO  Drain:              Stool:  0 occurrences. Stool Assessment  Stool Color: Bozena Quarry (10/06/21 0555)  Stool Appearance: Formed (10/06/21 0555)  Stool Amount: Medium (10/06/21 0555)  Stool Source/Status: Rectum (10/06/21 0555)    Emesis:  0 occurrences. VITAL SIGNS  Patient Vitals for the past 12 hrs:   Temp Pulse Resp BP SpO2   10/09/21 0316 98.1 °F (36.7 °C) 86 18 97/60 97 %   10/08/21 2323 98.8 °F (37.1 °C) 88 18 112/64 97 %   10/08/21 1932 98.9 °F (37.2 °C) 92 20 129/75 92 %       Pain Assessment  Pain 1  Pain Scale 1: Numeric (0 - 10) (10/09/21 0618)  Pain Intensity 1: 7 (10/09/21 0618)  Patient Stated Pain Goal: 0 (10/09/21 0618)  Pain Reassessment 1: Yes (10/07/21 1415)  Pain Onset 1: this adm (10/09/21 0618)  Pain Location 1: Foot (10/09/21 0618)  Pain Orientation 1: Left;Right (10/09/21 0618)  Pain Description 1: Aching (10/09/21 0618)  Pain Intervention(s) 1: Medication (see MAR); Repositioned (10/09/21 0618)    Ambulating  Yes    Additional Information:     Pt received PRN oxycodone for pain in legs. Pt reports pain is due to leg wraps. Shift report given to oncoming nurse at the bedside.     Charmayne Ng, RN

## 2021-10-09 NOTE — PROGRESS NOTES
Problem: Pressure Injury - Risk of  Goal: *Prevention of pressure injury  Description: Document Tu Scale and appropriate interventions in the flowsheet. Outcome: Progressing Towards Goal  Note: Pressure Injury Interventions:       Moisture Interventions: Absorbent underpads    Activity Interventions: Pressure redistribution bed/mattress(bed type), Increase time out of bed    Mobility Interventions: Pressure redistribution bed/mattress (bed type)    Nutrition Interventions: Document food/fluid/supplement intake, Offer support with meals,snacks and hydration    Friction and Shear Interventions: Minimize layers                Problem: Falls - Risk of  Goal: *Absence of Falls  Description: Document Anna Fall Risk and appropriate interventions in the flowsheet.   Outcome: Progressing Towards Goal  Note: Fall Risk Interventions:  Mobility Interventions: Communicate number of staff needed for ambulation/transfer, Patient to call before getting OOB         Medication Interventions: Evaluate medications/consider consulting pharmacy, Teach patient to arise slowly    Elimination Interventions: Call light in reach, Toileting schedule/hourly rounds    History of Falls Interventions: Bed/chair exit alarm

## 2021-10-09 NOTE — PROGRESS NOTES
[facility] Hematology & Oncology        Inpatient Hematology / Oncology Progress Note      Admission Date: 2021  8:28 PM  Reason for Admission/Hospital Course: Sepsis ([facility] [A41.9]  Neutropenic fever ([facility] [D70.9, R50.81]      24 Hour Events:  Afebrile, VSS  On Cef/Vanc for PNA x 8 days, switched to oral LVQ yesterday   Awaiting count recovery  Lower extremity swelling ongoing    ROS:  Constitutional: Negative for fever, chills. +weakness, malaise, fatigue. CV:  negative for chest pain, palpitations. +edema. Respiratory: Positive for GENTILE and cough  GI: negative for nausea, abdominal pain, diarrhea. 10 point review of systems is otherwise negative with the exception of the elements mentioned above in the HPI. Allergies   Allergen Reactions    Campath [Alemtuzumab] Other (comments)     Rigors       OBJECTIVE:  Patient Vitals for the past 8 hrs:   BP Temp Pulse Resp SpO2   10/09/21 1027 112/71 97.6 °F (36.4 °C) 86 17    10/09/21 0950 99/63 98.5 °F (36.9 °C) 86 15 97 %   10/09/21 0806 113/81 97.8 °F (36.6 °C) 86 17 99 %     Temp (24hrs), Av.3 °F (36.8 °C), Min:97.6 °F (36.4 °C), Max:98.9 °F (37.2 °C)    10/09 07 - 10/09 1900  In: -   Out: 175 [Urine:175]    Physical Exam:  Constitutional: Well developed male in no acute distress, sitting comfortably in the hospital bed. HEENT: Normocephalic and atraumatic. Oropharynx is clear, mucous membranes are moist.  Extraocular muscles are intact.  Sclerae anicteric. Skin Warm and dry.  No rash or erythema. Respiratory Lungs CTAB. CVS Normal rate, regular rhythm and normal S1 and S2.  No murmurs, gallops, or rubs. Abdomen Soft, nontender and nondistended, normoactive bowel sounds.  No palpable mass.     Neuro Grossly nonfocal with no obvious sensory or motor deficits.    MSK Normal range of motion in general. 2-3+ BLE edema   Psych Appropriate mood and affect.           Labs:      Recent Labs     10/09/21  0302 10/08/21  0331 10/07/21  0301 WBC 11.3* 10.2 11.2*   RBC 2.16* 2.32* 2.17*   HGB 6.9* 7.4* 6.8*   HCT 20.1* 21.5* 20.3*   MCV 93.1 92.7 93.5   MCH 31.9 31.9 31.3   MCHC 34.3 34.4 33.5   RDW 15.9* 16.2* 17.1*   PLT 8* 16* 10*   LYMPH 85* 84* 87*   MONOS 3  --  1*   DF MANUAL MANUAL MANUAL   ABL 9.6* 8.6* 9.7*   ABM 0.3  --  0.1        Recent Labs     10/09/21  0302 10/08/21  0331 10/07/21  0301    138 137   K 4.2 4.8 4.5   * 108* 107   CO2 25 26 26   AGAP 5* 4* 4*   * 106* 109*   BUN 20 20 23   CREA 0.88 0.82 0.84   GFRAA >60 >60 >60   GFRNA >60 >60 >60   CA 8.5 8.9 8.2*   AP 59 66 65   TP 5.8* 6.0* 5.9*   ALB 3.2 3.4 3.3   GLOB 2.6 2.6 2.6   AGRAT 1.2 1.3 1.3   MG 1.9  --   --          Imaging:  XR CHEST PORT [[id number]] Collected: 09/30/21 1936   Order Status: Completed Updated: 09/30/21 1941   Narrative:     CHEST X-RAY, single portable view  9/30/2021     History: Vomiting and chills. Cough that is been going on for a while. Technique: Single frontal view of the chest.     Comparison: Chest x-ray 9/25/2021     Findings: The cardiac silhouette is mild to moderately enlarged although stable.  The   lungs are expanded without evidence for pneumothorax.   Evolving airspace   changes and small effusion are seen in the left lung base. Impression:     1.  Evolving airspace changes and small effusion suggested in the left lung   base. Signs/symptoms of pneumonia should be excluded.           Medications:  Current Facility-Administered Medications   Medication Dose Route Frequency    0.9% sodium chloride infusion 250 mL  250 mL IntraVENous PRN    levoFLOXacin (LEVAQUIN) tablet 500 mg  500 mg Oral Q24H    docusate sodium (COLACE) capsule 100 mg  100 mg Oral DAILY PRN    polyethylene glycol (MIRALAX) packet 17 g  17 g Oral DAILY PRN    loperamide (IMODIUM) capsule 2 mg  2 mg Oral PRN    hydrocortisone (ANUSOL-HC) 2.5 % rectal cream   PeriANAL QID    witch hazel-glycerin (TUCKS) 12.5-50 % pads 1 Pad  1 Pad PeriANAL PRN  LORazepam (ATIVAN) tablet 1 mg  1 mg Oral Q6H PRN    diphenhydrAMINE (BENADRYL) capsule 25 mg  25 mg Oral Q6H PRN    acetaminophen (TYLENOL) tablet 650 mg  650 mg Oral Q6H PRN    acyclovir (ZOVIRAX) tablet 400 mg  400 mg Oral BID    allopurinoL (ZYLOPRIM) tablet 300 mg  300 mg Oral DAILY    calcium carbonate (OS-DANIELA) tablet 500 mg [elemental]  500 mg Oral TID WITH MEALS    fluconazole (DIFLUCAN) tablet 200 mg  200 mg Oral DAILY    HYDROcodone-homatropine (HYCODAN) 5-1.5 mg/5 mL (5 mL) oral solution 5 mL  5 mL Oral Q4H PRN    atorvastatin (LIPITOR) tablet 10 mg  10 mg Oral QHS    pantoprazole (PROTONIX) tablet 40 mg  40 mg Oral ACB    ondansetron (ZOFRAN ODT) tablet 8 mg  8 mg Oral Q8H PRN    sodium chloride (NS) flush 5-40 mL  5-40 mL IntraVENous Q8H    sodium chloride (NS) flush 5-40 mL  5-40 mL IntraVENous PRN    promethazine (PHENERGAN) tablet 12.5 mg  12.5 mg Oral Q6H PRN    Or    ondansetron (ZOFRAN) injection 4 mg  4 mg IntraVENous Q6H PRN    oxyCODONE IR (ROXICODONE) tablet 5 mg  5 mg Oral Q4H PRN    morphine injection 4 mg  4 mg IntraVENous Q4H PRN    melatonin tablet 5 mg  5 mg Oral QHS    temazepam (RESTORIL) capsule 15 mg  15 mg Oral QHS PRN    albuterol (PROVENTIL VENTOLIN) nebulizer solution 2.5 mg  2.5 mg Nebulization Q6H PRN    triamcinolone acetonide (KENALOG) 0.1 % cream   Topical TID    sodium chloride (NS) flush 5-10 mL  5-10 mL IntraVENous PRN         ASSESSMENT:    Problem List  Date Reviewed: 6/11/2020        Codes Class Noted    * (Principal) Sepsis ([facility] ICD-10-CM: A41.9  ICD-9-CM: 038.9, 995.91  9/30/2021        Neutropenic fever ([facility] ICD-10-CM: D70.9, R50.81  ICD-9-CM: 288.00, 780.61  9/30/2021        Thrombocytopenia (HCC) ICD-10-CM: D69.6  ICD-9-CM: 287.5  9/30/2021        Body mass index (BMI) of 40.0-44.9 in adult [facility]) (Chronic) ICD-10-CM: C86.96  ICD-9-CM: V85.41  6/13/2019        Admission for antineoplastic chemotherapy ICD-10-CM: Z51.11  ICD-9-CM: V58.11 1/14/2019        Prolymphocytic leukemia of T-cell (HCC) (Chronic) ICD-10-CM: C91.60  ICD-9-CM: 204.90  1/8/2019        Severe obesity (BMI 35.0-39. 9) ICD-10-CM: E66.01  ICD-9-CM: 278.01  9/6/2018        Benign prostatic hyperplasia with nocturia (Chronic) ICD-10-CM: N40.1, R35.1  ICD-9-CM: 600.01, 788.43  1/23/2018        Severe obstructive sleep apnea (Chronic) ICD-10-CM: G47.33  ICD-9-CM: 327.23  1/12/2017        Pure hypercholesterolemia (Chronic) ICD-10-CM: E78.00  ICD-9-CM: 272.0  12/22/2016        Essential hypertension, benign (Chronic) ICD-10-CM: I10  ICD-9-CM: 401.1  9/21/2015        Impotence ICD-10-CM: N52.9  ICD-9-CM: 607.84  9/21/2015        DDD (degenerative disc disease), cervical ICD-10-CM: M50.30  ICD-9-CM: 722.4  9/21/2015        DDD (degenerative disc disease), lumbar ICD-10-CM: M51.36  ICD-9-CM: 722.52  9/21/2015        Right shoulder pain ICD-10-CM: M25.511  ICD-9-CM: 719.41  9/21/2015        First degree hemorrhoids ICD-10-CM: K64.0  ICD-9-CM: 455.0  9/21/2015          Mr. Shiela Leonard is a 78 yo male patient with PMH of T-Cell Pro-Lymphocytic Leukemia. He was first diagnosed in late 2018. He received Alemtuzumab for 8 weeks with [place]. He was switched to Pentostatin and received 14 doses with CR. He relapse 9/2021 and is sp cycle one ICE 9/23 with cycle two due on 10/7. Patient came to ED 9/30 with complaint of fever, chills, weakness, dry cough, increased BLE edema and SOB. In ED temp was 102.9. BC/UC obtained. CXR concerning for PNA. Started on cefe and vanc. HGB 9.6, platelets 9K, ANC 0.4, Bili 2. 6. He received 2 units platelets and today platelets are 93X. IV are asked to see patient and take over his care.      Also of note patient has diffuse rash to lower extremities and purple colored palmar flexion creases. He noted that the symptoms started after being placed on Omincef 9/20/2021 for 10 days.  We are asked to take over on our patient.          PLAN:  Relapsed T Cell Pro-lymphocytic leukemia  -sp C1 ICE from 9/23-9/26 followed by Bud Cramp on 9/28. C2 due 10/7, will be delayed     Neutropenic fever / HCAP  -Tmax 102.9. Day 2 cefe and vanc. BC/UC pending  10/3 No fevers. Day 4 cefe/vanc. BC/UC negative  10/4 Tmax 100. BCx-NGTD. UCx-NG. Con't Cef/Vanc (D4).  10/6 Afebrile. BCx-NG.    10/8 Remains afebrile. Change Cef/Vanc to ppx LVQ. 10/9 Continue oral LVQ.      Pancytopenia secondary to chemotherapy  -?hemolysis-peripheral smear-bili elevated   -[facility] SOPs  -Give Granix  10/2 ANC not measurable. Continue Granix. 10/4 DC Granix, pt rec'd Udenyca on 9/28. Awaiting ANC recovery. 10/7 Awaiting [address] recovery. Transfuse 1 unit PRBCs and 1 unit platelets. 109 Awaiting ANC recovery.     BLE diffuse rash/palmar creases purplish color  -? Reaction due to Onmicef  -add Kenalog cream     BLE edema  10/2 give albumin and lasix. Monitor BP  10/3 Lasix ordered  10/4 no weight recorded, +460. Repeat Lasix. 10/5 Diuresed well, down 3# with -1L  10/6 +1.4L with increased BLE edema, repeat Lasix. 10/7 Diuresed well, down 2# with -1.6L. Will ask RN to wrap BLE in compression bandages. BLE dopp 9/22 neg for DVT. 10/8 Up +1.1L with increased BLE edema. Repeat Lasix. 10/9 Down 3 pounds and down 1.5 liters. Dry cough  -hycodan  10/3 now with some phlegm    Continue home meds  Prophylactic Meds: Acyclovir, Diflucan, Levaquin  [facility] SOPs  AC contraindicated d/t thrombocytopenia    Goals and plan of care reviewed with the patient. All questions answered to the best of our ability. Disposition:  TBD pending clinical course. Awaiting ANC recovery. PT recommends [facility] therapy upon discharge. Requiring oxygen at night. Will perform overnight oximetry in preparation for discharge home.              Laurey Jewel, NP   [facility] Hematology & Oncology  [address] [address]  Office : [phone]  Fax : [phone]       Attending Addendum:  I have personally performed a face to face diagnostic evaluation on this patient. I have reviewed and agree with the care plan as documented above Stephanie Kent N.P.  My findings are as follows: Patient appears stable, heart rate regular without murmurs, abdomen is non-tender, bowel sounds are positive.  66 female history of LLE DVT, and T-cell prolymphocytic leukemia, with prior lines of therapy including Alemtuzumab and pentostatin.  Patient has had a more recent relapse 9/21 and is status post cycle 1 ICE with Susie Fontanez is now admitted with neutropenic fever and imaging concerning for pneumonia, for which he recieved broad-spectrum antibiotics, now changed to LVQ.  Bilateral LE rash slowly improving, on Kenalog cream.  Lasix repeat as needed, repeat today. Ace bandages to help.  Remains on prophylaxis with acyclovir and Diflucan.  Transfuse blood products as needed.  Awaiting ANC recovery. Supportive care as outlined above.   Possible discharge in next 24 to 48 hours if remains afebrile.                   Pradeep Aquino MD  [facility] Hematology/Oncology  [address] [place] [address]  Office : [phone]  Fax : [phone]

## 2021-10-09 NOTE — PROGRESS NOTES
Patient Vitals for the past 12 hrs:   Temp Pulse Resp BP SpO2   10/09/21 1426 98.2 °F (36.8 °C) 87 18 116/61 95 %   10/09/21 1309 98.5 °F (36.9 °C) 77 19 96/63 98 %   10/09/21 1228 97.8 °F (36.6 °C) 79 18 96/62 96 %   10/09/21 1133 97.8 °F (36.6 °C) 87 18 115/66 97 %   10/09/21 1027 97.6 °F (36.4 °C) 86 17 112/71    10/09/21 0950 98.5 °F (36.9 °C) 86 15 99/63 97 %   10/09/21 0806 97.8 °F (36.6 °C) 86 17 113/81 99 %     Uneventful shift. 1unit PRBCs & 1unit plts transfused per BOBBY sop. Pt ambulated in hallway with wife. Pt to have overnight oxygen study. Respiratory therapy made aware. Pt declined RN wrapping legs per order.     Bedside shift report given to Cathi Mcardle, on-coming RN

## 2021-10-09 NOTE — PROGRESS NOTES
Problem: Mobility Impaired (Adult and Pediatric)  Goal: *Acute Goals and Plan of Care (Insert Text)  Outcome: Progressing Towards Goal  Note: GOALS MODIFIED & MET 10/9/21 :  (2.)Mr. Janee Durand will transfer from bed to chair and chair to bed with SUPERVISION. (3.)Mr. Janee Durand will ambulate with SUPERVISION 500 ft. (Pt was not concerned of performing bed mobility or stair ambulation & felt no need to practice)        _________________       PHYSICAL THERAPY: Daily Note, Discharge and PM 10/9/2021  INPATIENT: PT Visit Days : 2  Payor: Janet Cash / Plan: Lv Gouty / Product Type: GRAVIDI Care Medicare /       NAME/AGE/GENDER: David Avila is a 77 y.o. male   PRIMARY DIAGNOSIS: Sepsis ([facility] [A41.9]  Neutropenic fever ([address] [D70.9, R50.81] Sepsis ([address] Sepsis ([address]       ICD-10: Treatment Diagnosis:    · Difficulty in walking, Not elsewhere classified (R26.2)  · Other abnormalities of gait and mobility (R26.89)   Precaution/Allergies:  Campath [alemtuzumab]      ASSESSMENT:     Mr. Janee Durand is functioning at a supervision level & was instructed to continue to walk several times a day & warming up with HEP was was reviewed & issued. No further skilled PT need. This section established at most recent assessment   PROBLEM LIST (Impairments causing functional limitations):  1. Decreased Strength  2. Decreased Transfer Abilities  3. Decreased Ambulation Ability/Technique  4. Decreased Balance  5. Increased Pain  6. Decreased Activity Tolerance  7. Decreased Flexibility/Joint Mobility   INTERVENTIONS PLANNED: (Benefits and precautions of physical therapy have been discussed with the patient.)  1. Bed Mobility  2. Gait Training  3. Therapeutic Activites  4. Therapeutic Exercise/Strengthening  5.  Transfer Training     TREATMENT PLAN: Frequency/Duration: daily for duration of hospital stay  Rehabilitation Potential For Stated Goals: Good     REHAB RECOMMENDATIONS (at time of discharge pending progress):    Placement: It is my opinion, based on this patient's performance to date, that Mr. Edson Bloom may benefit from [address] after discharge due to the functional deficits listed above that are likely to improve with skilled rehabilitation because he/she has multiple medical issues that affect his/her functional mobility in the community. Equipment:    None at this time              HISTORY:   History of Present Injury/Illness (Reason for Referral):  Pt admitted with above diagnosis. Past Medical History/Comorbidities:   Mr. Edson Bloom  has a past medical history of Back pain, Cervical radiculopathy, Claustrophobia, DVT (deep venous thrombosis) ([facility] (06/2019), GERD (gastroesophageal reflux disease), H/O seasonal allergies, Hyperlipidemia, Impotence, Insomnia, Lateral epicondylitis, Leukemia ([facility] Obesity, and Sleep apnea. Mr. Edson Bloom  has a past surgical history that includes hx circumcision; hx colonoscopy (2017); hx wisdom teeth extraction; ir insert tunl cvc w port over 5 years (6/19/2019); hx orthopaedic (Right); and ir remove tunl cvad w port/pump (12/14/2020). Social History/Living Environment:   Home Environment: Private residence  One/Two Story Residence: One story  Living Alone: No  Support Systems: Child(amelia), Spouse/Significant Other  Patient Expects to be Discharged to[de-identified] House  Current DME Used/Available at Home: None  Prior Level of Function/Work/Activity:  Pt ambulates with a cane independently. Number of Personal Factors/Comorbidities that affect the Plan of Care: 0: LOW COMPLEXITY   EXAMINATION:   Most Recent Physical Functioning:   Gross Assessment:                       Balance:  Sitting: Intact; Without support  Standing: Intact; Without support Bed Mobility:  Supine to Sit:  (NT, but has no concerns with performing bed mobility)       Transfers:  Sit to Stand: Supervision  Stand to Sit: Supervision  Bed to Chair: Supervision  Gait:     Speed/Crista: Delayed  Step Length: (equal)  Gait Abnormalities: Decreased step clearance  Distance (ft): 500 Feet (ft)  Assistive Device:  (none)  Ambulation - Level of Assistance: Supervision      Body Structures Involved:  1. Bones  2. Joints  3. Muscles  4. Ligaments Body Functions Affected:  1. Neuromusculoskeletal  2. Movement Related Activities and Participation Affected:  1. Mobility   Number of elements that affect the Plan of Care: 4+: HIGH COMPLEXITY   CLINICAL PRESENTATION:   Presentation: Stable and uncomplicated: LOW COMPLEXITY   CLINICAL DECISION MAKING:   [facility] AM-PAC 6 Clicks   Basic Mobility Inpatient Short Form  How much difficulty does the patient currently have. .. Unable A Lot A Little None   1. Turning over in bed (including adjusting bedclothes, sheets and blankets)? [] 1   [] 2   [x] 3   [] 4   2. Sitting down on and standing up from a chair with arms ( e.g., wheelchair, bedside commode, etc.)   [] 1   [] 2   [x] 3   [] 4   3. Moving from lying on back to sitting on the side of the bed? [] 1   [] 2   [x] 3   [] 4   How much help from another person does the patient currently need. .. Total A Lot A Little None   4. Moving to and from a bed to a chair (including a wheelchair)? [] 1   [] 2   [x] 3   [] 4   5. Need to walk in hospital room? [] 1   [] 2   [x] 3   [] 4   6. Climbing 3-5 steps with a railing? [] 1   [] 2   [x] 3   [] 4   © 2007, Trustees of [facility], under license to [employer]. All rights reserved      Score:  Initial: 18 Most Recent: X (Date: -- )    Interpretation of Tool:  Represents activities that are increasingly more difficult (i.e. Bed mobility, Transfers, Gait). Medical Necessity:     · Skilled intervention continues to be required due to decreased mobility ability. Reason for Services/Other Comments:  · Patient continues to require skilled intervention due to   · Decreased mobility ability.   · .   Use of outcome tool(s) and clinical judgement create a POC that gives a: Clear prediction of patient's progress: LOW COMPLEXITY            TREATMENT:   (In addition to Assessment/Re-Assessment sessions the following treatments were rendered)   Pre-treatment Symptoms/Complaints:    Pain: Initial:   Pain Intensity 1: 0  Post Session:  no complaints     Therapeutic Activity: (    23 min ( extra time to work through activity noted): Therapeutic activities including warm up LE AROM with gluteal & quad isometrics, progressive gait training , review of HEP & education on positioning to reduce LE edema to improve mobility, strength, balance, coordination and dynamic movement of leg - bilateral and core to improve functional stability & endurance. .       Braces/Orthotics/Lines/Etc:   · none  Treatment/Session Assessment:    · Response to Treatment:  Pt pleased with progress  · Interdisciplinary Collaboration:   o Registered Nurse  · After treatment position/precautions:   o Up in chair  o Bed/Chair-wheels locked  o Caregiver at bedside  o Call light within reach  o RN notified  o Family at bedside   · Compliance with Program/Exercises: Compliant all of the time  Recommendations/ DC PT services.   Total Treatment Duration:  PT Patient Time In/Time Out  Time In: 1724  Time Out: 50 [facility],

## 2021-10-10 NOTE — PROGRESS NOTES
Pt placed on Continuous Oximetry Cap #25 for Overnight Oximetry test:       10/09/21 2243   Oxygen Therapy   O2 Sat (%) 97 %   Pulse via Oximetry 87 beats per minute   O2 Device None (Room air)   O2 Flow Rate (L/min) 0 l/min   FIO2 (%) 21 %

## 2021-10-10 NOTE — PROGRESS NOTES
Patient Vitals for the past 12 hrs:   Temp Pulse Resp BP SpO2   10/10/21 1543 98.7 °F (37.1 °C) 86 16 128/78 100 %   10/10/21 1512 97.7 °F (36.5 °C) 86 18 118/70 99 %   10/10/21 1354 97.6 °F (36.4 °C) 84  105/72 100 %   10/10/21 1339 97.2 °F (36.2 °C) 84 17 112/74 100 %   10/10/21 1209 97.5 °F (36.4 °C) 78 18 131/64 97 %   10/10/21 1110 98 °F (36.7 °C) 76 18 118/73 96 %   10/10/21 1011 98 °F (36.7 °C) 92 18 130/86 98 %   10/10/21 0936 98.8 °F (37.1 °C) 87 16 123/82 98 %   10/10/21 0753 98.1 °F (36.7 °C) 70 18 116/76 98 %     1unit PRBC's transfused  1unit plts transfused  While assessing PICC dressing this afternoon, yellow drainage noted at biopatch. While changing the dressing, slightly yellow/serous fluid noted at insertion site. Np notified. Stated she would assess site on Tuesday when pt goes into [facility]. Patient and wife educated to call prior if any signs of infection noted such as fever, pus at insertion site, warmth/redness at the site, etc.  Verbalized understanding. Dc education complete with patient and wife including follow-up (cancer center to call pt tomorrow), medications (scripts e-scribed to pt pharmacy), and home care (neutropenia & line care). Verbalized understanding. Pt dc'd home with wife.

## 2021-10-10 NOTE — PROGRESS NOTES
Problem: Pressure Injury - Risk of  Goal: *Prevention of pressure injury  Description: Document Tu Scale and appropriate interventions in the flowsheet. Outcome: Progressing Towards Goal  Note: Pressure Injury Interventions:  Sensory Interventions: Assess changes in LOC, Check visual cues for pain, Keep linens dry and wrinkle-free, Maintain/enhance activity level, Minimize linen layers    Moisture Interventions: Maintain skin hydration (lotion/cream), Minimize layers    Activity Interventions: Pressure redistribution bed/mattress(bed type), Increase time out of bed    Mobility Interventions: HOB 30 degrees or less, Pressure redistribution bed/mattress (bed type)    Nutrition Interventions: Document food/fluid/supplement intake, Offer support with meals,snacks and hydration    Friction and Shear Interventions: Minimize layers, Sit at 90-degree angle, HOB 30 degrees or less                Problem: Falls - Risk of  Goal: *Absence of Falls  Description: Document Anna Fall Risk and appropriate interventions in the flowsheet.   Outcome: Progressing Towards Goal  Note: Fall Risk Interventions:  Mobility Interventions: Communicate number of staff needed for ambulation/transfer         Medication Interventions: Patient to call before getting OOB, Teach patient to arise slowly    Elimination Interventions: Urinal in reach, Call light in reach, Elevated toilet seat, Patient to call for help with toileting needs, Toilet paper/wipes in reach, Toileting schedule/hourly rounds    History of Falls Interventions: Room close to nurse's station

## 2021-10-10 NOTE — DISCHARGE SUMMARY
[address] Hematology Oncology  In Patient Hematology / Oncology Discharge Summary Note    Patient ID:  Will Bloodgood  [id number]  77 y.o.  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 Domingo Chiu is a 76 yo male patient with PMH of T-Cell Pro-Lymphocytic Leukemia. He was first diagnosed in late 2018. He received Alemtuzumab for 8 weeks with TX. He was switched to Pentostatin and received 14 doses with CR. He relapse 9/2021 and is sp cycle one ICE 9/23 with cycle two due on 10/7. Patient came to ED 9/30 with complaint of fever, chills, weakness, dry cough, increased BLE edema and SOB. In ED temp was 102.9. BC/UC obtained which have remained. CXR concerning for PNA. Started on cefe and vanc. We are asked to see patient and take over his care. Also of note patient has diffuse rash to lower extremities and purple colored palmar flexion creases. He noted that the symptoms started after being placed on Omincef 9/20/2021 for 10 days. This has resolved. He is off of IV antibiotics and now is on oral Levaquin without return of fevers or other infectious symptoms - he will continue for 5 more days at home. He will receive PRBC's and platelets today prior to discharge. Neutrophils have still yet to recover. Precautions discussed. Lower extremity edema stable to improved - continue Lasix and ACE bandage wraps.  Continue treatment with anusol and Tuck pads for hemorrhoids. Continue oxycodone 5 mg as needed for pain. Cycle 2 will be delayed for now. We will get him follow up this week in clinic for evaluation and labs/replacements as needed. Continue good oral nutrition and hydration. Encouraged frequent activity throughout the day and rest as needed to combat fatigue. Call with any fevers, uncontrolled side effects from treatment or any other worrisome/concerning symptoms.         Consults:  IP CONSULT TO ONCOLOGY    Significant Diagnostic Studies:   XR CHEST PORT [[id number]] Collected: 21   Order Status: Completed Updated: 21   Narrative:     CHEST X-RAY, single portable view  2021     History: Vomiting and chills. Cough that is been going on for a while. Technique: Single frontal view of the chest.     Comparison: Chest x-ray 2021     Findings: The cardiac silhouette is mild to moderately enlarged although stable.  The   lungs are expanded without evidence for pneumothorax.   Evolving airspace   changes and small effusion are seen in the left lung base. Impression:     1.  Evolving airspace changes and small effusion suggested in the left lung   base. Signs/symptoms of pneumonia should be excluded. Allergies   Allergen Reactions    Campath [Alemtuzumab] Other (comments)     Rigors       OBJECTIVE:  Patient Vitals for the past 8 hrs:   BP Temp Pulse Resp SpO2   10/10/21 0753 116/76 98.1 °F (36.7 °C) 70 18 98 %   10/10/21 0306 117/64 97.7 °F (36.5 °C) 81 18 98 %     Temp (24hrs), Av °F (36.7 °C), Min:97.6 °F (36.4 °C), Max:98.5 °F (36.9 °C)    10/10 07 - 10/10 190  In: -   Out: 200 [Urine:200]    Physical Exam:  Constitutional: Well developed male in no acute distress, sitting comfortably in the hospital bed. HEENT: Normocephalic and atraumatic. Oropharynx is clear, mucous membranes are moist.  Extraocular muscles are intact.  Sclerae anicteric. Skin Warm and dry.  No rash or erythema. Respiratory Lungs CTAB. CVS Normal rate, regular rhythm and normal S1 and S2.  No murmurs, gallops, or rubs. Abdomen Soft, nontender and nondistended, normoactive bowel sounds.  No palpable mass.     Neuro Grossly nonfocal with no obvious sensory or motor deficits. MSK Normal range of motion in general. 2-3+ BLE edema   Psych Appropriate mood and affect. Labs:    Recent Labs     10/10/21  0247 10/09/21  0302 10/08/21  0331   WBC 10.6 11.3* 10.2   RBC 2.22* 2.16* 2.32*   HGB 7.0* 6.9* 7.4*   HCT 20.8* 20.1* 21.5*   MCV 93.7 93.1 92.7   MCH 31.5 31.9 31.9   MCHC 33.7 34.3 34.4   RDW 15.6* 15.9* 16.2*   PLT 12* 8* 16*   LYMPH 85* 85* 84*   MONOS 1* 3  --    DF MANUAL MANUAL MANUAL   ABL 9.0* 9.6* 8.6*   ABM 0.1 0.3  --       Recent Labs     10/10/21  0247 10/09/21  0302 10/08/21  0331    138 138   K 4.3 4.2 4.8    108* 108*   CO2 25 25 26   AGAP 7 5* 4*   * 105* 106*   BUN 21 20 20   CREA 0.86 0.88 0.82   GFRAA >60 >60 >60   GFRNA >60 >60 >60   CA 8.4 8.5 8.9   AP 59 59 66   TP 6.1* 5.8* 6.0*   ALB 3.3 3.2 3.4   GLOB 2.8 2.6 2.6   AGRAT 1.2 1.2 1.3   MG 1.9 1.9  --        ASSESSMENT:    Principal Problem:    Sepsis ([facility] (9/30/2021)    Active Problems:    Essential hypertension, benign (9/21/2015)      Severe obstructive sleep apnea (1/12/2017)      Prolymphocytic leukemia of T-cell ([facility] (1/8/2019)      Body mass index (BMI) of 40.0-44.9 in adult [facility]) (6/13/2019)      Neutropenic fever ([facility] (9/30/2021)      Thrombocytopenia ([facility] (9/30/2021)      Current Discharge Medication List      START taking these medications    Details   hydrocortisone (ANUSOL-HC) 2.5 % rectal cream Apply to outside rectum (do not insert) QID. Qty: 30 g, Refills: 0  Start date: 10/10/2021    Associated Diagnoses: Hemorrhoids, unspecified hemorrhoid type      levoFLOXacin (LEVAQUIN) 500 mg tablet Take 1 Tablet by mouth every twenty-four (24) hours for 5 days.   Qty: 5 Tablet, Refills: 0  Start date: 10/11/2021, End date: 10/16/2021 Associated Diagnoses: Prolymphocytic leukemia of T-cell type not having achieved remission ([facility] Pneumonia of left lower lobe due to infectious organism      melatonin 5 mg tablet Take 1 Tablet by mouth nightly. Qty: 30 Tablet, Refills: 5  Start date: 10/10/2021    Associated Diagnoses: Insomnia, unspecified type      oxyCODONE IR (ROXICODONE) 5 mg immediate release tablet Take 1 Tablet by mouth every eight (8) hours as needed for Pain for up to 30 days. Max Daily Amount: 15 mg.  Qty: 30 Tablet, Refills: 0  Start date: 10/10/2021, End date: 11/9/2021    Associated Diagnoses: Cancer associated pain      witch hazel-glycerin (TUCKS) 12.5-50 % pad 1 Pad by PeriANAL route as needed for Pain for up to 30 doses. Qty: 1 Box, Refills: 2  Start date: 10/10/2021    Associated Diagnoses: Hemorrhoids, unspecified hemorrhoid type         CONTINUE these medications which have NOT CHANGED    Details   acyclovir (ZOVIRAX) 400 mg tablet Take 1 Tablet by mouth two (2) times a day. Qty: 60 Tablet, Refills: 0      calcium carbonate (OS-DANIELA) 500 mg calcium (1,250 mg) tablet Take 1 Tablet by mouth three (3) times daily (with meals). Qty: 21 Tablet, Refills: 0      allopurinoL (ZYLOPRIM) 300 mg tablet Take 1 Tablet by mouth daily. Qty: 30 Tablet, Refills: 0      fluconazole (DIFLUCAN) 200 mg tablet Take 1 Tablet by mouth daily. FDA advises cautious prescribing of oral fluconazole in pregnancy. Qty: 30 Tablet, Refills: 0      ondansetron (Zofran ODT) 8 mg disintegrating tablet Take 1 Tablet by mouth every eight (8) hours as needed for Nausea or Vomiting. Qty: 90 Tablet, Refills: 0      prochlorperazine (Compazine) 10 mg tablet Take 0.5-1 Tablets by mouth every six (6) hours as needed for Nausea or Vomiting. Qty: 60 Tablet, Refills: 0      furosemide (Lasix) 20 mg tablet Take 1 Tablet by mouth daily.   Qty: 3 Tablet, Refills: 0      omeprazole (PRILOSEC) 40 mg capsule TAKE 1 CAPSULE BY MOUTH DAILY Comp.Stocking,Knee,Regular,Lrg misc 2 Each by Does Not Apply route daily. Qty: 2 Each, Refills: 0    Comments: Calf diameter 17 inches close to knee, and 11.5 inches close to ankle. Associated Diagnoses: Leg swelling; Dyspnea on exertion      lovastatin (MEVACOR) 10 mg tablet Take 1 Tab by mouth nightly. Qty: 90 Tab, Refills: 3    Associated Diagnoses: Pure hypercholesterolemia      docusate sodium (COLACE) 100 mg capsule Take 100 mg by mouth nightly. PLAN:  Follow-up Appointments   Procedures    FOLLOW UP VISIT Appointment in: Other (Specify) Will have our team contact him tomorrow with follow up Tuesday in office for evaluation/labs/replacements as needed. Will have our team contact him tomorrow with follow up Tuesday in office for evaluation/labs/replacements as needed. Standing Status:   Standing     Number of Occurrences:   1     Order Specific Question:   Appointment in     Answer: Other (Specify)             Dennis Theodore NP  [address] Hematology Oncology  [address] [place] [address]  Office : [phone]      Attending Addendum:  I have personally performed a face to face diagnostic evaluation on this patient. I have reviewed and agree with the care plan as documented by Lorenzo Wallace N.P. Patient appears table, heart rate regular without murmurs, abdomen is non-tender, bowel sounds are positive. 77 female history of LLE DVT, and T-cell prolymphocytic leukemia, with prior lines of therapy including Alemtuzumab and pentostatin.  Patient has had a more recent relapse 9/21 and is status post cycle 1 ICE with Mulu Wong was hospitalized with neutropenic fever and imaging concerning for pneumonia, for which he recieved broad-spectrum antibiotics, now changed to LVQ over weekend and doing well. During stay he needed lasix prn for b/l LE swelling. Ace bandages also helped. Also continued on prophylaxis with acyclovir and Diflucan.  Required multiple blood transfusions as to be expected  given illness and recent therapies. Now clinically stable and eager to go home. WBC appears to have recovered though [address] remains undetectable. Patient understands to seek urgent care if develops fevers, chills or signs of infection which she was educated on. Regular outpatient blood work, provider visit as outlined above. I spent 32 minutes on evaluation, management, counseling and discharge planning on patient.                   Oumou Fernandez MD  [facility] Hematology/Oncology  [address] [place] [address]  Office : [phone]  Fax : [phone]

## 2021-10-10 NOTE — PROGRESS NOTES
END OF SHIFT NOTE:    Intake/Output  10/09 1901 - 10/10 0700  In: 360 [P.O.:360]  Out: 800 [Urine:800]   Voiding: YES  Catheter: NO  Drain:              Stool:  0 occurrences. Stool Assessment  Stool Color: Diana Dire (10/06/21 0555)  Stool Appearance: Formed (10/06/21 0555)  Stool Amount: Medium (10/06/21 0555)  Stool Source/Status: Rectum (10/06/21 0555)    Emesis:  0 occurrences. VITAL SIGNS  Patient Vitals for the past 12 hrs:   Temp Pulse Resp BP SpO2   10/10/21 0306 97.7 °F (36.5 °C) 81 18 117/64 98 %   10/09/21 2334 97.7 °F (36.5 °C) 97 18 123/82 97 %   10/09/21 2243     97 %   10/09/21 1914 97.7 °F (36.5 °C) 88 18 103/62 98 %       Pain Assessment  Pain 1  Pain Scale 1: Numeric (0 - 10) (10/10/21 0145)  Pain Intensity 1: 0 (10/10/21 0145)  Patient Stated Pain Goal: 0 (10/10/21 0145)  Pain Reassessment 1: Yes (10/09/21 2330)  Pain Onset 1: this adm (10/09/21 2230)  Pain Location 1: Foot (10/09/21 2230)  Pain Orientation 1: Left;Right (10/09/21 2230)  Pain Description 1: Aching (10/09/21 2230)  Pain Intervention(s) 1: Medication (see MAR); Environmental changes; Elevation (10/09/21 2230)    Ambulating  Yes    Additional Information:   Afebrile. Was on overnight pulse ox monitoring. Needs 1 unit PRBC today for hgb 7.0 per SOP. Shift report given to oncoming nurse Reginaldo RN at the bedside.     Claudetta Shaw, RN

## 2021-10-10 NOTE — PROGRESS NOTES
Therapy notes reviewed, no needs. SW confirmed with NP Sheryle Dus that the patient will not need O2 at discharge. Patient is anticipated to discharge today. Plan for discharge is as follows:    Care Management Interventions  Mode of Transport at Discharge: Self  Transition of Care Consult (CM Consult): Discharge Planning  Support Systems: Child(amelia), Spouse/Significant Other  Confirm Follow Up Transport: Family  Discharge Location  Discharge Placement: Home with family assistance    Milestones and interventions have been entered.      Radha Butts LMSW    [facility]    * [email or web address]

## 2021-10-12 NOTE — PROGRESS NOTES
Arrived to infusion center. Premedicated with tylenol PO and benadryl PO. 1 unit platelets completed. At completion of transfusion, patient reported severe itching to trunk and arms and hives were noted to be forming on face, trunk, and arms. NS started @ 999cc/hr. Pepcid IV, Benadryl IV, and solu-cortef IV given. Vitals as recorded. Marylee Fu, NP to infusion center to assess patient. Transfusion reaction labs completed. After 1 hour observation, patient reported alleviation of itching. Patient and spouse instructed to contact on-call physician for any concerns. Discharged ambulatory.

## 2021-10-12 NOTE — PROGRESS NOTES
Pt was seen today post hospital FU. Labs reviewed he will need 1 unit of plt today. He will have labs and replacements scheduled for Thursday and sat and will return on Monday to see if his counts have improved for C2 of ICE. If so he will be admitted on Tuesday. He has an consult scheduled with NS on 11/1. He will take 3 additional days of Levaquin. This new script was called in. Ativan 1 mg BID prn was also called in for anxiety. Pt advised to not drive while taking this medication. He will call with any further needs before next visit. Pt and wife educated on bleeding precautions.

## 2021-10-12 NOTE — PROGRESS NOTES
Patient arrived to port lab for port access and lab draw    labs drawn per protocol   PICC dressing changed  Patient discharged from port lab to Md appointment

## 2021-10-14 NOTE — PROGRESS NOTES
Arrived to the [facility]. Pt was premedicated with tylenol, benadryl, and IV pepcid due to previous hypersensitivity reaction. 2 units Platelets completed. Patient tolerated well. Any issues or concerns during appointment: No.  Patient aware of next infusion appointment on 10/15/21 (date) at 0 (time). Discharged home via wc with spouse in stable condition.

## 2021-10-15 NOTE — PROGRESS NOTES
Arrived to the [facility]. Assessment completed and labs reviewed. Pre meds and 1 unit of PRBCs infused. Patient tolerated well. Any issues or concerns during appointment: none. Patient aware of next infusion appointment on 10/16/2021 at 0900. Discharged via wheelchair.

## 2021-10-16 NOTE — PROGRESS NOTES
Arrived to the [facility]. Potassium and magnesium found to be in normal range. Platelet count of 7K. Platelets completed. Any issues or concerns during appointment: See note re: reaction. .  Platelets not available until 11:45 AM. Patient sent out of infusion area with wife and returned at 12PM that time for premeds.   Patient aware of next infusion/lab replacement appointment on 10/18/21 at 3:30 PM.    Discharged home at 2:10 PM

## 2021-10-16 NOTE — PROGRESS NOTES
10/16/21 1320   Transfusion Reaction Suspected   Transfusion Reaction Symptoms Itching; Hives   Blood Reaction Notification Blood Bank  (NP-Zenia)   Record Vital Signs Yes   All Identfication Items Match Yes   Temperature of Blood (If Blood Warmer Used) No   Blood Samples Pink top tubes; Lavender top tubes   Urine Sample Obtained  Yes   Items to Blood Bank Administration set; Attached IV solutions     Patient developed welts to back and abdomen following transfusion line flush. Denies shortness of breath. VSS and afebrile. 1332: 25 mg IV benadryl x1, transfusion reaction labs drawn, urine collected. 1355: VS remain stable. Welts/hives improving. 1410: Patient discharged home, alert and oriented stating he was feeling better.

## 2021-10-18 NOTE — PROGRESS NOTES
Pt was seen today by jefferson Saleh reviewed. He will need 1 unit of Plt today and 1 unit of PRBC tomorrow. He will need a new type and screen as they took off his bracelet from sat. He will have labs and replacements of thursday and sat and will return to clinic on Monday. He will be admitted for C2 of ICE. He will see NS on 11/2. He will get labs and replacmentts on  before he goes to NS. Advised to call with any further needs before next visit.

## 2021-10-18 NOTE — PROGRESS NOTES
Arrived to the [facility] ambulatory with his wife. T&C drawn via PICC line completed. Patient tolerated well. Any issues or concerns during appointment: no, pt wants to get platelets tomorrw when he comes back for PRBC's. .  Patient aware of next infusion appointment on 10/21 at 0800. Discharged to home ambulatory.

## 2021-10-18 NOTE — PROGRESS NOTES
Patient arrived to port lab for picc access and lab draw. Picc flushed and labs drawn per protocol. Picc remains accessed. Patient discharged from port lab ambulatory.

## 2021-10-19 NOTE — PROGRESS NOTES
Patient arrived at [facility] for 1 unit of Platelets and 1 unit of PRBCs. Assessment completed. No needs voiced at this time. Patient tolerated infusions well without reactions and is aware of next appointment on 10/21/2021 @0800. Patient discharged ambulatory.

## 2021-10-21 NOTE — PROGRESS NOTES
Arrived to the [facility]. 1 unit PRBC and platelets completed. Patient tolerated well. Any issues or concerns during appointment: new type and cross specimen drawn at end of transfusion. Patient aware of next infusion appointment on 10/23/21 at [address] [facility] [facility] [facility] [address]. Patient aware of next lab and [facility] office visit on 10/25/21 at 1500. Discharged in [facility].

## 2021-10-23 NOTE — PROGRESS NOTES
Arrived to the [facility]. Labs draw completed. Patient tolerated well. Any issues or concerns during appointment: none. Potassium 40 mEQ given PO per Semaj Dodge NP. Patient aware of next infusion appointment on 10/25/2021 (date) at 36 (time). Pt left at 0815 and returned at 1330 to receive 1 unit of PRBCs and one unit of platelets. Discharged ambulatory.

## 2021-10-25 NOTE — H&P
[employer] [facility]        Inpatient Hematology / Oncology History and Physical    Reason for Admission:  Admission for antineoplastic chemotherapy [Z51.11]    History of Present Illness:  Mr. Sanya Brothers is a 77 y.o. male admitted on 10/26/2021 with a primary diagnosis of The primary encounter diagnosis was Admission for antineoplastic chemotherapy. A diagnosis of Prolymphocytic leukemia of T-cell ([facility] was also pertinent to this visit. Romina Walker is a [place] old male patient of Dr. Hetal Oliver followed for T-Cell Pro-Lymphocytic Leukemia. In 1/2019 he was started on Alemtuzumab and received it for 8 weeks and achieved a CT. In 4/2019 he was switched to Pentostatin and received 14 doses and achieved a CR. He developed a left leg DVT in 6/2019 and was anti-coagulated with Xarelto, in 11/2019 he was switched to [facility] he took it till 11/2020. He relapsed in 9/2021. He is being admitted for cycle 2 ICE. Review of Systems:  Constitutional Denies fever, chills, weight loss, +appetite changes,   + fatigue   HEENT Denies trauma, blurry vision, hearing loss, ear pain, nosebleeds, sore throat, neck pain    Skin Denies lesions or rashes. Lungs Denies dyspnea, cough, sputum production or hemoptysis. Cardiovascular Denies chest pain, palpitations, or lower extremity edema. Gastrointestinal Denies nausea, vomiting, abdominal pain. Neuro Denies headaches, visual changes or ataxia. Denies dizziness, tingling, tremors, sensory change, speech change, focal weakness or headaches. MSK Denies back pain, arthralgias, myalgias or frequent falls. Psychiatric/Behavioral The patient is not nervous/anxious.          Allergies   Allergen Reactions    Campath [Alemtuzumab] Other (comments)     Rigors     Past Medical History:   Diagnosis Date    Back pain     occassionally    Cervical radiculopathy     Claustrophobia     DVT (deep venous thrombosis) ([facility] 06/2019    currently treated with Xarelto- started on 5/30/2019    GERD (gastroesophageal reflux disease)     H/O seasonal allergies     Hyperlipidemia     Impotence     Insomnia     Lateral epicondylitis     Leukemia (HCC)     CHEMO    Obesity     BMI 36.6    Sleep apnea     noncomplaint with CPAP     Past Surgical History:   Procedure Laterality Date    HX CIRCUMCISION      HX COLONOSCOPY  2017    Due in 2027    HX ORTHOPAEDIC Right     r wrist    HX WISDOM TEETH EXTRACTION      IR INSERT TUNL CVC W PORT OVER 5 YEARS  6/19/2019    IR REMOVE TUNL CVAD W PORT/PUMP  12/14/2020     Family History   Problem Relation Age of Onset    Cancer Mother 80        pancreatic    Cancer Father 76        breast    No Known Problems Son     Hypertension Brother     Hypertension Brother     No Known Problems Daughter      Social History     Socioeconomic History    Marital status:      Spouse name: Not on file    Number of children: Not on file    Years of education: Not on file    Highest education level: Not on file   Occupational History    Not on file   Tobacco Use    Smoking status: Never Smoker    Smokeless tobacco: Never Used   Substance and Sexual Activity    Alcohol use: Not Currently     Alcohol/week: 0.0 standard drinks    Drug use: No    Sexual activity: Yes     Partners: Female   Other Topics Concern     Service Not Asked    Blood Transfusions Not Asked    Caffeine Concern Not Asked    Occupational Exposure Not Asked    Hobby Hazards Not Asked    Sleep Concern Not Asked    Stress Concern Not Asked    Weight Concern Not Asked    Special Diet Not Asked    Back Care Not Asked    Exercise Not Asked    Bike Helmet Not Asked   [place] Not Asked    Self-Exams Not Asked   Social History Narrative    Not on file     Social Determinants of Health     Financial Resource Strain:     Difficulty of Paying Living Expenses:    Food Insecurity:     Worried About Running Out of Food in the Last Year:     Ran Out of Food in the Last Year:    Transportation Needs:     Lack of Transportation (Medical):      Lack of Transportation (Non-Medical):    Physical Activity:     Days of Exercise per Week:     Minutes of Exercise per Session:    Stress:     Feeling of Stress :    Social Connections:     Frequency of Communication with Friends and Family:     Frequency of Social Gatherings with Friends and Family:     Attends [employer] Services:     Active Member of Clubs or Organizations:     Attends Club or Organization Meetings:     Marital Status:    Intimate Partner Violence:     Fear of Current or Ex-Partner:     Emotionally Abused:     Physically Abused:     Sexually Abused:      Current Facility-Administered Medications   Medication Dose Route Frequency Provider Last Rate Last Admin    acyclovir (ZOVIRAX) tablet 400 mg  400 mg Oral BID Yesenia Raymundo NP        [START ON 10/27/2021] allopurinoL (ZYLOPRIM) tablet 300 mg  300 mg Oral DAILY Yesenia Raymundo NP        calcium carbonate (OS-DANIELA) tablet 500 mg [elemental]  500 mg Oral TID WITH MEALS Yesenia Raymundo NP        docusate sodium (COLACE) capsule 100 mg  100 mg Oral QHS Yesenia Raymundo NP        [START ON 10/27/2021] fluconazole (DIFLUCAN) tablet 200 mg  200 mg Oral DAILY Yesenia Raymundo NP        fluticasone propionate (FLONASE) 50 mcg/actuation nasal spray 1 Spray  1 Spray Both Nostrils BID RT Yesenia Raymundo NP        [START ON 10/27/2021] furosemide (LASIX) tablet 20 mg  20 mg Oral DAILY Yesenia Raymundo NP        hydrocortisone (ANUSOL-HC) 2.5 % rectal cream   PeriANAL Q8H PRN Yesenia Raymundo NP        LORazepam (ATIVAN) tablet 1 mg  1 mg Oral BID PRN Yesenia Raymundo NP        melatonin tablet 5 mg  5 mg Oral QHS Mariely Mancia NP        [START ON 10/27/2021] atorvastatin (LIPITOR) tablet 10 mg  10 mg Oral DAILY Yesenia Raymundo NP        [START ON 10/27/2021] pantoprazole (PROTONIX) tablet 40 mg  40 mg Oral ACB Yesenia Raymundo NP        ondansetron [facility] ODT) tablet 8 mg  8 mg Oral Q8H PRN Nonnie Chriss, NP        oxyCODONE IR (ROXICODONE) tablet 5 mg  5 mg Oral Q8H PRN Nonnie Chriss, NP        prochlorperazine (COMPAZINE) tablet 5-10 mg  5-10 mg Oral Q6H PRN Nonnie Chriss, NP        witch hazel-glycerin (TUCKS) 12.5-50 % pads 1 Pad  1 Pad PeriANAL PRN Nonnie Chriss, NP        0.9% sodium chloride infusion  75 mL/hr IntraVENous CONTINUOUS Nonnie Chriss, NP 75 mL/hr at 10/26/21 1204 75 mL/hr at 10/26/21 1204    dronabinoL (MARINOL) capsule 5 mg  5 mg Oral BID Nonnie Chriss, NP        0.9% sodium chloride infusion 250 mL  250 mL IntraVENous PRN Zenia Zhu NP        [START ON 10/27/2021] fosaprepitant (EMEND) 150 mg in 0.9% sodium chloride 150 mL IVPB  150 mg IntraVENous ONCE Anastacia David MD        dexamethasone (DECADRON) 10 mg/mL injection 10 mg  10 mg IntraVENous Q24H Anastacia David MD        [facility] injection 8 mg  8 mg IntraVENous Q8H Anastacia David MD        prochlorperazine (COMPAZINE) with saline injection 10 mg  10 mg IntraVENous Q6H PRN Anastacia David MD        diphenhydrAMINE (BENADRYL) injection 25 mg  25 mg IntraVENous Q6H PRN Salomón Banerjee NP        LORazepam (ATIVAN) injection 0.5 mg  0.5 mg IntraVENous Q6H PRN Anastaica David MD        etoposide (VEPESID) 219 mg in 0.9% sodium chloride 500 mL chemo infusion  100 mg/m2 (Treatment Plan Adjusted) IntraVENous Q24H Anastacia David MD        [START ON 10/27/2021] CARBOplatin (PARAPLATIN) 734 mg in 0.9% sodium chloride 250 mL chemo infusion  734 mg IntraVENous ONCE MD Alana Sanchez ON 10/27/2021] ifosfamide (IFEX) 10,950 mg, mesna (MESNEX) 10,950 mg in 0.9% sodium chloride 1,000 mL chemo infusion  5,000 mg/m2 (Treatment Plan Adjusted) IntraVENous CONTINUOUS MD Alana Sanchez mouthwash [facility]) oral suspension 10 mL  10 mL Swish and Spit Q4H PRN Salomón Banerjee NP        0.9% sodium chloride infusion 250 mL  250 mL IntraVENous PRN Salomón Banerjee NP  famotidine (PF) (PEPCID) 20 mg in 0.9% sodium chloride 10 mL injection  20 mg IntraVENous PRN Thang Palomino NP        methylPREDNISolone (PF) (Solu-MEDROL) injection 125 mg  125 mg IntraVENous PRN Thang Palomino NP         Facility-Administered Medications Ordered in Other Encounters   Medication Dose Route Frequency Provider Last Rate Last Admin    central line flush (saline) syringe 10 mL  10 mL InterCATHeter PRN Adriana Garay MD   10 mL at 10/25/21 1501       OBJECTIVE:  Patient Vitals for the past 8 hrs:   BP Temp Pulse Resp SpO2 Height Weight   10/26/21 1130 118/67 97.6 °F (36.4 °C) 80 16 96 % 5' 11\" (1.803 m) 260 lb 1.6 oz (118 kg)     Temp (24hrs), Av.6 °F (36.4 °C), Min:97.6 °F (36.4 °C), Max:97.6 °F (36.4 °C)    No intake/output data recorded. Physical Exam:  Constitutional: Well developed, well nourished male in no acute distress, sitting comfortably in the hospital bed. HEENT: Normocephalic and atraumatic. Oropharynx is clear, mucous membranes are moist.  Pupils are equal, round, and reactive to light. Extraocular muscles are intact. Sclerae anicteric. Neck supple without JVD. No thyromegaly present. Lymph node   No palpable submandibular, cervical, supraclavicular, axillary or inguinal lymph nodes. Skin Warm and dry. No bruising and no rash noted. No erythema. No pallor. Respiratory Lungs are clear to auscultation bilaterally without wheezes, rales or rhonchi, normal air exchange without accessory muscle use. CVS Normal rate, regular rhythm and normal S1 and S2. No murmurs, gallops, or rubs. Abdomen Soft, nontender and nondistended, normoactive bowel sounds. No palpable mass. No hepatosplenomegaly. Neuro Grossly nonfocal with no obvious sensory or motor deficits. MSK Normal range of motion in general.  No edema and no tenderness. Psych Appropriate mood and affect.         Labs:    Recent Results (from the past 24 hour(s))   METABOLIC PANEL, COMPREHENSIVE Collection Time: 10/25/21  3:01 PM   Result Value Ref Range    Sodium 140 136 - 145 mmol/L    Potassium 3.6 3.5 - 5.1 mmol/L    Chloride 108 (H) 98 - 107 mmol/L    CO2 27 21 - 32 mmol/L    Anion gap 5 (L) 7 - 16 mmol/L    Glucose 111 (H) 65 - 100 mg/dL    BUN 25 (H) 8 - 23 MG/DL    Creatinine 1.00 0.8 - 1.5 MG/DL    GFR est AA >60 >60 ml/min/1.73m2    GFR est non-AA >60 >60 ml/min/1.73m2    Calcium 8.7 8.3 - 10.4 MG/DL    Bilirubin, total 2.2 (H) 0.2 - 1.1 MG/DL    ALT (SGPT) 43 12 - 65 U/L    AST (SGOT) 19 15 - 37 U/L    Alk. phosphatase 58 50 - 136 U/L    Protein, total 6.7 6.3 - 8.2 g/dL    Albumin 3.8 3.2 - 4.6 g/dL    Globulin 2.9 2.3 - 3.5 g/dL    A-G Ratio 1.3 1.2 - 3.5     MAGNESIUM    Collection Time: 10/25/21  3:01 PM   Result Value Ref Range    Magnesium 2.1 1.8 - 2.4 mg/dL   TYPE & SCREEN    Collection Time: 10/25/21  3:01 PM   Result Value Ref Range    Crossmatch Expiration 10/28/2021,2359     ABO/Rh(D) A POSITIVE     Antibody screen NEG    LD    Collection Time: 10/25/21  3:01 PM   Result Value Ref Range     110 - 210 U/L   URIC ACID    Collection Time: 10/25/21  3:01 PM   Result Value Ref Range    Uric acid 3.4 2.6 - 6.0 MG/DL   CBC WITH AUTOMATED DIFF    Collection Time: 10/25/21  3:01 PM   Result Value Ref Range    WBC 4.8 4.3 - 11.1 K/uL    RBC 2.34 (L) 4.23 - 5.6 M/uL    HGB 7.2 (L) 13.6 - 17.2 g/dL    HCT 20.9 %    MCV 89.3 79.6 - 97.8 FL    MCH 30.8 26.1 - 32.9 PG    MCHC 34.4 31.4 - 35.0 g/dL    RDW 14.7 (H) 11.9 - 14.6 %    PLATELET 4 (LL) 205 - 450 K/uL    MPV 10.1 9.4 - 12.3 FL    ABSOLUTE NRBC 0.00 0.0 - 0.2 K/uL    LYMPHOCYTES 100 (H) 16 - 44 %    ABS.  LYMPHOCYTES 4.8 (H) 0.5 - 4.6 K/UL    RBC COMMENTS SLIGHT  ANISOCYTOSIS + POIKILOCYTOSIS        RBC COMMENTS OCCASIONAL  OVALOCYTES        RBC COMMENTS SLIGHT  POLYCHROMASIA        WBC COMMENTS Result Confirmed By Smear      PLATELET COMMENTS MARKED      DF MANUAL     COVID-19 RAPID TEST    Collection Time: 10/25/21  3:23 PM   Result Value Ref Range    Specimen source NASAL SWAB      COVID-19 rapid test Not detected NOTD     SARS-COV-2    Collection Time: 10/25/21  3:23 PM   Result Value Ref Range    SARS-CoV-2 Please find results under separate order     PLATELETS, ALLOCATE    Collection Time: 10/26/21 11:30 AM   Result Value Ref Range    Unit number [id number]     Blood component type [facility]     Unit division 00     Status of unit ALLOCATED     Unit number [id number]     Blood component type [facility]     Unit division 00     Status of unit ALLOCATED    METABOLIC PANEL, COMPREHENSIVE    Collection Time: 10/26/21 11:31 AM   Result Value Ref Range    Sodium 140 136 - 145 mmol/L    Potassium 3.8 3.5 - 5.1 mmol/L    Chloride 107 98 - 107 mmol/L    CO2 28 21 - 32 mmol/L    Anion gap 5 (L) 7 - 16 mmol/L    Glucose 108 (H) 65 - 100 mg/dL    BUN 22 8 - 23 MG/DL    Creatinine 0.94 0.8 - 1.5 MG/DL    GFR est AA >60 >60 ml/min/1.73m2    GFR est non-AA >60 >60 ml/min/1.73m2    Calcium 8.9 8.3 - 10.4 MG/DL    Bilirubin, total 1.8 (H) 0.2 - 1.1 MG/DL    ALT (SGPT) 41 12 - 65 U/L    AST (SGOT) 15 15 - 37 U/L    Alk.  phosphatase 57 50 - 136 U/L    Protein, total 6.4 6.3 - 8.2 g/dL    Albumin 3.6 3.2 - 4.6 g/dL    Globulin 2.8 2.3 - 3.5 g/dL    A-G Ratio 1.3 1.2 - 3.5     TYPE & SCREEN    Collection Time: 10/26/21 11:31 AM   Result Value Ref Range    Crossmatch Expiration 10/29/2021,2359     ABO/Rh(D) A POSITIVE     Antibody screen NEG     Unit number [id number]     Blood component type  LR     Unit division 00     Status of unit ALLOCATED     Crossmatch result Compatible    CBC WITH AUTOMATED DIFF    Collection Time: 10/26/21 11:31 AM   Result Value Ref Range    WBC 4.8 4.3 - 11.1 K/uL    RBC 2.28 (L) 4.23 - 5.6 M/uL    HGB 7.0 (L) 13.6 - 17.2 g/dL    HCT 20.5 (L) 41.1 - 50.3 %    MCV 89.9 79.6 - 97.8 FL    MCH 30.7 26.1 - 32.9 PG    MCHC 34.1 31.4 - 35.0 g/dL    RDW 14.5 11.9 - 14.6 %    PLATELET 2 (LL) 109 - 450 K/uL    MPV 9.9 9.4 - 12.3 FL    ABSOLUTE NRBC 0.00 0.0 - 0.2 K/uL    DF PENDING    RBC, ALLOCATE    Collection Time: 10/26/21 12:30 PM   Result Value Ref Range    HISTORY CHECKED? Historical check performed        Imaging:  [unfilled]    ASSESSMENT:  Problem List  Date Reviewed: 6/11/2020        Codes Class Noted    Sepsis ([facility] ICD-10-CM: A41.9  ICD-9-CM: 038.9, 995.91  9/30/2021        Neutropenic fever ([facility] ICD-10-CM: D70.9, R50.81  ICD-9-CM: 288.00, 780.61  9/30/2021        Thrombocytopenia ([facility] ICD-10-CM: D69.6  ICD-9-CM: 287.5  9/30/2021        Body mass index (BMI) of 40.0-44.9 in adult [facility]) (Chronic) ICD-10-CM: Z68.41  ICD-9-CM: V85.41  6/13/2019        Admission for antineoplastic chemotherapy ICD-10-CM: Z51.11  ICD-9-CM: V58.11  1/14/2019        Prolymphocytic leukemia of T-cell (HCC) (Chronic) ICD-10-CM: C91.60  ICD-9-CM: 204.90  1/8/2019        Severe obesity (BMI 35.0-39. 9) ICD-10-CM: E66.01  ICD-9-CM: 278.01  9/6/2018        Benign prostatic hyperplasia with nocturia (Chronic) ICD-10-CM: N40.1, R35.1  ICD-9-CM: 600.01, 788.43  1/23/2018        Severe obstructive sleep apnea (Chronic) ICD-10-CM: G47.33  ICD-9-CM: 327.23  1/12/2017        Pure hypercholesterolemia (Chronic) ICD-10-CM: E78.00  ICD-9-CM: 272.0  12/22/2016        Essential hypertension, benign (Chronic) ICD-10-CM: I10  ICD-9-CM: 401.1  9/21/2015        Impotence ICD-10-CM: N52.9  ICD-9-CM: 607.84  9/21/2015        DDD (degenerative disc disease), cervical ICD-10-CM: M50.30  ICD-9-CM: 722.4  9/21/2015        DDD (degenerative disc disease), lumbar ICD-10-CM: M51.36  ICD-9-CM: 722.52  9/21/2015        Right shoulder pain ICD-10-CM: M25.511  ICD-9-CM: 719.41  9/21/2015        First degree hemorrhoids ICD-10-CM: K64.0  ICD-9-CM: 455.0  9/21/2015                PLAN:  Relapsed pro-lymphocytic leukemia  10/26 C2D1 ICE    Pancytopenia related to treatment and disease  10/26 2 units platelets on admission for count of 4K yesterday    Decreased appetite  -starting marinol 5 mg bid  -likes Premier supplement    Juan Jose SOPs  Lab studies and imaging studies were personally reviewed. Pertinent old records were reviewed. Araceli Horowitz NP   [facility]ematology [facility] On[facility]  [address] [place] [address]  Office : [phone]  Fax : [phone]         Attending Addendum:  I have personally performed a face to face diagnostic evaluation on this patient. I have reviewed and agree with the care plan as documented by Araceli Horowitz, N.P. My findings are as follows:  He has T-Cell Pro-Lymphocytic Leukemia, appears weak, heart rate regular without murmurs, abdomen is non-tender, bowel sounds are positive, we will admit him to our service and start Cycle 2 of ICE.               Moon Tang MD      [facility] Hematology/Oncology  [address] [place] [address]  Office : [phone]  Fax : [phone]

## 2021-10-25 NOTE — PROGRESS NOTES
Pt was seen today labs reviewed. He will be admitted tomorrow for C2 of ICE. He will need plt to start as they are unable to get them in time in OP. I have emailed IP to let them know. He will have Corlis Needs on 10/30 and have labs replacements on 11/1. We will keep him close to 10 on his hgb. He will need to get plt and possibly blood on 11/1 due to go to [facility] on 11/2 for a consult on transplant next steps. Pt knows to wait for bed and we will call him when his room is ready.

## 2021-10-25 NOTE — PROGRESS NOTES
Patient arrived to port lab for PICC asssement and lab draw   Right upper arm PICC flushed and labs drawn per protocol   *Port remains intact and patent for infusion  Patient discharged from port lab ambulatory with cane*

## 2021-10-26 NOTE — PROGRESS NOTES
Care Management Interventions  Mode of Transport at Discharge: Self  Transition of Care Consult (CM Consult): Discharge Planning  Support Systems: Spouse/Significant Other  Confirm Follow Up Transport: Family  Discharge Location  Discharge Placement: Home    Sw reviewed chart. Pt known to Sw from several previous admissions. Pt is a 77 yr old AA male adm for chemotherapy. Pt is . His wife a very supportive. Pt is typically active and indep but with each treatment he has some complications. Plan home with wife when treatment complete.    Sw will cont to follow and assist. Josie Desai

## 2021-10-26 NOTE — PROGRESS NOTES
Problem: Falls - Risk of  Goal: *Absence of Falls  Description: Document Earma Anirudh Fall Risk and appropriate interventions in the flowsheet.   Outcome: Progressing Towards Goal  Note: Fall Risk Interventions:  Mobility Interventions: Patient to call before getting OOB    Mentation Interventions: Adequate sleep, hydration, pain control    Medication Interventions: Patient to call before getting OOB    Elimination Interventions: Bed/chair exit alarm

## 2021-10-26 NOTE — PROGRESS NOTES
10/26/21 1143   Dual Skin Pressure Injury Assessment   Dual Skin Pressure Injury Assessment WDL   Second Care Provider (Based on Facility Policy) Julieta DIAZ    Skin Integumentary   Skin Integumentary (WDL) WDL    Pressure  Injury Documentation No Pressure Injury Noted-Pressure Ulcer Prevention Initiated   Skin Color Appropriate for ethnicity   Skin Condition/Temp Dry;Fragile   Skin Integrity Intact

## 2021-10-26 NOTE — PROGRESS NOTES
END OF SHIFT NOTE:    Intake/Output  No intake/output data recorded. Voiding: YES  Catheter: NO  Drain:              Stool:  0 occurrences. Stool Assessment  Stool Appearance: Soft (10/26/21 1143)    Emesis:  0 occurrences. VITAL SIGNS  Patient Vitals for the past 12 hrs:   Temp Pulse Resp BP SpO2   10/26/21 1848 97.6 °F (36.4 °C) 89 18 133/71 96 %   10/26/21 1719 98.9 °F (37.2 °C) 88 17 125/79 97 %   10/26/21 1556 98.4 °F (36.9 °C) 84 16 118/66 98 %   10/26/21 1529 98.2 °F (36.8 °C) 84 16 131/69 97 %   10/26/21 1443 97.9 °F (36.6 °C) 87 16 120/67 98 %   10/26/21 1351 98.6 °F (37 °C) 88 17 113/75    10/26/21 1130 97.6 °F (36.4 °C) 80 16 118/67 96 %       Pain Assessment  Pain 1  Pain Scale 1: Numeric (0 - 10) (10/26/21 1143)  Pain Intensity 1: 0 (10/26/21 1143)  Patient Stated Pain Goal: 0 (10/26/21 1143)    Ambulating  Yes    Additional Information: 2 units of plts given. Chemo started     Shift report given to oncoming nurse at the bedside.     Dusty Gan RN

## 2021-10-27 NOTE — PROGRESS NOTES
Problem: Chemotherapy, Day 2  Goal: Activity/Safety  Outcome: Progressing Towards Goal  Goal: Diagnostic Test/Procedures  Outcome: Progressing Towards Goal  Goal: Nutrition/Diet  Outcome: Progressing Towards Goal  Goal: Treatments/Interventions/Procedures  Outcome: Progressing Towards Goal  Goal: Psychosocial  Outcome: Progressing Towards Goal

## 2021-10-27 NOTE — PROGRESS NOTES
Patient Vitals for the past 12 hrs:   Temp Pulse Resp BP SpO2   10/27/21 1435 98 °F (36.7 °C) 70 19 138/73 98 %   10/27/21 1215  72 17 134/78 99 %   10/27/21 1100 97.8 °F (36.6 °C) 72 17 135/74 98 %   10/27/21 0956 97.5 °F (36.4 °C) 81 19 (!) 150/77 99 %   10/27/21 0927 97.6 °F (36.4 °C) 76 18 (!) 146/76 97 %   10/27/21 0744 97.6 °F (36.4 °C) 70  139/73 97 %     Pt ordered 2 units of PRBCs. 1 unit transfused on day shift. Second to be administered per night shift RN. Acyclovir increased to 800mg BID. Calcium gluconate 1gm IV given per eladio protocols. Started on levaquin per orders. C2D2 ICE. Tolerating well. PRN ativan 1mg po given x's 1 for pt complaint of leg twitching.     Bedside shift report given to Richard Cavazos RN

## 2021-10-27 NOTE — PROGRESS NOTES
[facility] Hematology & Oncology        Inpatient Hematology / Oncology Progress Note      Admission Date: 10/26/2021 11:15 AM  Reason for Admission/Hospital Course: Admission for antineoplastic chemotherapy [Z51.11]      24 Hour Events:  C2D2 ICE  Transfuse 2 units PRBC Hgb 6.2  VSS/no overnight events    ROS:  Constitutional: negative for fever, chills, weakness, malaise. +fatigue. CV: negative for chest pain, palpitations, edema. Respiratory: negative for dyspnea, cough, wheezing. GI:  negative for nausea, abdominal pain, diarrhea. 10 point review of systems is otherwise negative with the exception of the elements mentioned above in the HPI. Allergies   Allergen Reactions    Campath [Alemtuzumab] Other (comments)     Rigors       OBJECTIVE:  Patient Vitals for the past 8 hrs:   BP Temp Pulse Resp SpO2   10/27/21 1100 135/74 97.8 °F (36.6 °C) 72 17 98 %   10/27/21 0956 (!) 150/77 97.5 °F (36.4 °C) 81 19 99 %   10/27/21 0927 (!) 146/76 97.6 °F (36.4 °C) 76 18 97 %   10/27/21 0744 139/73 97.6 °F (36.4 °C) 70  97 %   10/27/21 0352 122/78 97.4 °F (36.3 °C) 69 20 97 %     Temp (24hrs), Av.9 °F (36.6 °C), Min:97.4 °F (36.3 °C), Max:98.9 °F (37.2 °C)    10/27 0701 - 10/27 1900  In: 120 [P.O.:120]  Out: 400 [Urine:400]    Physical Exam:  Constitutional: Well developed, well nourished male in no acute distress, sitting comfortably on side of the hospital bed. HEENT: Normocephalic and atraumatic. Oropharynx is clear, mucous membranes are moist.  Pupils are equal, round, and reactive to light. Extraocular muscles are intact. Sclerae anicteric. Skin Warm and dry. No bruising and no rash noted. No erythema. No pallor. Respiratory Lungs are clear to auscultation bilaterally without wheezes, rales or rhonchi, normal air exchange without accessory muscle use. CVS Normal rate, regular rhythm and normal S1 and S2. No murmurs, gallops, or rubs.    Abdomen Soft, nontender and nondistended, normoactive bowel sounds. Neuro Grossly nonfocal with no obvious sensory or motor deficits. MSK Normal range of motion in general.  No edema and no tenderness. Psych Appropriate mood and affect.         Labs:      Recent Labs     10/27/21  0258 10/26/21  1131 10/25/21  1501   WBC 1.6* 4.8 4.8   RBC 1.97* 2.28* 2.34*   HGB 6.2* 7.0* 7.2*   HCT 17.9* 20.5* 20.9   MCV 90.9 89.9 89.3   MCH 31.5 30.7 30.8   MCHC 34.6 34.1 34.4   RDW 14.3 14.5 14.7*   PLT 18* 2* 4*   GRANS 1* 1*  --    LYMPH 76* 99* 100*   MONOS 22*  --   --    EOS 0*  --   --    BASOS 1  --   --    IG 0  --   --    DF AUTOMATED MANUAL MANUAL   ANEU 0.0* 0.0*  --    ABL 1.2 4.8* 4.8*   ABM 0.4  --   --    HELEN 0.0  --   --    ABB 0.0  --   --    AIG 0.0  --   --         Recent Labs     10/27/21  0258 10/26/21  1131 10/25/21  1501    140 140   K 3.9 3.8 3.6   * 107 108*   CO2 19* 28 27   AGAP 8 5* 5*   * 108* 111*   BUN 19 22 25*   CREA 0.68* 0.94 1.00   GFRAA >60 >60 >60   GFRNA >60 >60 >60   CA 7.2* 8.9 8.7   AP 45* 57 58   TP 5.3* 6.4 6.7   ALB 2.9* 3.6 3.8   GLOB 2.4 2.8 2.9   AGRAT 1.2 1.3 1.3   MG 1.8  --  2.1         Imaging:    Medications:  Current Facility-Administered Medications   Medication Dose Route Frequency    0.9% sodium chloride infusion 250 mL  250 mL IntraVENous PRN    acyclovir (ZOVIRAX) tablet 800 mg  800 mg Oral BID    acyclovir (ZOVIRAX) tablet 400 mg  400 mg Oral ONCE    calcium gluconate 1 gram in sodium chloride (ISO-OSM) 50 mL infusion  1 g IntraVENous ONCE    0.9% sodium chloride infusion 250 mL  250 mL IntraVENous PRN    allopurinoL (ZYLOPRIM) tablet 300 mg  300 mg Oral DAILY    calcium carbonate (OS-DANIELA) tablet 500 mg [elemental]  500 mg Oral TID WITH MEALS    docusate sodium (COLACE) capsule 100 mg  100 mg Oral QHS    fluconazole (DIFLUCAN) tablet 200 mg  200 mg Oral DAILY    fluticasone propionate (FLONASE) 50 mcg/actuation nasal spray 1 Spray  1 Spray Both Nostrils BID RT    furosemide (LASIX) tablet 20 mg  20 mg Oral DAILY    hydrocortisone (ANUSOL-HC) 2.5 % rectal cream   PeriANAL Q8H PRN    LORazepam (ATIVAN) tablet 1 mg  1 mg Oral BID PRN    melatonin tablet 5 mg  5 mg Oral QHS    atorvastatin (LIPITOR) tablet 10 mg  10 mg Oral DAILY    pantoprazole (PROTONIX) tablet 40 mg  40 mg Oral ACB    ondansetron (ZOFRAN ODT) tablet 8 mg  8 mg Oral Q8H PRN    oxyCODONE IR (ROXICODONE) tablet 5 mg  5 mg Oral Q8H PRN    prochlorperazine (COMPAZINE) tablet 5-10 mg  5-10 mg Oral Q6H PRN    witch hazel-glycerin (TUCKS) 12.5-50 % pads 1 Pad  1 Pad PeriANAL PRN    0.9% sodium chloride infusion  75 mL/hr IntraVENous CONTINUOUS    dronabinoL (MARINOL) capsule 5 mg  5 mg Oral BID    fosaprepitant (EMEND) 150 mg in 0.9% sodium chloride 150 mL IVPB  150 mg IntraVENous ONCE    dexamethasone (DECADRON) 10 mg/mL injection 10 mg  10 mg IntraVENous Q24H    ondansetron (ZOFRAN) injection 8 mg  8 mg IntraVENous Q8H    prochlorperazine (COMPAZINE) with saline injection 10 mg  10 mg IntraVENous Q6H PRN    diphenhydrAMINE (BENADRYL) injection 25 mg  25 mg IntraVENous Q6H PRN    LORazepam (ATIVAN) injection 0.5 mg  0.5 mg IntraVENous Q6H PRN    etoposide (VEPESID) 219 mg in 0.9% sodium chloride 500 mL chemo infusion  100 mg/m2 (Treatment Plan Adjusted) IntraVENous Q24H    CARBOplatin (PARAPLATIN) 750 mg in 0.9% sodium chloride 250 mL chemo infusion  750 mg IntraVENous ONCE    ifosfamide (IFEX) 10,950 mg, mesna (MESNEX) 10,950 mg in 0.9% sodium chloride 1,000 mL chemo infusion  5,000 mg/m2 (Treatment Plan Adjusted) IntraVENous CONTINUOUS    famotidine (PF) (PEPCID) 20 mg in 0.9% sodium chloride 10 mL injection  20 mg IntraVENous PRN    methylPREDNISolone (PF) (Solu-MEDROL) injection 125 mg  125 mg IntraVENous PRN    acetaminophen (TYLENOL) tablet 650 mg  650 mg Oral Q6H PRN    magic mouthwash (JACLYN) oral suspension 10 mL  10 mL Swish and Spit Q4H         ASSESSMENT:    Problem List  Date Reviewed: 6/11/2020        Codes Class Noted    Sepsis ([facility] ICD-10-CM: A41.9  ICD-9-CM: 038.9, 995.91  9/30/2021        Neutropenic fever ([facility] ICD-10-CM: D70.9, R50.81  ICD-9-CM: 288.00, 780.61  9/30/2021        Thrombocytopenia ([facility] ICD-10-CM: D69.6  ICD-9-CM: 287.5  9/30/2021        Body mass index (BMI) of 40.0-44.9 in adult [facility]) (Chronic) ICD-10-CM: Z68.41  ICD-9-CM: V85.41  6/13/2019        Admission for antineoplastic chemotherapy ICD-10-CM: Z51.11  ICD-9-CM: V58.11  1/14/2019        Prolymphocytic leukemia of T-cell (HCC) (Chronic) ICD-10-CM: C91.60  ICD-9-CM: 204.90  1/8/2019        Severe obesity (BMI 35.0-39. 9) ICD-10-CM: E66.01  ICD-9-CM: 278.01  9/6/2018        Benign prostatic hyperplasia with nocturia (Chronic) ICD-10-CM: N40.1, R35.1  ICD-9-CM: 600.01, 788.43  1/23/2018        Severe obstructive sleep apnea (Chronic) ICD-10-CM: G47.33  ICD-9-CM: 327.23  1/12/2017        Pure hypercholesterolemia (Chronic) ICD-10-CM: E78.00  ICD-9-CM: 272.0  12/22/2016        Essential hypertension, benign (Chronic) ICD-10-CM: I10  ICD-9-CM: 401.1  9/21/2015        Impotence ICD-10-CM: N52.9  ICD-9-CM: 607.84  9/21/2015        DDD (degenerative disc disease), cervical ICD-10-CM: M50.30  ICD-9-CM: 722.4  9/21/2015        DDD (degenerative disc disease), lumbar ICD-10-CM: M51.36  ICD-9-CM: 722.52  9/21/2015        Right shoulder pain ICD-10-CM: M25.511  ICD-9-CM: 719.41  9/21/2015        First degree hemorrhoids ICD-10-CM: K64.0  ICD-9-CM: 455.0  9/21/2015            Shruti Walker is a [facility] old male patient of Dr. Shruthi Ramsay followed for T-Cell Pro-Lymphocytic Leukemia. In 1/2019 he was started on Alemtuzumab and received it for 8 weeks and achieved a ND. In 4/2019 he was switched to Pentostatin and received 14 doses and achieved a CR. He developed a left leg DVT in 6/2019 and was anti-coagulated with Xarelto, in 11/2019 he was switched to [address] [place] [address] [place] he took it till 11/2020. He relapsed in 9/2021.  He is being admitted for cycle 2 ICE    PLAN:  Relapsed pro-lymphocytic leukemia  10/26 C2D1 ICE  10/27 Day 2 ICE-completes tomorrow ~5pm. Kyung Sotomayor Saturday at 5pm. Follow up with Dr. Pavel Mills Monday then appointment at [facility] on Tuesday.      Pancytopenia related to treatment and disease  10/26 2 units platelets on admission for count of 4K yesterday  10/27 transfuse 2 unit prbc hgb 6.2. Platelets 11B     Decreased appetite  -starting marinol 5 mg bid  -likes Premier supplement     Juan Jose SOPs    Goals and plan of care reviewed with the patient. All questions answered to the best of our ability. Travis Cosme NP   [facility] Hematology & Oncology  [address] [place] [address]  Office : [phone]  Fax : [phone]         Attending Addendum:  I have personally performed a face to face diagnostic evaluation on this patient. I have reviewed and agree with the care plan as documented by Travis Cosme, N.P. 36 minutes were spent on patient care, including but not limited to, reviewing the chart and time with the patient and family, more than 50% of the time documented was spent in face-to-face contact with the patient and in the care of the patient on the floor/unit where the patient is located. My findings are as follows:  He has Pro-Lymphocytic Leukemia, appears fatigued, heart rate regular without murmurs, abdomen is non-tender, bowel sounds are positive, we will continue chemotherapy as per protocol and transfuse PRBCs.               Caridad Gibbons MD      [facility] Hematology/Oncology  [address] [place] [address]  Office : [phone]  Fax : [phone]

## 2021-10-27 NOTE — PROGRESS NOTES
END OF SHIFT NOTE:    Intake/Output  No intake/output data recorded. Voiding: YES  Catheter: NO  Drain:              Stool:  0 occurrences. Stool Assessment  Stool Appearance: Soft (10/26/21 1143)    Emesis:  0 occurrences. VITAL SIGNS  Patient Vitals for the past 12 hrs:   Temp Pulse Resp BP SpO2   10/27/21 0352 97.4 °F (36.3 °C) 69 20 122/78 97 %   10/26/21 2245 97.6 °F (36.4 °C) 79 18 133/73 99 %   10/26/21 2230 97.8 °F (36.6 °C) 74 16 128/71 98 %   10/26/21 2142 98.2 °F (36.8 °C) 76 17 119/68 98 %   10/26/21 2050 97.6 °F (36.4 °C) 79 16 131/68 100 %   10/26/21 2021 98.2 °F (36.8 °C) 82 16 119/75 96 %       Pain Assessment  Pain 1  Pain Scale 1: Visual (10/27/21 0317)  Pain Intensity 1: 0 (10/27/21 0317)  Patient Stated Pain Goal: 0 (10/27/21 0317)    Ambulating  Yes    Additional Information:     Pt received blood without issue. Pt's Hgb 6.2, New blood products ordered. No fever or bleeding. Pt for day 2 chemo today. Shift report given to oncoming nurse at the bedside.     Dex Shankar RN

## 2021-10-28 NOTE — PROGRESS NOTES
Patient Vitals for the past 12 hrs:   Temp Pulse Resp BP SpO2   10/28/21 1755 98.5 °F (36.9 °C) 74 16 (!) 152/87 98 %   10/28/21 1600  72 15 (!) 150/87 97 %   10/28/21 1539 97.9 °F (36.6 °C) 74 16 (!) 152/93 97 %   10/28/21 1435 97.7 °F (36.5 °C) 62 15 (!) 147/92 99 %   10/28/21 1347 97.7 °F (36.5 °C) 65 17 (!) 149/96 99 %   10/28/21 1300 98.1 °F (36.7 °C) 68 17 (!) 146/96 98 %   10/28/21 1156 97.4 °F (36.3 °C) 68 15 (!) 142/86 97 %   10/28/21 1051 97.8 °F (36.6 °C) 72 15 (!) 146/95 97 %   10/28/21 0802 97.4 °F (36.3 °C) 73 17 134/85 96 %     Pt ordered 2 units plts & 2 units prbcs. 2 units plts transfused & 1 unit prbcs started per this RN. Pt to have 1 unit prbcs & 2 units plts tomorrow. Products ordered and ready. Urine noted to be dark at times with metallic smell. UA sent.  + for ketones. On-call provider Daniel Aparicio notified. Pt's bp trended up throughout day. Spoke with daytime NP about it. RN to monitor. BP continued to increase. On-call NP Td notified. Order for lasix 20mg IV x's 1 received.     Bedside shift report given to Carlos Gaspar, on-coming RN

## 2021-10-28 NOTE — DISCHARGE SUMMARY
[facility]: Inpatient Hematology / Oncology Discharge Summary Note    Patient ID:  Cassi Tom  [id number]  77 y.o.  1955    Admit Date: 10/26/2021    Discharge Date: 10/29/2021    Admission Diagnoses: Admission for antineoplastic chemotherapy [Z51.11]    Discharge Diagnoses:  Principal Diagnosis: <principal problem not specified>  Active Problems:    Admission for antineoplastic chemotherapy (1/14/2019)        Hospital Course:  Dagoberto Walker is a [facility] old male patient of Dr. Caity Carney followed for T-Cell Pro-Lymphocytic Leukemia. In 1/2019 he was started on Alemtuzumab and received it for 8 weeks and achieved a OK. In 4/2019 he was switched to Pentostatin and received 14 doses and achieved a CR. He developed a left leg DVT in 6/2019 and was anti-coagulated with Xarelto, in 11/2019 he was switched to [facility] [place] he took it till 11/2020. He relapsed in 9/2021. He was admitted 10/26/2021 for cycle 2 ICE. He has tolerated this cycle well. He has required numerous transfusions platelets and PRBC during this admission. Today Hgb 9.2 (to receive one more unit prior to dc) na d platelet count 83D. Elisha Grace is scheduled Saturday at 5 pm then follow up with Dr. Caity Carney on Monday and consult at [facility] on Tuesday. He knows to call with any concerning symptoms.      FYI: patient did not do well with Marinol-too sedating.        Consults:  None    Pertinent Diagnostic Studies:   Labs:    Recent Labs     10/29/21  0328 10/28/21  0453 10/27/21  0258   WBC 0.7* 1.1* 1.6*   HGB 9.2* 7.9* 6.2*   PLT 21* 9 18*   ANEU 0.0* 0.0* 0.0*      Recent Labs     10/29/21  0328 10/28/21  0453 10/27/21  0258    139 144   K 4.2 4.5 3.9   * 110* 117*   CO2 25 24 19*   * 169* 143*   BUN 22 25* 19   CREA 0.88 0.96 0.68*   CA 8.1* 8.3 7.2*   AP 59 61 45*   TP 6.2* 6.1* 5.3*   ALB 3.8 3.5 2.9*   MG 2.3 2.3 1.8       Current Discharge Medication List      CONTINUE these medications which have CHANGED Details   levoFLOXacin (Levaquin) 500 mg tablet Take 1 Tablet by mouth daily. Qty: 30 Tablet, Refills: 0  Start date: 10/28/2021         CONTINUE these medications which have NOT CHANGED    Details   fluticasone propionate (FLONASE) 50 mcg/actuation nasal spray 1 Spray by Nasal route two (2) times a day. azelastine (ASTELIN) 137 mcg (0.1 %) nasal spray SPRAY ONE SPRAY IN EACH NOSTRIL TWICE DAILY      LORazepam (Ativan) 1 mg tablet Take 1 Tablet by mouth two (2) times daily as needed for Anxiety. Max Daily Amount: 2 mg. Indications: anxious  Qty: 60 Tablet, Refills: 1    Associated Diagnoses: Prolymphocytic leukemia of T-cell type not having achieved remission (HCC)      hydrocortisone (ANUSOL-HC) 2.5 % rectal cream Apply to outside rectum (do not insert) QID. Qty: 30 g, Refills: 0    Associated Diagnoses: Hemorrhoids, unspecified hemorrhoid type      melatonin 5 mg tablet Take 1 Tablet by mouth nightly. Qty: 30 Tablet, Refills: 5    Associated Diagnoses: Insomnia, unspecified type      oxyCODONE IR (ROXICODONE) 5 mg immediate release tablet Take 1 Tablet by mouth every eight (8) hours as needed for Pain for up to 30 days. Max Daily Amount: 15 mg.  Qty: 30 Tablet, Refills: 0    Associated Diagnoses: Cancer associated pain      acyclovir (ZOVIRAX) 400 mg tablet Take 1 Tablet by mouth two (2) times a day. Qty: 60 Tablet, Refills: 0      calcium carbonate (OS-DANIELA) 500 mg calcium (1,250 mg) tablet Take 1 Tablet by mouth three (3) times daily (with meals). Qty: 21 Tablet, Refills: 0      allopurinoL (ZYLOPRIM) 300 mg tablet Take 1 Tablet by mouth daily. Qty: 30 Tablet, Refills: 0      fluconazole (DIFLUCAN) 200 mg tablet Take 1 Tablet by mouth daily. FDA advises cautious prescribing of oral fluconazole in pregnancy. Qty: 30 Tablet, Refills: 0      furosemide (Lasix) 20 mg tablet Take 1 Tablet by mouth daily.   Qty: 3 Tablet, Refills: 0      omeprazole (PRILOSEC) 40 mg capsule TAKE 1 CAPSULE BY MOUTH DAILY lovastatin (MEVACOR) 10 mg tablet Take 1 Tab by mouth nightly. Qty: 90 Tab, Refills: 3    Associated Diagnoses: Pure hypercholesterolemia      docusate sodium (COLACE) 100 mg capsule Take 100 mg by mouth nightly. witch hazel-glycerin (TUCKS) 12.5-50 % pad 1 Pad by PeriANAL route as needed for Pain for up to 30 doses. Qty: 1 Box, Refills: 2    Associated Diagnoses: Hemorrhoids, unspecified hemorrhoid type      ondansetron (Zofran ODT) 8 mg disintegrating tablet Take 1 Tablet by mouth every eight (8) hours as needed for Nausea or Vomiting. Qty: 90 Tablet, Refills: 0      prochlorperazine (Compazine) 10 mg tablet Take 0.5-1 Tablets by mouth every six (6) hours as needed for Nausea or Vomiting. Qty: 60 Tablet, Refills: 0      Comp. Stocking,Knee,Regular,Lrg misc 2 Each by Does Not Apply route daily. Qty: 2 Each, Refills: 0    Comments: Calf diameter 17 inches close to knee, and 11.5 inches close to ankle. Associated Diagnoses: Leg swelling; Dyspnea on exertion             OBJECTIVE:  Patient Vitals for the past 8 hrs:   BP Temp Pulse Resp SpO2   10/29/21 1010 133/82 97.2 °F (36.2 °C) 72 17 98 %   10/29/21 0905 (!) 142/77 97.2 °F (36.2 °C) 74 17 99 %   10/29/21 0759 (!) 160/97 97.4 °F (36.3 °C) 77 17 99 %   10/29/21 0757 (!) 172/95 97.5 °F (36.4 °C) 75 18 97 %   10/29/21 0624 (!) 155/85 97.7 °F (36.5 °C) 77 18 98 %   10/29/21 0506 (!) 158/86 97.5 °F (36.4 °C) 78 16 94 %   10/29/21 0415 (!) 144/78 97.8 °F (36.6 °C) 80 16 98 %     Temp (24hrs), Av.7 °F (36.5 °C), Min:97.2 °F (36.2 °C), Max:98.5 °F (36.9 °C)    10/29 0701 - 10/29 1900  In: 671 [P.O.:354]  Out: 1600 [Urine:1600]    Physical Exam:  Constitutional: Well developed, well nourished male in no acute distress, sitting comfortably on the bed   HEENT: Normocephalic and atraumatic. Oropharynx is clear, mucous membranes are moist.  Pupils are equal, round, and reactive to light. Extraocular muscles are intact. Sclerae anicteric. Skin Warm and dry. No bruising and no rash noted. No erythema. No pallor. Respiratory Lungs are clear to auscultation bilaterally without wheezes, rales or rhonchi, normal air exchange without accessory muscle use. CVS Normal rate, regular rhythm and normal S1 and S2. No murmurs, gallops, or rubs. Abdomen Soft, nontender and nondistended, normoactive bowel sounds. Neuro Grossly nonfocal with no obvious sensory or motor deficits. MSK Normal range of motion in general.  No edema and no tenderness. Psych Appropriate mood and affect. ASSESSMENT:    Active Problems:    Admission for antineoplastic chemotherapy (1/14/2019)      DISPOSITION:  Folllow up as planned with Dr. Almond Siemens on Monday. I spent a total of 41 minutes on the day of discharge in discharge planning and coordination of care for this patient. Kamlesh Ramirez NP  [facility] Hematology & Oncology  [address] [place] [address]  Office : [phone]  Fax : [phone]         Attending Addendum:  I have personally performed a face to face diagnostic evaluation on this patient. I have reviewed and agree with the care plan as documented by Kamlesh Ramirez N.P. My findings are as follows:  He has Pro-Lymphocytic Leukemia and has received 2 cycles of ICE, appears weak, heart rate regular without murmurs, abdomen is non-tender, bowel sounds are positive, we will arrange for him to receive Neulasta in clinic tomorrow.               Armond Dhillon MD      [facility] Hematology/Oncology  [address] [place] [address]  Office : [phone]  Fax : [phone]

## 2021-10-28 NOTE — PROGRESS NOTES
Problem: Falls - Risk of  Goal: *Absence of Falls  Description: Document Angelia Bush Fall Risk and appropriate interventions in the flowsheet.   Outcome: Progressing Towards Goal  Note: Fall Risk Interventions:  Mobility Interventions: Patient to call before getting OOB    Mentation Interventions: Adequate sleep, hydration, pain control    Medication Interventions: Teach patient to arise slowly, Patient to call before getting OOB    Elimination Interventions: Call light in reach              Problem: Chemotherapy, Day 3 to Discharge  Goal: Off Pathway (Use only if patient is Off Pathway)  Outcome: Progressing Towards Goal

## 2021-10-28 NOTE — PROGRESS NOTES
[facility] Hematology & Oncology        Inpatient Hematology / Oncology Progress Note      Admission Date: 10/26/2021 11:15 AM  Reason for Admission/Hospital Course: Admission for antineoplastic chemotherapy [Z51.11]      24 Hour Events:  C2D3 ICE  VSS/no overnight events  Home tomorrow    ROS:  Constitutional: negative for fever, chills, weakness, malaise. +fatigue. CV: negative for chest pain, palpitations, edema. Respiratory: negative for dyspnea, cough, wheezing. GI:  negative for nausea, abdominal pain, diarrhea. 10 point review of systems is otherwise negative with the exception of the elements mentioned above in the HPI. Allergies   Allergen Reactions    Campath [Alemtuzumab] Other (comments)     Rigors       OBJECTIVE:  Patient Vitals for the past 8 hrs:   BP Temp Pulse Resp SpO2   10/28/21 1156 (!) 142/86 97.4 °F (36.3 °C) 68 15 97 %   10/28/21 1051 (!) 146/95 97.8 °F (36.6 °C) 72 15 97 %   10/28/21 0802 134/85 97.4 °F (36.3 °C) 73 17 96 %     Temp (24hrs), Av.6 °F (36.4 °C), Min:97.2 °F (36.2 °C), Max:98 °F (36.7 °C)    10/28 07 - 10/28 1900  In: 2617 [P.O.:237; I.V.:2380]  Out:  [Urine:]    Physical Exam:  Constitutional: Well developed, well nourished male in no acute distress, sitting comfortably on side of the hospital bed. HEENT: Normocephalic and atraumatic. Oropharynx is clear, mucous membranes are moist.  Pupils are equal, round, and reactive to light. Extraocular muscles are intact. Sclerae anicteric. Skin Warm and dry. No bruising and no rash noted. No erythema. No pallor. Respiratory Lungs are clear to auscultation bilaterally without wheezes, rales or rhonchi, normal air exchange without accessory muscle use. CVS Normal rate, regular rhythm and normal S1 and S2. No murmurs, gallops, or rubs. Abdomen Soft, nontender and nondistended, normoactive bowel sounds. Neuro Grossly nonfocal with no obvious sensory or motor deficits.    MSK Normal range of motion in general.  No edema and no tenderness. Psych Appropriate mood and affect. Labs:      Recent Labs     10/28/21  0453 10/27/21  0258 10/26/21  1131 10/25/21  1501 10/25/21  1501   WBC 1.1* 1.6* 4.8   < > 4.8   RBC 2.54 1.97* 2.28*   < > 2.34*   HGB 7.9* 6.2* 7.0*   < > 7.2*   HCT 22.8* 17.9* 20.5*   < > 20.9   MCV 89.8 90.9 89.9   < > 89.3   MCH 31.1 31.5 30.7   < > 30.8   MCHC 34.6 34.6 34.1   < > 34.4   RDW 13.9 14.3 14.5   < > 14.7*   PLT 9 18* 2*   < > 4*   GRANS  --  1* 1*  --   --    LYMPH  --  76* 99*  --  100*   MONOS  --  22*  --   --   --    EOS  --  0*  --   --   --    BASOS  --  1  --   --   --    IG  --  0  --   --   --    DF AUTOMATED AUTOMATED MANUAL   < > MANUAL   ANEU  --  0.0* 0.0*  --   --    ABL  --  1.2 4.8*  --  4.8*   ABM  --  0.4  --   --   --    HELEN  --  0.0  --   --   --    ABB  --  0.0  --   --   --    AIG  --  0.0  --   --   --     < > = values in this interval not displayed. Recent Labs     10/28/21  0453 10/27/21  0258 10/26/21  1131 10/25/21  1501 10/25/21  1501    144 140   < > 140   K 4.5 3.9 3.8   < > 3.6   * 117* 107   < > 108*   CO2 24 19* 28   < > 27   AGAP 5* 8 5*   < > 5*   * 143* 108*   < > 111*   BUN 25* 19 22   < > 25*   CREA 0.96 0.68* 0.94   < > 1.00   GFRAA >60 >60 >60   < > >60   GFRNA >60 >60 >60   < > >60   CA 8.3 7.2* 8.9   < > 8.7   AP 61 45* 57   < > 58   TP 6.1* 5.3* 6.4   < > 6.7   ALB 3.5 2.9* 3.6   < > 3.8   GLOB 2.6 2.4 2.8   < > 2.9   AGRAT 1.3 1.2 1.3   < > 1.3   MG 2.3 1.8  --   --  2.1    < > = values in this interval not displayed.          Imaging:    Medications:  Current Facility-Administered Medications   Medication Dose Route Frequency    0.9% sodium chloride infusion 250 mL  250 mL IntraVENous PRN    0.9% sodium chloride infusion 250 mL  250 mL IntraVENous PRN    0.9% sodium chloride infusion 250 mL  250 mL IntraVENous PRN    0.9% sodium chloride infusion 250 mL  250 mL IntraVENous PRN    acyclovir (ZOVIRAX) tablet 800 mg  800 mg Oral BID    0.9% sodium chloride infusion 250 mL  250 mL IntraVENous PRN    levoFLOXacin (LEVAQUIN) tablet 500 mg  500 mg Oral Q24H    allopurinoL (ZYLOPRIM) tablet 300 mg  300 mg Oral DAILY    calcium carbonate (OS-DANIELA) tablet 500 mg [elemental]  500 mg Oral TID WITH MEALS    docusate sodium (COLACE) capsule 100 mg  100 mg Oral QHS    fluconazole (DIFLUCAN) tablet 200 mg  200 mg Oral DAILY    fluticasone propionate (FLONASE) 50 mcg/actuation nasal spray 1 Spray  1 Spray Both Nostrils BID RT    furosemide (LASIX) tablet 20 mg  20 mg Oral DAILY    hydrocortisone (ANUSOL-HC) 2.5 % rectal cream   PeriANAL Q8H PRN    LORazepam (ATIVAN) tablet 1 mg  1 mg Oral BID PRN    melatonin tablet 5 mg  5 mg Oral QHS    atorvastatin (LIPITOR) tablet 10 mg  10 mg Oral DAILY    pantoprazole (PROTONIX) tablet 40 mg  40 mg Oral ACB    ondansetron (ZOFRAN ODT) tablet 8 mg  8 mg Oral Q8H PRN    oxyCODONE IR (ROXICODONE) tablet 5 mg  5 mg Oral Q8H PRN    prochlorperazine (COMPAZINE) tablet 5-10 mg  5-10 mg Oral Q6H PRN    witch hazel-glycerin (TUCKS) 12.5-50 % pads 1 Pad  1 Pad PeriANAL PRN    0.9% sodium chloride infusion  75 mL/hr IntraVENous CONTINUOUS    dronabinoL (MARINOL) capsule 5 mg  5 mg Oral BID    dexamethasone (DECADRON) 10 mg/mL injection 10 mg  10 mg IntraVENous Q24H    ondansetron (ZOFRAN) injection 8 mg  8 mg IntraVENous Q8H    prochlorperazine (COMPAZINE) with saline injection 10 mg  10 mg IntraVENous Q6H PRN    diphenhydrAMINE (BENADRYL) injection 25 mg  25 mg IntraVENous Q6H PRN    LORazepam (ATIVAN) injection 0.5 mg  0.5 mg IntraVENous Q6H PRN    etoposide (VEPESID) 219 mg in 0.9% sodium chloride 500 mL chemo infusion  100 mg/m2 (Treatment Plan Adjusted) IntraVENous Q24H    ifosfamide (IFEX) 10,950 mg, mesna (MESNEX) 10,950 mg in 0.9% sodium chloride 1,000 mL chemo infusion  5,000 mg/m2 (Treatment Plan Adjusted) IntraVENous CONTINUOUS    famotidine (PF) (PEPCID) 20 mg in 0.9% sodium chloride 10 mL injection  20 mg IntraVENous PRN    methylPREDNISolone (PF) (Solu-MEDROL) injection 125 mg  125 mg IntraVENous PRN    acetaminophen (TYLENOL) tablet 650 mg  650 mg Oral Q6H PRN    magic mouthwash (JACLYN) oral suspension 10 mL  10 mL Swish and Spit Q4H         ASSESSMENT:    Problem List  Date Reviewed: 6/11/2020        Codes Class Noted    S[facility] ICD-10-CM: A41.9  ICD-9-CM: 038.9, 995.91  9/30/2021        Neutropenic fever ([facility] ICD-10-CM: D70.9, R50.81  ICD-9-CM: 288.00, 780.61  9/30/2021        Thrombocytopenia (HCC) ICD-10-CM: D69.6  ICD-9-CM: 287.5  9/30/2021        Body mass index (BMI) of 40.0-44.9 in [facility]) (Chronic) ICD-10-CM: Z68.41  ICD-9-CM: V85.41  6/13/2019        Admission for antineoplastic chemotherapy ICD-10-CM: Z51.11  ICD-9-CM: V58.11  1/14/2019        Prolymphocytic leukemia of T-cell (HCC) (Chronic) ICD-10-CM: C91.60  ICD-9-CM: 204.90  1/8/2019        Severe obesity (BMI 35.0-39. 9) ICD-10-CM: E66.01  ICD-9-CM: 278.01  9/6/2018        Benign prostatic hyperplasia with nocturia (Chronic) ICD-10-CM: N40.1, R35.1  ICD-9-CM: 600.01, 788.43  1/23/2018        Severe obstructive sleep apnea (Chronic) ICD-10-CM: G47.33  ICD-9-CM: 327.23  1/12/2017        Pure hypercholesterolemia (Chronic) ICD-10-CM: E78.00  ICD-9-CM: 272.0  12/22/2016        Essential hypertension, benign (Chronic) ICD-10-CM: I10  ICD-9-CM: 401.1  9/21/2015        Impotence ICD-10-CM: N52.9  ICD-9-CM: 607.84  9/21/2015        DDD (degenerative disc disease), cervical ICD-10-CM: M50.30  ICD-9-CM: 722.4  9/21/2015        DDD (degenerative disc disease), lumbar ICD-10-CM: M51.36  ICD-9-CM: 722.52  9/21/2015        Right shoulder pain ICD-10-CM: M25.511  ICD-9-CM: 719.41  9/21/2015        First degree hemorrhoids ICD-10-CM: K64.0  ICD-9-CM: 455.0  9/21/2015            Dione Walker is a Yash.Soulier old male patient of Dr. Almond Siemens followed for T-Cell Pro-Lymphocytic Leukemia.  In 1/2019 he was started on Alemtuzumab and received it for 8 weeks and achieved a SC. In 4/2019 he was switched to Pentostatin and received 14 doses and achieved a CR. He developed a left leg DVT in 6/2019 and was anti-coagulated with Xarelto, in 11/2019 he was switched to [facility] he took it till 11/2020. He relapsed in 9/2021. He is being admitted for cycle 2 ICE    PLAN:  Relapsed pro-lymphocytic leukemia  10/26 C2D1 ICE  10/27 Day 2 ICE-  10/28 completes late this evening. Needing replacements. Discharge tomorrow. Hayder Saturday at [address] [facility]. Follow up with Dr. Judy Morales Monday then appointment at [facility] on Tuesday.      Pancytopenia related to treatment and disease  Juan Jose SOPs  10/28 Transfuse 2 unit plt and 2 unit prbc. In am transfuse 2 units plt and 1 prbc.     Decreased appetite  -starting marinol 5 mg bid  -likes Premier supplement  10/28 seems sleepy with Marinol. Will not continue at home.      Juan Jose SOPs    Goals and plan of care reviewed with the patient. All questions answered to the best of our ability. Chad Caban NP   [facility] Hematology & Oncology  [address] [place] [address]  Office : [phone]  Fax : [phone]       Attending Addendum:  I have personally performed a face to face diagnostic evaluation on this patient. I have reviewed and agree with the care plan as documented by Chad Caban, N.P. 36 minutes were spent on patient care, including but not limited to, reviewing the chart and time with the patient and family, more than 50% of the time documented was spent in face-to-face contact with the patient and in the care of the patient on the floor/unit where the patient is located. My findings are as follows:  He has Pro-Lymphocytic Leukemia, appears weak, heart rate regular without murmurs, abdomen is non-tender, bowel sounds are positive, we will continue chemotherapy as per protocol.               Debby Edwards MD      [facility] Hematology/Oncology  [address]  Office : [phone]  Fax : [phone]

## 2021-10-29 NOTE — PROGRESS NOTES
Care Management Interventions  Mode of Transport at Discharge: Self  Transition of Care Consult (CM Consult): Discharge Planning  Support Systems: Spouse/Significant Other  Confirm Follow Up Transport: Family  Discharge Location  Discharge Placement: Home  77 yr-old M with Prolymphocytic leukemia of T-cell admitted for chemotherapy. Plan is home with spouse. Chart screened by  for discharge planning. No needs identified at this time. Please consult  if any new issues arise.

## 2021-10-29 NOTE — PROGRESS NOTES
Problem: Falls - Risk of  Goal: *Absence of Falls  Description: Document Pass Christian Irion Fall Risk and appropriate interventions in the flowsheet.   Outcome: Progressing Towards Goal  Note: Fall Risk Interventions:  Mobility Interventions: Patient to call before getting OOB    Mentation Interventions: Adequate sleep, hydration, pain control    Medication Interventions: Teach patient to arise slowly    Elimination Interventions: Call light in reach

## 2021-10-29 NOTE — PROGRESS NOTES
's visit with Mr. Melonie Hopson. I offered spiritual and emotional support including scripture reading and prayer at patient's request. Mr. Melonie Hopson is anticipating discharge today.      Princess Stokes 68  Board Certified

## 2021-10-29 NOTE — PROGRESS NOTES
Patient Vitals for the past 12 hrs:   Temp Pulse Resp BP SpO2   10/29/21 1442 97.5 °F (36.4 °C) 78 15 129/79 98 %   10/29/21 1330 97.2 °F (36.2 °C) 72 17 (!) 158/89 100 %   10/29/21 1235 97.5 °F (36.4 °C) 72 18 (!) 156/95 99 %   10/29/21 1010 97.2 °F (36.2 °C) 72 17 133/82 98 %   10/29/21 0905 97.2 °F (36.2 °C) 74 17 (!) 142/77 99 %   10/29/21 0759 97.4 °F (36.3 °C) 77 17 (!) 160/97 99 %   10/29/21 0757 97.5 °F (36.4 °C) 75 18 (!) 172/95 97 %   10/29/21 0624 97.7 °F (36.5 °C) 77 18 (!) 155/85 98 %   10/29/21 0506 97.5 °F (36.4 °C) 78 16 (!) 158/86 94 %     CaGluconate 1gram per eladio. 1unit PRBCs per orders. 2 units PLTs per orders. Pt had elevated bp this AM.  NP notified and order for 40mg IV lasix x's 1. Continued to have elevated bp. One time order for hydralazine 20mg IV received. When RN went to administer, bp WNL. Med not given. Dc education complete with pt and pt's wife including follow-up appointments, medications, and home care. Verbalized understanding. PICC line CDI upon dc. Lines flushed, blood return present in both. Pt dc'd home via wheelchair with wife.

## 2021-10-30 NOTE — PROGRESS NOTES
Arrived to the [facility] via [facility] with his wife. Lab draw from PICC line for T&C, Udenyca given and 1unit Plts and 1unit PRBC's ordered for mon. 11/1 completed. Patient tolerated well. Any issues or concerns during appointment: no.  Patient aware of next infusion appointment on 11/1 at 1130. Discharged to home via [facility].

## 2021-11-01 NOTE — PROGRESS NOTES
Patient arrived to port lab for lab draw from PICC line. +BR obtained from PICC. Patient discharged from port lab ambulatory.

## 2021-11-01 NOTE — PROGRESS NOTES
Pt was seen today by Dr. Sandhya Carmona. He will need 1 unit of plt. His hgb has held so we will not do 1 unit of PRBC. He will be sen by [facility] tomorrow. He will call us to let us know what their plan is and if we need to bring him back in the week. Prescriptions sent for Acyclovir, Dilfulcan, Zofran and allopurinol. We will check in  on help with Marinol since his copay is so high. Advised to call with any further needs.

## 2021-11-01 NOTE — PROGRESS NOTES
Arrived to the [facility]. 1 Unit Platelets completed. Patient tolerated without problems. Any issues or concerns during appointment: Patient and spouse made aware that PICC dressing needs to be changed every 7 days. If patient isn't going to be at [facility], will need appointments here. Navigator made aware and will follow up with patient tomorrow  Patient to call Gino Pollardator tomorrow after appointment at [facility] via [facility] with spouse.

## 2021-11-03 NOTE — PROGRESS NOTES
Dr. Antonio Chawla from [facility] asking for HLA typing on this pt. We have [facility]immune HLA from 2019. I have rec'd verbal confirmation along with Odette Oh Rn observing from [facility]. These results have been emailed securely to [facility].

## 2021-11-03 NOTE — PROGRESS NOTES
Arrived to the [facility]. Platelets completed. Infused at rate of 300 ml/hr and monitored for sign/symptoms of a reaction. Given copy of blood education form, reviewed with patient and family. Patient tolerated transfusion well. Any issues or concerns during appointment: none. Patient aware of next infusion appointment on 11/5/21 at 1:30PM    Discharged home ambulatory with wife.

## 2021-11-05 NOTE — PROGRESS NOTES
Pt was seen in infusion today. He C/O SOB with no pressure or chest pain. O2 sats at 100% says when he ambulates for a short period he feels sob but resolves easy. Hgb at 9.5. per dorothy, NP send for stat chest xray. He will have this done while he waits for his plt to arrive.

## 2021-11-05 NOTE — PROGRESS NOTES
Pt arrived via w/c. Labs drawn from PICC. Premeds and 1 unit platelets given. Pt tolerated well. Pt c/o SOB; however sats 100% and CXR wnl. Pt and wife verbalized understanding to call immediately with temp 100.5 or higher, chills with or without fever, uncontrolled bleeding or any other concerns. Pt aware of next appt 11/8 at 0800. Discharged via w/c, no distress noted.

## 2021-11-08 NOTE — PROGRESS NOTES
Arrived to the [facility]. Labs, PICC line dressing changed, and 1 unit PLT completed. Patient tolerated well. Any issues or concerns during appointment:  Upon arrival, patient reporting continued SOB with exertion and patient/spouse requesting to see NP today. Sourav Benitez NP notified and to see patient at chairside. O2 sats 100% at this time. Patient to have CT scan per Kady Rojas and it is scheduled this afternoon at 3:00 PM.  Patient instructed to not have anything to eat/drink after 11:00 AM.  Patient verbalized understanding. Patient aware of next infusion appointment on 11/10 (date) at  (time). Discharged via w/c accompanied by spouse.

## 2021-11-08 NOTE — PROGRESS NOTES
[address], [address] [place] [address]  Phone: [phone]           11/8/2021  Lety Underwood  1955  [id number]          Franco Walker is a Arvella Chavez old -American man who has returned to my clinic for a follow-up visit; he was initially referred to [facility] Dr. Yunior Moon. In 12/2018 he was diagnosed with T-Cell Pro-Lymphocytic Leukemia, in 1/2019 he was started on Alemtuzumab and received it for 8 weeks and achieved a [place], in 4/2019 he was switched to Pentostatin and received 14 doses and achieved a CR. He developed a left leg DVT in 6/2019 and was anti-coagulated with Xarelto, in 11/2019 he was switched to [facility] he took it till 11/2020. He relapsed in 9/2021 and has received 2 cycles of ICE. Here today for labs/replacement. He has been having dyspnea with exertion for a few weeks now. Labored with ambulation - resolves quickly with rest. No heart palpitations or chest pain. No fevers, chills. He does have a dry cough which is new. Chest xray from a few days ago was clear. Oxygen saturation with rest 100% - will get walking saturations. Lower extremity swelling improved. No bleeding. He has a few sores in his mouth but not affecting eating or drinking. Fatigued and weakened since last chemotherapy.         ALLERGIES:     No known drug allergies.        FAMILY HISTORY:    No hematologic disorders.        SOCIAL HISTORY:    He is  and lives with his wife. He used to work as an . He denies ever using any tobacco products.        PAST MEDICAL HISTORY:   Hyperlipidemia, Obstructive Sleep Apnea, DVT, GERD, BPH and T-Cell PLL.       ROS:  The patient complained of fatigue, dyspnea with exertion; all other systems negative.        NCCN Distress Thermometer:    1.        PHYSICAL EXAM:   The patient was alert, awake and oriented, no acute distress was noted. Oral examination did not reveal any mucosal lesions.  Lymph node examination did not reveal any adenopathy. Neck examination revealed a supple neck, no thyromegaly or masses were noted. Chest examination revealed normal vesicular breath sounds. Heart examination revealed S-1 and S-2 without any murmurs. Abdominal examination revealed a non-tender abdomen, bowel sounds were positive, no organomegaly could be appreciated. Examination of the extremities did not reveal any tenderness or erythema. +1 edema bilaterally in LE's. Examination of the skin did not reveal any lesions. Medical problems and test results were reviewed with the patient today.        KPS:     90.         LABORATORY INVESTIGATIONS:    Recent Results (from the past 12 hour(s))   CBC WITH AUTOMATED DIFF    Collection Time: 11/08/21  8:00 AM   Result Value Ref Range    WBC 2.1 (L) 4.3 - 11.1 K/uL    RBC 2.88 (L) 4.23 - 5.6 M/uL    HGB 8.5 (L) 13.6 - 17.2 g/dL    HCT 24.8 %    MCV 86.1 79.6 - 97.8 FL    MCH 29.5 26.1 - 32.9 PG    MCHC 34.3 31.4 - 35.0 g/dL    RDW 12.9 11.9 - 14.6 %    PLATELET 4 (LL) 633 - 450 K/uL    MPV 10.7 9.4 - 12.3 FL    ABSOLUTE NRBC 0.00 0.0 - 0.2 K/uL    NEUTROPHILS 0 (L) 43 - 78 %    LYMPHOCYTES 65 (H) 13 - 44 %    MONOCYTES 34 (H) 4.0 - 12.0 %    EOSINOPHILS 0 (L) 0.5 - 7.8 %    BASOPHILS 1 0.0 - 2.0 %    IMMATURE GRANULOCYTES 0 0.0 - 5.0 %    ABS. NEUTROPHILS 0.0 (L) 1.7 - 8.2 K/UL    ABS. LYMPHOCYTES 1.4 0.5 - 4.6 K/UL    ABS. MONOCYTES 0.7 0.1 - 1.3 K/UL    ABS. EOSINOPHILS 0.0 0.0 - 0.8 K/UL    ABS. BASOPHILS 0.0 0.0 - 0.2 K/UL    ABS. IMM.  GRANS. 0.0 0.0 - 0.5 K/UL    RBC COMMENTS SLIGHT  ANISOCYTOSIS + POIKILOCYTOSIS        RBC COMMENTS OCCASIONAL  OVALOCYTES        WBC COMMENTS Result Confirmed By Smear      PLATELET COMMENTS DECREASED      DF AUTOMATED     METABOLIC PANEL, COMPREHENSIVE    Collection Time: 11/08/21  8:00 AM   Result Value Ref Range    Sodium 136 136 - 145 mmol/L    Potassium 4.3 3.5 - 5.1 mmol/L    Chloride 108 (H) 98 - 107 mmol/L    CO2 22 21 - 32 mmol/L    Anion gap 6 (L) 7 - 16 mmol/L    Glucose 122 (H) 65 - 100 mg/dL    BUN 32 (H) 8 - 23 MG/DL    Creatinine 1.00 0.8 - 1.5 MG/DL    GFR est AA >60 >60 ml/min/1.73m2    GFR est non-AA >60 >60 ml/min/1.73m2    Calcium 9.1 8.3 - 10.4 MG/DL    Bilirubin, total 2.3 (H) 0.2 - 1.1 MG/DL    ALT (SGPT) 40 12 - 65 U/L    AST (SGOT) 17 15 - 37 U/L    Alk. phosphatase 59 50 - 136 U/L    Protein, total 6.8 6.3 - 8.2 g/dL    Albumin 3.7 3.2 - 4.6 g/dL    Globulin 3.1 2.3 - 3.5 g/dL    A-G Ratio 1.2 1.2 - 3.5     MAGNESIUM    Collection Time: 11/08/21  8:00 AM   Result Value Ref Range    Magnesium 2.0 1.8 - 2.4 mg/dL     Visit Vitals  BP (!) 81/59 (BP 1 Location: Left arm, BP Patient Position: Sitting)   Pulse 98   Temp 98.1 °F (36.7 °C)   Resp 18   SpO2 100%             ASSESSMENT and PLAN:      T-Cell Pro-Lymphocytic Leukemia    Here today for labs/replacement. He has been having dyspnea with exertion for a few weeks now. Labored with ambulation - resolves quickly with rest. No heart palpitations or chest pain. No fevers, chills. He does have a dry cough which is new. Chest xray from a few days ago was clear. Oxygen saturation with rest 100% - will get walking saturations. Lower extremity swelling improved. No bleeding. He has a few sores in his mouth but not affecting eating or drinking. Fatigued and weakened since last chemotherapy. Discussed with Dr. Eduardo Ac. Will obtain walking saturations and CT chest. Heart rate regular rate and rhythm. Lower extremity swelling improved. Dyspnea could be related to decreased stamina/strenght/debility. Labs reviewed and Hgb 8.5, platelets 2,365, ANC 0.0. Discussed precautions. He will receive platelets today. Review saturations (walking) and CT results. He will continue prophylactic Acyclovir and prophylactic Fluconazole.  Follow closely.        Dennis Theodore, ROXANA  [address] Hematology Oncology

## 2021-11-08 NOTE — PROGRESS NOTES
Clinical Social Work Note  Name: Jennifer Wall  : 1955  MRN: [id number]  Date of Service: 2021  Location of Service: [facility]  Date of Service: 2021    Type of Service: Case Management     Length of Service: 15 minutes    Patient Diagnosis:   1. Prolymphocytic leukemia of T-cell (HCC)    2. Pancytopenia ([address]    3. Dyspnea on exertion    4. Cough    5. Immunocompromised [facility])        Referral Source: rn    Reason for Visit: fu    Subject: Seen patient with his wife at [facility]. Trip to [facility] went well, patient was too tired went to the consultation and the to the hotel to rest.  Patient is struggling with the physical distress of cancer, LISW-CP  provided therapeutic reassurance. At  this moment, patient and his wife denied any psychosocial and economic needs. Mental Status Exam: Intellectual functioning appears to be intact. Mood is \"tired\" and affect is good. Insight is adequate. Judgment is adequate. Patient did not report suicidal ideations, intent or plans. Patient did not report homicidal ideations, intent or plans. Patient is oriented to self, place, time and situation. Protective Factors: Current care for physical and mental illness, adequate insight and judgment, family support, cultural and Congregation beliefs and values that support self-care. Next Steps: LISW-CP gave contact information and encouraged pt to call should any needs arise. Pt verbalized understanding. LISW-CP intends to follow up by phone and/or face-to-face as needed.     3 most recent PHQ Screens 2021 10/25/2021 10/12/2021   Little interest or pleasure in doing things Nearly every day Not at all Not at all   Feeling down, depressed, irritable, or hopeless Nearly every day Not at all Several days   Total Score PHQ 2 6 0 1   Trouble falling or staying asleep, or sleeping too much Not at all - -   Feeling tired or having little energy Nearly every day - -   Poor appetite, weight loss, or overeating Nearly every day - -   Feeling bad about yourself - or that you are a failure or have let yourself or your family down Not at all - -   Trouble concentrating on things such as school, work, reading, or watching TV Nearly every day - -   Moving or speaking so slowly that other people could have noticed; or the opposite being so fidgety that others notice Not at all - -   Thoughts of being better off dead, or hurting yourself in some way Several days - -   PHQ 9 Score 16 - -   How difficult have these problems made it for you to do your work, take care of your home and get along with others Somewhat difficult - -         Signed By: ULISES Rod     November 8, 2021

## 2021-11-10 NOTE — PROGRESS NOTES
Pt was seen today labs reviewed. He will need 1 unit of blood and 1 unit of plt. Blood connection wont have pt there until after 5 ok per grimm ok to do in the am with blood. He will also need an echo for his SOB as CT was neg. We will also get an US of Liver due to increased bili. This is under an abdomen complete order. He will return for lab replacement on Friday and we will see him on Monday. Dr. Riley Tellez would like him to have decide on which way he would like to go for either CART at [facility] with a trial or NS for an allo as Dr. Riley Tellez has discussed this with Dr. Ariana Merino from [facility]. Pt VU along with wife. Update: blood connection dripped off plt DT but pt had already left. We will continue with the plan to give in the am. The pt ws educated if he was to start bleeding to go to Er immediately.

## 2021-11-10 NOTE — PROGRESS NOTES
Patient arrived at [facility]. PICC accessed and labs drawn. PICC flushed. Patient discharged via wheelchair with spouse.

## 2021-11-11 NOTE — PROGRESS NOTES
Pt arrived via w/c to [facility]. Pre meds given as ordered. NS KVO. Platelets infusing. Tolerated well. 1 unit of PRBC infusing. PICC flushed. Pt aware of next appt on 11/12/21 at 0800. Discharged via w/c.

## 2021-11-12 NOTE — PROGRESS NOTES
[facility] Hematology & Oncology        Inpatient Hematology / Oncology Progress Note      Admission Date: 2021  8:28 PM  Reason for Admission/Hospital Course: Sepsis ([facility] [A41.9]  Neutropenic fever ([facility] [D70.9, R50.81]      24 Hour Events:  No fevers overnight  Looks more comfortable today  Day 4 cefe/vanc  BP soft  Ongoing LE swelling  Transfuse PRBC today and give lasix    ROS:  Constitutional: negative for fever, chills. +weakness, malaise, fatigue. CV:  negative for chest pain, palpitations. +edema. Respiratory: Positive for dyspnea and productive cough  GI: negative for nausea, abdominal pain, diarrhea. 10 point review of systems is otherwise negative with the exception of the elements mentioned above in the HPI. Allergies   Allergen Reactions    Campath [Alemtuzumab] Other (comments)     Rigors       OBJECTIVE:  Patient Vitals for the past 8 hrs:   BP Temp Pulse Resp SpO2   10/03/21 0238 (!) 94/54 98 °F (36.7 °C) 95 14 97 %     Temp (24hrs), Av.4 °F (36.9 °C), Min:98 °F (36.7 °C), Max:98.8 °F (37.1 °C)    No intake/output data recorded. Physical Exam:  Constitutional: Ill appearing male in no acute distress, sitting comfortably in the hospital bed. HEENT: Normocephalic and atraumatic. Oropharynx is clear, mucous membranes are moist.  Pupils are equal, round, and reactive to light. Extraocular muscles are intact.  Sclerae anicteric. Skin Warm and dry.  BLE red, swollen, diffuse rash BLE, purple colored palmar flexion creases   Respiratory Lungs sounds diminished with  Rales bilaterally   CVS Normal rate, regular rhythm and normal S1 and S2.  No murmurs, gallops, or rubs. Abdomen Soft, nontender and nondistended, normoactive bowel sounds.  No palpable mass.  No hepatosplenomegaly. Neuro Grossly nonfocal with no obvious sensory or motor deficits.    MSK Normal range of motion in general. BLE edema   Psych Appropriate mood and affect.           Labs:      Recent Labs 10/03/21  0420 10/02/21  0334 10/01/21  0509 09/30/21  1947 09/30/21  1947   WBC 17.6* 17.5* 21.8*   < > 35.1*   RBC 2.01* 2.25* 2.23*   < > 2.97*   HGB 6.6* 7.3* 7.3*   < > 9.6*   HCT 19.5* 21.9* 21.6*   < > 28.8*   MCV 97.0 97.3 96.9   < > 97.0   MCH 32.8 32.4 32.7   < > 32.3   MCHC 33.8 33.3 33.8   < > 33.3   RDW 16.7* 17.1* 17.2*   < > 17.5*   PLT 15* 28* 36*   < > 9*   LYMPH 81* 70* 86*   < > 90*   MONOS  --  11*  --   --   --    DF MANUAL MANUAL AUTOMATED   < > MANUAL   ANEU  --   --   --   --  0.4*   ABL 14.3* 12.3* 18.7*   < > 31.6*   ABM  --  1.9*  --   --   --     < > = values in this interval not displayed. Recent Labs     10/03/21  0420 10/02/21  0334 10/01/21  0509 09/30/21  1947 09/30/21  1947   * 137 134*   < > 134*   K 4.2 4.4 4.5   < > 4.5    108* 106   < > 102   CO2 24 25 23   < > 24   AGAP 5* 4* 5*   < > 8   * 117* 135*   < > 116*   BUN 22 21 24*   < > 25*   CREA 1.00 1.01 1.18   < > 1.47   GFRAA >60 >60 >60   < > >60   GFRNA >60 >60 >60   < > 51*   CA 8.1* 8.1* 8.4   < > 9.0   AP 63 67  --   --  93   TP 5.7* 5.7*  --   --  7.1   ALB 3.3 3.2  --   --  4.1   GLOB 2.4 2.5  --   --  3.0   AGRAT 1.4 1.3  --   --  1.4   MG  --   --  2.4  --   --     < > = values in this interval not displayed.          Imaging:    Medications:  Current Facility-Administered Medications   Medication Dose Route Frequency    0.9% sodium chloride infusion 250 mL  250 mL IntraVENous PRN    diphenhydrAMINE (BENADRYL) capsule 25 mg  25 mg Oral Q6H PRN    acetaminophen (TYLENOL) tablet 650 mg  650 mg Oral Q6H PRN    Or    acetaminophen (TYLENOL) suppository 650 mg  650 mg Rectal Q6H PRN    tbo-filgrastim (GRANIX) injection 480 mcg  480 mcg SubCUTAneous DAILY    acyclovir (ZOVIRAX) tablet 400 mg  400 mg Oral BID    allopurinoL (ZYLOPRIM) tablet 300 mg  300 mg Oral DAILY    calcium carbonate (OS-DANIELA) tablet 500 mg [elemental]  500 mg Oral TID WITH MEALS    docusate sodium (COLACE) capsule 100 mg 100 mg Oral QHS    fluconazole (DIFLUCAN) tablet 200 mg  200 mg Oral DAILY    HYDROcodone-homatropine (HYCODAN) 5-1.5 mg/5 mL (5 mL) oral solution 5 mL  5 mL Oral Q4H PRN    atorvastatin (LIPITOR) tablet 10 mg  10 mg Oral QHS    pantoprazole (PROTONIX) tablet 40 mg  40 mg Oral ACB    ondansetron (ZOFRAN ODT) tablet 8 mg  8 mg Oral Q8H PRN    sodium chloride (NS) flush 5-40 mL  5-40 mL IntraVENous Q8H    sodium chloride (NS) flush 5-40 mL  5-40 mL IntraVENous PRN    polyethylene glycol (MIRALAX) packet 17 g  17 g Oral DAILY    promethazine (PHENERGAN) tablet 12.5 mg  12.5 mg Oral Q6H PRN    Or    ondansetron (ZOFRAN) injection 4 mg  4 mg IntraVENous Q6H PRN    cefepime (MAXIPIME) 1 g in 0.9% sodium chloride (MBP/ADV) 50 mL MBP  1 g IntraVENous Q8H    oxyCODONE IR (ROXICODONE) tablet 5 mg  5 mg Oral Q4H PRN    morphine injection 4 mg  4 mg IntraVENous Q4H PRN    melatonin tablet 5 mg  5 mg Oral QHS    temazepam (RESTORIL) capsule 15 mg  15 mg Oral QHS PRN    albuterol (PROVENTIL VENTOLIN) nebulizer solution 2.5 mg  2.5 mg Nebulization Q6H PRN    vancomycin (VANCOCIN) 1500 mg in  ml infusion  1,500 mg IntraVENous Q12H    triamcinolone acetonide (KENALOG) 0.1 % cream   Topical TID    sodium chloride (NS) flush 5-10 mL  5-10 mL IntraVENous PRN    0.9% sodium chloride infusion 250 mL  250 mL IntraVENous PRN         ASSESSMENT:    Problem List  Date Reviewed: 6/11/2020        Codes Class Noted    * (Principal) Sepsis ([facility] ICD-10-CM: A41.9  ICD-9-CM: 038.9, 995.91  9/30/2021        Neutropenic fever ([facility] ICD-10-CM: D70.9, R50.81  ICD-9-CM: 288.00, 780.61  9/30/2021        Thrombocytopenia (HCC) ICD-10-CM: D69.6  ICD-9-CM: 287.5  9/30/2021        Body mass index (BMI) of 40.0-44.9 in adult [facility]) (Chronic) ICD-10-CM: R74.83  ICD-9-CM: V85.41  6/13/2019        Admission for antineoplastic chemotherapy ICD-10-CM: Z51.11  ICD-9-CM: V58.11  1/14/2019        Prolymphocytic leukemia of T-cell ([facility] (Chronic) ICD-10-CM: C91.60  ICD-9-CM: 204.90  1/8/2019        Severe obesity (BMI 35.0-39. 9) ICD-10-CM: E66.01  ICD-9-CM: 278.01  9/6/2018        Benign prostatic hyperplasia with nocturia (Chronic) ICD-10-CM: N40.1, R35.1  ICD-9-CM: 600.01, 788.43  1/23/2018        Severe obstructive sleep apnea (Chronic) ICD-10-CM: G47.33  ICD-9-CM: 327.23  1/12/2017        Pure hypercholesterolemia (Chronic) ICD-10-CM: E78.00  ICD-9-CM: 272.0  12/22/2016        Essential hypertension, benign (Chronic) ICD-10-CM: I10  ICD-9-CM: 401.1  9/21/2015        Impotence ICD-10-CM: N52.9  ICD-9-CM: 607.84  9/21/2015        DDD (degenerative disc disease), cervical ICD-10-CM: M50.30  ICD-9-CM: 722.4  9/21/2015        DDD (degenerative disc disease), lumbar ICD-10-CM: M51.36  ICD-9-CM: 722.52  9/21/2015        Right shoulder pain ICD-10-CM: M25.511  ICD-9-CM: 719.41  9/21/2015        First degree hemorrhoids ICD-10-CM: K64.0  ICD-9-CM: 455.0  9/21/2015          Mr. Eddie Godfrey is a 76 yo male patient with PMH of T-Cell Pro-Lymphocytic Leukemia. He was first diagnosed in late 2018. He received Alemtuzumab for 8 weeks with MD. He was switched to Pentostatin and received 14 doses with CR. He relapse 9/2021 and is sp cycle one ICE 9/23 with cycle two due on 10/7. Patient came to ED 9/30 with complaint of fever, chills, weakness, dry cough, increased BLE edema and SOB. In ED temp was 102.9. BC/UC obtained. CXR concerning for PNA. Started on cefe and vanc. HGB 9.6, platelets 9K, ANC 0.4, Bili 2. 6. He received 2 units platelets and today platelets are 26S. LT are asked to see patient and take over his care.      Also of note patient has diffuse rash to lower extremities and purple colored palmar flexion creases. He noted that the symptoms started after being placed on Omincef 9/20/2021 for 10 days. We are asked to take over on our patient.          PLAN:    Relapsed T Cell Pro-lymphocytic leukemia  -sp cycle one ICE 9/23 followed by Chip Causey.  Next cycle due 10/7     Neutropenic fever/CXR concerning for PNA  -Tmax 102.9. Day 2 cefe and vanc. BC/UC pending  10/3 No fevers. Day 4 cefe/vanc. BC/UC negative     Pancytopenia   -?hemolysis-peripheral smear-bili elevated   -Juan Jose SOPs  -Give Granix  10/2 ANC not measurable. Continue Granix.     BLE diffuse rash/palmar creases purplish color  -? Reaction due to Onmicef  -add Kenalog cream     BLE edema  10/2 give albumin and lasix. Monitor BP    Dry cough  -hycodan  10/3 now with some phlegm      [facility] SOPs    Goals and plan of care reviewed with the patient. All questions answered to the best of our ability.             Natanael Aden NP   [facility] Hematology & Oncology  [address] [address]  Office : [phone]  Fax : [phone] no

## 2021-11-13 NOTE — PROGRESS NOTES
Arrived to the [facility]. Transfusion completed. Patient tolerated well. Any issues or concerns during appointment: Patient has had prior reactions to platelets - premedicated with IV Benadryl 50 mg, Tylenol 650 mg and 20 mg oral Pepcid - no reactions noted. Patient aware of next infusion appointment on 11/15 (date) at 0930 (time). Patient aware of next lab and [facility] office visit on 11/15 (date) at 0830 (time). Discharged via wheelchair in stable condition. 160.02

## 2021-11-15 NOTE — PROGRESS NOTES
Pt. Discharged via wheelchair. Tolerated transfusions well. No distress noted. To call physician with any problems or concerns. Understanding voiced. To return to Infusions on 11/17/21.

## 2021-11-15 NOTE — PROGRESS NOTES
Pt was seen today labs reviewed. He will need 1 unit of plt and 1 unit of PRBC. We did here back from [facility] and unfortunately T cell leuk is not eligible for this trial. We will stick with the NS allo plan. He will start [facility] and [facility]. After his appt had ended Dr. Zoraida Desir decided we also need a bm bx. So this will push his chemo out. He will need plt Friday morning before his bm bx. He will continue to come every other day for replacements. We will plan to start his chemo next wed as long as his bm results have come back. Palliative  and nutriention  on board to help with symptom management and weight issues. He will not need ED chemo as he has had both PO and IV before. I have emailed [facility] about change in IV plan. Advised to call with any further issues before next visit. New appts have been reviewed with pt.

## 2021-11-15 NOTE — PROGRESS NOTES
Patient arrived to port lab for PICC assessment and lab draw   Right upper arm PICC flushed and labs drawn per protocol   *PICC remains patent and intact for infusion  Patient discharged from port lab via wheelchair*

## 2021-11-17 NOTE — PROGRESS NOTES
Pt arrived via w/c to [facility]. Blood drawn through PICC and sent to lab. Pt c/o nausea and diarrhea. NS 1 L infusing. WBC 1.2 Hg 6.7 plt 4 read back and confirmed with brianne in lab. 1 unit of PRBC ordered. And 1 unit of plt ordered. NS KVO. Pre meds given as ordered. 1 unit of PRBC infusing. 1 unit of platelets infusing. Pt aware of next appt on 11/19/21 at 0715. PICC flushed. Pt discharged via w/c.

## 2021-11-19 NOTE — PROCEDURES
Department of Interventional Radiology  [phone]        Interventional Radiology Brief Procedure Note    Patient: Janna Byrd MRN: [id number]  SSN: [id number]    YOB: 1955  Age: 77 y.o. Sex: male      Date of Procedure: 11/19/2021    Pre-Procedure Diagnosis: Leukemia    Post-Procedure Diagnosis: SAME    Procedure(s): Image Guided Biopsy    Brief Description of Procedure: as above    Performed By: Darien Dotson MD     Assistants: None    Anesthesia:Moderate Sedation    Estimated Blood Loss: Less than 10ml    Specimens:  Pathology    Implants:  None    Findings: Unremarkable.      Complications: None    Recommendations: 1 hour bedrest.      Follow Up: Dr Hebert Rader    Signed By: Darien Dotson MD     November 19, 2021

## 2021-11-19 NOTE — PROGRESS NOTES
Pt moved from IR to CT for procedure. Moved self on to table in prone position. Secured to table for safety.

## 2021-11-19 NOTE — PROGRESS NOTES
Arrived to the [facility]. 2 units Platelets and level recheck completed. Patient tolerated without problems. Any issues or concerns during appointment: per Dr Junaid Viramontes with IR, Plt 24 will be ok for bone marrow biopsy. Patient aware of next infusion appointment on 11/22/21 (date) at [address] [place] [address] (time). Discharged via [facility] with spouse.

## 2021-11-19 NOTE — H&P
Department of Interventional Radiology  ([phone])    History and Physical    Patient:  Bandar Osorio MRN:  [id number]  SSN:  [id number]    YOB: 1955  Age:  77 y.o. Sex:  male      Primary Care Provider:  Tae Reeves NP  Referring Physician:  Ander Vargas MD    Subjective:     Chief Complaint: Recurrent Leukemia. Thrombocytopenia. History of the Present Illness: The patient is a 77 y.o. male who presents with Leukemia. NPO. No thinners. Plt = 28 after transfusion. Past Medical History:   Diagnosis Date    Back pain     occassionally    Cervical radiculopathy     Claustrophobia     DVT (deep venous thrombosis) ([facility] [address] 06/2019    currently treated with Xarelto- started on 5/30/2019    GERD (gastroesophageal reflux disease)     H/O seasonal allergies     Hyperlipidemia     Impotence     Insomnia     Lateral epicondylitis     Leukemia (HCC)     CHEMO    Obesity     BMI 36.6    Sleep apnea     noncomplaint with CPAP     Past Surgical History:   Procedure Laterality Date    HX CIRCUMCISION      HX COLONOSCOPY  2017    Due in 2027    HX ORTHOPAEDIC Right     r wrist    HX WISDOM TEETH EXTRACTION      IR INSERT TUNL CVC W PORT OVER 5 YEARS  6/19/2019    IR REMOVE TUNL CVAD W PORT/PUMP  12/14/2020        Review of Systems:    Pertinent items are noted in the History of Present Illness. Current Outpatient Medications   Medication Sig    mirtazapine (REMERON) 7.5 mg tablet Take 1 Tablet by mouth nightly.  DISABLED PLACARD (DISABLED PLACARD) DMV Handicap placard    dronabinoL (MARINOL) 5 mg capsule Take 1 Capsule by mouth two (2) times a day. Max Daily Amount: 10 mg.    venetoclax (Venclexta) 50 mg tab Take 1 Tablet by mouth daily. Take 1 tablet daily for 14 days and then 14 days off    acyclovir (ZOVIRAX) 400 mg tablet Take 2 Tablets by mouth two (2) times a day for 30 days.  allopurinoL (ZYLOPRIM) 300 mg tablet Take 1 Tablet by mouth daily.  fluconazole (DIFLUCAN) 200 mg tablet Take 1 Tablet by mouth daily. FDA advises cautious prescribing of oral fluconazole in pregnancy.  ondansetron (Zofran ODT) 8 mg disintegrating tablet Take 1 Tablet by mouth every eight (8) hours as needed for Nausea or Vomiting.  levoFLOXacin (Levaquin) 500 mg tablet Take 1 Tablet by mouth daily.  fluticasone propionate (FLONASE) 50 mcg/actuation nasal spray 1 Spray by Nasal route two (2) times a day.  azelastine (ASTELIN) 137 mcg (0.1 %) nasal spray SPRAY ONE SPRAY IN EACH NOSTRIL TWICE DAILY    LORazepam (Ativan) 1 mg tablet Take 1 Tablet by mouth two (2) times daily as needed for Anxiety. Max Daily Amount: 2 mg. Indications: anxious    hydrocortisone (ANUSOL-HC) 2.5 % rectal cream Apply to outside rectum (do not insert) QID.  melatonin 5 mg tablet Take 1 Tablet by mouth nightly.  witch hazel-glycerin (TUCKS) 12.5-50 % pad 1 Pad by PeriANAL route as needed for Pain for up to 30 doses.  calcium carbonate (OS-DANIELA) 500 mg calcium (1,250 mg) tablet Take 1 Tablet by mouth three (3) times daily (with meals).  prochlorperazine (Compazine) 10 mg tablet Take 0.5-1 Tablets by mouth every six (6) hours as needed for Nausea or Vomiting.  furosemide (Lasix) 20 mg tablet Take 1 Tablet by mouth daily.  omeprazole (PRILOSEC) 40 mg capsule TAKE 1 CAPSULE BY MOUTH DAILY    Comp. Stocking,Knee,Regular,Lrg misc 2 Each by Does Not Apply route daily.  lovastatin (MEVACOR) 10 mg tablet Take 1 Tab by mouth nightly.  docusate sodium (COLACE) 100 mg capsule Take 100 mg by mouth nightly. No current facility-administered medications for this encounter.      Facility-Administered Medications Ordered in Other Encounters   Medication Dose Route Frequency    0.9% sodium chloride infusion 250 mL  250 mL IntraVENous PRN    sodium chloride 0.9 % bolus infusion 500 mL  500 mL IntraVENous PRN    diphenhydrAMINE (BENADRYL) injection 50 mg  50 mg IntraVENous PRN    hydrocortisone Sod Succ (PF) (SOLU-CORTEF) injection 100 mg  100 mg IntraVENous PRN    EPINEPHrine HCl (PF) (ADRENALIN) 1 mg/mL (1 mL) injection 0.3 mg  0.3 mg SubCUTAneous PRN    albuterol (PROVENTIL VENTOLIN) nebulizer solution 2.5 mg  2.5 mg Nebulization PRN    ondansetron (ZOFRAN) injection 8 mg  8 mg IntraVENous PRN    acetaminophen (TYLENOL) tablet 650 mg  650 mg Oral PRN    famotidine (PF) (PEPCID) 20 mg in 0.9% sodium chloride 10 mL injection  20 mg IntraVENous PRN    central line flush (saline) syringe 20 mL  20 mL InterCATHeter PRN        Allergies   Allergen Reactions    Campath [Alemtuzumab] Other (comments)     Rigors       Family History   Problem Relation Age of Onset    Cancer Mother 80        pancreatic    Cancer Father 76        breast    No Known Problems Son     Hypertension Brother     Hypertension Brother     No Known Problems Daughter      Social History     Tobacco Use    Smoking status: Never Smoker    Smokeless tobacco: Never Used   Substance Use Topics    Alcohol use: Not Currently     Alcohol/week: 0.0 standard drinks        Objective:       Physical Examination:    Vitals:    11/19/21 1200   BP: 108/62   Pulse: 79   Resp: 18   Temp: 97.9 °F (36.6 °C)   SpO2: 100%   Weight: 105.7 kg (233 lb)   Height: 5' 11\" (1.803 m)     Blood pressure 108/62, pulse 79, temperature 97.9 °F (36.6 °C), resp. rate 18, height 5' 11\" (1.803 m), weight 105.7 kg (233 lb), SpO2 100 %.   General:  alert, cooperative, no distress  Heart:  regular rate and rhythm, S1, S2 normal, no murmur, click, rub or gallop  Lungs:  clear to auscultation bilaterally  Abdomen:  soft, nondistended, normal bowel sounds  Neuro:  normal without focal findings  Extrem:  RUE PICC    Laboratory:     Lab Results   Component Value Date/Time    Sodium 136 11/15/2021 08:23 AM    Sodium 133 (L) 11/10/2021 12:56 PM    Potassium 3.9 11/15/2021 08:23 AM    Potassium 4.3 11/10/2021 12:56 PM    Chloride 106 11/15/2021 08:23 AM Chloride 104 11/10/2021 12:56 PM    CO2 23 11/15/2021 08:23 AM    CO2 21 11/10/2021 12:56 PM    Anion gap 7 11/15/2021 08:23 AM    Anion gap 8 11/10/2021 12:56 PM    Glucose 145 (H) 11/15/2021 08:23 AM    Glucose 126 (H) 11/10/2021 12:56 PM    BUN 26 (H) 11/15/2021 08:23 AM    BUN 32 (H) 11/10/2021 12:56 PM    Creatinine 1.00 11/15/2021 08:23 AM    Creatinine 1.20 11/10/2021 12:56 PM    GFR est AA >60 11/15/2021 08:23 AM    GFR est AA >60 11/10/2021 12:56 PM    GFR est non-AA >60 11/15/2021 08:23 AM    GFR est non-AA >60 11/10/2021 12:56 PM    Calcium 8.7 11/15/2021 08:23 AM    Calcium 9.0 11/10/2021 12:56 PM    Magnesium 2.0 11/10/2021 12:56 PM    Magnesium 2.0 11/08/2021 08:00 AM    Phosphorus 3.3 09/25/2021 03:12 AM    Albumin 3.4 11/15/2021 08:23 AM    Albumin 3.8 11/10/2021 12:56 PM    Protein, total 6.8 11/15/2021 08:23 AM    Protein, total 7.2 11/10/2021 12:56 PM    Globulin 3.4 11/15/2021 08:23 AM    Globulin 3.4 11/10/2021 12:56 PM    A-G Ratio 1.0 (L) 11/15/2021 08:23 AM    A-G Ratio 1.1 (L) 11/10/2021 12:56 PM    ALT (SGPT) 41 11/15/2021 08:23 AM    ALT (SGPT) 38 11/10/2021 12:56 PM     Lab Results   Component Value Date/Time    WBC 1.5 (LL) 11/19/2021 12:02 PM    WBC 1.2 (LL) 11/17/2021 10:11 AM    HGB 6.6 (LL) 11/19/2021 12:02 PM    HGB 6.7 (LL) 11/17/2021 10:11 AM    HCT 19.6 (L) 11/19/2021 12:02 PM    HCT 19.3 11/17/2021 10:11 AM    PLATELET 28 (LL) 35/31/5878 12:02 PM    PLATELET 24 (LL) 40/66/6065 09:44 AM     No results found for: APTT, PTP, INR, INREXT    Assessment:     Leukemia. Thrombocytopenia. Plan:     I discussed the relative increased risk of bleeding secondary to thrombocytopenia. I discussed options of proceeding today vs additional tranfusion and biopsy next week. After discussing all options, Mr Shiela Leonard and his wife have decided to proceed today. They understand that this might require prolonged pressure, bedrest, and even hospital admission for Platelet transfusion. Planned Procedure:  Bone Marrow Aspiration/biopsy. Sedation. Risks, benefits, and alternatives reviewed with patient and he agrees to proceed with the procedure.       Signed By: Angel House MD     November 19, 2021

## 2021-11-19 NOTE — DISCHARGE INSTRUCTIONS
[address]  Department of Interventional Radiology  [facility]  [phone] Office  [phone] Fax    BIOPSY DISCHARGE INSTRUCTIONS    General Instructions:     A biopsy is the removal of a small piece of tissue for microscopic examination or testing. Healthy tissue can be obtained for the purpose of tissue-type matching for transplants. Unhealthy tissues are more commonly biopsied to diagnose disease. Lung Biopsy:     A needle lung biopsy is performed when there is a mass discovered in the lung area. The most serious risk is infection, bleeding, and pneumothorax (a collapsed lung). Signs of pneumothorax include shortness of breath, rapid heart rate, and blueness of the skin. If any of these occur, call 911. Liver Biopsy: This test helps detect cancer, infections, and the cause of an enlargement of the liver or elevated liver enzymes. It also helps to diagnose a number of liver diseases. The pain associated with the procedure may be felt in the shoulder. The risks include internal bleeding, pneumothorax, and injury to the surrounding organs. Renal Biopsy: This procedure is sometimes done to evaluate a transplanted kidney. It is also used to evaluate unexplained decrease in kidney function, blood, or protein in the urine. You may see bright red blood in the urine the first 24 hours after the test. If the bleeding lasts longer, you need to call your doctor. There is a risk of infection and bleeding into the muscle, which may cause soreness. Spinal Biopsy: This test is sometimes done in conjunction with other procedures. Your back will be sore, as we are taking a small sample of bone, which is slightly more difficult to sample than tissue. General Biopsy:     A mass can grow in any area of the body, and we are taking a specimen as ordered by your doctor. The risks are the same.  They include bleeding, pain, and infection. Home Care Instructions: You may resume your regular diet and medication regimen. Do not drink alcohol, drive, or make any important legal decisions in the next 24 hours. Do not lift anything heavier than a gallon of milk until the soreness goes away. You may use over the counter acetaminophen or ibuprofen for the soreness. You may apply an ice pack to the affected area for 20-30 minutes at time for the first 24 hours. After that, you may apply a heat pack. Call If: You should call your Physician and/or the Radiology Nurse if you have any questions or concerns about the biopsy site. Call if you should have increased pain, fever, redness, drainage, or bleeding more than a small spot on the bandage. Follow-Up Instructions: Please see your ordering doctor as he/she has requested. To Reach Us: If you have any questions about your procedure, please call the Interventional Radiology department at [phone]. After business hours (5pm) and weekends, call the answering service at [phone] and ask for the Radiologist on call to be paged. Si tiene Preguntas acerca del procedimiento, por favor llame al departamento de Radiología Intervencional al [phone]. Después de horas de oficina (5 pm) y los fines de [place], llamar al Zach Jason al [phone] y pregunte por el Radiologo de [facility]. Interventional Radiology General Nurse Discharge    After general anesthesia or intravenous sedation, for 24 hours or while taking prescription Narcotics:  · Limit your activities  · Do not drive and operate hazardous machinery  · Do not make important personal or business decisions  · Do  not drink alcoholic beverages  · If you have not urinated within 8 hours after discharge, please contact your surgeon on call. * Please give a list of your current medications to your Primary Care Provider.   * Please update this list whenever your medications are discontinued, doses are changed, or new medications (including over-the-counter products) are added. * Please carry medication information at all times in case of emergency situations. These are general instructions for a healthy lifestyle:    No smoking/ No tobacco products/ Avoid exposure to second hand smoke  Surgeon General's Warning:  Quitting smoking now greatly reduces serious risk to your health. Obesity, smoking, and sedentary lifestyle greatly increases your risk for illness  A healthy diet, regular physical exercise & weight monitoring are important for maintaining a healthy lifestyle    You may be retaining fluid if you have a history of heart failure or if you experience any of the following symptoms:  Weight gain of 3 pounds or more overnight or 5 pounds in a week, increased swelling in our hands or feet or shortness of breath while lying flat in bed. Please call your doctor as soon as you notice any of these symptoms; do not wait until your next office visit. Recognize signs and symptoms of STROKE:  F-face looks uneven    A-arms unable to move or move unevenly    S-speech slurred or non-existent    T-time-call 911 as soon as signs and symptoms begin-DO NOT go       Back to bed or wait to see if you get better-TIME IS BRAIN.       Patient Signature:  Date: 11/19/2021  Discharging Nurse: Jonathan Hillman RN

## 2021-11-19 NOTE — PROGRESS NOTES
TRANSFER - OUT REPORT:    Verbal report given to Wai Bryson RN on Pino Reid  being transferred to recovery (unit) for routine progression of care       Report consisted of patients Situation, Background, Assessment and   Recommendations(SBAR). Information from the following report(s) SBAR, Procedure Summary and MAR was reviewed with the receiving nurse. Opportunity for questions and clarification was provided. Conscious Sedation:   100 Mcg of Fentanyl administered  2 Mg of Versed administered    Pt tolerated procedure well.      Visit Vitals  /78   Pulse 86   Temp 97.9 °F (36.6 °C)   Resp 17   Ht 5' 11\" (1.803 m)   Wt 105.7 kg (233 lb)   SpO2 100%   BMI 32.50 kg/m²     Past Medical History:   Diagnosis Date    Back pain     occassionally    Cervical radiculopathy     Claustrophobia     DVT (deep venous thrombosis) ([facility] [address] 06/2019    currently treated with Xarelto- started on 5/30/2019    GERD (gastroesophageal reflux disease)     H/O seasonal allergies     Hyperlipidemia     Impotence     Insomnia     Lateral epicondylitis     Leukemia (HCC)     CHEMO    Obesity     BMI 36.6    Sleep apnea     noncomplaint with CPAP

## 2021-11-22 NOTE — PROGRESS NOTES
Pt arrived via w/c to [facility]. Picc dressing changed. Lines flush but no blood return. Blood drawn peripherally and sent to lab. Cathflo IV in both lumens. Hg 6.9 and plt 4 read back and confirmed with lab. 1 unit of PRBC and 1 unit of Plt ordered. Cath winnie X2 with still no blood return in either lumen. IV established with good blood return. Office notified. Pre meds given po. NS KVO. 1 unit of platelets infusing. 1 unit of PRBC infused. Pt aware of next appt at 11/24/21 at 1000. Pt to IR tomorrow for port placement. IV flushed and remains accesssed for IR appt.

## 2021-11-22 NOTE — PROGRESS NOTES
's visit requested by Palliative Care staff. Upon arrival to the [facility], I recognized Mr. Jarrod Chao from a prior admission at the [facility]. . Jarrod Chao remembered me also and he smiled upon my entrance to visit him and his wife Skip Mcfarland. The couple shared updates about their lives since October. I offered spiritual and emotional support including affirmation of emotions and heydi, exploration of coping mechanisms, and prayer as requested. The couple was very pleased by the visit and expressed their appreciation.       Princess Dawn 68  Board Certified

## 2021-11-23 NOTE — DISCHARGE INSTRUCTIONS
[address]  Department of Interventional Radiology  [facility]  [phone] Office  [phone] Fax  Implanted Port Discharge Instructions      General Instructions:   A port is like an implanted IV. They are usually ordered for patients who will be getting chemotherapy, but can also be used as an IV for long term antibiotics, large amounts of fluids, and/or blood products. Your blood can be drawn from your port for labs also. Those patients who do not have good veins find the ports convenient as they can get the IV they need with one stick. The port can be used long term, and the care is easy. The device is under the skin, and once the skin heals, care is minimal. All that is required is the nurse who accesses the port will need to flush it with heparinized saline after each use. Ports are usually placed in the chest wall, usually on the right side. But they can be place in the arms and in the abdomen. Home Care Instructions: If your port is in your arm, do not allow blood pressure or other IVs to be place in that arm. Do not allow bra straps or any clothing to rub the skin over the port. Do not bathe or swim until the skin has healed and if the port is accessed. Once it is healed, and when the port is not accessed, it is okay to bathe and swim. Restrict yourself to light activity for the first 5 days after getting the port put in, after that, resume normal activity slowly. You may resume your normal diet and medications. Follow-Up Instructions: Please see your oncologist, or whatever physician ordered the port as he/she has requested of you. Call If: You should call your Physician and/or the Radiology Nurse if you notice redness, pus, swelling, or pain from the area of your incision. Call if you should develop a fever. The nurses who access your port will know to call your doctor if the port does not seem to be working properly. You need to tell the nurses who use the port if you should have any pain or swelling at the site during an infusion. To Reach Us: If you have any questions about your procedure, please call the Interventional Radiology department at [phone]. After business hours (5pm) and weekends, call the answering service at [phone] and ask for the Radiologist on call to be paged. Si tiene Preguntas acerca del procedimiento, por favor llame al departamento de Radiología Intervencional al [phone]. Después de horas de oficina (5 pm) y los fines de [place], llamar al Jakub Jason al [phone] y pregunte por el Radiologo de [place]. Interventional Radiology General Nurse Discharge    After general anesthesia or intravenous sedation, for 24 hours or while taking prescription Narcotics:  · Limit your activities  · Do not drive and operate hazardous machinery  · Do not make important personal or business decisions  · Do  not drink alcoholic beverages  · If you have not urinated within 8 hours after discharge, please contact your surgeon on call. * Please give a list of your current medications to your Primary Care Provider. * Please update this list whenever your medications are discontinued, doses are     changed, or new medications (including over-the-counter products) are added. * Please carry medication information at all times in case of emergency situations. These are general instructions for a healthy lifestyle:    No smoking/ No tobacco products/ Avoid exposure to second hand smoke  Surgeon General's Warning:  Quitting smoking now greatly reduces serious risk to your health.     Obesity, smoking, and sedentary lifestyle greatly increases your risk for illness  A healthy diet, regular physical exercise & weight monitoring are important for maintaining a healthy lifestyle    You may be retaining fluid if you have a history of heart failure or if you experience any of the following symptoms:  Weight gain of 3 pounds or more overnight or 5 pounds in a week, increased swelling in our hands or feet or shortness of breath while lying flat in bed. Please call your doctor as soon as you notice any of these symptoms; do not wait until your next office visit. Recognize signs and symptoms of STROKE:  F-face looks uneven    A-arms unable to move or move unevenly    S-speech slurred or non-existent    T-time-call 911 as soon as signs and symptoms begin-DO NOT go       Back to bed or wait to see if you get better-TIME IS BRAIN.       Patient Signature:  Date: 11/23/2021  Discharging Nurse: Ayse Ledbetter RN

## 2021-11-23 NOTE — PROGRESS NOTES
Pt to Biplane via stretcher with RN.       IR Nurse Pre-Procedure Checklist Part 2          Consent signed: Yes    H&P complete:  Yes    Antibiotics: Yes    Airway/Mallampati Done: Yes    Shaved: Yes    Pregnancy Form:Not applicable    Patient Position: Yes    MD Side: Yes     Biopsy Worksheet: Not applicable    Specimen Medium: Not applicable

## 2021-11-23 NOTE — PROGRESS NOTES
TRANSFER - OUT REPORT:    Verbal report given to Merlin Valdovinos RN on Quan Gibbons  being transferred to IR room 2 for routine post - op       Report consisted of patients Situation, Background, Assessment and   Recommendations(SBAR). Information from the following report(s) SBAR, Procedure Summary and MAR was reviewed with the receiving nurse. Opportunity for questions and clarification was provided. Conscious Sedation:   125 Mcg of Fentanyl administered  2.5 Mg of Versed administered    Pt tolerated procedure well. Visit Vitals  BP (!) 105/59   Pulse 84   Temp 98.2 °F (36.8 °C)   Resp 16   Ht 5' 11\" (1.803 m)   Wt 105.7 kg (233 lb)   SpO2 95%   BMI 32.50 kg/m²     Past Medical History:   Diagnosis Date    Back pain     occassionally    Cervical radiculopathy     Claustrophobia     DVT (deep venous thrombosis) ([facility] [address] 06/2019    currently treated with Xarelto- started on 5/30/2019    GERD (gastroesophageal reflux disease)     H/O seasonal allergies     Hyperlipidemia     Impotence     Insomnia     Lateral epicondylitis     Leukemia (HCC)     CHEMO    Obesity     BMI 36.6    Sleep apnea     noncomplaint with CPAP     Peripheral IV 11/22/21 Left;  Lower Arm (Active)              Venous Access Device port 11/23/21 Upper chest (subclavicular area, right (Active)

## 2021-11-23 NOTE — H&P
Department of Interventional Radiology  [phone]    History and Physical    Patient:  Jose G Chavez MRN:  [id number]  SSN:  [id number]    YOB: 1955  Age:  77 y.o. Sex:  male      Primary Care Provider:  Valentina Singh NP  Referring Physician:  David Carrasco MD    Subjective:     Chief Complaint: port    History of the Present Illness: The patient is a 77 y.o. male with leukemia, pancytopenia who presents for venous chest port placement. Platelets infusing. NPO. Past Medical History:   Diagnosis Date    Back pain     occassionally    Cervical radiculopathy     Claustrophobia     DVT (deep venous thrombosis) ([facility] [address] 06/2019    currently treated with Xarelto- started on 5/30/2019    GERD (gastroesophageal reflux disease)     H/O seasonal allergies     Hyperlipidemia     Impotence     Insomnia     Lateral epicondylitis     Leukemia (HCC)     CHEMO    Obesity     BMI 36.6    Sleep apnea     noncomplaint with CPAP     Past Surgical History:   Procedure Laterality Date    HX CIRCUMCISION      HX COLONOSCOPY  2017    Due in 2027    HX ORTHOPAEDIC Right     r wrist    HX WISDOM TEETH EXTRACTION      IR INSERT TUNL CVC W PORT OVER 5 YEARS  6/19/2019    IR REMOVE TUNL CVAD W PORT/PUMP  12/14/2020        Review of Systems:    Pertinent items are noted in the History of Present Illness. Prior to Admission medications    Medication Sig Start Date End Date Taking? Authorizing Provider   mirtazapine (REMERON) 7.5 mg tablet Take 1 Tablet by mouth nightly. 11/19/21  Yes Jame Harris NP   DISABLED PLACARD (DISABLED PLACARD) DMV Handicap placard 11/10/21  Yes David Carrasco MD   dronabinoL (MARINOL) 5 mg capsule Take 1 Capsule by mouth two (2) times a day. Max Daily Amount: 10 mg. 11/5/21  Yes Lyle Appiah NP   venetoclax (Venclexta) 50 mg tab Take 1 Tablet by mouth daily.  Take 1 tablet daily for 14 days and then 14 days off 11/4/21  Yes David Carrasco MD acyclovir (ZOVIRAX) 400 mg tablet Take 2 Tablets by mouth two (2) times a day for 30 days. 11/1/21 12/1/21 Yes Mary Dwyer MD   allopurinoL (ZYLOPRIM) 300 mg tablet Take 1 Tablet by mouth daily. 11/1/21  Yes Mary Dwyer MD   fluconazole (DIFLUCAN) 200 mg tablet Take 1 Tablet by mouth daily. FDA advises cautious prescribing of oral fluconazole in pregnancy. 11/1/21  Yes Mary Dwyer MD   ondansetron (Zofran ODT) 8 mg disintegrating tablet Take 1 Tablet by mouth every eight (8) hours as needed for Nausea or Vomiting. 11/1/21  Yes Mary Dwyer MD   levoFLOXacin (Levaquin) 500 mg tablet Take 1 Tablet by mouth daily. 10/28/21  Yes Marc Gabriel NP   fluticasone propionate (FLONASE) 50 mcg/actuation nasal spray 1 Spray by Nasal route two (2) times a day. 10/15/21 10/15/22 Yes Provider, Historical   azelastine (ASTELIN) 137 mcg (0.1 %) nasal spray SPRAY ONE SPRAY IN EACH NOSTRIL TWICE DAILY 10/15/21  Yes Provider, Historical   LORazepam (Ativan) 1 mg tablet Take 1 Tablet by mouth two (2) times daily as needed for Anxiety. Max Daily Amount: 2 mg. Indications: anxious 10/14/21  Yes Mary Dwyer MD   hydrocortisone (ANUSOL-HC) 2.5 % rectal cream Apply to outside rectum (do not insert) QID. 10/10/21  Yes Daisy Appiah NP   melatonin 5 mg tablet Take 1 Tablet by mouth nightly. 10/10/21  Yes Marilyn Mistry NP   witch hazel-glycerin (TUCKS) 12.5-50 % pad 1 Pad by PeriANAL route as needed for Pain for up to 30 doses. 10/10/21  Yes Marilyn Mistry NP   calcium carbonate (OS-DANIELA) 500 mg calcium (1,250 mg) tablet Take 1 Tablet by mouth three (3) times daily (with meals). 9/28/21  Yes Mary Dwyer MD   prochlorperazine (Compazine) 10 mg tablet Take 0.5-1 Tablets by mouth every six (6) hours as needed for Nausea or Vomiting. 9/28/21  Yes Mary Dwyer MD   furosemide (Lasix) 20 mg tablet Take 1 Tablet by mouth daily.  9/26/21  Yes Yaw Govea NP   omeprazole (PRILOSEC) 40 mg capsule TAKE 1 CAPSULE BY MOUTH DAILY 3/29/21  Yes Provider, Historical   Comp. Stocking,Knee,Regular,Lrg misc 2 Each by Does Not Apply route daily. 6/11/20  Yes Ar Hinds NP   lovastatin (MEVACOR) 10 mg tablet Take 1 Tab by mouth nightly. 5/19/20  Yes Ar Hinds NP   docusate sodium (COLACE) 100 mg capsule Take 100 mg by mouth nightly.    Yes Provider, Historical        Allergies   Allergen Reactions    Campath [Alemtuzumab] Other (comments)     Rigors       Family History   Problem Relation Age of Onset    Cancer Mother 80        pancreatic    Cancer Father 76        breast    No Known Problems Son     Hypertension Brother     Hypertension Brother     No Known Problems Daughter      Social History     Tobacco Use    Smoking status: Never Smoker    Smokeless tobacco: Never Used   Substance Use Topics    Alcohol use: Not Currently     Alcohol/week: 0.0 standard drinks        Objective:       Physical Examination:    Vitals:    11/23/21 0946 11/23/21 0951 11/23/21 0956 11/23/21 1001   BP: 109/65 111/65 114/66 106/61   Pulse: 79 80 81 85   Resp: 16 16 16 16   Temp:  98 °F (36.7 °C)     SpO2: 100% 100% 100% 98%   Weight:       Height:           Pain Assessment  Pain Intensity 1: 7 (11/23/21 0759)  Pain Location 1: Mouth     Patient Stated Pain Goal: 0      HEART: regular rate and rhythm  LUNG: clear to auscultation bilaterally  ABDOMEN: normal findings: soft, non-tender  EXTREMITIES: warm, + LE edema    Laboratory:     Lab Results   Component Value Date/Time    Sodium 136 11/15/2021 08:23 AM    Sodium 133 (L) 11/10/2021 12:56 PM    Potassium 3.9 11/15/2021 08:23 AM    Potassium 4.3 11/10/2021 12:56 PM    Chloride 106 11/15/2021 08:23 AM    Chloride 104 11/10/2021 12:56 PM    CO2 23 11/15/2021 08:23 AM    CO2 21 11/10/2021 12:56 PM    Anion gap 7 11/15/2021 08:23 AM    Anion gap 8 11/10/2021 12:56 PM    Glucose 145 (H) 11/15/2021 08:23 AM    Glucose 126 (H) 11/10/2021 12:56 PM    BUN 26 (H) 11/15/2021 08:23 AM    BUN 32 (H) 11/10/2021 12:56 PM    Creatinine 1.00 11/15/2021 08:23 AM    Creatinine 1.20 11/10/2021 12:56 PM    GFR est AA >60 11/15/2021 08:23 AM    GFR est AA >60 11/10/2021 12:56 PM    GFR est non-AA >60 11/15/2021 08:23 AM    GFR est non-AA >60 11/10/2021 12:56 PM    Calcium 8.7 11/15/2021 08:23 AM    Calcium 9.0 11/10/2021 12:56 PM    Magnesium 2.0 11/10/2021 12:56 PM    Magnesium 2.0 11/08/2021 08:00 AM    Phosphorus 3.3 09/25/2021 03:12 AM    Albumin 3.4 11/15/2021 08:23 AM    Albumin 3.8 11/10/2021 12:56 PM    Protein, total 6.8 11/15/2021 08:23 AM    Protein, total 7.2 11/10/2021 12:56 PM    Globulin 3.4 11/15/2021 08:23 AM    Globulin 3.4 11/10/2021 12:56 PM    A-G Ratio 1.0 (L) 11/15/2021 08:23 AM    A-G Ratio 1.1 (L) 11/10/2021 12:56 PM    ALT (SGPT) 41 11/15/2021 08:23 AM    ALT (SGPT) 38 11/10/2021 12:56 PM     Lab Results   Component Value Date/Time    WBC 1.8 (LL) 11/23/2021 08:06 AM    WBC 1.9 (LL) 11/22/2021 07:36 AM    HGB 6.6 (LL) 11/23/2021 08:06 AM    HGB 6.9 (LL) 11/22/2021 07:36 AM    HCT 19.2 (L) 11/23/2021 08:06 AM    HCT 20.5 11/22/2021 07:36 AM    PLATELET 6 (LL) 94/00/0467 08:06 AM    PLATELET 4 (LL) 36/89/7069 07:36 AM     No results found for: APTT, PTP, INR, INREXT    Assessment:     leukemia    Hospital Problems  Date Reviewed: 11/19/2021    None          Plan:     Planned Procedure:  Port placement    Risks, benefits, and alternatives reviewed with patient and he agrees to proceed with the procedure.       Signed By: Josefina Nuñez PA-C     November 23, 2021

## 2021-11-23 NOTE — PROCEDURES
Department of Interventional Radiology  [phone]        Interventional Radiology Brief Procedure Note    Patient: Omayra Bales MRN: [id number]  SSN: [id number]    YOB: 1955  Age: 77 y.o. Sex: male      Date of Procedure: 11/23/2021    Pre-Procedure Diagnosis: leukemia    Post-Procedure Diagnosis: SAME    Procedure(s): Venous Chest Port Placement    Brief Description of Procedure: as above    Performed By: Jovita Funes PA-C     Assistants: None    Anesthesia:Moderate Sedation per DIONNA Dasilva MD    Estimated Blood Loss: Less than 10ml    Specimens:  None    Implants:  Chest Port Placement    Findings: catheter tip in right atrium     Complications: None    Recommendations: ok to use port     Follow Up: prn    Signed By: Jovita Funes PA-C     November 23, 2021

## 2021-11-23 NOTE — PROGRESS NOTES
Dr. Nomi Davidson attempted to find pulses in bilateral feet with doppler. He was unsuccessful. Pulses unable to be palpated at this time either. Dr. Nomi Davidson aware and states patient is okay for discharge after his recovery time.

## 2021-11-24 NOTE — PROGRESS NOTES
11/24/21 - Patient here for follow up. Patient will start Bendamustine today and has Venclexta 50 mg to begin at home as well. Patient will receive 1 unit PRBC and 1 unit plts today in infusion and return on Saturday for labs and replacements as needed. Patient does not feel comfortable going any longer than Saturday to have labs checked. Follow up with Dr. Zak Lion in infusion on Monday, likely will not need OV on Wednesday.

## 2021-11-24 NOTE — PROGRESS NOTES
Pt arrived via wheelchair today at 1025, to receive IV chemotherapy: day 1 cycle 1 of bendamustine. Pt also to receive one unit of platelets and a unit of blood. Pt with history of reactions to platelets. Pt premedicated with tylenol/benadryl and pepcid. Pt tolerated platelets without difficulty. Approx one hour into blood, pt developed one small dime size whelp to the left side of face, above the left  cheek bone and left lateral aspect of the eye. Pt diaphoretic, called to room by spouse. Pt disoriented, per wife, who reports that pt woke up and asked \"where am I?'  Blood transfusion stopped, vitals obtained. Pt assessed and alert and oriented. Denies itch, difficulty breathing and denies SOB. Benadryl and solu-cortef administered as ordered in emergency meds. Pt tolerated remainder of blood without difficulty. Mechelle Ann NP made aware. Blood bank aware. No write up needed per NP since pt had mild reaction that was controlled with medication and pt received all the blood. Patient discharged via wheelchair accompanied by spouse. Instructed to notify physician of any problems, questions or concerns. Allowed opportunity for patient/family to ask questions. Verbalized understanding. Next appointment is Nov 27 at [facility] with Amrita Jordan.

## 2021-11-27 NOTE — PROGRESS NOTES
Arrived to the [facility]. Transfusion completed. Patient tolerated well. Any issues or concerns during appointment: None. Patient aware of next infusion appointment on 11/29 (date) at 0800 (time). Patient aware of next lab and [facility] office visit on 11/29 (date) at [address]  (time). Discharged via wheelchair.

## 2021-11-29 NOTE — PROGRESS NOTES
Oral Chemotherapy Adherence:     Pino Reid is a 77 y.o. male undergoing treatment for T cell leukemia. Current Regimen: Venclexta    Patient asked about any concerns with treatment cost: yes    Patient has filled/refilled the prescription as written and has reviewed prescription label or directions and any special instructions:Yes    Missed doses reported: No    Patient verbalizes understanding of what to do in the event of a missed dose: Yes    Adverse reactions reported: No    Toxicities reported: No    Patient aware of next follow-up appointment: Yes     Patient encouraged/re-educated on suggestions for improved adherence (for example, keeping track of when a refill is needed and planning ahead to prevent running out of a medication) : Yes      Follow-up notes: labs replacements wed.

## 2021-11-29 NOTE — PROGRESS NOTES
Arrived to the [facility]. Labs drawn, Hgb 6.9, PLT 4, WBC 0.3 today. 2 units PRBC and 1 unit PLT completed. Patient tolerated well. Any issues or concerns during appointment: patient given pepcid and solucortef premeds prior to platelets due to previous reaction. No issues during infusion today. Patient aware of next infusion appointment on 12/1 at 0800. Discharged via wheelchair to home.

## 2021-12-03 NOTE — PROGRESS NOTES
Arrived to the [facility]. 1 unit PC and 1 unit platelets transfusion completed. Patient tolerated well. Any issues or concerns during appointment: None. Patient aware of next infusion appointment on 12/4 (date) at 0830 (time). Patient aware of next lab and [facility] office visit on 12/8 (date) at 0800 (time). Discharged via wheelchair in stable condition.

## 2021-12-04 NOTE — PROGRESS NOTES
Arrived to the [facility]. 1 unit PRBCs completed. Patient tolerated well. Any issues or concerns during appointment: none. Patient aware of next infusion appointment on 12/6/21 at 0830. Patient aware of next lab and [facility] office visit on 12/8/21 at 0830. Discharged via wheelchair with family.

## 2021-12-06 NOTE — PROGRESS NOTES
Pt arrived via wheelchair today at 3482, to receive labs & replacement. Pt needed one unit of platelets. Pt feeling stronger today to walk from infusion chair to bathroom, but still weak to need a wheelchair ride to and from car. Pt tolerated without difficulty. Patient discharged via wheelchair accompanied by spouse. Instructed to notify physician of any problems, questions or concerns. Allowed opportunity for patient/family to ask questions. Verbalized understanding. Next appointment is December 8 at 0830 with Amrita Jordan.

## 2021-12-06 NOTE — PROGRESS NOTES
Social Work Progress Note  Name: Lety Underwood  : 1955  MRN: [id number]  Date of Service: 2021    Length of Service: 16 minutes    Patient Diagnosis:   1. Prolymphocytic leukemia of T-cell ([facility] [address]        Reason for Visit: F/U    Subjective: Seen patient with his wife, provided therapeutic reassurance. Discussed taking a \"mini-vacation\". LISW-CP used reflective listening as patient spoke about emotional and physical distress. LISW-CP discussed with patient symptoms of emotional distress, causes, triggers and coping skills. Pt denies any other psychosocial needs at this time. Protective Factors: Current care for physical and mental illness, adequate insight and judgment, family support, cultural and Confucianist beliefs and values that support self-care. Patient did not report suicidal ideations, intent or plans. Patient did not report homicidal ideations, intent or plans. Patient is oriented to self, place, time and situation. Next Steps: LISW-CP gave contact information and encouraged pt to call should any needs arise. Pt verbalized understanding. LISW-CP intends to follow up as needed.       3 most recent [facility] 2021 11/15/2021 11/10/2021   Little interest or pleasure in doing things More than half the days Several days Several days   Feeling down, depressed, irritable, or hopeless More than half the days Several days Several days   Total Score PHQ 2 4 2 2   Trouble falling or staying asleep, or sleeping too much - - -   Feeling tired or having little energy - - -   Poor appetite, weight loss, or overeating - - -   Feeling bad about yourself - or that you are a failure or have let yourself or your family down - - -   Trouble concentrating on things such as school, work, reading, or watching TV - - -   Moving or speaking so slowly that other people could have noticed; or the opposite being so fidgety that others notice - - -   Thoughts of being better off dead, or hurting yourself in some way - - -   PHQ 9 Score - - -   How difficult have these problems made it for you to do your work, take care of your home and get along with others - - -         Latasha Nicholas 112, ULISES

## 2021-12-08 NOTE — PROGRESS NOTES
Pt was seen today in infusion with Dr. Isak Arambula. He will need 1 unit of plt today. He is doing fairly better. He has had some weight loss about 14 lbs in about 1.5 weeks. He states he is eating more. The fluid in his legs has subsided. Palliative is increasing his Remeron to 15 mg. We will push his chemo out from 12-22-12/29 due to lili. We also put in a ref to [facility] to help with conditioning. He will still come very 2 days for labs and replacements. Advised pt and wife to call with any concerns. They both VU. I have sent for a refill on his venclexta today. Advised to hold after receiving until we restart C2.

## 2021-12-08 NOTE — PROGRESS NOTES
Pt arrived via wheelchair labs drawn, 1unit platelets infused, pt tolerated well, discharged home via wheelchair

## 2021-12-10 NOTE — PROGRESS NOTES
Arrived to the [facility]. Labs drawn from port. 1 liter NS given IV. Plt count 8. 1 unit platelets given. Patient tolerated well. Any issues or concerns during appointment: none. Patient aware of next infusion appointment on 12-11-21 (date) at 0830 (time). Discharged via w/c.

## 2021-12-11 NOTE — PROGRESS NOTES
Arrived to the [facility]. 1 unit PRBCs completed. Patient tolerated well. Any issues or concerns during appointment: none. Patient aware of next infusion appointment on 12/13/21 at 0900. Patient aware of next lab and [facility] office visit on 12/13/21 at 0900. Discharged via wheelchair with wife.

## 2021-12-13 NOTE — PROGRESS NOTES
Arrived to the [facility]. Labs completed. Dr. Williamson Person in to see patient during infusion appointment. 4g magnesium infusing for magnesium 1.5. Patient tolerating well. Patient to receive ordered pre-meds and 1u PRBCs and 1u plts today. Any issues or concerns during appointment: no.  Patient aware of next infusion appointment on 12/15/2021 (date) at 8:30 am (time). Patient aware of next lab and [facility] office visit on 12/22/2021 (date) at 2 pm (time). Report given to Toshia Diaz RN.

## 2021-12-13 NOTE — PROGRESS NOTES
Arrived to the [facility]. Magnesium bolus, platelets, and blood transfusion completed. Patient tolerated well. VSS. Any issues or concerns during appointment: Type and screen drawn at completion of blood transfusion. Patient aware of next infusion appointment on 12/15/21 at 0830. Patient aware of next lab and [facility] office visit on 12/22/21 at 1400. Discharged in [facility].

## 2021-12-15 NOTE — PROGRESS NOTES
[facility]. 20 meq PO Potassium, 20 meq IV Potassium, pre-meds (tylenol, benadryl, pepcid, and solumedrol as patient has had a previous plt reaction), 1u PRBCs, and 1u plts completed. Patient tolerated well. Any issues or concerns during appointment: no.  Patient aware of next infusion appointment on 12/17/2021 (date) at 5 am (time). Patient aware of next lab and [facility] office visit on 12/22/2021 (date) at 1:30 pm (time).   Discharged ambulatory with spouse

## 2021-12-17 NOTE — PROGRESS NOTES
Arrived to the [facility]. Labs drawn and port access completed. Patient tolerated well. Notified Elsa Payne RN navigator of labs and no replacements required today. Any issues or concerns during appointment: none, patient has been feeling well. Patient aware of next infusion appointment on 12/20/21 at 8:30 AM    Discharged home ambulatory with wife.

## 2021-12-20 NOTE — PROGRESS NOTES
Clinical Social Work Note  Name: Florance Seip  : 1955  MRN: [id number]  Date of Service: 2021  Location of Service: [facility]  Date of Service: 2021    Type of Service: Case Management     Length of Service: 15 minutes    Patient Diagnosis:   1. Prolymphocytic leukemia of T-cell ([facility] [address]        Reason for Visit:F/U    Subject: Seen patient with his wife during Infusion. Provided therapeutic reassurance. At  this moment, pt denies any psychosocial and economic needs. Mental Status Exam: Intellectual functioning appears to be intact. Mood is \"good\" and affect is good. Insight is adequate. Judgment is adequate. Patient did not report suicidal ideations, intent or plans. Patient did not report homicidal ideations, intent or plans. Patient is oriented to self, place, time and situation. Protective Factors: Current care for physical and mental illness, adequate insight and judgment, family support, cultural and Roman [employer] beliefs and values that support self-care. Next Steps: LISW-CP gave contact information and encouraged pt to call should any needs arise. Pt verbalized understanding. LISW-CP intends to follow up by phone and/or face-to-face as needed.     3 most recent [facility] 2021 11/15/2021 11/10/2021   Little interest or pleasure in doing things More than half the days Several days Several days   Feeling down, depressed, irritable, or hopeless More than half the days Several days Several days   Total Score PHQ 2 4 2 2   Trouble falling or staying asleep, or sleeping too much - - -   Feeling tired or having little energy - - -   Poor appetite, weight loss, or overeating - - -   Feeling bad about yourself - or that you are a failure or have let yourself or your family down - - -   Trouble concentrating on things such as school, work, reading, or watching TV - - -   Moving or speaking so slowly that other people could have noticed; or the opposite being so fidgety that others notice - - -   Thoughts of being better off dead, or hurting yourself in some way - - -   PHQ 9 Score - - -   How difficult have these problems made it for you to do your work, take care of your home and get along with others - - -         Signed By: ULISES Kelly     December 20, 2021

## 2021-12-20 NOTE — PROGRESS NOTES
Premedications administered. 1u platelets transfused. Patient tolerated well. Port de-accessed. Patient discharged home ambulatory. Patient aware of next scheduled appt on 12/22.

## 2021-12-22 NOTE — PROGRESS NOTES
Patient arrived to port lab for port access and lab draw   [address] [facility] [address] accessed and labs drawn per protocol   *Port remains accessed   Patient discharged from port lab ambulatory*

## 2021-12-22 NOTE — PROGRESS NOTES
Arrived to the [facility]. 1 unit Platelets completed. Patient tolerated without problems. Any issues or concerns during appointment: no.  Patient aware of next infusion appointment on 12/24/21 (date) at 0830 (time). Patient instructed to call provider with temperature of 100.4 or greater or nausea/vomiting/ diarrhea or pain not controlled by medications  Discharged ambulatory with spouse.

## 2021-12-22 NOTE — PROGRESS NOTES
Spoke to blood bank who stated platelets would not be ready until 4:30. Notified pt, pt will return at 4:30.

## 2021-12-24 NOTE — PROGRESS NOTES
Pt arrived ambulatory, labs drawn, 1 unit platelets and 1 unit PRBC infused, pt tolerated well, discharged home ambulatory

## 2021-12-27 NOTE — PROGRESS NOTES
Arrived to the [facility]. Labs and Magnesium completed. Patient tolerated without problems. Any issues or concerns during appointment: spoke with Chavez Brooks NP, will order 1 unit PRBC and 1 unit PLTS for Wednesday along with Bendamustine. Patient aware of next infusion appointment on 12/29/21 (date) at  (time). Patient instructed to call provider with temperature of 100.4 or greater or nausea/vomiting/ diarrhea or pain not controlled by medications  Discharged ambulatory with spouse.

## 2021-12-29 NOTE — PROGRESS NOTES
Pt was seen today by Dr. Sandhya Carmona. Labs reviewed. He will have 1 unit of PRBC that were previously ordered. And 1 unit of plt. He will also start his venclexta and have C2 of bendamustine today. We will get another bm bx about 1 month post C3. We will add udencya in for C3. These orders have been added to his plan for pre auth to get started. He will have lab/replacment on Friday. Depending on his labs me may be able to bring him in less often for replacements. He will stop his Levaquin for now and will take his lasix every other day for now. Advised to call with any further needs.

## 2021-12-30 NOTE — PROGRESS NOTES
Oral Chemotherapy Adherence:     Current Regimen: Venclexta    Barriers to care identified including (financial, physical, psychosocial) : No    Missed doses reported: No    Patient verbalizes understanding of what to do in the event of a missed dose:  Yes    Adverse reactions/toxicities reported:None

## 2021-12-30 NOTE — PROGRESS NOTES
Pt. Discharged ambulatory. Tolerated infusion well. No distress noted. Discharge instructions given. To return to Infusions on 1/31/21.

## 2021-12-31 NOTE — PROGRESS NOTES
Labs called to Rafael Ayon NP. Hgb 9.0 and Plt Ct 14,000 without any evidence of bleeding as per patient. Electrolytes noted and no replacement required. Vitals stable and patient is drinking well. Denies vertigo. Patient discharged via ambulation with cane accompanied by wife. Instructed to notify physician of any problems, fever 100.4, uncontrolled n/v or diarrhea or questions or concerns after discharge. Next appointment is 01/03/2022 at 0800 with Infusion. New Type and Screen sent today as per order. Understanding verbalized.

## 2022-01-01 ENCOUNTER — HOSPITAL ENCOUNTER (OUTPATIENT)
Dept: INFUSION THERAPY | Age: 67
Discharge: HOME | End: 2022-05-05
Payer: MEDICARE

## 2022-01-01 ENCOUNTER — HOSPITAL ENCOUNTER (OUTPATIENT)
Dept: INFUSION THERAPY | Age: 67
Discharge: HOME | End: 2022-05-09
Payer: MEDICARE

## 2022-01-01 ENCOUNTER — HOSPITAL ENCOUNTER (OUTPATIENT)
Dept: INFUSION THERAPY | Age: 67
Discharge: HOME | End: 2022-05-16
Payer: MEDICARE

## 2022-01-01 ENCOUNTER — PATIENT OUTREACH (OUTPATIENT)
Dept: CASE MANAGEMENT | Age: 67
End: 2022-01-01

## 2022-01-01 ENCOUNTER — HOSPITAL ENCOUNTER (OUTPATIENT)
Dept: LAB | Age: 67
Discharge: HOME | End: 2022-03-11
Payer: MEDICARE

## 2022-01-01 ENCOUNTER — HOSPITAL ENCOUNTER (OUTPATIENT)
Dept: INFUSION THERAPY | Age: 67
End: 2022-01-01

## 2022-01-01 ENCOUNTER — HOSPITAL ENCOUNTER (INPATIENT)
Age: 67
LOS: 5 days | Discharge: HOME | DRG: 299 | End: 2022-05-29
Attending: EMERGENCY MEDICINE | Admitting: INTERNAL MEDICINE
Payer: MEDICARE

## 2022-01-01 ENCOUNTER — OFFICE VISIT (OUTPATIENT)
Dept: ONCOLOGY | Age: 67
End: 2022-01-01
Payer: MEDICARE

## 2022-01-01 ENCOUNTER — HOSPITAL ENCOUNTER (EMERGENCY)
Dept: GENERAL RADIOLOGY | Age: 67
Discharge: HOME | DRG: 640 | End: 2022-06-05
Payer: MEDICARE

## 2022-01-01 ENCOUNTER — HOSPICE ADMISSION (OUTPATIENT)
Dept: HOSPICE | Facility: HOSPICE | Age: 67
End: 2022-01-01
Payer: MEDICARE

## 2022-01-01 ENCOUNTER — CLINICAL DOCUMENTATION (OUTPATIENT)
Dept: ONCOLOGY | Age: 67
End: 2022-01-01

## 2022-01-01 ENCOUNTER — HOSPITAL ENCOUNTER (OUTPATIENT)
Dept: INFUSION THERAPY | Age: 67
Discharge: HOME | End: 2022-04-21
Payer: MEDICARE

## 2022-01-01 ENCOUNTER — HOSPITAL ENCOUNTER (OUTPATIENT)
Dept: INFUSION THERAPY | Age: 67
Discharge: HOME | End: 2022-01-03
Payer: MEDICARE

## 2022-01-01 ENCOUNTER — HOSPITAL ENCOUNTER (OUTPATIENT)
Dept: CT IMAGING | Age: 67
Discharge: HOME | End: 2022-03-02
Attending: INTERNAL MEDICINE
Payer: MEDICARE

## 2022-01-01 ENCOUNTER — HOSPITAL ENCOUNTER (OUTPATIENT)
Dept: INFUSION THERAPY | Age: 67
Discharge: HOME | End: 2022-06-09
Payer: MEDICARE

## 2022-01-01 ENCOUNTER — OFFICE VISIT (OUTPATIENT)
Dept: PALLATIVE CARE | Age: 67
End: 2022-01-01
Payer: MEDICARE

## 2022-01-01 ENCOUNTER — HOSPITAL ENCOUNTER (OUTPATIENT)
Dept: INFUSION THERAPY | Age: 67
Discharge: HOME | End: 2022-03-18
Payer: MEDICARE

## 2022-01-01 ENCOUNTER — HOSPITAL ENCOUNTER (INPATIENT)
Age: 67
LOS: 2 days | Discharge: HOME | DRG: 640 | End: 2022-06-05
Attending: EMERGENCY MEDICINE | Admitting: FAMILY MEDICINE
Payer: MEDICARE

## 2022-01-01 ENCOUNTER — HOSPITAL ENCOUNTER (OUTPATIENT)
Dept: INFUSION THERAPY | Age: 67
Discharge: HOME | End: 2022-05-13
Payer: MEDICARE

## 2022-01-01 ENCOUNTER — APPOINTMENT (OUTPATIENT)
Dept: GENERAL RADIOLOGY | Age: 67
DRG: 813 | End: 2022-01-01
Attending: INTERNAL MEDICINE
Payer: MEDICARE

## 2022-01-01 ENCOUNTER — HOSPITAL ENCOUNTER (OUTPATIENT)
Dept: INFUSION THERAPY | Age: 67
Discharge: HOME | End: 2022-05-23
Payer: MEDICARE

## 2022-01-01 ENCOUNTER — HOSPITAL ENCOUNTER (OUTPATIENT)
Dept: INFUSION THERAPY | Age: 67
Discharge: HOME | End: 2022-04-15
Payer: MEDICARE

## 2022-01-01 ENCOUNTER — HOSPITAL ENCOUNTER (OUTPATIENT)
Dept: INFUSION THERAPY | Age: 67
Discharge: HOME | End: 2022-03-14
Payer: MEDICARE

## 2022-01-01 ENCOUNTER — HOSPITAL ENCOUNTER (OUTPATIENT)
Dept: INFUSION THERAPY | Age: 67
Discharge: HOME | End: 2022-01-10
Payer: MEDICARE

## 2022-01-01 ENCOUNTER — HOSPITAL ENCOUNTER (OUTPATIENT)
Dept: ULTRASOUND IMAGING | Age: 67
Discharge: HOME | End: 2022-05-27

## 2022-01-01 ENCOUNTER — HOSPITAL ENCOUNTER (OUTPATIENT)
Dept: INFUSION THERAPY | Age: 67
Discharge: HOME | End: 2022-01-17
Payer: MEDICARE

## 2022-01-01 ENCOUNTER — APPOINTMENT (OUTPATIENT)
Dept: INTERVENTIONAL RADIOLOGY/VASCULAR | Age: 67
DRG: 299 | End: 2022-01-01
Payer: MEDICARE

## 2022-01-01 ENCOUNTER — HOSPITAL ENCOUNTER (OUTPATIENT)
Dept: INFUSION THERAPY | Age: 67
Discharge: HOME | End: 2022-06-02
Payer: MEDICARE

## 2022-01-01 ENCOUNTER — HOSPITAL ENCOUNTER (OUTPATIENT)
Dept: INFUSION THERAPY | Age: 67
Discharge: HOME | End: 2022-06-06
Payer: MEDICARE

## 2022-01-01 ENCOUNTER — CLINICAL DOCUMENTATION (OUTPATIENT)
Dept: CASE MANAGEMENT | Age: 67
End: 2022-01-01

## 2022-01-01 ENCOUNTER — HOSPITAL ENCOUNTER (OUTPATIENT)
Dept: INFUSION THERAPY | Age: 67
Discharge: HOME | End: 2022-02-22
Payer: MEDICARE

## 2022-01-01 ENCOUNTER — DOCUMENTATION ONLY (OUTPATIENT)
Dept: HEMATOLOGY | Age: 67
End: 2022-01-01

## 2022-01-01 ENCOUNTER — HOSPITAL ENCOUNTER (OUTPATIENT)
Dept: INFUSION THERAPY | Age: 67
Discharge: HOME | End: 2022-04-26
Payer: MEDICARE

## 2022-01-01 ENCOUNTER — HOSPITAL ENCOUNTER (OUTPATIENT)
Dept: INFUSION THERAPY | Age: 67
Discharge: HOME | End: 2022-02-03
Payer: MEDICARE

## 2022-01-01 ENCOUNTER — HOSPITAL ENCOUNTER (OUTPATIENT)
Dept: INFUSION THERAPY | Age: 67
Discharge: HOME | End: 2022-02-09
Payer: MEDICARE

## 2022-01-01 ENCOUNTER — HOSPITAL ENCOUNTER (OUTPATIENT)
Dept: INFUSION THERAPY | Age: 67
Discharge: HOME | End: 2022-05-19
Payer: MEDICARE

## 2022-01-01 ENCOUNTER — HOSPITAL ENCOUNTER (OUTPATIENT)
Dept: INFUSION THERAPY | Age: 67
Discharge: HOME | End: 2022-05-02
Payer: MEDICARE

## 2022-01-01 ENCOUNTER — HOSPITAL ENCOUNTER (INPATIENT)
Age: 67
LOS: 6 days | Discharge: HOSPICE | DRG: 813 | End: 2022-06-15
Attending: INTERNAL MEDICINE | Admitting: INTERNAL MEDICINE
Payer: MEDICARE

## 2022-01-01 ENCOUNTER — HOSPITAL ENCOUNTER (OUTPATIENT)
Dept: INFUSION THERAPY | Age: 67
Discharge: HOME | End: 2022-03-01
Payer: MEDICARE

## 2022-01-01 ENCOUNTER — HOSPITAL ENCOUNTER (OUTPATIENT)
Dept: INFUSION THERAPY | Age: 67
Discharge: HOME | End: 2022-05-12
Payer: MEDICARE

## 2022-01-01 ENCOUNTER — TELEPHONE (OUTPATIENT)
Dept: ONCOLOGY | Age: 67
End: 2022-01-01

## 2022-01-01 ENCOUNTER — HOSPITAL ENCOUNTER (OUTPATIENT)
Dept: INFUSION THERAPY | Age: 67
Discharge: HOME | End: 2022-05-06
Payer: MEDICARE

## 2022-01-01 ENCOUNTER — HOME CARE VISIT (OUTPATIENT)
Dept: SCHEDULING | Facility: HOME HEALTH | Age: 67
End: 2022-01-01
Payer: MEDICARE

## 2022-01-01 ENCOUNTER — HOSPITAL ENCOUNTER (OUTPATIENT)
Dept: INFUSION THERAPY | Age: 67
Discharge: HOME | End: 2022-03-30
Payer: MEDICARE

## 2022-01-01 ENCOUNTER — HOSPITAL ENCOUNTER (OUTPATIENT)
Dept: INFUSION THERAPY | Age: 67
Discharge: HOME | End: 2022-01-12
Payer: MEDICARE

## 2022-01-01 ENCOUNTER — HOSPITAL ENCOUNTER (OUTPATIENT)
Dept: INFUSION THERAPY | Age: 67
Discharge: HOME | End: 2022-03-17
Payer: MEDICARE

## 2022-01-01 ENCOUNTER — HOSPITAL ENCOUNTER (OUTPATIENT)
Dept: INFUSION THERAPY | Age: 67
Discharge: HOME | End: 2022-01-19
Payer: MEDICARE

## 2022-01-01 ENCOUNTER — HOSPITAL ENCOUNTER (OUTPATIENT)
Dept: INFUSION THERAPY | Age: 67
Discharge: HOME | End: 2022-01-27
Payer: MEDICARE

## 2022-01-01 ENCOUNTER — HOSPITAL ENCOUNTER (OUTPATIENT)
Dept: INFUSION THERAPY | Age: 67
Discharge: HOME | End: 2022-01-26
Payer: MEDICARE

## 2022-01-01 ENCOUNTER — HOSPITAL ENCOUNTER (OUTPATIENT)
Dept: INFUSION THERAPY | Age: 67
Discharge: HOME | End: 2022-01-05
Payer: MEDICARE

## 2022-01-01 ENCOUNTER — HOSPITAL ENCOUNTER (OUTPATIENT)
Dept: PET IMAGING | Age: 67
Discharge: HOME | End: 2022-02-23
Payer: MEDICARE

## 2022-01-01 ENCOUNTER — HOSPITAL ENCOUNTER (OUTPATIENT)
Dept: INFUSION THERAPY | Age: 67
Discharge: HOME | End: 2022-02-02
Payer: MEDICARE

## 2022-01-01 ENCOUNTER — HOSPITAL ENCOUNTER (OUTPATIENT)
Dept: INFUSION THERAPY | Age: 67
Discharge: HOME | End: 2022-02-16
Payer: MEDICARE

## 2022-01-01 ENCOUNTER — HOSPITAL ENCOUNTER (OUTPATIENT)
Dept: NON INVASIVE DIAGNOSTICS | Age: 67
Discharge: HOME | End: 2022-04-29
Attending: INTERNAL MEDICINE
Payer: MEDICARE

## 2022-01-01 ENCOUNTER — CLINICAL DOCUMENTATION (OUTPATIENT)
Dept: PALLATIVE CARE | Age: 67
End: 2022-01-01

## 2022-01-01 ENCOUNTER — HOSPITAL ENCOUNTER (OUTPATIENT)
Dept: INFUSION THERAPY | Age: 67
Discharge: HOME | End: 2022-03-23
Payer: MEDICARE

## 2022-01-01 ENCOUNTER — HOSPITAL ENCOUNTER (OUTPATIENT)
Dept: INFUSION THERAPY | Age: 67
Discharge: HOME | End: 2022-04-07
Payer: MEDICARE

## 2022-01-01 ENCOUNTER — HOSPITAL ENCOUNTER (OUTPATIENT)
Dept: INFUSION THERAPY | Age: 67
Discharge: HOME | End: 2022-04-14
Payer: MEDICARE

## 2022-01-01 ENCOUNTER — HOSPITAL ENCOUNTER (OUTPATIENT)
Dept: INFUSION THERAPY | Age: 67
Discharge: HOME | End: 2022-02-10
Payer: MEDICARE

## 2022-01-01 ENCOUNTER — NURSE ONLY (OUTPATIENT)
Dept: CARDIOLOGY CLINIC | Age: 67
End: 2022-01-01
Payer: MEDICARE

## 2022-01-01 VITALS
RESPIRATION RATE: 18 BRPM | SYSTOLIC BLOOD PRESSURE: 121 MMHG | HEART RATE: 64 BPM | BODY MASS INDEX: 30.35 KG/M2 | TEMPERATURE: 98 F | OXYGEN SATURATION: 98 % | DIASTOLIC BLOOD PRESSURE: 75 MMHG | WEIGHT: 217.6 LBS

## 2022-01-01 VITALS
TEMPERATURE: 97.7 F | SYSTOLIC BLOOD PRESSURE: 140 MMHG | OXYGEN SATURATION: 98 % | HEART RATE: 63 BPM | DIASTOLIC BLOOD PRESSURE: 85 MMHG | RESPIRATION RATE: 16 BRPM

## 2022-01-01 VITALS
DIASTOLIC BLOOD PRESSURE: 88 MMHG | TEMPERATURE: 97.8 F | RESPIRATION RATE: 18 BRPM | HEART RATE: 88 BPM | OXYGEN SATURATION: 98 % | SYSTOLIC BLOOD PRESSURE: 136 MMHG

## 2022-01-01 VITALS
DIASTOLIC BLOOD PRESSURE: 71 MMHG | RESPIRATION RATE: 18 BRPM | TEMPERATURE: 97.4 F | SYSTOLIC BLOOD PRESSURE: 140 MMHG | OXYGEN SATURATION: 100 % | HEART RATE: 93 BPM

## 2022-01-01 VITALS
DIASTOLIC BLOOD PRESSURE: 52 MMHG | TEMPERATURE: 98.3 F | OXYGEN SATURATION: 94 % | HEART RATE: 92 BPM | RESPIRATION RATE: 18 BRPM | SYSTOLIC BLOOD PRESSURE: 94 MMHG

## 2022-01-01 VITALS
HEART RATE: 89 BPM | TEMPERATURE: 97.5 F | DIASTOLIC BLOOD PRESSURE: 78 MMHG | SYSTOLIC BLOOD PRESSURE: 119 MMHG | RESPIRATION RATE: 16 BRPM | OXYGEN SATURATION: 98 %

## 2022-01-01 VITALS
WEIGHT: 220 LBS | BODY MASS INDEX: 30.8 KG/M2 | DIASTOLIC BLOOD PRESSURE: 81 MMHG | TEMPERATURE: 97 F | HEART RATE: 74 BPM | OXYGEN SATURATION: 99 % | SYSTOLIC BLOOD PRESSURE: 144 MMHG | RESPIRATION RATE: 16 BRPM | HEIGHT: 71 IN

## 2022-01-01 VITALS
RESPIRATION RATE: 18 BRPM | WEIGHT: 210.5 LBS | BODY MASS INDEX: 29.47 KG/M2 | SYSTOLIC BLOOD PRESSURE: 101 MMHG | DIASTOLIC BLOOD PRESSURE: 71 MMHG | OXYGEN SATURATION: 97 % | HEIGHT: 71 IN | HEART RATE: 88 BPM | TEMPERATURE: 97.9 F

## 2022-01-01 VITALS
BODY MASS INDEX: 30.59 KG/M2 | WEIGHT: 219.3 LBS | OXYGEN SATURATION: 100 % | DIASTOLIC BLOOD PRESSURE: 77 MMHG | TEMPERATURE: 97.4 F | SYSTOLIC BLOOD PRESSURE: 124 MMHG | HEART RATE: 73 BPM | RESPIRATION RATE: 18 BRPM

## 2022-01-01 VITALS
RESPIRATION RATE: 16 BRPM | SYSTOLIC BLOOD PRESSURE: 144 MMHG | DIASTOLIC BLOOD PRESSURE: 83 MMHG | OXYGEN SATURATION: 99 % | HEART RATE: 75 BPM | TEMPERATURE: 97.9 F

## 2022-01-01 VITALS
SYSTOLIC BLOOD PRESSURE: 121 MMHG | TEMPERATURE: 98.3 F | OXYGEN SATURATION: 94 % | DIASTOLIC BLOOD PRESSURE: 81 MMHG | RESPIRATION RATE: 16 BRPM | HEART RATE: 86 BPM

## 2022-01-01 VITALS
OXYGEN SATURATION: 99 % | DIASTOLIC BLOOD PRESSURE: 66 MMHG | RESPIRATION RATE: 18 BRPM | HEART RATE: 73 BPM | TEMPERATURE: 97.9 F | SYSTOLIC BLOOD PRESSURE: 108 MMHG

## 2022-01-01 VITALS
SYSTOLIC BLOOD PRESSURE: 106 MMHG | RESPIRATION RATE: 17 BRPM | HEART RATE: 87 BPM | OXYGEN SATURATION: 99 % | DIASTOLIC BLOOD PRESSURE: 49 MMHG | TEMPERATURE: 97.7 F

## 2022-01-01 VITALS
RESPIRATION RATE: 18 BRPM | DIASTOLIC BLOOD PRESSURE: 59 MMHG | TEMPERATURE: 97.7 F | OXYGEN SATURATION: 95 % | HEART RATE: 71 BPM | SYSTOLIC BLOOD PRESSURE: 112 MMHG

## 2022-01-01 VITALS
SYSTOLIC BLOOD PRESSURE: 89 MMHG | HEIGHT: 71 IN | WEIGHT: 219.3 LBS | BODY MASS INDEX: 30.7 KG/M2 | OXYGEN SATURATION: 98 % | TEMPERATURE: 99 F | DIASTOLIC BLOOD PRESSURE: 57 MMHG | HEART RATE: 112 BPM | RESPIRATION RATE: 20 BRPM

## 2022-01-01 VITALS
SYSTOLIC BLOOD PRESSURE: 124 MMHG | TEMPERATURE: 98.5 F | OXYGEN SATURATION: 97 % | TEMPERATURE: 97.3 F | HEART RATE: 80 BPM | WEIGHT: 215.1 LBS | RESPIRATION RATE: 16 BRPM | OXYGEN SATURATION: 99 % | DIASTOLIC BLOOD PRESSURE: 68 MMHG | DIASTOLIC BLOOD PRESSURE: 68 MMHG | HEART RATE: 110 BPM | HEIGHT: 71 IN | SYSTOLIC BLOOD PRESSURE: 101 MMHG | RESPIRATION RATE: 18 BRPM | BODY MASS INDEX: 30.11 KG/M2

## 2022-01-01 VITALS
OXYGEN SATURATION: 99 % | RESPIRATION RATE: 18 BRPM | HEART RATE: 87 BPM | DIASTOLIC BLOOD PRESSURE: 75 MMHG | SYSTOLIC BLOOD PRESSURE: 128 MMHG | TEMPERATURE: 97.9 F

## 2022-01-01 VITALS
TEMPERATURE: 97.6 F | SYSTOLIC BLOOD PRESSURE: 118 MMHG | OXYGEN SATURATION: 100 % | DIASTOLIC BLOOD PRESSURE: 64 MMHG | RESPIRATION RATE: 16 BRPM | HEART RATE: 63 BPM

## 2022-01-01 VITALS
HEART RATE: 105 BPM | BODY MASS INDEX: 30.68 KG/M2 | OXYGEN SATURATION: 98 % | TEMPERATURE: 97.6 F | SYSTOLIC BLOOD PRESSURE: 125 MMHG | RESPIRATION RATE: 18 BRPM | WEIGHT: 220 LBS | DIASTOLIC BLOOD PRESSURE: 67 MMHG

## 2022-01-01 VITALS
DIASTOLIC BLOOD PRESSURE: 94 MMHG | OXYGEN SATURATION: 96 % | TEMPERATURE: 97.9 F | RESPIRATION RATE: 18 BRPM | SYSTOLIC BLOOD PRESSURE: 143 MMHG | HEART RATE: 91 BPM

## 2022-01-01 VITALS
HEIGHT: 71 IN | TEMPERATURE: 97.5 F | WEIGHT: 218.8 LBS | OXYGEN SATURATION: 100 % | DIASTOLIC BLOOD PRESSURE: 82 MMHG | SYSTOLIC BLOOD PRESSURE: 127 MMHG | RESPIRATION RATE: 17 BRPM | BODY MASS INDEX: 30.63 KG/M2 | HEART RATE: 79 BPM

## 2022-01-01 VITALS
TEMPERATURE: 98 F | RESPIRATION RATE: 18 BRPM | SYSTOLIC BLOOD PRESSURE: 127 MMHG | DIASTOLIC BLOOD PRESSURE: 77 MMHG | HEART RATE: 81 BPM | OXYGEN SATURATION: 98 %

## 2022-01-01 VITALS
HEART RATE: 72 BPM | OXYGEN SATURATION: 100 % | TEMPERATURE: 97.7 F | DIASTOLIC BLOOD PRESSURE: 72 MMHG | SYSTOLIC BLOOD PRESSURE: 114 MMHG | RESPIRATION RATE: 18 BRPM

## 2022-01-01 VITALS
OXYGEN SATURATION: 96 % | RESPIRATION RATE: 18 BRPM | SYSTOLIC BLOOD PRESSURE: 118 MMHG | HEART RATE: 101 BPM | DIASTOLIC BLOOD PRESSURE: 54 MMHG | WEIGHT: 215.2 LBS | BODY MASS INDEX: 30.01 KG/M2 | TEMPERATURE: 102.2 F

## 2022-01-01 VITALS
HEART RATE: 69 BPM | WEIGHT: 236.1 LBS | RESPIRATION RATE: 18 BRPM | TEMPERATURE: 97.9 F | BODY MASS INDEX: 32.93 KG/M2 | OXYGEN SATURATION: 100 % | DIASTOLIC BLOOD PRESSURE: 81 MMHG | SYSTOLIC BLOOD PRESSURE: 129 MMHG

## 2022-01-01 VITALS
OXYGEN SATURATION: 100 % | RESPIRATION RATE: 16 BRPM | SYSTOLIC BLOOD PRESSURE: 148 MMHG | TEMPERATURE: 97.8 F | DIASTOLIC BLOOD PRESSURE: 79 MMHG | HEART RATE: 74 BPM

## 2022-01-01 VITALS
OXYGEN SATURATION: 99 % | SYSTOLIC BLOOD PRESSURE: 129 MMHG | HEART RATE: 70 BPM | RESPIRATION RATE: 16 BRPM | DIASTOLIC BLOOD PRESSURE: 69 MMHG | TEMPERATURE: 97.9 F

## 2022-01-01 VITALS
OXYGEN SATURATION: 100 % | HEART RATE: 93 BPM | WEIGHT: 215 LBS | RESPIRATION RATE: 18 BRPM | TEMPERATURE: 98 F | SYSTOLIC BLOOD PRESSURE: 103 MMHG | BODY MASS INDEX: 29.99 KG/M2 | DIASTOLIC BLOOD PRESSURE: 41 MMHG

## 2022-01-01 VITALS
SYSTOLIC BLOOD PRESSURE: 131 MMHG | TEMPERATURE: 97.8 F | RESPIRATION RATE: 16 BRPM | HEART RATE: 83 BPM | OXYGEN SATURATION: 100 % | DIASTOLIC BLOOD PRESSURE: 78 MMHG

## 2022-01-01 VITALS
RESPIRATION RATE: 18 BRPM | DIASTOLIC BLOOD PRESSURE: 63 MMHG | SYSTOLIC BLOOD PRESSURE: 113 MMHG | TEMPERATURE: 97.8 F | OXYGEN SATURATION: 99 % | HEART RATE: 78 BPM

## 2022-01-01 VITALS
DIASTOLIC BLOOD PRESSURE: 71 MMHG | SYSTOLIC BLOOD PRESSURE: 118 MMHG | WEIGHT: 220.3 LBS | TEMPERATURE: 97.4 F | RESPIRATION RATE: 18 BRPM | OXYGEN SATURATION: 100 % | BODY MASS INDEX: 30.73 KG/M2 | HEART RATE: 69 BPM

## 2022-01-01 VITALS
HEART RATE: 74 BPM | DIASTOLIC BLOOD PRESSURE: 60 MMHG | SYSTOLIC BLOOD PRESSURE: 109 MMHG | OXYGEN SATURATION: 98 % | RESPIRATION RATE: 18 BRPM | TEMPERATURE: 97.9 F

## 2022-01-01 VITALS
TEMPERATURE: 97.6 F | OXYGEN SATURATION: 100 % | RESPIRATION RATE: 18 BRPM | HEART RATE: 66 BPM | DIASTOLIC BLOOD PRESSURE: 69 MMHG | SYSTOLIC BLOOD PRESSURE: 107 MMHG

## 2022-01-01 VITALS
RESPIRATION RATE: 22 BRPM | TEMPERATURE: 98.4 F | WEIGHT: 226.19 LBS | BODY MASS INDEX: 31.67 KG/M2 | HEIGHT: 71 IN | SYSTOLIC BLOOD PRESSURE: 95 MMHG | OXYGEN SATURATION: 94 % | HEART RATE: 112 BPM | DIASTOLIC BLOOD PRESSURE: 65 MMHG

## 2022-01-01 VITALS
DIASTOLIC BLOOD PRESSURE: 53 MMHG | RESPIRATION RATE: 18 BRPM | TEMPERATURE: 97.5 F | OXYGEN SATURATION: 96 % | HEART RATE: 89 BPM | SYSTOLIC BLOOD PRESSURE: 88 MMHG

## 2022-01-01 VITALS
HEART RATE: 72 BPM | OXYGEN SATURATION: 100 % | RESPIRATION RATE: 16 BRPM | DIASTOLIC BLOOD PRESSURE: 75 MMHG | TEMPERATURE: 97.9 F | SYSTOLIC BLOOD PRESSURE: 120 MMHG

## 2022-01-01 VITALS
RESPIRATION RATE: 18 BRPM | SYSTOLIC BLOOD PRESSURE: 120 MMHG | TEMPERATURE: 97.4 F | HEART RATE: 66 BPM | OXYGEN SATURATION: 100 % | DIASTOLIC BLOOD PRESSURE: 86 MMHG

## 2022-01-01 VITALS
OXYGEN SATURATION: 100 % | RESPIRATION RATE: 16 BRPM | DIASTOLIC BLOOD PRESSURE: 78 MMHG | SYSTOLIC BLOOD PRESSURE: 134 MMHG | HEART RATE: 66 BPM | TEMPERATURE: 97.8 F

## 2022-01-01 VITALS — BODY MASS INDEX: 32.61 KG/M2 | WEIGHT: 233.8 LBS

## 2022-01-01 VITALS
RESPIRATION RATE: 18 BRPM | DIASTOLIC BLOOD PRESSURE: 74 MMHG | HEART RATE: 75 BPM | SYSTOLIC BLOOD PRESSURE: 103 MMHG | OXYGEN SATURATION: 100 % | TEMPERATURE: 97.4 F

## 2022-01-01 VITALS
WEIGHT: 238.4 LBS | TEMPERATURE: 98.2 F | SYSTOLIC BLOOD PRESSURE: 102 MMHG | OXYGEN SATURATION: 95 % | DIASTOLIC BLOOD PRESSURE: 58 MMHG | BODY MASS INDEX: 33.25 KG/M2 | HEART RATE: 84 BPM | RESPIRATION RATE: 16 BRPM

## 2022-01-01 VITALS
SYSTOLIC BLOOD PRESSURE: 117 MMHG | OXYGEN SATURATION: 100 % | RESPIRATION RATE: 18 BRPM | TEMPERATURE: 97.5 F | DIASTOLIC BLOOD PRESSURE: 78 MMHG | HEART RATE: 69 BPM

## 2022-01-01 VITALS
SYSTOLIC BLOOD PRESSURE: 114 MMHG | RESPIRATION RATE: 18 BRPM | OXYGEN SATURATION: 99 % | TEMPERATURE: 98.2 F | WEIGHT: 232 LBS | HEART RATE: 81 BPM | DIASTOLIC BLOOD PRESSURE: 62 MMHG | BODY MASS INDEX: 32.36 KG/M2

## 2022-01-01 DIAGNOSIS — C91.60 PROLYMPHOCYTIC LEUKEMIA OF T-CELL TYPE NOT HAVING ACHIEVED REMISSION (HCC): ICD-10-CM

## 2022-01-01 DIAGNOSIS — C91.60 PROLYMPHOCYTIC LEUKEMIA OF T-CELL (HCC): Primary | ICD-10-CM

## 2022-01-01 DIAGNOSIS — I82.401 ACUTE DEEP VEIN THROMBOSIS (DVT) OF RIGHT LOWER EXTREMITY, UNSPECIFIED VEIN (HCC): ICD-10-CM

## 2022-01-01 DIAGNOSIS — D69.6 THROMBOCYTOPENIA (HCC): ICD-10-CM

## 2022-01-01 DIAGNOSIS — E87.6 HYPOKALEMIA: ICD-10-CM

## 2022-01-01 DIAGNOSIS — C91.60 PROLYMPHOCYTIC LEUKEMIA OF T-CELL (HCC): ICD-10-CM

## 2022-01-01 DIAGNOSIS — Z51.81 ENCOUNTER FOR THERAPEUTIC DRUG MONITORING: ICD-10-CM

## 2022-01-01 DIAGNOSIS — B37.0 ORAL THRUSH: Primary | ICD-10-CM

## 2022-01-01 DIAGNOSIS — Z51.81 ENCOUNTER FOR MONITORING CARDIOTOXIC DRUG THERAPY: ICD-10-CM

## 2022-01-01 DIAGNOSIS — E83.42 HYPOMAGNESEMIA: ICD-10-CM

## 2022-01-01 DIAGNOSIS — R53.0 NEOPLASTIC MALIGNANT RELATED FATIGUE: ICD-10-CM

## 2022-01-01 DIAGNOSIS — R53.0 NEOPLASTIC MALIGNANT RELATED FATIGUE: Primary | ICD-10-CM

## 2022-01-01 DIAGNOSIS — Z71.89 ADVANCE CARE PLANNING: ICD-10-CM

## 2022-01-01 DIAGNOSIS — Z51.5 ENCOUNTER FOR PALLIATIVE CARE: ICD-10-CM

## 2022-01-01 DIAGNOSIS — I82.401 ACUTE DEEP VEIN THROMBOSIS (DVT) OF RIGHT LOWER EXTREMITY, UNSPECIFIED VEIN (HCC): Primary | ICD-10-CM

## 2022-01-01 DIAGNOSIS — N17.9 ACUTE KIDNEY INJURY (HCC): ICD-10-CM

## 2022-01-01 DIAGNOSIS — M79.661 PAIN IN RIGHT LOWER LEG: ICD-10-CM

## 2022-01-01 DIAGNOSIS — A41.9 SEPTICEMIA (HCC): Primary | ICD-10-CM

## 2022-01-01 DIAGNOSIS — Z79.899 ENCOUNTER FOR MONITORING CARDIOTOXIC DRUG THERAPY: ICD-10-CM

## 2022-01-01 DIAGNOSIS — Z51.81 ENCOUNTER FOR THERAPEUTIC DRUG MONITORING: Primary | ICD-10-CM

## 2022-01-01 DIAGNOSIS — M79.661 PAIN IN RIGHT LOWER LEG: Primary | ICD-10-CM

## 2022-01-01 DIAGNOSIS — D64.9 ANEMIA, UNSPECIFIED TYPE: ICD-10-CM

## 2022-01-01 DIAGNOSIS — Z66 DO NOT RESUSCITATE: ICD-10-CM

## 2022-01-01 LAB
ABO + RH BLD: NORMAL
ALBUMIN SERPL-MCNC: 2.5 G/DL (ref 3.2–4.6)
ALBUMIN SERPL-MCNC: 2.7 G/DL (ref 3.2–4.6)
ALBUMIN SERPL-MCNC: 2.7 G/DL (ref 3.2–4.6)
ALBUMIN SERPL-MCNC: 3.1 G/DL (ref 3.2–4.6)
ALBUMIN SERPL-MCNC: 3.2 G/DL (ref 3.2–4.6)
ALBUMIN SERPL-MCNC: 3.2 G/DL (ref 3.2–4.6)
ALBUMIN SERPL-MCNC: 3.5 G/DL (ref 3.2–4.6)
ALBUMIN SERPL-MCNC: 3.5 G/DL (ref 3.2–4.6)
ALBUMIN SERPL-MCNC: 3.6 G/DL (ref 3.2–4.6)
ALBUMIN SERPL-MCNC: 3.7 G/DL (ref 3.2–4.6)
ALBUMIN SERPL-MCNC: 3.8 G/DL (ref 3.2–4.6)
ALBUMIN SERPL-MCNC: 3.9 G/DL (ref 3.2–4.6)
ALBUMIN SERPL-MCNC: 4 G/DL (ref 3.2–4.6)
ALBUMIN SERPL-MCNC: 4.1 G/DL (ref 3.2–4.6)
ALBUMIN SERPL-MCNC: 4.2 G/DL (ref 3.2–4.6)
ALBUMIN SERPL-MCNC: 4.2 G/DL (ref 3.2–4.6)
ALBUMIN SERPL-MCNC: 4.5 G/DL (ref 3.2–4.6)
ALBUMIN/GLOB SERPL: 0.9 {RATIO} (ref 1.2–3.5)
ALBUMIN/GLOB SERPL: 1 {RATIO} (ref 1.2–3.5)
ALBUMIN/GLOB SERPL: 1 {RATIO} (ref 1.2–3.5)
ALBUMIN/GLOB SERPL: 1.1 {RATIO} (ref 1.2–3.5)
ALBUMIN/GLOB SERPL: 1.2 {RATIO} (ref 1.2–3.5)
ALBUMIN/GLOB SERPL: 1.2 {RATIO} (ref 1.2–3.5)
ALBUMIN/GLOB SERPL: 1.3 {RATIO} (ref 1.2–3.5)
ALBUMIN/GLOB SERPL: 1.4 {RATIO} (ref 1.2–3.5)
ALBUMIN/GLOB SERPL: 1.5 {RATIO} (ref 1.2–3.5)
ALBUMIN/GLOB SERPL: 1.6 {RATIO} (ref 1.2–3.5)
ALBUMIN/GLOB SERPL: 1.7 {RATIO} (ref 1.2–3.5)
ALBUMIN/GLOB SERPL: 1.8 {RATIO} (ref 1.2–3.5)
ALBUMIN/GLOB SERPL: 1.8 {RATIO} (ref 1.2–3.5)
ALBUMIN/GLOB SERPL: 1.9 {RATIO} (ref 1.2–3.5)
ALP SERPL-CCNC: 104 U/L (ref 50–136)
ALP SERPL-CCNC: 106 U/L (ref 50–136)
ALP SERPL-CCNC: 112 U/L (ref 50–136)
ALP SERPL-CCNC: 113 U/L (ref 50–136)
ALP SERPL-CCNC: 115 U/L (ref 50–136)
ALP SERPL-CCNC: 120 U/L (ref 50–136)
ALP SERPL-CCNC: 125 U/L (ref 50–136)
ALP SERPL-CCNC: 133 U/L (ref 50–136)
ALP SERPL-CCNC: 133 U/L (ref 50–136)
ALP SERPL-CCNC: 43 U/L (ref 50–136)
ALP SERPL-CCNC: 46 U/L (ref 50–136)
ALP SERPL-CCNC: 47 U/L (ref 50–136)
ALP SERPL-CCNC: 48 U/L (ref 50–136)
ALP SERPL-CCNC: 49 U/L (ref 50–136)
ALP SERPL-CCNC: 49 U/L (ref 50–136)
ALP SERPL-CCNC: 50 U/L (ref 50–136)
ALP SERPL-CCNC: 54 U/L (ref 50–136)
ALP SERPL-CCNC: 56 U/L (ref 50–136)
ALP SERPL-CCNC: 59 U/L (ref 50–136)
ALP SERPL-CCNC: 60 U/L (ref 50–136)
ALP SERPL-CCNC: 60 U/L (ref 50–136)
ALP SERPL-CCNC: 63 U/L (ref 50–136)
ALP SERPL-CCNC: 63 U/L (ref 50–136)
ALP SERPL-CCNC: 71 U/L (ref 50–136)
ALP SERPL-CCNC: 73 U/L (ref 50–136)
ALP SERPL-CCNC: 76 U/L (ref 50–136)
ALP SERPL-CCNC: 84 U/L (ref 50–136)
ALP SERPL-CCNC: 86 U/L (ref 50–136)
ALP SERPL-CCNC: 87 U/L (ref 50–136)
ALP SERPL-CCNC: 89 U/L (ref 50–136)
ALP SERPL-CCNC: 90 U/L (ref 50–136)
ALP SERPL-CCNC: 91 U/L (ref 50–136)
ALP SERPL-CCNC: 94 U/L (ref 50–136)
ALP SERPL-CCNC: 95 U/L (ref 50–136)
ALP SERPL-CCNC: 97 U/L (ref 50–136)
ALP SERPL-CCNC: 99 U/L (ref 50–136)
ALT SERPL-CCNC: 16 U/L (ref 12–65)
ALT SERPL-CCNC: 16 U/L (ref 12–65)
ALT SERPL-CCNC: 17 U/L (ref 12–65)
ALT SERPL-CCNC: 17 U/L (ref 12–65)
ALT SERPL-CCNC: 18 U/L (ref 12–65)
ALT SERPL-CCNC: 20 U/L (ref 12–65)
ALT SERPL-CCNC: 21 U/L (ref 12–65)
ALT SERPL-CCNC: 22 U/L (ref 12–65)
ALT SERPL-CCNC: 23 U/L (ref 12–65)
ALT SERPL-CCNC: 23 U/L (ref 12–65)
ALT SERPL-CCNC: 24 U/L (ref 12–65)
ALT SERPL-CCNC: 24 U/L (ref 12–65)
ALT SERPL-CCNC: 25 U/L (ref 12–65)
ALT SERPL-CCNC: 26 U/L (ref 12–65)
ALT SERPL-CCNC: 27 U/L (ref 12–65)
ALT SERPL-CCNC: 29 U/L (ref 12–65)
ALT SERPL-CCNC: 30 U/L (ref 12–65)
ALT SERPL-CCNC: 30 U/L (ref 12–65)
ALT SERPL-CCNC: 31 U/L (ref 12–65)
ALT SERPL-CCNC: 32 U/L (ref 12–65)
ALT SERPL-CCNC: 33 U/L (ref 12–65)
ALT SERPL-CCNC: 35 U/L (ref 12–65)
ALT SERPL-CCNC: 35 U/L (ref 12–65)
ALT SERPL-CCNC: 36 U/L (ref 12–65)
ALT SERPL-CCNC: 36 U/L (ref 12–65)
ALT SERPL-CCNC: 37 U/L (ref 12–65)
ALT SERPL-CCNC: 39 U/L (ref 12–65)
ALT SERPL-CCNC: 40 U/L (ref 12–65)
ALT SERPL-CCNC: 42 U/L (ref 12–65)
ALT SERPL-CCNC: 45 U/L (ref 12–65)
ALT SERPL-CCNC: 47 U/L (ref 12–65)
ALT SERPL-CCNC: 47 U/L (ref 12–65)
ALT SERPL-CCNC: 49 U/L (ref 12–65)
ALT SERPL-CCNC: 50 U/L (ref 12–65)
ALT SERPL-CCNC: 52 U/L (ref 12–65)
ALT SERPL-CCNC: 57 U/L (ref 12–65)
ALT SERPL-CCNC: 58 U/L (ref 12–65)
ALT SERPL-CCNC: 60 U/L (ref 12–65)
ANION GAP SERPL CALC-SCNC: 10 MMOL/L (ref 7–16)
ANION GAP SERPL CALC-SCNC: 10 MMOL/L (ref 7–16)
ANION GAP SERPL CALC-SCNC: 11 MMOL/L (ref 7–16)
ANION GAP SERPL CALC-SCNC: 14 MMOL/L (ref 7–16)
ANION GAP SERPL CALC-SCNC: 4 MMOL/L (ref 7–16)
ANION GAP SERPL CALC-SCNC: 5 MMOL/L (ref 7–16)
ANION GAP SERPL CALC-SCNC: 6 MMOL/L (ref 7–16)
ANION GAP SERPL CALC-SCNC: 7 MMOL/L (ref 7–16)
ANION GAP SERPL CALC-SCNC: 8 MMOL/L (ref 7–16)
ANION GAP SERPL CALC-SCNC: 9 MMOL/L (ref 7–16)
ANION GAP SERPL CALC-SCNC: ABNORMAL MMOL/L (ref 7–16)
APPEARANCE UR: ABNORMAL
AST SERPL-CCNC: 11 U/L (ref 15–37)
AST SERPL-CCNC: 11 U/L (ref 15–37)
AST SERPL-CCNC: 12 U/L (ref 15–37)
AST SERPL-CCNC: 13 U/L (ref 15–37)
AST SERPL-CCNC: 14 U/L (ref 15–37)
AST SERPL-CCNC: 14 U/L (ref 15–37)
AST SERPL-CCNC: 15 U/L (ref 15–37)
AST SERPL-CCNC: 16 U/L (ref 15–37)
AST SERPL-CCNC: 16 U/L (ref 15–37)
AST SERPL-CCNC: 17 U/L (ref 15–37)
AST SERPL-CCNC: 18 U/L (ref 15–37)
AST SERPL-CCNC: 18 U/L (ref 15–37)
AST SERPL-CCNC: 19 U/L (ref 15–37)
AST SERPL-CCNC: 19 U/L (ref 15–37)
AST SERPL-CCNC: 20 U/L (ref 15–37)
AST SERPL-CCNC: 21 U/L (ref 15–37)
AST SERPL-CCNC: 21 U/L (ref 15–37)
AST SERPL-CCNC: 23 U/L (ref 15–37)
AST SERPL-CCNC: 23 U/L (ref 15–37)
AST SERPL-CCNC: 24 U/L (ref 15–37)
AST SERPL-CCNC: 25 U/L (ref 15–37)
AST SERPL-CCNC: 26 U/L (ref 15–37)
AST SERPL-CCNC: 27 U/L (ref 15–37)
AST SERPL-CCNC: 27 U/L (ref 15–37)
AST SERPL-CCNC: 28 U/L (ref 15–37)
AST SERPL-CCNC: 29 U/L (ref 15–37)
AST SERPL-CCNC: 31 U/L (ref 15–37)
AST SERPL-CCNC: 31 U/L (ref 15–37)
AST SERPL-CCNC: 39 U/L (ref 15–37)
AST SERPL-CCNC: 42 U/L (ref 15–37)
AST SERPL-CCNC: 47 U/L (ref 15–37)
AST SERPL-CCNC: 8 U/L (ref 15–37)
AST SERPL-CCNC: 9 U/L (ref 15–37)
AST SERPL-CCNC: 92 U/L (ref 15–37)
ATRIAL RATE: 91 BPM
B PERT DNA SPEC QL NAA+PROBE: NOT DETECTED
BACTERIA SPEC CULT: NORMAL
BACTERIA URNS QL MICRO: 0 /HPF
BASOPHILS # BLD: 0 K/UL (ref 0–0.2)
BASOPHILS # BLD: 0.1 K/UL (ref 0–0.2)
BASOPHILS # BLD: 0.1 K/UL (ref 0–0.2)
BASOPHILS # BLD: 0.2 K/UL (ref 0–0.2)
BASOPHILS # BLD: 0.3 K/UL (ref 0–0.2)
BASOPHILS # BLD: 0.3 K/UL (ref 0–0.2)
BASOPHILS NFR BLD: 0 % (ref 0–2)
BASOPHILS NFR BLD: 1 % (ref 0–2)
BILIRUB DIRECT SERPL-MCNC: 0.2 MG/DL
BILIRUB SERPL-MCNC: 0.5 MG/DL (ref 0.2–1.1)
BILIRUB SERPL-MCNC: 0.6 MG/DL (ref 0.2–1.1)
BILIRUB SERPL-MCNC: 0.7 MG/DL (ref 0.2–1.1)
BILIRUB SERPL-MCNC: 0.8 MG/DL (ref 0.2–1.1)
BILIRUB SERPL-MCNC: 0.9 MG/DL (ref 0.2–1.1)
BILIRUB SERPL-MCNC: 1 MG/DL (ref 0.2–1.1)
BILIRUB SERPL-MCNC: 1.1 MG/DL (ref 0.2–1.1)
BILIRUB SERPL-MCNC: 1.3 MG/DL (ref 0.2–1.1)
BILIRUB SERPL-MCNC: 1.4 MG/DL (ref 0.2–1.1)
BILIRUB SERPL-MCNC: 1.5 MG/DL (ref 0.2–1.1)
BILIRUB SERPL-MCNC: 1.5 MG/DL (ref 0.2–1.1)
BILIRUB UR QL: NEGATIVE
BLASTS NFR BLD MANUAL: 16 %
BLASTS NFR BLD MANUAL: 4 %
BLASTS NFR BLD MANUAL: 4 %
BLASTS NFR BLD MANUAL: 6 %
BLASTS NFR BLD MANUAL: 6 %
BLASTS NFR BLD MANUAL: 8 %
BLD PROD TYP BPU: NORMAL
BLOOD BANK CMNT PATIENT-IMP: NORMAL
BLOOD BANK DISPENSE STATUS: NORMAL
BLOOD GROUP ANTIBODIES SERPL: NORMAL
BONE MARROW PREP & W,BMA: NORMAL
BORDETELLA PARAPERTUSSIS BY PCR: NOT DETECTED
BPU ID: NORMAL
BUN SERPL-MCNC: 11 MG/DL (ref 8–23)
BUN SERPL-MCNC: 11 MG/DL (ref 8–23)
BUN SERPL-MCNC: 12 MG/DL (ref 8–23)
BUN SERPL-MCNC: 13 MG/DL (ref 8–23)
BUN SERPL-MCNC: 13 MG/DL (ref 8–23)
BUN SERPL-MCNC: 14 MG/DL (ref 8–23)
BUN SERPL-MCNC: 15 MG/DL (ref 8–23)
BUN SERPL-MCNC: 16 MG/DL (ref 8–23)
BUN SERPL-MCNC: 17 MG/DL (ref 8–23)
BUN SERPL-MCNC: 18 MG/DL (ref 8–23)
BUN SERPL-MCNC: 19 MG/DL (ref 8–23)
BUN SERPL-MCNC: 20 MG/DL (ref 8–23)
BUN SERPL-MCNC: 21 MG/DL (ref 8–23)
BUN SERPL-MCNC: 21 MG/DL (ref 8–23)
BUN SERPL-MCNC: 22 MG/DL (ref 8–23)
BUN SERPL-MCNC: 22 MG/DL (ref 8–23)
BUN SERPL-MCNC: 23 MG/DL (ref 8–23)
BUN SERPL-MCNC: 24 MG/DL (ref 8–23)
BUN SERPL-MCNC: 31 MG/DL (ref 8–23)
BUN SERPL-MCNC: 31 MG/DL (ref 8–23)
BUN SERPL-MCNC: 37 MG/DL (ref 8–23)
BUN SERPL-MCNC: 42 MG/DL (ref 8–23)
BUN SERPL-MCNC: 44 MG/DL (ref 8–23)
BUN SERPL-MCNC: 52 MG/DL (ref 8–23)
BUN SERPL-MCNC: 56 MG/DL (ref 8–23)
C PNEUM DNA SPEC QL NAA+PROBE: NOT DETECTED
CALCIUM SERPL-MCNC: 8 MG/DL (ref 8.3–10.4)
CALCIUM SERPL-MCNC: 8.4 MG/DL (ref 8.3–10.4)
CALCIUM SERPL-MCNC: 8.5 MG/DL (ref 8.3–10.4)
CALCIUM SERPL-MCNC: 8.6 MG/DL (ref 8.3–10.4)
CALCIUM SERPL-MCNC: 8.7 MG/DL (ref 8.3–10.4)
CALCIUM SERPL-MCNC: 8.8 MG/DL (ref 8.3–10.4)
CALCIUM SERPL-MCNC: 8.9 MG/DL (ref 8.3–10.4)
CALCIUM SERPL-MCNC: 9 MG/DL (ref 8.3–10.4)
CALCIUM SERPL-MCNC: 9.1 MG/DL (ref 8.3–10.4)
CALCIUM SERPL-MCNC: 9.2 MG/DL (ref 8.3–10.4)
CALCIUM SERPL-MCNC: 9.3 MG/DL (ref 8.3–10.4)
CALCIUM SERPL-MCNC: 9.4 MG/DL (ref 8.3–10.4)
CALCIUM SERPL-MCNC: 9.5 MG/DL (ref 8.3–10.4)
CALCIUM SERPL-MCNC: 9.7 MG/DL (ref 8.3–10.4)
CALCIUM SERPL-MCNC: 9.7 MG/DL (ref 8.3–10.4)
CALCIUM SERPL-MCNC: 9.8 MG/DL (ref 8.3–10.4)
CALCULATED P AXIS, ECG09: 59 DEGREES
CALCULATED R AXIS, ECG10: 4 DEGREES
CALCULATED T AXIS, ECG11: 24 DEGREES
CASTS URNS QL MICRO: ABNORMAL /LPF
CHLORIDE SERPL-SCNC: 100 MMOL/L (ref 98–107)
CHLORIDE SERPL-SCNC: 100 MMOL/L (ref 98–107)
CHLORIDE SERPL-SCNC: 101 MMOL/L (ref 98–107)
CHLORIDE SERPL-SCNC: 101 MMOL/L (ref 98–107)
CHLORIDE SERPL-SCNC: 102 MMOL/L (ref 98–107)
CHLORIDE SERPL-SCNC: 103 MMOL/L (ref 98–107)
CHLORIDE SERPL-SCNC: 103 MMOL/L (ref 98–107)
CHLORIDE SERPL-SCNC: 104 MMOL/L (ref 98–107)
CHLORIDE SERPL-SCNC: 105 MMOL/L (ref 98–107)
CHLORIDE SERPL-SCNC: 106 MMOL/L (ref 98–107)
CHLORIDE SERPL-SCNC: 107 MMOL/L (ref 98–107)
CHLORIDE SERPL-SCNC: 108 MMOL/L (ref 98–107)
CHLORIDE SERPL-SCNC: 109 MMOL/L (ref 98–107)
CHLORIDE SERPL-SCNC: 110 MMOL/L (ref 98–107)
CHLORIDE SERPL-SCNC: 111 MMOL/L (ref 98–107)
CHLORIDE SERPL-SCNC: 113 MMOL/L (ref 98–107)
CHLORIDE SERPL-SCNC: 114 MMOL/L (ref 98–107)
CHLORIDE SERPL-SCNC: 99 MMOL/L (ref 98–107)
CO2 SERPL-SCNC: 18 MMOL/L (ref 21–32)
CO2 SERPL-SCNC: 19 MMOL/L (ref 21–32)
CO2 SERPL-SCNC: 21 MMOL/L (ref 21–32)
CO2 SERPL-SCNC: 22 MMOL/L (ref 21–32)
CO2 SERPL-SCNC: 23 MMOL/L (ref 21–32)
CO2 SERPL-SCNC: 24 MMOL/L (ref 21–32)
CO2 SERPL-SCNC: 25 MMOL/L (ref 21–32)
CO2 SERPL-SCNC: 26 MMOL/L (ref 21–32)
CO2 SERPL-SCNC: 27 MMOL/L (ref 21–32)
CO2 SERPL-SCNC: 28 MMOL/L (ref 21–32)
CO2 SERPL-SCNC: 29 MMOL/L (ref 21–32)
CO2 SERPL-SCNC: 30 MMOL/L (ref 21–32)
COLOR UR: YELLOW
CREAT SERPL-MCNC: 0.8 MG/DL (ref 0.8–1.5)
CREAT SERPL-MCNC: 0.9 MG/DL (ref 0.8–1.5)
CREAT SERPL-MCNC: 1 MG/DL (ref 0.8–1.5)
CREAT SERPL-MCNC: 1.1 MG/DL (ref 0.8–1.5)
CREAT SERPL-MCNC: 1.2 MG/DL (ref 0.8–1.5)
CREAT SERPL-MCNC: 1.3 MG/DL (ref 0.8–1.5)
CREAT SERPL-MCNC: 1.36 MG/DL (ref 0.8–1.5)
CREAT SERPL-MCNC: 1.4 MG/DL (ref 0.8–1.5)
CREAT SERPL-MCNC: 1.5 MG/DL (ref 0.8–1.5)
CREAT SERPL-MCNC: 1.69 MG/DL (ref 0.8–1.5)
CREAT SERPL-MCNC: 1.96 MG/DL (ref 0.8–1.5)
CREAT SERPL-MCNC: 2.03 MG/DL (ref 0.8–1.5)
CREAT SERPL-MCNC: 2.08 MG/DL (ref 0.8–1.5)
CREAT SERPL-MCNC: 2.09 MG/DL (ref 0.8–1.5)
CROSSMATCH RESULT,%XM: NORMAL
CROSSMATCH RESULT: NORMAL
CRYSTALS URNS QL MICRO: 0 /LPF
DIAGNOSIS, 93000: NORMAL
DIFFERENTIAL METHOD BLD: ABNORMAL
EKG ATRIAL RATE: 99 BPM
EKG DIAGNOSIS: NORMAL
EKG P AXIS: 63 DEGREES
EKG P-R INTERVAL: 168 MS
EKG Q-T INTERVAL: 330 MS
EKG QRS DURATION: 89 MS
EKG QTC CALCULATION (BAZETT): 424 MS
EKG R AXIS: -13 DEGREES
EKG T AXIS: 157 DEGREES
EKG VENTRICULAR RATE: 99 BPM
EOSINOPHIL # BLD: 0 K/UL (ref 0–0.8)
EOSINOPHIL # BLD: 0.1 K/UL (ref 0–0.8)
EOSINOPHIL NFR BLD: 0 % (ref 0.5–7.8)
EOSINOPHIL NFR BLD: 1 % (ref 0.5–7.8)
EOSINOPHIL NFR BLD: 2 % (ref 0.5–7.8)
EPI CELLS #/AREA URNS HPF: ABNORMAL /HPF
ERYTHROCYTE [DISTWIDTH] IN BLOOD BY AUTOMATED COUNT: 12.9 % (ref 11.9–14.6)
ERYTHROCYTE [DISTWIDTH] IN BLOOD BY AUTOMATED COUNT: 12.9 % (ref 11.9–14.6)
ERYTHROCYTE [DISTWIDTH] IN BLOOD BY AUTOMATED COUNT: 14 % (ref 11.9–14.6)
ERYTHROCYTE [DISTWIDTH] IN BLOOD BY AUTOMATED COUNT: 14.7 % (ref 11.9–14.6)
ERYTHROCYTE [DISTWIDTH] IN BLOOD BY AUTOMATED COUNT: 15.5 % (ref 11.9–14.6)
ERYTHROCYTE [DISTWIDTH] IN BLOOD BY AUTOMATED COUNT: 16.9 % (ref 11.9–14.6)
ERYTHROCYTE [DISTWIDTH] IN BLOOD BY AUTOMATED COUNT: 16.9 % (ref 11.9–14.6)
ERYTHROCYTE [DISTWIDTH] IN BLOOD BY AUTOMATED COUNT: 17.1 % (ref 11.9–14.6)
ERYTHROCYTE [DISTWIDTH] IN BLOOD BY AUTOMATED COUNT: 17.2 % (ref 11.9–14.6)
ERYTHROCYTE [DISTWIDTH] IN BLOOD BY AUTOMATED COUNT: 17.3 % (ref 11.9–14.6)
ERYTHROCYTE [DISTWIDTH] IN BLOOD BY AUTOMATED COUNT: 17.5 % (ref 11.9–14.6)
ERYTHROCYTE [DISTWIDTH] IN BLOOD BY AUTOMATED COUNT: 17.6 % (ref 11.9–14.6)
ERYTHROCYTE [DISTWIDTH] IN BLOOD BY AUTOMATED COUNT: 17.8 % (ref 11.9–14.6)
ERYTHROCYTE [DISTWIDTH] IN BLOOD BY AUTOMATED COUNT: 17.9 % (ref 11.9–14.6)
ERYTHROCYTE [DISTWIDTH] IN BLOOD BY AUTOMATED COUNT: 18 % (ref 11.9–14.6)
ERYTHROCYTE [DISTWIDTH] IN BLOOD BY AUTOMATED COUNT: 18.2 % (ref 11.9–14.6)
ERYTHROCYTE [DISTWIDTH] IN BLOOD BY AUTOMATED COUNT: 18.2 % (ref 11.9–14.6)
ERYTHROCYTE [DISTWIDTH] IN BLOOD BY AUTOMATED COUNT: 18.3 % (ref 11.9–14.6)
ERYTHROCYTE [DISTWIDTH] IN BLOOD BY AUTOMATED COUNT: 18.4 % (ref 11.9–14.6)
ERYTHROCYTE [DISTWIDTH] IN BLOOD BY AUTOMATED COUNT: 18.5 % (ref 11.9–14.6)
ERYTHROCYTE [DISTWIDTH] IN BLOOD BY AUTOMATED COUNT: 18.5 % (ref 11.9–14.6)
ERYTHROCYTE [DISTWIDTH] IN BLOOD BY AUTOMATED COUNT: 18.6 % (ref 11.9–14.6)
ERYTHROCYTE [DISTWIDTH] IN BLOOD BY AUTOMATED COUNT: 18.6 % (ref 11.9–14.6)
ERYTHROCYTE [DISTWIDTH] IN BLOOD BY AUTOMATED COUNT: 18.9 % (ref 11.9–14.6)
ERYTHROCYTE [DISTWIDTH] IN BLOOD BY AUTOMATED COUNT: 18.9 % (ref 11.9–14.6)
ERYTHROCYTE [DISTWIDTH] IN BLOOD BY AUTOMATED COUNT: 19.1 % (ref 11.9–14.6)
ERYTHROCYTE [DISTWIDTH] IN BLOOD BY AUTOMATED COUNT: 19.3 % (ref 11.9–14.6)
ERYTHROCYTE [DISTWIDTH] IN BLOOD BY AUTOMATED COUNT: 19.5 % (ref 11.9–14.6)
ERYTHROCYTE [DISTWIDTH] IN BLOOD BY AUTOMATED COUNT: 19.6 % (ref 11.9–14.6)
ERYTHROCYTE [DISTWIDTH] IN BLOOD BY AUTOMATED COUNT: 20 % (ref 11.9–14.6)
ERYTHROCYTE [DISTWIDTH] IN BLOOD BY AUTOMATED COUNT: 20.7 % (ref 11.9–14.6)
ERYTHROCYTE [DISTWIDTH] IN BLOOD BY AUTOMATED COUNT: 20.7 % (ref 11.9–14.6)
ERYTHROCYTE [DISTWIDTH] IN BLOOD BY AUTOMATED COUNT: 20.8 % (ref 11.9–14.6)
ERYTHROCYTE [DISTWIDTH] IN BLOOD BY AUTOMATED COUNT: 21.1 % (ref 11.9–14.6)
ERYTHROCYTE [DISTWIDTH] IN BLOOD BY AUTOMATED COUNT: 21.2 % (ref 11.9–14.6)
ERYTHROCYTE [DISTWIDTH] IN BLOOD BY AUTOMATED COUNT: 21.3 % (ref 11.9–14.6)
ERYTHROCYTE [DISTWIDTH] IN BLOOD BY AUTOMATED COUNT: 21.4 % (ref 11.9–14.6)
ERYTHROCYTE [DISTWIDTH] IN BLOOD BY AUTOMATED COUNT: 21.6 % (ref 11.9–14.6)
ERYTHROCYTE [DISTWIDTH] IN BLOOD BY AUTOMATED COUNT: 21.8 % (ref 11.9–14.6)
ERYTHROCYTE [DISTWIDTH] IN BLOOD BY AUTOMATED COUNT: 22.1 % (ref 11.9–14.6)
ERYTHROCYTE [DISTWIDTH] IN BLOOD BY AUTOMATED COUNT: 22.1 % (ref 11.9–14.6)
ERYTHROCYTE [DISTWIDTH] IN BLOOD BY AUTOMATED COUNT: 22.5 % (ref 11.9–14.6)
ERYTHROCYTE [DISTWIDTH] IN BLOOD BY AUTOMATED COUNT: 22.6 % (ref 11.9–14.6)
ERYTHROCYTE [DISTWIDTH] IN BLOOD BY AUTOMATED COUNT: 22.6 % (ref 11.9–14.6)
ERYTHROCYTE [DISTWIDTH] IN BLOOD BY AUTOMATED COUNT: 22.9 % (ref 11.9–14.6)
ERYTHROCYTE [DISTWIDTH] IN BLOOD BY AUTOMATED COUNT: 23 % (ref 11.9–14.6)
ERYTHROCYTE [DISTWIDTH] IN BLOOD BY AUTOMATED COUNT: 23.2 % (ref 11.9–14.6)
EST. AVERAGE GLUCOSE BLD GHB EST-MCNC: 169 MG/DL
EST. AVERAGE GLUCOSE BLD GHB EST-MCNC: 171 MG/DL
FLOW CYTOMETRY, FBTC1: NORMAL
FLUAV AG NPH QL IA: NEGATIVE
FLUAV SUBTYP SPEC NAA+PROBE: NOT DETECTED
FLUBV AG NPH QL IA: NEGATIVE
FLUBV RNA SPEC QL NAA+PROBE: NOT DETECTED
GLOBULIN SER CALC-MCNC: 2.2 G/DL (ref 2.3–3.5)
GLOBULIN SER CALC-MCNC: 2.3 G/DL (ref 2.3–3.5)
GLOBULIN SER CALC-MCNC: 2.3 G/DL (ref 2.3–3.5)
GLOBULIN SER CALC-MCNC: 2.4 G/DL (ref 2.3–3.5)
GLOBULIN SER CALC-MCNC: 2.5 G/DL (ref 2.3–3.5)
GLOBULIN SER CALC-MCNC: 2.6 G/DL (ref 2.3–3.5)
GLOBULIN SER CALC-MCNC: 2.7 G/DL (ref 2.3–3.5)
GLOBULIN SER CALC-MCNC: 2.8 G/DL (ref 2.3–3.5)
GLOBULIN SER CALC-MCNC: 2.9 G/DL (ref 2.3–3.5)
GLOBULIN SER CALC-MCNC: 3.1 G/DL (ref 2.3–3.5)
GLOBULIN SER CALC-MCNC: 3.2 G/DL (ref 2.3–3.5)
GLOBULIN SER CALC-MCNC: 3.2 G/DL (ref 2.3–3.5)
GLOBULIN SER CALC-MCNC: 3.3 G/DL (ref 2.3–3.5)
GLOBULIN SER CALC-MCNC: 3.4 G/DL (ref 2.3–3.5)
GLOBULIN SER CALC-MCNC: 3.5 G/DL (ref 2.3–3.5)
GLOBULIN SER CALC-MCNC: 3.7 G/DL (ref 2.3–3.5)
GLUCOSE BLD STRIP.AUTO-MCNC: 125 MG/DL (ref 65–100)
GLUCOSE BLD STRIP.AUTO-MCNC: 130 MG/DL (ref 65–100)
GLUCOSE BLD STRIP.AUTO-MCNC: 135 MG/DL (ref 65–100)
GLUCOSE BLD STRIP.AUTO-MCNC: 156 MG/DL (ref 65–100)
GLUCOSE BLD STRIP.AUTO-MCNC: 173 MG/DL (ref 65–100)
GLUCOSE BLD STRIP.AUTO-MCNC: 177 MG/DL (ref 65–100)
GLUCOSE BLD STRIP.AUTO-MCNC: 229 MG/DL (ref 65–100)
GLUCOSE BLD STRIP.AUTO-MCNC: 236 MG/DL (ref 65–100)
GLUCOSE BLD STRIP.AUTO-MCNC: 242 MG/DL (ref 65–100)
GLUCOSE SERPL-MCNC: 101 MG/DL (ref 65–100)
GLUCOSE SERPL-MCNC: 101 MG/DL (ref 65–100)
GLUCOSE SERPL-MCNC: 103 MG/DL (ref 65–100)
GLUCOSE SERPL-MCNC: 109 MG/DL (ref 65–100)
GLUCOSE SERPL-MCNC: 111 MG/DL (ref 65–100)
GLUCOSE SERPL-MCNC: 114 MG/DL (ref 65–100)
GLUCOSE SERPL-MCNC: 119 MG/DL (ref 65–100)
GLUCOSE SERPL-MCNC: 119 MG/DL (ref 65–100)
GLUCOSE SERPL-MCNC: 120 MG/DL (ref 65–100)
GLUCOSE SERPL-MCNC: 120 MG/DL (ref 65–100)
GLUCOSE SERPL-MCNC: 121 MG/DL (ref 65–100)
GLUCOSE SERPL-MCNC: 123 MG/DL (ref 65–100)
GLUCOSE SERPL-MCNC: 132 MG/DL (ref 65–100)
GLUCOSE SERPL-MCNC: 135 MG/DL (ref 65–100)
GLUCOSE SERPL-MCNC: 136 MG/DL (ref 65–100)
GLUCOSE SERPL-MCNC: 141 MG/DL (ref 65–100)
GLUCOSE SERPL-MCNC: 142 MG/DL (ref 65–100)
GLUCOSE SERPL-MCNC: 143 MG/DL (ref 65–100)
GLUCOSE SERPL-MCNC: 144 MG/DL (ref 65–100)
GLUCOSE SERPL-MCNC: 145 MG/DL (ref 65–100)
GLUCOSE SERPL-MCNC: 145 MG/DL (ref 65–100)
GLUCOSE SERPL-MCNC: 152 MG/DL (ref 65–100)
GLUCOSE SERPL-MCNC: 153 MG/DL (ref 65–100)
GLUCOSE SERPL-MCNC: 154 MG/DL (ref 65–100)
GLUCOSE SERPL-MCNC: 156 MG/DL (ref 65–100)
GLUCOSE SERPL-MCNC: 160 MG/DL (ref 65–100)
GLUCOSE SERPL-MCNC: 164 MG/DL (ref 65–100)
GLUCOSE SERPL-MCNC: 170 MG/DL (ref 65–100)
GLUCOSE SERPL-MCNC: 172 MG/DL (ref 65–100)
GLUCOSE SERPL-MCNC: 173 MG/DL (ref 65–100)
GLUCOSE SERPL-MCNC: 174 MG/DL (ref 65–100)
GLUCOSE SERPL-MCNC: 180 MG/DL (ref 65–100)
GLUCOSE SERPL-MCNC: 181 MG/DL (ref 65–100)
GLUCOSE SERPL-MCNC: 192 MG/DL (ref 65–100)
GLUCOSE SERPL-MCNC: 213 MG/DL (ref 65–100)
GLUCOSE SERPL-MCNC: 214 MG/DL (ref 65–100)
GLUCOSE SERPL-MCNC: 214 MG/DL (ref 65–100)
GLUCOSE SERPL-MCNC: 223 MG/DL (ref 65–100)
GLUCOSE SERPL-MCNC: 286 MG/DL (ref 65–100)
GLUCOSE SERPL-MCNC: 297 MG/DL (ref 65–100)
GLUCOSE SERPL-MCNC: 311 MG/DL (ref 65–100)
GLUCOSE SERPL-MCNC: 320 MG/DL (ref 65–100)
GLUCOSE SERPL-MCNC: 323 MG/DL (ref 65–100)
GLUCOSE SERPL-MCNC: 336 MG/DL (ref 65–100)
GLUCOSE SERPL-MCNC: 98 MG/DL (ref 65–100)
GLUCOSE UR STRIP.AUTO-MCNC: NEGATIVE MG/DL
HADV DNA SPEC QL NAA+PROBE: NOT DETECTED
HBA1C MFR BLD: 7.5 % (ref 4.2–6.3)
HBA1C MFR BLD: 7.6 % (ref 4.2–6.3)
HBV CORE AB SERPL QL IA: NEGATIVE
HBV SURFACE AB SERPL IA-ACNC: 43.98 MIU/ML
HBV SURFACE AG SER QL: NONREACTIVE
HCOV 229E RNA SPEC QL NAA+PROBE: DETECTED
HCOV HKU1 RNA SPEC QL NAA+PROBE: NOT DETECTED
HCOV NL63 RNA SPEC QL NAA+PROBE: NOT DETECTED
HCOV OC43 RNA SPEC QL NAA+PROBE: NOT DETECTED
HCT VFR BLD AUTO: 20.6 % (ref 41.1–50.3)
HCT VFR BLD AUTO: 21 % (ref 41.1–50.3)
HCT VFR BLD AUTO: 22.3 %
HCT VFR BLD AUTO: 22.4 % (ref 41.1–50.3)
HCT VFR BLD AUTO: 22.7 % (ref 41.1–50.3)
HCT VFR BLD AUTO: 22.8 %
HCT VFR BLD AUTO: 23 % (ref 41.1–50.3)
HCT VFR BLD AUTO: 23 % (ref 41.1–50.3)
HCT VFR BLD AUTO: 23.1 % (ref 41.1–50.3)
HCT VFR BLD AUTO: 23.3 % (ref 41.1–50.3)
HCT VFR BLD AUTO: 23.4 % (ref 41.1–50.3)
HCT VFR BLD AUTO: 23.7 %
HCT VFR BLD AUTO: 23.7 % (ref 41.1–50.3)
HCT VFR BLD AUTO: 23.9 % (ref 41.1–50.3)
HCT VFR BLD AUTO: 23.9 % (ref 41.1–50.3)
HCT VFR BLD AUTO: 24 %
HCT VFR BLD AUTO: 24 % (ref 41.1–50.3)
HCT VFR BLD AUTO: 24.1 %
HCT VFR BLD AUTO: 24.1 %
HCT VFR BLD AUTO: 24.5 %
HCT VFR BLD AUTO: 24.6 %
HCT VFR BLD AUTO: 24.6 % (ref 41.1–50.3)
HCT VFR BLD AUTO: 24.7 %
HCT VFR BLD AUTO: 24.9 % (ref 41.1–50.3)
HCT VFR BLD AUTO: 25.2 %
HCT VFR BLD AUTO: 25.2 %
HCT VFR BLD AUTO: 25.3 %
HCT VFR BLD AUTO: 25.5 %
HCT VFR BLD AUTO: 25.5 % (ref 41.1–50.3)
HCT VFR BLD AUTO: 25.6 %
HCT VFR BLD AUTO: 25.6 %
HCT VFR BLD AUTO: 25.7 % (ref 41.1–50.3)
HCT VFR BLD AUTO: 25.7 % (ref 41.1–50.3)
HCT VFR BLD AUTO: 25.9 %
HCT VFR BLD AUTO: 26 % (ref 41.1–50.3)
HCT VFR BLD AUTO: 26.2 %
HCT VFR BLD AUTO: 26.3 %
HCT VFR BLD AUTO: 26.5 %
HCT VFR BLD AUTO: 26.6 %
HCT VFR BLD AUTO: 27.2 %
HCT VFR BLD AUTO: 27.2 %
HCT VFR BLD AUTO: 27.6 %
HCT VFR BLD AUTO: 28.1 %
HCT VFR BLD AUTO: 28.4 %
HCT VFR BLD AUTO: 28.5 %
HGB BLD-MCNC: 6.9 G/DL (ref 13.6–17.2)
HGB BLD-MCNC: 7.1 G/DL (ref 13.6–17.2)
HGB BLD-MCNC: 7.4 G/DL (ref 13.6–17.2)
HGB BLD-MCNC: 7.6 G/DL (ref 13.6–17.2)
HGB BLD-MCNC: 7.6 G/DL (ref 13.6–17.2)
HGB BLD-MCNC: 7.7 G/DL (ref 13.6–17.2)
HGB BLD-MCNC: 7.8 G/DL (ref 13.6–17.2)
HGB BLD-MCNC: 7.9 G/DL (ref 13.6–17.2)
HGB BLD-MCNC: 7.9 G/DL (ref 13.6–17.2)
HGB BLD-MCNC: 8 G/DL (ref 13.6–17.2)
HGB BLD-MCNC: 8 G/DL (ref 13.6–17.2)
HGB BLD-MCNC: 8.1 G/DL (ref 13.6–17.2)
HGB BLD-MCNC: 8.1 G/DL (ref 13.6–17.2)
HGB BLD-MCNC: 8.2 G/DL (ref 13.6–17.2)
HGB BLD-MCNC: 8.3 G/DL (ref 13.6–17.2)
HGB BLD-MCNC: 8.3 G/DL (ref 13.6–17.2)
HGB BLD-MCNC: 8.4 G/DL (ref 13.6–17.2)
HGB BLD-MCNC: 8.4 G/DL (ref 13.6–17.2)
HGB BLD-MCNC: 8.5 G/DL (ref 13.6–17.2)
HGB BLD-MCNC: 8.6 G/DL (ref 13.6–17.2)
HGB BLD-MCNC: 8.7 G/DL (ref 13.6–17.2)
HGB BLD-MCNC: 8.8 G/DL (ref 13.6–17.2)
HGB BLD-MCNC: 8.9 G/DL (ref 13.6–17.2)
HGB BLD-MCNC: 8.9 G/DL (ref 13.6–17.2)
HGB BLD-MCNC: 9 G/DL (ref 13.6–17.2)
HGB BLD-MCNC: 9.1 G/DL (ref 13.6–17.2)
HGB BLD-MCNC: 9.2 G/DL (ref 13.6–17.2)
HGB BLD-MCNC: 9.4 G/DL (ref 13.6–17.2)
HGB BLD-MCNC: 9.6 G/DL (ref 13.6–17.2)
HGB BLD-MCNC: 9.7 G/DL (ref 13.6–17.2)
HGB BLD-MCNC: 9.7 G/DL (ref 13.6–17.2)
HGB BLD-MCNC: 9.8 G/DL (ref 13.6–17.2)
HGB UR QL STRIP: ABNORMAL
HISTORY CHECK: NORMAL
HISTORY CHECKED?,CKHIST: NORMAL
HMPV RNA SPEC QL NAA+PROBE: NOT DETECTED
HPIV1 RNA SPEC QL NAA+PROBE: NOT DETECTED
HPIV2 RNA SPEC QL NAA+PROBE: NOT DETECTED
HPIV3 RNA SPEC QL NAA+PROBE: NOT DETECTED
HPIV4 RNA SPEC QL NAA+PROBE: NOT DETECTED
IMM GRANULOCYTES # BLD AUTO: 0 K/UL (ref 0–0.5)
IMM GRANULOCYTES # BLD AUTO: 0.1 K/UL (ref 0–0.5)
IMM GRANULOCYTES NFR BLD AUTO: 0 % (ref 0–5)
IMM GRANULOCYTES NFR BLD AUTO: 1 % (ref 0–5)
INR PPP: 1.1
KETONES UR QL STRIP.AUTO: NEGATIVE MG/DL
LACTATE SERPL-SCNC: 1.7 MMOL/L (ref 0.4–2)
LACTATE SERPL-SCNC: 2.5 MMOL/L (ref 0.4–2)
LACTATE SERPL-SCNC: 2.7 MMOL/L (ref 0.4–2)
LACTATE SERPL-SCNC: 3.4 MMOL/L (ref 0.4–2)
LEUKOCYTE ESTERASE UR QL STRIP.AUTO: NEGATIVE
LYMPHOCYTES # BLD: 0 K/UL (ref 0.5–4.6)
LYMPHOCYTES # BLD: 0 K/UL (ref 0.5–4.6)
LYMPHOCYTES # BLD: 0.1 K/UL (ref 0.5–4.6)
LYMPHOCYTES # BLD: 0.2 K/UL (ref 0.5–4.6)
LYMPHOCYTES # BLD: 0.3 K/UL (ref 0.5–4.6)
LYMPHOCYTES # BLD: 0.3 K/UL (ref 0.5–4.6)
LYMPHOCYTES # BLD: 0.4 K/UL (ref 0.5–4.6)
LYMPHOCYTES # BLD: 1 K/UL (ref 0.5–4.6)
LYMPHOCYTES # BLD: 1.5 K/UL (ref 0.5–4.6)
LYMPHOCYTES # BLD: 1.6 K/UL (ref 0.5–4.6)
LYMPHOCYTES # BLD: 12 K/UL (ref 0.5–4.6)
LYMPHOCYTES # BLD: 13 K/UL (ref 0.5–4.6)
LYMPHOCYTES # BLD: 131.5 K/UL (ref 0.5–4.6)
LYMPHOCYTES # BLD: 134.7 K/UL (ref 0.5–4.6)
LYMPHOCYTES # BLD: 16.1 K/UL (ref 0.5–4.6)
LYMPHOCYTES # BLD: 17.1 K/UL (ref 0.5–4.6)
LYMPHOCYTES # BLD: 2 K/UL (ref 0.5–4.6)
LYMPHOCYTES # BLD: 2.3 K/UL (ref 0.5–4.6)
LYMPHOCYTES # BLD: 2.4 K/UL (ref 0.5–4.6)
LYMPHOCYTES # BLD: 20.3 K/UL (ref 0.5–4.6)
LYMPHOCYTES # BLD: 20.3 K/UL (ref 0.5–4.6)
LYMPHOCYTES # BLD: 20.4 K/UL (ref 0.5–4.6)
LYMPHOCYTES # BLD: 20.8 K/UL (ref 0.5–4.6)
LYMPHOCYTES # BLD: 21.4 K/UL (ref 0.5–4.6)
LYMPHOCYTES # BLD: 22 K/UL (ref 0.5–4.6)
LYMPHOCYTES # BLD: 23.6 K/UL (ref 0.5–4.6)
LYMPHOCYTES # BLD: 23.8 K/UL (ref 0.5–4.6)
LYMPHOCYTES # BLD: 24.7 K/UL (ref 0.5–4.6)
LYMPHOCYTES # BLD: 25.2 K/UL (ref 0.5–4.6)
LYMPHOCYTES # BLD: 28.2 K/UL (ref 0.5–4.6)
LYMPHOCYTES # BLD: 33.7 K/UL (ref 0.5–4.6)
LYMPHOCYTES # BLD: 34.7 K/UL (ref 0.5–4.6)
LYMPHOCYTES # BLD: 37.3 K/UL (ref 0.5–4.6)
LYMPHOCYTES # BLD: 5.4 K/UL (ref 0.5–4.6)
LYMPHOCYTES # BLD: 52.4 K/UL (ref 0.5–4.6)
LYMPHOCYTES # BLD: 55.7 K/UL (ref 0.5–4.6)
LYMPHOCYTES # BLD: 61.2 K/UL (ref 0.5–4.6)
LYMPHOCYTES # BLD: 69.1 K/UL (ref 0.5–4.6)
LYMPHOCYTES # BLD: 7.7 K/UL (ref 0.5–4.6)
LYMPHOCYTES # BLD: 70.5 K/UL (ref 0.5–4.6)
LYMPHOCYTES # BLD: 73.4 K/UL (ref 0.5–4.6)
LYMPHOCYTES # BLD: 8.2 K/UL (ref 0.5–4.6)
LYMPHOCYTES # BLD: 81.7 K/UL (ref 0.5–4.6)
LYMPHOCYTES NFR BLD MANUAL: 81 % (ref 16–44)
LYMPHOCYTES NFR BLD MANUAL: 84 % (ref 16–44)
LYMPHOCYTES NFR BLD MANUAL: 85 % (ref 16–44)
LYMPHOCYTES NFR BLD MANUAL: 85 % (ref 16–44)
LYMPHOCYTES NFR BLD MANUAL: 92 % (ref 16–44)
LYMPHOCYTES NFR BLD MANUAL: 96 % (ref 16–44)
LYMPHOCYTES NFR BLD: 10 % (ref 13–44)
LYMPHOCYTES NFR BLD: 10 % (ref 13–44)
LYMPHOCYTES NFR BLD: 12 % (ref 13–44)
LYMPHOCYTES NFR BLD: 15 % (ref 13–44)
LYMPHOCYTES NFR BLD: 17 % (ref 13–44)
LYMPHOCYTES NFR BLD: 17 % (ref 13–44)
LYMPHOCYTES NFR BLD: 19 % (ref 13–44)
LYMPHOCYTES NFR BLD: 23 % (ref 13–44)
LYMPHOCYTES NFR BLD: 3 % (ref 13–44)
LYMPHOCYTES NFR BLD: 34 % (ref 13–44)
LYMPHOCYTES NFR BLD: 35 % (ref 13–44)
LYMPHOCYTES NFR BLD: 36 % (ref 13–44)
LYMPHOCYTES NFR BLD: 38 % (ref 13–44)
LYMPHOCYTES NFR BLD: 39 % (ref 13–44)
LYMPHOCYTES NFR BLD: 4 % (ref 13–44)
LYMPHOCYTES NFR BLD: 44 % (ref 13–44)
LYMPHOCYTES NFR BLD: 48 % (ref 13–44)
LYMPHOCYTES NFR BLD: 51 % (ref 13–44)
LYMPHOCYTES NFR BLD: 56 % (ref 13–44)
LYMPHOCYTES NFR BLD: 58 % (ref 13–44)
LYMPHOCYTES NFR BLD: 60 % (ref 13–44)
LYMPHOCYTES NFR BLD: 61 % (ref 13–44)
LYMPHOCYTES NFR BLD: 65 % (ref 13–44)
LYMPHOCYTES NFR BLD: 68 % (ref 13–44)
LYMPHOCYTES NFR BLD: 68 % (ref 13–44)
LYMPHOCYTES NFR BLD: 69 % (ref 13–44)
LYMPHOCYTES NFR BLD: 7 % (ref 13–44)
LYMPHOCYTES NFR BLD: 70 % (ref 13–44)
LYMPHOCYTES NFR BLD: 71 % (ref 13–44)
LYMPHOCYTES NFR BLD: 75 % (ref 13–44)
LYMPHOCYTES NFR BLD: 89 % (ref 13–44)
LYMPHOCYTES NFR BLD: 89 % (ref 13–44)
LYMPHOCYTES NFR BLD: 90 % (ref 13–44)
LYMPHOCYTES NFR BLD: 91 % (ref 13–44)
LYMPHOCYTES NFR BLD: 93 %
LYMPHOCYTES NFR BLD: 94 % (ref 13–44)
Lab: NORMAL
M PNEUMO DNA SPEC QL NAA+PROBE: NOT DETECTED
MAGNESIUM SERPL-MCNC: 1.3 MG/DL (ref 1.8–2.4)
MAGNESIUM SERPL-MCNC: 1.5 MG/DL (ref 1.8–2.4)
MAGNESIUM SERPL-MCNC: 1.5 MG/DL (ref 1.8–2.4)
MAGNESIUM SERPL-MCNC: 1.6 MG/DL (ref 1.8–2.4)
MAGNESIUM SERPL-MCNC: 1.6 MG/DL (ref 1.8–2.4)
MAGNESIUM SERPL-MCNC: 1.7 MG/DL (ref 1.8–2.4)
MAGNESIUM SERPL-MCNC: 1.8 MG/DL (ref 1.8–2.4)
MAGNESIUM SERPL-MCNC: 1.9 MG/DL (ref 1.6–2.3)
MAGNESIUM SERPL-MCNC: 1.9 MG/DL (ref 1.8–2.4)
MAGNESIUM SERPL-MCNC: 2 MG/DL (ref 1.8–2.4)
MAGNESIUM SERPL-MCNC: 2.1 MG/DL (ref 1.8–2.4)
MAGNESIUM SERPL-MCNC: 2.2 MG/DL (ref 1.8–2.4)
MAGNESIUM SERPL-MCNC: 2.3 MG/DL (ref 1.8–2.4)
MAGNESIUM SERPL-MCNC: 2.4 MG/DL (ref 1.8–2.4)
MAGNESIUM SERPL-MCNC: 2.8 MG/DL (ref 1.8–2.4)
MCH RBC QN AUTO: 28.9 PG (ref 26.1–32.9)
MCH RBC QN AUTO: 29 PG (ref 26.1–32.9)
MCH RBC QN AUTO: 29.3 PG (ref 26.1–32.9)
MCH RBC QN AUTO: 29.5 PG (ref 26.1–32.9)
MCH RBC QN AUTO: 29.5 PG (ref 26.1–32.9)
MCH RBC QN AUTO: 29.6 PG (ref 26.1–32.9)
MCH RBC QN AUTO: 29.9 PG (ref 26.1–32.9)
MCH RBC QN AUTO: 30 PG (ref 26.1–32.9)
MCH RBC QN AUTO: 30.1 PG (ref 26.1–32.9)
MCH RBC QN AUTO: 30.3 PG (ref 26.1–32.9)
MCH RBC QN AUTO: 30.4 PG (ref 26.1–32.9)
MCH RBC QN AUTO: 30.5 PG (ref 26.1–32.9)
MCH RBC QN AUTO: 30.6 PG (ref 26.1–32.9)
MCH RBC QN AUTO: 30.7 PG (ref 26.1–32.9)
MCH RBC QN AUTO: 31 PG (ref 26.1–32.9)
MCH RBC QN AUTO: 31.1 PG (ref 26.1–32.9)
MCH RBC QN AUTO: 31.3 PG (ref 26.1–32.9)
MCH RBC QN AUTO: 31.3 PG (ref 26.1–32.9)
MCH RBC QN AUTO: 31.4 PG (ref 26.1–32.9)
MCH RBC QN AUTO: 31.5 PG (ref 26.1–32.9)
MCH RBC QN AUTO: 31.5 PG (ref 26.1–32.9)
MCH RBC QN AUTO: 31.6 PG (ref 26.1–32.9)
MCH RBC QN AUTO: 31.7 PG (ref 26.1–32.9)
MCH RBC QN AUTO: 31.9 PG (ref 26.1–32.9)
MCH RBC QN AUTO: 32.3 PG (ref 26.1–32.9)
MCH RBC QN AUTO: 32.3 PG (ref 26.1–32.9)
MCH RBC QN AUTO: 32.7 PG (ref 26.1–32.9)
MCH RBC QN AUTO: 32.8 PG (ref 26.1–32.9)
MCH RBC QN AUTO: 33.1 PG (ref 26.1–32.9)
MCH RBC QN AUTO: 33.2 PG (ref 26.1–32.9)
MCH RBC QN AUTO: 33.5 PG (ref 26.1–32.9)
MCH RBC QN AUTO: 33.6 PG (ref 26.1–32.9)
MCH RBC QN AUTO: 33.7 PG (ref 26.1–32.9)
MCH RBC QN AUTO: 34 PG (ref 26.1–32.9)
MCH RBC QN AUTO: 34.7 PG (ref 26.1–32.9)
MCHC RBC AUTO-ENTMCNC: 32.5 G/DL (ref 31.4–35)
MCHC RBC AUTO-ENTMCNC: 32.9 G/DL (ref 31.4–35)
MCHC RBC AUTO-ENTMCNC: 32.9 G/DL (ref 31.4–35)
MCHC RBC AUTO-ENTMCNC: 33 G/DL (ref 31.4–35)
MCHC RBC AUTO-ENTMCNC: 33.2 G/DL (ref 31.4–35)
MCHC RBC AUTO-ENTMCNC: 33.3 G/DL (ref 31.4–35)
MCHC RBC AUTO-ENTMCNC: 33.5 G/DL (ref 31.4–35)
MCHC RBC AUTO-ENTMCNC: 33.6 G/DL (ref 31.4–35)
MCHC RBC AUTO-ENTMCNC: 33.7 G/DL (ref 31.4–35)
MCHC RBC AUTO-ENTMCNC: 33.8 G/DL (ref 31.4–35)
MCHC RBC AUTO-ENTMCNC: 33.9 G/DL (ref 31.4–35)
MCHC RBC AUTO-ENTMCNC: 33.9 G/DL (ref 31.4–35)
MCHC RBC AUTO-ENTMCNC: 34 G/DL (ref 31.4–35)
MCHC RBC AUTO-ENTMCNC: 34.2 G/DL (ref 31.4–35)
MCHC RBC AUTO-ENTMCNC: 34.2 G/DL (ref 31.4–35)
MCHC RBC AUTO-ENTMCNC: 34.3 G/DL (ref 31.4–35)
MCHC RBC AUTO-ENTMCNC: 34.4 G/DL (ref 31.4–35)
MCHC RBC AUTO-ENTMCNC: 34.4 G/DL (ref 31.4–35)
MCHC RBC AUTO-ENTMCNC: 34.6 G/DL (ref 31.4–35)
MCHC RBC AUTO-ENTMCNC: 34.8 G/DL (ref 31.4–35)
MCHC RBC AUTO-ENTMCNC: 34.8 G/DL (ref 31.4–35)
MCHC RBC AUTO-ENTMCNC: 34.9 G/DL (ref 31.4–35)
MCHC RBC AUTO-ENTMCNC: 34.9 G/DL (ref 31.4–35)
MCHC RBC AUTO-ENTMCNC: 35 G/DL (ref 31.4–35)
MCHC RBC AUTO-ENTMCNC: 35 G/DL (ref 31.4–35)
MCV RBC AUTO: 100 FL (ref 79.6–97.8)
MCV RBC AUTO: 100 FL (ref 79.6–97.8)
MCV RBC AUTO: 100.8 FL (ref 79.6–97.8)
MCV RBC AUTO: 101.4 FL (ref 79.6–97.8)
MCV RBC AUTO: 101.6 FL (ref 79.6–97.8)
MCV RBC AUTO: 104.1 FL (ref 79.6–97.8)
MCV RBC AUTO: 84.9 FL (ref 79.6–97.8)
MCV RBC AUTO: 86.6 FL (ref 79.6–97.8)
MCV RBC AUTO: 86.6 FL (ref 79.6–97.8)
MCV RBC AUTO: 87.5 FL (ref 79.6–97.8)
MCV RBC AUTO: 87.5 FL (ref 79.6–97.8)
MCV RBC AUTO: 88 FL (ref 79.6–97.8)
MCV RBC AUTO: 88.2 FL (ref 79.6–97.8)
MCV RBC AUTO: 88.3 FL (ref 79.6–97.8)
MCV RBC AUTO: 88.5 FL (ref 79.6–97.8)
MCV RBC AUTO: 88.5 FL (ref 79.6–97.8)
MCV RBC AUTO: 88.8 FL (ref 79.6–97.8)
MCV RBC AUTO: 89.9 FL (ref 79.6–97.8)
MCV RBC AUTO: 90.1 FL (ref 79.6–97.8)
MCV RBC AUTO: 90.1 FL (ref 79.6–97.8)
MCV RBC AUTO: 90.3 FL (ref 79.6–97.8)
MCV RBC AUTO: 90.5 FL (ref 79.6–97.8)
MCV RBC AUTO: 90.5 FL (ref 79.6–97.8)
MCV RBC AUTO: 90.7 FL (ref 79.6–97.8)
MCV RBC AUTO: 90.9 FL (ref 79.6–97.8)
MCV RBC AUTO: 90.9 FL (ref 79.6–97.8)
MCV RBC AUTO: 91.2 FL (ref 79.6–97.8)
MCV RBC AUTO: 91.2 FL (ref 79.6–97.8)
MCV RBC AUTO: 91.7 FL (ref 79.6–97.8)
MCV RBC AUTO: 91.9 FL (ref 79.6–97.8)
MCV RBC AUTO: 92 FL (ref 79.6–97.8)
MCV RBC AUTO: 92.1 FL (ref 79.6–97.8)
MCV RBC AUTO: 92.3 FL (ref 79.6–97.8)
MCV RBC AUTO: 93.1 FL (ref 79.6–97.8)
MCV RBC AUTO: 93.1 FL (ref 79.6–97.8)
MCV RBC AUTO: 93.3 FL (ref 79.6–97.8)
MCV RBC AUTO: 93.3 FL (ref 79.6–97.8)
MCV RBC AUTO: 93.8 FL (ref 79.6–97.8)
MCV RBC AUTO: 93.9 FL (ref 79.6–97.8)
MCV RBC AUTO: 94.1 FL (ref 79.6–97.8)
MCV RBC AUTO: 94.1 FL (ref 79.6–97.8)
MCV RBC AUTO: 94.4 FL (ref 79.6–97.8)
MCV RBC AUTO: 94.7 FL (ref 79.6–97.8)
MCV RBC AUTO: 95.2 FL (ref 79.6–97.8)
MCV RBC AUTO: 97.8 FL (ref 79.6–97.8)
MCV RBC AUTO: 99.2 FL (ref 79.6–97.8)
MCV RBC AUTO: 99.6 FL (ref 79.6–97.8)
MONOCYTES # BLD: 0.1 K/UL (ref 0.1–1.3)
MONOCYTES # BLD: 0.2 K/UL (ref 0.1–1.3)
MONOCYTES # BLD: 0.3 K/UL (ref 0.1–1.3)
MONOCYTES # BLD: 0.3 K/UL (ref 0.1–1.3)
MONOCYTES # BLD: 0.7 K/UL (ref 0.1–1.3)
MONOCYTES # BLD: 1.2 K/UL (ref 0.1–1.3)
MONOCYTES # BLD: 1.3 K/UL (ref 0.1–1.3)
MONOCYTES # BLD: 1.3 K/UL (ref 0.1–1.3)
MONOCYTES # BLD: 1.6 K/UL (ref 0.1–1.3)
MONOCYTES # BLD: 1.7 K/UL (ref 0.1–1.3)
MONOCYTES # BLD: 1.9 K/UL (ref 0.1–1.3)
MONOCYTES # BLD: 10.5 K/UL (ref 0.1–1.3)
MONOCYTES # BLD: 11.1 K/UL (ref 0.1–1.3)
MONOCYTES # BLD: 12.1 K/UL (ref 0.1–1.3)
MONOCYTES # BLD: 13.3 K/UL (ref 0.1–1.3)
MONOCYTES # BLD: 13.8 K/UL (ref 0.1–1.3)
MONOCYTES # BLD: 13.8 K/UL (ref 0.1–1.3)
MONOCYTES # BLD: 14.7 K/UL (ref 0.1–1.3)
MONOCYTES # BLD: 16.6 K/UL (ref 0.1–1.3)
MONOCYTES # BLD: 17.3 K/UL (ref 0.1–1.3)
MONOCYTES # BLD: 17.5 K/UL (ref 0.1–1.3)
MONOCYTES # BLD: 2.7 K/UL (ref 0.1–1.3)
MONOCYTES # BLD: 21.5 K/UL (ref 0.1–1.3)
MONOCYTES # BLD: 26.7 K/UL (ref 0.1–1.3)
MONOCYTES # BLD: 27.1 K/UL (ref 0.1–1.3)
MONOCYTES # BLD: 33 K/UL (ref 0.1–1.3)
MONOCYTES # BLD: 38.4 K/UL (ref 0.1–1.3)
MONOCYTES # BLD: 4.3 K/UL (ref 0.1–1.3)
MONOCYTES # BLD: 5.1 K/UL (ref 0.1–1.3)
MONOCYTES # BLD: 5.5 K/UL (ref 0.1–1.3)
MONOCYTES # BLD: 5.5 K/UL (ref 0.1–1.3)
MONOCYTES # BLD: 53.7 K/UL (ref 0.1–1.3)
MONOCYTES # BLD: 6.5 K/UL (ref 0.1–1.3)
MONOCYTES # BLD: 7.8 K/UL (ref 0.1–1.3)
MONOCYTES # BLD: 9.1 K/UL (ref 0.1–1.3)
MONOCYTES # BLD: 9.3 K/UL (ref 0.1–1.3)
MONOCYTES NFR BLD MANUAL: 11 % (ref 3–9)
MONOCYTES NFR BLD MANUAL: 13 % (ref 3–9)
MONOCYTES NFR BLD MANUAL: 9 % (ref 3–9)
MONOCYTES NFR BLD: 10 % (ref 4–12)
MONOCYTES NFR BLD: 11 % (ref 4–12)
MONOCYTES NFR BLD: 11 % (ref 4–12)
MONOCYTES NFR BLD: 12 % (ref 4–12)
MONOCYTES NFR BLD: 14 % (ref 4–12)
MONOCYTES NFR BLD: 16 % (ref 4–12)
MONOCYTES NFR BLD: 16 % (ref 4–12)
MONOCYTES NFR BLD: 2 %
MONOCYTES NFR BLD: 24 % (ref 4–12)
MONOCYTES NFR BLD: 29 % (ref 4–12)
MONOCYTES NFR BLD: 30 % (ref 4–12)
MONOCYTES NFR BLD: 32 % (ref 4–12)
MONOCYTES NFR BLD: 32 % (ref 4–12)
MONOCYTES NFR BLD: 33 % (ref 4–12)
MONOCYTES NFR BLD: 34 % (ref 4–12)
MONOCYTES NFR BLD: 36 % (ref 4–12)
MONOCYTES NFR BLD: 40 % (ref 4–12)
MONOCYTES NFR BLD: 41 % (ref 4–12)
MONOCYTES NFR BLD: 42 % (ref 4–12)
MONOCYTES NFR BLD: 44 % (ref 4–12)
MONOCYTES NFR BLD: 44 % (ref 4–12)
MONOCYTES NFR BLD: 45 % (ref 4–12)
MONOCYTES NFR BLD: 49 % (ref 4–12)
MONOCYTES NFR BLD: 5 % (ref 4–12)
MONOCYTES NFR BLD: 51 % (ref 4–12)
MONOCYTES NFR BLD: 51 % (ref 4–12)
MONOCYTES NFR BLD: 52 % (ref 4–12)
MONOCYTES NFR BLD: 61 % (ref 4–12)
MONOCYTES NFR BLD: 62 % (ref 4–12)
MONOCYTES NFR BLD: 7 % (ref 4–12)
MONOCYTES NFR BLD: 8 % (ref 4–12)
MONOCYTES NFR BLD: 9 % (ref 4–12)
MUCOUS THREADS URNS QL MICRO: 0 /LPF
NEUTS SEG # BLD: 0 K/UL (ref 1.7–8.2)
NEUTS SEG # BLD: 0.2 K/UL (ref 1.7–8.2)
NEUTS SEG # BLD: 0.3 K/UL (ref 1.7–8.2)
NEUTS SEG # BLD: 0.4 K/UL (ref 1.7–8.2)
NEUTS SEG # BLD: 0.5 K/UL (ref 1.7–8.2)
NEUTS SEG # BLD: 0.5 K/UL (ref 1.7–8.2)
NEUTS SEG # BLD: 0.6 K/UL (ref 1.7–8.2)
NEUTS SEG # BLD: 0.6 K/UL (ref 1.7–8.2)
NEUTS SEG # BLD: 0.7 K/UL (ref 1.7–8.2)
NEUTS SEG # BLD: 0.7 K/UL (ref 1.7–8.2)
NEUTS SEG # BLD: 0.8 K/UL (ref 1.7–8.2)
NEUTS SEG # BLD: 0.9 K/UL (ref 1.7–8.2)
NEUTS SEG # BLD: 1 K/UL (ref 1.7–8.2)
NEUTS SEG # BLD: 1 K/UL (ref 1.7–8.2)
NEUTS SEG # BLD: 1.1 K/UL (ref 1.7–8.2)
NEUTS SEG # BLD: 1.2 K/UL (ref 1.7–8.2)
NEUTS SEG # BLD: 1.2 K/UL (ref 1.7–8.2)
NEUTS SEG # BLD: 1.4 K/UL (ref 1.7–8.2)
NEUTS SEG # BLD: 1.5 K/UL (ref 1.7–8.2)
NEUTS SEG # BLD: 1.9 K/UL (ref 1.7–8.2)
NEUTS SEG # BLD: 2 K/UL (ref 1.7–8.2)
NEUTS SEG # BLD: 2.5 K/UL (ref 1.7–8.2)
NEUTS SEG # BLD: 3.6 K/UL (ref 1.7–8.2)
NEUTS SEG NFR BLD MANUAL: 4 % (ref 47–75)
NEUTS SEG NFR BLD: 0 % (ref 43–78)
NEUTS SEG NFR BLD: 1 %
NEUTS SEG NFR BLD: 1 % (ref 43–78)
NEUTS SEG NFR BLD: 17 % (ref 43–78)
NEUTS SEG NFR BLD: 19 % (ref 43–78)
NEUTS SEG NFR BLD: 2 % (ref 43–78)
NEUTS SEG NFR BLD: 21 % (ref 43–78)
NEUTS SEG NFR BLD: 33 % (ref 43–78)
NEUTS SEG NFR BLD: 34 % (ref 43–78)
NEUTS SEG NFR BLD: 4 % (ref 43–78)
NEUTS SEG NFR BLD: 56 % (ref 43–78)
NEUTS SEG NFR BLD: 65 % (ref 43–78)
NEUTS SEG NFR BLD: 65 % (ref 43–78)
NEUTS SEG NFR BLD: 66 % (ref 43–78)
NEUTS SEG NFR BLD: 7 % (ref 43–78)
NEUTS SEG NFR BLD: 71 % (ref 43–78)
NEUTS SEG NFR BLD: 72 % (ref 43–78)
NEUTS SEG NFR BLD: 75 % (ref 43–78)
NEUTS SEG NFR BLD: 76 % (ref 43–78)
NEUTS SEG NFR BLD: 81 % (ref 43–78)
NEUTS SEG NFR BLD: 83 % (ref 43–78)
NEUTS SEG NFR BLD: 84 % (ref 43–78)
NEUTS SEG NFR BLD: 86 % (ref 43–78)
NITRITE UR QL STRIP.AUTO: NEGATIVE
NRBC # BLD: 0 K/UL (ref 0–0.2)
NRBC # BLD: 0.02 K/UL (ref 0–0.2)
NT PRO BNP: 772 PG/ML (ref 5–125)
OSMOLALITY SERPL: 289 MOSM/KG H2O (ref 280–301)
P-R INTERVAL, ECG05: 178 MS
PH UR STRIP: 5.5 [PH] (ref 5–9)
PHOSPHATE SERPL-MCNC: 2 MG/DL (ref 2.3–3.7)
PHOSPHATE SERPL-MCNC: 6.1 MG/DL (ref 2.3–3.7)
PHOSPHATE SERPL-MCNC: 6.1 MG/DL (ref 2.3–3.7)
PLATELET # BLD AUTO: 10 K/UL (ref 150–450)
PLATELET # BLD AUTO: 11 K/UL (ref 150–450)
PLATELET # BLD AUTO: 11 K/UL (ref 150–450)
PLATELET # BLD AUTO: 12 K/UL (ref 150–450)
PLATELET # BLD AUTO: 13 K/UL (ref 150–450)
PLATELET # BLD AUTO: 14 K/UL (ref 150–450)
PLATELET # BLD AUTO: 15 K/UL (ref 150–450)
PLATELET # BLD AUTO: 16 K/UL (ref 150–450)
PLATELET # BLD AUTO: 17 K/UL (ref 150–450)
PLATELET # BLD AUTO: 18 K/UL (ref 150–450)
PLATELET # BLD AUTO: 19 K/UL (ref 150–450)
PLATELET # BLD AUTO: 19 K/UL (ref 150–450)
PLATELET # BLD AUTO: 2 K/UL (ref 150–450)
PLATELET # BLD AUTO: 20 K/UL (ref 150–450)
PLATELET # BLD AUTO: 23 K/UL (ref 150–450)
PLATELET # BLD AUTO: 28 K/UL (ref 150–450)
PLATELET # BLD AUTO: 3 K/UL (ref 150–450)
PLATELET # BLD AUTO: 3 K/UL (ref 150–450)
PLATELET # BLD AUTO: 32 K/UL (ref 150–450)
PLATELET # BLD AUTO: 34 K/UL (ref 150–450)
PLATELET # BLD AUTO: 4 K/UL (ref 150–450)
PLATELET # BLD AUTO: 41 K/UL (ref 150–450)
PLATELET # BLD AUTO: 5 K/UL (ref 150–450)
PLATELET # BLD AUTO: 53 K/UL (ref 150–450)
PLATELET # BLD AUTO: 53 K/UL (ref 150–450)
PLATELET # BLD AUTO: 6 K/UL (ref 150–450)
PLATELET # BLD AUTO: 6 K/UL (ref 150–450)
PLATELET # BLD AUTO: 7 K/UL (ref 150–450)
PLATELET # BLD AUTO: 7 K/UL (ref 150–450)
PLATELET # BLD AUTO: 8 K/UL (ref 150–450)
PLATELET # BLD AUTO: 8 K/UL (ref 150–450)
PLATELET # BLD AUTO: 9 K/UL (ref 150–450)
PLATELET COMMENT: ABNORMAL
PLATELET COMMENTS,PCOM: ABNORMAL
PMV BLD AUTO: 10 FL (ref 9.4–12.3)
PMV BLD AUTO: 10.9 FL (ref 9.4–12.3)
PMV BLD AUTO: 11.2 FL (ref 9.4–12.3)
PMV BLD AUTO: 11.3 FL (ref 9.4–12.3)
PMV BLD AUTO: 12.7 FL (ref 9.4–12.3)
PMV BLD AUTO: 8.1 FL (ref 9.4–12.3)
PMV BLD AUTO: 8.7 FL (ref 9.4–12.3)
PMV BLD AUTO: 8.8 FL (ref 9.4–12.3)
PMV BLD AUTO: 8.9 FL (ref 9.4–12.3)
PMV BLD AUTO: 9.2 FL (ref 9.4–12.3)
PMV BLD AUTO: 9.6 FL (ref 9.4–12.3)
PMV BLD AUTO: 9.6 FL (ref 9.4–12.3)
PMV BLD AUTO: 9.7 FL (ref 9.4–12.3)
PMV BLD AUTO: 9.9 FL (ref 9.4–12.3)
PMV BLD AUTO: ABNORMAL FL (ref 9.4–12.3)
POTASSIUM SERPL-SCNC: 3.2 MMOL/L (ref 3.5–5.1)
POTASSIUM SERPL-SCNC: 3.2 MMOL/L (ref 3.5–5.1)
POTASSIUM SERPL-SCNC: 3.3 MMOL/L (ref 3.5–5.1)
POTASSIUM SERPL-SCNC: 3.4 MMOL/L (ref 3.5–5.1)
POTASSIUM SERPL-SCNC: 3.4 MMOL/L (ref 3.5–5.1)
POTASSIUM SERPL-SCNC: 3.5 MMOL/L (ref 3.5–5.1)
POTASSIUM SERPL-SCNC: 3.5 MMOL/L (ref 3.5–5.1)
POTASSIUM SERPL-SCNC: 3.6 MMOL/L (ref 3.5–5.1)
POTASSIUM SERPL-SCNC: 3.7 MMOL/L (ref 3.5–5.1)
POTASSIUM SERPL-SCNC: 3.8 MMOL/L (ref 3.5–5.1)
POTASSIUM SERPL-SCNC: 3.9 MMOL/L (ref 3.5–5.1)
POTASSIUM SERPL-SCNC: 4 MMOL/L (ref 3.5–5.1)
POTASSIUM SERPL-SCNC: 4.1 MMOL/L (ref 3.5–5.1)
POTASSIUM SERPL-SCNC: 4.2 MMOL/L (ref 3.5–5.1)
POTASSIUM SERPL-SCNC: 4.3 MMOL/L (ref 3.5–5.1)
POTASSIUM SERPL-SCNC: 4.4 MMOL/L (ref 3.5–5.1)
POTASSIUM SERPL-SCNC: 4.5 MMOL/L (ref 3.5–5.1)
POTASSIUM SERPL-SCNC: 4.6 MMOL/L (ref 3.5–5.1)
POTASSIUM SERPL-SCNC: 4.6 MMOL/L (ref 3.5–5.1)
POTASSIUM SERPL-SCNC: 4.7 MMOL/L (ref 3.5–5.1)
POTASSIUM SERPL-SCNC: 4.9 MMOL/L (ref 3.5–5.1)
POTASSIUM SERPL-SCNC: 5 MMOL/L (ref 3.5–5.1)
POTASSIUM SERPL-SCNC: 6.3 MMOL/L (ref 3.5–5.1)
POTASSIUM SERPL-SCNC: 7.2 MMOL/L (ref 3.5–5.1)
PROCALCITONIN SERPL-MCNC: 0.69 NG/ML (ref 0–0.49)
PROCALCITONIN SERPL-MCNC: 1.18 NG/ML (ref 0–0.49)
PROT SERPL-MCNC: 5.2 G/DL (ref 6.3–8.2)
PROT SERPL-MCNC: 5.3 G/DL (ref 6.3–8.2)
PROT SERPL-MCNC: 5.3 G/DL (ref 6.3–8.2)
PROT SERPL-MCNC: 5.5 G/DL (ref 6.3–8.2)
PROT SERPL-MCNC: 5.6 G/DL (ref 6.3–8.2)
PROT SERPL-MCNC: 5.6 G/DL (ref 6.3–8.2)
PROT SERPL-MCNC: 5.7 G/DL (ref 6.3–8.2)
PROT SERPL-MCNC: 6 G/DL (ref 6.3–8.2)
PROT SERPL-MCNC: 6 G/DL (ref 6.3–8.2)
PROT SERPL-MCNC: 6.2 G/DL (ref 6.3–8.2)
PROT SERPL-MCNC: 6.3 G/DL (ref 6.3–8.2)
PROT SERPL-MCNC: 6.4 G/DL (ref 6.3–8.2)
PROT SERPL-MCNC: 6.6 G/DL (ref 6.3–8.2)
PROT SERPL-MCNC: 6.7 G/DL (ref 6.3–8.2)
PROT SERPL-MCNC: 6.8 G/DL (ref 6.3–8.2)
PROT SERPL-MCNC: 6.8 G/DL (ref 6.3–8.2)
PROT SERPL-MCNC: 6.9 G/DL (ref 6.3–8.2)
PROT SERPL-MCNC: 6.9 G/DL (ref 6.3–8.2)
PROT SERPL-MCNC: 7 G/DL (ref 6.3–8.2)
PROT SERPL-MCNC: 7 G/DL (ref 6.3–8.2)
PROT SERPL-MCNC: 7.3 G/DL (ref 6.3–8.2)
PROT SERPL-MCNC: 7.4 G/DL (ref 6.3–8.2)
PROT SERPL-MCNC: 7.5 G/DL (ref 6.3–8.2)
PROT UR STRIP-MCNC: 30 MG/DL
PROTHROMBIN TIME: 14.5 SEC (ref 12.6–14.5)
Q-T INTERVAL, ECG07: 368 MS
QRS DURATION, ECG06: 90 MS
QTC CALCULATION (BEZET), ECG08: 452 MS
RBC # BLD AUTO: 2.19 M/UL (ref 4.23–5.6)
RBC # BLD AUTO: 2.2 M/UL (ref 4.23–5.6)
RBC # BLD AUTO: 2.26 M/UL (ref 4.23–5.6)
RBC # BLD AUTO: 2.29 M/UL (ref 4.23–5.6)
RBC # BLD AUTO: 2.41 M/UL (ref 4.23–5.6)
RBC # BLD AUTO: 2.45 M/UL (ref 4.23–5.6)
RBC # BLD AUTO: 2.45 M/UL (ref 4.23–5.6)
RBC # BLD AUTO: 2.49 M/UL (ref 4.23–5.6)
RBC # BLD AUTO: 2.52 M/UL (ref 4.23–5.6)
RBC # BLD AUTO: 2.53 M/UL (ref 4.23–5.6)
RBC # BLD AUTO: 2.53 M/UL (ref 4.23–5.6)
RBC # BLD AUTO: 2.54 M/UL (ref 4.23–5.6)
RBC # BLD AUTO: 2.57 M/UL (ref 4.23–5.6)
RBC # BLD AUTO: 2.57 M/UL (ref 4.23–5.6)
RBC # BLD AUTO: 2.6 M/UL (ref 4.23–5.6)
RBC # BLD AUTO: 2.61 M/UL (ref 4.23–5.6)
RBC # BLD AUTO: 2.61 M/UL (ref 4.23–5.6)
RBC # BLD AUTO: 2.64 M/UL (ref 4.23–5.6)
RBC # BLD AUTO: 2.67 M/UL (ref 4.23–5.6)
RBC # BLD AUTO: 2.69 M/UL (ref 4.23–5.6)
RBC # BLD AUTO: 2.71 M/UL (ref 4.23–5.6)
RBC # BLD AUTO: 2.72 M/UL (ref 4.23–5.6)
RBC # BLD AUTO: 2.73 M/UL (ref 4.23–5.6)
RBC # BLD AUTO: 2.74 M/UL (ref 4.23–5.6)
RBC # BLD AUTO: 2.77 M/UL (ref 4.23–5.6)
RBC # BLD AUTO: 2.77 M/UL (ref 4.23–5.6)
RBC # BLD AUTO: 2.78 M/UL (ref 4.23–5.6)
RBC # BLD AUTO: 2.8 M/UL (ref 4.23–5.6)
RBC # BLD AUTO: 2.82 M/UL (ref 4.23–5.6)
RBC # BLD AUTO: 2.83 M/UL (ref 4.23–5.6)
RBC # BLD AUTO: 2.84 M/UL (ref 4.23–5.6)
RBC # BLD AUTO: 2.85 M/UL (ref 4.23–5.6)
RBC # BLD AUTO: 2.88 M/UL (ref 4.23–5.6)
RBC # BLD AUTO: 2.9 M/UL (ref 4.23–5.6)
RBC # BLD AUTO: 2.94 M/UL (ref 4.23–5.6)
RBC # BLD AUTO: 2.96 M/UL (ref 4.23–5.6)
RBC # BLD AUTO: 2.98 M/UL (ref 4.23–5.6)
RBC # BLD AUTO: 3.01 M/UL (ref 4.23–5.6)
RBC # BLD AUTO: 3.06 M/UL (ref 4.23–5.6)
RBC # BLD AUTO: 3.09 M/UL (ref 4.23–5.6)
RBC # BLD AUTO: 3.14 M/UL (ref 4.23–5.6)
RBC # BLD AUTO: 3.24 M/UL (ref 4.23–5.6)
RBC #/AREA URNS HPF: >100 /HPF
RBC MORPH BLD: ABNORMAL
REFERENCE LAB,REFLB: NORMAL
RSV RNA SPEC QL NAA+PROBE: NOT DETECTED
RV+EV RNA SPEC QL NAA+PROBE: NOT DETECTED
SARS-COV-2 RDRP RESP QL NAA+PROBE: NOT DETECTED
SARS-COV-2 RNA RESP QL NAA+PROBE: NOT DETECTED
SERVICE CMNT-IMP: ABNORMAL
SERVICE CMNT-IMP: NORMAL
SODIUM SERPL-SCNC: 129 MMOL/L (ref 136–145)
SODIUM SERPL-SCNC: 132 MMOL/L (ref 136–145)
SODIUM SERPL-SCNC: 132 MMOL/L (ref 136–145)
SODIUM SERPL-SCNC: 133 MMOL/L (ref 136–145)
SODIUM SERPL-SCNC: 133 MMOL/L (ref 136–145)
SODIUM SERPL-SCNC: 133 MMOL/L (ref 138–145)
SODIUM SERPL-SCNC: 134 MMOL/L (ref 136–145)
SODIUM SERPL-SCNC: 134 MMOL/L (ref 136–145)
SODIUM SERPL-SCNC: 135 MMOL/L (ref 136–145)
SODIUM SERPL-SCNC: 135 MMOL/L (ref 136–145)
SODIUM SERPL-SCNC: 135 MMOL/L (ref 138–145)
SODIUM SERPL-SCNC: 135 MMOL/L (ref 138–145)
SODIUM SERPL-SCNC: 136 MMOL/L (ref 136–145)
SODIUM SERPL-SCNC: 136 MMOL/L (ref 138–145)
SODIUM SERPL-SCNC: 137 MMOL/L (ref 136–145)
SODIUM SERPL-SCNC: 137 MMOL/L (ref 138–145)
SODIUM SERPL-SCNC: 138 MMOL/L (ref 136–145)
SODIUM SERPL-SCNC: 139 MMOL/L (ref 136–145)
SODIUM SERPL-SCNC: 140 MMOL/L (ref 136–145)
SODIUM SERPL-SCNC: 141 MMOL/L (ref 136–145)
SODIUM SERPL-SCNC: 141 MMOL/L (ref 136–145)
SODIUM SERPL-SCNC: 142 MMOL/L (ref 136–145)
SODIUM SERPL-SCNC: 142 MMOL/L (ref 136–145)
SODIUM SERPL-SCNC: 143 MMOL/L (ref 136–145)
SOURCE: NORMAL
SP GR UR REFRACTOMETRY: 1.01 (ref 1–1.02)
SPECIMEN EXP DATE BLD: NORMAL
SPECIMEN SOURCE: NORMAL
SPECIMEN SOURCE: NORMAL
STATUS OF UNIT,%ST: NORMAL
TEST DESCRIPTION:,ATST: NORMAL
TEST ORDERED:: NORMAL
UNIT DIVISION, %UDIV: 0
UNIT DIVISION: 0
URINE CULTURE IF INDICATED: ABNORMAL
UROBILINOGEN UR QL STRIP.AUTO: 0.2 EU/DL (ref 0.2–1)
VANCOMYCIN SERPL-MCNC: 16.4 UG/ML
VENTRICULAR RATE, ECG03: 91 BPM
WBC # BLD AUTO: 0.6 K/UL (ref 4.3–11.1)
WBC # BLD AUTO: 0.8 K/UL (ref 4.3–11.1)
WBC # BLD AUTO: 0.8 K/UL (ref 4.3–11.1)
WBC # BLD AUTO: 1.2 K/UL (ref 4.3–11.1)
WBC # BLD AUTO: 1.3 K/UL (ref 4.3–11.1)
WBC # BLD AUTO: 1.5 K/UL (ref 4.3–11.1)
WBC # BLD AUTO: 1.6 K/UL (ref 4.3–11.1)
WBC # BLD AUTO: 1.7 K/UL (ref 4.3–11.1)
WBC # BLD AUTO: 11.3 K/UL (ref 4.3–11.1)
WBC # BLD AUTO: 12.6 K/UL (ref 4.3–11.1)
WBC # BLD AUTO: 120.1 K/UL (ref 4.3–11.1)
WBC # BLD AUTO: 13.3 K/UL (ref 4.3–11.1)
WBC # BLD AUTO: 151.3 K/UL (ref 4.3–11.1)
WBC # BLD AUTO: 16.8 K/UL (ref 4.3–11.1)
WBC # BLD AUTO: 185.2 K/UL (ref 4.3–11.1)
WBC # BLD AUTO: 2.6 K/UL (ref 4.3–11.1)
WBC # BLD AUTO: 2.8 K/UL (ref 4.3–11.1)
WBC # BLD AUTO: 2.9 K/UL (ref 4.3–11.1)
WBC # BLD AUTO: 21.7 K/UL (ref 4.3–11.1)
WBC # BLD AUTO: 22.8 K/UL (ref 4.3–11.1)
WBC # BLD AUTO: 25.2 K/UL (ref 4.3–11.1)
WBC # BLD AUTO: 27.1 K/UL (ref 4.3–11.1)
WBC # BLD AUTO: 3.9 K/UL (ref 4.3–11.1)
WBC # BLD AUTO: 31 K/UL (ref 4.3–11.1)
WBC # BLD AUTO: 34.6 K/UL (ref 4.3–11.1)
WBC # BLD AUTO: 35.1 K/UL (ref 4.3–11.1)
WBC # BLD AUTO: 36.1 K/UL (ref 4.3–11.1)
WBC # BLD AUTO: 36.6 K/UL (ref 4.3–11.1)
WBC # BLD AUTO: 36.6 K/UL (ref 4.3–11.1)
WBC # BLD AUTO: 37.3 K/UL (ref 4.3–11.1)
WBC # BLD AUTO: 39.3 K/UL (ref 4.3–11.1)
WBC # BLD AUTO: 4.2 K/UL (ref 4.3–11.1)
WBC # BLD AUTO: 4.3 K/UL (ref 4.3–11.1)
WBC # BLD AUTO: 41.8 K/UL (ref 4.3–11.1)
WBC # BLD AUTO: 42.2 K/UL (ref 4.3–11.1)
WBC # BLD AUTO: 5.4 K/UL (ref 4.3–11.1)
WBC # BLD AUTO: 5.8 K/UL (ref 4.3–11.1)
WBC # BLD AUTO: 51.4 K/UL (ref 4.3–11.1)
WBC # BLD AUTO: 53.1 K/UL (ref 4.3–11.1)
WBC # BLD AUTO: 53.3 K/UL (ref 4.3–11.1)
WBC # BLD AUTO: 54.8 K/UL (ref 4.3–11.1)
WBC # BLD AUTO: 55.3 K/UL (ref 4.3–11.1)
WBC # BLD AUTO: 58.9 K/UL (ref 4.3–11.1)
WBC # BLD AUTO: 6 K/UL (ref 4.3–11.1)
WBC # BLD AUTO: 61.2 K/UL (ref 4.3–11.1)
WBC # BLD AUTO: 72.8 K/UL (ref 4.3–11.1)
WBC # BLD AUTO: 82.9 K/UL (ref 4.3–11.1)
WBC # BLD AUTO: 85.3 K/UL (ref 4.3–11.1)
WBC # BLD AUTO: 86.4 K/UL (ref 4.3–11.1)
WBC MORPH BLD: ABNORMAL
WBC URNS QL MICRO: ABNORMAL /HPF

## 2022-01-01 PROCEDURE — 6370000000 HC RX 637 (ALT 250 FOR IP): Performed by: INTERNAL MEDICINE

## 2022-01-01 PROCEDURE — 83735 ASSAY OF MAGNESIUM: CPT

## 2022-01-01 PROCEDURE — 77030018667 ADMN ST IV BLD FENW -A

## 2022-01-01 PROCEDURE — 86900 BLOOD TYPING SEROLOGIC ABO: CPT

## 2022-01-01 PROCEDURE — 6370000000 HC RX 637 (ALT 250 FOR IP): Performed by: NURSE PRACTITIONER

## 2022-01-01 PROCEDURE — 80053 COMPREHEN METABOLIC PANEL: CPT

## 2022-01-01 PROCEDURE — 85025 COMPLETE CBC W/AUTO DIFF WBC: CPT

## 2022-01-01 PROCEDURE — 36430 TRANSFUSION BLD/BLD COMPNT: CPT

## 2022-01-01 PROCEDURE — 74011250636 HC RX REV CODE- 250/636: Performed by: INTERNAL MEDICINE

## 2022-01-01 PROCEDURE — 96415 CHEMO IV INFUSION ADDL HR: CPT

## 2022-01-01 PROCEDURE — 99239 HOSP IP/OBS DSCHRG MGMT >30: CPT | Performed by: INTERNAL MEDICINE

## 2022-01-01 PROCEDURE — APPSS45 APP SPLIT SHARED TIME 31-45 MINUTES: Performed by: NURSE PRACTITIONER

## 2022-01-01 PROCEDURE — 86923 COMPATIBILITY TEST ELECTRIC: CPT

## 2022-01-01 PROCEDURE — 6370000000 HC RX 637 (ALT 250 FOR IP): Performed by: FAMILY MEDICINE

## 2022-01-01 PROCEDURE — 2700000000 HC OXYGEN THERAPY PER DAY

## 2022-01-01 PROCEDURE — 2580000003 HC RX 258: Performed by: NURSE PRACTITIONER

## 2022-01-01 PROCEDURE — 74011250637 HC RX REV CODE- 250/637: Performed by: INTERNAL MEDICINE

## 2022-01-01 PROCEDURE — 74011000250 HC RX REV CODE- 250: Performed by: INTERNAL MEDICINE

## 2022-01-01 PROCEDURE — 83036 HEMOGLOBIN GLYCOSYLATED A1C: CPT

## 2022-01-01 PROCEDURE — 88374 M/PHMTRC ALYS ISHQUANT/SEMIQ: CPT

## 2022-01-01 PROCEDURE — 30233R1 TRANSFUSION OF NONAUTOLOGOUS PLATELETS INTO PERIPHERAL VEIN, PERCUTANEOUS APPROACH: ICD-10-PCS | Performed by: NURSE PRACTITIONER

## 2022-01-01 PROCEDURE — 97535 SELF CARE MNGMENT TRAINING: CPT

## 2022-01-01 PROCEDURE — 93005 ELECTROCARDIOGRAM TRACING: CPT | Performed by: INTERNAL MEDICINE

## 2022-01-01 PROCEDURE — A4216 STERILE WATER/SALINE, 10 ML: HCPCS | Performed by: NURSE PRACTITIONER

## 2022-01-01 PROCEDURE — P9037 PLATE PHERES LEUKOREDU IRRAD: HCPCS

## 2022-01-01 PROCEDURE — 93000 ELECTROCARDIOGRAM COMPLETE: CPT | Performed by: INTERNAL MEDICINE

## 2022-01-01 PROCEDURE — 36415 COLL VENOUS BLD VENIPUNCTURE: CPT

## 2022-01-01 PROCEDURE — 88313 SPECIAL STAINS GROUP 2: CPT

## 2022-01-01 PROCEDURE — 86644 CMV ANTIBODY: CPT

## 2022-01-01 PROCEDURE — 6360000002 HC RX W HCPCS: Performed by: NURSE PRACTITIONER

## 2022-01-01 PROCEDURE — 96374 THER/PROPH/DIAG INJ IV PUSH: CPT

## 2022-01-01 PROCEDURE — 96360 HYDRATION IV INFUSION INIT: CPT

## 2022-01-01 PROCEDURE — 96375 TX/PRO/DX INJ NEW DRUG ADDON: CPT

## 2022-01-01 PROCEDURE — 80048 BASIC METABOLIC PNL TOTAL CA: CPT

## 2022-01-01 PROCEDURE — 2580000003 HC RX 258: Performed by: FAMILY MEDICINE

## 2022-01-01 PROCEDURE — 1100000000 HC RM PRIVATE

## 2022-01-01 PROCEDURE — 74011250636 HC RX REV CODE- 250/636: Performed by: NURSE PRACTITIONER

## 2022-01-01 PROCEDURE — 36591 DRAW BLOOD OFF VENOUS DEVICE: CPT

## 2022-01-01 PROCEDURE — 2500000003 HC RX 250 WO HCPCS: Performed by: NURSE PRACTITIONER

## 2022-01-01 PROCEDURE — 77012 CT SCAN FOR NEEDLE BIOPSY: CPT

## 2022-01-01 PROCEDURE — 96366 THER/PROPH/DIAG IV INF ADDON: CPT

## 2022-01-01 PROCEDURE — 3017F COLORECTAL CA SCREEN DOC REV: CPT | Performed by: NURSE PRACTITIONER

## 2022-01-01 PROCEDURE — A4216 STERILE WATER/SALINE, 10 ML: HCPCS | Performed by: INTERNAL MEDICINE

## 2022-01-01 PROCEDURE — 97530 THERAPEUTIC ACTIVITIES: CPT

## 2022-01-01 PROCEDURE — 6360000002 HC RX W HCPCS: Performed by: RADIOLOGY

## 2022-01-01 PROCEDURE — 88264 CHROMOSOME ANALYSIS 20-25: CPT

## 2022-01-01 PROCEDURE — 88311 DECALCIFY TISSUE: CPT

## 2022-01-01 PROCEDURE — G8417 CALC BMI ABV UP PARAM F/U: HCPCS | Performed by: NURSE PRACTITIONER

## 2022-01-01 PROCEDURE — 88237 TISSUE CULTURE BONE MARROW: CPT

## 2022-01-01 PROCEDURE — 86704 HEP B CORE ANTIBODY TOTAL: CPT

## 2022-01-01 PROCEDURE — P9040 RBC LEUKOREDUCED IRRADIATED: HCPCS

## 2022-01-01 PROCEDURE — 99232 SBSQ HOSP IP/OBS MODERATE 35: CPT | Performed by: INTERNAL MEDICINE

## 2022-01-01 PROCEDURE — 2500000003 HC RX 250 WO HCPCS: Performed by: INTERNAL MEDICINE

## 2022-01-01 PROCEDURE — G8427 DOCREV CUR MEDS BY ELIG CLIN: HCPCS | Performed by: NURSE PRACTITIONER

## 2022-01-01 PROCEDURE — 99223 1ST HOSP IP/OBS HIGH 75: CPT | Performed by: INTERNAL MEDICINE

## 2022-01-01 PROCEDURE — 96372 THER/PROPH/DIAG INJ SC/IM: CPT

## 2022-01-01 PROCEDURE — 83605 ASSAY OF LACTIC ACID: CPT

## 2022-01-01 PROCEDURE — 3331090001 HH PPS REVENUE CREDIT

## 2022-01-01 PROCEDURE — 2580000003 HC RX 258: Performed by: EMERGENCY MEDICINE

## 2022-01-01 PROCEDURE — 1111F DSCHRG MED/CURRENT MED MERGE: CPT | Performed by: NURSE PRACTITIONER

## 2022-01-01 PROCEDURE — 83930 ASSAY OF BLOOD OSMOLALITY: CPT

## 2022-01-01 PROCEDURE — 3331090002 HH PPS REVENUE DEBIT

## 2022-01-01 PROCEDURE — 88342 IMHCHEM/IMCYTCHM 1ST ANTB: CPT

## 2022-01-01 PROCEDURE — 6360000002 HC RX W HCPCS: Performed by: FAMILY MEDICINE

## 2022-01-01 PROCEDURE — 96367 TX/PROPH/DG ADDL SEQ IV INF: CPT

## 2022-01-01 PROCEDURE — APPSS60 APP SPLIT SHARED TIME 46-60 MINUTES: Performed by: NURSE PRACTITIONER

## 2022-01-01 PROCEDURE — 74011250637 HC RX REV CODE- 250/637: Performed by: NURSE PRACTITIONER

## 2022-01-01 PROCEDURE — 80069 RENAL FUNCTION PANEL: CPT

## 2022-01-01 PROCEDURE — 85610 PROTHROMBIN TIME: CPT

## 2022-01-01 PROCEDURE — 85027 COMPLETE CBC AUTOMATED: CPT

## 2022-01-01 PROCEDURE — 74011000258 HC RX REV CODE- 258: Performed by: INTERNAL MEDICINE

## 2022-01-01 PROCEDURE — APPSS180 APP SPLIT SHARED TIME > 60 MINUTES: Performed by: NURSE PRACTITIONER

## 2022-01-01 PROCEDURE — 86901 BLOOD TYPING SEROLOGIC RH(D): CPT

## 2022-01-01 PROCEDURE — 96413 CHEMO IV INFUSION 1 HR: CPT

## 2022-01-01 PROCEDURE — 2580000003 HC RX 258: Performed by: INTERNAL MEDICINE

## 2022-01-01 PROCEDURE — 82962 GLUCOSE BLOOD TEST: CPT

## 2022-01-01 PROCEDURE — 2580000003 HC RX 258: Performed by: RADIOLOGY

## 2022-01-01 PROCEDURE — APPSS30 APP SPLIT SHARED TIME 16-30 MINUTES: Performed by: NURSE PRACTITIONER

## 2022-01-01 PROCEDURE — 96368 THER/DIAG CONCURRENT INF: CPT

## 2022-01-01 PROCEDURE — 1170000000 HC RM PRIVATE ONCOLOGY

## 2022-01-01 PROCEDURE — 74011000636 HC RX REV CODE- 636: Performed by: INTERNAL MEDICINE

## 2022-01-01 PROCEDURE — 74011250636 HC RX REV CODE- 250/636: Performed by: RADIOLOGY

## 2022-01-01 PROCEDURE — 0656 HSPC GENERAL INPATIENT

## 2022-01-01 PROCEDURE — G0151 HHCP-SERV OF PT,EA 15 MIN: HCPCS

## 2022-01-01 PROCEDURE — 96361 HYDRATE IV INFUSION ADD-ON: CPT

## 2022-01-01 PROCEDURE — 97112 NEUROMUSCULAR REEDUCATION: CPT

## 2022-01-01 PROCEDURE — APPNB15 APP NON BILLABLE TIME 0-15 MINS: Performed by: NURSE PRACTITIONER

## 2022-01-01 PROCEDURE — 6A551Z2 PHERESIS OF PLATELETS, MULTIPLE: ICD-10-PCS | Performed by: INTERNAL MEDICINE

## 2022-01-01 PROCEDURE — 81001 URINALYSIS AUTO W/SCOPE: CPT

## 2022-01-01 PROCEDURE — 74011000250 HC RX REV CODE- 250: Performed by: NURSE PRACTITIONER

## 2022-01-01 PROCEDURE — 83880 ASSAY OF NATRIURETIC PEPTIDE: CPT

## 2022-01-01 PROCEDURE — 30233N1 TRANSFUSION OF NONAUTOLOGOUS RED BLOOD CELLS INTO PERIPHERAL VEIN, PERCUTANEOUS APPROACH: ICD-10-PCS | Performed by: INTERNAL MEDICINE

## 2022-01-01 PROCEDURE — 1036F TOBACCO NON-USER: CPT | Performed by: NURSE PRACTITIONER

## 2022-01-01 PROCEDURE — 94760 N-INVAS EAR/PLS OXIMETRY 1: CPT

## 2022-01-01 PROCEDURE — 99215 OFFICE O/P EST HI 40 MIN: CPT | Performed by: NURSE PRACTITIONER

## 2022-01-01 PROCEDURE — 6360000002 HC RX W HCPCS: Performed by: INTERNAL MEDICINE

## 2022-01-01 PROCEDURE — 96409 CHEMO IV PUSH SNGL DRUG: CPT

## 2022-01-01 PROCEDURE — 6360000004 HC RX CONTRAST MEDICATION: Performed by: RADIOLOGY

## 2022-01-01 PROCEDURE — 71045 X-RAY EXAM CHEST 1 VIEW: CPT

## 2022-01-01 PROCEDURE — 99285 EMERGENCY DEPT VISIT HI MDM: CPT

## 2022-01-01 PROCEDURE — 06H03DZ INSERTION OF INTRALUMINAL DEVICE INTO INFERIOR VENA CAVA, PERCUTANEOUS APPROACH: ICD-10-PCS | Performed by: RADIOLOGY

## 2022-01-01 PROCEDURE — 87040 BLOOD CULTURE FOR BACTERIA: CPT

## 2022-01-01 PROCEDURE — G0157 HHC PT ASSISTANT EA 15: HCPCS

## 2022-01-01 PROCEDURE — 99152 MOD SED SAME PHYS/QHP 5/>YRS: CPT

## 2022-01-01 PROCEDURE — 74011000250 HC RX REV CODE- 250: Performed by: RADIOLOGY

## 2022-01-01 PROCEDURE — 96365 THER/PROPH/DIAG IV INF INIT: CPT

## 2022-01-01 PROCEDURE — 94664 DEMO&/EVAL PT USE INHALER: CPT

## 2022-01-01 PROCEDURE — 71046 X-RAY EXAM CHEST 2 VIEWS: CPT

## 2022-01-01 PROCEDURE — G8428 CUR MEDS NOT DOCUMENT: HCPCS | Performed by: NURSE PRACTITIONER

## 2022-01-01 PROCEDURE — 6360000002 HC RX W HCPCS

## 2022-01-01 PROCEDURE — 84145 PROCALCITONIN (PCT): CPT

## 2022-01-01 PROCEDURE — 99233 SBSQ HOSP IP/OBS HIGH 50: CPT | Performed by: INTERNAL MEDICINE

## 2022-01-01 PROCEDURE — 87340 HEPATITIS B SURFACE AG IA: CPT

## 2022-01-01 PROCEDURE — 99222 1ST HOSP IP/OBS MODERATE 55: CPT | Performed by: NURSE PRACTITIONER

## 2022-01-01 PROCEDURE — 94761 N-INVAS EAR/PLS OXIMETRY MLT: CPT

## 2022-01-01 PROCEDURE — 88341 IMHCHEM/IMCYTCHM EA ADD ANTB: CPT

## 2022-01-01 PROCEDURE — 97161 PT EVAL LOW COMPLEX 20 MIN: CPT

## 2022-01-01 PROCEDURE — 84100 ASSAY OF PHOSPHORUS: CPT

## 2022-01-01 PROCEDURE — 99211 OFF/OP EST MAY X REQ PHY/QHP: CPT

## 2022-01-01 PROCEDURE — 99231 SBSQ HOSP IP/OBS SF/LOW 25: CPT | Performed by: INTERNAL MEDICINE

## 2022-01-01 PROCEDURE — 88305 TISSUE EXAM BY PATHOLOGIST: CPT

## 2022-01-01 PROCEDURE — 97166 OT EVAL MOD COMPLEX 45 MIN: CPT

## 2022-01-01 PROCEDURE — 87804 INFLUENZA ASSAY W/OPTIC: CPT

## 2022-01-01 PROCEDURE — 1123F ACP DISCUSS/DSCN MKR DOCD: CPT | Performed by: NURSE PRACTITIONER

## 2022-01-01 PROCEDURE — 6500000001 HSPC ELECTION

## 2022-01-01 PROCEDURE — 99233 SBSQ HOSP IP/OBS HIGH 50: CPT | Performed by: NURSE PRACTITIONER

## 2022-01-01 PROCEDURE — 87086 URINE CULTURE/COLONY COUNT: CPT

## 2022-01-01 PROCEDURE — 85049 AUTOMATED PLATELET COUNT: CPT

## 2022-01-01 PROCEDURE — 80202 ASSAY OF VANCOMYCIN: CPT

## 2022-01-01 PROCEDURE — 1123F ACP DISCUSS/DSCN MKR DOCD: CPT | Performed by: INTERNAL MEDICINE

## 2022-01-01 PROCEDURE — 0202U NFCT DS 22 TRGT SARS-COV-2: CPT

## 2022-01-01 PROCEDURE — 99213 OFFICE O/P EST LOW 20 MIN: CPT | Performed by: NURSE PRACTITIONER

## 2022-01-01 PROCEDURE — 99214 OFFICE O/P EST MOD 30 MIN: CPT | Performed by: NURSE PRACTITIONER

## 2022-01-01 PROCEDURE — 6360000002 HC RX W HCPCS: Performed by: EMERGENCY MEDICINE

## 2022-01-01 PROCEDURE — 93005 ELECTROCARDIOGRAM TRACING: CPT | Performed by: EMERGENCY MEDICINE

## 2022-01-01 PROCEDURE — A9552 F18 FDG: HCPCS

## 2022-01-01 PROCEDURE — 97116 GAIT TRAINING THERAPY: CPT

## 2022-01-01 PROCEDURE — 30233N1 TRANSFUSION OF NONAUTOLOGOUS RED BLOOD CELLS INTO PERIPHERAL VEIN, PERCUTANEOUS APPROACH: ICD-10-PCS | Performed by: NURSE PRACTITIONER

## 2022-01-01 PROCEDURE — 99215 OFFICE O/P EST HI 40 MIN: CPT | Performed by: INTERNAL MEDICINE

## 2022-01-01 PROCEDURE — 88184 FLOWCYTOMETRY/ TC 1 MARKER: CPT

## 2022-01-01 PROCEDURE — 94644 CONT INHLJ TX 1ST HOUR: CPT

## 2022-01-01 PROCEDURE — 87635 SARS-COV-2 COVID-19 AMP PRB: CPT

## 2022-01-01 PROCEDURE — 97162 PT EVAL MOD COMPLEX 30 MIN: CPT

## 2022-01-01 PROCEDURE — 88185 FLOWCYTOMETRY/TC ADD-ON: CPT

## 2022-01-01 PROCEDURE — 86706 HEP B SURFACE ANTIBODY: CPT

## 2022-01-01 PROCEDURE — 80076 HEPATIC FUNCTION PANEL: CPT

## 2022-01-01 PROCEDURE — 99497 ADVNCD CARE PLAN 30 MIN: CPT | Performed by: NURSE PRACTITIONER

## 2022-01-01 RX ORDER — PANTOPRAZOLE SODIUM 40 MG/1
40 TABLET, DELAYED RELEASE ORAL
Status: DISCONTINUED | OUTPATIENT
Start: 2022-01-01 | End: 2022-01-01 | Stop reason: HOSPADM

## 2022-01-01 RX ORDER — HYDROCORTISONE SODIUM SUCCINATE 100 MG/2ML
100 INJECTION, POWDER, FOR SOLUTION INTRAMUSCULAR; INTRAVENOUS AS NEEDED
Status: DISCONTINUED | OUTPATIENT
Start: 2022-01-01 | End: 2022-01-01 | Stop reason: HOSPADM

## 2022-01-01 RX ORDER — DIPHENHYDRAMINE HCL 25 MG
25 CAPSULE ORAL
Status: COMPLETED | OUTPATIENT
Start: 2022-01-01 | End: 2022-01-01

## 2022-01-01 RX ORDER — ACETAMINOPHEN 325 MG/1
650 TABLET ORAL EVERY 6 HOURS PRN
Status: DISCONTINUED | OUTPATIENT
Start: 2022-01-01 | End: 2022-01-01 | Stop reason: HOSPADM

## 2022-01-01 RX ORDER — POTASSIUM CHLORIDE 750 MG/1
20 TABLET, EXTENDED RELEASE ORAL ONCE
Status: COMPLETED | OUTPATIENT
Start: 2022-01-01 | End: 2022-01-01

## 2022-01-01 RX ORDER — METHYLPREDNISOLONE SODIUM SUCCINATE 125 MG/2ML
40 INJECTION, POWDER, LYOPHILIZED, FOR SOLUTION INTRAMUSCULAR; INTRAVENOUS ONCE
Status: COMPLETED | OUTPATIENT
Start: 2022-01-01 | End: 2022-01-01

## 2022-01-01 RX ORDER — POLYETHYLENE GLYCOL 3350 17 G/17G
17 POWDER, FOR SOLUTION ORAL DAILY PRN
Status: DISCONTINUED | OUTPATIENT
Start: 2022-01-01 | End: 2022-01-01 | Stop reason: HOSPADM

## 2022-01-01 RX ORDER — FENTANYL CITRATE 50 UG/ML
25-50 INJECTION, SOLUTION INTRAMUSCULAR; INTRAVENOUS
Status: DISCONTINUED | OUTPATIENT
Start: 2022-01-01 | End: 2022-01-01 | Stop reason: HOSPADM

## 2022-01-01 RX ORDER — DIPHENHYDRAMINE HCL 25 MG
25 CAPSULE ORAL SEE ADMIN INSTRUCTIONS
Status: DISCONTINUED | OUTPATIENT
Start: 2022-01-01 | End: 2022-01-01

## 2022-01-01 RX ORDER — ACETAMINOPHEN 325 MG/1
650 TABLET ORAL
Status: CANCELLED | OUTPATIENT
Start: 2022-01-01

## 2022-01-01 RX ORDER — METHYLPREDNISOLONE SODIUM SUCCINATE 125 MG/2ML
60 INJECTION, POWDER, LYOPHILIZED, FOR SOLUTION INTRAMUSCULAR; INTRAVENOUS ONCE
Status: COMPLETED | OUTPATIENT
Start: 2022-01-01 | End: 2022-01-01

## 2022-01-01 RX ORDER — 0.9 % SODIUM CHLORIDE 0.9 %
10 VIAL (ML) INJECTION ONCE
Status: CANCELLED | OUTPATIENT
Start: 2022-01-01 | End: 2022-01-01

## 2022-01-01 RX ORDER — MORPHINE SULFATE 2 MG/ML
1 INJECTION, SOLUTION INTRAMUSCULAR; INTRAVENOUS EVERY 4 HOURS PRN
Status: DISCONTINUED | OUTPATIENT
Start: 2022-01-01 | End: 2022-01-01 | Stop reason: HOSPADM

## 2022-01-01 RX ORDER — DIPHENHYDRAMINE HCL 25 MG
25 CAPSULE ORAL ONCE
Status: COMPLETED | OUTPATIENT
Start: 2022-01-01 | End: 2022-01-01

## 2022-01-01 RX ORDER — SODIUM CHLORIDE 0.9 % (FLUSH) 0.9 %
10 SYRINGE (ML) INJECTION AS NEEDED
Status: DISCONTINUED | OUTPATIENT
Start: 2022-01-01 | End: 2022-01-01 | Stop reason: HOSPADM

## 2022-01-01 RX ORDER — DEXTROSE MONOHYDRATE 50 MG/ML
100 INJECTION, SOLUTION INTRAVENOUS PRN
Status: DISCONTINUED | OUTPATIENT
Start: 2022-01-01 | End: 2022-01-01 | Stop reason: HOSPADM

## 2022-01-01 RX ORDER — ALBUTEROL SULFATE 0.83 MG/ML
2.5 SOLUTION RESPIRATORY (INHALATION) AS NEEDED
Status: DISCONTINUED | OUTPATIENT
Start: 2022-01-01 | End: 2022-01-01 | Stop reason: HOSPADM

## 2022-01-01 RX ORDER — ACETAMINOPHEN 325 MG/1
650 TABLET ORAL AS NEEDED
Status: DISCONTINUED | OUTPATIENT
Start: 2022-01-01 | End: 2022-01-01 | Stop reason: HOSPADM

## 2022-01-01 RX ORDER — SODIUM CHLORIDE 0.9 % (FLUSH) 0.9 %
5-40 SYRINGE (ML) INJECTION EVERY 12 HOURS SCHEDULED
Status: DISCONTINUED | OUTPATIENT
Start: 2022-01-01 | End: 2022-01-01 | Stop reason: HOSPADM

## 2022-01-01 RX ORDER — DIPHENHYDRAMINE HYDROCHLORIDE 50 MG/ML
50 INJECTION INTRAMUSCULAR; INTRAVENOUS
Status: CANCELLED | OUTPATIENT
Start: 2022-01-01

## 2022-01-01 RX ORDER — SODIUM CHLORIDE 9 MG/ML
INJECTION, SOLUTION INTRAVENOUS PRN
Status: DISCONTINUED | OUTPATIENT
Start: 2022-01-01 | End: 2022-01-01 | Stop reason: HOSPADM

## 2022-01-01 RX ORDER — ACETAMINOPHEN 325 MG/1
650 TABLET ORAL ONCE
Status: COMPLETED | OUTPATIENT
Start: 2022-01-01 | End: 2022-01-01

## 2022-01-01 RX ORDER — SODIUM CHLORIDE 9 MG/ML
5-250 INJECTION, SOLUTION INTRAVENOUS PRN
Status: CANCELLED | OUTPATIENT
Start: 2022-01-01

## 2022-01-01 RX ORDER — SODIUM CHLORIDE 9 MG/ML
10 INJECTION INTRAMUSCULAR; INTRAVENOUS; SUBCUTANEOUS AS NEEDED
Status: DISPENSED | OUTPATIENT
Start: 2022-01-01 | End: 2022-01-01

## 2022-01-01 RX ORDER — SODIUM CHLORIDE 9 MG/ML
250 INJECTION, SOLUTION INTRAVENOUS AS NEEDED
Status: DISCONTINUED | OUTPATIENT
Start: 2022-01-01 | End: 2022-01-01 | Stop reason: HOSPADM

## 2022-01-01 RX ORDER — SODIUM CHLORIDE 0.9 % (FLUSH) 0.9 %
5-40 SYRINGE (ML) INJECTION PRN
Status: CANCELLED | OUTPATIENT
Start: 2022-01-01

## 2022-01-01 RX ORDER — SODIUM CHLORIDE 9 MG/ML
250 INJECTION, SOLUTION INTRAVENOUS ONCE
Status: COMPLETED | OUTPATIENT
Start: 2022-01-01 | End: 2022-01-01

## 2022-01-01 RX ORDER — SODIUM CHLORIDE 9 MG/ML
INJECTION, SOLUTION INTRAVENOUS PRN
Status: DISCONTINUED | OUTPATIENT
Start: 2022-01-01 | End: 2022-01-01

## 2022-01-01 RX ORDER — OXYCODONE HYDROCHLORIDE 5 MG/1
5 TABLET ORAL EVERY 8 HOURS PRN
Status: DISCONTINUED | OUTPATIENT
Start: 2022-01-01 | End: 2022-01-01 | Stop reason: HOSPADM

## 2022-01-01 RX ORDER — DIPHENHYDRAMINE HYDROCHLORIDE 50 MG/ML
50 INJECTION INTRAMUSCULAR; INTRAVENOUS ONCE
Status: CANCELLED | OUTPATIENT
Start: 2022-01-01 | End: 2022-01-01

## 2022-01-01 RX ORDER — SODIUM CHLORIDE 0.9 % (FLUSH) 0.9 %
10 SYRINGE (ML) INJECTION ONCE
Status: COMPLETED | OUTPATIENT
Start: 2022-01-01 | End: 2022-01-01

## 2022-01-01 RX ORDER — HEPARIN SODIUM (PORCINE) LOCK FLUSH IV SOLN 100 UNIT/ML 100 UNIT/ML
500 SOLUTION INTRAVENOUS PRN
Status: CANCELLED | OUTPATIENT
Start: 2022-01-01

## 2022-01-01 RX ORDER — SODIUM CHLORIDE 9 MG/ML
INJECTION, SOLUTION INTRAVENOUS CONTINUOUS
Status: CANCELLED | OUTPATIENT
Start: 2022-01-01

## 2022-01-01 RX ORDER — MORPHINE SULFATE 2 MG/ML
2 INJECTION, SOLUTION INTRAMUSCULAR; INTRAVENOUS EVERY 4 HOURS PRN
Status: DISCONTINUED | OUTPATIENT
Start: 2022-01-01 | End: 2022-01-01 | Stop reason: HOSPADM

## 2022-01-01 RX ORDER — MIDAZOLAM HYDROCHLORIDE 1 MG/ML
.5-2 INJECTION, SOLUTION INTRAMUSCULAR; INTRAVENOUS
Status: DISCONTINUED | OUTPATIENT
Start: 2022-01-01 | End: 2022-01-01 | Stop reason: HOSPADM

## 2022-01-01 RX ORDER — DIPHENHYDRAMINE HYDROCHLORIDE 50 MG/ML
25 INJECTION INTRAMUSCULAR; INTRAVENOUS ONCE
Status: COMPLETED | OUTPATIENT
Start: 2022-01-01 | End: 2022-01-01

## 2022-01-01 RX ORDER — DIPHENHYDRAMINE HYDROCHLORIDE 50 MG/ML
12.5-5 INJECTION, SOLUTION INTRAMUSCULAR; INTRAVENOUS ONCE
Status: COMPLETED | OUTPATIENT
Start: 2022-01-01 | End: 2022-01-01

## 2022-01-01 RX ORDER — ONDANSETRON 2 MG/ML
4 INJECTION INTRAMUSCULAR; INTRAVENOUS EVERY 6 HOURS PRN
Status: DISCONTINUED | OUTPATIENT
Start: 2022-01-01 | End: 2022-01-01 | Stop reason: HOSPADM

## 2022-01-01 RX ORDER — ACETAMINOPHEN 650 MG/1
650 SUPPOSITORY RECTAL EVERY 6 HOURS PRN
Status: DISCONTINUED | OUTPATIENT
Start: 2022-01-01 | End: 2022-01-01

## 2022-01-01 RX ORDER — MIDAZOLAM HYDROCHLORIDE 2 MG/2ML
INJECTION, SOLUTION INTRAMUSCULAR; INTRAVENOUS
Status: COMPLETED | OUTPATIENT
Start: 2022-01-01 | End: 2022-01-01

## 2022-01-01 RX ORDER — DIPHENHYDRAMINE HYDROCHLORIDE 50 MG/ML
50 INJECTION INTRAMUSCULAR; INTRAVENOUS
Status: CANCELLED | OUTPATIENT
Start: 2022-06-23

## 2022-01-01 RX ORDER — ACYCLOVIR 800 MG/1
400 TABLET ORAL 2 TIMES DAILY
Status: DISCONTINUED | OUTPATIENT
Start: 2022-01-01 | End: 2022-01-01 | Stop reason: HOSPADM

## 2022-01-01 RX ORDER — SODIUM CHLORIDE 0.9 % (FLUSH) 0.9 %
10 SYRINGE (ML) INJECTION AS NEEDED
Status: ACTIVE | OUTPATIENT
Start: 2022-01-01 | End: 2022-01-01

## 2022-01-01 RX ORDER — DOCUSATE SODIUM 100 MG/1
100 CAPSULE, LIQUID FILLED ORAL DAILY
Status: DISCONTINUED | OUTPATIENT
Start: 2022-01-01 | End: 2022-01-01 | Stop reason: HOSPADM

## 2022-01-01 RX ORDER — FUROSEMIDE 20 MG/1
20 TABLET ORAL DAILY
Status: DISCONTINUED | OUTPATIENT
Start: 2022-01-01 | End: 2022-01-01 | Stop reason: HOSPADM

## 2022-01-01 RX ORDER — POTASSIUM CHLORIDE 14.9 MG/ML
20 INJECTION INTRAVENOUS ONCE
Status: COMPLETED | OUTPATIENT
Start: 2022-01-01 | End: 2022-01-01

## 2022-01-01 RX ORDER — FAMOTIDINE 20 MG/1
20 TABLET, FILM COATED ORAL 2 TIMES DAILY
Status: DISCONTINUED | OUTPATIENT
Start: 2022-01-01 | End: 2022-01-01 | Stop reason: HOSPADM

## 2022-01-01 RX ORDER — ALBUTEROL SULFATE 90 UG/1
4 AEROSOL, METERED RESPIRATORY (INHALATION) PRN
Status: CANCELLED | OUTPATIENT
Start: 2022-01-01

## 2022-01-01 RX ORDER — GUAIFENESIN 100 MG/5ML
200 SOLUTION ORAL EVERY 4 HOURS PRN
Status: DISCONTINUED | OUTPATIENT
Start: 2022-01-01 | End: 2022-01-01 | Stop reason: HOSPADM

## 2022-01-01 RX ORDER — ONDANSETRON 4 MG/1
4 TABLET, ORALLY DISINTEGRATING ORAL EVERY 8 HOURS PRN
Status: DISCONTINUED | OUTPATIENT
Start: 2022-01-01 | End: 2022-01-01 | Stop reason: HOSPADM

## 2022-01-01 RX ORDER — ONDANSETRON 2 MG/ML
8 INJECTION INTRAMUSCULAR; INTRAVENOUS ONCE
Status: COMPLETED | OUTPATIENT
Start: 2022-01-01 | End: 2022-01-01

## 2022-01-01 RX ORDER — DIPHENHYDRAMINE HCL 25 MG
25 CAPSULE ORAL ONCE
Status: DISCONTINUED | OUTPATIENT
Start: 2022-01-01 | End: 2022-01-01

## 2022-01-01 RX ORDER — SODIUM CHLORIDE 9 MG/ML
INJECTION, SOLUTION INTRAVENOUS PRN
Status: COMPLETED | OUTPATIENT
Start: 2022-01-01 | End: 2022-01-01

## 2022-01-01 RX ORDER — ALBUTEROL SULFATE 2.5 MG/3ML
10 SOLUTION RESPIRATORY (INHALATION) ONCE
Status: COMPLETED | OUTPATIENT
Start: 2022-01-01 | End: 2022-01-01

## 2022-01-01 RX ORDER — SODIUM CHLORIDE 9 MG/ML
25 INJECTION, SOLUTION INTRAVENOUS PRN
Status: CANCELLED | OUTPATIENT
Start: 2022-01-01

## 2022-01-01 RX ORDER — SODIUM CHLORIDE 0.9 % (FLUSH) 0.9 %
10-40 SYRINGE (ML) INJECTION AS NEEDED
Status: ACTIVE | OUTPATIENT
Start: 2022-01-01 | End: 2022-01-01

## 2022-01-01 RX ORDER — SODIUM CHLORIDE 9 MG/ML
25 INJECTION, SOLUTION INTRAVENOUS ONCE
Status: COMPLETED | OUTPATIENT
Start: 2022-01-01 | End: 2022-01-01

## 2022-01-01 RX ORDER — SODIUM CHLORIDE 0.9 % (FLUSH) 0.9 %
5-40 SYRINGE (ML) INJECTION PRN
Status: DISCONTINUED | OUTPATIENT
Start: 2022-01-01 | End: 2022-01-01 | Stop reason: HOSPADM

## 2022-01-01 RX ORDER — SODIUM CHLORIDE 9 MG/ML
20 INJECTION, SOLUTION INTRAVENOUS CONTINUOUS
Status: DISCONTINUED | OUTPATIENT
Start: 2022-01-01 | End: 2022-01-01 | Stop reason: HOSPADM

## 2022-01-01 RX ORDER — PSEUDOEPHEDRINE HCL 30 MG
100 TABLET ORAL DAILY
Status: DISCONTINUED | OUTPATIENT
Start: 2022-01-01 | End: 2022-01-01

## 2022-01-01 RX ORDER — SODIUM CHLORIDE, SODIUM LACTATE, POTASSIUM CHLORIDE, AND CALCIUM CHLORIDE .6; .31; .03; .02 G/100ML; G/100ML; G/100ML; G/100ML
1000 INJECTION, SOLUTION INTRAVENOUS ONCE
Status: COMPLETED | OUTPATIENT
Start: 2022-01-01 | End: 2022-01-01

## 2022-01-01 RX ORDER — MIDODRINE HYDROCHLORIDE 5 MG/1
5 TABLET ORAL
Status: DISCONTINUED | OUTPATIENT
Start: 2022-01-01 | End: 2022-01-01 | Stop reason: HOSPADM

## 2022-01-01 RX ORDER — SODIUM CHLORIDE 9 MG/ML
250 INJECTION, SOLUTION INTRAVENOUS AS NEEDED
Status: CANCELLED | OUTPATIENT
Start: 2022-01-01

## 2022-01-01 RX ORDER — ACETAMINOPHEN 325 MG/1
650 TABLET ORAL ONCE
Status: DISPENSED | OUTPATIENT
Start: 2022-01-01 | End: 2022-01-01

## 2022-01-01 RX ORDER — POTASSIUM CHLORIDE 20 MEQ/1
20 TABLET, EXTENDED RELEASE ORAL DAILY
Status: DISCONTINUED | OUTPATIENT
Start: 2022-01-01 | End: 2022-01-01 | Stop reason: HOSPADM

## 2022-01-01 RX ORDER — ONDANSETRON 2 MG/ML
8 INJECTION INTRAMUSCULAR; INTRAVENOUS
Status: CANCELLED | OUTPATIENT
Start: 2022-01-01

## 2022-01-01 RX ORDER — ACETAMINOPHEN 325 MG/1
650 TABLET ORAL ONCE
Status: CANCELLED | OUTPATIENT
Start: 2022-01-01 | End: 2022-01-01

## 2022-01-01 RX ORDER — EPINEPHRINE 1 MG/ML
0.3 INJECTION, SOLUTION, CONCENTRATE INTRAVENOUS AS NEEDED
Status: DISCONTINUED | OUTPATIENT
Start: 2022-01-01 | End: 2022-01-01 | Stop reason: HOSPADM

## 2022-01-01 RX ORDER — LANOLIN ALCOHOL/MO/W.PET/CERES
400 CREAM (GRAM) TOPICAL DAILY
Status: DISCONTINUED | OUTPATIENT
Start: 2022-01-01 | End: 2022-01-01 | Stop reason: HOSPADM

## 2022-01-01 RX ORDER — LORAZEPAM 1 MG/1
1 TABLET ORAL 2 TIMES DAILY PRN
Status: DISCONTINUED | OUTPATIENT
Start: 2022-01-01 | End: 2022-01-01 | Stop reason: HOSPADM

## 2022-01-01 RX ORDER — ACETAMINOPHEN 325 MG/1
650 TABLET ORAL
Status: DISCONTINUED | OUTPATIENT
Start: 2022-01-01 | End: 2022-01-01 | Stop reason: HOSPADM

## 2022-01-01 RX ORDER — ACETAMINOPHEN 325 MG/1
650 TABLET ORAL
Status: CANCELLED | OUTPATIENT
Start: 2022-06-23

## 2022-01-01 RX ORDER — DIPHENHYDRAMINE HCL 25 MG
25 CAPSULE ORAL ONCE
Status: CANCELLED | OUTPATIENT
Start: 2022-01-01 | End: 2022-01-01

## 2022-01-01 RX ORDER — INSULIN LISPRO 100 [IU]/ML
0-4 INJECTION, SOLUTION INTRAVENOUS; SUBCUTANEOUS NIGHTLY
Status: DISCONTINUED | OUTPATIENT
Start: 2022-01-01 | End: 2022-01-01

## 2022-01-01 RX ORDER — FLUCONAZOLE 100 MG/1
200 TABLET ORAL DAILY
Status: DISCONTINUED | OUTPATIENT
Start: 2022-01-01 | End: 2022-01-01 | Stop reason: HOSPADM

## 2022-01-01 RX ORDER — LIDOCAINE HYDROCHLORIDE 20 MG/ML
20-300 INJECTION, SOLUTION INFILTRATION; PERINEURAL
Status: DISCONTINUED | OUTPATIENT
Start: 2022-01-01 | End: 2022-01-01 | Stop reason: HOSPADM

## 2022-01-01 RX ORDER — MEPERIDINE HYDROCHLORIDE 50 MG/ML
12.5 INJECTION INTRAMUSCULAR; INTRAVENOUS; SUBCUTANEOUS PRN
Status: CANCELLED | OUTPATIENT
Start: 2022-01-01

## 2022-01-01 RX ORDER — ATORVASTATIN CALCIUM 10 MG/1
10 TABLET, FILM COATED ORAL DAILY
Status: DISCONTINUED | OUTPATIENT
Start: 2022-01-01 | End: 2022-01-01 | Stop reason: HOSPADM

## 2022-01-01 RX ORDER — ALLOPURINOL 100 MG/1
300 TABLET ORAL DAILY
Status: DISCONTINUED | OUTPATIENT
Start: 2022-01-01 | End: 2022-01-01

## 2022-01-01 RX ORDER — SODIUM CHLORIDE 9 MG/ML
5-40 INJECTION INTRAVENOUS PRN
Status: CANCELLED | OUTPATIENT
Start: 2022-01-01

## 2022-01-01 RX ORDER — SODIUM CHLORIDE 9 MG/ML
25 INJECTION, SOLUTION INTRAVENOUS CONTINUOUS
Status: ACTIVE | OUTPATIENT
Start: 2022-01-01 | End: 2022-01-01

## 2022-01-01 RX ORDER — ALLOPURINOL 300 MG/1
300 TABLET ORAL DAILY
Status: DISCONTINUED | OUTPATIENT
Start: 2022-01-01 | End: 2022-01-01 | Stop reason: HOSPADM

## 2022-01-01 RX ORDER — ONDANSETRON 2 MG/ML
8 INJECTION INTRAMUSCULAR; INTRAVENOUS ONCE
Status: CANCELLED | OUTPATIENT
Start: 2022-01-01 | End: 2022-01-01

## 2022-01-01 RX ORDER — SODIUM CHLORIDE 0.9 % (FLUSH) 0.9 %
10-30 SYRINGE (ML) INJECTION AS NEEDED
Status: DISCONTINUED | OUTPATIENT
Start: 2022-01-01 | End: 2022-01-01 | Stop reason: HOSPADM

## 2022-01-01 RX ORDER — EPINEPHRINE 1 MG/ML
0.3 INJECTION, SOLUTION, CONCENTRATE INTRAVENOUS PRN
Status: CANCELLED | OUTPATIENT
Start: 2022-06-23

## 2022-01-01 RX ORDER — SODIUM CHLORIDE 9 MG/ML
25 INJECTION, SOLUTION INTRAVENOUS CONTINUOUS
Status: DISCONTINUED | OUTPATIENT
Start: 2022-01-01 | End: 2022-01-01 | Stop reason: HOSPADM

## 2022-01-01 RX ORDER — SODIUM CHLORIDE 0.9 % (FLUSH) 0.9 %
10 SYRINGE (ML) INJECTION EVERY 8 HOURS
Status: DISCONTINUED | OUTPATIENT
Start: 2022-01-01 | End: 2022-01-01

## 2022-01-01 RX ORDER — MAGNESIUM SULFATE HEPTAHYDRATE 40 MG/ML
2 INJECTION, SOLUTION INTRAVENOUS ONCE
Status: COMPLETED | OUTPATIENT
Start: 2022-01-01 | End: 2022-01-01

## 2022-01-01 RX ORDER — EPINEPHRINE 1 MG/ML
0.3 INJECTION, SOLUTION, CONCENTRATE INTRAVENOUS PRN
Status: CANCELLED | OUTPATIENT
Start: 2022-01-01

## 2022-01-01 RX ORDER — GLUCAGON 1 MG/ML
1 KIT INJECTION PRN
Status: DISCONTINUED | OUTPATIENT
Start: 2022-01-01 | End: 2022-01-01 | Stop reason: HOSPADM

## 2022-01-01 RX ORDER — FUROSEMIDE 40 MG/1
20 TABLET ORAL DAILY
Status: DISCONTINUED | OUTPATIENT
Start: 2022-01-01 | End: 2022-01-01 | Stop reason: HOSPADM

## 2022-01-01 RX ORDER — MAGNESIUM SULFATE HEPTAHYDRATE 40 MG/ML
4 INJECTION, SOLUTION INTRAVENOUS ONCE
Status: COMPLETED | OUTPATIENT
Start: 2022-01-01 | End: 2022-01-01

## 2022-01-01 RX ORDER — 0.9 % SODIUM CHLORIDE 0.9 %
500 INTRAVENOUS SOLUTION INTRAVENOUS ONCE
Status: COMPLETED | OUTPATIENT
Start: 2022-01-01 | End: 2022-01-01

## 2022-01-01 RX ORDER — LOPERAMIDE HYDROCHLORIDE 2 MG/1
2 CAPSULE ORAL 4 TIMES DAILY PRN
Status: DISCONTINUED | OUTPATIENT
Start: 2022-01-01 | End: 2022-01-01 | Stop reason: HOSPADM

## 2022-01-01 RX ORDER — GABAPENTIN 100 MG/1
100 CAPSULE ORAL 3 TIMES DAILY
Status: DISCONTINUED | OUTPATIENT
Start: 2022-01-01 | End: 2022-01-01 | Stop reason: HOSPADM

## 2022-01-01 RX ORDER — LEVOFLOXACIN 500 MG/1
500 TABLET, FILM COATED ORAL DAILY
Status: DISCONTINUED | OUTPATIENT
Start: 2022-01-01 | End: 2022-01-01 | Stop reason: HOSPADM

## 2022-01-01 RX ORDER — DIPHENHYDRAMINE HYDROCHLORIDE 50 MG/ML
25 INJECTION, SOLUTION INTRAMUSCULAR; INTRAVENOUS ONCE
Status: COMPLETED | OUTPATIENT
Start: 2022-01-01 | End: 2022-01-01

## 2022-01-01 RX ORDER — MAGNESIUM OXIDE 400 MG/1
400 TABLET ORAL DAILY
Status: DISCONTINUED | OUTPATIENT
Start: 2022-01-01 | End: 2022-01-01

## 2022-01-01 RX ORDER — DIPHENOXYLATE HYDROCHLORIDE AND ATROPINE SULFATE 2.5; .025 MG/1; MG/1
2 TABLET ORAL 2 TIMES DAILY
Status: DISCONTINUED | OUTPATIENT
Start: 2022-01-01 | End: 2022-01-01 | Stop reason: HOSPADM

## 2022-01-01 RX ORDER — SODIUM CHLORIDE 9 MG/ML
250 INJECTION, SOLUTION INTRAVENOUS ONCE
Status: CANCELLED | OUTPATIENT
Start: 2022-01-01 | End: 2022-01-01

## 2022-01-01 RX ORDER — SODIUM CHLORIDE 0.9 % (FLUSH) 0.9 %
10 SYRINGE (ML) INJECTION PRN
Status: DISCONTINUED | OUTPATIENT
Start: 2022-01-01 | End: 2022-01-01 | Stop reason: HOSPADM

## 2022-01-01 RX ORDER — MAGNESIUM SULFATE IN WATER 40 MG/ML
2000 INJECTION, SOLUTION INTRAVENOUS ONCE
Status: COMPLETED | OUTPATIENT
Start: 2022-01-01 | End: 2022-01-01

## 2022-01-01 RX ORDER — FENTANYL CITRATE 50 UG/ML
INJECTION, SOLUTION INTRAMUSCULAR; INTRAVENOUS
Status: COMPLETED | OUTPATIENT
Start: 2022-01-01 | End: 2022-01-01

## 2022-01-01 RX ORDER — SODIUM CHLORIDE 9 MG/ML
5-40 INJECTION INTRAVENOUS PRN
Status: CANCELLED | OUTPATIENT
Start: 2022-06-23

## 2022-01-01 RX ORDER — POTASSIUM CHLORIDE 20 MEQ/1
20 TABLET, EXTENDED RELEASE ORAL 2 TIMES DAILY
Status: DISCONTINUED | OUTPATIENT
Start: 2022-01-01 | End: 2022-01-01 | Stop reason: HOSPADM

## 2022-01-01 RX ORDER — METHYLPREDNISOLONE SODIUM SUCCINATE 40 MG/ML
40 INJECTION, POWDER, LYOPHILIZED, FOR SOLUTION INTRAMUSCULAR; INTRAVENOUS ONCE
Status: COMPLETED | OUTPATIENT
Start: 2022-01-01 | End: 2022-01-01

## 2022-01-01 RX ORDER — ONDANSETRON 2 MG/ML
8 INJECTION INTRAMUSCULAR; INTRAVENOUS ONCE
Status: CANCELLED | OUTPATIENT
Start: 2022-06-23 | End: 2022-01-01

## 2022-01-01 RX ORDER — FAMOTIDINE 20 MG/1
20 TABLET, FILM COATED ORAL ONCE
Status: COMPLETED | OUTPATIENT
Start: 2022-01-01 | End: 2022-01-01

## 2022-01-01 RX ORDER — LORAZEPAM 2 MG/ML
0.5 INJECTION INTRAMUSCULAR EVERY 4 HOURS PRN
Status: DISCONTINUED | OUTPATIENT
Start: 2022-01-01 | End: 2022-01-01 | Stop reason: HOSPADM

## 2022-01-01 RX ORDER — DIPHENHYDRAMINE HYDROCHLORIDE 50 MG/ML
INJECTION INTRAMUSCULAR; INTRAVENOUS
Status: COMPLETED | OUTPATIENT
Start: 2022-01-01 | End: 2022-01-01

## 2022-01-01 RX ORDER — HYDROCORTISONE SODIUM SUCCINATE 100 MG/2ML
100 INJECTION, POWDER, FOR SOLUTION INTRAMUSCULAR; INTRAVENOUS ONCE
Status: COMPLETED | OUTPATIENT
Start: 2022-01-01 | End: 2022-01-01

## 2022-01-01 RX ORDER — ONDANSETRON 2 MG/ML
8 INJECTION INTRAMUSCULAR; INTRAVENOUS
Status: CANCELLED | OUTPATIENT
Start: 2022-06-23

## 2022-01-01 RX ORDER — FAMOTIDINE 10 MG/ML
20 INJECTION INTRAVENOUS ONCE
Status: DISCONTINUED | OUTPATIENT
Start: 2022-01-01 | End: 2022-01-01 | Stop reason: HOSPADM

## 2022-01-01 RX ORDER — METHYLPREDNISOLONE SODIUM SUCCINATE 125 MG/2ML
125 INJECTION, POWDER, LYOPHILIZED, FOR SOLUTION INTRAMUSCULAR; INTRAVENOUS ONCE
Status: CANCELLED | OUTPATIENT
Start: 2022-01-01 | End: 2022-01-01

## 2022-01-01 RX ORDER — SODIUM CHLORIDE 0.9 % (FLUSH) 0.9 %
10 SYRINGE (ML) INJECTION
Status: COMPLETED | OUTPATIENT
Start: 2022-01-01 | End: 2022-01-01

## 2022-01-01 RX ORDER — HYDROCODONE BITARTRATE AND ACETAMINOPHEN 5; 325 MG/1; MG/1
1 TABLET ORAL EVERY 6 HOURS PRN
Status: DISCONTINUED | OUTPATIENT
Start: 2022-01-01 | End: 2022-01-01 | Stop reason: HOSPADM

## 2022-01-01 RX ORDER — IBUPROFEN 200 MG
625 CAPSULE ORAL DAILY
Status: DISCONTINUED | OUTPATIENT
Start: 2022-01-01 | End: 2022-01-01 | Stop reason: SDUPTHER

## 2022-01-01 RX ORDER — ACETAMINOPHEN 325 MG/1
650 TABLET ORAL
Status: COMPLETED | OUTPATIENT
Start: 2022-01-01 | End: 2022-01-01

## 2022-01-01 RX ORDER — SODIUM CHLORIDE 9 MG/ML
20 INJECTION, SOLUTION INTRAVENOUS CONTINUOUS
Status: CANCELLED | OUTPATIENT
Start: 2022-01-01

## 2022-01-01 RX ORDER — POTASSIUM CHLORIDE 20 MEQ/1
20 TABLET, EXTENDED RELEASE ORAL DAILY
Status: DISCONTINUED | OUTPATIENT
Start: 2022-01-01 | End: 2022-01-01

## 2022-01-01 RX ORDER — HEPARIN SODIUM (PORCINE) LOCK FLUSH IV SOLN 100 UNIT/ML 100 UNIT/ML
500 SOLUTION INTRAVENOUS PRN
Status: CANCELLED | OUTPATIENT
Start: 2022-06-23

## 2022-01-01 RX ORDER — ONDANSETRON 2 MG/ML
8 INJECTION INTRAMUSCULAR; INTRAVENOUS AS NEEDED
Status: DISCONTINUED | OUTPATIENT
Start: 2022-01-01 | End: 2022-01-01 | Stop reason: HOSPADM

## 2022-01-01 RX ORDER — ACETAMINOPHEN 325 MG/1
650 TABLET ORAL ONCE
Status: DISCONTINUED | OUTPATIENT
Start: 2022-01-01 | End: 2022-01-01

## 2022-01-01 RX ORDER — DIPHENHYDRAMINE HYDROCHLORIDE 50 MG/ML
INJECTION INTRAMUSCULAR; INTRAVENOUS
Status: COMPLETED
Start: 2022-01-01 | End: 2022-01-01

## 2022-01-01 RX ORDER — MIRTAZAPINE 15 MG/1
15 TABLET, FILM COATED ORAL NIGHTLY
Status: DISCONTINUED | OUTPATIENT
Start: 2022-01-01 | End: 2022-01-01 | Stop reason: HOSPADM

## 2022-01-01 RX ORDER — DIPHENHYDRAMINE HCL 25 MG
25 CAPSULE ORAL EVERY 6 HOURS PRN
Status: DISCONTINUED | OUTPATIENT
Start: 2022-01-01 | End: 2022-01-01 | Stop reason: HOSPADM

## 2022-01-01 RX ORDER — FAMOTIDINE 10 MG/ML
20 INJECTION, SOLUTION INTRAVENOUS
Status: CANCELLED | OUTPATIENT
Start: 2022-01-01

## 2022-01-01 RX ORDER — PROCHLORPERAZINE EDISYLATE 5 MG/ML
10 INJECTION INTRAMUSCULAR; INTRAVENOUS EVERY 6 HOURS PRN
Status: DISCONTINUED | OUTPATIENT
Start: 2022-01-01 | End: 2022-01-01 | Stop reason: HOSPADM

## 2022-01-01 RX ORDER — SODIUM CHLORIDE 9 MG/ML
INJECTION, SOLUTION INTRAVENOUS PRN
Status: DISCONTINUED | OUTPATIENT
Start: 2022-01-01 | End: 2022-01-01 | Stop reason: SDUPTHER

## 2022-01-01 RX ORDER — DIPHENHYDRAMINE HYDROCHLORIDE 50 MG/ML
25 INJECTION, SOLUTION INTRAMUSCULAR; INTRAVENOUS ONCE
Status: DISPENSED | OUTPATIENT
Start: 2022-01-01 | End: 2022-01-01

## 2022-01-01 RX ORDER — LIDOCAINE AND PRILOCAINE 25; 25 MG/G; MG/G
CREAM TOPICAL PRN
Qty: 1 EACH | Refills: 1 | Status: ON HOLD | OUTPATIENT
Start: 2022-01-01 | End: 2022-01-01 | Stop reason: HOSPADM

## 2022-01-01 RX ORDER — FLUDEOXYGLUCOSE F18 300 MCI/ML
10 INJECTION INTRAVENOUS ONCE
Status: COMPLETED | OUTPATIENT
Start: 2022-01-01 | End: 2022-01-01

## 2022-01-01 RX ORDER — DIPHENHYDRAMINE HCL 25 MG
25 CAPSULE ORAL
Status: DISPENSED | OUTPATIENT
Start: 2022-01-01 | End: 2022-01-01

## 2022-01-01 RX ORDER — LORAZEPAM 1 MG/1
1 TABLET ORAL EVERY 8 HOURS PRN
Status: DISCONTINUED | OUTPATIENT
Start: 2022-01-01 | End: 2022-01-01

## 2022-01-01 RX ORDER — HYDROCORTISONE SODIUM SUCCINATE 100 MG/2ML
100 INJECTION, POWDER, FOR SOLUTION INTRAMUSCULAR; INTRAVENOUS AS NEEDED
Status: ACTIVE | OUTPATIENT
Start: 2022-01-01 | End: 2022-01-01

## 2022-01-01 RX ORDER — SODIUM CHLORIDE 9 MG/ML
5-250 INJECTION, SOLUTION INTRAVENOUS PRN
Status: CANCELLED | OUTPATIENT
Start: 2022-06-23

## 2022-01-01 RX ORDER — DIMETHICONE, CAMPHOR (SYNTHETIC), MENTHOL, AND PHENOL 1.1; .5; .625; .5 G/100G; G/100G; G/100G; G/100G
OINTMENT TOPICAL PRN
Status: DISCONTINUED | OUTPATIENT
Start: 2022-01-01 | End: 2022-01-01 | Stop reason: HOSPADM

## 2022-01-01 RX ORDER — IBUPROFEN 200 MG
1250 CAPSULE ORAL 2 TIMES DAILY
Status: DISCONTINUED | OUTPATIENT
Start: 2022-01-01 | End: 2022-01-01

## 2022-01-01 RX ORDER — INSULIN LISPRO 100 [IU]/ML
0-8 INJECTION, SOLUTION INTRAVENOUS; SUBCUTANEOUS
Status: DISCONTINUED | OUTPATIENT
Start: 2022-01-01 | End: 2022-01-01 | Stop reason: HOSPADM

## 2022-01-01 RX ORDER — SODIUM CHLORIDE 9 MG/ML
INJECTION, SOLUTION INTRAVENOUS CONTINUOUS
Status: CANCELLED | OUTPATIENT
Start: 2022-06-23

## 2022-01-01 RX ORDER — LORAZEPAM 1 MG/1
1 TABLET ORAL EVERY 4 HOURS PRN
Status: DISCONTINUED | OUTPATIENT
Start: 2022-01-01 | End: 2022-01-01 | Stop reason: HOSPADM

## 2022-01-01 RX ORDER — DIPHENHYDRAMINE HYDROCHLORIDE 12.5 MG/1
25 BAR, CHEWABLE ORAL EVERY 6 HOURS PRN
Status: DISCONTINUED | OUTPATIENT
Start: 2022-01-01 | End: 2022-01-01 | Stop reason: CLARIF

## 2022-01-01 RX ORDER — FAMOTIDINE 10 MG/ML
20 INJECTION, SOLUTION INTRAVENOUS
Status: CANCELLED | OUTPATIENT
Start: 2022-06-23

## 2022-01-01 RX ORDER — METHYLPREDNISOLONE SODIUM SUCCINATE 125 MG/2ML
125 INJECTION, POWDER, LYOPHILIZED, FOR SOLUTION INTRAMUSCULAR; INTRAVENOUS ONCE
Status: COMPLETED | OUTPATIENT
Start: 2022-01-01 | End: 2022-01-01

## 2022-01-01 RX ORDER — LIDOCAINE AND PRILOCAINE 25; 25 MG/G; MG/G
CREAM TOPICAL PRN
Qty: 1 EACH | Refills: 1 | Status: SHIPPED | OUTPATIENT
Start: 2022-01-01 | End: 2022-01-01 | Stop reason: SDUPTHER

## 2022-01-01 RX ORDER — INSULIN LISPRO 100 [IU]/ML
0-8 INJECTION, SOLUTION INTRAVENOUS; SUBCUTANEOUS
Status: DISCONTINUED | OUTPATIENT
Start: 2022-01-01 | End: 2022-01-01

## 2022-01-01 RX ORDER — 0.9 % SODIUM CHLORIDE 0.9 %
1000 INTRAVENOUS SOLUTION INTRAVENOUS ONCE
Status: COMPLETED | OUTPATIENT
Start: 2022-01-01 | End: 2022-01-01

## 2022-01-01 RX ORDER — SODIUM CHLORIDE 9 MG/ML
10 INJECTION INTRAMUSCULAR; INTRAVENOUS; SUBCUTANEOUS AS NEEDED
Status: DISCONTINUED | OUTPATIENT
Start: 2022-01-01 | End: 2022-01-01 | Stop reason: HOSPADM

## 2022-01-01 RX ORDER — DIPHENHYDRAMINE HYDROCHLORIDE 50 MG/ML
50 INJECTION, SOLUTION INTRAMUSCULAR; INTRAVENOUS AS NEEDED
Status: DISCONTINUED | OUTPATIENT
Start: 2022-01-01 | End: 2022-01-01 | Stop reason: HOSPADM

## 2022-01-01 RX ORDER — HYDROCORTISONE SODIUM SUCCINATE 100 MG/2ML
100 INJECTION, POWDER, FOR SOLUTION INTRAMUSCULAR; INTRAVENOUS AS NEEDED
Status: DISPENSED | OUTPATIENT
Start: 2022-01-01 | End: 2022-01-01

## 2022-01-01 RX ORDER — METHYLPREDNISOLONE SODIUM SUCCINATE 125 MG/2ML
125 INJECTION, POWDER, LYOPHILIZED, FOR SOLUTION INTRAMUSCULAR; INTRAVENOUS ONCE
Status: DISCONTINUED | OUTPATIENT
Start: 2022-01-01 | End: 2022-01-01

## 2022-01-01 RX ORDER — GABAPENTIN 100 MG/1
100 CAPSULE ORAL 2 TIMES DAILY
Status: DISCONTINUED | OUTPATIENT
Start: 2022-01-01 | End: 2022-01-01 | Stop reason: HOSPADM

## 2022-01-01 RX ORDER — METHYLPREDNISOLONE SODIUM SUCCINATE 125 MG/2ML
INJECTION, POWDER, LYOPHILIZED, FOR SOLUTION INTRAMUSCULAR; INTRAVENOUS
Status: DISPENSED
Start: 2022-01-01 | End: 2022-01-01

## 2022-01-01 RX ORDER — METHYLPREDNISOLONE SODIUM SUCCINATE 125 MG/2ML
125 INJECTION, POWDER, LYOPHILIZED, FOR SOLUTION INTRAMUSCULAR; INTRAVENOUS ONCE
Status: CANCELLED
Start: 2022-01-01 | End: 2022-01-01

## 2022-01-01 RX ORDER — SODIUM CHLORIDE 0.9 % (FLUSH) 0.9 %
5-40 SYRINGE (ML) INJECTION PRN
Status: CANCELLED | OUTPATIENT
Start: 2022-06-23

## 2022-01-01 RX ORDER — AZELASTINE 1 MG/ML
1 SPRAY, METERED NASAL 2 TIMES DAILY
Status: DISCONTINUED | OUTPATIENT
Start: 2022-01-01 | End: 2022-01-01 | Stop reason: HOSPADM

## 2022-01-01 RX ORDER — ALBUTEROL SULFATE 90 UG/1
4 AEROSOL, METERED RESPIRATORY (INHALATION) PRN
Status: CANCELLED | OUTPATIENT
Start: 2022-06-23

## 2022-01-01 RX ORDER — OXYCODONE HYDROCHLORIDE 5 MG/1
5 TABLET ORAL EVERY 8 HOURS PRN
Status: ON HOLD | COMMUNITY
Start: 2021-01-01 | End: 2022-01-01 | Stop reason: HOSPADM

## 2022-01-01 RX ORDER — DIPHENHYDRAMINE HYDROCHLORIDE 50 MG/ML
50 INJECTION, SOLUTION INTRAMUSCULAR; INTRAVENOUS AS NEEDED
Status: ACTIVE | OUTPATIENT
Start: 2022-01-01 | End: 2022-01-01

## 2022-01-01 RX ORDER — HEPARIN SODIUM (PORCINE) LOCK FLUSH IV SOLN 100 UNIT/ML 100 UNIT/ML
500 SOLUTION INTRAVENOUS PRN
Status: DISCONTINUED | OUTPATIENT
Start: 2022-01-01 | End: 2022-01-01 | Stop reason: HOSPADM

## 2022-01-01 RX ORDER — MEPERIDINE HYDROCHLORIDE 50 MG/ML
12.5 INJECTION INTRAMUSCULAR; INTRAVENOUS; SUBCUTANEOUS PRN
Status: CANCELLED | OUTPATIENT
Start: 2022-06-23

## 2022-01-01 RX ORDER — DIPHENHYDRAMINE HCL 25 MG
25 CAPSULE ORAL
Status: DISCONTINUED | OUTPATIENT
Start: 2022-01-01 | End: 2022-01-01 | Stop reason: HOSPADM

## 2022-01-01 RX ORDER — DIPHENHYDRAMINE HYDROCHLORIDE 50 MG/ML
25 INJECTION INTRAMUSCULAR; INTRAVENOUS EVERY 6 HOURS PRN
Status: DISCONTINUED | OUTPATIENT
Start: 2022-01-01 | End: 2022-01-01 | Stop reason: HOSPADM

## 2022-01-01 RX ORDER — ACETAMINOPHEN 650 MG/1
650 SUPPOSITORY RECTAL EVERY 6 HOURS PRN
Status: DISCONTINUED | OUTPATIENT
Start: 2022-01-01 | End: 2022-01-01 | Stop reason: HOSPADM

## 2022-01-01 RX ORDER — SODIUM CHLORIDE 9 MG/ML
INJECTION, SOLUTION INTRAVENOUS CONTINUOUS PRN
Status: COMPLETED | OUTPATIENT
Start: 2022-01-01 | End: 2022-01-01

## 2022-01-01 RX ORDER — DIPHENHYDRAMINE HYDROCHLORIDE 50 MG/ML
25 INJECTION, SOLUTION INTRAMUSCULAR; INTRAVENOUS AS NEEDED
Status: DISPENSED | OUTPATIENT
Start: 2022-01-01 | End: 2022-01-01

## 2022-01-01 RX ORDER — PROCHLORPERAZINE EDISYLATE 5 MG/ML
5 INJECTION INTRAMUSCULAR; INTRAVENOUS EVERY 6 HOURS PRN
Status: DISCONTINUED | OUTPATIENT
Start: 2022-01-01 | End: 2022-01-01 | Stop reason: HOSPADM

## 2022-01-01 RX ORDER — DIPHENHYDRAMINE HYDROCHLORIDE 50 MG/ML
50 INJECTION, SOLUTION INTRAMUSCULAR; INTRAVENOUS ONCE
Status: DISCONTINUED | OUTPATIENT
Start: 2022-01-01 | End: 2022-01-01

## 2022-01-01 RX ORDER — ONDANSETRON 2 MG/ML
8 INJECTION INTRAMUSCULAR; INTRAVENOUS AS NEEDED
Status: DISPENSED | OUTPATIENT
Start: 2022-01-01 | End: 2022-01-01

## 2022-01-01 RX ORDER — SODIUM CHLORIDE 9 MG/ML
INJECTION, SOLUTION INTRAVENOUS CONTINUOUS
Status: DISCONTINUED | OUTPATIENT
Start: 2022-01-01 | End: 2022-01-01 | Stop reason: HOSPADM

## 2022-01-01 RX ORDER — LANOLIN ALCOHOL/MO/W.PET/CERES
400 CREAM (GRAM) TOPICAL DAILY
Qty: 30 TABLET | Refills: 0 | Status: ON HOLD | OUTPATIENT
Start: 2022-01-01 | End: 2022-01-01 | Stop reason: HOSPADM

## 2022-01-01 RX ORDER — FUROSEMIDE 10 MG/ML
20 INJECTION INTRAMUSCULAR; INTRAVENOUS ONCE
Status: COMPLETED | OUTPATIENT
Start: 2022-01-01 | End: 2022-01-01

## 2022-01-01 RX ORDER — LORAZEPAM 0.5 MG/1
1 TABLET ORAL ONCE
Status: COMPLETED | OUTPATIENT
Start: 2022-01-01 | End: 2022-01-01

## 2022-01-01 RX ORDER — DIPHENHYDRAMINE HYDROCHLORIDE 50 MG/ML
25 INJECTION, SOLUTION INTRAMUSCULAR; INTRAVENOUS
Status: DISCONTINUED | OUTPATIENT
Start: 2022-01-01 | End: 2022-01-01 | Stop reason: HOSPADM

## 2022-01-01 RX ORDER — GABAPENTIN 100 MG/1
CAPSULE ORAL 2 TIMES DAILY
Status: ON HOLD | COMMUNITY
Start: 2022-01-01 | End: 2022-01-01 | Stop reason: HOSPADM

## 2022-01-01 RX ADMIN — FLUCONAZOLE 200 MG: 100 TABLET ORAL at 09:23

## 2022-01-01 RX ADMIN — SODIUM CHLORIDE, PRESERVATIVE FREE 10 ML: 5 INJECTION INTRAVENOUS at 21:09

## 2022-01-01 RX ADMIN — ACYCLOVIR 400 MG: 800 TABLET ORAL at 09:23

## 2022-01-01 RX ADMIN — SERTRALINE 50 MG: 50 TABLET, FILM COATED ORAL at 08:45

## 2022-01-01 RX ADMIN — Medication 400 MG: at 09:05

## 2022-01-01 RX ADMIN — DIPHENHYDRAMINE HYDROCHLORIDE 25 MG: 50 INJECTION, SOLUTION INTRAMUSCULAR; INTRAVENOUS at 11:25

## 2022-01-01 RX ADMIN — ACYCLOVIR 400 MG: 800 TABLET ORAL at 21:19

## 2022-01-01 RX ADMIN — MIDODRINE HYDROCHLORIDE 5 MG: 5 TABLET ORAL at 08:59

## 2022-01-01 RX ADMIN — ALLOPURINOL 300 MG: 300 TABLET ORAL at 08:24

## 2022-01-01 RX ADMIN — SERTRALINE 50 MG: 50 TABLET, FILM COATED ORAL at 10:50

## 2022-01-01 RX ADMIN — NYSTATIN 500000 UNITS: 100000 SUSPENSION ORAL at 12:57

## 2022-01-01 RX ADMIN — SODIUM CHLORIDE 250 ML: 9 INJECTION, SOLUTION INTRAVENOUS at 13:45

## 2022-01-01 RX ADMIN — METHYLPREDNISOLONE SODIUM SUCCINATE 125 MG: 125 INJECTION, POWDER, FOR SOLUTION INTRAMUSCULAR; INTRAVENOUS at 11:06

## 2022-01-01 RX ADMIN — LORAZEPAM 0.5 MG: 2 INJECTION INTRAMUSCULAR; INTRAVENOUS at 00:19

## 2022-01-01 RX ADMIN — GABAPENTIN 100 MG: 100 CAPSULE ORAL at 08:59

## 2022-01-01 RX ADMIN — CEFEPIME HYDROCHLORIDE 2000 MG: 2 INJECTION, POWDER, FOR SOLUTION INTRAVENOUS at 21:41

## 2022-01-01 RX ADMIN — Medication 10 ML: at 08:50

## 2022-01-01 RX ADMIN — MAGNESIUM SULFATE HEPTAHYDRATE 2 G: 40 INJECTION, SOLUTION INTRAVENOUS at 13:04

## 2022-01-01 RX ADMIN — ACETAMINOPHEN 650 MG: 325 TABLET ORAL at 14:55

## 2022-01-01 RX ADMIN — DIPHENHYDRAMINE HYDROCHLORIDE 25 MG: 50 INJECTION, SOLUTION INTRAMUSCULAR; INTRAVENOUS at 17:45

## 2022-01-01 RX ADMIN — SODIUM CHLORIDE, PRESERVATIVE FREE 10 ML: 5 INJECTION INTRAVENOUS at 09:15

## 2022-01-01 RX ADMIN — SODIUM CHLORIDE, PRESERVATIVE FREE 10 ML: 5 INJECTION INTRAVENOUS at 16:37

## 2022-01-01 RX ADMIN — ONDANSETRON 8 MG: 2 INJECTION INTRAMUSCULAR; INTRAVENOUS at 09:37

## 2022-01-01 RX ADMIN — SODIUM CHLORIDE, PRESERVATIVE FREE 10 ML: 5 INJECTION INTRAVENOUS at 22:30

## 2022-01-01 RX ADMIN — DEXAMETHASONE SODIUM PHOSPHATE 12 MG: 4 INJECTION, SOLUTION INTRAMUSCULAR; INTRAVENOUS at 15:06

## 2022-01-01 RX ADMIN — DIPHENOXYLATE HYDROCHLORIDE AND ATROPINE SULFATE 2 TABLET: 2.5; .025 TABLET ORAL at 16:45

## 2022-01-01 RX ADMIN — FAMOTIDINE 20 MG: 10 INJECTION, SOLUTION INTRAVENOUS at 11:36

## 2022-01-01 RX ADMIN — SODIUM CHLORIDE 250 ML: 9 INJECTION, SOLUTION INTRAVENOUS at 16:00

## 2022-01-01 RX ADMIN — Medication 10 ML: at 08:32

## 2022-01-01 RX ADMIN — METHYLPREDNISOLONE SODIUM SUCCINATE 125 MG: 125 INJECTION, POWDER, FOR SOLUTION INTRAMUSCULAR; INTRAVENOUS at 13:55

## 2022-01-01 RX ADMIN — DIPHENHYDRAMINE HYDROCHLORIDE 25 MG: 25 CAPSULE ORAL at 11:55

## 2022-01-01 RX ADMIN — POTASSIUM CHLORIDE 20 MEQ: 20 TABLET, EXTENDED RELEASE ORAL at 08:44

## 2022-01-01 RX ADMIN — DIPHENHYDRAMINE HYDROCHLORIDE 25 MG: 25 CAPSULE ORAL at 03:17

## 2022-01-01 RX ADMIN — DIPHENOXYLATE HYDROCHLORIDE AND ATROPINE SULFATE 2 TABLET: 2.5; .025 TABLET ORAL at 08:59

## 2022-01-01 RX ADMIN — FLUCONAZOLE 200 MG: 100 TABLET ORAL at 08:31

## 2022-01-01 RX ADMIN — ACYCLOVIR 400 MG: 800 TABLET ORAL at 21:47

## 2022-01-01 RX ADMIN — VANCOMYCIN HYDROCHLORIDE 1500 MG: 10 INJECTION, POWDER, LYOPHILIZED, FOR SOLUTION INTRAVENOUS at 14:11

## 2022-01-01 RX ADMIN — SODIUM CHLORIDE, PRESERVATIVE FREE 10 ML: 5 INJECTION INTRAVENOUS at 20:45

## 2022-01-01 RX ADMIN — SODIUM CHLORIDE 250 ML: 900 INJECTION, SOLUTION INTRAVENOUS at 11:15

## 2022-01-01 RX ADMIN — PEGFILGRASTIM-CBQV 6 MG: 6 INJECTION, SOLUTION SUBCUTANEOUS at 17:36

## 2022-01-01 RX ADMIN — CEFEPIME HYDROCHLORIDE 2000 MG: 2 INJECTION, POWDER, FOR SOLUTION INTRAVENOUS at 20:10

## 2022-01-01 RX ADMIN — ACETAMINOPHEN 650 MG: 325 TABLET ORAL at 13:43

## 2022-01-01 RX ADMIN — DIPHENHYDRAMINE HYDROCHLORIDE 50 MG: 50 INJECTION, SOLUTION INTRAMUSCULAR; INTRAVENOUS at 15:36

## 2022-01-01 RX ADMIN — Medication 10 ML: at 11:20

## 2022-01-01 RX ADMIN — PANTOPRAZOLE SODIUM 40 MG: 40 TABLET, DELAYED RELEASE ORAL at 07:57

## 2022-01-01 RX ADMIN — ATORVASTATIN CALCIUM 10 MG: 10 TABLET, FILM COATED ORAL at 09:22

## 2022-01-01 RX ADMIN — PANTOPRAZOLE SODIUM 40 MG: 40 TABLET, DELAYED RELEASE ORAL at 05:30

## 2022-01-01 RX ADMIN — SODIUM CHLORIDE, PRESERVATIVE FREE 10 ML: 5 INJECTION INTRAVENOUS at 08:27

## 2022-01-01 RX ADMIN — NYSTATIN 500000 UNITS: 100000 SUSPENSION ORAL at 08:50

## 2022-01-01 RX ADMIN — SODIUM CHLORIDE, PRESERVATIVE FREE 10 ML: 5 INJECTION INTRAVENOUS at 08:48

## 2022-01-01 RX ADMIN — LORAZEPAM 1 MG: 1 TABLET ORAL at 10:34

## 2022-01-01 RX ADMIN — ACETAMINOPHEN 650 MG: 325 TABLET ORAL at 16:45

## 2022-01-01 RX ADMIN — Medication 30 ML: at 08:30

## 2022-01-01 RX ADMIN — Medication 10 ML: at 11:30

## 2022-01-01 RX ADMIN — DIPHENHYDRAMINE HYDROCHLORIDE 25 MG: 50 INJECTION, SOLUTION INTRAMUSCULAR; INTRAVENOUS at 16:25

## 2022-01-01 RX ADMIN — METHYLPREDNISOLONE SODIUM SUCCINATE 125 MG: 125 INJECTION, POWDER, FOR SOLUTION INTRAMUSCULAR; INTRAVENOUS at 11:57

## 2022-01-01 RX ADMIN — PANTOPRAZOLE SODIUM 40 MG: 40 TABLET, DELAYED RELEASE ORAL at 08:45

## 2022-01-01 RX ADMIN — MAGNESIUM SULFATE HEPTAHYDRATE 4 G: 40 INJECTION, SOLUTION INTRAVENOUS at 15:25

## 2022-01-01 RX ADMIN — GABAPENTIN 100 MG: 100 CAPSULE ORAL at 21:34

## 2022-01-01 RX ADMIN — SODIUM CHLORIDE: 9 INJECTION, SOLUTION INTRAVENOUS at 04:31

## 2022-01-01 RX ADMIN — METHYLPREDNISOLONE SODIUM SUCCINATE 125 MG: 125 INJECTION, POWDER, FOR SOLUTION INTRAMUSCULAR; INTRAVENOUS at 10:39

## 2022-01-01 RX ADMIN — GABAPENTIN 100 MG: 100 CAPSULE ORAL at 08:45

## 2022-01-01 RX ADMIN — Medication 10 ML: at 11:15

## 2022-01-01 RX ADMIN — FLUCONAZOLE 200 MG: 100 TABLET ORAL at 08:47

## 2022-01-01 RX ADMIN — FAMOTIDINE 20 MG: 10 INJECTION, SOLUTION INTRAVENOUS at 11:23

## 2022-01-01 RX ADMIN — ACYCLOVIR 400 MG: 800 TABLET ORAL at 09:05

## 2022-01-01 RX ADMIN — FAMOTIDINE 20 MG: 10 INJECTION, SOLUTION INTRAVENOUS at 16:58

## 2022-01-01 RX ADMIN — METHYLPREDNISOLONE SODIUM SUCCINATE 125 MG: 125 INJECTION, POWDER, FOR SOLUTION INTRAMUSCULAR; INTRAVENOUS at 11:38

## 2022-01-01 RX ADMIN — GABAPENTIN 100 MG: 100 CAPSULE ORAL at 21:19

## 2022-01-01 RX ADMIN — LOPERAMIDE HYDROCHLORIDE 2 MG: 2 CAPSULE ORAL at 14:11

## 2022-01-01 RX ADMIN — PANTOPRAZOLE SODIUM 40 MG: 40 TABLET, DELAYED RELEASE ORAL at 08:50

## 2022-01-01 RX ADMIN — CEFEPIME HYDROCHLORIDE 2000 MG: 2 INJECTION, POWDER, FOR SOLUTION INTRAVENOUS at 04:16

## 2022-01-01 RX ADMIN — FAMOTIDINE 20 MG: 10 INJECTION INTRAVENOUS at 21:55

## 2022-01-01 RX ADMIN — ONDANSETRON 4 MG: 2 INJECTION INTRAMUSCULAR; INTRAVENOUS at 18:38

## 2022-01-01 RX ADMIN — ACETAMINOPHEN 650 MG: 325 TABLET ORAL at 12:28

## 2022-01-01 RX ADMIN — GABAPENTIN 100 MG: 100 CAPSULE ORAL at 09:05

## 2022-01-01 RX ADMIN — DOCUSATE SODIUM 100 MG: 100 CAPSULE, LIQUID FILLED ORAL at 08:32

## 2022-01-01 RX ADMIN — SODIUM CHLORIDE 250 ML: 9 INJECTION, SOLUTION INTRAVENOUS at 12:00

## 2022-01-01 RX ADMIN — SODIUM CHLORIDE, PRESERVATIVE FREE 10 ML: 5 INJECTION INTRAVENOUS at 13:46

## 2022-01-01 RX ADMIN — ACETAMINOPHEN 650 MG: 325 TABLET ORAL at 13:52

## 2022-01-01 RX ADMIN — SODIUM CHLORIDE 1000 ML: 900 INJECTION, SOLUTION INTRAVENOUS at 10:45

## 2022-01-01 RX ADMIN — Medication 10 ML: at 10:07

## 2022-01-01 RX ADMIN — MIRTAZAPINE 15 MG: 15 TABLET, FILM COATED ORAL at 20:49

## 2022-01-01 RX ADMIN — FAMOTIDINE 20 MG: 10 INJECTION, SOLUTION INTRAVENOUS at 13:44

## 2022-01-01 RX ADMIN — METHYLPREDNISOLONE SODIUM SUCCINATE 125 MG: 125 INJECTION, POWDER, FOR SOLUTION INTRAMUSCULAR; INTRAVENOUS at 15:28

## 2022-01-01 RX ADMIN — DIPHENHYDRAMINE HYDROCHLORIDE 25 MG: 25 CAPSULE ORAL at 13:18

## 2022-01-01 RX ADMIN — FAMOTIDINE 20 MG: 10 INJECTION INTRAVENOUS at 13:24

## 2022-01-01 RX ADMIN — ALLOPURINOL 300 MG: 300 TABLET ORAL at 08:17

## 2022-01-01 RX ADMIN — PANTOPRAZOLE SODIUM 40 MG: 40 TABLET, DELAYED RELEASE ORAL at 09:06

## 2022-01-01 RX ADMIN — FLUCONAZOLE 200 MG: 100 TABLET ORAL at 07:57

## 2022-01-01 RX ADMIN — DIPHENHYDRAMINE HYDROCHLORIDE 50 MG: 50 INJECTION, SOLUTION INTRAMUSCULAR; INTRAVENOUS at 14:17

## 2022-01-01 RX ADMIN — SODIUM CHLORIDE, PRESERVATIVE FREE 10 ML: 5 INJECTION INTRAVENOUS at 14:35

## 2022-01-01 RX ADMIN — ACYCLOVIR 400 MG: 800 TABLET ORAL at 20:43

## 2022-01-01 RX ADMIN — ONDANSETRON 4 MG: 2 INJECTION INTRAMUSCULAR; INTRAVENOUS at 01:41

## 2022-01-01 RX ADMIN — SODIUM CHLORIDE, PRESERVATIVE FREE 20 MG: 5 INJECTION INTRAVENOUS at 12:40

## 2022-01-01 RX ADMIN — MIRTAZAPINE 15 MG: 15 TABLET, FILM COATED ORAL at 21:36

## 2022-01-01 RX ADMIN — DIPHENHYDRAMINE HYDROCHLORIDE 25 MG: 25 CAPSULE ORAL at 11:48

## 2022-01-01 RX ADMIN — POTASSIUM CHLORIDE 20 MEQ: 14.9 INJECTION, SOLUTION INTRAVENOUS at 13:05

## 2022-01-01 RX ADMIN — ACETAMINOPHEN 650 MG: 325 TABLET ORAL at 23:27

## 2022-01-01 RX ADMIN — Medication 10 ML: at 13:38

## 2022-01-01 RX ADMIN — ACETAMINOPHEN 650 MG: 325 TABLET ORAL at 21:35

## 2022-01-01 RX ADMIN — ACETAMINOPHEN 650 MG: 325 TABLET ORAL at 11:14

## 2022-01-01 RX ADMIN — ACYCLOVIR 400 MG: 800 TABLET ORAL at 08:24

## 2022-01-01 RX ADMIN — DIPHENHYDRAMINE HYDROCHLORIDE 25 MG: 25 CAPSULE ORAL at 23:26

## 2022-01-01 RX ADMIN — SODIUM CHLORIDE, PRESERVATIVE FREE 10 ML: 5 INJECTION INTRAVENOUS at 09:05

## 2022-01-01 RX ADMIN — ACYCLOVIR 400 MG: 800 TABLET ORAL at 20:12

## 2022-01-01 RX ADMIN — DIPHENHYDRAMINE HYDROCHLORIDE 25 MG: 25 CAPSULE ORAL at 14:18

## 2022-01-01 RX ADMIN — FOSAPREPITANT 150 MG: 150 INJECTION, POWDER, LYOPHILIZED, FOR SOLUTION INTRAVENOUS at 10:05

## 2022-01-01 RX ADMIN — POTASSIUM CHLORIDE 20 MEQ: 20 TABLET, EXTENDED RELEASE ORAL at 08:31

## 2022-01-01 RX ADMIN — FAMOTIDINE 20 MG: 10 INJECTION INTRAVENOUS at 11:44

## 2022-01-01 RX ADMIN — SODIUM CHLORIDE, PRESERVATIVE FREE 10 ML: 5 INJECTION INTRAVENOUS at 21:38

## 2022-01-01 RX ADMIN — ATORVASTATIN CALCIUM 10 MG: 10 TABLET, FILM COATED ORAL at 08:17

## 2022-01-01 RX ADMIN — CALCIUM CARBONATE 500 MG (1,250 MG)-VITAMIN D3 200 UNIT TABLET 1 TABLET: at 08:47

## 2022-01-01 RX ADMIN — Medication 10 ML: at 13:45

## 2022-01-01 RX ADMIN — Medication 10 ML: at 11:57

## 2022-01-01 RX ADMIN — GABAPENTIN 100 MG: 100 CAPSULE ORAL at 20:13

## 2022-01-01 RX ADMIN — GABAPENTIN 100 MG: 100 CAPSULE ORAL at 09:09

## 2022-01-01 RX ADMIN — ATORVASTATIN CALCIUM 10 MG: 10 TABLET, FILM COATED ORAL at 08:50

## 2022-01-01 RX ADMIN — LIDOCAINE HYDROCHLORIDE 200 MG: 20 INJECTION, SOLUTION INFILTRATION; PERINEURAL at 14:29

## 2022-01-01 RX ADMIN — NYSTATIN 500000 UNITS: 100000 SUSPENSION ORAL at 20:12

## 2022-01-01 RX ADMIN — METHYLPREDNISOLONE SODIUM SUCCINATE 40 MG: 40 INJECTION, POWDER, FOR SOLUTION INTRAMUSCULAR; INTRAVENOUS at 10:33

## 2022-01-01 RX ADMIN — FENTANYL CITRATE 50 MCG: 50 INJECTION INTRAMUSCULAR; INTRAVENOUS at 15:42

## 2022-01-01 RX ADMIN — SODIUM CHLORIDE, PRESERVATIVE FREE 10 ML: 5 INJECTION INTRAVENOUS at 18:10

## 2022-01-01 RX ADMIN — SODIUM CHLORIDE, PRESERVATIVE FREE 10 ML: 5 INJECTION INTRAVENOUS at 09:10

## 2022-01-01 RX ADMIN — HYDROCORTISONE SODIUM SUCCINATE 100 MG: 100 INJECTION, POWDER, FOR SOLUTION INTRAMUSCULAR; INTRAVENOUS at 13:17

## 2022-01-01 RX ADMIN — BENDAMUSTINE HYDROCHLORIDE 275 MG: 25 INJECTION, SOLUTION INTRAVENOUS at 15:10

## 2022-01-01 RX ADMIN — CEFEPIME HYDROCHLORIDE 2000 MG: 2 INJECTION, POWDER, FOR SOLUTION INTRAVENOUS at 12:14

## 2022-01-01 RX ADMIN — ACETAMINOPHEN 650 MG: 325 TABLET ORAL at 13:17

## 2022-01-01 RX ADMIN — NYSTATIN 500000 UNITS: 100000 SUSPENSION ORAL at 20:53

## 2022-01-01 RX ADMIN — FAMOTIDINE 20 MG: 10 INJECTION INTRAVENOUS at 16:45

## 2022-01-01 RX ADMIN — SODIUM CHLORIDE 250 ML: 900 INJECTION, SOLUTION INTRAVENOUS at 13:38

## 2022-01-01 RX ADMIN — DIPHENHYDRAMINE HYDROCHLORIDE 25 MG: 25 CAPSULE ORAL at 14:38

## 2022-01-01 RX ADMIN — DIPHENOXYLATE HYDROCHLORIDE AND ATROPINE SULFATE 2 TABLET: 2.5; .025 TABLET ORAL at 09:02

## 2022-01-01 RX ADMIN — Medication 10 ML: at 15:15

## 2022-01-01 RX ADMIN — FAMOTIDINE 20 MG: 20 TABLET, FILM COATED ORAL at 21:07

## 2022-01-01 RX ADMIN — SODIUM CHLORIDE, PRESERVATIVE FREE 10 ML: 5 INJECTION INTRAVENOUS at 18:39

## 2022-01-01 RX ADMIN — ONDANSETRON 8 MG: 2 INJECTION INTRAMUSCULAR; INTRAVENOUS at 08:26

## 2022-01-01 RX ADMIN — ACETAMINOPHEN 650 MG: 325 TABLET ORAL at 21:39

## 2022-01-01 RX ADMIN — GUAIFENESIN 200 MG: 200 SOLUTION ORAL at 12:00

## 2022-01-01 RX ADMIN — GABAPENTIN 100 MG: 100 CAPSULE ORAL at 21:41

## 2022-01-01 RX ADMIN — CALCIUM CARBONATE 500 MG (1,250 MG)-VITAMIN D3 200 UNIT TABLET 1 TABLET: at 08:17

## 2022-01-01 RX ADMIN — FAMOTIDINE 20 MG: 10 INJECTION, SOLUTION INTRAVENOUS at 13:24

## 2022-01-01 RX ADMIN — DIPHENHYDRAMINE HYDROCHLORIDE 25 MG: 25 CAPSULE ORAL at 13:24

## 2022-01-01 RX ADMIN — MIRTAZAPINE 15 MG: 15 TABLET, FILM COATED ORAL at 21:07

## 2022-01-01 RX ADMIN — SODIUM ZIRCONIUM CYCLOSILICATE 10 G: 10 POWDER, FOR SUSPENSION ORAL at 16:30

## 2022-01-01 RX ADMIN — DIPHENHYDRAMINE HYDROCHLORIDE 25 MG: 50 INJECTION, SOLUTION INTRAMUSCULAR; INTRAVENOUS at 12:10

## 2022-01-01 RX ADMIN — ALLOPURINOL 300 MG: 300 TABLET ORAL at 08:31

## 2022-01-01 RX ADMIN — FUROSEMIDE 20 MG: 20 TABLET ORAL at 08:31

## 2022-01-01 RX ADMIN — ACYCLOVIR 400 MG: 800 TABLET ORAL at 20:48

## 2022-01-01 RX ADMIN — SODIUM CHLORIDE 25 ML/HR: 9 INJECTION, SOLUTION INTRAVENOUS at 11:20

## 2022-01-01 RX ADMIN — DIPHENHYDRAMINE HYDROCHLORIDE 25 MG: 25 CAPSULE ORAL at 10:25

## 2022-01-01 RX ADMIN — DIPHENHYDRAMINE HYDROCHLORIDE 25 MG: 25 CAPSULE ORAL at 13:43

## 2022-01-01 RX ADMIN — DIPHENHYDRAMINE HYDROCHLORIDE 25 MG: 25 CAPSULE ORAL at 10:32

## 2022-01-01 RX ADMIN — Medication 10 ML: at 16:18

## 2022-01-01 RX ADMIN — ACETAMINOPHEN 650 MG: 325 TABLET ORAL at 14:38

## 2022-01-01 RX ADMIN — SODIUM CHLORIDE: 9 INJECTION, SOLUTION INTRAVENOUS at 10:35

## 2022-01-01 RX ADMIN — Medication 10 ML: at 08:54

## 2022-01-01 RX ADMIN — FUROSEMIDE 20 MG: 20 TABLET ORAL at 08:46

## 2022-01-01 RX ADMIN — ROMIDEPSIN 32.48 MG: KIT at 09:07

## 2022-01-01 RX ADMIN — FLUCONAZOLE 200 MG: 100 TABLET ORAL at 08:45

## 2022-01-01 RX ADMIN — SODIUM CHLORIDE, PRESERVATIVE FREE 10 ML: 5 INJECTION INTRAVENOUS at 07:40

## 2022-01-01 RX ADMIN — ROMIDEPSIN 32.5 MG: KIT at 11:06

## 2022-01-01 RX ADMIN — Medication 10 ML: at 11:05

## 2022-01-01 RX ADMIN — NYSTATIN 500000 UNITS: 100000 SUSPENSION ORAL at 21:41

## 2022-01-01 RX ADMIN — Medication 10 ML: at 09:07

## 2022-01-01 RX ADMIN — PANTOPRAZOLE SODIUM 40 MG: 40 TABLET, DELAYED RELEASE ORAL at 06:06

## 2022-01-01 RX ADMIN — FENTANYL CITRATE 50 MCG: 50 INJECTION INTRAMUSCULAR; INTRAVENOUS at 15:36

## 2022-01-01 RX ADMIN — MIDODRINE HYDROCHLORIDE 5 MG: 5 TABLET ORAL at 12:00

## 2022-01-01 RX ADMIN — ONDANSETRON 8 MG: 2 INJECTION INTRAMUSCULAR; INTRAVENOUS at 14:08

## 2022-01-01 RX ADMIN — ACETAMINOPHEN 650 MG: 325 TABLET ORAL at 11:00

## 2022-01-01 RX ADMIN — ALLOPURINOL 300 MG: 100 TABLET ORAL at 10:47

## 2022-01-01 RX ADMIN — VANCOMYCIN HYDROCHLORIDE 1500 MG: 10 INJECTION, POWDER, LYOPHILIZED, FOR SOLUTION INTRAVENOUS at 14:22

## 2022-01-01 RX ADMIN — ONDANSETRON 8 MG: 2 INJECTION INTRAMUSCULAR; INTRAVENOUS at 12:55

## 2022-01-01 RX ADMIN — MIRTAZAPINE 15 MG: 15 TABLET, FILM COATED ORAL at 21:19

## 2022-01-01 RX ADMIN — SODIUM CHLORIDE 25 ML/HR: 9 INJECTION, SOLUTION INTRAVENOUS at 15:36

## 2022-01-01 RX ADMIN — SODIUM CHLORIDE 250 ML: 9 INJECTION, SOLUTION INTRAVENOUS at 13:28

## 2022-01-01 RX ADMIN — FUROSEMIDE 20 MG: 40 TABLET ORAL at 08:50

## 2022-01-01 RX ADMIN — SODIUM CHLORIDE, PRESERVATIVE FREE 10 ML: 5 INJECTION INTRAVENOUS at 14:09

## 2022-01-01 RX ADMIN — CEFEPIME HYDROCHLORIDE 2000 MG: 2 INJECTION, POWDER, FOR SOLUTION INTRAVENOUS at 12:04

## 2022-01-01 RX ADMIN — ONDANSETRON 8 MG: 2 INJECTION INTRAMUSCULAR; INTRAVENOUS at 14:06

## 2022-01-01 RX ADMIN — FUROSEMIDE 20 MG: 20 TABLET ORAL at 08:43

## 2022-01-01 RX ADMIN — Medication 10 ML: at 12:25

## 2022-01-01 RX ADMIN — ONDANSETRON 8 MG: 2 INJECTION INTRAMUSCULAR; INTRAVENOUS at 15:03

## 2022-01-01 RX ADMIN — ATORVASTATIN CALCIUM 10 MG: 10 TABLET, FILM COATED ORAL at 21:01

## 2022-01-01 RX ADMIN — METHYLPREDNISOLONE SODIUM SUCCINATE 40 MG: 125 INJECTION, POWDER, FOR SOLUTION INTRAMUSCULAR; INTRAVENOUS at 23:27

## 2022-01-01 RX ADMIN — DIPHENHYDRAMINE HYDROCHLORIDE 25 MG: 25 CAPSULE ORAL at 13:10

## 2022-01-01 RX ADMIN — FAMOTIDINE 20 MG: 10 INJECTION, SOLUTION INTRAVENOUS at 11:01

## 2022-01-01 RX ADMIN — GABAPENTIN 100 MG: 100 CAPSULE ORAL at 08:25

## 2022-01-01 RX ADMIN — GABAPENTIN 100 MG: 100 CAPSULE ORAL at 08:32

## 2022-01-01 RX ADMIN — GABAPENTIN 100 MG: 100 CAPSULE ORAL at 20:58

## 2022-01-01 RX ADMIN — FUROSEMIDE 20 MG: 40 TABLET ORAL at 08:31

## 2022-01-01 RX ADMIN — METHYLPREDNISOLONE SODIUM SUCCINATE 125 MG: 125 INJECTION, POWDER, FOR SOLUTION INTRAMUSCULAR; INTRAVENOUS at 11:21

## 2022-01-01 RX ADMIN — FAMOTIDINE 20 MG: 10 INJECTION, SOLUTION INTRAVENOUS at 10:27

## 2022-01-01 RX ADMIN — FAMOTIDINE 20 MG: 10 INJECTION, SOLUTION INTRAVENOUS at 11:35

## 2022-01-01 RX ADMIN — MIDAZOLAM HYDROCHLORIDE 1 MG: 1 INJECTION, SOLUTION INTRAMUSCULAR; INTRAVENOUS at 15:42

## 2022-01-01 RX ADMIN — SODIUM CHLORIDE: 9 INJECTION, SOLUTION INTRAVENOUS at 13:50

## 2022-01-01 RX ADMIN — FAMOTIDINE 20 MG: 10 INJECTION, SOLUTION INTRAVENOUS at 11:21

## 2022-01-01 RX ADMIN — DIPHENHYDRAMINE HYDROCHLORIDE 25 MG: 25 CAPSULE ORAL at 13:49

## 2022-01-01 RX ADMIN — DIPHENHYDRAMINE HYDROCHLORIDE 25 MG: 25 CAPSULE ORAL at 16:48

## 2022-01-01 RX ADMIN — FUROSEMIDE 20 MG: 20 TABLET ORAL at 09:05

## 2022-01-01 RX ADMIN — SODIUM CHLORIDE, PRESERVATIVE FREE 10 ML: 5 INJECTION INTRAVENOUS at 18:35

## 2022-01-01 RX ADMIN — SODIUM CHLORIDE 20 ML/HR: 9 INJECTION, SOLUTION INTRAVENOUS at 11:10

## 2022-01-01 RX ADMIN — ACYCLOVIR 400 MG: 800 TABLET ORAL at 10:53

## 2022-01-01 RX ADMIN — ACETAMINOPHEN 650 MG: 325 TABLET ORAL at 03:17

## 2022-01-01 RX ADMIN — SODIUM CHLORIDE 250 ML: 900 INJECTION, SOLUTION INTRAVENOUS at 11:21

## 2022-01-01 RX ADMIN — PANTOPRAZOLE SODIUM 40 MG: 40 TABLET, DELAYED RELEASE ORAL at 08:24

## 2022-01-01 RX ADMIN — SERTRALINE 50 MG: 50 TABLET, FILM COATED ORAL at 08:32

## 2022-01-01 RX ADMIN — ACETAMINOPHEN 650 MG: 325 TABLET ORAL at 13:10

## 2022-01-01 RX ADMIN — ACYCLOVIR 400 MG: 800 TABLET ORAL at 09:09

## 2022-01-01 RX ADMIN — DIPHENHYDRAMINE HYDROCHLORIDE 25 MG: 25 CAPSULE ORAL at 11:51

## 2022-01-01 RX ADMIN — SODIUM CHLORIDE, PRESERVATIVE FREE 10 ML: 5 INJECTION INTRAVENOUS at 08:32

## 2022-01-01 RX ADMIN — ACETAMINOPHEN 650 MG: 325 TABLET ORAL at 10:25

## 2022-01-01 RX ADMIN — CEFEPIME HYDROCHLORIDE 2000 MG: 2 INJECTION, POWDER, FOR SOLUTION INTRAVENOUS at 11:30

## 2022-01-01 RX ADMIN — HYDROCORTISONE SODIUM SUCCINATE 100 MG: 100 INJECTION, POWDER, FOR SOLUTION INTRAMUSCULAR; INTRAVENOUS at 12:25

## 2022-01-01 RX ADMIN — ACYCLOVIR 400 MG: 800 TABLET ORAL at 08:32

## 2022-01-01 RX ADMIN — FUROSEMIDE 20 MG: 20 TABLET ORAL at 09:09

## 2022-01-01 RX ADMIN — ACYCLOVIR 400 MG: 800 TABLET ORAL at 09:01

## 2022-01-01 RX ADMIN — HYDROCODONE BITARTRATE AND ACETAMINOPHEN 1 TABLET: 5; 325 TABLET ORAL at 10:42

## 2022-01-01 RX ADMIN — NYSTATIN 500000 UNITS: 100000 SUSPENSION ORAL at 16:45

## 2022-01-01 RX ADMIN — SODIUM CHLORIDE, PRESERVATIVE FREE 10 ML: 5 INJECTION INTRAVENOUS at 21:35

## 2022-01-01 RX ADMIN — BENDAMUSTINE HYDROCHLORIDE 275 MG: 25 INJECTION, SOLUTION INTRAVENOUS at 15:28

## 2022-01-01 RX ADMIN — DIPHENHYDRAMINE HYDROCHLORIDE 25 MG: 25 CAPSULE ORAL at 11:17

## 2022-01-01 RX ADMIN — NYSTATIN 500000 UNITS: 100000 SUSPENSION ORAL at 17:06

## 2022-01-01 RX ADMIN — DIPHENHYDRAMINE HYDROCHLORIDE 25 MG: 25 CAPSULE ORAL at 11:00

## 2022-01-01 RX ADMIN — HYDROCORTISONE SODIUM SUCCINATE 100 MG: 100 INJECTION, POWDER, FOR SOLUTION INTRAMUSCULAR; INTRAVENOUS at 11:44

## 2022-01-01 RX ADMIN — MAGNESIUM GLUCONATE 500 MG ORAL TABLET 400 MG: 500 TABLET ORAL at 08:30

## 2022-01-01 RX ADMIN — Medication 400 MG: at 08:45

## 2022-01-01 RX ADMIN — METHYLPREDNISOLONE SODIUM SUCCINATE 125 MG: 125 INJECTION, POWDER, FOR SOLUTION INTRAMUSCULAR; INTRAVENOUS at 11:20

## 2022-01-01 RX ADMIN — METHYLPREDNISOLONE SODIUM SUCCINATE 125 MG: 125 INJECTION, POWDER, FOR SOLUTION INTRAMUSCULAR; INTRAVENOUS at 17:01

## 2022-01-01 RX ADMIN — VANCOMYCIN HYDROCHLORIDE 1500 MG: 10 INJECTION, POWDER, LYOPHILIZED, FOR SOLUTION INTRAVENOUS at 13:41

## 2022-01-01 RX ADMIN — SODIUM CHLORIDE 500 ML: 9 INJECTION, SOLUTION INTRAVENOUS at 17:25

## 2022-01-01 RX ADMIN — ACETAMINOPHEN 650 MG: 325 TABLET ORAL at 11:16

## 2022-01-01 RX ADMIN — MAGNESIUM GLUCONATE 500 MG ORAL TABLET 400 MG: 500 TABLET ORAL at 08:50

## 2022-01-01 RX ADMIN — MAGNESIUM GLUCONATE 500 MG ORAL TABLET 400 MG: 500 TABLET ORAL at 08:47

## 2022-01-01 RX ADMIN — GABAPENTIN 100 MG: 100 CAPSULE ORAL at 10:50

## 2022-01-01 RX ADMIN — FUROSEMIDE 20 MG: 20 TABLET ORAL at 10:47

## 2022-01-01 RX ADMIN — METHYLPREDNISOLONE SODIUM SUCCINATE 125 MG: 125 INJECTION, POWDER, FOR SOLUTION INTRAMUSCULAR; INTRAVENOUS at 11:28

## 2022-01-01 RX ADMIN — SODIUM CHLORIDE 25 ML/HR: 9 INJECTION, SOLUTION INTRAVENOUS at 14:00

## 2022-01-01 RX ADMIN — Medication 10 ML: at 13:25

## 2022-01-01 RX ADMIN — MIRTAZAPINE 15 MG: 15 TABLET, FILM COATED ORAL at 21:40

## 2022-01-01 RX ADMIN — ACYCLOVIR 400 MG: 800 TABLET ORAL at 07:56

## 2022-01-01 RX ADMIN — SODIUM CHLORIDE 25 ML/HR: 900 INJECTION, SOLUTION INTRAVENOUS at 08:27

## 2022-01-01 RX ADMIN — ACYCLOVIR 400 MG: 800 TABLET ORAL at 08:47

## 2022-01-01 RX ADMIN — METHYLPREDNISOLONE SODIUM SUCCINATE 125 MG: 125 INJECTION, POWDER, FOR SOLUTION INTRAMUSCULAR; INTRAVENOUS at 12:40

## 2022-01-01 RX ADMIN — DIPHENOXYLATE HYDROCHLORIDE AND ATROPINE SULFATE 2 TABLET: 2.5; .025 TABLET ORAL at 09:06

## 2022-01-01 RX ADMIN — Medication 10 ML: at 07:42

## 2022-01-01 RX ADMIN — POTASSIUM CHLORIDE 20 MEQ: 20 TABLET, EXTENDED RELEASE ORAL at 08:24

## 2022-01-01 RX ADMIN — FAMOTIDINE 20 MG: 10 INJECTION INTRAVENOUS at 15:20

## 2022-01-01 RX ADMIN — SODIUM CHLORIDE 1000 ML: 900 INJECTION, SOLUTION INTRAVENOUS at 10:25

## 2022-01-01 RX ADMIN — MIRTAZAPINE 15 MG: 15 TABLET, FILM COATED ORAL at 21:34

## 2022-01-01 RX ADMIN — FLUCONAZOLE 200 MG: 100 TABLET ORAL at 08:50

## 2022-01-01 RX ADMIN — ATORVASTATIN CALCIUM 10 MG: 10 TABLET, FILM COATED ORAL at 21:47

## 2022-01-01 RX ADMIN — METHYLPREDNISOLONE SODIUM SUCCINATE 125 MG: 125 INJECTION, POWDER, FOR SOLUTION INTRAMUSCULAR; INTRAVENOUS at 12:16

## 2022-01-01 RX ADMIN — FUROSEMIDE 20 MG: 20 INJECTION, SOLUTION INTRAMUSCULAR; INTRAVENOUS at 14:17

## 2022-01-01 RX ADMIN — ALLOPURINOL 300 MG: 300 TABLET ORAL at 09:00

## 2022-01-01 RX ADMIN — PANTOPRAZOLE SODIUM 40 MG: 40 TABLET, DELAYED RELEASE ORAL at 08:47

## 2022-01-01 RX ADMIN — SODIUM CHLORIDE 25 ML/HR: 900 INJECTION, SOLUTION INTRAVENOUS at 13:00

## 2022-01-01 RX ADMIN — SODIUM CHLORIDE 25 ML/HR: 900 INJECTION, SOLUTION INTRAVENOUS at 13:40

## 2022-01-01 RX ADMIN — DIPHENHYDRAMINE HYDROCHLORIDE 25 MG: 50 INJECTION, SOLUTION INTRAMUSCULAR; INTRAVENOUS at 11:44

## 2022-01-01 RX ADMIN — ACYCLOVIR 400 MG: 800 TABLET ORAL at 08:49

## 2022-01-01 RX ADMIN — METHYLPREDNISOLONE SODIUM SUCCINATE 125 MG: 125 INJECTION, POWDER, FOR SOLUTION INTRAMUSCULAR; INTRAVENOUS at 13:56

## 2022-01-01 RX ADMIN — ACETAMINOPHEN 650 MG: 325 TABLET ORAL at 04:12

## 2022-01-01 RX ADMIN — DIPHENHYDRAMINE HYDROCHLORIDE 25 MG: 50 INJECTION, SOLUTION INTRAMUSCULAR; INTRAVENOUS at 12:26

## 2022-01-01 RX ADMIN — DIPHENHYDRAMINE HYDROCHLORIDE 25 MG: 25 CAPSULE ORAL at 12:09

## 2022-01-01 RX ADMIN — SODIUM CHLORIDE, PRESERVATIVE FREE 10 ML: 5 INJECTION INTRAVENOUS at 20:13

## 2022-01-01 RX ADMIN — METHYLPREDNISOLONE SODIUM SUCCINATE 125 MG: 125 INJECTION, POWDER, FOR SOLUTION INTRAMUSCULAR; INTRAVENOUS at 13:17

## 2022-01-01 RX ADMIN — LORAZEPAM 1 MG: 1 TABLET ORAL at 12:44

## 2022-01-01 RX ADMIN — CEFEPIME HYDROCHLORIDE 2000 MG: 2 INJECTION, POWDER, FOR SOLUTION INTRAVENOUS at 03:19

## 2022-01-01 RX ADMIN — POTASSIUM CHLORIDE 20 MEQ: 20 TABLET, EXTENDED RELEASE ORAL at 17:35

## 2022-01-01 RX ADMIN — ACETAMINOPHEN 650 MG: 325 TABLET ORAL at 11:02

## 2022-01-01 RX ADMIN — ACETAMINOPHEN 650 MG: 325 TABLET ORAL at 14:17

## 2022-01-01 RX ADMIN — FLUCONAZOLE 200 MG: 100 TABLET ORAL at 08:30

## 2022-01-01 RX ADMIN — SERTRALINE 50 MG: 50 TABLET, FILM COATED ORAL at 09:00

## 2022-01-01 RX ADMIN — SODIUM CHLORIDE, PRESERVATIVE FREE 10 ML: 5 INJECTION INTRAVENOUS at 08:52

## 2022-01-01 RX ADMIN — ATORVASTATIN CALCIUM 10 MG: 10 TABLET, FILM COATED ORAL at 23:29

## 2022-01-01 RX ADMIN — CEFEPIME HYDROCHLORIDE 2000 MG: 2 INJECTION, POWDER, FOR SOLUTION INTRAVENOUS at 16:38

## 2022-01-01 RX ADMIN — ACETAMINOPHEN 650 MG: 325 TABLET ORAL at 10:32

## 2022-01-01 RX ADMIN — SODIUM CHLORIDE 250 ML: 9 INJECTION, SOLUTION INTRAVENOUS at 13:15

## 2022-01-01 RX ADMIN — POTASSIUM CHLORIDE 20 MEQ: 20 TABLET, EXTENDED RELEASE ORAL at 07:56

## 2022-01-01 RX ADMIN — Medication 10 ML: at 11:31

## 2022-01-01 RX ADMIN — ACETAMINOPHEN 650 MG: 325 TABLET ORAL at 11:36

## 2022-01-01 RX ADMIN — CEFEPIME HYDROCHLORIDE 2000 MG: 2 INJECTION, POWDER, FOR SOLUTION INTRAVENOUS at 21:19

## 2022-01-01 RX ADMIN — IOPAMIDOL 30 ML: 612 INJECTION, SOLUTION INTRAVENOUS at 15:51

## 2022-01-01 RX ADMIN — ACYCLOVIR 400 MG: 800 TABLET ORAL at 21:34

## 2022-01-01 RX ADMIN — FLUCONAZOLE 200 MG: 100 TABLET ORAL at 08:17

## 2022-01-01 RX ADMIN — ALLOPURINOL 300 MG: 300 TABLET ORAL at 08:44

## 2022-01-01 RX ADMIN — ACETAMINOPHEN 650 MG: 325 TABLET ORAL at 11:43

## 2022-01-01 RX ADMIN — FAMOTIDINE 20 MG: 10 INJECTION INTRAVENOUS at 11:54

## 2022-01-01 RX ADMIN — PEGFILGRASTIM-CBQV 6 MG: 6 INJECTION, SOLUTION SUBCUTANEOUS at 14:00

## 2022-01-01 RX ADMIN — PANTOPRAZOLE SODIUM 40 MG: 40 TABLET, DELAYED RELEASE ORAL at 09:00

## 2022-01-01 RX ADMIN — SODIUM CHLORIDE, PRESERVATIVE FREE 10 ML: 5 INJECTION INTRAVENOUS at 20:49

## 2022-01-01 RX ADMIN — ACETAMINOPHEN 650 MG: 325 TABLET ORAL at 12:40

## 2022-01-01 RX ADMIN — ACETAMINOPHEN 650 MG: 325 TABLET, FILM COATED ORAL at 15:22

## 2022-01-01 RX ADMIN — GABAPENTIN 100 MG: 100 CAPSULE ORAL at 16:30

## 2022-01-01 RX ADMIN — GABAPENTIN 100 MG: 100 CAPSULE ORAL at 15:06

## 2022-01-01 RX ADMIN — SODIUM CHLORIDE, PRESERVATIVE FREE 10 ML: 5 INJECTION INTRAVENOUS at 15:10

## 2022-01-01 RX ADMIN — MIRTAZAPINE 15 MG: 15 TABLET, FILM COATED ORAL at 20:58

## 2022-01-01 RX ADMIN — Medication 10 ML: at 12:49

## 2022-01-01 RX ADMIN — SODIUM CHLORIDE: 9 INJECTION, SOLUTION INTRAVENOUS at 17:42

## 2022-01-01 RX ADMIN — FAMOTIDINE 20 MG: 20 TABLET, FILM COATED ORAL at 15:29

## 2022-01-01 RX ADMIN — Medication 10 ML: at 12:11

## 2022-01-01 RX ADMIN — PROCHLORPERAZINE EDISYLATE 10 MG: 5 INJECTION INTRAMUSCULAR; INTRAVENOUS at 09:15

## 2022-01-01 RX ADMIN — Medication 400 MG: at 10:50

## 2022-01-01 RX ADMIN — DOCUSATE SODIUM 100 MG: 100 CAPSULE, LIQUID FILLED ORAL at 09:22

## 2022-01-01 RX ADMIN — METHYLPREDNISOLONE SODIUM SUCCINATE 125 MG: 125 INJECTION, POWDER, FOR SOLUTION INTRAMUSCULAR; INTRAVENOUS at 10:24

## 2022-01-01 RX ADMIN — SODIUM CHLORIDE, PRESERVATIVE FREE 10 ML: 5 INJECTION INTRAVENOUS at 08:50

## 2022-01-01 RX ADMIN — FAMOTIDINE 20 MG: 10 INJECTION, SOLUTION INTRAVENOUS at 13:58

## 2022-01-01 RX ADMIN — SODIUM CHLORIDE 250 ML: 900 INJECTION, SOLUTION INTRAVENOUS at 13:40

## 2022-01-01 RX ADMIN — Medication 2500 MG: at 02:23

## 2022-01-01 RX ADMIN — Medication 10 ML: at 14:15

## 2022-01-01 RX ADMIN — DOCUSATE SODIUM 100 MG: 100 CAPSULE, LIQUID FILLED ORAL at 08:17

## 2022-01-01 RX ADMIN — MAGNESIUM GLUCONATE 500 MG ORAL TABLET 400 MG: 500 TABLET ORAL at 11:30

## 2022-01-01 RX ADMIN — DIPHENOXYLATE HYDROCHLORIDE AND ATROPINE SULFATE 2 TABLET: 2.5; .025 TABLET ORAL at 16:39

## 2022-01-01 RX ADMIN — SODIUM CHLORIDE, PRESERVATIVE FREE 10 ML: 5 INJECTION INTRAVENOUS at 17:35

## 2022-01-01 RX ADMIN — SODIUM CHLORIDE 25 ML/HR: 900 INJECTION, SOLUTION INTRAVENOUS at 14:00

## 2022-01-01 RX ADMIN — LOPERAMIDE HYDROCHLORIDE 2 MG: 2 CAPSULE ORAL at 03:32

## 2022-01-01 RX ADMIN — MIRTAZAPINE 15 MG: 15 TABLET, FILM COATED ORAL at 22:02

## 2022-01-01 RX ADMIN — METHYLPREDNISOLONE SODIUM SUCCINATE 125 MG: 125 INJECTION, POWDER, FOR SOLUTION INTRAMUSCULAR; INTRAVENOUS at 16:18

## 2022-01-01 RX ADMIN — FENTANYL CITRATE 50 MCG: 50 INJECTION, SOLUTION INTRAMUSCULAR; INTRAVENOUS at 14:27

## 2022-01-01 RX ADMIN — MIRTAZAPINE 15 MG: 15 TABLET, FILM COATED ORAL at 20:12

## 2022-01-01 RX ADMIN — DIPHENHYDRAMINE HYDROCHLORIDE 25 MG: 25 CAPSULE ORAL at 12:40

## 2022-01-01 RX ADMIN — FAMOTIDINE 20 MG: 10 INJECTION, SOLUTION INTRAVENOUS at 10:33

## 2022-01-01 RX ADMIN — MAGNESIUM SULFATE HEPTAHYDRATE 2000 MG: 40 INJECTION, SOLUTION INTRAVENOUS at 14:00

## 2022-01-01 RX ADMIN — DIPHENHYDRAMINE HYDROCHLORIDE 25 MG: 50 INJECTION INTRAMUSCULAR; INTRAVENOUS at 12:10

## 2022-01-01 RX ADMIN — SODIUM CHLORIDE, PRESERVATIVE FREE 10 ML: 5 INJECTION INTRAVENOUS at 22:03

## 2022-01-01 RX ADMIN — ACETAMINOPHEN 650 MG: 325 TABLET ORAL at 11:48

## 2022-01-01 RX ADMIN — OXYCODONE 5 MG: 5 TABLET ORAL at 01:34

## 2022-01-01 RX ADMIN — FLUCONAZOLE 200 MG: 100 TABLET ORAL at 08:25

## 2022-01-01 RX ADMIN — SODIUM CHLORIDE 500 ML: 9 INJECTION, SOLUTION INTRAVENOUS at 10:00

## 2022-01-01 RX ADMIN — ACYCLOVIR 400 MG: 800 TABLET ORAL at 21:07

## 2022-01-01 RX ADMIN — DIPHENHYDRAMINE HYDROCHLORIDE 25 MG: 25 CAPSULE ORAL at 11:02

## 2022-01-01 RX ADMIN — BENDAMUSTINE HYDROCHLORIDE 275 MG: 25 INJECTION, SOLUTION INTRAVENOUS at 08:50

## 2022-01-01 RX ADMIN — DIPHENHYDRAMINE HYDROCHLORIDE 25 MG: 25 CAPSULE ORAL at 15:11

## 2022-01-01 RX ADMIN — PEGFILGRASTIM-CBQV 6 MG: 6 INJECTION, SOLUTION SUBCUTANEOUS at 15:57

## 2022-01-01 RX ADMIN — SODIUM CHLORIDE 25 ML/HR: 9 INJECTION, SOLUTION INTRAVENOUS at 17:31

## 2022-01-01 RX ADMIN — FAMOTIDINE 20 MG: 20 TABLET, FILM COATED ORAL at 09:22

## 2022-01-01 RX ADMIN — SODIUM CHLORIDE 250 ML: 9 INJECTION, SOLUTION INTRAVENOUS at 11:20

## 2022-01-01 RX ADMIN — SODIUM CHLORIDE 250 ML: 9 INJECTION, SOLUTION INTRAVENOUS at 11:50

## 2022-01-01 RX ADMIN — GABAPENTIN 100 MG: 100 CAPSULE ORAL at 20:48

## 2022-01-01 RX ADMIN — Medication 10 ML: at 08:44

## 2022-01-01 RX ADMIN — Medication 10 ML: at 11:50

## 2022-01-01 RX ADMIN — DIPHENHYDRAMINE HYDROCHLORIDE 25 MG: 25 CAPSULE ORAL at 13:52

## 2022-01-01 RX ADMIN — INSULIN HUMAN 10 UNITS: 100 INJECTION, SOLUTION PARENTERAL at 18:04

## 2022-01-01 RX ADMIN — POTASSIUM CHLORIDE 20 MEQ: 20 TABLET, EXTENDED RELEASE ORAL at 09:06

## 2022-01-01 RX ADMIN — Medication 10 ML: at 14:55

## 2022-01-01 RX ADMIN — ACYCLOVIR 400 MG: 800 TABLET ORAL at 20:59

## 2022-01-01 RX ADMIN — SODIUM ZIRCONIUM CYCLOSILICATE 10 G: 10 POWDER, FOR SUSPENSION ORAL at 21:08

## 2022-01-01 RX ADMIN — FLUCONAZOLE 200 MG: 100 TABLET ORAL at 09:00

## 2022-01-01 RX ADMIN — Medication 10 ML: at 14:48

## 2022-01-01 RX ADMIN — ACETAMINOPHEN 650 MG: 325 TABLET ORAL at 11:55

## 2022-01-01 RX ADMIN — METHYLPREDNISOLONE SODIUM SUCCINATE 125 MG: 125 INJECTION, POWDER, FOR SOLUTION INTRAMUSCULAR; INTRAVENOUS at 11:03

## 2022-01-01 RX ADMIN — ALLOPURINOL 300 MG: 300 TABLET ORAL at 08:47

## 2022-01-01 RX ADMIN — SERTRALINE 50 MG: 50 TABLET, FILM COATED ORAL at 07:57

## 2022-01-01 RX ADMIN — CALCIUM CARBONATE 500 MG (1,250 MG)-VITAMIN D3 200 UNIT TABLET 1 TABLET: at 08:50

## 2022-01-01 RX ADMIN — SODIUM CHLORIDE, PRESERVATIVE FREE 10 ML: 5 INJECTION INTRAVENOUS at 08:29

## 2022-01-01 RX ADMIN — SODIUM CHLORIDE 250 ML: 900 INJECTION, SOLUTION INTRAVENOUS at 10:20

## 2022-01-01 RX ADMIN — Medication: at 14:43

## 2022-01-01 RX ADMIN — DOCUSATE SODIUM 100 MG: 100 CAPSULE, LIQUID FILLED ORAL at 08:50

## 2022-01-01 RX ADMIN — Medication 10 ML: at 13:22

## 2022-01-01 RX ADMIN — FUROSEMIDE 20 MG: 20 TABLET ORAL at 09:11

## 2022-01-01 RX ADMIN — GABAPENTIN 100 MG: 100 CAPSULE ORAL at 21:07

## 2022-01-01 RX ADMIN — ROMIDEPSIN 32.48 MG: KIT at 12:49

## 2022-01-01 RX ADMIN — DIPHENHYDRAMINE HYDROCHLORIDE 25 MG: 50 INJECTION, SOLUTION INTRAMUSCULAR; INTRAVENOUS at 11:33

## 2022-01-01 RX ADMIN — FAMOTIDINE 20 MG: 10 INJECTION, SOLUTION INTRAVENOUS at 16:20

## 2022-01-01 RX ADMIN — ACETAMINOPHEN 650 MG: 325 TABLET ORAL at 11:26

## 2022-01-01 RX ADMIN — MIDAZOLAM 1 MG: 1 INJECTION INTRAMUSCULAR; INTRAVENOUS at 14:17

## 2022-01-01 RX ADMIN — ACYCLOVIR 400 MG: 800 TABLET ORAL at 22:03

## 2022-01-01 RX ADMIN — METHYLPREDNISOLONE SODIUM SUCCINATE 125 MG: 125 INJECTION, POWDER, FOR SOLUTION INTRAMUSCULAR; INTRAVENOUS at 14:58

## 2022-01-01 RX ADMIN — SODIUM CHLORIDE, PRESERVATIVE FREE 10 ML: 5 INJECTION INTRAVENOUS at 10:54

## 2022-01-01 RX ADMIN — ATORVASTATIN CALCIUM 10 MG: 10 TABLET, FILM COATED ORAL at 21:07

## 2022-01-01 RX ADMIN — CEFEPIME HYDROCHLORIDE 2000 MG: 2 INJECTION, POWDER, FOR SOLUTION INTRAVENOUS at 11:00

## 2022-01-01 RX ADMIN — ACETAMINOPHEN 650 MG: 325 TABLET ORAL at 16:48

## 2022-01-01 RX ADMIN — METHYLPREDNISOLONE SODIUM SUCCINATE 125 MG: 125 INJECTION, POWDER, FOR SOLUTION INTRAMUSCULAR; INTRAVENOUS at 13:46

## 2022-01-01 RX ADMIN — ACETAMINOPHEN 650 MG: 325 TABLET ORAL at 15:11

## 2022-01-01 RX ADMIN — CEFEPIME HYDROCHLORIDE 2000 MG: 2 INJECTION, POWDER, FOR SOLUTION INTRAVENOUS at 20:12

## 2022-01-01 RX ADMIN — FAMOTIDINE 20 MG: 10 INJECTION, SOLUTION INTRAVENOUS at 15:22

## 2022-01-01 RX ADMIN — FAMOTIDINE 20 MG: 10 INJECTION, SOLUTION INTRAVENOUS at 12:14

## 2022-01-01 RX ADMIN — DIPHENHYDRAMINE HYDROCHLORIDE 25 MG: 25 CAPSULE ORAL at 11:36

## 2022-01-01 RX ADMIN — Medication 10 ML: at 13:08

## 2022-01-01 RX ADMIN — METHYLPREDNISOLONE SODIUM SUCCINATE 40 MG: 40 INJECTION, POWDER, FOR SOLUTION INTRAMUSCULAR; INTRAVENOUS at 12:29

## 2022-01-01 RX ADMIN — SERTRALINE 50 MG: 50 TABLET, FILM COATED ORAL at 08:24

## 2022-01-01 RX ADMIN — POTASSIUM CHLORIDE 20 MEQ: 10 TABLET, EXTENDED RELEASE ORAL at 14:06

## 2022-01-01 RX ADMIN — Medication 10 ML: at 15:29

## 2022-01-01 RX ADMIN — CEFEPIME HYDROCHLORIDE 2000 MG: 2 INJECTION, POWDER, FOR SOLUTION INTRAVENOUS at 20:06

## 2022-01-01 RX ADMIN — ALLOPURINOL 300 MG: 300 TABLET ORAL at 09:21

## 2022-01-01 RX ADMIN — DIPHENOXYLATE HYDROCHLORIDE AND ATROPINE SULFATE 2 TABLET: 2.5; .025 TABLET ORAL at 17:35

## 2022-01-01 RX ADMIN — SERTRALINE 50 MG: 50 TABLET, FILM COATED ORAL at 09:05

## 2022-01-01 RX ADMIN — DIPHENHYDRAMINE HYDROCHLORIDE 25 MG: 50 INJECTION, SOLUTION INTRAMUSCULAR; INTRAVENOUS at 15:22

## 2022-01-01 RX ADMIN — PANTOPRAZOLE SODIUM 40 MG: 40 TABLET, DELAYED RELEASE ORAL at 08:32

## 2022-01-01 RX ADMIN — FLUCONAZOLE 200 MG: 100 TABLET ORAL at 09:07

## 2022-01-01 RX ADMIN — ALLOPURINOL 300 MG: 300 TABLET ORAL at 09:05

## 2022-01-01 RX ADMIN — SODIUM CHLORIDE: 9 INJECTION, SOLUTION INTRAVENOUS at 13:25

## 2022-01-01 RX ADMIN — FENTANYL CITRATE 50 MCG: 50 INJECTION, SOLUTION INTRAMUSCULAR; INTRAVENOUS at 14:17

## 2022-01-01 RX ADMIN — FAMOTIDINE 20 MG: 10 INJECTION INTRAVENOUS at 12:23

## 2022-01-01 RX ADMIN — SODIUM CHLORIDE, PRESERVATIVE FREE 20 MG: 5 INJECTION INTRAVENOUS at 12:28

## 2022-01-01 RX ADMIN — FAMOTIDINE 20 MG: 20 TABLET, FILM COATED ORAL at 23:26

## 2022-01-01 RX ADMIN — FLUCONAZOLE 200 MG: 100 TABLET ORAL at 09:05

## 2022-01-01 RX ADMIN — ACYCLOVIR 400 MG: 800 TABLET ORAL at 20:58

## 2022-01-01 RX ADMIN — LORAZEPAM 1 MG: 1 TABLET ORAL at 18:38

## 2022-01-01 RX ADMIN — SODIUM CHLORIDE 25 ML/HR: 900 INJECTION, SOLUTION INTRAVENOUS at 11:45

## 2022-01-01 RX ADMIN — FAMOTIDINE 20 MG: 10 INJECTION, SOLUTION INTRAVENOUS at 17:40

## 2022-01-01 RX ADMIN — SODIUM CHLORIDE, PRESERVATIVE FREE 10 ML: 5 INJECTION INTRAVENOUS at 09:28

## 2022-01-01 RX ADMIN — POTASSIUM CHLORIDE 20 MEQ: 20 TABLET, EXTENDED RELEASE ORAL at 09:21

## 2022-01-01 RX ADMIN — NYSTATIN 500000 UNITS: 100000 SUSPENSION ORAL at 07:55

## 2022-01-01 RX ADMIN — SODIUM CHLORIDE: 9 INJECTION, SOLUTION INTRAVENOUS at 12:30

## 2022-01-01 RX ADMIN — ACETAMINOPHEN 650 MG: 325 TABLET ORAL at 11:21

## 2022-01-01 RX ADMIN — ACETAMINOPHEN 650 MG: 325 TABLET ORAL at 10:36

## 2022-01-01 RX ADMIN — Medication 400 MG: at 09:00

## 2022-01-01 RX ADMIN — SODIUM CHLORIDE 250 ML: 900 INJECTION, SOLUTION INTRAVENOUS at 11:19

## 2022-01-01 RX ADMIN — Medication 10 ML: at 08:25

## 2022-01-01 RX ADMIN — NYSTATIN 500000 UNITS: 100000 SUSPENSION ORAL at 08:31

## 2022-01-01 RX ADMIN — FAMOTIDINE 20 MG: 10 INJECTION, SOLUTION INTRAVENOUS at 11:18

## 2022-01-01 RX ADMIN — NYSTATIN 500000 UNITS: 100000 SUSPENSION ORAL at 20:11

## 2022-01-01 RX ADMIN — ACYCLOVIR 400 MG: 800 TABLET ORAL at 08:17

## 2022-01-01 RX ADMIN — ACETAMINOPHEN 650 MG: 325 TABLET ORAL at 13:24

## 2022-01-01 RX ADMIN — PANTOPRAZOLE SODIUM 40 MG: 40 TABLET, DELAYED RELEASE ORAL at 08:31

## 2022-01-01 RX ADMIN — SERTRALINE 50 MG: 50 TABLET, FILM COATED ORAL at 09:09

## 2022-01-01 RX ADMIN — SODIUM CHLORIDE, PRESERVATIVE FREE 10 ML: 5 INJECTION INTRAVENOUS at 15:26

## 2022-01-01 RX ADMIN — HYDROCORTISONE SODIUM SUCCINATE 100 MG: 100 INJECTION, POWDER, FOR SOLUTION INTRAMUSCULAR; INTRAVENOUS at 21:39

## 2022-01-01 RX ADMIN — LEVOFLOXACIN 500 MG: 500 TABLET, FILM COATED ORAL at 10:34

## 2022-01-01 RX ADMIN — Medication 400 MG: at 08:31

## 2022-01-01 RX ADMIN — ACYCLOVIR 400 MG: 800 TABLET ORAL at 20:13

## 2022-01-01 RX ADMIN — MAGNESIUM SULFATE HEPTAHYDRATE 2000 MG: 40 INJECTION, SOLUTION INTRAVENOUS at 10:01

## 2022-01-01 RX ADMIN — FUROSEMIDE 20 MG: 40 TABLET ORAL at 09:23

## 2022-01-01 RX ADMIN — SODIUM CHLORIDE 250 ML: 900 INJECTION, SOLUTION INTRAVENOUS at 12:49

## 2022-01-01 RX ADMIN — NYSTATIN 500000 UNITS: 100000 SUSPENSION ORAL at 21:34

## 2022-01-01 RX ADMIN — ACETAMINOPHEN 650 MG: 325 TABLET ORAL at 17:40

## 2022-01-01 RX ADMIN — CEFEPIME HYDROCHLORIDE 2000 MG: 2 INJECTION, POWDER, FOR SOLUTION INTRAVENOUS at 21:34

## 2022-01-01 RX ADMIN — ACETAMINOPHEN 650 MG: 325 TABLET ORAL at 18:09

## 2022-01-01 RX ADMIN — LORAZEPAM 0.5 MG: 2 INJECTION INTRAMUSCULAR; INTRAVENOUS at 11:46

## 2022-01-01 RX ADMIN — METHYLPREDNISOLONE SODIUM SUCCINATE 125 MG: 125 INJECTION, POWDER, FOR SOLUTION INTRAMUSCULAR; INTRAVENOUS at 11:22

## 2022-01-01 RX ADMIN — HYDROCORTISONE SODIUM SUCCINATE 100 MG: 100 INJECTION, POWDER, FOR SOLUTION INTRAMUSCULAR; INTRAVENOUS at 16:45

## 2022-01-01 RX ADMIN — VANCOMYCIN HYDROCHLORIDE 2000 MG: 10 INJECTION, POWDER, LYOPHILIZED, FOR SOLUTION INTRAVENOUS at 14:09

## 2022-01-01 RX ADMIN — SODIUM CHLORIDE, PRESERVATIVE FREE 10 ML: 5 INJECTION INTRAVENOUS at 20:37

## 2022-01-01 RX ADMIN — MIRTAZAPINE 15 MG: 15 TABLET, FILM COATED ORAL at 20:11

## 2022-01-01 RX ADMIN — DEXAMETHASONE SODIUM PHOSPHATE 12 MG: 4 INJECTION, SOLUTION INTRAMUSCULAR; INTRAVENOUS at 14:20

## 2022-01-01 RX ADMIN — NYSTATIN 500000 UNITS: 100000 SUSPENSION ORAL at 08:25

## 2022-01-01 RX ADMIN — MIDODRINE HYDROCHLORIDE 5 MG: 5 TABLET ORAL at 16:39

## 2022-01-01 RX ADMIN — SODIUM CHLORIDE 250 ML: 9 INJECTION, SOLUTION INTRAVENOUS at 16:55

## 2022-01-01 RX ADMIN — SODIUM CHLORIDE 250 ML: 9 INJECTION, SOLUTION INTRAVENOUS at 14:15

## 2022-01-01 RX ADMIN — ALLOPURINOL 300 MG: 300 TABLET ORAL at 08:50

## 2022-01-01 RX ADMIN — ACETAMINOPHEN 650 MG: 325 TABLET, FILM COATED ORAL at 12:09

## 2022-01-01 RX ADMIN — FUROSEMIDE 20 MG: 40 TABLET ORAL at 08:17

## 2022-01-01 RX ADMIN — SODIUM CHLORIDE, PRESERVATIVE FREE 10 ML: 5 INJECTION INTRAVENOUS at 10:15

## 2022-01-01 RX ADMIN — ACETAMINOPHEN 650 MG: 325 TABLET ORAL at 13:18

## 2022-01-01 RX ADMIN — MAGNESIUM SULFATE HEPTAHYDRATE 2 G: 40 INJECTION, SOLUTION INTRAVENOUS at 13:50

## 2022-01-01 RX ADMIN — HYDROCORTISONE SODIUM SUCCINATE 100 MG: 100 INJECTION, POWDER, FOR SOLUTION INTRAMUSCULAR; INTRAVENOUS at 15:11

## 2022-01-01 RX ADMIN — GABAPENTIN 100 MG: 100 CAPSULE ORAL at 08:46

## 2022-01-01 RX ADMIN — NYSTATIN 500000 UNITS: 100000 SUSPENSION ORAL at 16:50

## 2022-01-01 RX ADMIN — CEFEPIME HYDROCHLORIDE 2000 MG: 2 INJECTION, POWDER, FOR SOLUTION INTRAVENOUS at 04:23

## 2022-01-01 RX ADMIN — ACETAMINOPHEN 650 MG: 325 TABLET ORAL at 12:09

## 2022-01-01 RX ADMIN — Medication 10 ML: at 18:15

## 2022-01-01 RX ADMIN — CEFEPIME HYDROCHLORIDE 2000 MG: 2 INJECTION, POWDER, FOR SOLUTION INTRAVENOUS at 03:31

## 2022-01-01 RX ADMIN — ONDANSETRON 8 MG: 2 INJECTION INTRAMUSCULAR; INTRAVENOUS at 11:59

## 2022-01-01 RX ADMIN — ALLOPURINOL 300 MG: 300 TABLET ORAL at 08:30

## 2022-01-01 RX ADMIN — DIPHENHYDRAMINE HYDROCHLORIDE 25 MG: 50 INJECTION, SOLUTION INTRAMUSCULAR; INTRAVENOUS at 21:36

## 2022-01-01 RX ADMIN — FAMOTIDINE 20 MG: 10 INJECTION, SOLUTION INTRAVENOUS at 10:36

## 2022-01-01 RX ADMIN — DEXAMETHASONE SODIUM PHOSPHATE 12 MG: 4 INJECTION, SOLUTION INTRAMUSCULAR; INTRAVENOUS at 08:19

## 2022-01-01 RX ADMIN — CALCIUM CARBONATE 500 MG (1,250 MG)-VITAMIN D3 200 UNIT TABLET 1 TABLET: at 09:22

## 2022-01-01 RX ADMIN — ROMIDEPSIN 32.48 MG: KIT at 14:31

## 2022-01-01 RX ADMIN — ACETAMINOPHEN 650 MG: 325 TABLET ORAL at 16:20

## 2022-01-01 RX ADMIN — MIDAZOLAM 1 MG: 1 INJECTION INTRAMUSCULAR; INTRAVENOUS at 14:27

## 2022-01-01 RX ADMIN — FAMOTIDINE 20 MG: 10 INJECTION, SOLUTION INTRAVENOUS at 11:04

## 2022-01-01 RX ADMIN — LORAZEPAM 1 MG: 0.5 TABLET ORAL at 09:20

## 2022-01-01 RX ADMIN — PIPERACILLIN SODIUM AND TAZOBACTAM SODIUM 4500 MG: 4; .5 INJECTION, POWDER, LYOPHILIZED, FOR SOLUTION INTRAVENOUS at 01:11

## 2022-01-01 RX ADMIN — ONDANSETRON 8 MG: 2 INJECTION INTRAMUSCULAR; INTRAVENOUS at 08:17

## 2022-01-01 RX ADMIN — NYSTATIN 500000 UNITS: 100000 SUSPENSION ORAL at 09:05

## 2022-01-01 RX ADMIN — ACETAMINOPHEN 650 MG: 325 TABLET ORAL at 11:17

## 2022-01-01 RX ADMIN — DIPHENHYDRAMINE HYDROCHLORIDE 25 MG: 25 CAPSULE ORAL at 13:17

## 2022-01-01 RX ADMIN — ACYCLOVIR 400 MG: 800 TABLET ORAL at 08:45

## 2022-01-01 RX ADMIN — CEFEPIME HYDROCHLORIDE 2000 MG: 2 INJECTION, POWDER, FOR SOLUTION INTRAVENOUS at 11:04

## 2022-01-01 RX ADMIN — FAMOTIDINE 20 MG: 10 INJECTION, SOLUTION INTRAVENOUS at 11:55

## 2022-01-01 RX ADMIN — LOPERAMIDE HYDROCHLORIDE 2 MG: 2 CAPSULE ORAL at 20:44

## 2022-01-01 RX ADMIN — Medication 10 ML: at 07:43

## 2022-01-01 RX ADMIN — Medication 400 MG: at 07:56

## 2022-01-01 RX ADMIN — SODIUM ZIRCONIUM CYCLOSILICATE 10 G: 10 POWDER, FOR SUSPENSION ORAL at 05:38

## 2022-01-01 RX ADMIN — METHYLPREDNISOLONE SODIUM SUCCINATE 125 MG: 125 INJECTION, POWDER, FOR SOLUTION INTRAMUSCULAR; INTRAVENOUS at 17:42

## 2022-01-01 RX ADMIN — CALCIUM CARBONATE 500 MG (1,250 MG)-VITAMIN D3 200 UNIT TABLET 1 TABLET: at 08:31

## 2022-01-01 RX ADMIN — POTASSIUM CHLORIDE 20 MEQ: 20 TABLET, EXTENDED RELEASE ORAL at 09:00

## 2022-01-01 RX ADMIN — MIRTAZAPINE 15 MG: 15 TABLET, FILM COATED ORAL at 20:43

## 2022-01-01 RX ADMIN — SODIUM CHLORIDE, PRESERVATIVE FREE 10 ML: 5 INJECTION INTRAVENOUS at 21:47

## 2022-01-01 RX ADMIN — CEFEPIME HYDROCHLORIDE 2000 MG: 2 INJECTION, POWDER, FOR SOLUTION INTRAVENOUS at 12:57

## 2022-01-01 RX ADMIN — Medication 10 ML: at 14:53

## 2022-01-01 RX ADMIN — SODIUM CHLORIDE, PRESERVATIVE FREE 10 ML: 5 INJECTION INTRAVENOUS at 20:58

## 2022-01-01 RX ADMIN — GABAPENTIN 100 MG: 100 CAPSULE ORAL at 20:12

## 2022-01-01 RX ADMIN — SODIUM CHLORIDE 250 ML: 9 INJECTION, SOLUTION INTRAVENOUS at 11:30

## 2022-01-01 RX ADMIN — SODIUM CHLORIDE 100 ML/HR: 9 INJECTION, SOLUTION INTRAVENOUS at 07:40

## 2022-01-01 RX ADMIN — Medication 400 MG: at 08:24

## 2022-01-01 RX ADMIN — ALLOPURINOL 300 MG: 300 TABLET ORAL at 07:57

## 2022-01-01 RX ADMIN — NYSTATIN 500000 UNITS: 100000 SUSPENSION ORAL at 08:59

## 2022-01-01 RX ADMIN — POTASSIUM CHLORIDE 20 MEQ: 20 TABLET, EXTENDED RELEASE ORAL at 18:02

## 2022-01-01 RX ADMIN — DIPHENHYDRAMINE HYDROCHLORIDE 25 MG: 25 CAPSULE ORAL at 21:36

## 2022-01-01 RX ADMIN — Medication 10 ML: at 13:20

## 2022-01-01 RX ADMIN — ONDANSETRON 8 MG: 2 INJECTION INTRAMUSCULAR; INTRAVENOUS at 09:57

## 2022-01-01 RX ADMIN — GABAPENTIN 100 MG: 100 CAPSULE ORAL at 07:57

## 2022-01-01 RX ADMIN — Medication 10 ML: at 11:35

## 2022-01-01 RX ADMIN — SODIUM CHLORIDE 250 ML: 9 INJECTION, SOLUTION INTRAVENOUS at 11:58

## 2022-01-01 RX ADMIN — INSULIN LISPRO 2 UNITS: 100 INJECTION, SOLUTION INTRAVENOUS; SUBCUTANEOUS at 18:00

## 2022-01-01 RX ADMIN — FAMOTIDINE 20 MG: 20 TABLET, FILM COATED ORAL at 08:17

## 2022-01-01 RX ADMIN — FLUCONAZOLE 200 MG: 100 TABLET ORAL at 10:48

## 2022-01-01 RX ADMIN — SODIUM CHLORIDE 250 ML: 900 INJECTION, SOLUTION INTRAVENOUS at 12:35

## 2022-01-01 RX ADMIN — ACETAMINOPHEN 650 MG: 325 TABLET ORAL at 13:49

## 2022-01-01 RX ADMIN — DIPHENHYDRAMINE HYDROCHLORIDE 25 MG: 50 INJECTION, SOLUTION INTRAMUSCULAR; INTRAVENOUS at 16:45

## 2022-01-01 RX ADMIN — DIPHENHYDRAMINE HYDROCHLORIDE 25 MG: 50 INJECTION, SOLUTION INTRAMUSCULAR; INTRAVENOUS at 14:59

## 2022-01-01 RX ADMIN — ACETAMINOPHEN 650 MG: 325 TABLET ORAL at 11:50

## 2022-01-01 RX ADMIN — FUROSEMIDE 20 MG: 20 TABLET ORAL at 07:57

## 2022-01-01 RX ADMIN — CEFEPIME HYDROCHLORIDE 2000 MG: 2 INJECTION, POWDER, FOR SOLUTION INTRAVENOUS at 03:30

## 2022-01-01 RX ADMIN — ALBUTEROL SULFATE 10 MG: 2.5 SOLUTION RESPIRATORY (INHALATION) at 13:15

## 2022-01-01 RX ADMIN — SODIUM CHLORIDE, PRESERVATIVE FREE 10 ML: 5 INJECTION INTRAVENOUS at 13:40

## 2022-01-01 RX ADMIN — SODIUM CHLORIDE, SODIUM LACTATE, POTASSIUM CHLORIDE, AND CALCIUM CHLORIDE 1000 ML: 600; 310; 30; 20 INJECTION, SOLUTION INTRAVENOUS at 23:20

## 2022-01-01 RX ADMIN — FLUDEOXYGLUCOSE F18 13.87 MILLICURIE: 300 INJECTION INTRAVENOUS at 07:43

## 2022-01-01 RX ADMIN — SODIUM CHLORIDE, PRESERVATIVE FREE 10 ML: 5 INJECTION INTRAVENOUS at 23:29

## 2022-01-01 RX ADMIN — METHYLPREDNISOLONE SODIUM SUCCINATE 60 MG: 125 INJECTION, POWDER, FOR SOLUTION INTRAMUSCULAR; INTRAVENOUS at 15:23

## 2022-01-01 RX ADMIN — ACYCLOVIR 400 MG: 800 TABLET ORAL at 08:31

## 2022-01-01 RX ADMIN — SERTRALINE 50 MG: 50 TABLET, FILM COATED ORAL at 08:46

## 2022-01-01 RX ADMIN — ACYCLOVIR 400 MG: 800 TABLET ORAL at 21:35

## 2022-01-01 RX ADMIN — NYSTATIN 500000 UNITS: 100000 SUSPENSION ORAL at 21:19

## 2022-01-01 RX ADMIN — MIDAZOLAM HYDROCHLORIDE 1 MG: 1 INJECTION, SOLUTION INTRAMUSCULAR; INTRAVENOUS at 15:36

## 2022-01-01 RX ADMIN — DIPHENHYDRAMINE HYDROCHLORIDE 25 MG: 50 INJECTION, SOLUTION INTRAMUSCULAR; INTRAVENOUS at 11:14

## 2022-01-01 RX ADMIN — HYDROCODONE BITARTRATE AND ACETAMINOPHEN 1 TABLET: 5; 325 TABLET ORAL at 05:05

## 2022-01-01 RX ADMIN — FAMOTIDINE 20 MG: 10 INJECTION, SOLUTION INTRAVENOUS at 13:52

## 2022-01-01 RX ADMIN — FAMOTIDINE 20 MG: 10 INJECTION, SOLUTION INTRAVENOUS at 14:56

## 2022-01-01 RX ADMIN — DIATRIZOATE MEGLUMINE AND DIATRIZOATE SODIUM 10 ML: 660; 100 LIQUID ORAL; RECTAL at 07:43

## 2022-01-01 RX ADMIN — FUROSEMIDE 20 MG: 40 TABLET ORAL at 08:47

## 2022-01-01 RX ADMIN — DIPHENHYDRAMINE HYDROCHLORIDE 25 MG: 50 INJECTION, SOLUTION INTRAMUSCULAR; INTRAVENOUS at 10:38

## 2022-01-01 RX ADMIN — SODIUM CHLORIDE, PRESERVATIVE FREE 10 ML: 5 INJECTION INTRAVENOUS at 12:10

## 2022-01-01 RX ADMIN — Medication 10 ML: at 08:30

## 2022-01-01 RX ADMIN — SODIUM CHLORIDE 250 ML: 9 INJECTION, SOLUTION INTRAVENOUS at 12:18

## 2022-01-01 RX ADMIN — Medication 10 ML: at 08:20

## 2022-01-01 RX ADMIN — CEFEPIME HYDROCHLORIDE 2000 MG: 2 INJECTION, POWDER, FOR SOLUTION INTRAVENOUS at 03:02

## 2022-01-01 RX ADMIN — MIRTAZAPINE 15 MG: 15 TABLET, FILM COATED ORAL at 21:47

## 2022-01-01 RX ADMIN — PANTOPRAZOLE SODIUM 40 MG: 40 TABLET, DELAYED RELEASE ORAL at 08:00

## 2022-01-01 RX ADMIN — SODIUM CHLORIDE 250 ML: 900 INJECTION, SOLUTION INTRAVENOUS at 15:50

## 2022-01-01 RX ADMIN — SODIUM CHLORIDE, PRESERVATIVE FREE 10 ML: 5 INJECTION INTRAVENOUS at 07:57

## 2022-01-01 RX ADMIN — SODIUM CHLORIDE 250 ML: 9 INJECTION, SOLUTION INTRAVENOUS at 10:57

## 2022-01-01 RX ADMIN — NYSTATIN 500000 UNITS: 100000 SUSPENSION ORAL at 17:39

## 2022-01-01 ASSESSMENT — ENCOUNTER SYMPTOMS
SHORTNESS OF BREATH: 0
SHORTNESS OF BREATH: 0
CHEST TIGHTNESS: 0
EYE ITCHING: 0
NAUSEA: 1
RHINORRHEA: 1
ABDOMINAL PAIN: 0
EYE DISCHARGE: 0
CHEST TIGHTNESS: 0
DIARRHEA: 0
VOMITING: 0
COUGH: 0
NAUSEA: 1

## 2022-01-01 ASSESSMENT — PAIN SCALES - GENERAL
PAINLEVEL_OUTOF10: 0
PAINLEVEL_OUTOF10: 4
PAINLEVEL_OUTOF10: 0
PAINLEVEL_OUTOF10: 6
PAINLEVEL_OUTOF10: 0
PAINLEVEL_OUTOF10: 3
PAINLEVEL_OUTOF10: 0

## 2022-01-01 ASSESSMENT — PATIENT HEALTH QUESTIONNAIRE - PHQ9
SUM OF ALL RESPONSES TO PHQ QUESTIONS 1-9: 0
2. FEELING DOWN, DEPRESSED OR HOPELESS: 0
SUM OF ALL RESPONSES TO PHQ QUESTIONS 1-9: 0
1. LITTLE INTEREST OR PLEASURE IN DOING THINGS: 0
SUM OF ALL RESPONSES TO PHQ9 QUESTIONS 1 & 2: 0

## 2022-01-01 ASSESSMENT — PAIN DESCRIPTION - LOCATION
LOCATION: KNEE
LOCATION: NECK

## 2022-01-01 ASSESSMENT — PAIN DESCRIPTION - DESCRIPTORS
DESCRIPTORS: ACHING
DESCRIPTORS: ACHING

## 2022-01-01 ASSESSMENT — PAIN DESCRIPTION - FREQUENCY: FREQUENCY: INTERMITTENT

## 2022-01-01 ASSESSMENT — PAIN - FUNCTIONAL ASSESSMENT
PAIN_FUNCTIONAL_ASSESSMENT: ACTIVITIES ARE NOT PREVENTED
PAIN_FUNCTIONAL_ASSESSMENT: NONE - DENIES PAIN
PAIN_FUNCTIONAL_ASSESSMENT: 0-10
PAIN_FUNCTIONAL_ASSESSMENT: PREVENTS OR INTERFERES SOME ACTIVE ACTIVITIES AND ADLS
PAIN_FUNCTIONAL_ASSESSMENT: NONE - DENIES PAIN

## 2022-01-01 ASSESSMENT — PAIN DESCRIPTION - ORIENTATION: ORIENTATION: MID

## 2022-01-01 ASSESSMENT — PAIN DESCRIPTION - ONSET: ONSET: GRADUAL

## 2022-01-01 ASSESSMENT — PAIN DESCRIPTION - PAIN TYPE: TYPE: ACUTE PAIN

## 2022-01-03 NOTE — PROGRESS NOTES
Arrived to the [facility]. Lab draw and platelets completed. Patient tolerated well. Any issues or concerns during appointment: none. Patient aware of next infusion appointment on 1/5/22 (date) at 0800 (time). Patient aware of next lab and [facility] office visit on 1/5/22 (date) at 0830 (time). Patient instructed to call provider with temperature of 100.4 or greater or nausea/vomiting/ diarrhea or pain not controlled by medications  Discharged ambulatory accompanied by wife .

## 2022-01-03 NOTE — PROGRESS NOTES
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  provided therapeutic reassurance. LISW-CP overviewed with pt relaxation and daily mindfulness. At  this moment, pt denies any psychosocial and economic needs. Mental Status Exam: Intellectual functioning appears to be intact. Mood is \"better\" and affect is good. Insight is adequate. Judgment is adequate. Patient did not report suicidal ideations, intent or plans. Patient did not report homicidal ideations, intent or plans. Patient is oriented to self, place, time and situation. Protective Factors: Current care for physical and mental illness, adequate insight and judgment, family support, cultural and Yarsanism beliefs and values that support self-care. Next Steps: LISW-CP gave contact information and encouraged pt to call should any needs arise. Pt verbalized understanding. LISW-CP intends to follow up by phone and/or face-to-face as needed.     3 most recent [facility] 2021 2021 11/15/2021   Little interest or pleasure in doing things Not at all More than half the days Several days   Feeling down, depressed, irritable, or hopeless Not at all More than half the days Several days   Total Score PHQ 2 0 4 2   Trouble falling or staying asleep, or sleeping too much - - -   Feeling tired or having little energy - - -   Poor appetite, weight loss, or overeating - - -   Feeling bad about yourself - or that you are a failure or have let yourself or your family down - - -   Trouble concentrating on things such as school, work, reading, or watching TV - - -   Moving or speaking so slowly that other people could have noticed; or the opposite being so fidgety that others notice - - -   Thoughts of being better off dead, or hurting yourself in some way - - -   PHQ 9 Score - - -   How difficult have these problems made it for you to do your work, take care of your home and get along with others - - -         Signed By: ULISES Knowles     January 3, 2022

## 2022-01-03 NOTE — ADDENDUM NOTE
Encounter addended by: Aryan Feliciano RN on: 1/3/2022 6:08 PM   Actions taken: MAR administration edited, MAR administration accepted, Flowsheet accepted

## 2022-01-04 NOTE — ADDENDUM NOTE
Encounter addended by: Anson Peña RN on: 1/4/2022 3:52 PM   Actions taken: Charge Capture section accepted, Flowsheet accepted

## 2022-01-05 PROBLEM — E87.6 HYPOKALEMIA: Status: ACTIVE | Noted: 2022-01-01

## 2022-01-05 NOTE — PROGRESS NOTES
Arrived to the [facility]. Labs, 1 unit PRBC and 1 unit PLTS completed. Patient tolerated without problems. Any issues or concerns during appointment: patient was premedicated with Tylenol, Benadryl, Pepcid and SoluMedrol prior to PLTS. Patient went home and returned at 1300 for the Transfusions. Patient aware of next infusion appointment on 1/10/22 (date) at 0830 (time). Patient instructed to call provider with temperature of 100.4 or greater or nausea/vomiting/ diarrhea or pain not controlled by medications  Discharged ambulatory with spouse.

## 2022-01-05 NOTE — PROGRESS NOTES
Pt was seen today in infusion by Dr. Becca Flynn. Labs reviewed. He will get 1 unit of plt and 1 unit of PRBC. We will cancel Fridays appt to see how he does and he will return on next Monday. He will also get K+40 meq x1 today in infusion for a k+ of 3.3. also encouraged to eat food high in K+. We will reassess what his hgb and plt are on Monday to see if we can take him to x 2 week. Pt VU that if he is to have signs of bleeding he needs to call us ASAP or if closed to go to ER.

## 2022-01-10 NOTE — PROGRESS NOTES
Problem: Knowledge Deficit  Goal: *Participate in the learning process  Outcome: Progressing Towards Goal  Goal: *Verbalize description of procedure  Outcome: Progressing Towards Goal  Goal: *Verbalizes understanding and describes medication purposes and frequencies  Outcome: Progressing Towards Goal  Goal: *Knowledge of discharge instructions  Outcome: Progressing Towards Goal     Problem: Patient Education: Go to Patient Education Activity  Goal: Patient/Family Education  Outcome: Progressing Towards Goal     Problem: Knowledge Deficit  Goal: *Participate in the learning process  Outcome: Progressing Towards Goal  Goal: *Verbalize description of procedure  Outcome: Progressing Towards Goal  Goal: *Verbalizes understanding and describes medication purposes and frequencies  Outcome: Progressing Towards Goal  Goal: *Knowledge of discharge instructions  Outcome: Progressing Towards Goal

## 2022-01-10 NOTE — PROGRESS NOTES
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 Provided therapeutic reassurance. LISW-CP discussed Self-Compassion and mini-vacation. Mini Mental Status Exam: Morgan Mejia was dressed properly. No abnormal psychomotor movements observed. Intellectual functioning appeared to be intact. Insight was adequate. Judgment was adequate. Patient did not report suicidal ideations, intent or plans. Speech was coherent. Thought process was clear. Patient did not report homicidal ideations, intent or plans. Patient was oriented to self, place, time and situation. Protective Factors: Current care for physical and mental illness, adequate insight and judgment, family support, cultural and Pentecostalism beliefs and values that support self-care. Next Steps: LISW-CP gave contact information and encouraged pt to call should any needs arise. Pt verbalized understanding. [facility] intends to follow up as needed.       3 most recent [facility] 2021 2021 11/15/2021   Little interest or pleasure in doing things Not at all More than half the days Several days   Feeling down, depressed, irritable, or hopeless Not at all More than half the days Several days   Total Score PHQ 2 0 4 2   Trouble falling or staying asleep, or sleeping too much - - -   Feeling tired or having little energy - - -   Poor appetite, weight loss, or overeating - - -   Feeling bad about yourself - or that you are a failure or have let yourself or your family down - - -   Trouble concentrating on things such as school, work, reading, or watching TV - - -   Moving or speaking so slowly that other people could have noticed; or the opposite being so fidgety that others notice - - -   Thoughts of being better off dead, or hurting yourself in some way - - -   PHQ 9 Score - - -   How difficult have these problems made it for you to do your work, take care of your home and get along with others - - -           Electronically Signed By:  Ron Blood

## 2022-01-10 NOTE — PROGRESS NOTES
Arrived to the infusion center. No complaints offered. Port accessed and labs drawn. No replacements necessary. Aware of his next appointment on 1/12 at 1000. Discharged ambulatory in satisfactory condition.

## 2022-01-12 NOTE — PROGRESS NOTES
Patient arrived to port lab for port access and lab draw   Antonio Smith accessed and labs drawn per protocol   *Port remains accessed for infusion  Patient discharged from port lab ambulatory*

## 2022-01-12 NOTE — PROGRESS NOTES
Pt was seen today by Dr. Rickey Watt. Labs reviewed. He will need 1 unit of plt today and not other replacements. We will now be seeing x 1 weekly. So he may need plt even if they are above 10 to help last him for the week. He will be seen on Monday's for now. He finished his venclexta today and will be off x 14 days. He will start C3 on 1/26. We will add Udencya to cycle 3. His ANC is at 800 today. Educated on neutropenic precautions and bleeding precautions. Pt and wife VU. Infusion will call wife's cell phone when plt are on the way.

## 2022-01-12 NOTE — PROGRESS NOTES
Arrived to the [facility]. Pre-meds (tylenol, benadryl, pepcid, solumedrol) and 1u platelets completed. Patient tolerated well. Any issues or concerns during appointment: no.  Patient aware of next infusion/OV appointment on 1/17/2022 (date) at 9:30 am (time). Patient instructed to call provider with temperature of 100.4 or greater or nausea/vomiting/ diarrhea or pain not controlled by medications  Discharged ambulatory with spouse.

## 2022-01-17 NOTE — PROGRESS NOTES
Arrived to the [facility]. Port accessed, labs drawn & reviewed. PlateletTransfusion completed. Patient tolerated well. Any issues or concerns during appointment: None. Patient aware of next infusion appointment on 1/19 (date) at 80 (time) for PRBC transfusion. Patient aware of next lab and [facility] office visit on 1/26 (date) at 1200 (time). Patient instructed to call provider with temperature of 100.4 or greater or nausea/vomiting/ diarrhea or pain not controlled by medications  Discharged in stable condition.

## 2022-01-19 NOTE — PROGRESS NOTES
Arrived to the [facility]. 1 unit of PRBCs completed. Patient tolerated well. Any issues or concerns during appointment: None. Patient aware of next infusion appointment on January 24th at 0830. Patient instructed to call provider with temperature of 100.4 or greater or nausea/vomiting/ diarrhea or pain not controlled by medications  Discharged ambulatory.

## 2022-01-21 NOTE — PROGRESS NOTES
Mr. Mary Sheets has been [id number] with the pharmaceutical  [employer]. E/D 1/20/22 - 1/20/23. ID# [id number]. Approval letter will be scanned into chart.

## 2022-01-26 NOTE — PROGRESS NOTES
Patient arrived to port lab for port access and lab draw   Rosa Maria Ricardo [address] accessed and labs drawn per protocol   Port remains accessed  Patient discharged from port lab

## 2022-01-26 NOTE — PROGRESS NOTES
Arrived to the [facility]. Bendamustine completed. Patient tolerated well. Any issues or concerns during appointment: none. Patient aware of next infusion appointment on 01/27/22 (date) at 0 (time). Patient instructed to call provider with temperature of 100.4 or greater or nausea/vomiting/ diarrhea or pain not controlled by medications  Discharged ambulatory with spouse.

## 2022-01-26 NOTE — PROGRESS NOTES
Pt was seen today for C3 of Venclexta and Benamustine. Labs reviewed with Dr. Loren Arroyo. He will also need 1 unit of plt as he is only coming once a week now. We will get a PET on 2/23 and bm bx on 3/2 and he will be seen next week with Dr. Loren Arroyo. He has what appears to be a fungal type rash on his thighs and back. We have called in clotrimazole cream. He will call on Monday if this is not better by then. He is only on Diflucan.po. He is feeling well and has been discharged from PT. He is not using a cane anymore. His energy has also improved. He denies refills today but states he will call if he needs some. Oral Chemotherapy Adherence:     Current Regimen: bendamustine, Vencletxa  Drug Name: David Omar  Dose: 50 mg   Frequency: 14 days on 14 days off    Barriers to care identified including (financial, physical, psychosocial) : No    Missed doses reported: No    Patient verbalizes understanding of what to do in the event of a missed dose:  Yes    Adverse reactions/toxicities reported:None

## 2022-01-27 NOTE — PROGRESS NOTES
Arrived to the [facility]. 1 unit platelets & Udenyca completed. Patient tolerated well. Any issues or concerns during appointment: None. Patient aware of next infusion appointment on 2/2 (date) at 1430 (time). Patient aware of next lab and [facility] office visit on 2/2 (date) at 1430 (time). See in infusion  Patient instructed to call provider with temperature of 100.4 or greater or nausea/vomiting/ diarrhea or pain not controlled by medications  Discharged ambulatory in stable condition.

## 2022-02-02 NOTE — PROGRESS NOTES
Pt was seen today in infusion. Labs reviewed. He will need 1 unit of PRBC and 1 unit of plt tomorrow. CMP is pending today. He is feeling slightly SOB like before along with some dizziness. He attributes this to his hgb and chem he just had. He will also take Claritin for bone pain from his [facility] [place]. He will return tomorrow at 11 am for his blood and next wednesday in infusion and will see alfa in infusion. Denies any other needs at this time. Discharged ambulatory with wife. Oral Chemotherapy Adherence:     Current Regimen:  Drug Name: [place] Bearded  Dose: 50 mg on D8 today  Frequency: 14/14    Barriers to care identified including (financial, physical, psychosocial) : No    Missed doses reported: Yes    Patient verbalizes understanding of what to do in the event of a missed dose:  Yes    Adverse reactions/toxicities reported:None

## 2022-02-02 NOTE — PROGRESS NOTES
Pt arrived ambulatory. Labs drawn from port. Pt ro receive 1 unit platelets and 1 unit PRBCs tomorrow. Port remained accessed per pt request.  Pt aware of tomorrow appt at 1100. Discharged ambulatory, no distress noted.   Patient instructed to call provider with temperature of 100.4 or greater or nausea/vomiting/ diarrhea or pain not controlled by medications

## 2022-02-03 NOTE — PROGRESS NOTES
Arrived to the [facility]. One unit of blood and 1 unit of platelets completed. 2 gm MAG completed. Patient tolerated well. Any issues or concerns during appointment: none. Patient aware of next infusion appointment on 02/09/2022 (date) at 56 (time). Discharged ambulatory.

## 2022-02-09 NOTE — PROGRESS NOTES
Arrived to the [facility]. Labs drawn from port and one unit blood/platelets to be transfused tomorrow. Any issues or concerns during appointment: none. Contacted patients wife, Avinash Blow at [facility], kathleen and cross still in process per blood bank. Orders placed in sign and held for tomorrow. Patient aware of next infusion appointment on 2/10/22 at 10 AM. Port remains accessed. Patient instructed to call provider with temperature of 100.4 or greater or nausea/vomiting/ diarrhea or pain not controlled by medications  Discharged home ambulatory.

## 2022-02-11 NOTE — PROGRESS NOTES
[facility] Assessment and 1unit PRBCs, 1 unit Platelets completed. Patient tolerated well. Any issues or concerns during appointment: No.  Patient aware of next infusion appointment on 2/16/22 @0900. Patient instructed to call provider with temperature of 100.4 or greater or nausea/vomiting/ diarrhea or pain not controlled by medications  Discharged ambulatory.

## 2022-02-16 NOTE — PROGRESS NOTES
Arrived to the [facility]. Assessment completed, labs drawn/reviewed. 1 unit PRBCs and 1 unit platelets transfused. Patient tolerated well. Any issues or concerns during appointment: No.  Patient aware of next infusion appointment on 2/22/22 @0830. Patient instructed to call provider with temperature of 100.4 or greater or nausea/vomiting/ diarrhea or pain not controlled by medications  Discharged ambulatory.

## 2022-02-22 NOTE — PROGRESS NOTES
Arrived to the [facility]. Labs completed. Jacqueline Lockett, nurse navigator notified of patient's platelet count and 1u plts ordered and administered. Patient tolerated well. Any issues or concerns during appointment: no.  Patient aware of next infusion appointment on 3/1/2022 (date) at 6 am (time). Patient instructed to call provider with temperature of 100.4 or greater or nausea/vomiting/ diarrhea or pain not controlled by medications  Discharged ambulatory with spouse.

## 2022-02-24 NOTE — PROGRESS NOTES
Problem: Knowledge Deficit  Goal: *Verbalizes understanding of procedures and medications  Outcome: Progressing Towards Goal 02-Feb-2022

## 2022-03-01 NOTE — PROGRESS NOTES
Pt was seen today in infusion. We have ordered 2 unit of plt and 1 on reserve for him in prep for his bm bx tomorrow in IR. Infusion will get a post plt count to ensure his plt are over 50. If he needs blood and is not under 7 we an plan to give this on thursday. Awaiting on CMP to see if any other replacements are needed. Pt is aware of plan and next steps once path is received to proceed with NS for an allo. I have emailed his coordinator Emily Whalen there to get details on timeline as pt is inquiring. He will be seen next week for labs and replacements and see alfa on 3/14 to discuss the results of his PET and bm bx. No refills needed this visit.

## 2022-03-01 NOTE — PROGRESS NOTES
Arrived to the [facility]. 2 units of Plts completed, post platelet count 53. Patient tolerated well. Any issues or concerns during appointment: None. Patient aware of next infusion appointment on March 9th at 0830. Patient instructed to call provider with temperature of 100.4 or greater or nausea/vomiting/ diarrhea or pain not controlled by medications  Discharged ambulatory.

## 2022-03-02 NOTE — PROCEDURES
Department of Interventional Radiology  [phone]        Interventional Radiology Brief Procedure Note    Patient: Elio Giles MRN: [id number]  SSN: [id number]    YOB: 1955  Age: 77 y.o. Sex: male      Date of Procedure: 3/2/2022    Pre-Procedure Diagnosis: Leukemia    Post-Procedure Diagnosis: SAME    Procedure(s): Image Guided Biopsy    Brief Description of Procedure: CT guided bone marrow aspirate and core biopsy, right iliac bone.      Performed By: Neno Henson MD     Assistants: None    Anesthesia:Moderate Sedation    Estimated Blood Loss: Less than 10ml    Specimens:  As above    Implants:  None    Findings: As above    Complications: None    Signed By: Neno Henson MD     March 2, 2022

## 2022-03-02 NOTE — H&P
Department of Interventional Radiology  [phone]    History and Physical    Patient:  Jann Toney MRN:  [id number]  SSN:  [id number]    YOB: 1955  Age:  77 y.o. Sex:  male      Primary Care Provider:  Genna Zafar NP  Referring Physician:  Wayne Galindo MD    Subjective:     Chief Complaint: Bone marrow biopsy needed    History of the Present Illness: The patient is a 77 y.o. male with hx of T cell Pro-Lymphocytic Leukemia who presents for repeat bone marrow biopsy. He has no acute complaints. He is NPO. He reports that he tolerated his past bone marrow biopsies well. Past Medical History:   Diagnosis Date    Back pain     occassionally    Cervical radiculopathy     Claustrophobia     DVT (deep venous thrombosis) ([facility] [address] 06/2019    currently treated with Xarelto- started on 5/30/2019    GERD (gastroesophageal reflux disease)     H/O seasonal allergies     Hyperlipidemia     Impotence     Insomnia     Lateral epicondylitis     Leukemia (HCC)     CHEMO    Obesity     BMI 36.6    Sleep apnea     noncomplaint with CPAP     Past Surgical History:   Procedure Laterality Date    HX CIRCUMCISION      HX COLONOSCOPY  2017    Due in 2027    HX ORTHOPAEDIC Right     r wrist    HX VASCULAR ACCESS      HX WISDOM TEETH EXTRACTION      IR INSERT TUNL CVC W PORT OVER 5 YEARS  6/19/2019    IR INSERT TUNL CVC W PORT OVER 5 YEARS  11/23/2021    IR REMOVE TUNL CVAD W PORT/PUMP  12/14/2020        Review of Systems:    Pertinent items are noted in the History of Present Illness. Prior to Admission medications    Medication Sig Start Date End Date Taking? Authorizing Provider   mirtazapine (REMERON) 15 mg tablet Take 1 Tablet by mouth nightly. 1/27/22   Wayne Galindo MD   fluconazole (DIFLUCAN) 200 mg tablet Take 1 Tablet by mouth daily. FDA advises cautious prescribing of oral fluconazole in pregnancy.  1/27/22   Wayne Galindo MD   allopurinoL (ZYLOPRIM) 300 mg tablet Take 1 Tablet by mouth daily. 1/27/22   Jin Anderson MD   oxyCODONE IR (ROXICODONE) 5 mg immediate release tablet Take 1 Tablet by mouth every eight (8) hours as needed for Pain for up to 30 days. Max Daily Amount: 15 mg. 1/27/22 2/26/22  Sheree Nguyen NP   clotrimazole (Antifungal, Clotrimazole,) 1 % topical cream Apply  to affected area two (2) times a day. 1/26/22   Jin Anderson MD   Venclexta 50 mg tab TAKE 1 TABLET BY MOUTH ONCE DAILY FOR 14 DAYS, THEN 14 DAYS OFF 1/6/22   Jin Anderson MD   levoFLOXacin (Levaquin) 500 mg tablet Take 1 Tablet by mouth daily. Patient not taking: Reported on 1/12/2022 12/27/21   Ida Jaime NP   furosemide (Lasix) 20 mg tablet Take 1 Tablet by mouth daily. 11/29/21   Jin Anderson MD   calcium carbonate (OS-DANIELA) 500 mg calcium (1,250 mg) tablet Take 1 Tablet by mouth three (3) times daily (with meals). 11/24/21   Jin Anderson MD   lidocaine (XYLOCAINE) 2 % solution Take 5-10 mL by mouth as needed for Pain. Patient taking differently: Take 5-10 mL by mouth daily as needed for Pain. 11/24/21   Ida Jaime NP   lidocaine-prilocaine (EMLA) topical cream Apply  to affected area as needed for Pain. Use liberal amount on port. Cover with small piece saran wrap. Patient taking differently: Apply  to affected area as needed for Pain. Apply before being accessed. Use liberal amount on port. Cover with small piece saran wrap. 11/23/21   Jin Anderson MD   DISABLED PLACARD (DISABLED PLACARD) [facility] Handicap placard 11/10/21   Jin Anderson MD   azelastine (ASTELIN) 137 mcg (0.1 %) nasal spray SPRAY ONE SPRAY IN EACH NOSTRIL TWICE DAILY 10/15/21   Irma Magaña   LORazepam (Ativan) 1 mg tablet Take 1 Tablet by mouth two (2) times daily as needed for Anxiety. Max Daily Amount: 2 mg. Indications: anxious 10/14/21   Jin Anderson MD   hydrocortisone (ANUSOL-HC) 2.5 % rectal cream Apply to outside rectum (do not insert) QID.   Patient taking differently: apply 1 dose topically to the outside of rectum 4 times a day, do not insert  10/10/21   Iwona Primus, NP   omeprazole (PRILOSEC) 40 mg capsule TAKE 1 CAPSULE BY MOUTH DAILY 3/29/21   Provider, Historical   lovastatin (MEVACOR) 10 mg tablet Take 1 Tab by mouth nightly. 5/19/20   Roma Allen, NP   docusate sodium (COLACE) 100 mg capsule Take 100 mg by mouth nightly. Provider, Historical        Allergies   Allergen Reactions    Campath [Alemtuzumab] Other (comments)     Rigors       Family History   Problem Relation Age of Onset    Cancer Mother 80        pancreatic    Cancer Father 76        breast    No Known Problems Son     Hypertension Brother     Hypertension Brother     No Known Problems Daughter      Social History     Tobacco Use    Smoking status: Never Smoker    Smokeless tobacco: Never Used   Substance Use Topics    Alcohol use: Not Currently     Alcohol/week: 0.0 standard drinks        Objective:       Physical Examination:    Vitals:    03/02/22 1207 03/02/22 1208   BP: 138/75    Pulse: 69    Resp: 18    Temp: 97 °F (36.1 °C)    SpO2: 100%    Weight:  99.8 kg (220 lb)   Height:  5' 11\" (1.803 m)       Pain Assessment  Pain Intensity 1: 0 (03/02/22 1208)  Pain Location 1: Back  Pain Intervention(s) 1: Position  Patient Stated Pain Goal: 0      General: WDWN male in NAD. AAOx4. Lungs: Respirations are even and non-labored.      Laboratory:     Lab Results   Component Value Date/Time    Sodium 137 03/01/2022 11:32 AM    Sodium 140 02/22/2022 08:45 AM    Potassium 4.0 03/01/2022 11:32 AM    Potassium 3.6 02/22/2022 08:45 AM    Chloride 102 03/01/2022 11:32 AM    Chloride 106 02/22/2022 08:45 AM    CO2 30 03/01/2022 11:32 AM    CO2 30 02/22/2022 08:45 AM    Anion gap 5 (L) 03/01/2022 11:32 AM    Anion gap 4 (L) 02/22/2022 08:45 AM    Glucose 114 (H) 03/01/2022 11:32 AM    Glucose 101 (H) 02/22/2022 08:45 AM    BUN 15 03/01/2022 11:32 AM    BUN 19 02/22/2022 08:45 AM Creatinine 1.10 03/01/2022 11:32 AM    Creatinine 0.90 02/22/2022 08:45 AM    GFR est AA >60 03/01/2022 11:32 AM    GFR est AA >60 02/22/2022 08:45 AM    GFR est non-AA >60 03/01/2022 11:32 AM    GFR est non-AA >60 02/22/2022 08:45 AM    Calcium 9.5 03/01/2022 11:32 AM    Calcium 9.7 02/22/2022 08:45 AM    Magnesium 1.9 03/01/2022 11:32 AM    Magnesium 1.8 02/22/2022 08:45 AM    Phosphorus 3.3 09/25/2021 03:12 AM    Albumin 4.0 03/01/2022 11:32 AM    Albumin 3.8 02/22/2022 08:45 AM    Protein, total 7.3 03/01/2022 11:32 AM    Protein, total 6.7 02/22/2022 08:45 AM    Globulin 3.3 03/01/2022 11:32 AM    Globulin 2.9 02/22/2022 08:45 AM    A-G Ratio 1.2 03/01/2022 11:32 AM    A-G Ratio 1.3 02/22/2022 08:45 AM    ALT (SGPT) 24 03/01/2022 11:32 AM    ALT (SGPT) 23 02/22/2022 08:45 AM     Lab Results   Component Value Date/Time    WBC 4.3 03/02/2022 12:14 PM    WBC 2.8 (L) 03/01/2022 11:32 AM    HGB 8.7 (L) 03/02/2022 12:14 PM    HGB 9.6 (L) 03/01/2022 11:32 AM    HCT 25.7 (L) 03/02/2022 12:14 PM    HCT 28.5 03/01/2022 11:32 AM    PLATELET 53 (L) 62/29/0539 12:14 PM    PLATELET 53 (L) 37/66/9506 01:13 PM     No results found for: APTT, PTP, INR, INREXT    Assessment:     The patient is a 77 y.o. male with hx of T cell Pro-Lymphocytic Leukemia who presents for repeat bone marrow biopsy    Hospital Problems  Date Reviewed: 3/1/2022    None          Plan:     Planned Procedure:  CT guided bone marrow biopsy    Risks, benefits, and alternatives reviewed with patient and he agrees to proceed with the procedure.       Signed By: Maryann Cyr MD     March 2, 2022

## 2022-03-02 NOTE — DISCHARGE INSTRUCTIONS
[address] [facility] [address]  Department of Interventional Radiology  [facility]  [phone] Office  [phone] Fax    BIOPSY DISCHARGE INSTRUCTIONS    General Instructions:     A biopsy is the removal of a small piece of tissue for microscopic examination or testing. Healthy tissue can be obtained for the purpose of tissue-type matching for transplants. Unhealthy tissues are more commonly biopsied to diagnose disease. Lung Biopsy:     A needle lung biopsy is performed when there is a mass discovered in the lung area. The most serious risk is infection, bleeding, and pneumothorax (a collapsed lung). Signs of pneumothorax include shortness of breath, rapid heart rate, and blueness of the skin. If any of these occur, call 911. Liver Biopsy: This test helps detect cancer, infections, and the cause of an enlargement of the liver or elevated liver enzymes. It also helps to diagnose a number of liver diseases. The pain associated with the procedure may be felt in the shoulder. The risks include internal bleeding, pneumothorax, and injury to the surrounding organs. Renal Biopsy: This procedure is sometimes done to evaluate a transplanted kidney. It is also used to evaluate unexplained decrease in kidney function, blood, or protein in the urine. You may see bright red blood in the urine the first 24 hours after the test. If the bleeding lasts longer, you need to call your doctor. There is a risk of infection and bleeding into the muscle, which may cause soreness. Spinal Biopsy: This test is sometimes done in conjunction with other procedures. Your back will be sore, as we are taking a small sample of bone, which is slightly more difficult to sample than tissue. General Biopsy:     A mass can grow in any area of the body, and we are taking a specimen as ordered by your doctor. The risks are the same.  They include bleeding, pain, and infection. Home Care Instructions: You may resume your regular diet and medication regimen. Do not drink alcohol, drive, or make any important legal decisions in the next 24 hours. Do not lift anything heavier than a gallon of milk until the soreness goes away. You may use over the counter acetaminophen or ibuprofen for the soreness. You may apply an ice pack to the affected area for 20-30 minutes at time for the first 24 hours. After that, you may apply a heat pack. Call If: You should call your Physician and/or the Radiology Nurse if you have any questions or concerns about the biopsy site. Call if you should have increased pain, fever, redness, drainage, or bleeding more than a small spot on the bandage. Follow-Up Instructions: Please see your ordering doctor as he/she has requested. To Reach Us: If you have any questions about your procedure, please call the Interventional Radiology department at [phone]. After business hours (5pm) and weekends, call the answering service at [phone] and ask for the Radiologist on call to be paged. Si tiene Preguntas acerca del procedimiento, por favor llame al departamento de Radiología Intervencional al [phone]. Después de horas de oficina (5 pm) y los fines de [place], llamar al Julio Jason al [phone] y pregunte por el Radiologo de [facility]. Interventional Radiology General Nurse Discharge    After general anesthesia or intravenous sedation, for 24 hours or while taking prescription Narcotics:  · Limit your activities  · Do not drive and operate hazardous machinery  · Do not make important personal or business decisions  · Do  not drink alcoholic beverages  · If you have not urinated within 8 hours after discharge, please contact your surgeon on call. * Please give a list of your current medications to your Primary Care Provider.   * Please update this list whenever your medications are discontinued, doses are     changed, or new medications (including over-the-counter products) are added. * Please carry medication information at all times in case of emergency situations. These are general instructions for a healthy lifestyle:    No smoking/ No tobacco products/ Avoid exposure to second hand smoke  Surgeon General's Warning:  Quitting smoking now greatly reduces serious risk to your health. Obesity, smoking, and sedentary lifestyle greatly increases your risk for illness  A healthy diet, regular physical exercise & weight monitoring are important for maintaining a healthy lifestyle    You may be retaining fluid if you have a history of heart failure or if you experience any of the following symptoms:  Weight gain of 3 pounds or more overnight or 5 pounds in a week, increased swelling in our hands or feet or shortness of breath while lying flat in bed. Please call your doctor as soon as you notice any of these symptoms; do not wait until your next office visit. Recognize signs and symptoms of STROKE:  F-face looks uneven    A-arms unable to move or move unevenly    S-speech slurred or non-existent    T-time-call 911 as soon as signs and symptoms begin-DO NOT go       Back to bed or wait to see if you get better-TIME IS BRAIN.       Patient Signature:  Date: 3/2/2022  Discharging Nurse: Osiris Fischer RN

## 2022-03-14 NOTE — PROGRESS NOTES
Arrived to the [facility]. 1 unit PRBC and 1 unit PLT completed. Patient tolerated well. Any issues or concerns during appointment: none. Patient aware of next infusion appointment on  3/17  Patient instructed to call provider with temperature of 100.4 or greater or nausea/vomiting/ diarrhea or pain not controlled by medications  Discharged ambulatory with wife.

## 2022-03-14 NOTE — PROGRESS NOTES
Pt was seen today post NS consult. They radha like for him to get more treatment as his bm bx still had 20 %. They did work up his brother Rebecca Lopez for possible haplo. We will restart him on Bendamustine and Venclexta but with Venclexta at 100 mg x 14 days. Its ok if he does not receive Venclexta by this thursday. We will plan to give 3 more cycles. He will have labs and replacements in between his cycles. I have sent for Fc to start working on a script for him. He will get udencya on Friday. Script for refill on allopurinol has been sent in. He denies any need for any other scripts. He will call with any fevers or other concerns. He is getting 1 unit of PRBC and 1 unit of plt today.

## 2022-03-17 NOTE — PROGRESS NOTES
Pt arrived ambulatory today at [phone], to receive IV chemotherapy, labs and replacements, none needed. Pt tolerated without difficulty. Patient discharged via ambulatory accompanied by spouse. Instructed to notify physician of any problems, questions or concerns. Allowed opportunity for patient/family to ask questions. Verbalized understanding. Next appointment is March 18 at 1400 with Amrita Jordan.

## 2022-03-18 PROBLEM — C91.60 PROLYMPHOCYTIC LEUKEMIA OF T-CELL (HCC): Status: ACTIVE | Noted: 2019-01-08

## 2022-03-18 NOTE — PROGRESS NOTES
Pt arrived ambulatory today at 1346, to receive Udenyca. Pt tolerated without difficulty. Patient discharged via ambulatory accompanied by spouse. Instructed to notify physician of any problems, questions or concerns. Allowed opportunity for patient/family to ask questions. Verbalized understanding. Next appointment is March 30 at 0800 with Amrita Jordan.

## 2022-03-19 PROBLEM — E87.6 HYPOKALEMIA: Status: ACTIVE | Noted: 2022-01-01

## 2022-03-19 PROBLEM — D70.9 NEUTROPENIC FEVER (HCC): Status: ACTIVE | Noted: 2021-01-01

## 2022-03-19 PROBLEM — D69.6 THROMBOCYTOPENIA (HCC): Status: ACTIVE | Noted: 2021-01-01

## 2022-03-19 PROBLEM — R50.81 NEUTROPENIC FEVER (HCC): Status: ACTIVE | Noted: 2021-01-01

## 2022-03-19 PROBLEM — G47.33 SEVERE OBSTRUCTIVE SLEEP APNEA: Status: ACTIVE | Noted: 2017-01-12

## 2022-03-20 PROBLEM — N40.1 BENIGN PROSTATIC HYPERPLASIA WITH NOCTURIA: Status: ACTIVE | Noted: 2018-01-23

## 2022-03-20 PROBLEM — Z51.11 ADMISSION FOR ANTINEOPLASTIC CHEMOTHERAPY: Status: ACTIVE | Noted: 2019-01-14

## 2022-03-20 PROBLEM — A41.9 SEPSIS (HCC): Status: ACTIVE | Noted: 2021-01-01

## 2022-03-20 PROBLEM — R35.1 BENIGN PROSTATIC HYPERPLASIA WITH NOCTURIA: Status: ACTIVE | Noted: 2018-01-23

## 2022-03-23 NOTE — PROGRESS NOTES
Patient arrived to [facility] for labs and replacements. Assessment completed. Platelets given. Patient tolerated infusion well and is aware of next appointment on 3/30/2022 @0800. Patient discharged ambulatory.

## 2022-03-30 NOTE — PROGRESS NOTES
Pt arrived ambulatory. Labs drawn from port. Premeds given. 1 unit platelets and 1 unit PRBCs transfused as ordered. Pt tolerated well. Pt aware of next appt 4/7 at 0800. Discharged ambulatory, no distress noted.   Patient instructed to call provider with temperature of 100.4 or greater or nausea/vomiting/ diarrhea or pain not controlled by medications

## 2022-03-30 NOTE — PROGRESS NOTES
3/30 Pt here for OV accompanied by caregiver. Pt verbalizes doing well. Denying N/V/D or pain. Refills sent in. Notes edema in BLE. Pt educated by MD to elevate BLE while sitting in chair. Pt will receive 1 unit of plts today in infusion. Will repeat BMBX in June. RTC 4/14.

## 2022-03-30 NOTE — PROGRESS NOTES
Clinical Social Work Note  Name: Nadia Pena  : 1955  MRN: [id number]  Date of Service: 3/30/2022  Location of Service: [facility]  Date of Service: 3/30/2022    Type of Service: Case Management     Length of Service: 16 minutes    Patient Diagnosis:   1. Prolymphocytic leukemia of T-cell ([facility] [address]        Subject: Seen patient with his wife at [facility], provided therapeutic reassurance. LISW-CP overviewed with pt relaxation and daily mindfulness. At  this moment, pt denies any psychosocial and economic needs. Mental Status Exam: Intellectual functioning appears to be intact. Mood is \"ok\" and affect is good. Insight is adequate. Judgment is adequate. Patient did not report suicidal ideations, intent or plans. Patient did not report homicidal ideations, intent or plans. Patient is oriented to self, place, time and situation. Protective Factors: Current care for physical and mental illness, adequate insight and judgment, family support, cultural and Episcopal beliefs and values that support self-care. Next Steps: LISW-CP gave contact information and encouraged pt to call should any needs arise. Pt verbalized understanding. LISW-CP intends to follow up by phone and/or face-to-face as needed.     3 most recent [facility] 3/14/2022 2022 2022   Little interest or pleasure in doing things Not at all Not at all Not at all   Feeling down, depressed, irritable, or hopeless Not at all Not at all Not at all   Total Score PHQ 2 0 0 0   Trouble falling or staying asleep, or sleeping too much - Not at all Not at all   Feeling tired or having little energy - Not at all Not at all   Poor appetite, weight loss, or overeating - Not at all Not at all   Feeling bad about yourself - or that you are a failure or have let yourself or your family down - Not at all Not at all   Trouble concentrating on things such as school, work, reading, or watching TV - Not at all Not at all   Moving or speaking so slowly that other people could have noticed; or the opposite being so fidgety that others notice - Not at all Not at all   Thoughts of being better off dead, or hurting yourself in some way - Not at all Not at all   PHQ 9 Score - 0 0   How difficult have these problems made it for you to do your work, take care of your home and get along with others - - -         Signed By: ULISES Conde     March 30, 2022

## 2022-03-30 NOTE — PROGRESS NOTES
Oral Chemotherapy Adherence:     Current Regimen:  Drug Name: Venetoclax  Dose: 100mg  Frequency: 14 days on 14 days off    Barriers to care identified including (financial, physical, psychosocial) : No    Missed doses reported: No    Patient verbalizes understanding of what to do in the event of a missed dose:  Yes    Adverse reactions/toxicities reported:None

## 2022-04-07 NOTE — ED PROVIDER NOTES
Social Work Progress Note  Name: Colt Esteves  : 1955  MRN: [id number]  Date of Service: 2022    Length of Service: 16 minutes    Patient Diagnosis:   1. Prolymphocytic leukemia of T-cell ([facility] [address]        Reason for Visit: F/U    Subjective: Seen patient with his wife, gave information on [employer],  provided therapeutic reassurance. LISW-CP used reflective listening as patient spoke about emotional and physical distress. LISW-CP discussed with patient symptoms of emotional distress, causes, triggers and coping skills. Pt denies any other psychosocial needs at this time. Protective Factors: Current care for physical and mental illness, adequate insight and judgment, family support, cultural and Adventism beliefs and values that support self-care. Patient did not report suicidal ideations, intent or plans. Patient did not report homicidal ideations, intent or plans. Patient is oriented to self, place, time and situation. Next Steps: LISW-CP gave contact information and encouraged pt to call should any needs arise. Pt verbalized understanding. LISW-CP intends to follow up as needed.       3 most recent [place] [address] Screens 3/14/2022 2022 2022   Little interest or pleasure in doing things Not at all Not at all Not at all   Feeling down, depressed, irritable, or hopeless Not at all Not at all Not at all   Total Score PHQ 2 0 0 0   Trouble falling or staying asleep, or sleeping too much - Not at all Not at all   Feeling tired or having little energy - Not at all Not at all   Poor appetite, weight loss, or overeating - Not at all Not at all   Feeling bad about yourself - or that you are a failure or have let yourself or your family down - Not at all Not at all   Trouble concentrating on things such as school, work, reading, or watching TV - Not at all Not at all   Moving or speaking so slowly that other people could have noticed; or the opposite being so fidgety that others notice - Not at all Not at all   Thoughts of being better off dead, or hurting yourself in some way - Not at all Not at all   PHQ 9 Score - 0 0   How difficult have these problems made it for you to do your work, take care of your home and get along with others - - -         Latasha Nicholas 112, ULISES

## 2022-04-07 NOTE — PROGRESS NOTES
[facility] One unit Platelets trasfused for Plt count of 4K. Patient complained of nausea at the end of the transfusion. Vital signs stable. Any issues or concerns during appointment: yes. Called NP for order for Zofran. Patient aware of next infusion appointment on 04/14/22 (date) at 36 (time). Patient aware of next lab and [facility] office visit on 04/21/22 (date) at 46 (time). Discharged ambulatory with spouse.

## 2022-04-14 NOTE — PROGRESS NOTES
Pt was seen today by Dr. Billy Hirsch in infusion. Labs reviewed. He will need 1 unit of plt, 2 grams of mag, 20 meq of K+ IV. He will also moved his bendamustine to today instead of next week due to his WBC going up. He will have Sri Gaston tomorrow. He will start taking his lasix daily now. Dr. Billy Hirsch also would like him to increase venclexta to 100 mg daily for 21 days then off 7 days. I have called mary anne and DEMETRIO on pharm VM for these changes. I have also let ELISEO carrera know. He will be seen next week in office and will have labs and reaplacements until his next cycle.

## 2022-04-14 NOTE — PROGRESS NOTES
Per alfa NS would also like for pt to start romidepsin. We will discuss this at his next appt. Oral Chemotherapy Adherence:     Current Regimen:  Drug Name: Esther Tyson  Dose: 100 mg  Frequency: 21 days on 7 off    Barriers to care identified including (financial, physical, psychosocial) : No    Missed doses reported: No    Patient verbalizes understanding of what to do in the event of a missed dose:  Yes    Adverse reactions/toxicities reported:None

## 2022-04-14 NOTE — PROGRESS NOTES
Arrived to the [facility]. 1 U platelets, magnesium, potassium, and bendamustine completed. Patient tolerated well. Any issues or concerns during appointment: none. Patient aware of next infusion appointment on 4/15/22 (date) at 1600 (time). Patient aware of next lab and [facility] office visit on 4/21/22 (date) at [address] (time). Patient instructed to call provider with temperature of 100.4 or greater or nausea/vomiting/ diarrhea or pain not controlled by medications  Discharged ambulatory accompanied by wife.

## 2022-04-21 NOTE — PROGRESS NOTES
Pt arrived via wheelchair today at 1025, to receive one unit of platelets and 2 units of blood. Pt tolerated without difficullty. Patient discharged via ambulatory accompanied by spouse, feeling much better than pt arrived today. Instructed to notify physician of any problems, questions or concerns. Allowed opportunity for patient/family to ask questions. Verbalized understanding. Next appointment is April 28 at 0900 with Amrita Jordan.

## 2022-04-21 NOTE — PROGRESS NOTES
Patient arrived to port lab for port access and lab draw   Michelle Molina accessed and labs drawn per protocol   *Port remains accessed   Patient discharged from port lab via wheel chair*

## 2022-04-21 NOTE — PROGRESS NOTES
4/21 Pt came in for a follow up. He doesn't feel the best, is weak. Dr. Jordin Amaya instructed pt that if he continues to feel fatigue, he can take his venetoclax every other day. NS suggested we start him on Romidepsin. Plan is in, will plan to start on 5/5. We will continue to see him weekly. Today he will need 1 unit of plt and 2 units of RBCs.

## 2022-04-26 NOTE — PROGRESS NOTES
Arrived to the [facility]. Labs draw completed. 2 units of plts given. Patient tolerated well. Any issues or concerns during appointment: none. Patient aware of next infusion appointment on 04/28/2022 (date) at 0900 (time). Discharged ambulatory with spouse.

## 2022-05-02 NOTE — PROGRESS NOTES
Patient arrived ambulatory to infusion area. 2u platelets and 1u PRBCs transfused. Patient tolerated well. Port deaccessed. Discharged ambulatory. Patient aware of next appt on 5/5.

## 2022-05-05 NOTE — PROGRESS NOTES
Pt was seen today by Dr. Valentine Whitt. Labs reviewed. He will start Istodax today in addition to his venclexta he has been taking. He will continue taking 100 mg every other day. He has 8 days left. I will call in a new prescription for him. We will give him 1 unit of plt and 1 unit of PRBC. He will come weekly x3 for his isotodax and labs/replacments weekly. He will see the NP on 6/2 and alfa on 6/30. His EKG for baseline before starting was neg. Consent obtained and pt given chemo care copy of Istodax. Advised to call with any further needs.

## 2022-05-05 NOTE — PROGRESS NOTES
Arrived to the [facility]. Assessment completed, labs reviewed. Istodax and 1 unit of platelets completed. Patient tolerated without problems.    Any issues or concerns during appointment: None  Patient instructed to call provider with temperature of 100.4 or greater or nausea/vomiting/ diarrhea or pain not controlled by medications  Blood education reviewed with patient and education sheet sent home with patient  Instructed to call Dr Bobby John with any side effects or other concerns  Patient aware of next infusion appointment on 5/6/22(date) at 10 30 AM (time)for blood  Discharge via W/C with spouse

## 2022-05-05 NOTE — PROGRESS NOTES
Patient arrived to port lab for port access and lab draw   John Riddles accessed and labs drawn per protocol   *Port remains accessed /   Patient discharged from port lab ambulating

## 2022-05-06 NOTE — PROGRESS NOTES
Arrived to the infusion center. Completed 1 unit of PRBC's without incidence of reaction. . Issue with diarrhea fofllowiing chemo yesterday. Much less today. Aware of his next appointment on 5/9 for labs and replacement as needed. Discharged ambulatory in satisfactory condition.

## 2022-05-09 NOTE — PROGRESS NOTES
Progress Note      Name: Dori Villarreal  : 1955  MRN: [id number]  Date of Service: 2022    Length of Service: 16 minutes    Patient Diagnosis:   1. Prolymphocytic leukemia of T-cell ([facility] [address]        Reason for Visit: F/U    Subjective: Seen pt during Infusion with his wife. Patient denied suicidal or homicidal ideations, intent or plans. Patient denied self-injury behaviors. Pt denied alcohol and other substance abuse. ROSANAW-CP discussed Distress by using NCCN Guidelines for Patients' Distress with patient. Denied psychosocial needs at this time. Objective:  Appearance   Hygiene: clean and  grooming   Dress: clean,  neat, climate appropriate   Speech   General:  clarity   Rate: normal   Latency: none   Volume: normal  Behavior   General: relaxed   Eye Contact: appropriate  Cooperativeness   Cooperative and friendly. Thinking  Thought Processes    logical and goal directed (self-care)  Thought Content   Future oriented  Mood   Tired\"  Affect   Type:  congruent   Range: full range   Appropriateness to content and congruence with stated mood  Insight/Judgment   Adequate    Assessment/Plans:   Will continue to meet with and be available to patient as as needed    3 most recent [place] Screens 2022 2022 3/14/2022   Little interest or pleasure in doing things Not at all Not at all Not at all   Feeling down, depressed, irritable, or hopeless Not at all Nearly every day Not at all   Total Score PHQ 2 0 3 0   Trouble falling or staying asleep, or sleeping too much - Not at all -   Feeling tired or having little energy - Nearly every day -   Poor appetite, weight loss, or overeating - Nearly every day -   Feeling bad about yourself - or that you are a failure or have let yourself or your family down - Nearly every day -   Trouble concentrating on things such as school, work, reading, or watching TV - Not at all -   Moving or speaking so slowly that other people could have noticed; or the opposite being so fidgety that others notice - Not at all -   Thoughts of being better off dead, or hurting yourself in some way - Not at all -   PHQ 9 Score - 12 -   How difficult have these problems made it for you to do your work, take care of your home and get along with others - - -       Latasha Nicholas 112, ULISES

## 2022-05-09 NOTE — PROGRESS NOTES
Arrived to the [facility]. Labs, antiemetics, 1L NS, ordered pre-meds, and 1u platelets completed. Patient tolerated well. Any issues or concerns during appointment: Patient complaining of ongoing nausea through the weekend. Patient reports 2 episode of vomiting. Patient also reports fatigue and decreased appetite. Dr. Valentine Whitt notified and order given for 8mg IV Zofran x1, 450mg IV Emend x1, and 1L NS. Dr. Valentine Whitt notified of total bilirubin 1.5. Patient aware of next infusion appointment on 5/12/2022 (date) at 1:30 pm (time). Patient instructed to call provider with temperature of 100.4 or greater or nausea/vomiting/ diarrhea or pain not controlled by medications. Discharged via wheelchair with spouse.

## 2022-05-12 NOTE — PROGRESS NOTES
Arrived to infusion center . Plan of care reviewed for Istodax today  Patient reports more short of breath and weakness today and would like lab values checked.  Orders recieved from Kayla Olguin NP

## 2022-05-13 NOTE — PROGRESS NOTES
Arrived to the [facility]. Assessment completed and labs reviewed. Pre meds and 1 unit of platelets infused. Patient tolerated well. Any issues or concerns during appointment: none. Patient aware of next infusion appointment on 05/16/2022 at 0800. Patient instructed to call provider with temperature of 100.4 or greater or nausea/vomiting/ diarrhea or pain not controlled by medications. Discharged ambulatory.

## 2022-05-13 NOTE — PROGRESS NOTES
Clinical Social Work Note  Name: Merlin Devlin  : 1955  MRN: [id number]  Date of Service: 2022  Location of Service: [facility]  Date of Service: 2022    Type of Service: Case Management     Length of Service: 16 minutes    Patient Diagnosis: No diagnosis found. Referral Source: rn    Reason for Visit: fu    Subject: Seen patient with his wife,  provided therapeutic reassurance. Gave information on [employer]. At  this moment, pt denies any psychosocial and economic needs. Mental Status Exam: Intellectual functioning appears to be intact. Mood and affect were congruent. Insight iwas adequate. Judgment was adequate. Patient did not report suicidal ideations, intent or plans. Patient did not report homicidal ideations, intent or plans. Patient was oriented to self, place, time and situation. Protective Factors: Current care for physical and mental illness, adequate insight and judgment, family support, cultural and Confucianist beliefs and values that support self-care. Next Steps: LISW-CP gave contact information and encouraged pt to call should any needs arise. Pt verbalized understanding. LISW-CP intends to follow up by phone and/or face-to-face as needed.     3 most recent [facility] Screens 2022 2022 3/14/2022   Little interest or pleasure in doing things Not at all Not at all Not at all   Feeling down, depressed, irritable, or hopeless Not at all Nearly every day Not at all   Total Score PHQ 2 0 3 0   Trouble falling or staying asleep, or sleeping too much - Not at all -   Feeling tired or having little energy - Nearly every day -   Poor appetite, weight loss, or overeating - Nearly every day -   Feeling bad about yourself - or that you are a failure or have let yourself or your family down - Nearly every day -   Trouble concentrating on things such as school, work, reading, or watching TV - Not at all -   Moving or speaking so slowly that other people could have noticed; or the opposite being so fidgety that others notice - Not at all -   Thoughts of being better off dead, or hurting yourself in some way - Not at all -   PHQ 9 Score - 12 -   How difficult have these problems made it for you to do your work, take care of your home and get along with others - - -         Signed By: ULISES He     May 13, 2022

## 2022-05-16 NOTE — PROGRESS NOTES
Pt arrived via wheelchair today at [phone], to have labs and replacements. Pt received one unit of platelets and one unit of blood. Idania Lee RN, made aware of platelet count, per order. Pt only needs one unit as bone marrow biopsy order was old. Pt tolerated without difficulty. Patient discharged via ambulatory accompanied by spouse. Instructed to notify physician of any problems, questions or concerns. Allowed opportunity for patient/family to ask questions. Verbalized understanding. Next appointment is May 19 at 0800 with Amrita Jordan.

## 2022-05-19 NOTE — PROGRESS NOTES
Arrived to the [facility]. Istodax and 1 unit PLTS completed. Patient tolerated without problems. Any issues or concerns during appointment: PLTS result 11, spoke with Bert Ortiz NP and will replace today. Patient aware of next infusion appointment on 5/23/22 (date) at 0800 (time). Patient instructed to call provider with temperature of 100.4 or greater or nausea/vomiting/ diarrhea or pain not controlled by medications  Discharged ambulatory with spouse (patient felt well enough to walk).

## 2022-05-24 PROBLEM — D69.6 THROMBOCYTOPENIA (HCC): Status: ACTIVE | Noted: 2021-01-01

## 2022-05-24 PROBLEM — E87.6 HYPOKALEMIA: Status: ACTIVE | Noted: 2022-01-01

## 2022-05-24 PROBLEM — I82.411 ACUTE DEEP VEIN THROMBOSIS (DVT) OF FEMORAL VEIN OF RIGHT LOWER EXTREMITY (HCC): Status: ACTIVE | Noted: 2022-01-01

## 2022-05-24 PROBLEM — C91.60 PROLYMPHOCYTIC LEUKEMIA OF T-CELL (HCC): Status: ACTIVE | Noted: 2019-01-08

## 2022-05-24 NOTE — PROGRESS NOTES
Spoke to pt to let him know that he has a clot in his right leg and that Efren aBllard would like for him to go to ED for blood and to have an IVC filter placed. I spoke to Hardeep chauhan NP on the floor and called the ED to let them know of his reasoning for coming and needing IVC due to no plt and needing blood plt every other day. Pt VU and will go to ED.

## 2022-05-24 NOTE — PROGRESS NOTES
Received email [facility] started PA for Lidocaine-Prilocaine 2.5-2.5% cream, obtained PA through [facility], waiting on determination

## 2022-05-24 NOTE — TELEPHONE ENCOUNTER
Stat call from Franki Schrader from [address]  Patient has a right leg + DVT. I reported to Dr Stacy Pool and we can send him home.

## 2022-05-24 NOTE — ED PROVIDER NOTES
[facility] Emergency Department Provider Note                   PCP:                KESHAWN Brown NP               Age: 79 y.o. Sex: male       ICD-10-CM    1. Acute deep vein thrombosis (DVT) of right lower extremity, unspecified vein (HCC)  I82.401    2. Thrombocytopenia ([facility] [address]  D69.6        DISPOSITION Decision To Admit 05/24/2022 04:46:10 PM       New Prescriptions    No medications on file       Orders Placed This Encounter   Procedures    CBC    CMP    Brain Natriuretic Peptide    EKG 12 Lead    Insert peripheral IV STAT        MDM  Number of Diagnoses or Management Options     Amount and/or Complexity of Data Reviewed  Clinical lab tests: ordered and reviewed  Tests in the radiology section of CPT®: reviewed    Risk of Complications, Morbidity, and/or Mortality  Presenting problems: moderate  Diagnostic procedures: low  Management options: low  General comments: After medical screening exam, hospitalist was contacted for admission. Patient Progress  Patient progress: joe Collier is a 79 y.o. male who presents to the Emergency Department with chief complaint of    Chief Complaint   Patient presents with    Leg Swelling      Patient was referred to the emergency department from the radiology department after outpatient ultrasound demonstrated deep venous thrombosis in the right lower extremity. Patient has a history of T-cell leukemia and thrombocytopenia and actually received an infusion of platelets yesterday. Office notes available for review in the computer system indicate the patient a is to be admitted for additional platelets and planned IVC filter. Patient's only complaint is of some discomfort in the right lower extremity. The history is provided by the patient. All other systems reviewed and are negative. Review of Systems   Constitutional: Negative for chills and fever. Respiratory: Negative for chest tightness and shortness of breath. Cardiovascular: Negative for chest pain and leg swelling. All other systems reviewed and are negative.       Past Medical History:   Diagnosis Date    Back pain     occassionally    Cervical radiculopathy     Claustrophobia     DVT (deep venous thrombosis) ([facility] [address] 06/2019    currently treated with Xarelto- started on 5/30/2019    GERD (gastroesophageal reflux disease)     H/O seasonal allergies     Hyperlipidemia     Impotence     Insomnia     Lateral epicondylitis     Leukemia (HCC)     CHEMO    Obesity     BMI 36.6    Sleep apnea     noncomplaint with CPAP        Past Surgical History:   Procedure Laterality Date    CIRCUMCISION      COLONOSCOPY  2017    Due in 2027    IR PORT PLACEMENT EQUAL OR GREATER THAN 5 YEARS  11/23/2021    IR PORT PLACEMENT EQUAL OR GREATER THAN 5 YEARS  6/19/2019    IR PORT PLACEMENT EQUAL OR GREATER THAN 5 YEARS  6/19/2019    IR PORT PLACEMENT EQUAL OR GREATER THAN 5 YEARS 6/19/2019 [facility]    IR PORT PLACEMENT EQUAL OR GREATER THAN 5 YEARS  11/23/2021    IR PORT PLACEMENT EQUAL OR GREATER THAN 5 YEARS 11/23/2021 [facility]    IR REMOVE TUNNELED VAD W PORT  12/14/2020    ORTHOPEDIC SURGERY Right     r wrist    VASCULAR SURGERY      WISDOM TOOTH EXTRACTION          Family History   Problem Relation Age of Onset    Hypertension Brother     Hypertension Brother     No Known Problems Son     Cancer Father 76        breast    Cancer Mother 80        pancreatic    No Known Problems Daughter            Social Connections:     Frequency of Communication with Friends and Family: Not on file    Frequency of Social Gatherings with Friends and Family: Not on file    Attends [employer] Services: Not on file    Active Member of Clubs or Organizations: Not on file    Attends Club or Organization Meetings: Not on file    Marital Status: Not on file        Allergies   Allergen Reactions    Alemtuzumab Other (See Comments)     [place] Vitals signs and nursing note reviewed. Patient Vitals for the past 4 hrs:   Temp Pulse Resp BP SpO2   05/24/22 1530 -- 96 20 111/66 --   05/24/22 1504 98.6 °F (37 °C) 99 16 139/78 98 %          Physical Exam  Vitals and nursing note reviewed. Constitutional:       Appearance: Normal appearance. HENT:      Head: Normocephalic and atraumatic. Eyes:      Extraocular Movements: Extraocular movements intact. Conjunctiva/sclera: Conjunctivae normal.      Pupils: Pupils are equal, round, and reactive to light. Musculoskeletal:         General: No swelling or tenderness. Normal range of motion. Right lower leg: No edema. Left lower leg: No edema. Skin:     General: Skin is warm and dry. Capillary Refill: Capillary refill takes less than 2 seconds. Neurological:      General: No focal deficit present. Mental Status: He is alert and oriented to person, place, and time. Mental status is at baseline. Psychiatric:         Mood and Affect: Mood normal.         Behavior: Behavior normal.         Thought Content: Thought content normal.          Procedures    Labs Reviewed   CBC - Abnormal; Notable for the following components:       Result Value    WBC 41.8 (*)     RBC 2.57 (*)     Hemoglobin 8.0 (*)     Hematocrit 23.3 (*)     RDW 18.0 (*)     Platelets 20 (*)     MPV 8.8 (*)     All other components within normal limits   COMPREHENSIVE METABOLIC PANEL   BRAIN NATRIURETIC PEPTIDE        No orders to display            [place] Coma Scale  Eye Opening: Spontaneous  Best Verbal Response: Oriented  Best Motor Response: Obeys commands  [place] Coma Scale Score: 15                     Voice dictation software was used during the making of this note. This software is not perfect and grammatical and other typographical errors may be present. This note has not been completely proofread for errors.         Alexi Cifuentes, DO  05/24/22 [id number]

## 2022-05-24 NOTE — ACP (ADVANCE CARE PLANNING)
[facility] Hospitalist Service  At the heart of better care     Advance Care Planning   Admit Date:  2022  3:16 PM   Name:  Yamilka Collier   Age:  79 y.o. Sex:  male  :  1955   MRN:  [id number]   Room:  [place] [address] Olivia Baker is able to make his own decisions:   Yes    Patient / surrogate decision-maker directed:  FULL    Patient or surrogate consented to discussion of the current conditions, workup, management plans, prognosis, and understand the risk for further deterioration. Time spent: 17 minutes in direct discussion (face to face and/or over phone).   CPT:  03227: First 16-30 minutes  26247: each additional 30 min (minimum of 46 min)    Signed:  Quinn Carrion DO

## 2022-05-24 NOTE — ED NOTES
TRANSFER - OUT REPORT:    Verbal report given to Darci N Miya Thorpe on Tharon Kanner  being transferred to [address] [place] [address] for routine progression of patient care       Report consisted of patient's Situation, Background, Assessment and   Recommendations(SBAR). Information from the following report(s) Nurse Handoff Report, ED Encounter Summary and MAR was reviewed with the receiving nurse. Lines:   Single Lumen Implantable Port Right Subclavian (Active)       Peripheral IV 05/24/22 Right Antecubital (Active)   Site Assessment Clean, dry & intact 05/24/22 1752   Line Status Blood return noted 05/24/22 1752   Phlebitis Assessment No symptoms 05/24/22 1752   Infiltration Assessment 0 05/24/22 1752        Opportunity for questions and clarification was provided.       Patient transported with:  Bhavik Miranda RN  05/24/22 1752

## 2022-05-24 NOTE — H&P
Hospitalist Admission History and Physical         NAME:            Florance Seip    Age:                79 y.o.    :               1955    MRN:              [id number]    PCP: KESHAWN Patel NP    Consulting MD:    Treatment Team: Attending Provider: Og Tucker DO; Registered Nurse: Ashley Gilliam RN         Chief Complaint   Patient presents with    Leg Swelling   HPI:Patient is a 79 y.o. male who presented to the ED after being told he had an abnormal US showing DVT to right leg. No noted trauma. Nothing seems to make better or worse. 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 Follows Dr. Cindy Delgado    Vitals- stable    Labs- CMP and BNP not back. Hg 8, plaletets 20. US LE - Nonocclusive thrombus of the right deep femoral vein.   2. 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    Past Medical History:   Diagnosis Date    Back pain     occassionally    Cervical radiculopathy     Claustrophobia     DVT (deep venous thrombosis) ([address] 2019    currently treated with Xarelto- started on 2019    GERD (gastroesophageal reflux disease)     H/O seasonal allergies     Hyperlipidemia     Impotence     Insomnia     Lateral epicondylitis     Leukemia (HCC)     CHEMO    Obesity     BMI 36.6    Sleep apnea     noncomplaint with CPAP            Past Surgical History:   Procedure Laterality Date    CIRCUMCISION      COLONOSCOPY  2017    Due in     IR PORT PLACEMENT EQUAL OR GREATER THAN 5 YEARS  2021    IR PORT PLACEMENT EQUAL OR GREATER THAN 5 YEARS  2019    IR PORT PLACEMENT EQUAL OR GREATER THAN 5 YEARS  2019    IR PORT PLACEMENT EQUAL OR GREATER THAN 5 YEARS 2019 [facility]    IR PORT PLACEMENT EQUAL OR GREATER THAN 5 YEARS  2021    IR PORT PLACEMENT EQUAL OR GREATER THAN 5 YEARS 2021 [facility]    IR REMOVE TUNNELED VAD W PORT  2020    ORTHOPEDIC SURGERY Right     r wrist    VASCULAR SURGERY      WISDOM TOOTH EXTRACTION              Family History   Problem Relation Age of Onset    Hypertension Brother     Hypertension Brother     No Known Problems Son     Cancer Father 76        breast    Cancer Mother 80        pancreatic    No Known Problems Daughter        Family history reviewed and negative except as noted above. Social History     Social History Narrative    Not on file            Social History     Tobacco Use    Smoking status: Never Smoker    Smokeless tobacco: Never Used   Substance Use Topics    Alcohol use: Not Currently     Alcohol/week: 0.0 standard drinks            Social History     Substance and Sexual Activity   Drug Use No                 Allergies   Allergen Reactions    Alemtuzumab Other (See Comments)     Rigors            Prior to Admission medications    Medication Sig Start Date End Date Taking?  Authorizing Provider   lidocaine-prilocaine (EMLA) 2.5-2.5 % cream Apply topically as needed (PRN for port use) 5/23/22   Hong Scales MD   acyclovir (ZOVIRAX) 400 MG tablet Take 400 mg by mouth 2 times daily 3/30/22   Ar Automatic Reconciliation   allopurinol (ZYLOPRIM) 300 MG tablet Take 300 mg by mouth daily 4/14/22   Ar Automatic Reconciliation   azelastine (ASTELIN) 0.1 % nasal spray SPRAY ONE SPRAY IN EACH NOSTRIL TWICE DAILY 10/15/21   Ar Automatic Reconciliation   calcium carbonate (OYSTER SHELL CALCIUM 500 MG) 1250 (500 Ca) MG tablet Take 500 mg by mouth 3 times daily (with meals) 11/24/21   Ar Automatic Reconciliation   clotrimazole (LOTRIMIN) 1 % cream Apply topically 2 times daily 1/26/22   Ar Automatic Reconciliation   docusate (COLACE, DULCOLAX) 100 MG CAPS Take 100 mg by mouth    Ar Automatic Reconciliation   fluconazole (DIFLUCAN) 200 MG tablet Take 200 mg by mouth daily 3/30/22   Ar Automatic Reconciliation   furosemide (LASIX) 20 MG tablet Take 20 mg by mouth daily 3/30/22   Ar Automatic Reconciliation   hydrocortisone 2.5 % cream The details of the medication are not available because there are pending changes by a home health clinician. 10/10/21   Ar Automatic Reconciliation   levoFLOXacin (LEVAQUIN) 500 MG tablet Take 500 mg by mouth daily 12/27/21   Ar Automatic Reconciliation   lidocaine viscous hcl (XYLOCAINE) 2 % SOLN solution Take 5-10 mLs by mouth as needed 11/24/21   Ar Automatic Reconciliation   LORazepam (ATIVAN) 1 MG tablet Take 1 mg by mouth 2 times daily as needed.  10/14/21   Ar Automatic Reconciliation   lovastatin (MEVACOR) 10 MG tablet Take 10 mg by mouth 5/19/20   Ar Automatic Reconciliation   magnesium oxide (MAG-OX) 400 MG tablet Take 400 mg by mouth daily 4/14/22   Ar Automatic Reconciliation   mirtazapine (REMERON) 15 MG tablet Take 15 mg by mouth 3/30/22   Ar Automatic Reconciliation   omeprazole (PRILOSEC) 40 MG delayed release capsule TAKE 1 CAPSULE BY MOUTH DAILY 3/29/21   Ar Automatic Reconciliation   potassium chloride (KLOR-CON M) 20 MEQ extended release tablet Take 20 mEq by mouth daily 4/14/22   Ar Automatic Reconciliation   Venetoclax (VENCLEXTA) 100 MG TABS Take 100 mg by mouth daily 4/7/22   Ar Automatic Reconciliation                      Review of Systems         Constitutional: NAD    Eyes:  no change in visual acuity, no photophobia    Ears, nose, mouth, throat, and face: no  Odynphagia, dysphagia, no thrush or exudate, negative for chronic sinus congestion, recurrent headaches    Respiratory: Chronic SOB    Cardiovascular: negative for CP, palpitations, or PND    Gastrointestinal: negative for abdominal pain, no hematemesis, hematochezia or BRBPR    Genitourinary: no urgency, frequency, or dysuria, no nocturia    Integument/breast: negative for skin rash or skin lesions    Hematologic/lymphatic: negative for known bleeding disorder    Musculoskeletal:Right leg swelling    Neurological: negative for lightheadedness, syncope or presyncopal events, no seizure or CVA history    Behavioral/Psych: negative for depression or chronic anxiety,    Endocrine: negative for polydyspia, polyuria or intolerance to heat or cold    Allergic/Immunologic: negative for chronic allergic rhinitis, or known connective tissue disorder              Objective:         Patient Vitals for the past 24 hrs:   Temp Pulse Resp BP SpO2   05/24/22 1530 -- 96 20 111/66 --   05/24/22 1504 98.6 °F (37 °C) 99 16 139/78 98 %            No intake/output data recorded. No intake/output data recorded. Data Review:   Recent Results (from the past 24 hour(s))   CBC    Collection Time: 05/24/22  3:19 PM   Result Value Ref Range    WBC 41.8 (H) 4.3 - 11.1 K/uL    RBC 2.57 (L) 4.23 - 5.6 M/uL    Hemoglobin 8.0 (L) 13.6 - 17.2 g/dL    Hematocrit 23.3 (L) 41.1 - 50.3 %    MCV 90.7 79.6 - 97.8 FL    MCH 31.1 26.1 - 32.9 PG    MCHC 34.3 31.4 - 35.0 g/dL    RDW 18.0 (H) 11.9 - 14.6 %    Platelets 20 (LL) 761 - 450 K/uL    MPV 8.8 (L) 9.4 - 12.3 FL    nRBC 0.00 0.0 - 0.2 K/uL   EKG 12 Lead    Collection Time: 05/24/22  3:31 PM   Result Value Ref Range    Ventricular Rate 99 BPM    Atrial Rate 99 BPM    P-R Interval 168 ms    QRS Duration 89 ms    Q-T Interval 330 ms    QTc Calculation (Bazett) 424 ms    P Axis 63 degrees    R Axis -13 degrees    T Axis 157 degrees    Diagnosis Sinus tachycardia             Physical Exam:         General:    Alert, cooperative, no distress, very pleasant     Eyes:    Conjunctivae/corneas clear. PERRL    Ears:    Normal     Nose:    Nares normal. Septum midline. Mouth/Throat:    Lips, mucosa, and tongue normal.     Neck:     no JVD. Back:     deferred    Lungs:     Clear to auscultation bilaterally. Heart:    Regular rate and rhythm, S1, S2 normal    Abdomen:     Soft, non-tender. Bowel sounds normal. No masses,  No organomegaly. Extremities:    1+ edema bilaterally. Good sensation to legs.  Good right femoral and popliteal pulse    Skin:    Skin color, texture, turgor normal. No rashes or lesions    Lymph nodes:    Cervical, supraclavicular, and axillary nodes normal.    Neurologic:    CNII-XII intact. Assessment and Plan         Principal Problem:    Acute deep vein thrombosis (DVT) of femoral vein of right lower extremity (HCC)  Active Problems:    Prolymphocytic leukemia of T-cell (HCC)    Essential hypertension, benign    Hypokalemia    Thrombocytopenia (HCC)    Pure hypercholesterolemia  Resolved Problems:    * No resolved hospital problems. *    right deep femoral vein DVT - Not a candidate for anticoagulation due to his anemia. Likely will need IVC filter. Consult IR to see in AM.     Thrombocytopenia - Likely will need IVC filter. Order 1 unit platelets today. T cell prolymphocytic leukemia- Consult oncology to assume care in the AM. Holding his Venclexta in the setting of anemia. Acyclovir and fluconazole for prophylaxis. Chronic edema - Lasix daily with KCL supplement. Pepcid    Denies taking ativan or Levofloxacin. I have discussed with the patient the rationale for blood component transfusion; its benefits in treating or preventing fatigue, organ damage, or death; and its risk which includes mild transfusion reactions, rare risk of blood borne infection, or more serious but rare reactions. I have discussed the alternatives to transfusion, including the risk and consequences of not receiving transfusion. The patient had an opportunity to ask questions and had agreed to proceed with transfusion of blood components.     DVT prophylaxis - SCDs       Signed By:    Connie Belle DO    May 24, 2022

## 2022-05-24 NOTE — ED TRIAGE NOTES
Patient ambulatory to triage with mask in place. Patient reports he had an ultrasound done today and was told he had a blood clot in his right leg. Pt reports pain for weeks.

## 2022-05-25 PROBLEM — I82.401 ACUTE DEEP VEIN THROMBOSIS (DVT) OF RIGHT LOWER EXTREMITY (HCC): Status: ACTIVE | Noted: 2022-01-01

## 2022-05-25 NOTE — PROGRESS NOTES
TRANSFER - OUT REPORT:    Verbal report given to CAT Galeas(name) on Ofe Resendez  being transferred to [facility](unit) for routine progression of care. Report consisted of patients Situation, Background, Assessment and   Recommendations(SBAR). Information from the following report(s) SBAR, MAR, Procedure summary was reviewed with the receiving nurse. Opportunity for questions and clarification was provided. Conscious Sedation:   100 Mcg of Fentanyl administered  2 Mg of Versed administered    Pt tolerated procedure well. Vitals:    05/25/22 1611   BP: (!) 141/66   Pulse: 86   Resp: 18   Temp:    SpO2: 95%     Past Medical History:   Diagnosis Date    Back pain     occassionally    Cervical radiculopathy     Claustrophobia     DVT (deep venous thrombosis) [address] [address] 06/2019    currently treated with Xarelto- started on 5/30/2019    GERD (gastroesophageal reflux disease)     H/O seasonal allergies     Hyperlipidemia     Impotence     Insomnia     Lateral epicondylitis     Leukemia (HCC)     CHEMO    Obesity     BMI 36.6    Sleep apnea     noncomplaint with CPAP     Peripheral IV 05/24/22 Right Antecubital (Active)   Site Assessment Clean, dry & intact 05/25/22 1357   Line Status Blood return noted; Flushed;Capped 05/25/22 1357   Line Care Ports disinfected 05/25/22 0746   Phlebitis Assessment No symptoms 05/25/22 0746   Infiltration Assessment 0 05/25/22 0746   Alcohol Cap Used Yes 05/25/22 0746   Dressing Status Clean, dry & intact 05/25/22 1357   Dressing Type Transparent 05/25/22 0746     Single Lumen Implantable Port Right Subclavian (Active)   Port A Cath Status Not Accessed 05/25/22 0746   Criteria for Appropriate Use Long term IV/antibiotic administration 05/23/22 0827   Site Assessment Clean, dry & intact 05/23/22 0827   Line Care Connections checked and tightened;Ports disinfected 05/23/22 0827   Alcohol Cap Used Yes 05/23/22 0827   Date of Last Dressing Change 05/23/22 05/23/22 0827 Dressing Type Antimicrobial;Transparent 05/23/22 0827   Dressing Status Clean, dry & intact 05/23/22 0827   Dressing Intervention New 05/23/22 0827   Date Accessed  05/23/22 05/23/22 0827   Access Attempts  1 05/23/22 0827   Access Needle Gauge 20 G 05/23/22 0827   Access Needle Length 0.75 inches 05/23/22 0827   Accessed By: Poonam Bates RN  05/23/22 0827   Single Lumen Status Blood return noted; Flushed 05/23/22 0827   De-Access Date 05/23/22 05/23/22 1335   De-Access Time 1325 05/23/22 1335   De-Accessed By Matthias Villagomez RN  05/23/22 9381

## 2022-05-25 NOTE — PRE SEDATION
Sedation Pre-Procedure Note    Patient Name: Samir Carreon   YOB: 1955  Room/Bed: [facility]  Medical Record Number: [id number]  Date: 5/25/2022   Time: 3:21 PM       Mallampati Airway Assessment:  Mallampati Class III - (soft palate & base of uvula are visible)    Prior History of Anesthesia Complications:   none    ASA Classification:  Class 3 - A patient with severe systemic disease that limits activity but is not incapacitating      Electronically signed by Albino Echeverria MD on 5/25/2022 at 3:21 PM

## 2022-05-25 NOTE — BRIEF OP NOTE
Department of Interventional Radiology  [phone]        Interventional Radiology Brief Procedure Note    Patient: Neo Valdez MRN: [id number]  SSN: [id number]    YOB: 1955  Age: 79 y.o. Sex: male      Date of Procedure: 5/25/2022    Pre-Procedure Diagnosis:  Leukemia, Thrombocytopenia, chronic L Pop V nonocclusive clot, probably acute R PFV nonocclusive clot. Post-Procedure Diagnosis:  SAME    Procedure(s):  Venogram w IVC Filter placement. Brief Description of Procedure:  RIJV access. Performed By:  Megan Orr MD     Assistants:  None    Anesthesia:  Moderate Sedation    Estimated Blood Loss:  Less than 10ml    Specimens:  None    Implants:  Cook IVCF    Findings:  Normal IVC. Complications:  None    Recommendations:  1 hour bedrest.  Elevate HOB. Rx for compression hose given to Mrs Meg Acosta     Follow Up:  Office in 3 months.       Signed By: Megan Orr MD     May 25, 2022

## 2022-05-25 NOTE — PROGRESS NOTES
TRANSFER - OUT REPORT:    Verbal report given to Gudelia on Nel Buchanan  being transferred to [facility] for routine progression of patient care       Report consisted of patient's Situation, Background, Assessment and   Recommendations(SBAR). Information from the following report(s) Nurse Handoff Report, Adult Overview, MAR, Recent Results, Procedure Verification and Neuro Assessment was reviewed with the receiving nurse. Lines:   Single Lumen Implantable Port Right Subclavian (Active)   Port A Cath Status Not Accessed 05/25/22 0746   Criteria for Appropriate Use Long term IV/antibiotic administration 05/23/22 0827   Site Assessment Clean, dry & intact 05/23/22 0827   Line Care Connections checked and tightened;Ports disinfected 05/23/22 0827   Alcohol Cap Used Yes 05/23/22 0827   Date of Last Dressing Change 05/23/22 05/23/22 0827   Dressing Type Antimicrobial;Transparent 05/23/22 0827   Dressing Status Clean, dry & intact 05/23/22 0827   Dressing Intervention New 05/23/22 0827   Date Accessed  05/23/22 05/23/22 0827   Access Attempts  1 05/23/22 0827   Access Needle Gauge 20 G 05/23/22 0827   Access Needle Length 0.75 inches 05/23/22 0827   Accessed By: Meredith Leyden, RN  05/23/22 0827   Single Lumen Status Blood return noted; Flushed 05/23/22 0827   De-Access Date 05/23/22 05/23/22 1335   De-Access Time 1325 05/23/22 1335   De-Accessed By Jose Fleming RN  05/23/22 1335       Peripheral IV 05/24/22 Right Antecubital (Active)   Site Assessment Clean, dry & intact 05/25/22 1357   Line Status Blood return noted; Flushed;Capped 05/25/22 1357   Line Care Ports disinfected 05/25/22 0746   Phlebitis Assessment No symptoms 05/25/22 0746   Infiltration Assessment 0 05/25/22 0746   Alcohol Cap Used Yes 05/25/22 0746   Dressing Status Clean, dry & intact 05/25/22 1357   Dressing Type Transparent 05/25/22 0746        Opportunity for questions and clarification was provided.       Patient transported with:  Tech and to IR for procedure before going to room 512.

## 2022-05-25 NOTE — PROGRESS NOTES
Comprehensive Nutrition Assessment    Type and Reason for Visit: Initial,Positive Nutrition Screen  Malnutrition Screening Tool: Malnutrition Screen  Have you recently lost weight without trying?: 14 to 23 pounds (2 points)  Have you been eating poorly because of a decreased appetite?: Yes (1 point)  Malnutrition Screening Tool Score: 3    Nutrition Recommendations/Plan:   Meals and Snacks:  Diet: Continue current order  Progress s/p IR as medically appropriate. Nutrition Supplement Therapy:   Medical food supplement therapy:  Continue Ensure Enlive three times per day (this provides 350 kcal and 20 grams protein per bottle) to be served once diet advanced. Malnutrition Assessment:  Malnutrition Status: At risk for malnutrition (Comment) (hx of poor po intake inpt, +loss ?flulid, CBW similar UBW)    Nutrition Assessment:  Nutrition History:   Pt and wife attribute wt changes to fluid fluctuations. Wt hx per outpt RD records: 219# 1/26/22 at last RD visit which was up 6# over the last month. Wt hx per wife wt continued to increase after last RD visit. 232# 4/7/22, 238# 5/5/22, 220# 5/12/22, 5/19/22 215#. No brianna muscle or fat wasting appreciated. Nutrition Background:   PMH remarkable for DVT, HLD, ISAURO not on CPAP, T cell prolymphocytic leukemia. Admitted with acute DVT. Nutrition Interval:  Brief visit with pt and spouse at bedside prior to going to IR. Historically pt has had compromised intake while inpatient and accepted oral supplements.       Current Nutrition Therapies:  Diet NPO  ADULT ORAL NUTRITION SUPPLEMENT; Breakfast, Lunch, Dinner; Standard High Calorie/High Protein Oral Supplement    Current Intake:   Average Meal Intake: NPO Average Supplements Intake: NPO      Anthropometric Measures:  Height: 5' 11\" (180.3 cm)  Current Body Wt: 218 lb 0.6 oz (98.9 kg) (5/24), Weight source: Not Specified  BMI: 30.4  Admission Body Weight: 218 lb 0.6 oz (98.9 kg) (source not specified)  Ideal

## 2022-05-25 NOTE — PROGRESS NOTES
Hospitalist Progress Note   Admit Date:  2022  3:16 PM   Name:  Trevor Raymond   Age:  79 y.o. 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 Mirella Briggs. Hypertension   Hyperlipidemia   ISAURO   History of previous DVT     Patient presented to hospital after she was found to have DVT in the right leg. No history of trauma. Patient is deemed not a candidate for systemic anticoagulation due to risk of bleeding and low platelet count. Interventional radiology is consulted for IVC filter placement. Subjective/24hr Events (22):    22   No fever. No shaking. No chills. No chest pain. No shortness of breath. Patient reports no pain at right leg. No passing blood per rectum. No blood per urine. Assessment & Plan:     Principal Problem:    Acute deep vein thrombosis (DVT) of femoral vein of right lower extremity ([address]  From study before admission. interventional radiology is consulted for IVC filter placement. Active Problems:    Prolymphocytic leukemia of T-cell ([facility]    Thrombocytopenia [facility])  Oncology service is consulted. Very low platelet. Bleeding precaution. No antiplatelets. No anti-coagulants. Essential hypertension, benign  BP is 139/78 mmHg which is controlled. Hypokalemia  K is 4.9 now. Monitor for hyperkalemia   Stop potassium supplement. Pure hypercholesterolemia  Noted. Not on medication for this now. I have discussed the plan of care with patient. Discharge Planning:    to be determined     Diet:  Diet NPO  ADULT ORAL NUTRITION SUPPLEMENT; Breakfast, Lunch, Dinner; Standard High Calorie/High Protein Oral Supplement  DVT PPx: patient can not be on anticoagulation. Also at the moment, patient can not be on SCD on the right leg due to the risk of dislodging clots. Code status: Full Code    Hospital Problems           Last Modified POA    * (Principal) Acute deep vein thrombosis (DVT) of femoral vein of right lower extremity ([facility] 5/24/2022 Yes    Prolymphocytic leukemia of T-cell ([facility] [address] 5/24/2022 Yes    Essential hypertension, benign 5/24/2022 Yes    Hypokalemia 5/24/2022 Yes    Thrombocytopenia ([facility] [address] 5/24/2022 Yes    Pure hypercholesterolemia 5/24/2022 Yes            Objective:     Patient Vitals for the past 24 hrs:   Temp Pulse Resp BP SpO2   05/25/22 1100 97.7 °F (36.5 °C) 78 16 108/69 100 %   05/25/22 0741 -- 72 -- -- --   05/25/22 0730 98.2 °F (36.8 °C) 86 16 120/77 97 %   05/25/22 0416 97.9 °F (36.6 °C) 81 18 116/69 94 %   05/25/22 0213 98.4 °F (36.9 °C) 84 18 106/61 97 %   05/25/22 0029 98.2 °F (36.8 °C) 83 18 110/63 97 %   05/25/22 0010 98.6 °F (37 °C) 82 18 112/62 98 %   05/24/22 2333 98.4 °F (36.9 °C) 84 18 108/64 100 %   05/24/22 1926 99.1 °F (37.3 °C) 88 18 112/72 100 %   05/24/22 1732 -- 92 17 (!) 90/54 99 %   05/24/22 1702 -- 90 12 (!) 95/52 100 %   05/24/22 1632 -- 89 16 (!) 88/57 98 %   05/24/22 1530 -- 96 20 111/66 --   05/24/22 1504 98.6 °F (37 °C) 99 16 139/78 98 %       Estimated body mass index is 30.4 kg/m² as calculated from the following:    Height as of this encounter: 5' 11\" (1.803 m). Weight as of this encounter: 218 lb (98.9 kg). Intake/Output Summary (Last 24 hours) at 5/25/2022 1156  Last data filed at 5/25/2022 0845  Gross per 24 hour   Intake 20 ml   Output 1000 ml   Net -980 ml         Physical Exam:     Blood pressure 108/69, pulse 78, temperature 97.7 °F (36.5 °C), temperature source Oral, resp.  rate 16, height 5' 11\" (1.803 m), weight 218 lb (98.9 kg), SpO2 100 %. General:    Well nourished. No overt distress, pale. No icterus. Patient is lying flat in bed and talking well. Head:  Normocephalic, atraumatic  Eyes:  Sclerae appear normal.  Pupils equally round. ENT:  Nares appear normal, no drainage. Moist oral mucosa  Neck:  No restricted ROM. Trachea midline   CV:   RRR. No m/r/g. No jugular venous distension. Lungs:   CTAB. No wheezing, rhonchi, or rales. Respirations even, unlabored  Abdomen: Bowel sounds present. Soft, nontender, nondistended. Extremities: No cyanosis or clubbing. Mild swelling right lower leg. Skin:     No rashes and normal coloration. Warm and dry. Neuro:  CN II-XII grossly intact. Sensation intact. A&Ox3  Psych:  Normal mood and affect.       I have reviewed ordered lab tests and independently visualized imaging below:    Recent Labs:  Recent Results (from the past 48 hour(s))   CBC    Collection Time: 05/24/22  3:19 PM   Result Value Ref Range    WBC 41.8 (H) 4.3 - 11.1 K/uL    RBC 2.57 (L) 4.23 - 5.6 M/uL    Hemoglobin 8.0 (L) 13.6 - 17.2 g/dL    Hematocrit 23.3 (L) 41.1 - 50.3 %    MCV 90.7 79.6 - 97.8 FL    MCH 31.1 26.1 - 32.9 PG    MCHC 34.3 31.4 - 35.0 g/dL    RDW 18.0 (H) 11.9 - 14.6 %    Platelets 20 (LL) 223 - 450 K/uL    MPV 8.8 (L) 9.4 - 12.3 FL    nRBC 0.00 0.0 - 0.2 K/uL   EKG 12 Lead    Collection Time: 05/24/22  3:31 PM   Result Value Ref Range    Ventricular Rate 99 BPM    Atrial Rate 99 BPM    P-R Interval 168 ms    QRS Duration 89 ms    Q-T Interval 330 ms    QTc Calculation (Bazett) 424 ms    P Axis 63 degrees    R Axis -13 degrees    T Axis 157 degrees    Diagnosis Sinus tachycardia    PREPARE PLATELETS, 1 Product    Collection Time: 05/24/22  5:15 PM   Result Value Ref Range    Unit Number [id number]     Product Code Blood Bank [facility],LRIR2     Unit Divison 00     Dispense Status Blood Bank ISSUED    Comprehensive Metabolic Panel    Collection Time: 05/24/22  5:27 PM   Result Value Ref Range    Sodium 135 (L) 138 - 145 mmol/L    Potassium 4.6 3.5 - 5.1 mmol/L    Chloride 104 98 - 107 mmol/L    CO2 25 21 - 32 mmol/L    Anion Gap 6 (L) 7 - 16 mmol/L    Glucose 164 (H) 65 - 100 mg/dL    BUN 19 8 - 23 MG/DL    CREATININE 1.50 0.8 - 1.5 MG/DL    GFR African American >60 >60 ml/min/1.73m2    GFR Non- 50 (L) >60 ml/min/1.73m2    Calcium 8.8 8.3 - 10.4 MG/DL    Total Bilirubin 0.6 0.2 - 1.1 MG/DL    ALT 50 12 - 65 U/L    AST 24 15 - 37 U/L    Alk Phosphatase 95 50 - 136 U/L    Total Protein 6.6 6.3 - 8.2 g/dL    Albumin 3.9 3.2 - 4.6 g/dL    Globulin 2.7 2.3 - 3.5 g/dL    Albumin/Globulin Ratio 1.4 1.2 - 3.5     proBNP, N-TERMINAL    Collection Time: 05/24/22  5:27 PM   Result Value Ref Range    NT Pro- (H) 5 - 125 PG/ML   TYPE AND SCREEN    Collection Time: 05/24/22  6:22 PM   Result Value Ref Range    Crossmatch expiration date 05/27/2022,2356     ABO/Rh A POSITIVE     Antibody Screen NEG    Basic Metabolic Panel w/ Reflex to MG    Collection Time: 05/25/22  5:04 AM   Result Value Ref Range    Sodium 136 (L) 138 - 145 mmol/L    Potassium 4.9 3.5 - 5.1 mmol/L    Chloride 106 98 - 107 mmol/L    CO2 25 21 - 32 mmol/L    Anion Gap 5 (L) 7 - 16 mmol/L    Glucose 214 (H) 65 - 100 mg/dL    BUN 18 8 - 23 MG/DL    CREATININE 1.40 0.8 - 1.5 MG/DL    GFR African American >60 >60 ml/min/1.73m2    GFR Non- 54 (L) >60 ml/min/1.73m2    Calcium 9.3 8.3 - 10.4 MG/DL   CBC with Auto Differential    Collection Time: 05/25/22  5:04 AM   Result Value Ref Range    WBC 34.6 (H) 4.3 - 11.1 K/uL    RBC 2.29 (L) 4.23 - 5.6 M/uL    Hemoglobin 7.1 (L) 13.6 - 17.2 g/dL    Hematocrit 21.0 (L) 41.1 - 50.3 %    MCV 91.7 79.6 - 97.8 FL    MCH 31.0 26.1 - 32.9 PG    MCHC 33.8 31.4 - 35.0 g/dL    RDW 17.9 (H) 11.9 - 14.6 %    Platelets 41 (L) 559 - 450 K/uL    MPV 11.2 9.4 - 12.3 FL    nRBC 0.00 0.0 - 0.2 K/uL    Seg Neutrophils 0 (L) 43 - 78 %    Lymphocytes 60 (H) 13 - 44 %    Monocytes 40 (H) 4.0 - 12.0 %    Eosinophils % 0 (L) 0.5 - 7.8 %    Basophils 0 0.0 - 2.0 %    Immature Granulocytes 0 0.0 - 5.0 %    Segs Absolute 0.0 (L) 1.7 - 8.2 K/UL    Absolute Lymph # 20.8 (H) 0.5 - 4.6 K/UL    Absolute Mono # 13.8 (H) 0.1 - 1.3 K/UL    Absolute Eos # 0.0 0.0 - 0.8 K/UL    Basophils Absolute 0.0 0.0 - 0.2 K/UL    Absolute Immature Granulocyte 0.0 0.0 - 0.5 K/UL    RBC Comment SLIGHT  ANISOCYTOSIS + POIKILOCYTOSIS        WBC Comment MARKED      Platelet Comment DECREASED      Differential Type AUTOMATED           Other Studies:  Vascular duplex lower extremity venous bilateral    Result Date: 5/24/2022  Exam: VAS DUP LOWER EXTREMITY VENOUS BILATERAL on 5/24/2022 1:56 PM Clinical History: The Male patient is 79years old presenting for pain and tenderness in right calf. RU DVT; CALF PAIN. Comparisons:  None Findings: The right common femoral, superficial femoral, popliteal, and visualized calf veins are patent demonstrating normal compressibility, normal color flow, and normal response to distal augmentation. The deep femoral vein demonstrates nonocclusive thrombus. The left common femoral, superficial femoral, and visualized calf veins are patent demonstrating normal compressibility, normal color flow, and normal response to distal augmentation. There is recanalized thrombus in the popliteal vein. 1. Nonocclusive thrombus of the right deep femoral vein. 2. 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  CPT code(s) 95415       Current Meds:  Current Facility-Administered Medications   Medication Dose Route Frequency    0.9 % sodium chloride infusion   IntraVENous PRN    acyclovir (ZOVIRAX) tablet 400 mg  400 mg Oral BID    allopurinol (ZYLOPRIM) tablet 300 mg  300 mg Oral Daily    azelastine (ASTELIN) 0.1 % nasal spray 1 spray (Patient Supplied)  1 spray Each Nostril BID    fluconazole (DIFLUCAN) tablet 200 mg  200 mg Oral Daily    furosemide (LASIX) tablet 20 mg  20 mg Oral Daily    atorvastatin (LIPITOR) tablet 10 mg  10 mg Oral Daily    mirtazapine (REMERON) tablet 15 mg  15 mg Oral Nightly    potassium chloride (KLOR-CON M) extended release tablet 20 mEq  20 mEq Oral Daily    famotidine (PEPCID) tablet 20 mg  20 mg Oral BID    sodium chloride flush 0.9 % injection 5-40 mL  5-40 mL IntraVENous 2 times per day    sodium chloride flush 0.9 % injection 5-40 mL  5-40 mL IntraVENous PRN    0.9 % sodium chloride infusion   IntraVENous PRN    ondansetron (ZOFRAN-ODT) disintegrating tablet 4 mg  4 mg Oral Q8H PRN    Or    ondansetron (ZOFRAN) injection 4 mg  4 mg IntraVENous Q6H PRN    polyethylene glycol (GLYCOLAX) packet 17 g  17 g Oral Daily PRN    acetaminophen (TYLENOL) tablet 650 mg  650 mg Oral Q6H PRN    Or    acetaminophen (TYLENOL) suppository 650 mg  650 mg Rectal Q6H PRN    oyster shell calcium w/D 500-200 MG-UNIT tablet 1 tablet  1 tablet Oral Daily    docusate sodium (COLACE) capsule 100 mg  100 mg Oral Daily    diphenhydrAMINE (BENADRYL) capsule 25 mg  25 mg Oral Q6H PRN       Signed:  Nba Gore MD    Part of this note may have been written by using a voice dictation software. The note has been proof read but may still contain some grammatical/other typographical errors.

## 2022-05-25 NOTE — H&P
HEENT Denies trauma, blurry vision, hearing loss, ear pain, nosebleeds, sore throat, neck pain and ear discharge. Skin Denies lesions or rashes. Lungs Denies dyspnea, cough, sputum production or hemoptysis. Cardiovascular Denies chest pain, palpitations, or lower extremity edema. Gastrointestinal Denies nausea, vomiting, changes in bowel habits, bloody or black stools, abdominal pain.  Denies dysuria, frequency or hesitancy of urination. Neuro Denies headaches, visual changes or ataxia. Denies dizziness, tingling, tremors, sensory change, speech change, focal weakness or headaches. Hematology Denies easy bruising or bleeding, denies gingival bleeding or epistaxis. Endo Denies heat/cold intolerance, denies diabetes or thyroid abnormalities. MSK Denies back pain, arthralgias, myalgias or frequent falls. +right leg swelling/pain     Psychiatric/Behavioral +hx of depression. The patient is not nervous/anxious.          Allergies   Allergen Reactions    Alemtuzumab Other (See Comments)     Rigors     Past Medical History:   Diagnosis Date    Back pain     occassionally    Cervical radiculopathy     Claustrophobia     DVT (deep venous thrombosis) ([facility] [address] 06/2019    currently treated with Xarelto- started on 5/30/2019    GERD (gastroesophageal reflux disease)     H/O seasonal allergies     Hyperlipidemia     Impotence     Insomnia     Lateral epicondylitis     Leukemia (HCC)     CHEMO    Obesity     BMI 36.6    Sleep apnea     noncomplaint with CPAP     Past Surgical History:   Procedure Laterality Date    CIRCUMCISION      COLONOSCOPY  2017    Due in 2027    IR PORT PLACEMENT EQUAL OR GREATER THAN 5 YEARS  11/23/2021    IR PORT PLACEMENT EQUAL OR GREATER THAN 5 YEARS  6/19/2019    IR PORT PLACEMENT EQUAL OR GREATER THAN 5 YEARS  6/19/2019    IR PORT PLACEMENT EQUAL OR GREATER THAN 5 YEARS 6/19/2019 [facility]    IR PORT PLACEMENT EQUAL OR GREATER THAN 5 YEARS 11/23/2021    IR PORT PLACEMENT EQUAL OR GREATER THAN 5 YEARS 11/23/2021 [facility]    IR REMOVE TUNNELED VAD W PORT  12/14/2020    ORTHOPEDIC SURGERY Right     r wrist    VASCULAR SURGERY      WISDOM TOOTH EXTRACTION       Family History   Problem Relation Age of Onset    Hypertension Brother     Hypertension Brother     No Known Problems Son     Cancer Father 76        breast    Cancer Mother 80        pancreatic    No Known Problems Daughter      Social History     Socioeconomic History    Marital status:      Spouse name: Not on file    Number of children: Not on file    Years of education: Not on file    Highest education level: Not on file   Occupational History    Not on file   Tobacco Use    Smoking status: Never Smoker    Smokeless tobacco: Never Used   Substance and Sexual Activity    Alcohol use: Not Currently     Alcohol/week: 0.0 standard drinks    Drug use: No    Sexual activity: Not on file   Other Topics Concern    Not on file   Social History Narrative    Not on file     Social Determinants of Health     Financial Resource Strain:     Difficulty of Paying Living Expenses: Not on file   Food Insecurity:     Worried About Running Out of Food in the Last Year: Not on file    Ann of Food in the Last Year: Not on file   Transportation Needs:     Lack of Transportation (Medical): Not on file    Lack of Transportation (Non-Medical):  Not on file   Physical Activity:     Days of Exercise per Week: Not on file    Minutes of Exercise per Session: Not on file   Stress:     Feeling of Stress : Not on file   Social Connections:     Frequency of Communication with Friends and Family: Not on file    Frequency of Social Gatherings with Friends and Family: Not on file    Attends Yarsanism Services: Not on file    Active Member of Clubs or Organizations: Not on file    Attends Club or Organization Meetings: Not on file    Marital Status: Not on file   Intimate Partner Violence:     Fear of Current or Ex-Partner: Not on file    Emotionally Abused: Not on file    Physically Abused: Not on file    Sexually Abused: Not on file   Housing Stability:     Unable to Pay for Housing in the Last Year: Not on file    Number of Jeronimo in the Last Year: Not on file    Unstable Housing in the Last Year: Not on file     Current Facility-Administered Medications   Medication Dose Route Frequency Provider Last Rate Last Admin    0.9 % sodium chloride infusion   IntraVENous PRN [facility], DO        acyclovir (ZOVIRAX) tablet 400 mg  400 mg Oral BID [facility], DO   400 mg at 05/25/22 5265    allopurinol (ZYLOPRIM) tablet 300 mg  300 mg Oral Daily [facility], DO   300 mg at 05/25/22 4944    azelastine (ASTELIN) 0.1 % nasal spray 1 spray (Patient Supplied)  1 spray Each Nostril BID [facility], DO        fluconazole (DIFLUCAN) tablet 200 mg  200 mg Oral Daily [facility], DO   200 mg at 05/25/22 1545    furosemide (LASIX) tablet 20 mg  20 mg Oral Daily [facility], DO   20 mg at 05/25/22 3609    atorvastatin (LIPITOR) tablet 10 mg  10 mg Oral Daily [facility], DO   10 mg at 05/25/22 9886    mirtazapine (REMERON) tablet 15 mg  15 mg Oral Nightly [facility], DO   15 mg at 05/24/22 2202    [Held by provider] potassium chloride (KLOR-CON M) extended release tablet 20 mEq  20 mEq Oral Daily [facility], DO   20 mEq at 05/25/22 7632    famotidine (PEPCID) tablet 20 mg  20 mg Oral BID [facility], DO   20 mg at 05/25/22 2556    sodium chloride flush 0.9 % injection 5-40 mL  5-40 mL IntraVENous 2 times per day [facility], DO   10 mL at 05/25/22 3144    sodium chloride flush 0.9 % injection 5-40 mL  5-40 mL IntraVENous PRN [facility], DO   10 mL at 05/24/22 2329    0.9 % sodium chloride infusion   IntraVENous PRN [facility], DO        ondansetron (ZOFRAN-ODT) disintegrating tablet 4 mg  4 mg Oral Q8H PRN Denise Мария, DO        Or    ondansetron [facility]) injection 4 mg  4 mg IntraVENous Q6H PRN Denise Мария, DO        polyethylene glycol (GLYCOLAX) packet 17 g  17 g Oral Daily PRN Denise Мария, DO        acetaminophen (TYLENOL) tablet 650 mg  650 mg Oral Q6H PRN Denise Мария, DO   650 mg at 22 2327    Or    acetaminophen (TYLENOL) suppository 650 mg  650 mg Rectal Q6H PRN Denise Мария, DO        oyster shell calcium w/D 500-200 MG-UNIT tablet 1 tablet  1 tablet Oral Daily Denise Мария, DO   1 tablet at 22 8990    docusate sodium (COLACE) capsule 100 mg  100 mg Oral Daily Denise Мария, DO   100 mg at 22 0768    diphenhydrAMINE (BENADRYL) capsule 25 mg  25 mg Oral Q6H PRN Denise Мария, DO   25 mg at 22 2326       OBJECTIVE:  Patient Vitals for the past 8 hrs:   BP Temp Temp src Pulse Resp SpO2   22 1100 108/69 97.7 °F (36.5 °C) Oral 78 16 100 %   22 0741 -- -- -- 72 -- --   22 0730 120/77 98.2 °F (36.8 °C) Oral 86 16 97 %     Temp (24hrs), Av.3 °F (36.8 °C), Min:97.7 °F (36.5 °C), Max:99.1 °F (37.3 °C)     0701 -  1900  In: 20 [P.O.:20]  Out: 1000 [Urine:1000]    Physical Exam:  Constitutional: Well developed, well nourished male in no acute distress, sitting comfortably in the hospital bed. HEENT: Normocephalic and atraumatic. Oropharynx is clear, mucous membranes are moist.  Neck supple. Lymph node   Deferred. Skin Warm and dry. No bruising and no rash noted. No erythema. No pallor. Respiratory Lungs are clear to auscultation bilaterally without wheezes, rales or rhonchi, normal air exchange without accessory muscle use. CVS Normal rate, regular rhythm and normal S1 and S2. No murmurs, gallops, or rubs. Abdomen Soft, nontender and nondistended, normoactive bowel sounds. No palpable mass. No hepatosplenomegaly. Neuro Grossly nonfocal with no obvious sensory or motor deficits.    MSK Normal range of motion in general.  +RLE swelling   Psych Appropriate mood and affect.         Labs:    Recent Results (from the past 24 hour(s))   CBC    Collection Time: 05/24/22  3:19 PM   Result Value Ref Range    WBC 41.8 (H) 4.3 - 11.1 K/uL    RBC 2.57 (L) 4.23 - 5.6 M/uL    Hemoglobin 8.0 (L) 13.6 - 17.2 g/dL    Hematocrit 23.3 (L) 41.1 - 50.3 %    MCV 90.7 79.6 - 97.8 FL    MCH 31.1 26.1 - 32.9 PG    MCHC 34.3 31.4 - 35.0 g/dL    RDW 18.0 (H) 11.9 - 14.6 %    Platelets 20 (LL) 107 - 450 K/uL    MPV 8.8 (L) 9.4 - 12.3 FL    nRBC 0.00 0.0 - 0.2 K/uL   EKG 12 Lead    Collection Time: 05/24/22  3:31 PM   Result Value Ref Range    Ventricular Rate 99 BPM    Atrial Rate 99 BPM    P-R Interval 168 ms    QRS Duration 89 ms    Q-T Interval 330 ms    QTc Calculation (Bazett) 424 ms    P Axis 63 degrees    R Axis -13 degrees    T Axis 157 degrees    Diagnosis Sinus tachycardia    PREPARE PLATELETS, 1 Product    Collection Time: 05/24/22  5:15 PM   Result Value Ref Range    Unit Number [id number]     [facility]     Unit Divison 00     Dispense Status Blood Bank ISSUED    Comprehensive Metabolic Panel    Collection Time: 05/24/22  5:27 PM   Result Value Ref Range    Sodium 135 (L) 138 - 145 mmol/L    Potassium 4.6 3.5 - 5.1 mmol/L    Chloride 104 98 - 107 mmol/L    CO2 25 21 - 32 mmol/L    Anion Gap 6 (L) 7 - 16 mmol/L    Glucose 164 (H) 65 - 100 mg/dL    BUN 19 8 - 23 MG/DL    CREATININE 1.50 0.8 - 1.5 MG/DL    GFR African American >60 >60 ml/min/1.73m2    GFR Non- 50 (L) >60 ml/min/1.73m2    Calcium 8.8 8.3 - 10.4 MG/DL    Total Bilirubin 0.6 0.2 - 1.1 MG/DL    ALT 50 12 - 65 U/L    AST 24 15 - 37 U/L    Alk Phosphatase 95 50 - 136 U/L    Total Protein 6.6 6.3 - 8.2 g/dL    Albumin 3.9 3.2 - 4.6 g/dL    Globulin 2.7 2.3 - 3.5 g/dL    Albumin/Globulin Ratio 1.4 1.2 - 3.5     proBNP, N-TERMINAL    Collection Time: 05/24/22  5:27 PM   Result Value Ref Range    NT Pro- (H) 5 - 125 PG/ML   TYPE AND SCREEN    Collection Time: 05/24/22  6:22 PM   Result Value Ref Range    Crossmatch expiration date 05/27/2022,2626     ABO/Rh A POSITIVE     Antibody Screen NEG    Basic Metabolic Panel w/ Reflex to MG    Collection Time: 05/25/22  5:04 AM   Result Value Ref Range    Sodium 136 (L) 138 - 145 mmol/L    Potassium 4.9 3.5 - 5.1 mmol/L    Chloride 106 98 - 107 mmol/L    CO2 25 21 - 32 mmol/L    Anion Gap 5 (L) 7 - 16 mmol/L    Glucose 214 (H) 65 - 100 mg/dL    BUN 18 8 - 23 MG/DL    CREATININE 1.40 0.8 - 1.5 MG/DL    GFR African American >60 >60 ml/min/1.73m2    GFR Non- 54 (L) >60 ml/min/1.73m2    Calcium 9.3 8.3 - 10.4 MG/DL   CBC with Auto Differential    Collection Time: 05/25/22  5:04 AM   Result Value Ref Range    WBC 34.6 (H) 4.3 - 11.1 K/uL    RBC 2.29 (L) 4.23 - 5.6 M/uL    Hemoglobin 7.1 (L) 13.6 - 17.2 g/dL    Hematocrit 21.0 (L) 41.1 - 50.3 %    MCV 91.7 79.6 - 97.8 FL    MCH 31.0 26.1 - 32.9 PG    MCHC 33.8 31.4 - 35.0 g/dL    RDW 17.9 (H) 11.9 - 14.6 %    Platelets 41 (L) 620 - 450 K/uL    MPV 11.2 9.4 - 12.3 FL    nRBC 0.00 0.0 - 0.2 K/uL    Seg Neutrophils 0 (L) 43 - 78 %    Lymphocytes 60 (H) 13 - 44 %    Monocytes 40 (H) 4.0 - 12.0 %    Eosinophils % 0 (L) 0.5 - 7.8 %    Basophils 0 0.0 - 2.0 %    Immature Granulocytes 0 0.0 - 5.0 %    Segs Absolute 0.0 (L) 1.7 - 8.2 K/UL    Absolute Lymph # 20.8 (H) 0.5 - 4.6 K/UL    Absolute Mono # 13.8 (H) 0.1 - 1.3 K/UL    Absolute Eos # 0.0 0.0 - 0.8 K/UL    Basophils Absolute 0.0 0.0 - 0.2 K/UL    Absolute Immature Granulocyte 0.0 0.0 - 0.5 K/UL    RBC Comment SLIGHT  ANISOCYTOSIS + POIKILOCYTOSIS        WBC Comment MARKED      Platelet Comment DECREASED      Differential Type AUTOMATED         ASSESSMENT:  Patient Active Problem List   Diagnosis    Prolymphocytic leukemia of T-cell (HCC)    Severe obstructive sleep apnea    Essential hypertension, benign    Hypokalemia    Right shoulder pain    DDD (degenerative disc disease), cervical    DDD (degenerative disc disease), lumbar    Neutropenic fever (HCC)    Thrombocytopenia (HCC)    Body mass index (BMI) of 40.0-44.9 in adult [facility])    First degree hemorrhoids    Pure hypercholesterolemia    Admission for antineoplastic chemotherapy    Benign prostatic hyperplasia with nocturia    Impotence    Sepsis ([facility] [address]    Acute deep vein thrombosis (DVT) of femoral vein of right lower extremity (HCC)       PLAN:  Prolymphocytic Leukemia  - Followed by Dr. Keiko Foley as OP, s/p multiple lines of therapy. Most recently, s/p Romidepsin C1 on 5/5, 5/12, and 5/19; C2 due on 6/2.  - Requires twice weekly labs and replacements and frequent transfusions at CC infusion  - PC follows pt as an OP and will consult     New RLE DVT  - Not a candidate for [facility] [place] [place] by [facility] d/t TCP  - IR consulted for consideration of IVCF    Lab studies were personally reviewed. Pertinent old records were reviewed. Thank you for allowing us to participate in the care of Mr. Alber Dietz. We will gladly take over care of our known patient and transfer to 5th floor. Hoang Chu, KESHAWN - NP   [address] [facility]  [address] [place] [address]  Office : [phone]    Attending Addendum:  I have personally performed a face to face diagnostic evaluation on this patient. I have reviewed and agree with the care plan as documented above by Hoang Chu N.P.  My findings are as follows: Patient appears stable, heart rate regular without murmurs, abdomen is non-tender, bowel sounds are positive. 59-year-old gentleman with history of prolymphocytic leukemia, status post multiple lines of therapy, now admitted with new RLE DVT, plan for IVC filter later today. Supportive care as above. Transfuse blood products as needed. Goals of care will need to be addressed.             MD Dax Villegas[place]becca [place] [address]  [address] [facility]  Office : [phone]  Fax : [phone]

## 2022-05-25 NOTE — PROGRESS NOTES
Patient transported by this RN to [phone] without incident. Bedside verbal report to receiving RN.

## 2022-05-25 NOTE — CONSULTS
Please see H&P dated 5/25/22.             [facility]  [address] [place] [address]  Office : [phone]  Fax : [phone]

## 2022-05-26 NOTE — PROGRESS NOTES
[facility] Hematology & Oncology        Inpatient Hematology / Oncology Progress Note    Reason for Admission: Thrombocytopenia (HCC) [D69.6]  Acute deep vein thrombosis (DVT) of femoral vein of right lower extremity (HCC) [I82.411]  Acute deep vein thrombosis (DVT) of right lower extremity, unspecified vein (HCC) [I82.401]    24 Hour Events:  Afebrile, VSS, on O2 @ 3L  Hgb down to 6.9  S/p IVC filter placement yesterday    Transfusions: 1 unit PRBCs  Replacements: None    ROS:  Constitutional: Negative for fever, chills. CV: +edema. Negative for chest pain, palpitations. Respiratory: Negative for dyspnea, cough, wheezing. GI: Negative for nausea, abdominal pain, diarrhea. 10 point review of systems is otherwise negative with the exception of the elements mentioned above in the HPI.          Allergies   Allergen Reactions    Alemtuzumab Other (See Comments)     Rigors     Past Medical History:   Diagnosis Date    Back pain     occassionally    Cervical radiculopathy     Claustrophobia     DVT (deep venous thrombosis) ([facility] [place] [facility] [address] 06/2019    currently treated with Xarelto- started on 5/30/2019    GERD (gastroesophageal reflux disease)     H/O seasonal allergies     Hyperlipidemia     Impotence     Insomnia     Lateral epicondylitis     Leukemia (HCC)     CHEMO    Obesity     BMI 36.6    Sleep apnea     noncomplaint with CPAP     Past Surgical History:   Procedure Laterality Date    CIRCUMCISION      COLONOSCOPY  2017    Due in 2027    IR IVC FILTER PLACEMENT W IMAGING  5/25/2022    IR IVC FILTER PLACEMENT W IMAGING 5/25/2022 [facility]    IR PORT PLACEMENT EQUAL OR GREATER THAN 5 YEARS  11/23/2021    IR PORT PLACEMENT EQUAL OR GREATER THAN 5 YEARS  6/19/2019    IR PORT PLACEMENT EQUAL OR GREATER THAN 5 YEARS  6/19/2019    IR PORT PLACEMENT EQUAL OR GREATER THAN 5 YEARS 6/19/2019 [facility]    IR PORT PLACEMENT EQUAL OR GREATER THAN 5 YEARS  11/23/2021    IR PORT  Fear of Current or Ex-Partner: Not on file    Emotionally Abused: Not on file    Physically Abused: Not on file    Sexually Abused: Not on file   Housing Stability:     Unable to Pay for Housing in the Last Year: Not on file    Number of Jeronimo in the Last Year: Not on file    Unstable Housing in the Last Year: Not on file     Current Facility-Administered Medications   Medication Dose Route Frequency Provider Last Rate Last Admin    HYDROcodone-acetaminophen ([address]) 5-325 MG per tablet 1 tablet  1 tablet Oral Q6H PRN KESHAWN Traylor - CNP   1 tablet at 05/26/22 0505    0.9 % sodium chloride infusion   IntraVENous PRN Mary Hazel MD        0.9 % sodium chloride infusion   IntraVENous PRN Moon Neighbor, DO        acyclovir (ZOVIRAX) tablet 400 mg  400 mg Oral BID Moon Neighbor, DO   400 mg at 05/25/22 2107    allopurinol (ZYLOPRIM) tablet 300 mg  300 mg Oral Daily Moon Neighbor, DO   300 mg at 05/25/22 2104    azelastine (ASTELIN) 0.1 % nasal spray 1 spray (Patient Supplied)  1 spray Each Nostril BID Moon Neighbor, DO        fluconazole (DIFLUCAN) tablet 200 mg  200 mg Oral Daily Moon Neighbor, DO   200 mg at 05/25/22 5312    furosemide (LASIX) tablet 20 mg  20 mg Oral Daily Mono Neighbor, DO   20 mg at 05/25/22 3701    atorvastatin (LIPITOR) tablet 10 mg  10 mg Oral Daily Moon Neighbor, DO   10 mg at 05/25/22 8347    mirtazapine (REMERON) tablet 15 mg  15 mg Oral Nightly Moon Neighbor, DO   15 mg at 05/25/22 2107    [Held by provider] potassium chloride (KLOR-CON M) extended release tablet 20 mEq  20 mEq Oral Daily Moon Neighbor, DO   20 mEq at 05/25/22 3256    famotidine (PEPCID) tablet 20 mg  20 mg Oral BID Moon Neighbor, DO   20 mg at 05/25/22 2107    sodium chloride flush 0.9 % injection 5-40 mL  5-40 mL IntraVENous 2 times per day Moon Neighbor, DO   10 mL at 05/25/22 2109    sodium chloride flush 0.9 % injection 5-40 mL  5-40 mL IntraVENous PRN Yandel Chay, DO   10 mL at 22 2329    0.9 % sodium chloride infusion   IntraVENous PRN Yandel Chay, DO        ondansetron (ZOFRAN-ODT) disintegrating tablet 4 mg  4 mg Oral Q8H PRN Yandel Chay, DO        Or    [facility]) injection 4 mg  4 mg IntraVENous Q6H PRN Yandel McCormick, DO        polyethylene glycol (GLYCOLAX) packet 17 g  17 g Oral Daily PRN Yandel McCormick, DO        acetaminophen (TYLENOL) tablet 650 mg  650 mg Oral Q6H PRN Yandel Chay, DO   650 mg at 22 2327    Or    acetaminophen (TYLENOL) suppository 650 mg  650 mg Rectal Q6H PRN Yandel McCormick, DO        oyster shell calcium w/D 500-200 MG-UNIT tablet 1 tablet  1 tablet Oral Daily Yandel Chay, DO   1 tablet at 22 8232    docusate sodium (COLACE) capsule 100 mg  100 mg Oral Daily Yandel Chay, DO   100 mg at 22 0204    diphenhydrAMINE (BENADRYL) capsule 25 mg  25 mg Oral Q6H PRN Yandel Chay, DO   25 mg at 22 2326       OBJECTIVE:  Patient Vitals for the past 8 hrs:   BP Temp Temp src Pulse Resp SpO2   22 0249 (!) 93/54 97.5 °F (36.4 °C) Oral 85 18 96 %     Temp (24hrs), Av.7 °F (36.5 °C), Min:97.4 °F (36.3 °C), Max:97.9 °F (36.6 °C)    No intake/output data recorded. Physical Exam:  Constitutional: Well developed, well nourished male in no acute distress, sitting comfortably in the hospital bed. HEENT: Normocephalic and atraumatic. Oropharynx is clear, mucous membranes are moist.  Neck supple. Skin Warm and dry. No bruising and no rash noted. No erythema. No pallor. Respiratory Lungs are clear to auscultation bilaterally without wheezes, rales or rhonchi, normal air exchange without accessory muscle use. CVS Normal rate, regular rhythm and normal S1 and S2. No murmurs, gallops, or rubs. Abdomen Soft, nontender and nondistended, normoactive bowel sounds. No palpable mass. No hepatosplenomegaly.    Neuro Grossly nonfocal with no obvious sensory or motor deficits. MSK Normal range of motion in general.  +RLE edema   Psych Appropriate mood and affect.         Labs:    Recent Results (from the past 24 hour(s))   CBC with Auto Differential    Collection Time: 05/26/22  4:43 AM   Result Value Ref Range    WBC 35.1 (H) 4.3 - 11.1 K/uL    RBC 2.26 (L) 4.23 - 5.6 M/uL    Hemoglobin 6.9 (LL) 13.6 - 17.2 g/dL    Hematocrit 20.6 (L) 41.1 - 50.3 %    MCV 91.2 79.6 - 97.8 FL    MCH 30.5 26.1 - 32.9 PG    MCHC 33.5 31.4 - 35.0 g/dL    RDW 18.2 (H) 11.9 - 14.6 %    Platelets 28 (LL) 801 - 450 K/uL    MPV 9.9 9.4 - 12.3 FL    nRBC 0.00 0.0 - 0.2 K/uL    Seg Neutrophils 0 (L) 43 - 78 %    Lymphocytes 58 (H) 13 - 44 %    Monocytes 42 (H) 4.0 - 12.0 %    Eosinophils % 0 (L) 0.5 - 7.8 %    Basophils 0 0.0 - 2.0 %    Immature Granulocytes 0 0.0 - 5.0 %    Segs Absolute 0.0 (L) 1.7 - 8.2 K/UL    Absolute Lymph # 20.4 (H) 0.5 - 4.6 K/UL    Absolute Mono # 14.7 (H) 0.1 - 1.3 K/UL    Absolute Eos # 0.0 0.0 - 0.8 K/UL    Basophils Absolute 0.0 0.0 - 0.2 K/UL    Absolute Immature Granulocyte 0.0 0.0 - 0.5 K/UL    RBC Comment ANISOCYTOSIS + POIKILOCYTOSIS      WBC Comment ATYPICAL LYMPHOCYTES PRESENT      Platelet Comment MARKED      Differential Type AUTOMATED     Basic Metabolic Panel    Collection Time: 05/26/22  4:43 AM   Result Value Ref Range    Sodium 137 (L) 138 - 145 mmol/L    Potassium 4.3 3.5 - 5.1 mmol/L    Chloride 104 98 - 107 mmol/L    CO2 25 21 - 32 mmol/L    Anion Gap 8 7 - 16 mmol/L    Glucose 154 (H) 65 - 100 mg/dL    BUN 20 8 - 23 MG/DL    CREATININE 1.50 0.8 - 1.5 MG/DL    GFR African American >60 >60 ml/min/1.73m2    GFR Non- 50 (L) >60 ml/min/1.73m2    Calcium 9.2 8.3 - 10.4 MG/DL       ASSESSMENT:  Patient Active Problem List   Diagnosis    Prolymphocytic leukemia of T-cell (HCC)    Severe obstructive sleep apnea    Essential hypertension, benign    Hypokalemia    Right shoulder pain    DDD (degenerative disc disease), cervical    DDD (degenerative disc disease), lumbar    Neutropenic fever (HCC)    Thrombocytopenia (HCC)    Body mass index (BMI) of 40.0-44.9 in adult ([facility] [address]    First degree hemorrhoids    Pure hypercholesterolemia    Admission for antineoplastic chemotherapy    Benign prostatic hyperplasia with nocturia    Impotence    Sepsis ([facility] [address]    Acute deep vein thrombosis (DVT) of right lower extremity [facility])     Mr. Zully Reed is a 79 y.o. male admitted on 5/24/2022 with a primary diagnosis of The primary encounter diagnosis was Acute deep vein thrombosis (DVT) of right lower extremity, unspecified vein ([facility] [address] A diagnosis of Thrombocytopenia (HCC) was also pertinent to this visit. .      Mr. Zully Reed was admitted on 5/24/2022 with a new right leg DVT in the setting of TCP, admitted for consideration of IVCF. He has a PMH of prolymphocytic leukemia followed by Dr. Sanjay Cardenas. Other PMH HLD, ISAURO, GERD, depression, anxiety, hx of previous DVT (was on Eliquis, not currently on this). He is s/p multiple lines of therapy (copied from previous note): In 12/2018 he was diagnosed with T-Cell Pro-Lymphocytic Leukemia, in 1/2019 he was started on Alemtuzumab and received it for 8 weeks and achieved a OH, in 4/2019 he was switched to Pentostatin and received 14 doses and achieved a CR. He developed a left leg DVT in 6/2019 and was anti-coagulated with Xarelto, in 11/2019 he was switched to [address] [place] [address] [place] he took it till 11/2020. He relapsed in 9/2021 and has received 2 cycles of ICE and did not achieve remission; he then received 5 cycles of Bendamustine/Venetoclax and achieved a OH. Most recently, s/p C1 of Romidepsin on 5/5, 5/12, and 5/19, due for C2 on 6/2/2022. Pt has ongoing need for blood transfusions and has twice weekly appointments for labs and replacements and CC infusion. Pt has received 1 unit of plts. We have been asked to assume care on our known patient.      PLAN:  Prolymphocytic Leukemia  - Followed by  Anum Atkinson as OP, s/p multiple lines of therapy. Most recently, s/p Romidepsin C1 on 5/5, 5/12, and 5/19; C2 due on 6/2. On Venetoclax 100mg QOD. - Requires twice weekly labs and replacements and frequent transfusions at CC infusion  - PC follows pt as an OP and will consult   5/26 [facility] \[facility] [place] [facility]\"" consult pending for GOC    RLE DVT  - Not a candidate for [facility] d/t TCP  - IR consulted for consideration of IVCF  5/26 s/p IVC filter placement yesterday    Pancytopenia secondary to disease/chemotherapy  - Transfuse prn per Carmen SOPs  5/26 Transfuse 1 unit PRBCs    Continue home meds  PPx Meds: Acyclovir, Diflucan, Levaquin  Carmen SOPs  AC contraindicated d/t thrombocytopenia    Goals and plan of care reviewed with the patient. All questions answered to the best of our ability. Disposition:  Anticipate discharge home on Sat, 5/28.               KESHAWN Swanson  Hematology & Oncology  [address] [address]  Office : [phone]

## 2022-05-26 NOTE — PROGRESS NOTES
Hospitalist Progress Note   Admit Date:  2022  3:16 PM   Name:  Shoshana De La Cruz   Age:  79 y.o. 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 Rip Ivory. Hypertension   Hyperlipidemia   ISAURO   History of previous DVT     Patient presented to hospital after she was found to have DVT in the right leg. No history of trauma. Patient is deemed not a candidate for systemic anticoagulation due to risk of bleeding and low platelet count. Interventional radiology is consulted for IVC filter placement. Subjective/24hr Events (22): Patient seen and examined at bedside. No acute overnight events. Has not noticed any signs of bleeding. Pain under control Denies fevers/chills, chest pain, shortness of breath, or abdominal pain. In good spirits. 10 point ROS negative except for HPI above. Assessment & Plan:     Principal Problem:    Acute deep vein thrombosis (DVT) of femoral vein of right lower extremity (HCC)  From study before admission.   - patient s/p IVC filter placement with IR    Active Problems:    Prolymphocytic leukemia of T-cell ([facility]    Thrombocytopenia [facility])  Oncology service is consulted. Very low platelet. Bleeding precaution. No antiplatelets. No anti-coagulants. Essential hypertension, benign  BP is 139/78 mmHg which is controlled. Hypokalemia  K is 4.9 now. Monitor for hyperkalemia   Stop potassium supplement. Pure hypercholesterolemia  Noted. Not on medication for this now. I have discussed the plan of care with patient. Discharge Planning:    to be determined     Diet:  ADULT ORAL NUTRITION SUPPLEMENT; Breakfast, Lunch, Dinner; Standard High Calorie/High Protein Oral Supplement  ADULT DIET; Regular; Low Fat/Low Chol/High Fiber/2 gm Na  DVT PPx: patient can not be on anticoagulation. Also at the moment, patient can not be on SCD on the right leg due to the risk of dislodging clots. Code status: DNR     Thank you for this consult. Hospitalist will sign off. Please call/page with questions or concerns.      Hospital Problems           Last Modified POA    * (Principal) Acute deep vein thrombosis (DVT) of right lower extremity ([facility] 5/25/2022 Yes    Anxiety 5/26/2022 Yes    Shortness of breath 5/26/2022 Yes    Frailty 5/26/2022 Yes    Prolymphocytic leukemia of T-cell ([facility] 5/24/2022 Yes    Essential hypertension, benign 5/24/2022 Yes    Hypokalemia 5/24/2022 Yes    Thrombocytopenia ([facility] 5/24/2022 Yes    Pure hypercholesterolemia 5/24/2022 Yes            Objective:     Patient Vitals for the past 24 hrs:   Temp Pulse Resp BP SpO2   05/26/22 1354 98 °F (36.7 °C) 88 16 108/70 --   05/26/22 1338 97.5 °F (36.4 °C) 86 16 116/74 99 %   05/26/22 1042 98.6 °F (37 °C) 90 -- 103/65 100 %   05/26/22 0809 98.1 °F (36.7 °C) 92 19 114/80 99 %   05/26/22 0249 97.5 °F (36.4 °C) 85 18 (!) 93/54 96 %   05/25/22 2319 97.7 °F (36.5 °C) 86 16 109/61 98 %   05/25/22 2103 -- -- -- -- 98 %   05/25/22 1942 97.7 °F (36.5 °C) 88 18 119/66 99 %   05/25/22 1706 97.4 °F (36.3 °C) 77 18 127/77 100 %   05/25/22 1630 -- 78 16 110/64 100 %   05/25/22 1620 -- 78 18 116/66 100 %   05/25/22 1611 -- 86 18 (!) 141/66 95 %   05/25/22 1601 -- 85 20 131/76 100 %   05/25/22 1556 -- 88 20 131/76 100 %   05/25/22 1551 -- 86 20 137/85 100 %   05/25/22 1546 -- 86 20 132/87 100 %   05/25/22 1541 -- 94 22 (!) 141/79 100 %   05/25/22 1536 -- 96 22 (!) 152/83 100 %       Estimated body mass index is 30.4 kg/m² as calculated from the following:    Height as of this encounter: 5' 11\" (1.803 m). Weight as of this encounter: 218 lb (98.9 kg). Intake/Output Summary (Last 24 hours) at 5/26/2022 1507  Last data filed at 5/26/2022 1408  Gross per 24 hour   Intake 600 ml   Output --   Net 600 ml         Physical Exam:     Blood pressure 108/70, pulse 88, temperature 98 °F (36.7 °C), temperature source Oral, resp. rate 16, height 5' 11\" (1.803 m), weight 218 lb (98.9 kg), SpO2 99 %. General:    Well nourished. No overt distress. Eating breakfast  Head:  Normocephalic, atraumatic  Eyes:  Sclerae appear normal.  Pupils equally round. ENT:  Nares appear normal, no drainage. Moist oral mucosa  Neck:  No restricted ROM. Trachea midline   CV:   RRR. No jugular venous distension. Lungs:   CTAB. No wheezing, rhonchi, or rales. Respirations even, unlabored  Abdomen: Bowel sounds present. Soft, nontender, nondistended. Extremities: No cyanosis or clubbing. Mild swelling right lower leg. Skin:     No rashes and normal coloration. Warm and dry. Neuro:  CN II-XII grossly intact. Sensation intact. A&Ox3  Psych:  Normal mood and affect.       I have reviewed ordered lab tests and independently visualized imaging below:    Recent Labs:  Recent Results (from the past 48 hour(s))   CBC    Collection Time: 05/24/22  3:19 PM   Result Value Ref Range    WBC 41.8 (H) 4.3 - 11.1 K/uL    RBC 2.57 (L) 4.23 - 5.6 M/uL    Hemoglobin 8.0 (L) 13.6 - 17.2 g/dL    Hematocrit 23.3 (L) 41.1 - 50.3 %    MCV 90.7 79.6 - 97.8 FL    MCH 31.1 26.1 - 32.9 PG    MCHC 34.3 31.4 - 35.0 g/dL    RDW 18.0 (H) 11.9 - 14.6 %    Platelets 20 (LL) 083 - 450 K/uL    MPV 8.8 (L) 9.4 - 12.3 FL    nRBC 0.00 0.0 - 0.2 K/uL   EKG 12 Lead    Collection Time: 05/24/22  3:31 PM   Result Value Ref Range    Ventricular Rate 99 BPM    Atrial Rate 99 BPM    P-R Interval 168 ms    QRS Duration 89 ms    Q-T Interval 330 ms    QTc Calculation (Bazett) 424 ms    P Axis 63 degrees R Axis -13 degrees    T Axis 157 degrees    Diagnosis Sinus tachycardia    PREPARE PLATELETS, 1 Product    Collection Time: 05/24/22  5:15 PM   Result Value Ref Range    Unit Number [id number]     Product Code Blood Bank [facility]     Unit Divison 00     Dispense Status Blood Bank TRANSFUSED    Comprehensive Metabolic Panel    Collection Time: 05/24/22  5:27 PM   Result Value Ref Range    Sodium 135 (L) 138 - 145 mmol/L    Potassium 4.6 3.5 - 5.1 mmol/L    Chloride 104 98 - 107 mmol/L    CO2 25 21 - 32 mmol/L    Anion Gap 6 (L) 7 - 16 mmol/L    Glucose 164 (H) 65 - 100 mg/dL    BUN 19 8 - 23 MG/DL    CREATININE 1.50 0.8 - 1.5 MG/DL    GFR African American >60 >60 ml/min/1.73m2    GFR Non- 50 (L) >60 ml/min/1.73m2    Calcium 8.8 8.3 - 10.4 MG/DL    Total Bilirubin 0.6 0.2 - 1.1 MG/DL    ALT 50 12 - 65 U/L    AST 24 15 - 37 U/L    Alk Phosphatase 95 50 - 136 U/L    Total Protein 6.6 6.3 - 8.2 g/dL    Albumin 3.9 3.2 - 4.6 g/dL    Globulin 2.7 2.3 - 3.5 g/dL    Albumin/Globulin Ratio 1.4 1.2 - 3.5     proBNP, N-TERMINAL    Collection Time: 05/24/22  5:27 PM   Result Value Ref Range    NT Pro- (H) 5 - 125 PG/ML   TYPE AND SCREEN    Collection Time: 05/24/22  6:22 PM   Result Value Ref Range    Crossmatch expiration date 05/27/2022,2357     ABO/Rh A POSITIVE     Antibody Screen NEG     Unit Number [id number]     Product Code Blood Bank [facility]     Unit Divison 00     Dispense Status Blood Bank ISSUED     Crossmatch Result Compatible    Basic Metabolic Panel w/ Reflex to MG    Collection Time: 05/25/22  5:04 AM   Result Value Ref Range    Sodium 136 (L) 138 - 145 mmol/L    Potassium 4.9 3.5 - 5.1 mmol/L    Chloride 106 98 - 107 mmol/L    CO2 25 21 - 32 mmol/L    Anion Gap 5 (L) 7 - 16 mmol/L    Glucose 214 (H) 65 - 100 mg/dL    BUN 18 8 - 23 MG/DL    CREATININE 1.40 0.8 - 1.5 MG/DL    GFR African American >60 >60 ml/min/1.73m2    GFR Non- 54 (L) >60 ml/min/1.73m2    Calcium 9.3 8.3 - 10.4 MG/DL   CBC with Auto Differential    Collection Time: 05/25/22  5:04 AM   Result Value Ref Range    WBC 34.6 (H) 4.3 - 11.1 K/uL    RBC 2.29 (L) 4.23 - 5.6 M/uL    Hemoglobin 7.1 (L) 13.6 - 17.2 g/dL    Hematocrit 21.0 (L) 41.1 - 50.3 %    MCV 91.7 79.6 - 97.8 FL    MCH 31.0 26.1 - 32.9 PG    MCHC 33.8 31.4 - 35.0 g/dL    RDW 17.9 (H) 11.9 - 14.6 %    Platelets 41 (L) 108 - 450 K/uL    MPV 11.2 9.4 - 12.3 FL    nRBC 0.00 0.0 - 0.2 K/uL    Seg Neutrophils 0 (L) 43 - 78 %    Lymphocytes 60 (H) 13 - 44 %    Monocytes 40 (H) 4.0 - 12.0 %    Eosinophils % 0 (L) 0.5 - 7.8 %    Basophils 0 0.0 - 2.0 %    Immature Granulocytes 0 0.0 - 5.0 %    Segs Absolute 0.0 (L) 1.7 - 8.2 K/UL    Absolute Lymph # 20.8 (H) 0.5 - 4.6 K/UL    Absolute Mono # 13.8 (H) 0.1 - 1.3 K/UL    Absolute Eos # 0.0 0.0 - 0.8 K/UL    Basophils Absolute 0.0 0.0 - 0.2 K/UL    Absolute Immature Granulocyte 0.0 0.0 - 0.5 K/UL    RBC Comment SLIGHT  ANISOCYTOSIS + POIKILOCYTOSIS        WBC Comment MARKED      Platelet Comment DECREASED      Differential Type AUTOMATED     PREPARE RBC (CROSSMATCH), 1 Units    Collection Time: 05/26/22  4:00 AM   Result Value Ref Range    History Check Historical check performed    CBC with Auto Differential    Collection Time: 05/26/22  4:43 AM   Result Value Ref Range    WBC 35.1 (H) 4.3 - 11.1 K/uL    RBC 2.26 (L) 4.23 - 5.6 M/uL    Hemoglobin 6.9 (LL) 13.6 - 17.2 g/dL    Hematocrit 20.6 (L) 41.1 - 50.3 %    MCV 91.2 79.6 - 97.8 FL    MCH 30.5 26.1 - 32.9 PG    MCHC 33.5 31.4 - 35.0 g/dL    RDW 18.2 (H) 11.9 - 14.6 %    Platelets 28 (LL) 640 - 450 K/uL    MPV 9.9 9.4 - 12.3 FL    nRBC 0.00 0.0 - 0.2 K/uL    Seg Neutrophils 0 (L) 43 - 78 %    Lymphocytes 58 (H) 13 - 44 %    Monocytes 42 (H) 4.0 - 12.0 %    Eosinophils % 0 (L) 0.5 - 7.8 %    Basophils 0 0.0 - 2.0 %    Immature Granulocytes 0 0.0 - 5.0 %    Segs Absolute 0.0 (L) 1.7 - 8.2 K/UL    Absolute Lymph # 20.4 (H) 0.5 - 4.6 K/UL    Absolute Mono # 14.7 (H) 0.1 - 1.3 K/UL    Absolute Eos # 0.0 0.0 - 0.8 K/UL    Basophils Absolute 0.0 0.0 - 0.2 K/UL    Absolute Immature Granulocyte 0.0 0.0 - 0.5 K/UL    RBC Comment ANISOCYTOSIS + POIKILOCYTOSIS      WBC Comment ATYPICAL LYMPHOCYTES PRESENT      Platelet Comment MARKED      Differential Type AUTOMATED     Basic Metabolic Panel    Collection Time: 05/26/22  4:43 AM   Result Value Ref Range    Sodium 137 (L) 138 - 145 mmol/L    Potassium 4.3 3.5 - 5.1 mmol/L    Chloride 104 98 - 107 mmol/L    CO2 25 21 - 32 mmol/L    Anion Gap 8 7 - 16 mmol/L    Glucose 154 (H) 65 - 100 mg/dL    BUN 20 8 - 23 MG/DL    CREATININE 1.50 0.8 - 1.5 MG/DL    GFR African American >60 >60 ml/min/1.73m2    GFR Non- 50 (L) >60 ml/min/1.73m2    Calcium 9.2 8.3 - 10.4 MG/DL   Hepatic Function Panel    Collection Time: 05/26/22  4:43 AM   Result Value Ref Range    Total Protein 6.4 6.3 - 8.2 g/dL    Albumin 3.9 3.2 - 4.6 g/dL    Globulin 2.5 2.3 - 3.5 g/dL    Albumin/Globulin Ratio 1.6 1.2 - 3.5      Total Bilirubin 0.6 0.2 - 1.1 MG/DL    Bilirubin, Direct 0.2 <0.4 MG/DL    Alk Phosphatase 94 50 - 136 U/L    AST 20 15 - 37 U/L    ALT 49 12 - 65 U/L         Other Studies:  Vascular duplex lower extremity venous bilateral    Result Date: 5/24/2022  Exam: VAS DUP LOWER EXTREMITY VENOUS BILATERAL on 5/24/2022 1:56 PM Clinical History: The Male patient is 79years old presenting for pain and tenderness in right calf. RU DVT; CALF PAIN. Comparisons:  None Findings: The right common femoral, superficial femoral, popliteal, and visualized calf veins are patent demonstrating normal compressibility, normal color flow, and normal response to distal augmentation. The deep femoral vein demonstrates nonocclusive thrombus. The left common femoral, superficial femoral, and visualized calf veins are patent demonstrating normal compressibility, normal color flow, and normal response to distal augmentation.  There is recanalized thrombus in the popliteal vein. 1. Nonocclusive thrombus of the right deep femoral vein. 2. 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  CPT code(s) 40608       Current Meds:  Current Facility-Administered Medications   Medication Dose Route Frequency    HYDROcodone-acetaminophen (NORCO) 5-325 MG per tablet 1 tablet  1 tablet Oral Q6H PRN    0.9 % sodium chloride infusion   IntraVENous PRN    morphine injection 2 mg  2 mg IntraVENous Q4H PRN    prochlorperazine (COMPAZINE) injection 5 mg  5 mg IntraVENous Q6H PRN    Venetoclax TABS 100 mg (Patient Supplied)  100 mg Oral Every Other Day    [START ON 5/27/2022] pantoprazole (PROTONIX) tablet 40 mg  40 mg Oral QAM AC    magnesium oxide (MAG-OX) tablet 400 mg  400 mg Oral Daily    LORazepam (ATIVAN) tablet 1 mg  1 mg Oral BID PRN    levoFLOXacin (LEVAQUIN) tablet 500 mg  500 mg Oral Daily    0.9 % sodium chloride infusion   IntraVENous PRN    acyclovir (ZOVIRAX) tablet 400 mg  400 mg Oral BID    allopurinol (ZYLOPRIM) tablet 300 mg  300 mg Oral Daily    azelastine (ASTELIN) 0.1 % nasal spray 1 spray (Patient Supplied)  1 spray Each Nostril BID    fluconazole (DIFLUCAN) tablet 200 mg  200 mg Oral Daily    furosemide (LASIX) tablet 20 mg  20 mg Oral Daily    atorvastatin (LIPITOR) tablet 10 mg  10 mg Oral Daily    mirtazapine (REMERON) tablet 15 mg  15 mg Oral Nightly    [Held by provider] potassium chloride (KLOR-CON M) extended release tablet 20 mEq  20 mEq Oral Daily    famotidine (PEPCID) tablet 20 mg  20 mg Oral BID    sodium chloride flush 0.9 % injection 5-40 mL  5-40 mL IntraVENous 2 times per day    sodium chloride flush 0.9 % injection 5-40 mL  5-40 mL IntraVENous PRN    0.9 % sodium chloride infusion   IntraVENous PRN    ondansetron (ZOFRAN-ODT) disintegrating tablet 4 mg  4 mg Oral Q8H PRN    Or    ondansetron (ZOFRAN) injection 4 mg  4 mg IntraVENous Q6H PRN    polyethylene glycol (GLYCOLAX) packet 17 g  17 g Oral Daily PRN    acetaminophen (TYLENOL) tablet 650 mg  650 mg Oral Q6H PRN    oyster shell calcium w/D 500-200 MG-UNIT tablet 1 tablet  1 tablet Oral Daily    docusate sodium (COLACE) capsule 100 mg  100 mg Oral Daily    diphenhydrAMINE (BENADRYL) capsule 25 mg  25 mg Oral Q6H PRN       Signed:  JUDI GLEZ DO    Part of this note may have been written by using a voice dictation software. The note has been proof read but may still contain some grammatical/other typographical errors. 70

## 2022-05-26 NOTE — ACP (ADVANCE CARE PLANNING)
Advance Care Planning     General Advance Care Planning (ACP) Conversation    Date of Conversation: 5/24/2022  Conducted with: Patient with Decision Making Capacity    Healthcare Decision Maker:    Primary Decision Maker: Elenor Jeans - [phone]    Secondary Decision Maker: Elke Bose - [phone]  Click here to complete Healthcare Decision Makers including selection of the Healthcare Decision Maker Relationship (ie \"Primary\"). Today we documented Decision Maker(s) consistent with ACP documents on file. Content/Action Overview:   Has ACP document(s) on file - reflects the patient's care preferences  Reviewed DNR/DNI and patient confirms current DNR status - completed forms on file (place new order if needed)  artificial nutrition, ventilation preferences, hospitalization preferences, resuscitation preferences and end of life care preferences (vegetative state/imminent death)  N/A    Length of Voluntary ACP Conversation in minutes:  <16 minutes (Non-Billable)    [address]

## 2022-05-26 NOTE — PROGRESS NOTES
Pt discussed during IDR and chart screened by CM for d/c planning. Pt sees Dr. Flo Samuel at the [facility]. He resides with his spouse Leonides Merrill [phone]. He also has a daughter Edgar Becker [phone]. Pt has insurance with pharmacy benefits and a PCP. Pt admitted with Dx of Acute DVT of R LE femoral vein, Prolymphocytic leukemia of T-Cell, HTN, Hypokalemia, Thrombocytopenia, Pure hypercholesterolemia, Severe ISAURO, Depression, and Anxiety. Pt had an IVC filter placed yesterday. He is receiving Romidepsin and C2 is due 6/2. Pt has received 1 unit PRBC since admission. PT consult has been ordered. CM is awaiting recommendations. Anticipated d/c plan is for pt to return home with spouse when medially stable. Anticipate pt to d/c on 5-28-22. CM will remain available and continue to follow if any needs arise. 05/26/22 4157   Service Assessment   Patient Orientation Alert and Oriented;Person;Place; Self   Cognition Alert   History Provided By Patient   Primary Caregiver Self   Support Systems Spouse/Significant Other;Children   Patient's Healthcare Decision Maker is: Named in [address] [place] [address]   PCP Verified by CM Yes  [facility] [place])   Last Visit to PCP Within last 6 months   Prior Functional Level Independent in ADLs/IADLs   Current Functional Level Independent in ADLs/IADLs   Can patient return to prior living arrangement Yes   Ability to make needs known: Good   Family able to assist with home care needs: Yes   Would you like for me to discuss the discharge plan with any other family members/significant others, and if so, who?  No   Social/Functional History   Lives With Spouse   Type of [address] One level   Bathroom Accessibility Accessible   Receives Help From [facility] [address].   Homemaking Responsibilities Yes   Ambulation Assistance Independent   Transfer Assistance Independent   Active  Yes   Mode of Transportation Car   Occupation Retired   Discharge Planning   Type of [address] Prior To Admission C-pap   Potential Assistance Needed N/A   DME Ordered? No   Potential Assistance Purchasing Medications No   Type of Home Care Services None   Patient expects to be discharged to: House   One/Two Story Residence One story   Services At/After Discharge   Transition of Care Consult (CM Consult) Discharge [facility] Discharge None   [employer] Information Provided? No   Mode of Transport at Discharge Other (see comment)  (Family)   Confirm Follow Up Transport Family   Condition of Participation: Discharge Planning   The Plan for Transition of Care is related to the following treatment goals: Pt to obtain care to become medically stable and to return with a safe transition. The Patient and/or Patient Representative was provided with a Choice of Provider? Patient   The Patient and/Or Patient Representative agree with the Discharge Plan? Yes   Freedom of Choice list was provided with basic dialogue that supports the patient's individualized plan of care/goals, treatment preferences, and shares the quality data associated with the providers?   Yes

## 2022-05-26 NOTE — PROGRESS NOTES
END OF SHIFT:    Gave prn Norco X1. Ordered pRBCs for Hgb of 6.9    I/Os:    No intake/output data recorded. I/O last 3 completed shifts:   In: 380 [P.O.:380]  Out: George Gonzales RN

## 2022-05-26 NOTE — CONSULTS
Patient: Maryann Mcallister MRN: [id number]  SSN: [id number]    YOB: 1955  Age: 79 y.o. Sex: male       Date of Request: 5/26/2022  Date of Consult:  5/26/2022  Reason for Consult:  pain and symptom management and goals of care  Requesting Physician: Dr. Marshall Villalta     Assessment/Plan:     Principal Diagnosis:     Dyspnea  R06.00    Additional Diagnoses:   · Anxiety  F41.1  · Fatigue, Lethargy  R53.83  · Frailty  R54  · Counseling, Encounter for Medical Advice  Z71.9  · Encounter for Palliative Care  Z51.5    Palliative Performance Scale (PPS):       Medical Decision Making:   Reviewed and summarized labs and imaging from admission. Spoke with pt at bedside. He is s/p ivc filter yesterday. Denies any current pain, nausea. Notes some anxiety and fatigue. He expressed gratitude for the care he has received since admission. I reviewed pt wishes and goals. He confirmed he does not wish for life support and wishes for DNR. Order placed. He is hopeful to continue infusions and treatments with Dr. Nevaeh Mattson. He feels he still has quality of life while pursuing these options. norco has helped with dyspnea and would continue  Ativan ordered for anxiety  Will follow loosely while hospitalized. Follow up with palliative care at clinic after discharge. Will discuss findings with members of the interdisciplinary team.      Thank you for this referral.         Subjective:     History obtained from:  Patient and Chart    Chief Complaint: dyspena  History of Present Illness:  79 y.o. male admitted on 5/24/2022 with a primary diagnosis of The primary encounter diagnosis was Acute deep vein thrombosis (DVT) of right lower extremity, unspecified vein ([address] A diagnosis of Thrombocytopenia (HCC) was also pertinent to this visit. Pt was admitted on 5/24/2022 with a new right leg DVT in the setting of TCP, admitted for consideration of IVCF. He has a PMH of prolymphocytic leukemia followed by Dr. Nevaeh Mattson.   Other PMH HLD, ISAURO, GERD, depression, anxiety, hx of previous DVT (was on Eliquis, not currently on this). He is s/p multiple lines of therapy (copied from previous note): In 12/2018 he was diagnosed with T-Cell Pro-Lymphocytic Leukemia, in 1/2019 he was started on Alemtuzumab and received it for 8 weeks and achieved a NJ, in 4/2019 he was switched to Pentostatin and received 14 doses and achieved a CR. He developed a left leg DVT in 6/2019 and was anti-coagulated with Xarelto, in 11/2019 he was switched to [facility] [address] he took it till 11/2020. He relapsed in 9/2021 and has received 2 cycles of ICE and did not achieve remission; he then received 5 cycles of Bendamustine/Venetoclax and achieved a NJ. Most recently, s/p C1 of Romidepsin on 5/5, 5/12, and 5/19, due for C2 on 6/2/2022. Pt has ongoing need for blood transfusions and has twice weekly appointments for labs and replacements and CC infusion. Pt has received 1 unit of plts  Advance Directive: Yes       Code Status:  DNR            Health Care Power of : Yes - Copy of [address] on file.     Past Medical History:   Diagnosis Date    Back pain     occassionally    Cervical radiculopathy     Claustrophobia     DVT (deep venous thrombosis) ([facility] [address] 06/2019    currently treated with Xarelto- started on 5/30/2019    GERD (gastroesophageal reflux disease)     H/O seasonal allergies     Hyperlipidemia     Impotence     Insomnia     Lateral epicondylitis     Leukemia (HCC)     CHEMO    Obesity     BMI 36.6    Sleep apnea     noncomplaint with CPAP      Past Surgical History:   Procedure Laterality Date    CIRCUMCISION      COLONOSCOPY  2017    Due in 2027    IR IVC FILTER PLACEMENT W IMAGING  5/25/2022    IR IVC FILTER PLACEMENT W IMAGING 5/25/2022 [facility] RADIOLOG[facility] SPECIALS    IR PORT PLACEMENT EQUAL OR GREATER THAN 5 YEARS  11/23/2021    IR PORT PLACEMENT EQUAL OR GREATER THAN 5 YEARS  6/19/2019    IR PORT PLACEMENT EQUAL OR GREATER THAN 5 YEARS  6/19/2019    IR PORT PLACEMENT EQUAL OR GREATER THAN 5 YEARS 6/19/2019 [facility] RADIOLOGY SPECIALS    IR PORT PLACEMENT EQUAL OR GREATER THAN 5 YEARS  11/23/2021    IR PORT PLACEMENT EQUAL OR GREATER THAN 5 YEARS 11/23/2021 [facility] RADIOLOGY SPECIALS    IR REMOVE TUNNELED VAD W PORT  12/14/2020    ORTHOPEDIC SURGERY Right     r wrist    VASCULAR SURGERY      WISDOM TOOTH EXTRACTION       Family History   Problem Relation Age of Onset    Hypertension Brother     Hypertension Brother     No Known Problems Son     Cancer Father 76        breast    Cancer Mother 80        pancreatic    No Known Problems Daughter       Social History     Tobacco Use    Smoking status: Never Smoker    Smokeless tobacco: Never Used   Substance Use Topics    Alcohol use: Not Currently     Alcohol/week: 0.0 standard drinks     Prior to Admission medications    Medication Sig Start Date End Date Taking?  Authorizing Provider   lidocaine-prilocaine (EMLA) 2.5-2.5 % cream Apply topically as needed (PRN for port use) 5/23/22   Bill Wagner MD   acyclovir (ZOVIRAX) 400 MG tablet Take 400 mg by mouth 2 times daily 3/30/22   Ar Automatic Reconciliation   allopurinol (ZYLOPRIM) 300 MG tablet Take 300 mg by mouth daily 4/14/22   Ar Automatic Reconciliation   azelastine (ASTELIN) 0.1 % nasal spray SPRAY ONE SPRAY IN EACH NOSTRIL TWICE DAILY  Patient not taking: Reported on 5/24/2022 10/15/21   Ar Automatic Reconciliation   calcium carbonate (OYSTER SHELL CALCIUM 500 MG) 1250 (500 Ca) MG tablet Take 500 mg by mouth daily  11/24/21   Ar Automatic Reconciliation   clotrimazole (LOTRIMIN) 1 % cream Apply topically 2 times daily  Patient not taking: Reported on 5/24/2022 1/26/22   Ar Automatic Reconciliation   docusate (COLACE, DULCOLAX) 100 MG CAPS Take 100 mg by mouth    Ar Automatic Reconciliation   fluconazole (DIFLUCAN) 200 MG tablet Take 200 mg by mouth daily 3/30/22   Ar Automatic Reconciliation   furosemide (LASIX) 20 MG tablet Take 20 mg by mouth daily 3/30/22   Ar Automatic Reconciliation   hydrocortisone 2.5 % cream The details of the medication are not available because there are pending changes by a home health clinician. Patient not taking: Reported on 5/24/2022 10/10/21   Ar Automatic Reconciliation   levoFLOXacin (LEVAQUIN) 500 MG tablet Take 500 mg by mouth daily  Patient not taking: Reported on 5/24/2022 12/27/21   Ar Automatic Reconciliation   lidocaine viscous hcl (XYLOCAINE) 2 % SOLN solution Take 5-10 mLs by mouth as needed  Patient not taking: Reported on 5/24/2022 11/24/21   Ar Automatic Reconciliation   LORazepam (ATIVAN) 1 MG tablet Take 1 mg by mouth 2 times daily as needed. 10/14/21   Ar Automatic Reconciliation   lovastatin (MEVACOR) 10 MG tablet Take 10 mg by mouth 5/19/20   Ar Automatic Reconciliation   magnesium oxide (MAG-OX) 400 MG tablet Take 400 mg by mouth daily 4/14/22   Ar Automatic Reconciliation   mirtazapine (REMERON) 15 MG tablet Take 15 mg by mouth 3/30/22   Ar Automatic Reconciliation   omeprazole (PRILOSEC) 40 MG delayed release capsule TAKE 1 CAPSULE BY MOUTH DAILY 3/29/21   Ar Automatic Reconciliation   potassium chloride (KLOR-CON M) 20 MEQ extended release tablet Take 20 mEq by mouth daily 4/14/22   Ar Automatic Reconciliation   Venetoclax (VENCLEXTA) 100 MG TABS Take 100 mg by mouth every other day  4/7/22   Ar Automatic Reconciliation       Allergies   Allergen Reactions    Alemtuzumab Other (See Comments)     Rigors        Comprehensive review of systems completed and negative with exception of noted above     Objective:     Visit Vitals  BP (!) 93/54   Pulse 85   Temp 97.5 °F (36.4 °C) (Oral)   Resp 18   Ht 5' 11\" (1.803 m)   Wt 218 lb (98.9 kg)   SpO2 96%   BMI 30.40 kg/m²        Physical Exam:    General:  Cooperative. No acute distress. Eyes:  Conjunctivae/corneas clear    Nose: Nares normal. Septum midline.    Neck: Supple, symmetrical, trachea midline, no JVD   Lungs:   Clear to auscultation bilaterally, unlabored   Heart:  Regular rate and rhythm, no murmur    Abdomen:   Soft, non-tender, non-distended. Positive bowel sounds   Extremities: Normal, atraumatic, no cyanosis or edema   Skin: Skin color, texture, turgor normal. No rash or lesions.    Neurologic: Nonfocal   Psych: Alert and oriented      Assessment:     [unfilled]    Signed By: Winsome Herzog MD     May 26, 2022

## 2022-05-27 NOTE — PROGRESS NOTES
[facility]        Inpatient Hematology / Oncology Progress Note    Reason for Admission: Thrombocytopenia (HCC) [D69.6]  Acute deep vein thrombosis (DVT) of femoral vein of right lower extremity (HCC) [I82.411]  Acute deep vein thrombosis (DVT) of right lower extremity, unspecified vein (HCC) [I82.401]    24 Hour Events:  Afebrile, VSS, on RA  Hgb up to 7.9 s/p tx  Plt 19k  Wife at bedside    Transfusions: None  Replacements: None    ROS:  Constitutional: Negative for fever, chills. CV: +edema. Negative for chest pain, palpitations. Respiratory: Negative for dyspnea, cough, wheezing. GI: Negative for nausea, abdominal pain, diarrhea. 10 point review of systems is otherwise negative with the exception of the elements mentioned above in the HPI.          Allergies   Allergen Reactions    Alemtuzumab Other (See Comments)     Rigors     Past Medical History:   Diagnosis Date    Back pain     occassionally    Cervical radiculopathy     Claustrophobia     DVT (deep venous thrombosis) ([facility] [address] 06/2019    currently treated with Xarelto- started on 5/30/2019    GERD (gastroesophageal reflux disease)     H/O seasonal allergies     Hyperlipidemia     Impotence     Insomnia     Lateral epicondylitis     Leukemia (HCC)     CHEMO    Obesity     BMI 36.6    Sleep apnea     noncomplaint with CPAP     Past Surgical History:   Procedure Laterality Date    CIRCUMCISION      COLONOSCOPY  2017    Due in 2027    IR IVC FILTER PLACEMENT W IMAGING  5/25/2022    IR IVC FILTER PLACEMENT W IMAGING 5/25/2022 [facility] RADIOLOGY SPECIALS    IR PORT PLACEMENT EQUAL OR GREATER THAN 5 YEARS  11/23/2021    IR PORT PLACEMENT EQUAL OR GREATER THAN 5 YEARS  6/19/2019    IR PORT PLACEMENT EQUAL OR GREATER THAN 5 YEARS  6/19/2019    IR PORT PLACEMENT EQUAL OR GREATER THAN 5 YEARS 6/19/2019 [facility] RAD[facility]OLOGY SPECIALS    IR PORT PLACEMENT EQUAL OR GREATER THAN 5 YEARS  11/23/2021    IR PORT PLACEMENT EQUAL OR GREATER THAN 5 YEARS 11/23/2021 [facility] RADIOLOGY SPECIALS    IR REMOVE TUNNELED VAD W PORT  12/14/2020    ORTHOPEDIC SURGERY Right     r wrist    VASCULAR SURGERY      WISDOM TOOTH EXTRACTION       Family History   Problem Relation Age of Onset    Hypertension Brother     Hypertension Brother     No Known Problems Son     Cancer Father 76        breast    Cancer Mother 80        pancreatic    No Known Problems Daughter      Social History     Socioeconomic History    Marital status:      Spouse name: Not on file    Number of children: Not on file    Years of education: Not on file    Highest education level: Not on file   Occupational History    Not on file   Tobacco Use    Smoking status: Never Smoker    Smokeless tobacco: Never Used   Substance and Sexual Activity    Alcohol use: Not Currently     Alcohol/week: 0.0 standard drinks    Drug use: No    Sexual activity: Not on file   Other Topics Concern    Not on file   Social History Narrative    Not on file     Social Determinants of Health     Financial Resource Strain:     Difficulty of Paying Living Expenses: Not on file   Food Insecurity:     Worried About Running Out of Food in the Last Year: Not on file    Ann of Food in the Last Year: Not on file   Transportation Needs:     Lack of Transportation (Medical): Not on file    Lack of Transportation (Non-Medical):  Not on file   Physical Activity:     Days of Exercise per Week: Not on file    Minutes of Exercise per Session: Not on file   Stress:     Feeling of Stress : Not on file   Social Connections:     Frequency of Communication with Friends and Family: Not on file    Frequency of Social Gatherings with Friends and Family: Not on file    Attends [employer] Services: Not on file    Active Member of Clubs or Organizations: Not on file    Attends Club or Organization Meetings: Not on file    Marital Status: Not on file   Intimate Partner Violence:     Fear of Current at 22 0817    atorvastatin (LIPITOR) tablet 10 mg  10 mg Oral Daily Jean-Paul Ditto, DO   10 mg at 22 2427    mirtazapine (REMERON) tablet 15 mg  15 mg Oral Nightly Jean-Paul Ditto, DO   15 mg at 22    [Held by provider] potassium chloride (KLOR-CON M) extended release tablet 20 mEq  20 mEq Oral Daily Jean-Paul Ditto, DO   20 mEq at 22 8500    famotidine (PEPCID) tablet 20 mg  20 mg Oral BID Jean-Paul Ditto, DO   20 mg at 22 010    sodium chloride flush 0.9 % injection 5-40 mL  5-40 mL IntraVENous 2 times per day Jean-Paul Ditto, DO   10 mL at 22    sodium chloride flush 0.9 % injection 5-40 mL  5-40 mL IntraVENous PRN Jean-Paul Ditto, DO   10 mL at 22    0.9 % sodium chloride infusion   IntraVENous PRN Jean-Paul Ditto, DO        ondansetron (ZOFRAN-ODT) disintegrating tablet 4 mg  4 mg Oral Q8H PRN Jean-Paul Ditto, DO        Or    ondansetron [facility]) injection 4 mg  4 mg IntraVENous Q6H PRN Jean-Paul Ditto, DO        polyethylene glycol (GLYCOLAX) packet 17 g  17 g Oral Daily PRN Jean-Paul Ditto, DO        acetaminophen (TYLENOL) tablet 650 mg  650 mg Oral Q6H PRN Jean-Paul Ditto, DO   650 mg at 22 1318    oyster shell calcium w/D 500-200 MG-UNIT tablet 1 tablet  1 tablet Oral Daily Jean-Paul Ditto, DO   1 tablet at 22 0817    docusate sodium (COLACE) capsule 100 mg  100 mg Oral Daily Jean-Paul Ditto, DO   100 mg at 22 0817    diphenhydrAMINE (BENADRYL) capsule 25 mg  25 mg Oral Q6H PRN Jean-Paul Ditto, DO   25 mg at 22 1318       OBJECTIVE:  Patient Vitals for the past 8 hrs:   BP Temp Temp src Pulse Resp SpO2   22 0305 104/67 97.7 °F (36.5 °C) Oral 93 17 97 %     Temp (24hrs), Av.3 °F (36.8 °C), Min:97.5 °F (36.4 °C), Max:99.1 °F (37.3 °C)    No intake/output data recorded.     Physical Exam:  Constitutional: Well developed, well nourished male in no acute distress, sitting comfortably in the hospital bed. HEENT: Normocephalic and atraumatic. Oropharynx is clear, mucous membranes are moist.  Neck supple. Skin Warm and dry. No bruising and no rash noted. No erythema. No pallor. Respiratory Lungs are clear to auscultation bilaterally without wheezes, rales or rhonchi, normal air exchange without accessory muscle use. CVS Normal rate, regular rhythm and normal S1 and S2. No murmurs, gallops, or rubs. Abdomen Soft, nontender and nondistended, normoactive bowel sounds. No palpable mass. No hepatosplenomegaly. Neuro Grossly nonfocal with no obvious sensory or motor deficits. MSK Normal range of motion in general.  +RLE edema   Psych Appropriate mood and affect.         Labs:    Recent Results (from the past 24 hour(s))   CBC with Auto Differential    Collection Time: 05/27/22  4:07 AM   Result Value Ref Range    WBC 51.4 (HH) 4.3 - 11.1 K/uL    RBC 2.54 (L) 4.23 - 5.6 M/uL    Hemoglobin 7.9 (L) 13.6 - 17.2 g/dL    Hematocrit 23.4 (L) 41.1 - 50.3 %    MCV 92.1 79.6 - 97.8 FL    MCH 31.1 26.1 - 32.9 PG    MCHC 33.8 31.4 - 35.0 g/dL    RDW 17.3 (H) 11.9 - 14.6 %    Platelets 19 (LL) 108 - 450 K/uL    MPV 9.2 (L) 9.4 - 12.3 FL    nRBC 0.00 0.0 - 0.2 K/uL    Seg Neutrophils 0 (L) 43 - 78 %    Lymphocytes 48 (H) 13 - 44 %    Monocytes 52 (H) 4.0 - 12.0 %    Eosinophils % 0 (L) 0.5 - 7.8 %    Basophils 0 0.0 - 2.0 %    Immature Granulocytes 0 0.0 - 5.0 %    Segs Absolute 0.0 (L) 1.7 - 8.2 K/UL    Absolute Lymph # 24.7 (H) 0.5 - 4.6 K/UL    Absolute Mono # 26.7 (H) 0.1 - 1.3 K/UL    Absolute Eos # 0.0 0.0 - 0.8 K/UL    Basophils Absolute 0.0 0.0 - 0.2 K/UL    Absolute Immature Granulocyte 0.0 0.0 - 0.5 K/UL    RBC Comment SLIGHT  ANISOCYTOSIS + POIKILOCYTOSIS        WBC Comment MANY      Platelet Comment MARKED      Differential Type AUTOMATED     Comprehensive Metabolic Panel w/ Reflex to MG    Collection Time: 05/27/22  4:07 AM   Result Value Ref Range    Sodium 135 (L) 138 - 145 mmol/L Potassium 4.0 3.5 - 5.1 mmol/L    Chloride 104 98 - 107 mmol/L    CO2 25 21 - 32 mmol/L    Anion Gap 6 (L) 7 - 16 mmol/L    Glucose 144 (H) 65 - 100 mg/dL    BUN 17 8 - 23 MG/DL    CREATININE 1.30 0.8 - 1.5 MG/DL    GFR African American >60 >60 ml/min/1.73m2    GFR Non- 59 (L) >60 ml/min/1.73m2    Calcium 8.9 8.3 - 10.4 MG/DL    Total Bilirubin 0.6 0.2 - 1.1 MG/DL    ALT 47 12 - 65 U/L    AST 23 15 - 37 U/L    Alk Phosphatase 99 50 - 136 U/L    Total Protein 6.2 (L) 6.3 - 8.2 g/dL    Albumin 3.7 3.2 - 4.6 g/dL    Globulin 2.5 2.3 - 3.5 g/dL    Albumin/Globulin Ratio 1.5 1.2 - 3.5         ASSESSMENT:  Patient Active Problem List   Diagnosis    Prolymphocytic leukemia of T-cell (HCC)    Severe obstructive sleep apnea    Essential hypertension, benign    Hypokalemia    Right shoulder pain    DDD (degenerative disc disease), cervical    DDD (degenerative disc disease), lumbar    Neutropenic fever (HCC)    Thrombocytopenia (HCC)    Body mass index (BMI) of 40.0-44.9 in adult (HCC)    First degree hemorrhoids    Pure hypercholesterolemia    Admission for antineoplastic chemotherapy    Benign prostatic hyperplasia with nocturia    Impotence    Sepsis (HCC)    Acute deep vein thrombosis (DVT) of right lower extremity (HCC)    Anxiety    Shortness of breath    Frailty     Mr. Debra Crockett is a 79 y.o. male admitted on 5/24/2022 with a primary diagnosis of The primary encounter diagnosis was Acute deep vein thrombosis (DVT) of right lower extremity, unspecified vein ([address] A diagnosis of Thrombocytopenia (HCC) was also pertinent to this visit. .      Mr. Debra Crockett was admitted on 5/24/2022 with a new right leg DVT in the setting of TCP, admitted for consideration of IVCF. He has a PMH of prolymphocytic leukemia followed by Dr. Bernhard Oppenheim. Other PMH HLD, ISAURO, GERD, depression, anxiety, hx of previous DVT (was on Eliquis, not currently on this).   He is s/p multiple lines of therapy (copied from previous note): In 12/2018 he was diagnosed with T-Cell Pro-Lymphocytic Leukemia, in 1/2019 he was started on Alemtuzumab and received it for 8 weeks and achieved a W[place], in 4/2019 he was switched to Pentostatin and received 14 doses and achieved a CR. He developed a left leg DVT in 6/2019 and was anti-coagulated with Xarelto, in 11/2019 he was switched to [address] [place] [address] Pasha he took it till 11/2020. He relapsed in 9/2021 and has received 2 cycles of ICE and did not achieve remission; he then received 5 cycles of Bendamustine/Venetoclax and achieved a WY. Most recently, s/p C1 of Romidepsin on 5/5, 5/12, and 5/19, due for C2 on 6/2/2022. Pt has ongoing need for blood transfusions and has twice weekly appointments for labs and replacements and CC infusion. Pt has received 1 unit of plts. We have been asked to assume care on our known patient. PLAN:  Prolymphocytic Leukemia  - Followed by Dr. Dorita Arceo as OP, s/p multiple lines of therapy. Most recently, s/p Romidepsin C1 on 5/5, 5/12, and 5/19; C2 due on 6/2. On Venetoclax 100mg QOD. - Requires twice weekly labs and replacements and frequent transfusions at CC infusion  - PC follows pt as an OP and will consult   5/26 PC consulted for [address] - pt changed code status to DNR and states he wishes to continue cancer-directed therapies    RLE DVT  - Not a candidate for [facility] d/t TCP  - IR consulted for consideration of IVCF  5/26 s/p IVC filter placement yesterday    Pancytopenia secondary to disease/chemotherapy  - Transfuse prn per Carmen SOPs    Continue home meds  PPx Meds: Acyclovir, Diflucan, Levaquin  Carmen SOPs  AC contraindicated d/t thrombocytopenia    Goals and plan of care reviewed with the patient. All questions answered to the best of our ability. Disposition:  Anticipate discharge home tomorrow.               KESHAWN Saeed [address] Hematology [facility] Oncology  [address] [address]  Office : [phone]

## 2022-05-27 NOTE — PROGRESS NOTES
Palliative Care Progress Note    Patient: Cathryn Muniz MRN: [id number]  SSN: [id number]    YOB: 1955  Age: 79 y.o. Sex: male       Assessment/Plan:     Chief Complaint/Interval History: pt working with PT. Denies any pain, nausea, vomting    Principal Diagnosis:    · Dyspnea  R06.00    Additional Diagnoses:   · Anxiety  F41.1  · Frailty  R54  · Counseling, Encounter for Medical Advice  Z71.9  · Encounter for Palliative Care  Z51.5    Palliative Performance Scale (PPS)       Medical Decision Making:   Reviewed and summarized labs and imaging over past 24 hours. Spoke with pt at bedside. Pt in good spirits. Notes plans for discharge tomorrow. He feels symptoms are well controlled currently. Will plan for follow up with cancer center palliative clinic. Will discuss findings with members of the interdisciplinary team.      More than 50% of this 25 minute visit was spent counseling and coordination of care as outlined above. Subjective:     Comprehensive Review of Systems completed and negative with the exception of as noted above     Objective:     Visit Vitals  /74   Pulse (!) 101   Temp 97.8 °F (36.6 °C) (Oral)   Resp 16   Ht 5' 11\" (1.803 m)   Wt 218 lb (98.9 kg)   SpO2 96%   BMI 30.40 kg/m²       Physical Exam:    General:  Cooperative. No acute distress. Eyes:  Conjunctivae/corneas clear    Nose: Nares normal. Septum midline. Neck: Supple, symmetrical, trachea midline, no JVD   Lungs:   Clear to auscultation bilaterally, unlabored   Heart:  Regular rate and rhythm, no murmur    Abdomen:   Soft, non-tender, non-distended   Extremities: Normal, atraumatic, no cyanosis or edema   Skin: Skin color, texture, turgor normal. No rash or lesions.    Neurologic: Nonfocal   Psych: Alert and oriented     Signed By: Charity Ochoa MD     May 27, 2022

## 2022-05-27 NOTE — PROGRESS NOTES
PHYSICAL THERAPY Initial Assessment and Daily Note  (Link to Caseload Tracking: PT Visit Days : 1  Acknowledge Orders  Time In/Out  PT Charge Capture  Rehab Caseload Tracker    Ofe Resendez is a 79 y.o. male   PRIMARY DIAGNOSIS: Acute deep vein thrombosis (DVT) of right lower extremity (HCC)  Thrombocytopenia (HCC) [D69.6]  Acute deep vein thrombosis (DVT) of femoral vein of right lower extremity (HCC) [I82.411]  Acute deep vein thrombosis (DVT) of right lower extremity, unspecified vein ([address] [I82.401]       Reason for Referral: Other abnormalities of gait and mobility (R26.89)  Inpatient: Payor: Merry Medina / Plan: Concepcion Brady HMO / Product Type: Medicare /     ASSESSMENT:     REHAB RECOMMENDATIONS:   Recommendation to date pending progress:  Setting:   No further skilled therapy after discharge from hospital    Equipment:     None     ASSESSMENT:  Mr. Brayden Cruz presents with an acute DVT of the RLE and a PMH significant for T-cell leukemia. At baseline he lives with his wife in a 2 story home with 10-12 steps to get to his bedroom. He is independent at baseline with all mobility and uses a quad cane occasionally when he is having a harder day with treatments. Today he is independent with all bed mobility and able to come to stand from a low surface with supervision. Pt then ambulated 250' with SBA and some lateral trunk sway and instability noted. Pt did have a few instances of reaching for tactile support with instability but no specific LOB. He would continue to benefit from skilled PT to facilitate improvements in functional strength, balance and ambulation to promote return to PLOF.      [address] [facility] [address] [address] AM-PAC 6 Clicks Basic Mobility Inpatient Short Form  AM-PAC Mobility Inpatient   How much difficulty turning over in bed?: None  How much difficulty sitting down on / standing up from a chair with arms?: None  How much difficulty moving from lying on back to sitting on side of bed?: None  How much help from another person moving to and from a bed to a chair?: None  How much help from another person needed to walk in hospital room?: None  How much help from another person for climbing 3-5 steps with a railing?: A Little  AM-PAC Inpatient Mobility Raw Score : 23  AM-PAC Inpatient T-Scale Score : 56.93  Mobility Inpatient CMS 0-100% Score: 11.2  Mobility Inpatient CMS G-Code Modifier : CI    SUBJECTIVE:   Mr. Minerva Russell states, \"I do feel a little more wobbly than normal\"     Social/Functional Lives With: Spouse  Type of Home: House  Home Layout: One level  Bathroom Accessibility: Accessible  Receives Help From: Family  ADL Assistance: [address]: Independent  Homemaking Responsibilities: Yes  Ambulation Assistance: Independent  Transfer Assistance: Independent  Active : Yes  Mode of Transportation: Car  Occupation: Retired    OBJECTIVE:     PAIN: VITALS / O2: PRECAUTION / Iver Risser / Voncile Conine:   Pre Treatment:   Pain Assessment: 0-10  Pain Level: 0      Post Treatment: 0/10 Vitals        Oxygen      None    RESTRICTIONS/PRECAUTIONS:  Restrictions/Precautions: None                 GROSS EVALUATION: Intact Impaired (Comments):   AROM [x]     PROM []    Strength [x]     Balance [] Posture: Good  Sitting - Static: Good  Sitting - Dynamic: Good  Standing - Static: Fair,+  Standing - Dynamic: Fair   Posture [] Rounded Shoulders   Sensation [x]     Coordination []      Tone []     Edema []    Activity Tolerance [x]      []      COGNITION/  PERCEPTION: Intact Impaired (Comments):   Orientation [x]     Vision [x]     Hearing [x]     Cognition  [x]       MOBILITY: I Mod I S SBA CGA Min Mod Max Total  NT x2 Comments:   Bed Mobility    Rolling [x] [] [] [] [] [] [] [] [] [] []    Supine to Sit [x] [] [] [] [] [] [] [] [] [] []    Scooting [x] [] [] [] [] [] [] [] [] [] []    Sit to Supine [] [] [] [] [] [] [] [] [] [x] []    Transfers    Sit to Stand [] [] [] [x] [] [] [] [] [] [] [] Activity  Therapeutic Exercise/HEP  Neuromuscular Re-education  Gait Training       TREATMENT:   EVALUATION: LOW COMPLEXITY: (Untimed Charge)    TREATMENT:   Therapeutic Activity (8 Minutes): Therapeutic activity included Rolling, Supine to Sit, Sit to Supine, Scooting, Transfer Training, Ambulation on level ground, Sitting balance  and Standing balance to improve functional Activity tolerance, Balance, Mobility and Strength.     TREATMENT GRID:  N/A    AFTER TREATMENT PRECAUTIONS: Call light within reach, Chair and Needs within reach    INTERDISCIPLINARY COLLABORATION:  RN/ PCT and PT/ PTA    EDUCATION: Education Given To: Patient  Education Provided: Plan of Care;Role of Therapy;Transfer Training    TIME IN/OUT:  Time In: 1101  Time Out: [address]  Minutes: [address] [facility] [address], [address] [facility]

## 2022-05-27 NOTE — PROGRESS NOTES
Comprehensive Nutrition Assessment    Type and Reason for Visit: Reassess  Malnutrition Screening Tool: Malnutrition Screen  Have you recently lost weight without trying?: 14 to 23 pounds (2 points)  Have you been eating poorly because of a decreased appetite?: Yes (1 point)  Malnutrition Screening Tool Score: 3    Nutrition Recommendations/Plan:   Meals and Snacks:  Diet: Continue current order  Progress s/p IR as medically appropriate. Nutrition Supplement Therapy:   Medical food supplement therapy:  Continue Ensure Enlive three times per day (this provides 350 kcal and 20 grams protein per bottle)       Malnutrition Assessment:  Malnutrition Status: At risk for malnutrition (Comment) (hx of poor po intake inpt, +loss ?flulid, CBW similar UBW)    Nutrition Assessment:  Nutrition History:   Pt and wife attribute wt changes to fluid fluctuations. Wt hx per outpt RD records: 219# 1/26/22 at last RD visit which was up 6# over the last month. Wt hx per wife wt continued to increase after last RD visit. 232# 4/7/22, 238# 5/5/22, 220# 5/12/22, 5/19/22 215#. No brianna muscle or fat wasting appreciated. Nutrition Background:   PMH remarkable for DVT, HLD, ISAURO not on CPAP, T cell prolymphocytic leukemia. Admitted with acute DVT. Nutrition Interval:  Patient s/p IVC filter placement 5/25. Patient seen up to chair eating a burger. He asks why he is on a cardiac diet. He denies any cardiac history. When RD offers to contact provider regarding changing diet order he states \"don't worry about it, I'm leaving tomorrow. \" He asks why he is getting Ensure in addition to tea and other beverages. Reviewed intake records of variable intake of meals being either 0% or 100%. He states \"that's right. \" RD then explains Ensure for use when intake of meals is poor. Explained that if eating well, no needs to drink Ensure. He voiced understanding.     Current Nutrition Therapies:  ADULT ORAL NUTRITION SUPPLEMENT; Breakfast, Lunch, Dinner; Standard High Calorie/High Protein Oral Supplement  ADULT DIET; Regular; Low Fat/Low Chol/High Fiber/2 gm Na    Current Intake:   Average Meal Intake:  (0% or 100%) Average Supplements Intake: 51-75%      Anthropometric Measures:  Height: 5' 11\" (180.3 cm)  Current Body Wt: 218 lb 0.6 oz (98.9 kg) (5/24), Weight source: Not Specified  BMI: 30.4  Admission Body Weight: 218 lb 0.6 oz (98.9 kg) (source not specified)  Ideal Body Weight (Kg) (Calculated): 78 kg (172 lbs), 126.8 %  Usual Body Wt: 238 lb (108 kg) (stated 5/5/22 per pt and family), Percent weight change: -8.4       Edema: No data recorded  BMI Category Obese Class 1 (BMI 30.0-34. 9)  Estimated Daily Nutrient Needs:  Energy (kcal/day): 9102-3957 (18-20 kcal/kg) (Kcal/kg Weight used: 98.9 kg (5/24) Admission  Protein (g/day):  (1-1.2 g/kg) Weight Used: (Admission) 98.9 kg  Fluid (ml/day):   (1 ml/kcal)    Nutrition Diagnosis:   · Inadequate oral intake related to  (variable intake for meals) as evidenced by  (intake as above, h/o poor PO inpatient)    Nutrition Interventions:   Food and/or Nutrient Delivery: Continue Current Diet,Continue Oral Nutrition Supplement     Coordination of Nutrition Care: Continue to monitor while inpatient       Goals:   Previous Goal Met: Progressing toward Goal(s)  Active Goal: Meet at least 75% of estimated needs       Nutrition Monitoring and Evaluation:      Food/Nutrient Intake Outcomes: Food and Nutrient Intake,Supplement Intake  Physical Signs/Symptoms Outcomes: Meal Time Behavior,Weight    Discharge Planning:    Continue current diet    LATHA, [address], [address], [address]

## 2022-05-27 NOTE — PROGRESS NOTES
Pt states the Ativan did help to make him feel less anxious. Pt in the recliner at bedside watching tv.

## 2022-05-27 NOTE — DISCHARGE SUMMARY
[facility] Hematology & Oncology: Inpatient Hematology / Oncology Discharge Summary Note    Patient ID:  Cherrie Romano  [id number]  79 y.o.  1955    Admit Date: 5/24/2022    Discharge Date: 05/29/2022    Admission Diagnoses: Thrombocytopenia (HCC) [D69.6]  Acute deep vein thrombosis (DVT) of femoral vein of right lower extremity (HCC) [I82.411]  Acute deep vein thrombosis (DVT) of right lower extremity, unspecified vein ([address] [I82.401]    Discharge Diagnoses:  Principal Diagnosis: Acute deep vein thrombosis (DVT) of right lower extremity (HCC)  Principal Problem:    Acute deep vein thrombosis (DVT) of right lower extremity (HCC)  Active Problems:    Anxiety    Shortness of breath    Frailty    Prolymphocytic leukemia of T-cell (HCC)    Essential hypertension, benign    Hypokalemia    Thrombocytopenia (HCC)    Pure hypercholesterolemia  Resolved Problems:    * No resolved hospital problems. [facility] Course:  Mr. Sandra Mann is a 79 y.o. male admitted on 5/24/2022 with a primary diagnosis of The primary encounter diagnosis was Acute deep vein thrombosis (DVT) of right lower extremity, unspecified vein ([address] A diagnosis of Thrombocytopenia (HCC) was also pertinent to this visit. .       Mr. Sandra Mann was admitted on 5/24/2022 with a new right leg DVT in the setting of TCP, admitted for consideration of IVCF. He has a PMH of prolymphocytic leukemia followed by Dr. Sussy Hines. Other PMH HLD, ISAURO, GERD, depression, anxiety, hx of previous DVT (was on Eliquis, not currently on this). He is s/p multiple lines of therapy (copied from previous note): In 12/2018 he was diagnosed with T-Cell Pro-Lymphocytic Leukemia, in 1/2019 he was started on Alemtuzumab and received it for 8 weeks and achieved a IN, in 4/2019 he was switched to Pentostatin and received 14 doses and achieved a CR.  He developed a left leg DVT in 6/2019 and was anti-coagulated with Xarelto, in 11/2019 he was switched to Eliquis and he took it till 11/2020. He relapsed in 9/2021 and has received 2 cycles of ICE and did not achieve remission; he then received 5 cycles of Bendamustine/Venetoclax and achieved a TN. Most recently, s/p C1D15 of Romidepsin on 5/19, due for C2 on 6/2/2022. Pt has ongoing need for blood transfusions and has twice weekly appointments for labs and replacements and CC infusion. He is s/p IVC filter placement on 5/25. He met with palliative care and changed code status to DNR. He states he wishes to continue cancer-directed therapies. He is feeling well and is ready for discharge home. Hgb 9.3. receiving platelets prior to DC. He will f/u as previously scheduled on 6/2. Advised to call with fever, chills, uncontrollable symptoms, or with any other concerns. Prolymphocytic Leukemia  - Followed by Dr. Emma Lebron as OP, s/p multiple lines of therapy. Most recently, s/p Romidepsin C1 on 5/5, 5/12, and 5/19; C2 due on 6/2. On Venetoclax 100mg QOD.   - Requires twice weekly labs and replacements and frequent transfusions at CC infusion  - PC follows pt as an OP and will consult   5/26 PC consulted for [address] - pt changed code status to DNR and states he wishes to continue cancer-directed therapies     RLE DVT  - Not a candidate for [facility] d/t TCP  - IR consulted for consideration of IVCF  5/26 s/p IVC filter placement yesterday     Pancytopenia secondary to disease/chemotherapy  - Transfuse prn per Carmen SOPs      Consults:  IP CONSULT TO ONCOLOGY  IP CONSULT TO PALLIATIVE CARE    Pertinent Diagnostic Studies:   Labs:    Recent Labs     05/27/22  0407 05/28/22  0437 05/29/22  0318   WBC 51.4* 55.3* 72.8*   HGB 7.9* 8.7* 9.0*   PLT 19* 12* 9*      Recent Labs     05/27/22  0407 05/28/22  0437 05/29/22  0318   * 134* 133*   K 4.0 4.1 4.5    104 101   CO2 25 25 26   BUN 17 19 18       Imaging:  Exam: VAS DUP LOWER EXTREMITY VENOUS BILATERAL on 5/24/2022 1:56 PM     Clinical History: The Male patient is 79years old presenting for pain and  tenderness in right calf. RU DVT; CALF PAIN.    Comparisons:  None     Findings:       The right common femoral, superficial femoral, popliteal, and visualized calf  veins are patent demonstrating normal compressibility, normal color flow, and  normal response to distal augmentation. The deep femoral vein demonstrates  nonocclusive thrombus.     The left common femoral, superficial femoral, and visualized calf veins are  patent demonstrating normal compressibility, normal color flow, and normal  response to distal augmentation. There is recanalized thrombus in the popliteal  vein.        IMPRESSION:     1. Nonocclusive thrombus of the right deep femoral vein.   2. 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            Medication List      CONTINUE taking these medications    acyclovir 400 MG tablet  Commonly known as: ZOVIRAX     allopurinol 300 MG tablet  Commonly known as: ZYLOPRIM     calcium carbonate 1250 (500 Ca) MG tablet  Commonly known as: OYSTER SHELL CALCIUM 500 mg     docusate 100 MG Caps  Commonly known as: COLACE, DULCOLAX     fluconazole 200 MG tablet  Commonly known as: DIFLUCAN     furosemide 20 MG tablet  Commonly known as: LASIX     levoFLOXacin 500 MG tablet  Commonly known as: LEVAQUIN     lidocaine viscous hcl 2 % Soln solution  Commonly known as: XYLOCAINE     lidocaine-prilocaine 2.5-2.5 % cream  Commonly known as: EMLA  Apply topically as needed (PRN for port use)     LORazepam 1 MG tablet  Commonly known as: ATIVAN     lovastatin 10 MG tablet  Commonly known as: MEVACOR     magnesium oxide 400 MG tablet  Commonly known as: MAG-OX     mirtazapine 15 MG tablet  Commonly known as: REMERON     omeprazole 40 MG delayed release capsule  Commonly known as: PRILOSEC     potassium chloride 20 MEQ extended release tablet  Commonly known as: KLOR-CON M     Venclexta 100 MG Tabs  Generic drug: Venetoclax        STOP taking these medications    azelastine 0.1 % nasal spray  Commonly known as: ASTELIN     clotrimazole 1 % cream  Commonly known as: LOTRIMIN     hydrocortisone 2.5 % cream              OBJECTIVE:  Patient Vitals for the past 8 hrs:   BP Temp Temp src Pulse Resp SpO2   22 1100 (!) 95/59 97.4 °F (36.3 °C) Oral 83 19 99 %   22 0647 97/64 98.4 °F (36.9 °C) Oral 89 19 96 %   22 0455 95/61 98.3 °F (36.8 °C) Oral 99 20 96 %     Temp (24hrs), Av.3 °F (36.8 °C), Min:97.4 °F (36.3 °C), Max:98.7 °F (37.1 °C)     0701 -  1900  In: 200   Out: -     Physical Exam:  Constitutional: Well developed, well nourished male in no acute distress, sitting comfortably on the hospital bed. HEENT: Normocephalic and atraumatic. Oropharynx is clear, mucous membranes are moist.  Extraocular muscles are intact. Sclerae anicteric. Neck supple without JVD. No thyromegaly present. Skin Warm and dry. No bruising and no rash noted. No erythema. No pallor. Respiratory Lungs are clear to auscultation bilaterally without wheezes, rales or rhonchi, normal air exchange without accessory muscle use. CVS Normal rate, regular rhythm and normal S1 and S2. No murmurs, gallops, or rubs. Abdomen Soft, nontender and nondistended, normoactive bowel sounds. No palpable mass. No hepatosplenomegaly. Neuro Grossly nonfocal with no obvious sensory or motor deficits. MSK Normal range of motion in general.  No edema and no tenderness. Psych Appropriate mood and affect. ASSESSMENT:    Principal Problem:    Acute deep vein thrombosis (DVT) of right lower extremity (HCC)  Active Problems:    Anxiety    Shortness of breath    Frailty    Prolymphocytic leukemia of T-cell (HCC)    Essential hypertension, benign    Hypokalemia    Thrombocytopenia (HCC)    Pure hypercholesterolemia  Resolved Problems:    * No resolved hospital problems.  *      DISPOSITION:  Follow up as previously scheduled on       I spent 41 minutes on the day of discharge in discharge planning and coordination of care for this patient. KESHAWN Carcamo NP    [facility] Hematology & Oncology  [address] [place] [address]  Office : [phone]  Fax : [phone]         Attending Addendum:  I have personally performed a face to face diagnostic evaluation on this patient. I have reviewed and agree with the care plan as documented by Michelle Araiza N.P. My findings are as follows:  He has Pro-Lymphocytic Leukemia, appears anxious, heart rate regular without murmurs, abdomen is non-tender, bowel sounds are positive, we will arrange for him to follow-up in our clinic.               Marv Briones MD    [facility] Hematology/Oncology  [address] [address]  Office : [phone]  Fax : [phone]

## 2022-05-28 NOTE — PLAN OF CARE
Problem: Safety - Adult  Goal: Free from fall injury  Outcome: Progressing  Flowsheets (Taken 5/28/2022 6624)  Free From Fall Injury: Instruct family/caregiver on patient safety     Problem: ABCDS Injury Assessment  Goal: Absence of physical injury  Outcome: Progressing

## 2022-05-28 NOTE — PROGRESS NOTES
[facility] Hematology & Oncology        Inpatient Hematology / Oncology Progress Note    Reason for Admission: Thrombocytopenia (HCC) [D69.6]  Acute deep vein thrombosis (DVT) of femoral vein of right lower extremity (HCC) [I82.411]  Acute deep vein thrombosis (DVT) of right lower extremity, unspecified vein (HCC) [I82.401]    24 Hour Events:  Afebrile, VSS, on RA  Hgb 8.7 sp transfusion  Plt 12K  Wife at bedside    Transfusions: None  Replacements: None    ROS:  Constitutional: Negative for fever, chills. CV: +edema. Negative for chest pain, palpitations. Respiratory: Negative for dyspnea, cough, wheezing. GI: Negative for nausea, abdominal pain, diarrhea. 10 point review of systems is otherwise negative with the exception of the elements mentioned above in the HPI.          Allergies   Allergen Reactions    Alemtuzumab Other (See Comments)     Rigors     Past Medical History:   Diagnosis Date    Back pain     occassionally    Cervical radiculopathy     Claustrophobia     DVT (deep venous thrombosis) ([facility] [address] 06/2019    currently treated with Xarelto- started on 5/30/2019    GERD (gastroesophageal reflux disease)     H/O seasonal allergies     Hyperlipidemia     Impotence     Insomnia     Lateral epicondylitis     Leukemia (HCC)     CHEMO    Obesity     BMI 36.6    Sleep apnea     noncomplaint with CPAP     Past Surgical History:   Procedure Laterality Date    CIRCUMCISION      COLONOSCOPY  2017    Due in 2027    IR IVC FILTER PLACEMENT W IMAGING  5/25/2022    IR IVC FILTER PLACEMENT W IMAGING 5/25/2022 D RADIOLOGY SPECIALS    IR PORT PLACEMENT EQUAL OR GREATER THAN 5 YEARS  11/23/2021    IR PORT PLACEMENT EQUAL OR GREATER THAN 5 YEARS  6/19/2019    IR PORT PLACEMENT EQUAL OR GREATER THAN 5 YEARS  6/19/2019    IR PORT PLACEMENT EQUAL OR GREATER THAN 5 YEARS 6/19/2019 D RADIOLOGY SPECIALS    IR PORT PLACEMENT EQUAL OR GREATER THAN 5 YEARS  11/23/2021    IR PORT PLACEMENT EQUAL OR GREATER THAN 5 YEARS 11/23/2021 [facility] RADIOLOGY SPECIALS    IR REMOVE TUNNELED VAD W PORT  12/14/2020    ORTHOPEDIC SURGERY Right     r wrist    VASCULAR SURGERY      WISDOM TOOTH EXTRACTION       Family History   Problem Relation Age of Onset    Hypertension Brother     Hypertension Brother     No Known Problems Son     Cancer Father 76        breast    Cancer Mother 80        pancreatic    No Known Problems Daughter      Social History     Socioeconomic History    Marital status:      Spouse name: Not on file    Number of children: Not on file    Years of education: Not on file    Highest education level: Not on file   Occupational History    Not on file   Tobacco Use    Smoking status: Never Smoker    Smokeless tobacco: Never Used   Substance and Sexual Activity    Alcohol use: Not Currently     Alcohol/week: 0.0 standard drinks    Drug use: No    Sexual activity: Not on file   Other Topics Concern    Not on file   Social History Narrative    Not on file     Social Determinants of Health     Financial Resource Strain:     Difficulty of Paying Living Expenses: Not on file   Food Insecurity:     Worried About Running Out of Food in the Last Year: Not on file    Ann of Food in the Last Year: Not on file   Transportation Needs:     Lack of Transportation (Medical): Not on file    Lack of Transportation (Non-Medical):  Not on file   Physical Activity:     Days of Exercise per Week: Not on file    Minutes of Exercise per Session: Not on file   Stress:     Feeling of Stress : Not on file   Social Connections:     Frequency of Communication with Friends and Family: Not on file    Frequency of Social Gatherings with Friends and Family: Not on file    Attends Church Services: Not on file    Active Member of Clubs or Organizations: Not on file    Attends Club or Organization Meetings: Not on file    Marital Status: Not on file   Intimate Partner Violence:     Fear of Current or Ex-Partner: Not on file    Emotionally Abused: Not on file    Physically Abused: Not on file    Sexually Abused: Not on file   Housing Stability:     Unable to Pay for Housing in the Last Year: Not on file    Number of Jeronimo in the Last Year: Not on file    Unstable Housing in the Last Year: Not on file     Current Facility-Administered Medications   Medication Dose Route Frequency Provider Last Rate Last Admin    magic (miracle) mouthwash with nystatin  5 mL Swish & Spit 4x Daily PRN Rossi Davidson MD        0.9 % sodium chloride infusion   IntraVENous PRN Rasta Sauceda APRN - NP        HYDROcodone-acetaminophen ([facility]) 5-325 MG per tablet 1 tablet  1 tablet Oral Q6H PRN Kelsey Parker, APRN - CNP   1 tablet at 05/26/22 1042    0.9 % sodium chloride infusion   IntraVENous PRN Jennifer Quiñones MD        morphine injection 2 mg  2 mg IntraVENous Q4H PRN Manisha Harris, APRN - CNP        prochlorperazine (COMPAZINE) injection 5 mg  5 mg IntraVENous Q6H PRN Manisha Kimberly, APRN - CNP        Venetoclax TABS 100 mg (Patient Supplied)  100 mg Oral Every Other Day Manisha Kimberly, APRN - CNP        pantoprazole (PROTONIX) tablet 40 mg  40 mg Oral QAM AC Manisha Harris, APRN - CNP   40 mg at 05/27/22 0847    magnesium oxide (MAG-OX) tablet 400 mg  400 mg Oral Daily Manisha Kimberly, APRN - CNP   400 mg at 05/27/22 0847    LORazepam (ATIVAN) tablet 1 mg  1 mg Oral BID PRN Manisha Harris, APRN - CNP   1 mg at 05/27/22 1244    levoFLOXacin (LEVAQUIN) tablet 500 mg  500 mg Oral Daily Manisha Kimberly, APRN - CNP   500 mg at 05/26/22 1034    0.9 % sodium chloride infusion   IntraVENous PRN Corina Hernandez,         acyclovir (ZOVIRAX) tablet 400 mg  400 mg Oral BID Corina Hernandez, DO   400 mg at 05/27/22 2135    allopurinol (ZYLOPRIM) tablet 300 mg  300 mg Oral Daily Corina Hernandez DO   300 mg at 05/27/22 0847    azelastine (ASTELIN) 0.1 % nasal spray 1 spray (Patient Supplied)  1 spray Each Nostril BID Makenzie [place], DO        fluconazole (DIFLUCAN) tablet 200 mg  200 mg Oral Daily Yandel Chay, DO   200 mg at 05/27/22 0847    furosemide (LASIX) tablet 20 mg  20 mg Oral Daily Yandel [place], DO   20 mg at 05/27/22 0847    atorvastatin (LIPITOR) tablet 10 mg  10 mg Oral Daily Yandel Chay, DO   10 mg at 05/27/22 2329    mirtazapine (REMERON) tablet 15 mg  15 mg Oral Nightly Yandel Chay, DO   15 mg at 05/27/22 2136    [Held by provider] potassium chloride (KLOR-CON M) extended release tablet 20 mEq  20 mEq Oral Daily Yandel [place], DO   20 mEq at 05/25/22 5183    famotidine (PEPCID) tablet 20 mg  20 mg Oral BID Yandel [place], DO   20 mg at 05/26/22 2830    sodium chloride flush 0.9 % injection 5-40 mL  5-40 mL IntraVENous 2 times per day Yandel [place], DO   10 mL at 05/27/22 2138    sodium chloride flush 0.9 % injection 5-40 mL  5-40 mL IntraVENous PRN Yandel [place], DO   10 mL at 05/24/22 2329    0.9 % sodium chloride infusion   IntraVENous PRN Yandel [place], DO        ondansetron (ZOFRAN-ODT) disintegrating tablet 4 mg  4 mg Oral Q8H PRN Yandel Chay, DO        Or    ondansetron [facility]) injection 4 mg  4 mg IntraVENous Q6H PRN Yandel Chay, DO        polyethylene glycol (GLYCOLAX) packet 17 g  17 g Oral Daily PRN Yandel [place], DO        acetaminophen (TYLENOL) tablet 650 mg  650 mg Oral Q6H PRN Yandel [place], DO   650 mg at 05/27/22 2135    oyster shell calcium w/D 500-200 MG-UNIT tablet 1 tablet  1 tablet Oral Daily Yandel Chay, DO   1 tablet at 05/27/22 0847    docusate sodium (COLACE) capsule 100 mg  100 mg Oral Daily Yandel Chay, DO   100 mg at 05/26/22 0817    diphenhydrAMINE (BENADRYL) capsule 25 mg  25 mg Oral Q6H PRN Yandel [place], DO   25 mg at 05/27/22 2136       OBJECTIVE:  Patient Vitals for the past 8 hrs:   BP Temp Temp src Pulse Resp SpO2   05/28/22 0308 114/75 98.1 °F (36.7 °C) Oral 87 16 96 %   05/28/22 0120 96/71 97.6 °F (36.4 °C) Oral 84 20 100 %     Temp (24hrs), Av °F (36.7 °C), Min:97.5 °F (36.4 °C), Max:98.9 °F (37.2 °C)    No intake/output data recorded. Physical Exam:  Constitutional: Well developed, well nourished male in no acute distress, sitting comfortably in the hospital bed. HEENT: Normocephalic and atraumatic. Oropharynx is clear, mucous membranes are moist.  Neck supple. Skin Warm and dry. No bruising and no rash noted. No erythema. No pallor. Respiratory Lungs are clear to auscultation bilaterally without wheezes, rales or rhonchi, normal air exchange without accessory muscle use. CVS Normal rate, regular rhythm and normal S1 and S2. No murmurs, gallops, or rubs. Abdomen Soft, nontender and nondistended, normoactive bowel sounds. No palpable mass. No hepatosplenomegaly. Neuro Grossly nonfocal with no obvious sensory or motor deficits. MSK Normal range of motion in general.  +RLE edema   Psych Appropriate mood and affect. Labs:    Recent Results (from the past 24 hour(s))   PREPARE RBC (CROSSMATCH), 1 Units    Collection Time: 22  8:30 PM   Result Value Ref Range    History Check Historical check performed    CBC with Auto Differential    Collection Time: 22  4:37 AM   Result Value Ref Range    WBC 55.3 (HH) 4.3 - 11.1 K/uL    RBC 2.77 (L) 4.23 - 5.6 M/uL    Hemoglobin 8.7 (L) 13.6 - 17.2 g/dL    Hematocrit 24.9 (L) 41.1 - 50.3 %    MCV 89.9 79.6 - 97.8 FL    MCH 31.4 26.1 - 32.9 PG    MCHC 34.9 31.4 - 35.0 g/dL    RDW 16.9 (H) 11.9 - 14.6 %    Platelets 12 (LL) 854 - 450 K/uL    MPV Unable to calculate. Recommend adding IPF.  9.4 - 12.3 FL    nRBC 0.00 0.0 - 0.2 K/uL    Seg Neutrophils 0 (L) 43 - 78 %    Lymphocytes 51 (H) 13 - 44 %    Monocytes 49 (H) 4.0 - 12.0 %    Eosinophils % 0 (L) 0.5 - 7.8 %    Basophils 0 0.0 - 2.0 %    Immature Granulocytes 0 0.0 - 5.0 %    Segs Absolute 0.0 (L) 1.7 - 8.2 K/UL    Absolute Lymph # 28.2 (H) 0.5 - 4.6 K/UL    Absolute Mono # 27.1 (H) 0.1 - 1.3 K/UL    Absolute Eos # 0.0 0.0 - 0.8 K/UL    Basophils Absolute 0.0 0.0 - 0.2 K/UL    Absolute Immature Granulocyte 0.0 0.0 - 0.5 K/UL    RBC Comment NORMOCYTIC/NORMOCHROMIC      WBC Comment MARKED      Platelet Comment DECREASED      Differential Type AUTOMATED     Comprehensive Metabolic Panel w/ Reflex to MG    Collection Time: 05/28/22  4:37 AM   Result Value Ref Range    Sodium 134 (L) 136 - 145 mmol/L    Potassium 4.1 3.5 - 5.1 mmol/L    Chloride 104 98 - 107 mmol/L    CO2 25 21 - 32 mmol/L    Anion Gap 5 (L) 7 - 16 mmol/L    Glucose 145 (H) 65 - 100 mg/dL    BUN 19 8 - 23 MG/DL    CREATININE 1.40 0.8 - 1.5 MG/DL    GFR African American >60 >60 ml/min/1.73m2    GFR Non- 54 (L) >60 ml/min/1.73m2    Calcium 9.1 8.3 - 10.4 MG/DL    Total Bilirubin 0.5 0.2 - 1.1 MG/DL    ALT 52 12 - 65 U/L    AST 31 15 - 37 U/L    Alk Phosphatase 97 50 - 136 U/L    Total Protein 6.0 (L) 6.3 - 8.2 g/dL    Albumin 3.6 3.2 - 4.6 g/dL    Globulin 2.4 2.3 - 3.5 g/dL    Albumin/Globulin Ratio 1.5 1.2 - 3.5         ASSESSMENT:  Patient Active Problem List   Diagnosis    Prolymphocytic leukemia of T-cell (HCC)    Severe obstructive sleep apnea    Essential hypertension, benign    Hypokalemia    Right shoulder pain    DDD (degenerative disc disease), cervical    DDD (degenerative disc disease), lumbar    Neutropenic fever (HCC)    Thrombocytopenia (HCC)    Body mass index (BMI) of 40.0-44.9 in adult (HCC)    First degree hemorrhoids    Pure hypercholesterolemia    Admission for antineoplastic chemotherapy    Benign prostatic hyperplasia with nocturia    Impotence    Sepsis (HCC)    Acute deep vein thrombosis (DVT) of right lower extremity (HCC)    Anxiety    Shortness of breath    Frailty     Mr. Elieser Britt is a 79 y.o. male admitted on 5/24/2022 with a primary diagnosis of The primary encounter diagnosis was Acute deep vein thrombosis (DVT) of right lower extremity, unspecified vein ([address]  A diagnosis of Thrombocytopenia ([facility] [address] was also pertinent to this visit. .      Mr. Taylor Braden was admitted on 5/24/2022 with a new right leg DVT in the setting of TCP, admitted for consideration of IVCF. He has a PMH of prolymphocytic leukemia followed by Dr. Haydee Vazquez. Other PMH HLD, ISAURO, GERD, depression, anxiety, hx of previous DVT (was on Eliquis, not currently on this). He is s/p multiple lines of therapy (copied from previous note): In 12/2018 he was diagnosed with T-Cell Pro-Lymphocytic Leukemia, in 1/2019 he was started on Alemtuzumab and received it for 8 weeks and achieved a SC, in 4/2019 he was switched to Pentostatin and received 14 doses and achieved a CR. He developed a left leg DVT in 6/2019 and was anti-coagulated with Xarelto, in 11/2019 he was [facility] he took it till 11/2020. He relapsed in 9/2021 and has received 2 cycles of ICE and did not achieve remission; he then received 5 cycles of Bendamustine/Venetoclax and achieved a SC. Most recently, s/p C1 of Romidepsin on 5/5, 5/12, and 5/19, due for C2 on 6/2/2022. Pt has ongoing need for blood transfusions and has twice weekly appointments for labs and replacements and CC infusion. Pt has received 1 unit of plts. We have been asked to assume care on our known patient. PLAN:  Prolymphocytic Leukemia  - Followed by Dr. Haydee Vazquez as OP, s/p multiple lines of therapy. Most recently, s/p Romidepsin C1 on 5/5, 5/12, and 5/19; C2 due on 6/2. On Venetoclax 100mg QOD.   - Requires twice weekly labs and replacements and frequent transfusions at CC infusion  - PC follows pt as an OP and will consult   5/26 PC consulted for [address] - pt changed code status to DNR and states he wishes to continue cancer-directed therapies    RLE DVT  - Not a candidate for [facility] d/t TCP  - IR consulted for consideration of IVCF  5/26 s/p IVC filter placement yesterday    Pancytopenia secondary to disease/chemotherapy  - Transfuse prn per Carmen SOPs    Continue home meds  PPx Meds: Acyclovir, Diflucan, Levaquin  Carmen SOPs  AC contraindicated d/t thrombocytopenia    Goals and plan of care reviewed with the patient. All questions answered to the best of our ability. KESHAWN Dean NP   [facility] Hematology & Oncology  [address] [place] [address]  Office : [phone]      Attending Addendum:  I have personally performed a face to face diagnostic evaluation on this patient. I have reviewed and agree with the care plan as documented by Marie Bliss N.P. 36 minutes were spent on patient care, including but not limited to, reviewing the chart and time with the patient and family, more than 50% of the time documented was spent in face-to-face contact with the patient and in the care of the patient on the floor/unit where the patient is located. My findings are as follows:  He has refractory PLL, appears weak, heart rate regular without murmurs, abdomen is non-tender, bowel sounds are positive, we will transfuse PRBCs and Platelets.               Horacio Sahni MD    [facility] Hematology/Oncology  [address] [place], [place] [id number] [facility]  Office : [phone]  Fax : [phone]

## 2022-05-28 NOTE — PROGRESS NOTES
Pt complained of fatigue & sob upon assessment.     2l O2 via nasal canula applied to pt for sob  Verbalized reduction of sob w/ 02  Hgb 7.9 on 5/27 labs  RN contacted on-call oncology NP inquiring if RN can transfuse unit prbcs due to pt being symptomatic  1 unit prbcs transfused  PRN benadryl 25mg po & prn tylenol 650mg po given prior to blood admin  Pt tolerated well  Slightly low bp prior to blood - blood provided volume  Pt complained of tongue soreness  MMW ordered per [facility]    Shift report given to [facility], oncoming RN    Vitals:    05/27/22 2258 05/27/22 2330 05/28/22 0120 05/28/22 0308   BP: 109/75 (!) 98/50 96/71 114/75   Pulse: 99 92 84 87   Resp: 20 16 20 16   Temp: 97.5 °F (36.4 °C) 98.9 °F (37.2 °C) 97.6 °F (36.4 °C) 98.1 °F (36.7 °C)   TempSrc: Oral Oral Oral Oral   SpO2: 99% 99% 100% 96%   Weight:       Height:

## 2022-05-29 NOTE — PROGRESS NOTES
Per pt & wife, Alexandria Vera mentioned pt receiving platelets & blood prior to dc 5/29  Spoke w/ on-call oncology NP - OK to order   1 unit prbcs transfused prior to end of this RNs shift  Plts not available @ end of this RNs shift    Shift report given to robert Cavazos RN    Vitals:    05/28/22 2240 05/29/22 0323 05/29/22 0455 05/29/22 0647   BP: 100/72 100/68 95/61 97/64   Pulse: 82 (!) 104 99 89   Resp: 16  20 19   Temp: 98.7 °F (37.1 °C) 98.5 °F (36.9 °C) 98.3 °F (36.8 °C) 98.4 °F (36.9 °C)   TempSrc: Oral Oral Oral Oral   SpO2: 97% 98% 96% 96%   Weight:       Height:

## 2022-05-29 NOTE — DISCHARGE INSTR - DIET

## 2022-05-29 NOTE — PROGRESS NOTES
Pt verbalizes understanding of written and verbal DC instructions, all questions and concerns addressed, wife at bedside, pt to call when ready to be wheeled down to vehicle

## 2022-05-29 NOTE — PROGRESS NOTES
Patient will be d/c home today. Patient has no needs identified at being d/c home today. Family will transport home. Patient has met all treatment goals / milestones. CM will continue to monitor and remain available for any needs that may occur. ASSESSMENT NOTE    Attending Physician: Irlanda Faith MD  Admit Problem:  Thrombocytopenia (HCC) [D69.6]  Acute deep vein thrombosis (DVT) of femoral vein of right lower extremity (HCC) [I82.411]  Acute deep vein thrombosis (DVT) of right lower extremity, unspecified vein ([facility] [address] [I82.401]  Date/Time of Admission: 5/24/2022  3:16 PM  Problem List:  Patient Active Problem List   Diagnosis    Prolymphocytic leukemia of T-cell ([facility] [address]    Severe obstructive sleep apnea    Essential hypertension, benign    Hypokalemia    Right shoulder pain    DDD (degenerative disc disease), cervical    DDD (degenerative disc disease), lumbar    Neutropenic fever (HCC)    Thrombocytopenia (HCC)    Body mass index (BMI) of 40.0-44.9 in adult ([facility] [address]    First degree hemorrhoids    Pure hypercholesterolemia    Admission for antineoplastic chemotherapy    Benign prostatic hyperplasia with nocturia    Impotence    Sepsis ([address]    Acute deep vein thrombosis (DVT) of right lower extremity (HCC)    Anxiety    Shortness of breath    Frailty       Service Assessment  Patient Orientation Alert and [address]   Cognition Alert   History Provided By Patient   Primary Caregiver Self   Accompanied By/Relationship     Support Systems Spouse/Significant Other,Children   Patient's Healthcare Decision Maker is: Named in [address] [place] [address]   PCP Verified by CM Yes KESHAWN Alcantara)   Last Visit to PCP Within last 6 months   Prior Functional Level Independent in ADLs/IADLs   Current Functional Level Independent in ADLs/IADLs   Can patient return to prior living arrangement Yes   Ability to make needs known: Good   Family able to assist with home care needs: Yes   Would you like for me to discuss the discharge plan with any other family members/significant others, and if so, who? No   Financial Resources     Community Resources     CM/SW Referral       Social/Functional History  Lives With Spouse   Type of [address] One level   Home Access     Entrance Stairs - Number of Steps     Entrance Stairs - Rails     Bathroom Shower/Tub     Bathroom Toilet     Bathroom Equipment     Bathroom Accessibility Accessible   Home Equipment     Receives Help From Family   ADL Assistance Independent   Bath     Dressing     Grooming     Feeding     Toileting     [address] Work     Driving     Shopping          Other (Comment)     Homemaking Responsibilities Yes   Meal [address] Paying/Finance Responsibility     [address] Management     Other (Comment)     Ambuation Assistance Independent   Transfer Assisstance Independent   Active  Yes   Patient's  Info     Mode of Transportation Car   Education     Occupation Retired   Type of Occupation       Discharge Planning   Type of [address] Prior To Admission [address] [facility]   DME     DME     DME Ordered? No   Potential Assistance Purchasing Medications No   Meds-to-Beds: Does the patient want to have any new prescriptions delivered to bedside prior to discharge? Type of Home Care Services None   Patient expects to be discharged to: House   Follow Up Appointment: Best Day/Time     One/Two Story Residence: One story   # of Interior Steps     Height of Each Step (in)     [employer] Available     History of Falls? Services At/After Discharge  Transition of Care Consult (CM Consult): Discharge Planning   Internal Home Health     Internal Hospice     Reason Outside Agency [address]     Partner SNF     Reason Why Partner SNF Not Chosen     Internal Comfort Care     Reason Outside [address] Discharge None    Resource Information Provided? No   Mode of Transport at Discharge Other (see comment) [facility] Transport Time of Discharge     Confirm Follow Up Transport Family     Condition of Participation: Discharge Planning  The plan for Transition of Care is related to the following treatment goals: Pt to obtain care to become medically stable and to return with a safe transition. The Patient and/or Patient Representative was provided with a Choice of Provider? Patient   Name of the Patient Representative who was provided with the Choice of Provider and agrees with the Discharge Plan? The Patient and/or Patient Representative Agree with the Discharge Plan? Yes   Freedom of Choice list was provided with basic dialogue that supports the individualized plan of care/goals, treatment preferences, and shares the quality data associated with the providers?  Yes     Documentation for Discharge Appeal  Discharge Appealed by     Date notified by [facility] of appeal request:     Time notified by [facility] of appeal request:     Detailed Notice of Discharge given to:     Date Notice of Discharge given:     Time Notice of Discharge given:     Date records sent to [facility]     Time records sent to Luis Sargent     Date Notified of Outcome     Time Notified of Outcome     Outcome of appeal           CHA Velazquez 05/29/22 12:12 PM

## 2022-06-02 PROBLEM — E83.42 HYPOMAGNESEMIA: Status: ACTIVE | Noted: 2022-01-01

## 2022-06-02 NOTE — PROGRESS NOTES
palpable mass. No hepatosplenomegaly. Neuro Grossly nonfocal with no obvious sensory or motor deficits. MSK Normal range of motion in general.  1-2+ BLE edema. Psych Appropriate mood. Mostly flat affect. Palliative Performance Scale (PPS): 60%      Labs:  Recent Results (from the past 96 hour(s))   PREPARE PLATELETS    Collection Time: 05/29/22  9:30 AM   Result Value Ref Range    Blood Bank Comment       OK NO DROP CMV NEG PER [facility] [place] NP NO CMV NEG AVAILABLE SB    Unit Number [id number]     Product Code Blood Bank [facility],LRIR[facility]     Unit Divison 00     Dispense Status Blood Bank TRANSFUSED    CBC    Collection Time: 05/29/22 12:14 PM   Result Value Ref Range    WBC 53.1 (HH) 4.3 - 11.1 K/uL    RBC 2.88 (L) 4.23 - 5.6 M/uL    Hemoglobin 9.1 (L) 13.6 - 17.2 g/dL    Hematocrit 26.0 (L) 41.1 - 50.3 %    MCV 90.3 79.6 - 97.8 FL    MCH 31.6 26.1 - 32.9 PG    MCHC 35.0 31.4 - 35.0 g/dL    RDW 16.9 (H) 11.9 - 14.6 %    Platelets 23 (LL) 574 - 450 K/uL    MPV Unable to calculate. Recommend adding IPF. 9.4 - 12.3 FL    nRBC 0.00 0.0 - 0.2 K/uL   CBC with Auto Differential    Collection Time: 06/02/22  7:33 AM   Result Value Ref Range    .2 (HH) 4.3 - 11.1 K/uL    RBC 3.06 (L) 4.23 - 5.6 M/uL    Hemoglobin 9.7 (L) 13.6 - 17.2 g/dL    Hematocrit 28.5 %    MCV 93.1 79.6 - 97.8 FL    MCH 31.7 26.1 - 32.9 PG    MCHC 34.0 31.4 - 35.0 g/dL    RDW 18.4 (H) 11.9 - 14.6 %    Platelets 5 (LL) 074 - 450 K/uL    MPV Unable to calculate. Recommend adding IPF.  9.4 - 12.3 FL    nRBC 0.00 0.0 - 0.2 K/uL    Seg Neutrophils 0 (L) 43 - 78 %    Lymphocytes 71 (H) 13 - 44 %    Monocytes 29 (H) 4.0 - 12.0 %    Eosinophils % 0 (L) 0.5 - 7.8 %    Basophils 0 0.0 - 2.0 %    Immature Granulocytes 0 0.0 - 5.0 %    Segs Absolute 0.0 (L) 1.7 - 8.2 K/UL    Absolute Lymph # 131.5 (H) 0.5 - 4.6 K/UL    Absolute Mono # 53.7 (H) 0.1 - 1.3 K/UL    Absolute Eos # 0.0 0.0 - 0.8 K/UL    Basophils Absolute 0.0 0.0 - 0.2 K/UL    Absolute Immature Granulocyte 0.0 0.0 - 0.5 K/UL    RBC Comment MODERATE  ANISOCYTOSIS + POIKILOCYTOSIS        RBC Comment OCCASIONAL  OVALOCYTES        RBC Comment OCCASIONAL  TEARDROP CELLS        RBC Comment       Corrected on 06/02 AT 0849: This is a correction to the medical record of the test results previously reported as MODERATE ANISOCYTOSIS + POIKILOCYTOSIS OCCASIONAL OVALOCYTES OCCASIONAL STOMATOCYTES    WBC Comment Result Confirmed By Smear      Platelet Comment DECREASED      Differential Type AUTOMATED     Comprehensive Metabolic Panel    Collection Time: 06/02/22  7:33 AM   Result Value Ref Range    Sodium 138 136 - 145 mmol/L    Potassium 4.4 3.5 - 5.1 mmol/L    Chloride 105 98 - 107 mmol/L    CO2 24 21 - 32 mmol/L    Anion Gap 9 7 - 16 mmol/L    Glucose 180 (H) 65 - 100 mg/dL    BUN 19 8 - 23 MG/DL    CREATININE 1.50 0.8 - 1.5 MG/DL    GFR African American >60 >60 ml/min/1.73m2    GFR Non- 50 (L) >60 ml/min/1.73m2    Calcium 9.8 8.3 - 10.4 MG/DL    Total Bilirubin 0.7 0.2 - 1.1 MG/DL    ALT 42 12 - 65 U/L    AST 47 (H) 15 - 37 U/L    Alk Phosphatase 133 50 - 136 U/L    Total Protein 6.2 (L) 6.3 - 8.2 g/dL    Albumin 3.7 3.2 - 4.6 g/dL    Globulin 2.5 2.3 - 3.5 g/dL    Albumin/Globulin Ratio 1.5 1.2 - 3.5     Magnesium    Collection Time: 06/02/22  7:33 AM   Result Value Ref Range    Magnesium 1.5 (L) 1.8 - 2.4 mg/dL       Imaging:  No valid procedures specified. ASSESSMENT:   Diagnosis Orders   1. Neoplastic malignant related fatigue     2. Prolymphocytic leukemia of T-cell ([facility] [address]  Limited Code   3. Advance care planning  Limited Code   4. Encounter for palliative care  Limited Code   5. Do not resuscitate  Limited Code         PLAN:  1. Fatigue: multifactorial- deconditioning, leukocytosis. Encouraged patient to fight for and invest in relationships through activities that do not require much energy. He and wife appreciative of this encouragement.   2. Nausea: Mild during visit today, but has not been an issue. 3. Pain: Denies today, but has oxycodone 5mg available if needed. 4. Anxiety/depression: Continue Zoloft 50mg. Continue Remeron 15mg HS. He has Ativan available as needed. Advanced Care Planning addressed for 30 minutes in addition to visit above:  Patient confirms that he wants to \"keep fighting\" and agreeable to proceed with chemotherapy today, with plans to discuss rotating chemotherapy regimen with Dr. Emma Lebron on Monday. Validated patient's concerns that there are difficult discussions and decisions to be made. He and his wife asked about his prognosis if he were to stop chemotherapy. Information provided by oncology NP and validated by myself. I remained with patient and his wife to discuss the concept of fighting. He is fighting for good quality time with the people that he loves. Encouraged patient to consider whether chemotherapy, time spent at the cancer center was helping him accomplish that. Asked patient to consider sharing with us when he felt that treatment was prolonging his suffering and taking away from his quality of life, at which point hospice would be the most appropriate care plan for him. He verbalizes understanding and agreement. Through conversation during last week's hospitalization, patient elected a DO NOT RESUSCITATE status. Patient has a healthcare power of  communicating these wishes. We completed a durable DO NOT RESUSCITATE order today. Original was given to patient/wife and a copy was made to be scanned into EMR. Will follow up in: 1 week or earlier if needed. I have reviewed the patient's controlled substance prescription history, as maintained in the [place] prescription monitoring program, so that the prescriptions(s) for a controlled substance can be given.   Last Date Reviewed: 6/2/22          KESHAWN Trujillo - CNP  Outpatient Palliative Care at the  [facility]. [phone]  Office : [phone]  Fax : [phone]

## 2022-06-02 NOTE — PROGRESS NOTES
Pt was seen today with Marcio Bell. Labs reviewed. His . This was dicussed with Dr. Anum Atkinson. Ok to proceed with chemo today. He will change to Pentostatin next week. He will also need 2 units of plt today. Pt says he wants to still do tx for now. He will think about this process and what he may want in the future and share with us at his next visit. He will have labs replacements on monday and see Tessa Phalen on Friday to discuss in depth more. He is feeling more fagitued but states he feels better after plt more than likely from the solumedrol he gets. Encouraged to call with any concerns or questions.

## 2022-06-02 NOTE — PROGRESS NOTES
[address]  Phone: [phone]           6/2/2022  Ofe Resendez  1955  [id number]          David Hudson is a 79 year old -American man who has returned to my clinic for a follow-up visit; he was initially referred to [facility] Dr. La Guzman. 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 He developed a left leg DVT in 6/2019 and was anti-coagulated with Xarelto, in 11/2019 he was switched to [facility] he took it till 11/2020. He relapsed in 9/2021 and has received 2 cycles of ICE and did not achieve remission; he then received 5 cycles of Bendamustine/Venetoclax and achieved a MS.    6/2/2022:  He is here today for follow up and consideration of C2 of Romidepsin/Venetoclax. Recent hospitalization with new acute DVT in RLE s/p IVCF placement. Pt was seen by [facility] in the hospital and changed to DNR status, but wishes to continue with cancer directed therapies. Discussed again at length today and pt states he wishes to \"continue fighting\" and does agree to have treatment today. He denies any GI or bowel complaints. He has been eating/drinking fairly well. His energy level is not good and he has had some difficulty getting around at home since being in the hospital.  He denies any current pain. He still has swelling in LEs R>L secondary to above. He denies any fevers or other infectious symptoms.                 ALLERGIES:     No known drug allergies.        FAMILY HISTORY:    No hematologic disorders.        SOCIAL HISTORY:    He is  and lives with his wife. He used to work as an .  He denies ever using any tobacco products.        PAST MEDICAL HISTORY:   Hyperlipidemia, Obstructive Sleep Apnea, DVT, GERD, BPH and T-Cell PLL.       ROS:  The patient complained of fatigue; all other systems negative.        PHYSICAL EXAM:   The patient was alert, awake and oriented, no acute distress was noted. Oral examination revealed a right buccal mucosal lesion. Lymph node examination did not reveal any adenopathy. Neck examination revealed a supple neck, no thyromegaly or masses were noted. Chest examination revealed normal vesicular breath sounds. Heart examination revealed S-1 and S-2 without any murmurs. Abdominal examination revealed a non-tender abdomen, bowel sounds were positive, no organomegaly could be appreciated. Examination of the extremities did not reveal any tenderness or erythema. Examination of the skin did not reveal any lesions. Medical problems and test results were reviewed with the patient today.        KPS:     90.         LABORATORY INVESTIGATIONS:    Hospital Outpatient Visit on 06/02/2022   Component Date Value Ref Range Status    WBC 06/02/2022 185.2* 4.3 - 11.1 K/uL Final    Comment: RESULTS CHECKED X 2  PERIPHERAL REVIEW TO FOLLOW  RESULTS VERIFIED, PHONED TO AND READ BACK BY  LAVON SCHERER RN, AT 0806 BY LMS [id number]      RBC 06/02/2022 3.06* 4.23 - 5.6 M/uL Final    Hemoglobin 06/02/2022 9.7* 13.6 - 17.2 g/dL Final    Hematocrit 06/02/2022 28.5  % Final    MCV 06/02/2022 93.1  79.6 - 97.8 FL Final    MCH 06/02/2022 31.7  26.1 - 32.9 PG Final    MCHC 06/02/2022 34.0  31.4 - 35.0 g/dL Final    RDW 06/02/2022 18.4* 11.9 - 14.6 % Final    Platelets 25/90/5186 5* 150 - 450 K/uL Final    Comment: RESULTS CHECKED X 2  RESULTS VERIFIED, PHONED TO AND READ BACK BY  LAVON SCHERER RN, AT 0806 BY LMS [id number]      MPV 06/02/2022 Unable to calculate. Recommend adding IPF.   9.4 - 12.3 FL Final    nRBC 06/02/2022 0.00  0.0 - 0.2 K/uL Final    **Note: Absolute NRBC parameter is now reported with Hemogram**    Seg Neutrophils 06/02/2022 0* 43 - 78 % Final    Lymphocytes 06/02/2022 71* 13 - 44 % Final    Monocytes 06/02/2022 29* 4.0 - 12.0 % Final    Eosinophils % 06/02/2022 0* 0.5 - 7.8 % Final    Basophils 06/02/2022 0  0.0 - 2.0 % Final    Immature Granulocytes 06/02/2022 0  0.0 - 5.0 % Final    Segs Absolute 06/02/2022 0.0* 1.7 - 8.2 K/UL Final    Absolute Lymph # 06/02/2022 131.5* 0.5 - 4.6 K/UL Final    Absolute Mono # 06/02/2022 53.7* 0.1 - 1.3 K/UL Final    Absolute Eos # 06/02/2022 0.0  0.0 - 0.8 K/UL Final    Basophils Absolute 06/02/2022 0.0  0.0 - 0.2 K/UL Final    Absolute Immature Granulocyte 06/02/2022 0.0  0.0 - 0.5 K/UL Final    RBC Comment 06/02/2022     Corrected                    Value:MODERATE  ANISOCYTOSIS + POIKILOCYTOSIS      RBC Comment 06/02/2022     Corrected                    Value:OCCASIONAL  OVALOCYTES      RBC Comment 06/02/2022     Corrected                    Value:OCCASIONAL  TEARDROP CELLS      RBC Comment 06/02/2022 Corrected on 06/02 AT 0849:  This is a correction to the medical record of the test results previously reported as MODERATE ANISOCYTOSIS + POIKILOCYTOSIS OCCASIONAL OVALOCYTES OCCASIONAL STOMATOCYTES   Corrected    WBC Comment 06/02/2022 Result Confirmed By Smear    Final    Comment: MARKED  ATYPICAL LYMPHOCYTES PRESENT  WBC'S APPEAR ABNORMAL/IMMATURE/ATYPICAL  OCCASIONAL  SMUDGE CELLS      Platelet Comment 23/41/9079 DECREASED    Final    Differential Type 06/02/2022 AUTOMATED    Final    Sodium 06/02/2022 138  136 - 145 mmol/L Final    Potassium 06/02/2022 4.4  3.5 - 5.1 mmol/L Final    Chloride 06/02/2022 105  98 - 107 mmol/L Final    CO2 06/02/2022 24  21 - 32 mmol/L Final    Anion Gap 06/02/2022 9  7 - 16 mmol/L Final    Glucose 06/02/2022 180* 65 - 100 mg/dL Final    BUN 06/02/2022 19  8 - 23 MG/DL Final    CREATININE 06/02/2022 1.50  0.8 - 1.5 MG/DL Final    GFR  06/02/2022 >60  >60 ml/min/1.73m2 Final    GFR Non- 06/02/2022 50* >60 ml/min/1.73m2 Final    Comment:      Estimated GFR is calculated using the Modification of Diet in Renal Disease (MDRD) Study equation, reported for both  Americans (GFRAA) and non- Americans (GFRNA), and normalized to 1.73m2 body surface area. The physician must decide which value applies to the patient. The MDRD study equation should only be used in individuals age 25 or older. It has not been validated for the following: pregnant women, patients with serious comorbid conditions,or on certain medications, or persons with extremes of body size, muscle mass, or nutritional status.  Calcium 06/02/2022 9.8  8.3 - 10.4 MG/DL Final    Total Bilirubin 06/02/2022 0.7  0.2 - 1.1 MG/DL Final    ALT 06/02/2022 42  12 - 65 U/L Final    AST 06/02/2022 47* 15 - 37 U/L Final    Alk Phosphatase 06/02/2022 133  50 - 136 U/L Final    Total Protein 06/02/2022 6.2* 6.3 - 8.2 g/dL Final    Albumin 06/02/2022 3.7  3.2 - 4.6 g/dL Final    Globulin 06/02/2022 2.5  2.3 - 3.5 g/dL Final    Albumin/Globulin Ratio 06/02/2022 1.5  1.2 - 3.5   Final    Magnesium 06/02/2022 1.5* 1.8 - 2.4 mg/dL Final       ASSESSMENT:    T-Cell Pro-Lymphocytic Leukemia. I spent a total of 42 minutes on the day of the visit, managing the care of this patient.     PLAN:  I will arrange for him to be transfused with PRBCs and Platelets. He should continue taking prophylactic Acyclovir and prophylactic Fluconazole, I will arrange for him to receive Romidepsin. Discussed with Dr. Sussy Hines via phone today and okay to proceed with tx with WBC 185k, Dr. Sussy Hines plans to change to Pentostatin and will discuss further with pt next week. Pt [facility].             RICKI Salazar  [facility] Hematology and Oncology  [address] [place] [address]  Office : [phone]  Fax : [phone]

## 2022-06-02 NOTE — ACP (ADVANCE CARE PLANNING)
Patient confirms that he wants to [facility]" and agreeable to proceed with chemotherapy today, with plans to discuss rotating chemotherapy regimen with Dr. Anum Atkinson on Monday. Validated patient's concerns that there are difficult discussions and decisions to be made. He and his wife asked about his prognosis if he were to stop chemotherapy. Information provided by oncology NP and validated by myself. I remained with patient and his wife to discuss the concept of fighting. He is fighting for good quality time with the people that he loves. Encouraged patient to consider whether chemotherapy, time spent at the cancer center was helping him accomplish that. Asked patient to consider sharing with us when he felt that treatment was prolonging his suffering and taking away from his quality of life, at which point hospice would be the most appropriate care plan for him. He verbalizes understanding and agreement. Through conversation during last week's hospitalization, patient elected a DO NOT RESUSCITATE status. Patient has a healthcare power of  communicating these wishes. We completed a durable DO NOT RESUSCITATE order today. Original was given to patient/wife and a copy was made to be scanned into EMR.

## 2022-06-02 NOTE — PATIENT INSTRUCTIONS
Patient Instructions from Today's Visit    Reason for Visit:  Pre chemo    Diagnosis Information:  https://www.[employer]/. net/about-us/asco-answers-patient-education-materials/jqcy-mrbudqn-jptw-sheets  Patient was educated and given handouts published by [employer] entitled [employer] Answers Fact Sheets about their diagnosis of  during todays office visit. Plan: We will give chemo today if this is what you would like. Dr. Nevaeh Mattson said he will change to pentostatin next week. I will add him to see you on Monday. Follow Up: As planned    Recent Lab Results:  Hospital Outpatient Visit on 06/02/2022   Component Date Value Ref Range Status    WBC 06/02/2022 185.2* 4.3 - 11.1 K/uL Final    Comment: RESULTS CHECKED X 2  PERIPHERAL REVIEW TO FOLLOW  RESULTS VERIFIED, PHONED TO AND READ BACK BY  LAVON SCHERER RN, AT 0806 BY [facility] [id number]      RBC 06/02/2022 3.06* 4.23 - 5.6 M/uL Final    Hemoglobin 06/02/2022 9.7* 13.6 - 17.2 g/dL Final    Hematocrit 06/02/2022 28.5  % Final    MCV 06/02/2022 93.1  79.6 - 97.8 FL Final    MCH 06/02/2022 31.7  26.1 - 32.9 PG Final    MCHC 06/02/2022 34.0  31.4 - 35.0 g/dL Final    RDW 06/02/2022 18.4* 11.9 - 14.6 % Final    Platelets 45/89/0364 5* 150 - 450 K/uL Final    Comment: RESULTS CHECKED X 2  RESULTS VERIFIED, PHONED TO AND READ BACK BY  LAVON SCHERER RN, AT 0806 BY [facility] [id number]      MPV 06/02/2022 Unable to calculate. Recommend adding IPF.   9.4 - 12.3 FL Final    nRBC 06/02/2022 0.00  0.0 - 0.2 K/uL Final    **Note: Absolute NRBC parameter is now reported with Hemogram**    Seg Neutrophils 06/02/2022 0* 43 - 78 % Final    Lymphocytes 06/02/2022 71* 13 - 44 % Final    Monocytes 06/02/2022 29* 4.0 - 12.0 % Final    Eosinophils % 06/02/2022 0* 0.5 - 7.8 % Final    Basophils 06/02/2022 0  0.0 - 2.0 % Final    Immature Granulocytes 06/02/2022 0  0.0 - 5.0 % Final    Segs Absolute 06/02/2022 0.0* 1.7 - 8.2 K/UL Final    Absolute Lymph # 06/02/2022 131.5* 0.5 - 4.6 K/UL Final    Absolute Mono # 06/02/2022 53.7* 0.1 - 1.3 K/UL Final    Absolute Eos # 06/02/2022 0.0  0.0 - 0.8 K/UL Final    Basophils Absolute 06/02/2022 0.0  0.0 - 0.2 K/UL Final    Absolute Immature Granulocyte 06/02/2022 0.0  0.0 - 0.5 K/UL Final    RBC Comment 06/02/2022     Final                    Value:MODERATE  ANISOCYTOSIS + POIKILOCYTOSIS      RBC Comment 06/02/2022     Final                    Value:OCCASIONAL  OVALOCYTES      RBC Comment 06/02/2022     Final                    Value:OCCASIONAL  STOMATOCYTES      WBC Comment 06/02/2022 Result Confirmed By Smear    Final    Comment: MARKED  ATYPICAL LYMPHOCYTES PRESENT  WBC'S APPEAR ABNORMAL/IMMATURE/ATYPICAL  OCCASIONAL  SMUDGE CELLS      Platelet Comment 56/10/7015 DECREASED    Final    Differential Type 06/02/2022 AUTOMATED    Final    Sodium 06/02/2022 138  136 - 145 mmol/L Final    Potassium 06/02/2022 4.4  3.5 - 5.1 mmol/L Final    Chloride 06/02/2022 105  98 - 107 mmol/L Final    CO2 06/02/2022 24  21 - 32 mmol/L Final    Anion Gap 06/02/2022 9  7 - 16 mmol/L Final    Glucose 06/02/2022 180* 65 - 100 mg/dL Final    BUN 06/02/2022 19  8 - 23 MG/DL Final    CREATININE 06/02/2022 1.50  0.8 - 1.5 MG/DL Final    GFR  06/02/2022 >60  >60 ml/min/1.73m2 Final    GFR Non- 06/02/2022 50* >60 ml/min/1.73m2 Final    Comment:      Estimated GFR is calculated using the Modification of Diet in Renal Disease (MDRD) Study equation, reported for both  Americans (GFRAA) and non- Americans (GFRNA), and normalized to 1.73m2 body surface area. The physician must decide which value applies to the patient. The MDRD study equation should only be used in individuals age 25 or older. It has not been validated for the following: pregnant women, patients with serious comorbid conditions,or on certain medications, or persons with extremes of body size, muscle mass, or nutritional status.       Calcium 06/02/2022 9.8  8.3 - 10.4 MG/DL Final    Total Bilirubin 06/02/2022 0.7  0.2 - 1.1 MG/DL Final    ALT 06/02/2022 42  12 - 65 U/L Final    AST 06/02/2022 47* 15 - 37 U/L Final    Alk Phosphatase 06/02/2022 133  50 - 136 U/L Final    Total Protein 06/02/2022 6.2* 6.3 - 8.2 g/dL Final    Albumin 06/02/2022 3.7  3.2 - 4.6 g/dL Final    Globulin 06/02/2022 2.5  2.3 - 3.5 g/dL Final    Albumin/Globulin Ratio 06/02/2022 1.5  1.2 - 3.5   Final    Magnesium 06/02/2022 1.5* 1.8 - 2.4 mg/dL Final       Treatment Summary has been discussed and given to patient: na        -------------------------------------------------------------------------------------------------------------------  Please call our office at [phone] if you have any  of the following symptoms:   · Fever of 100.5 or greater  · Chills  · Shortness of breath  · Swelling or pain in one leg    After office hours an answering service is available and will contact a provider for emergencies or if you are experiencing any of the above symptoms.  Patient does express an interest in My Chart. My Chart log in information explained on the after visit summary printout at the [facility] [address] desk.     Josefina Norton RN, BSN, [facility]  Hematology Nurse Navigator  phone: [phone]  cell: [phone]  fax: [phone]  Email: [email or web address]

## 2022-06-02 NOTE — PROGRESS NOTES
_ Port accessed per protocol with a 0.75 ocampo needle. Flushed with normal saline 10cc. Positive blood return. Labs drawn per order. Flushed with 10cc of normal saline and discharged via wheel chair      hep locked no.    Still accessed yes   Dressing applied yes

## 2022-06-02 NOTE — PROGRESS NOTES
bp 88/53 post NS bolus. Reported to Cale Meek RN. Pt stated he took lasix this am without much results. Pt instructed to take BP and temp at home and if BP is low 80s/50s and/or temp 100.5 or higher to go to the ER per NP. Encouraged not to take fever reducing agents that might mask a fever. Pt and wife verbalized understanding. Port flushed and left accessed for appointment on 6/3 @ 1330.

## 2022-06-02 NOTE — PROGRESS NOTES
Arrived to the [facility]. ISTODAX, IVF bolus x2, IV MAG, and 2 units platelets infusions completed. Patient tolerated well. Any issues or concerns during appointment: yes. Pt hypotensive, tachycardic in office visit- 500 ns infusion given and bp/hr improved. Pt became hypotensive after 2nd unit platelets, called NP, order rcvd for ns bolus, currently infusing, RN to notify NP of BP after infusion. T-max 99.2, and pt reported low uop during visit, NP made aware. Pt denied symptoms of infusion reaction. Patient aware of next infusion appointment on 6/6/2022 at 0830. Patient aware of next lab and [facility] office visit on 6/7/2022 at [id number]. Patient instructed to call provider with temperature of 100.4 or greater or nausea/vomiting/ diarrhea or pain not controlled by medications  Report given to [facility].

## 2022-06-03 PROBLEM — E87.5 HYPERKALEMIA: Status: ACTIVE | Noted: 2022-01-01

## 2022-06-03 PROBLEM — G47.33 SEVERE OBSTRUCTIVE SLEEP APNEA: Status: ACTIVE | Noted: 2017-01-12

## 2022-06-03 PROBLEM — E87.1 HYPONATREMIA: Status: ACTIVE | Noted: 2022-01-01

## 2022-06-03 PROBLEM — E83.39 HYPOPHOSPHATEMIA: Status: ACTIVE | Noted: 2022-01-01

## 2022-06-03 PROBLEM — N17.9 AKI (ACUTE KIDNEY INJURY) (HCC): Status: ACTIVE | Noted: 2022-01-01

## 2022-06-03 PROBLEM — D64.9 ANEMIA: Status: ACTIVE | Noted: 2022-01-01

## 2022-06-03 NOTE — ED NOTES
TRANSFER - OUT REPORT:    Verbal report given to Griselda Canton, RN on Mari Young  being transferred to [facility] for routine progression of patient care       Report consisted of patient's Situation, Background, Assessment and   Recommendations(SBAR). Information from the following report(s) ED SBAR was reviewed with the receiving nurse. Lines:   Single Lumen Implantable Port Right Subclavian (Active)       Peripheral IV 06/02/22 Left Antecubital (Active)        Opportunity for questions and clarification was provided.       Patient transported with:  Registered Nurse       Luke Valles RN  06/03/22 8719

## 2022-06-03 NOTE — FLOWSHEET NOTE
06/03/22 0209   Dual Clinician Skin Assessment   Dual Skin Assessment (4 Eyes) WDL   Second Clinical  (First and Last Name) Héctor Zhao RN   Skin Integumentary    Skin Integumentary (WDL) X   Skin Condition/Temp Dry;Warm;Flaky

## 2022-06-03 NOTE — CONSULTS
Inpatient Hematology / Oncology Consult Note    Reason for Consult:  Anemia [D64.9]  Thrombocytopenia ([address] [D69.6]  Septicemia ([address] [A41.9]  Acute kidney injury ([address] [N17.9]  Anemia, unspecified type [D64.9]  Referring Physician:  Cullen Blood DO    History of Present Illness:  Mr. Meg Acosta is a 79 y.o. male admitted on 6/2/2022 with a primary diagnosis of The primary encounter diagnosis was Septicemia ([address] Diagnoses of Thrombocytopenia ([address] Acute kidney injury ([address] and Anemia, unspecified type were also pertinent to this visit. .      71-year-old gentleman with history of prolymphocytic leukemia, status post multiple lines of therapy,  RLE DVT s/p IVC filter, with more recent discussions in which he expressed preference to continue seeking out leukemia directed therapy, recently started pentostatin therapy with cycle 1 day 1 on 6/2/2022, at which time he also received platelet transfusion. He subsequently presented to ED with low BP, leukocytosis which appears improved, and ELIZABETH. On evaluation today reports feeling better. Hematology consulted. Review of Systems:  As mentioned above. All other systems reviewed in full and are unremarkable.        Allergies   Allergen Reactions    Alemtuzumab Other (See Comments)     Rigors     Past Medical History:   Diagnosis Date    Back pain     occassionally    Cervical radiculopathy     Claustrophobia     DVT (deep venous thrombosis) ([address] 06/2019    currently treated with Xarelto- started on 5/30/2019    GERD (gastroesophageal reflux disease)     H/O seasonal allergies     Hyperlipidemia     Impotence     Insomnia     Lateral epicondylitis     Leukemia (HCC)     CHEMO    Obesity     BMI 36.6    Sleep apnea     noncomplaint with CPAP     Past Surgical History:   Procedure Laterality Date    CIRCUMCISION      COLONOSCOPY  2017    Due in 2027    IR IVC FILTER PLACEMENT W IMAGING  5/25/2022    IR IVC FILTER PLACEMENT W IMAGING 5/25/2022 [facility]    IR PORT PLACEMENT EQUAL OR GREATER THAN 5 YEARS  11/23/2021    IR PORT PLACEMENT EQUAL OR GREATER THAN 5 YEARS  6/19/2019    IR PORT PLACEMENT EQUAL OR GREATER THAN 5 YEARS  6/19/2019    IR PORT PLACEMENT EQUAL OR GREATER THAN 5 YEARS 6/19/2019 [facility]    IR PORT PLACEMENT EQUAL OR GREATER THAN 5 YEARS  11/23/2021    IR PORT PLACEMENT EQUAL OR GREATER THAN 5 YEARS 11/23/2021 [facility]    IR REMOVE TUNNELED VAD W PORT  12/14/2020    ORTHOPEDIC SURGERY Right     r wrist    VASCULAR SURGERY      WISDOM TOOTH EXTRACTION       Family History   Problem Relation Age of Onset    Hypertension Brother     Hypertension Brother     No Known Problems Son     Cancer Father 76        breast    Cancer Mother 80        pancreatic    No Known Problems Daughter      Social History     Socioeconomic History    Marital status:      Spouse name: Not on file    Number of children: Not on file    Years of education: Not on file    Highest education level: Not on file   Occupational History    Not on file   Tobacco Use    Smoking status: Never Smoker    Smokeless tobacco: Never Used   Substance and Sexual Activity    Alcohol use: Not Currently     Alcohol/week: 0.0 standard drinks    Drug use: No    Sexual activity: Not on file   Other Topics Concern    Not on file   Social History Narrative    Not on file     Social Determinants of Health     Financial Resource Strain:     Difficulty of Paying Living Expenses: Not on file   Food Insecurity:     Worried About Running Out of Food in the Last Year: Not on file    Ann of Food in the Last Year: Not on file   Transportation Needs:     Lack of Transportation (Medical): Not on file    Lack of Transportation (Non-Medical):  Not on file   Physical Activity:     Days of Exercise per Week: Not on file    Minutes of Exercise per Session: Not on file   Stress:     Feeling of Stress : Not on file   Social Connections:     Frequency of Communication with Friends and Family: Not on file    Frequency of Social Gatherings with Friends and Family: Not on file    Attends [employer] Services: Not on file    Active Member of Clubs or Organizations: Not on file    Attends Club or Organization Meetings: Not on file    Marital Status: Not on file   Intimate Partner Violence:     Fear of Current or Ex-Partner: Not on file    Emotionally Abused: Not on file    Physically Abused: Not on file    Sexually Abused: Not on file   Housing Stability:     Unable to Pay for Housing in the Last Year: Not on file    Number of [place] in the Last Year: Not on file    Unstable Housing in the Last Year: Not on file     Current Facility-Administered Medications   Medication Dose Route Frequency Provider Last Rate Last Admin    acyclovir (ZOVIRAX) tablet 400 mg  400 mg Oral BID Girish Singer MD   400 mg at 06/03/22 1053    fluconazole (DIFLUCAN) tablet 200 mg  200 mg Oral Daily Girish Singer MD   200 mg at 06/03/22 1048    furosemide (LASIX) tablet 20 mg  20 mg Oral Daily Girish Singer MD   20 mg at 06/03/22 1047    LORazepam (ATIVAN) tablet 1 mg  1 mg Oral BID PRN Girish Singer MD        atorvastatin (LIPITOR) tablet 10 mg  10 mg Oral Daily Girish Singer MD        mirtazapine (REMERON) tablet 15 mg  15 mg Oral Nightly Girish Singer MD        pantoprazole (PROTONIX) tablet 40 mg  40 mg Oral QAM AC Girish Singer MD   40 mg at 06/03/22 0800    potassium chloride (KLOR-CON M) extended release tablet 20 mEq  20 mEq Oral Daily Girish Singer MD        Venetoclax TABS 100 mg- pt supplied (Patient Supplied)  100 mg Oral Every Other Day Girish Singer MD        gabapentin (NEURONTIN) capsule 100 mg  100 mg Oral TID Girish Singer MD   100 mg at 06/03/22 1050    oxyCODONE (ROXICODONE) immediate release tablet 5 mg  5 mg Oral Q8H PRN Girish Singer MD        sertraline (ZOLOFT) tablet 50 mg  50 mg Oral Daily Rosa Maria Mcfadden MD   50 mg at 22 1050    sodium chloride flush 0.9 % injection 5-40 mL  5-40 mL IntraVENous 2 times per day Rosa Maria Mcfadden MD   10 mL at 22 1054    sodium chloride flush 0.9 % injection 5-40 mL  5-40 mL IntraVENous PRN Rosa Maria Mcfadden MD        0.9 % sodium chloride infusion   IntraVENous PRN Rosa Maria Mcfadden MD        ondansetron (ZOFRAN-ODT) disintegrating tablet 4 mg  4 mg Oral Q8H PRN Rosa Maria Mcfadden MD        Or    [facility]etron [facility]) injection 4 mg  4 mg IntraVENous Q6H PRN Rosa Maria Mcfadden MD        polyethylene glycol Lisa Fess) packet 17 g  17 g Oral Daily PRN Rosa Maria Mcfadden MD        acetaminophen (TYLENOL) tablet 650 mg  650 mg Oral Q6H PRN Rosa Maria Mcfadden MD        Or    acetaminophen (TYLENOL) suppository 650 mg  650 mg Rectal Q6H PRN Rosa Maria Mcfadden MD        magnesium oxide (MAG-OX) tablet 400 mg  400 mg Oral Daily Trev Lombardi, DO   400 mg at 22 1050    insulin regular (HUMULIN R;NOVOLIN R) injection 10 Units  10 Units IntraVENous Once [employer], DO        sodium zirconium cyclosilicate (LOKELMA) oral suspension 10 g  10 g Oral TID [employer], DO           OBJECTIVE:  Patient Vitals for the past 8 hrs:   BP Temp Temp src Pulse Resp SpO2   22 1315 -- -- -- 87 26 100 %   22 1141 (!) 137/105 97.2 °F (36.2 °C) Oral 84 20 100 %   22 0809 (!) 133/92 97.3 °F (36.3 °C) Oral 87 -- 99 %     Temp (24hrs), Av.1 °F (36.7 °C), Min:97.2 °F (36.2 °C), Max:99.2 °F (37.3 °C)    No intake/output data recorded. Physical Exam:  Constitutional: Well developed, well nourished male in no acute distress, sitting comfortably in the hospital bed. HEENT: Normocephalic and atraumatic. Oropharynx is clear, mucous membranes are moist.  Pupils are equal, round, and reactive to light. Extraocular muscles are intact. Sclerae anicteric. Neck supple without JVD. No thyromegaly present.     Lymph node   No palpable submandibular, cervical, supraclavicular, axillary or inguinal lymph nodes. Skin Warm and dry. No bruising and no rash noted. No erythema. No pallor. Respiratory Lungs are clear to auscultation bilaterally without wheezes, rales or rhonchi, normal air exchange without accessory muscle use. CVS Normal rate, regular rhythm and normal S1 and S2. No murmurs, gallops, or rubs. Abdomen Soft, nontender and nondistended, normoactive bowel sounds. No palpable mass. No hepatosplenomegaly. Neuro Grossly nonfocal with no obvious sensory or motor deficits. MSK Normal range of motion in general.  No edema and no tenderness. Psych Appropriate mood and affect.         Labs:    Recent Results (from the past 24 hour(s))   CBC with Auto Differential    Collection Time: 06/02/22 11:11 PM   Result Value Ref Range    .3 (HH) 4.3 - 11.1 K/uL    RBC 2.45 (L) 4.23 - 5.6 M/uL    Hemoglobin 7.7 (L) 13.6 - 17.2 g/dL    Hematocrit 23.0 (L) 41.1 - 50.3 %    MCV 93.9 79.6 - 97.8 FL    MCH 31.4 26.1 - 32.9 PG    MCHC 33.5 31.4 - 35.0 g/dL    RDW 18.5 (H) 11.9 - 14.6 %    Platelets 34 (L) 036 - 450 K/uL    MPV 9.6 9.4 - 12.3 FL    nRBC 0.02 0.0 - 0.2 K/uL    Seg Neutrophils 0 (L) 43 - 78 %    Lymphocytes 89 (H) 13 - 44 %    Monocytes 11 4.0 - 12.0 %    Eosinophils % 0 (L) 0.5 - 7.8 %    Basophils 0 0.0 - 2.0 %    Immature Granulocytes 0 0.0 - 5.0 %    Segs Absolute 0.0 (L) 1.7 - 8.2 K/UL    Absolute Lymph # 134.7 (H) 0.5 - 4.6 K/UL    Absolute Mono # 16.6 (H) 0.1 - 1.3 K/UL    Absolute Eos # 0.0 0.0 - 0.8 K/UL    Basophils Absolute 0.0 0.0 - 0.2 K/UL    Absolute Immature Granulocyte 0.0 0.0 - 0.5 K/UL    RBC Comment MODERATE  ANISOCYTOSIS + POIKILOCYTOSIS        RBC Comment OCCASIONAL  TEARDROP CELLS        WBC Comment Result Confirmed By Smear      Platelet Comment DECREASED      Differential Type AUTOMATED     Comprehensive Metabolic Panel    Collection Time: 06/02/22 11:11 PM   Result Value Ref Range    Sodium 134 (L) 136 - 145 mmol/L    Potassium 7.2 (HH) 3.5 - 5.1 mmol/L    Chloride 103 98 - 107 mmol/L    CO2 24 21 - 32 mmol/L    Anion Gap 7 7 - 16 mmol/L    Glucose 297 (H) 65 - 100 mg/dL    BUN 31 (H) 8 - 23 MG/DL    CREATININE 2.03 (H) 0.8 - 1.5 MG/DL    GFR  42 (L) >60 ml/min/1.73m2    GFR Non- 35 (L) >60 ml/min/1.73m2    Calcium 8.8 8.3 - 10.4 MG/DL    Total Bilirubin 0.7 0.2 - 1.1 MG/DL    ALT 36 12 - 65 U/L    AST 92 (H) 15 - 37 U/L    Alk Phosphatase 113 50 - 136 U/L    Total Protein 5.6 (L) 6.3 - 8.2 g/dL    Albumin 3.2 3.2 - 4.6 g/dL    Globulin 2.4 2.3 - 3.5 g/dL    Albumin/Globulin Ratio 1.3 1.2 - 3.5     Procalcitonin    Collection Time: 06/02/22 11:11 PM   Result Value Ref Range    Procalcitonin 0.69 (H) 0.00 - 0.49 ng/mL   Lactic Acid    Collection Time: 06/02/22 11:13 PM   Result Value Ref Range    Lactic Acid, Plasma 2.5 (H) 0.4 - 2.0 MMOL/L   COVID-19, Rapid    Collection Time: 06/02/22 11:45 PM    Specimen: Nasopharyngeal   Result Value Ref Range    Source NASAL SWAB      SARS-CoV-2, Rapid Not detected NOTD     Rapid influenza A/B antigens    Collection Time: 06/02/22 11:45 PM    Specimen: Nasal Washing   Result Value Ref Range    Influenza A Ag Negative NEG      Influenza B Ag Negative NEG      Source Nasopharyngeal     Culture, Urine    Collection Time: 06/03/22  1:05 AM    Specimen: URINE-CLEAN CATCH    URINE   Result Value Ref Range    Special Requests NO SPECIAL REQUESTS      Culture        No growth after short period of incubation. Further results to follow after overnight incubation.    Basic Metabolic Panel w/ Reflex to MG    Collection Time: 06/03/22  3:05 AM   Result Value Ref Range    Sodium 132 (L) 136 - 145 mmol/L    Potassium 6.3 (HH) 3.5 - 5.1 mmol/L    Chloride 99 98 - 107 mmol/L    CO2 22 21 - 32 mmol/L    Anion Gap 11 7 - 16 mmol/L    Glucose 323 (H) 65 - 100 mg/dL    BUN 37 (H) 8 - 23 MG/DL    CREATININE 1.96 (H) 0.8 - 1.5 MG/DL    GFR  44 (L) >60 ml/min/1.73m2    GFR Non- 36 (L) >60 ml/min/1.73m2    Calcium 8.7 8.3 - 10.4 MG/DL   Lactic Acid    Collection Time: 06/03/22  3:05 AM   Result Value Ref Range    Lactic Acid, Plasma 2.7 (H) 0.4 - 2.0 MMOL/L   Renal Function Panel    Collection Time: 06/03/22  3:05 AM   Result Value Ref Range    CO2 21 21 - 32 mmol/L    Anion Gap Cannot be calculated 7 - 16 mmol/L    Glucose 286 (H) 65 - 100 mg/dL    BUN 31 (H) 8 - 23 MG/DL    CREATININE 2.08 (H) 0.8 - 1.5 MG/DL    GFR  41 (L) >60 ml/min/1.73m2    GFR Non- 34 (L) >60 ml/min/1.73m2    Calcium 9.2 8.3 - 10.4 MG/DL    Phosphorus 2.0 (L) 2.3 - 3.7 MG/DL    Albumin 3.2 3.2 - 4.6 g/dL   CBC with Auto Differential    Collection Time: 06/03/22  3:05 AM   Result Value Ref Range    .1 (HH) 4.3 - 11.1 K/uL    RBC 2.52 (L) 4.23 - 5.6 M/uL    Hemoglobin 7.9 (L) 13.6 - 17.2 g/dL    Hematocrit 24.0 (L) 41.1 - 50.3 %    MCV 95.2 79.6 - 97.8 FL    MCH 31.3 26.1 - 32.9 PG    MCHC 32.9 31.4 - 35.0 g/dL    RDW 18.6 (H) 11.9 - 14.6 %    Platelets 32 (L) 805 - 450 K/uL    MPV 10.0 9.4 - 12.3 FL    nRBC 0.00 0.0 - 0.2 K/uL    Seg Neutrophils 0 (L) 43 - 78 %    Lymphocytes 68 (H) 13 - 44 %    Monocytes 32 (H) 4.0 - 12.0 %    Eosinophils % 0 (L) 0.5 - 7.8 %    Basophils 0 0.0 - 2.0 %    Immature Granulocytes 0 0.0 - 5.0 %    Segs Absolute 0.0 (L) 1.7 - 8.2 K/UL    Absolute Lymph # 81.7 (H) 0.5 - 4.6 K/UL    Absolute Mono # 38.4 (H) 0.1 - 1.3 K/UL    Absolute Eos # 0.0 0.0 - 0.8 K/UL    Basophils Absolute 0.0 0.0 - 0.2 K/UL    Absolute Immature Granulocyte 0.0 0.0 - 0.5 K/UL    RBC Comment SLIGHT  ANISOCYTOSIS + POIKILOCYTOSIS        RBC Comment SLIGHT  POLYCHROMASIA        RBC Comment SLIGHT  MACROCYTOSIS        RBC Comment SLIGHT  HYPOCHROMIA        WBC Comment Result Confirmed By Smear      Platelet Comment MARKED      Differential Type AUTOMATED         Imaging:  Reviewed    ASSESSMENT & PLAN:  59-year-old gentleman with history of prolymphocytic leukemia, status post multiple lines of therapy,  RLE DVT s/p IVC filter, with more recent discussions in which he expressed preference to continue seeking out leukemia directed therapy, recently started pentostatin therapy with cycle 1 day 1 on 6/2/2022, at which time he also received platelet transfusion. He subsequently presented to ED with low BP, leukocytosis which appears improved, and ELIZABETH. On evaluation today reports feeling better. Hematology consulted. Case discussed with primary service. Monitor closely for tumor lysis. Hyperkalemia being corrected, ELIZABETH improved. Transfuse blood products as needed (typically for hemoglobin less than 7 and platelet count less than 10,000). Patient will be seen in outpatient hematology within a week of discharge. Lab studies and imaging studies were personally reviewed. Pertinent old records were reviewed.      Thank you for allowing us to participate in the care of Mr. Christos Jessica MD  [address] [place] [address] Hematology Oncology  [address] [place] [address]  Office : [phone]  Fax : [phone]

## 2022-06-03 NOTE — ED PROVIDER NOTES
[employer] Emergency Department Provider Note                   PCP:                KESHAWN Bales NP               Age: 79 y.o. Sex: male       ICD-10-CM    1. Septicemia ([facility] [address]  A41.9    2. Thrombocytopenia ([facility] [address]  D69.6    3. Acute kidney injury ([facility] [address]  N17.9    4. Anemia, unspecified type  D64.9        DISPOSITION Decision To Admit 06/03/2022 12:37:34 AM       New Prescriptions    No medications on file       Orders Placed This Encounter   Procedures    COVID-19, Rapid    Rapid influenza A/B antigens    Culture, Blood 1    Culture, Blood 1    Culture, Urine    XR CHEST (2 VW)    CBC with Auto Differential    Comprehensive Metabolic Panel    Lactic Acid    Procalcitonin    Lactic Acid    POCT Urine Dipstick    EKG 12 Lead    Insert peripheral IV        MDM  Number of Diagnoses or Management Options  Acute kidney injury ([facility] [address]): new, needed workup  Anemia, unspecified type: established, worsening  Septicemia ([facility] [address]): new, needed workup  Thrombocytopenia ([facility] [address]): established, worsening  Diagnosis management comments: 80-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 Other infectious work-up does not reveal a source.   Patient will be continued on IV fluids  Will start IV antibiotics    Hospitalist consulted for admission       Amount and/or Complexity of Data Reviewed  Clinical lab tests: ordered and reviewed  Tests in the radiology section of CPT®: ordered and reviewed  Tests in the medicine section of CPT®: ordered and reviewed  Decide to obtain previous medical records or to obtain history from someone other than the patient: yes  Obtain history from someone other than the patient: yes  Review and summarize past medical records: yes  Discuss the patient with other providers: yes  Independent visualization of images, tracings, or specimens: yes    Risk of Complications, Morbidity, and/or Mortality  Presenting problems: high  Diagnostic procedures: high  Management options: high  General comments: Elements of this note have been dictated via voice recognition software. Text and phrases may be limited by the accuracy of the software. The chart has been reviewed, but errors may still be present. Patient Progress  Patient progress: stable       Katie Beasley is a 79 y.o. male who presents to the Emergency Department with chief complaint of    Chief Complaint   Patient presents with    Hypotension      80-year-old male patient presents from home with concerns over low blood pressure and generalized fatigue  Patient is a DNR and is currently undergoing treatment for prolymphocytic T-cell leukemia    Patient recently diagnosed with DVT and had IVC filter placed    Patient reports that he was at the cancer center today where he received chemo and a platelet transfusion. Blood pressures were noted to be low at that time and patient was told that if these low blood pressures persist that he should come to the emergency department. They raise some concern about the possibility of \"infection. \"    Patient's main complaint really is severe fatigue which has been an ongoing problem for the patient. Patient is a DNR    The history is provided by the patient and the spouse.    Fatigue  Severity:  Severe  Onset quality:  Gradual  Timing:  Constant  Progression:  Waxing and waning  Chronicity:  Chronic  Context comment:  Patient undergoing chemotherapy for prolymphocytic T-cell leukemia  Relieved by:  Nothing  Worsened by:  Standing and activity  Ineffective treatments:  Rest, sleep and drinking fluids  Associated symptoms: arthralgias and nausea    Associated symptoms: no abdominal pain, no chest pain, no cough, no diarrhea, no drooling, no dysphagia, no dysuria, no fever, no headaches, no myalgias, no seizures, no shortness of breath, no stroke symptoms, no syncope, no urgency and no vomiting        All other systems reviewed and are negative. Review of Systems   Constitutional: Positive for fatigue. Negative for chills and fever. HENT: Positive for rhinorrhea. Negative for drooling, hearing loss, sneezing and tinnitus. Eyes: Negative for discharge and itching. Respiratory: Negative for cough, chest tightness and shortness of breath. Cardiovascular: Negative for chest pain, palpitations and syncope. Gastrointestinal: Positive for nausea. Negative for abdominal pain, diarrhea, dysphagia and vomiting. Endocrine: Negative for cold intolerance, heat intolerance, polydipsia, polyphagia and polyuria. Genitourinary: Negative for dysuria, hematuria and urgency. Musculoskeletal: Positive for arthralgias. Negative for myalgias. Skin: Negative for rash and wound. Allergic/Immunologic: Negative for immunocompromised state. Neurological: Positive for light-headedness. Negative for seizures, syncope and headaches. Hematological: Negative. Psychiatric/Behavioral: Positive for dysphoric mood. Negative for self-injury. The patient is not nervous/anxious. All other systems reviewed and are negative.       Past Medical History:   Diagnosis Date    Back pain     occassionally    Cervical radiculopathy     Claustrophobia     DVT (deep venous thrombosis) ([facility] [address] 06/2019    currently treated with Xarelto- started on 5/30/2019    GERD (gastroesophageal reflux disease)     H/O seasonal allergies     Hyperlipidemia     Impotence     Insomnia     Lateral epicondylitis     Leukemia (HCC)     CHEMO    Obesity     BMI 36.6    Sleep apnea     noncomplaint with CPAP        Past Surgical History:   Procedure Laterality Date    CIRCUMCISION      COLONOSCOPY  2017    Due in 2027    IR IVC FILTER PLACEMENT W IMAGING  5/25/2022    IR IVC FILTER PLACEMENT W IMAGING 5/25/2022 [facility] RADIOLOGY SPECIALS    IR PORT PLACEMENT EQUAL OR GREATER THAN 5 YEARS  11/23/2021    IR PORT PLACEMENT EQUAL OR GREATER THAN 5 YEARS  6/19/2019    IR PORT PLACEMENT EQUAL OR GREATER THAN 5 YEARS  6/19/2019    IR PORT PLACEMENT EQUAL OR GREATER THAN 5 YEARS 6/19/2019 [facility] RADIOLOGY SPECIALS    IR PORT PLACEMENT EQUAL OR GREATER THAN 5 YEARS  11/23/2021    IR PORT PLACEMENT EQUAL OR GREATER THAN 5 YEARS 11/23/2021 [facility] RADIOLOGY SPECIALS    IR REMOVE TUNNELED VAD W PORT  12/14/2020    ORTHOPEDIC SURGERY Right     r wrist    VASCULAR SURGERY      WISDOM TOOTH EXTRACTION          Family History   Problem Relation Age of Onset    Hypertension Brother     Hypertension Brother     No Known Problems Son     Cancer Father 76        breast    Cancer Mother 80        pancreatic    No Known Problems Daughter            Social Connections:     Frequency of Communication with Friends and Family: Not on file    Frequency of Social Gatherings with Friends and Family: Not on file    Attends Sabianist Services: Not on file    Active Member of Clubs or Organizations: Not on file    Attends Club or Organization Meetings: Not on file    Marital Status: Not on file        Allergies   Allergen Reactions    Alemtuzumab Other (See Comments)     Rigors        Vitals signs and nursing note reviewed. Patient Vitals for the past 4 hrs:   Temp Pulse Resp BP SpO2   06/02/22 2344 -- 76 -- (!) 93/54 95 %   06/02/22 2246 97.8 °F (36.6 °C) 88 16 93/60 99 %          Physical Exam  Vitals and nursing note reviewed. Constitutional:       General: He is in acute distress. Appearance: Normal appearance. He is normal weight. HENT:      Head: Normocephalic and atraumatic. Right Ear: External ear normal.      Left Ear: External ear normal.      Nose: Nose normal.      Mouth/Throat:      Mouth: Mucous membranes are moist.      Pharynx: Oropharynx is clear.    Eyes:      General: No scleral icterus. Extraocular Movements: Extraocular movements intact. Conjunctiva/sclera: Conjunctivae normal.      Pupils: Pupils are equal, round, and reactive to light. Cardiovascular:      Rate and Rhythm: Normal rate and regular rhythm. Pulses: Normal pulses. Heart sounds: Normal heart sounds. Pulmonary:      Effort: Pulmonary effort is normal. No respiratory distress. Breath sounds: Normal breath sounds. Abdominal:      General: Abdomen is flat. Bowel sounds are normal. There is no distension. Palpations: Abdomen is soft. There is no mass. Tenderness: There is no abdominal tenderness. Musculoskeletal:         General: No deformity or signs of injury. Normal range of motion. Cervical back: Normal range of motion and neck supple. Skin:     General: Skin is warm and dry. Capillary Refill: Capillary refill takes less than 2 seconds. Neurological:      General: No focal deficit present. Mental Status: He is alert and oriented to person, place, and time. Psychiatric:         Mood and Affect: Mood is depressed. Speech: Speech normal.         Behavior: Behavior normal. Behavior is cooperative. Thought Content:  Thought content normal.         Cognition and Memory: Cognition and memory normal.         Judgment: Judgment normal.          EKG 12 Lead    Date/Time: 6/2/2022 11:55 PM  Performed by: Adelina Chu MD  Authorized by: Adelina Chu MD     ECG reviewed by ED Physician in the absence of a cardiologist: yes    Previous ECG:     Previous ECG:  Compared to current    Similarity:  No change  Interpretation:     Interpretation: normal    Rate:     ECG rate assessment: normal    Rhythm:     Rhythm: sinus rhythm    Ectopy:     Ectopy: none    QRS:     QRS axis:  Normal    QRS intervals:  Normal  Conduction:     Conduction: normal    ST segments:     ST segments:  Normal  T waves:     T waves: normal    Comments:      No acute ischemic changes  No significant interval change from prior tracing        Labs Reviewed   CBC WITH AUTO DIFFERENTIAL - Abnormal; Notable for the following components:       Result Value    .3 (*)     RBC 2.45 (*)     Hemoglobin 7.7 (*)     Hematocrit 23.0 (*)     RDW 18.5 (*)     Platelets 34 (*)     Seg Neutrophils 0 (*)     Lymphocytes 89 (*)     Eosinophils % 0 (*)     Segs Absolute 0.0 (*)     Absolute Lymph # 134.7 (*)     Absolute Mono # 16.6 (*)     All other components within normal limits   COMPREHENSIVE METABOLIC PANEL - Abnormal; Notable for the following components:    Glucose 297 (*)     BUN 31 (*)     CREATININE 2.03 (*)     GFR  42 (*)     GFR Non- 35 (*)     AST 92 (*)     Total Protein 5.6 (*)     All other components within normal limits   LACTIC ACID - Abnormal; Notable for the following components:    Lactic Acid, Plasma 2.5 (*)     All other components within normal limits   PROCALCITONIN - Abnormal; Notable for the following components:    Procalcitonin 0.69 (*)     All other components within normal limits   COVID-19, RAPID   RAPID INFLUENZA A/B ANTIGENS   CULTURE, BLOOD 1   CULTURE, BLOOD 1   CULTURE, URINE        XR CHEST (2 VW)    (Results Pending)            Alexander Coma Scale  Eye Opening: Spontaneous  Best Verbal Response: Oriented  Best Motor Response: Obeys commands  Alexander Coma Scale Score: 15                     Voice dictation software was used during the making of this note. This software is not perfect and grammatical and other typographical errors may be present. This note has not been completely proofread for errors.      Teja Corbett MD  06/03/22 0040

## 2022-06-03 NOTE — H&P
Hospitalist History and Physical   Admit Date:  2022 10:52 PM   Name:  Spencer Lindsay   Age:  79 y.o. Sex:  male  :  1955   MRN:  [id number]     Presenting Complaint: malaise  Reason(s) for Admission: Anemia [D64.9]  Thrombocytopenia ([address] [D69.6]  Septicemia ([address] [A41.9]  Acute kidney injury ([address] [N17.9]  Anemia, unspecified type [D64.9]     History of Present Illness:   Spencer Lindsay is a 79 y.o. male with medical history of pro-lymphocytic T cell leukemia, HTN who presented to ED with malaise. Patient was seen at the [facility] yesterday (Thursday) and underwent chemotherapy as well as a platelet transfusion. BP were low at that time, and he was told to come into the ER if they continued to be low. Upon ER workup, BP was slightly low, but with normal MAP of 71. Lab work is remarkable for WBC of 151.3 (which is actually lower that it was at the [facility] yesterday morning of 185.2). Hgb has fallen to 7.7, was 9.7 yesterday. Platelets are improved after transfusion at 34 (up from 5). Patient also has some ELIZABETH with Cr of 2.03 (up from 1.5). Procalcitonin is slightly elevated at 0.69. Electrolytes still pending on BMP due to lab problems. Hospitalist asked to admit. Due to bed availability at [facility], patient will be admitted to [facility] at this time. Review of Systems:  10 systems reviewed and negative except as noted in HPI.   Assessment & Plan:   Anemia  - Hgb is 7.7, down from 9.7  - Not currently on blood thinners (previously diagnosed with DVT, but has IVC filter)  - Will check hemoccult  - Transfuse if necessary    Thrombocytopenia  - Platelets improved after transfusion at [facility]  - Plt currently 34  - Transfuse as needed    Leukocytosis  - Chronic due to leukemia  - Monitor CBC  - Blood cultures obtained in ER  - Given dose of antibiotics, but will hold defer antibiotics at this time    ELIZABETH  - IVFs  - Recheck in AM    Prolymphocytic leukemia of T-cell  - Consult to Oncology  - No beds available at [facility] at this time    HTN  - Home meds    Disposition: inpatient    Diet: ADULT DIET; Regular  Code status: DNR (completed paperwork at [facility] with Palliative Care yesterday)    Past medical history reviewed. Past Medical History:   Diagnosis Date    Back pain     occassionally    Cervical radiculopathy     Claustrophobia     DVT (deep venous thrombosis) ([address] 06/2019    currently treated with Xarelto- started on 5/30/2019    GERD (gastroesophageal reflux disease)     H/O seasonal allergies     Hyperlipidemia     Impotence     Insomnia     Lateral epicondylitis     Leukemia (HCC)     CHEMO    Obesity     BMI 36.6    Sleep apnea     noncomplaint with CPAP     Past surgical history reviewed.     Past Surgical History:   Procedure Laterality Date    CIRCUMCISION      COLONOSCOPY  2017    Due in 2027    IR IVC FILTER PLACEMENT W IMAGING  5/25/2022    IR IVC FILTER PLACEMENT W IMAGING 5/25/2022 [facility] RADIOLOGY SPECIALS    IR PORT PLACEMENT EQUAL OR GREATER THAN 5 YEARS  11/23/2021    IR PORT PLACEMENT EQUAL OR GREATER THAN 5 YEARS  6/19/2019    IR PORT PLACEMENT EQUAL OR GREATER THAN 5 YEARS  6/19/2019    IR PORT PLACEMENT EQUAL OR GREATER THAN 5 YEARS 6/19/2019 [facility] RADIOLOGY SPECIALS    IR PORT PLACEMENT EQUAL OR GREATER THAN 5 YEARS  11/23/2021    IR PORT PLACEMENT EQUAL OR GREATER THAN 5 YEARS 11/23/2021 [facility] RADIOLOGY SPECIALS    IR REMOVE TUNNELED VAD W PORT  12/14/2020    ORTHOPEDIC SURGERY Right     r wrist    VASCULAR SURGERY      WISDOM TOOTH EXTRACTION        Allergies   Allergen Reactions    Alemtuzumab Other (See Comments)     Rigors      Social History     Tobacco Use    Smoking status: Never Smoker    Smokeless tobacco: Never Used   Substance Use Topics    Alcohol use: Not Currently     Alcohol/week: 0.0 standard drinks      Family History   Problem Relation Age of Onset    Hypertension Brother     Hypertension Brother     No Known 96 %   06/03/22 0104 -- 70 -- 97/62 98 %   06/02/22 2344 -- 76 -- (!) 93/54 95 %   06/02/22 2246 97.8 °F (36.6 °C) 88 16 93/60 99 %       Estimated body mass index is 30.54 kg/m² as calculated from the following:    Height as of this encounter: 5' 11\" (1.803 m). Weight as of this encounter: 219 lb (99.3 kg). No intake or output data in the 24 hours ending 06/03/22 0403      Physical Exam:  General:    Well nourished. No overt distress  Head:  Normocephalic, atraumatic  Eyes:  Sclerae appear normal.  Pupils equally round. HENT:  Nares appear normal, no drainage. Moist mucous membranes  Neck:  No restricted ROM. Trachea midline  CV:   RRR. S1/S2 auscultated  Lungs:   CTAB. No wheezing, rhonchi, or rales. Appears even, unlabored  Abdomen: Bowel sounds present. Soft, nontender, nondistended. Extremities: Warm and dry. No cyanosis or clubbing. No edema. Skin:     No rashes. Normal turgor. Normal coloration  Neuro:  Cranial nerves II-XII grossly intact. Sensation intact  Psych:  Flat mood and affect. Alert and oriented x3    Data Ordered and Personally Reviewed:    Last 24hr Labs:  Recent Results (from the past 24 hour(s))   CBC with Auto Differential    Collection Time: 06/02/22  7:33 AM   Result Value Ref Range    .2 (HH) 4.3 - 11.1 K/uL    RBC 3.06 (L) 4.23 - 5.6 M/uL    Hemoglobin 9.7 (L) 13.6 - 17.2 g/dL    Hematocrit 28.5 %    MCV 93.1 79.6 - 97.8 FL    MCH 31.7 26.1 - 32.9 PG    MCHC 34.0 31.4 - 35.0 g/dL    RDW 18.4 (H) 11.9 - 14.6 %    Platelets 5 (LL) 665 - 450 K/uL    MPV Unable to calculate. Recommend adding IPF.  9.4 - 12.3 FL    nRBC 0.00 0.0 - 0.2 K/uL    Seg Neutrophils 0 (L) 43 - 78 %    Lymphocytes 71 (H) 13 - 44 %    Monocytes 29 (H) 4.0 - 12.0 %    Eosinophils % 0 (L) 0.5 - 7.8 %    Basophils 0 0.0 - 2.0 %    Immature Granulocytes 0 0.0 - 5.0 %    Segs Absolute 0.0 (L) 1.7 - 8.2 K/UL    Absolute Lymph # 131.5 (H) 0.5 - 4.6 K/UL    Absolute Mono # 53.7 (H) 0.1 - 1.3 K/UL    Absolute Eos # 0.0 0.0 - 0.8 K/UL    Basophils Absolute 0.0 0.0 - 0.2 K/UL    Absolute Immature Granulocyte 0.0 0.0 - 0.5 K/UL    RBC Comment MODERATE  ANISOCYTOSIS + POIKILOCYTOSIS        RBC Comment OCCASIONAL  OVALOCYTES        RBC Comment OCCASIONAL  TEARDROP CELLS        RBC Comment       Corrected on 06/02 AT 0849:  This is a correction to the medical record of the test results previously reported as MODERATE ANISOCYTOSIS + POIKILOCYTOSIS OCCASIONAL OVALOCYTES OCCASIONAL STOMATOCYTES    WBC Comment Result Confirmed By Smear      Platelet Comment DECREASED      Differential Type AUTOMATED     Comprehensive Metabolic Panel    Collection Time: 06/02/22  7:33 AM   Result Value Ref Range    Sodium 138 136 - 145 mmol/L    Potassium 4.4 3.5 - 5.1 mmol/L    Chloride 105 98 - 107 mmol/L    CO2 24 21 - 32 mmol/L    Anion Gap 9 7 - 16 mmol/L    Glucose 180 (H) 65 - 100 mg/dL    BUN 19 8 - 23 MG/DL    CREATININE 1.50 0.8 - 1.5 MG/DL    GFR African American >60 >60 ml/min/1.73m2    GFR Non- 50 (L) >60 ml/min/1.73m2    Calcium 9.8 8.3 - 10.4 MG/DL    Total Bilirubin 0.7 0.2 - 1.1 MG/DL    ALT 42 12 - 65 U/L    AST 47 (H) 15 - 37 U/L    Alk Phosphatase 133 50 - 136 U/L    Total Protein 6.2 (L) 6.3 - 8.2 g/dL    Albumin 3.7 3.2 - 4.6 g/dL    Globulin 2.5 2.3 - 3.5 g/dL    Albumin/Globulin Ratio 1.5 1.2 - 3.5     Magnesium    Collection Time: 06/02/22  7:33 AM   Result Value Ref Range    Magnesium 1.5 (L) 1.8 - 2.4 mg/dL   PREPARE PLATELETS, 2 Product    Collection Time: 06/02/22  9:00 AM   Result Value Ref Range    Unit Number [id number]     Product Code Blood Bank [id number] [facility]     Unit Divison 00     Dispense Status Blood Bank ISSUED     Unit Number [id number]     Product Code Blood Bank [facility]     Unit Divison 00     Dispense Status Blood Bank ISSUED    CBC with Auto Differential    Collection Time: 06/02/22 11:11 PM   Result Value Ref Range    .3 (HH) 4.3 - 11.1 K/uL    RBC 2.45 (L) Rapid    Collection Time: 06/02/22 11:45 PM    Specimen: Nasopharyngeal   Result Value Ref Range    Source NASAL SWAB      SARS-CoV-2, Rapid Not detected NOTD     Rapid influenza A/B antigens    Collection Time: 06/02/22 11:45 PM    Specimen: Nasal Washing   Result Value Ref Range    Influenza A Ag Negative NEG      Influenza B Ag Negative NEG      Source Nasopharyngeal             Signed:  Giuseppe Riddle MD

## 2022-06-03 NOTE — ED TRIAGE NOTES
Pt arrives A&O x4 ambulatory with cane to triage. Pt states he received platelet transfusion this AM for leukemia. Pt states he was hypotensive at appt and has not been able to get pressure up today. Pt states hypotension at appt was with SOB and fatigue. Pt states he now only feels fatigue. Pt denies lightheadedness, dizziness, SOB, N/V/D.

## 2022-06-03 NOTE — PROGRESS NOTES
Outreach Follow Up Note    Trevor Raymond was seen and assessed. @[facility]([id number])@  Vitals:    06/03/22 0809 06/03/22 1141 06/03/22 1315 06/03/22 1646   BP: (!) 133/92 (!) 137/105  126/89   Pulse: 87 84 87 94   Resp:  20 26 20   Temp: 97.3 °F (36.3 °C) 97.2 °F (36.2 °C)  97.9 °F (36.6 °C)   TempSrc: Oral Oral  Oral   SpO2: 99% 100% 100% 100%   Weight:       Height:            Patient significantly less ill appearing at this time. Patient reviewed and discussed with primary nurse. There have been no significant clinical changes since the completion of the last dated Outreach assessment. Will continue to follow up per outreach protocol.     Signed By:   Nya Boone RN    Ana 3, 2022 5:26 PM

## 2022-06-03 NOTE — CARE COORDINATION
Medical record reviewed. Pt is a 79 yr old male admitted with anemia, thrombocytopenia, and septicemia. Pt has a history of T cell Leukemia and is in treatment at the [facility]. CM met with patient and spouse to introduce self and explain role in planning. Pts wife stated that patient is current with his PCP. Their pharmacy of choice is [facility] on [address]. At baseline, pt is independent with ambulation and ADL's, however, patient has recently been more short of breath and is less steady on his feet. Bedside nurse reported that patient improves with oxygen and home oxygen may be needed at time of discharge. Pt and wife are in agreement with this plan. CM agreed to continue following and to assist with O2 arrangements if needed. Spouse indicated that they have no preference in providers for O2. Pt was recently discharged from [facility]. Wife stated that there was no way to prevent readmission. Progression of disease and current treatment side effects have mandated readmissions. CM will continue to follow to provide emotional support and to assist with planning. 06/03/22 0827   Service Assessment   Patient Orientation Alert and Oriented;Person;Place;Situation   Cognition Alert   History Provided By Spouse   Primary Caregiver Spouse   Accompanied By/Relationship wifeFernando Spouse/Significant Other;Children   PCP Verified by [facility] Yes  (Dr. Cheng Carr)   Last Visit to PCP Within last 3 months   Prior Functional Level Independent in ADLs/IADLs; Cooking;Housework; Shopping   Current Functional Level Assistance with the following:;Bathing;Cooking;Housework; Shopping   Can patient return to prior living arrangement Yes   Ability to make needs known: Good   Family able to assist with home care needs: Yes   Social/Functional History   Lives With Spouse   Type of [address] chair   [address] [place] [address] Help From Family   ADL Assistance Independent   Homemaking Assistance Needs assistance   Meal Prep Unable to assess (comment)   Laundry Unable to assess (comment)   Vacuuming Unable to assess (comment)   Cleaning Unable to assess (comment)   Gardening Unable to assess (comment)   Yard Work Unable to assess (comment)   Driving Unable to assess (comment)   Shopping Unable to assess (comment)   Ambulation Assistance Independent   Transfer Assistance Independent   Discharge Planning   Type of [place] Spouse/Significant Other   Current Services Prior To Admission [facility]  (possible need for oxygen)   Potential Assistance Purchasing Medications No   Type of [address] Discharge   Transition of Care Consult (CM Consult) Discharge Planning;DME/Supply Assistance   Mode of Transport at Discharge Self   Confirm Follow Up Transport Family   Condition of Participation: Discharge [address] for Transition of Care is related to the following treatment goals: medical stability   The Patient and/or Patient Representative was provided with a Choice of Provider? Patient Representative   Name of the Patient Representative who was provided with the Choice of Provider and agrees with the Discharge Plan? Geraldine Centeno, spouse   The Patient and/Or Patient Representative agree with the Discharge Plan? Yes   Freedom of Choice list was provided with basic dialogue that supports the patient's individualized plan of care/goals, treatment preferences, and shares the quality data associated with the providers?   Yes

## 2022-06-03 NOTE — PLAN OF CARE
Problem: Discharge Planning  Goal: Discharge to home or other facility with appropriate resources  Outcome: Progressing     Problem: Safety - Adult  Goal: Free from fall injury  6/3/2022 1951 by Marisol Hodge RN  Outcome: Progressing  6/3/2022 1454 by Heriberto Mccann RN  Outcome: Progressing

## 2022-06-03 NOTE — PROGRESS NOTES
Patient seen and assessed by RN Outreach team secondary to elevated DIS score and RN discretion. Patients LA level also elevated. Patient is ill appearing 79year old male and is currently being treated for leukemia. Upon assessment, he is a good historian, he is A&0X4, lung sounds are clear, and heart rate and rhythm are regular, patient does have BLE edema 2+. Patient states the swelling \"is not normal\" and he \"doesn't have any heart problems. \"     Will continue to follow per outreach protocol. Will discuss patients care with primary RN, and physician.

## 2022-06-03 NOTE — PROGRESS NOTES
TRANSFER - IN REPORT:    Verbal report received from CAT Higginbotham on Mari Young  being received from ED for routine progression of patient care      Report consisted of patient's Situation, Background, Assessment and   Recommendations(SBAR). Information from the following report(s) Nurse Handoff Report, Intake/Output and Recent Results was reviewed with the receiving nurse. Opportunity for questions and clarification was provided. Assessment completed upon patient's arrival to unit and care assumed.

## 2022-06-03 NOTE — PROGRESS NOTES
Hospitalist Progress Note   Admit Date:  2022 10:52 PM   Name:  Angelito Zelaya   Age:  79 y.o. Sex:  male  :  1955   MRN:  [id number]   Room:  [facility]/    Presenting Complaint: Fatigue  Reason(s) for Admission: Anemia [D64.9]  Thrombocytopenia ([address] [D69.6]  Septicemia ([address] [A41.9]  Acute kidney injury ([facility] [N17.9]  Anemia, unspecified type [D64.9]     Hospital Course & Interval History:   79 y.o. male with medical history of pro-lymphocytic T cell leukemia, HTN who presented to ED with malaise and fatigue. Patient was seen at the [facility] and underwent chemotherapy as well as a platelet transfusion. BP were low at that time, and he was told to come into the ER if they continued to be low. Upon ER workup, BP was slightly low, but with normal MAP of 71. Lab work is remarkable for WBC of 151.3 (which is actually lower that it was at the [facility] yesterday morning of 185.2). Hgb has fallen to 7.7, was 9.7 previously. Platelets are improved after transfusion at 34 (up from 5). Patient also has some ELIZABETH with Cr of 2.03 (up from 1.5). Procalcitonin is slightly elevated at 0.69. Electrolytes showed K 7.2. Subjective/24hr Events (22): Still with malaise and fatigue but feeling better overall. Denies N/V/D. Denies decreased UOP. Denies CP/SOB. Denies F/C.       Assessment & Plan:     Principal Problem:    Hyperkalemia  - 6/2 K 7.2 - started on Lokelma  - /3 K 6.4 --> supposedly 7.4 --> give Insulin (glucose 286) + Albuterol + Lokelma  - Repeat K in 4 hours  - Place on remote telemetry    Active Problems:    Hypomagnesemia  - Replaced IV  - Repeat tomorrow AM  - Continue home Mag ox      Anemia  - Due to leukemia  - Continue to monitor  - Transfuse Hgb <7      ELIZABETH (acute kidney injury) ([facility]  - Likely due to dehdyration  - Continue IVFs  - Check BMP daily      Hyponatremia  - Likely due to dehdyration  - Continue IVFs  - Check BMP daily      Hypophosphatemia  - Likely due to chemo  - Replace with sodium phosphate  - Repeat in AM      Prolymphocytic leukemia of T-cell ([facility]  - Per Heme/Onc      Severe obstructive sleep apnea      Essential hypertension, benign      Thrombocytopenia (HCC)  - Stable for now  - Check CBC daily  - Transfuse as needed    Diet:  ADULT DIET; Regular; Low Potassium (Less than 3000 mg/day)  ADULT ORAL NUTRITION SUPPLEMENT; Breakfast, Lunch, Dinner; Renal Oral Supplement  DVT PPx: SCDs  Code status: DNR    Hospital Problems:  Hospital Problems           Last Modified POA    * (Principal) Hyperkalemia 6/3/2022 Yes    Hypomagnesemia 6/3/2022 Yes    Anemia 6/3/2022 Yes    ELIZABETH (acute kidney injury) ([facility] 6/3/2022 Yes    Hyponatremia 6/3/2022 Yes    Hypophosphatemia 6/3/2022 Yes    Prolymphocytic leukemia of T-cell ([facility] 6/3/2022 Yes    Severe obstructive sleep apnea 6/3/2022 Yes    Essential hypertension, benign 6/3/2022 Yes    Thrombocytopenia ([facility] 6/3/2022 Yes          Objective:     Patient Vitals for the past 24 hrs:   Temp Pulse Resp BP SpO2   06/03/22 1141 97.2 °F (36.2 °C) 84 20 (!) 137/105 100 %   06/03/22 0809 97.3 °F (36.3 °C) 87 -- (!) 133/92 99 %   06/03/22 0739 -- 83 20 -- 100 %   06/03/22 0209 98.4 °F (36.9 °C) 68 18 113/76 --   06/03/22 0124 -- 69 -- 101/64 96 %   06/03/22 0104 -- 70 -- 97/62 98 %   06/02/22 2344 -- 76 -- (!) 93/54 95 %   06/02/22 2246 97.8 °F (36.6 °C) 88 16 93/60 99 %       Oxygen Therapy  SpO2: 100 %  Pulse Oximeter Device Mode: Intermittent  O2 Device: Nasal cannula  O2 Flow Rate (L/min): 3 L/min    Estimated body mass index is 30.54 kg/m² as calculated from the following:    Height as of this encounter: 5' 11\" (1.803 m). Weight as of this encounter: 219 lb (99.3 kg).     Intake/Output Summary (Last 24 hours) at 6/3/2022 1322  Last data filed at 6/3/2022 0508  Gross per 24 hour   Intake --   Output 300 ml   Net -300 ml         Physical Exam:   Blood pressure (!) 137/105, pulse 84, temperature 97.2 °F (36.2 °C), temperature source Oral, resp. rate 20, height 5' 11\" (1.803 m), weight 219 lb (99.3 kg), SpO2 100 %. General:    Well nourished. No overt distress. Appears fatigued. Head:  Normocephalic, atraumatic  Eyes:  Sclerae appear normal.  Pupils equally round. ENT:  Nares appear normal, no drainage. Moist oral mucosa  Neck:  No restricted ROM. Trachea midline   CV:   RRR. No m/r/g. No jugular venous distension. Lungs:   CTAB. No wheezing, rhonchi, or rales. Respirations even, unlabored  Abdomen: Bowel sounds present. Soft, nontender, nondistended. Extremities: No cyanosis or clubbing. No edema  Skin:     No rashes and normal coloration. Warm and dry. Neuro:  CN II-XII grossly intact. Sensation intact. A&Ox3  Psych:  Normal mood and affect. I have reviewed ordered lab tests and independently visualized imaging below:    Recent Labs:  Recent Results (from the past 48 hour(s))   CBC with Auto Differential    Collection Time: 06/02/22  7:33 AM   Result Value Ref Range    .2 (HH) 4.3 - 11.1 K/uL    RBC 3.06 (L) 4.23 - 5.6 M/uL    Hemoglobin 9.7 (L) 13.6 - 17.2 g/dL    Hematocrit 28.5 %    MCV 93.1 79.6 - 97.8 FL    MCH 31.7 26.1 - 32.9 PG    MCHC 34.0 31.4 - 35.0 g/dL    RDW 18.4 (H) 11.9 - 14.6 %    Platelets 5 (LL) 246 - 450 K/uL    MPV Unable to calculate. Recommend adding IPF.  9.4 - 12.3 FL    nRBC 0.00 0.0 - 0.2 K/uL    Seg Neutrophils 0 (L) 43 - 78 %    Lymphocytes 71 (H) 13 - 44 %    Monocytes 29 (H) 4.0 - 12.0 %    Eosinophils % 0 (L) 0.5 - 7.8 %    Basophils 0 0.0 - 2.0 %    Immature Granulocytes 0 0.0 - 5.0 %    Segs Absolute 0.0 (L) 1.7 - 8.2 K/UL    Absolute Lymph # 131.5 (H) 0.5 - 4.6 K/UL    Absolute Mono # 53.7 (H) 0.1 - 1.3 K/UL    Absolute Eos # 0.0 0.0 - 0.8 K/UL    Basophils Absolute 0.0 0.0 - 0.2 K/UL    Absolute Immature Granulocyte 0.0 0.0 - 0.5 K/UL    RBC Comment MODERATE  ANISOCYTOSIS + POIKILOCYTOSIS        RBC Comment OCCASIONAL  OVALOCYTES        RBC Comment OCCASIONAL  TEARDROP CELLS        RBC Comment       Corrected on 06/02 AT 0849:  This is a correction to the medical record of the test results previously reported as MODERATE ANISOCYTOSIS + POIKILOCYTOSIS OCCASIONAL OVALOCYTES OCCASIONAL STOMATOCYTES    WBC Comment Result Confirmed By Smear      Platelet Comment DECREASED      Differential Type AUTOMATED     Comprehensive Metabolic Panel    Collection Time: 06/02/22  7:33 AM   Result Value Ref Range    Sodium 138 136 - 145 mmol/L    Potassium 4.4 3.5 - 5.1 mmol/L    Chloride 105 98 - 107 mmol/L    CO2 24 21 - 32 mmol/L    Anion Gap 9 7 - 16 mmol/L    Glucose 180 (H) 65 - 100 mg/dL    BUN 19 8 - 23 MG/DL    CREATININE 1.50 0.8 - 1.5 MG/DL    GFR African American >60 >60 ml/min/1.73m2    GFR Non- 50 (L) >60 ml/min/1.73m2    Calcium 9.8 8.3 - 10.4 MG/DL    Total Bilirubin 0.7 0.2 - 1.1 MG/DL    ALT 42 12 - 65 U/L    AST 47 (H) 15 - 37 U/L    Alk Phosphatase 133 50 - 136 U/L    Total Protein 6.2 (L) 6.3 - 8.2 g/dL    Albumin 3.7 3.2 - 4.6 g/dL    Globulin 2.5 2.3 - 3.5 g/dL    Albumin/Globulin Ratio 1.5 1.2 - 3.5     Magnesium    Collection Time: 06/02/22  7:33 AM   Result Value Ref Range    Magnesium 1.5 (L) 1.8 - 2.4 mg/dL   PREPARE PLATELETS, 2 Product    Collection Time: 06/02/22  9:00 AM   Result Value Ref Range    Unit Number [id number]     Product Code Blood Bank [facility]     Unit Divison 00     Dispense Status Blood Bank TRANSFUSED     Unit Number [id number]     Product Code Blood Bank [facility]     Unit Divison 00     Dispense Status Blood Bank TRANSFUSED    CBC with Auto Differential    Collection Time: 06/02/22 11:11 PM   Result Value Ref Range    .3 (HH) 4.3 - 11.1 K/uL    RBC 2.45 (L) 4.23 - 5.6 M/uL    Hemoglobin 7.7 (L) 13.6 - 17.2 g/dL    Hematocrit 23.0 (L) 41.1 - 50.3 %    MCV 93.9 79.6 - 97.8 FL    MCH 31.4 26.1 - 32.9 PG    MCHC 33.5 31.4 - 35.0 g/dL    RDW 18.5 (H) 11.9 - 14.6 %    Platelets 34 (L) 238 - 450 K/uL    MPV 9.6 9.4 - 12.3 FL nRBC 0.02 0.0 - 0.2 K/uL    Seg Neutrophils 0 (L) 43 - 78 %    Lymphocytes 89 (H) 13 - 44 %    Monocytes 11 4.0 - 12.0 %    Eosinophils % 0 (L) 0.5 - 7.8 %    Basophils 0 0.0 - 2.0 %    Immature Granulocytes 0 0.0 - 5.0 %    Segs Absolute 0.0 (L) 1.7 - 8.2 K/UL    Absolute Lymph # 134.7 (H) 0.5 - 4.6 K/UL    Absolute Mono # 16.6 (H) 0.1 - 1.3 K/UL    Absolute Eos # 0.0 0.0 - 0.8 K/UL    Basophils Absolute 0.0 0.0 - 0.2 K/UL    Absolute Immature Granulocyte 0.0 0.0 - 0.5 K/UL    RBC Comment MODERATE  ANISOCYTOSIS + POIKILOCYTOSIS        RBC Comment OCCASIONAL  TEARDROP CELLS        WBC Comment Result Confirmed By Smear      Platelet Comment DECREASED      Differential Type AUTOMATED     Comprehensive Metabolic Panel    Collection Time: 06/02/22 11:11 PM   Result Value Ref Range    Sodium PENDING mmol/L    Potassium 7.2 (HH) 3.5 - 5.1 mmol/L    Chloride 103 98 - 107 mmol/L    CO2 24 21 - 32 mmol/L    Anion Gap PENDING mmol/L    Glucose 297 (H) 65 - 100 mg/dL    BUN 31 (H) 8 - 23 MG/DL    CREATININE 2.03 (H) 0.8 - 1.5 MG/DL    GFR  42 (L) >60 ml/min/1.73m2    GFR Non- 35 (L) >60 ml/min/1.73m2    Calcium 8.8 8.3 - 10.4 MG/DL    Total Bilirubin 0.7 0.2 - 1.1 MG/DL    ALT 36 12 - 65 U/L    AST 92 (H) 15 - 37 U/L    Alk Phosphatase 113 50 - 136 U/L    Total Protein 5.6 (L) 6.3 - 8.2 g/dL    Albumin 3.2 3.2 - 4.6 g/dL    Globulin 2.4 2.3 - 3.5 g/dL    Albumin/Globulin Ratio 1.3 1.2 - 3.5     Procalcitonin    Collection Time: 06/02/22 11:11 PM   Result Value Ref Range    Procalcitonin 0.69 (H) 0.00 - 0.49 ng/mL   Lactic Acid    Collection Time: 06/02/22 11:13 PM   Result Value Ref Range    Lactic Acid, Plasma 2.5 (H) 0.4 - 2.0 MMOL/L   COVID-19, Rapid    Collection Time: 06/02/22 11:45 PM    Specimen: Nasopharyngeal   Result Value Ref Range    Source NASAL SWAB      SARS-CoV-2, Rapid Not detected NOTD     Rapid influenza A/B antigens    Collection Time: 06/02/22 11:45 PM    Specimen: Nasal Washing   Result Value Ref Range    Influenza A Ag Negative NEG      Influenza B Ag Negative NEG      Source Nasopharyngeal     Culture, Urine    Collection Time: 06/03/22  1:05 AM    Specimen: URINE-CLEAN CATCH    URINE   Result Value Ref Range    Special Requests NO SPECIAL REQUESTS      Culture        No growth after short period of incubation. Further results to follow after overnight incubation.    Basic Metabolic Panel w/ Reflex to MG    Collection Time: 06/03/22  3:05 AM   Result Value Ref Range    Sodium 132 (L) 136 - 145 mmol/L    Potassium 6.3 (HH) 3.5 - 5.1 mmol/L    Chloride 99 98 - 107 mmol/L    CO2 22 21 - 32 mmol/L    Anion Gap 11 7 - 16 mmol/L    Glucose 323 (H) 65 - 100 mg/dL    BUN 37 (H) 8 - 23 MG/DL    CREATININE 1.96 (H) 0.8 - 1.5 MG/DL    GFR  44 (L) >60 ml/min/1.73m2    GFR Non- 36 (L) >60 ml/min/1.73m2    Calcium 8.7 8.3 - 10.4 MG/DL   Lactic Acid    Collection Time: 06/03/22  3:05 AM   Result Value Ref Range    Lactic Acid, Plasma 2.7 (H) 0.4 - 2.0 MMOL/L   Renal Function Panel    Collection Time: 06/03/22  3:05 AM   Result Value Ref Range    CO2 21 21 - 32 mmol/L    Anion Gap Cannot be calculated 7 - 16 mmol/L    Glucose 286 (H) 65 - 100 mg/dL    BUN 31 (H) 8 - 23 MG/DL    CREATININE 2.08 (H) 0.8 - 1.5 MG/DL    GFR  41 (L) >60 ml/min/1.73m2    GFR Non- 34 (L) >60 ml/min/1.73m2    Calcium 9.2 8.3 - 10.4 MG/DL    Phosphorus 2.0 (L) 2.3 - 3.7 MG/DL    Albumin 3.2 3.2 - 4.6 g/dL   CBC with Auto Differential    Collection Time: 06/03/22  3:05 AM   Result Value Ref Range    .1 (HH) 4.3 - 11.1 K/uL    RBC 2.52 (L) 4.23 - 5.6 M/uL    Hemoglobin 7.9 (L) 13.6 - 17.2 g/dL    Hematocrit 24.0 (L) 41.1 - 50.3 %    MCV 95.2 79.6 - 97.8 FL    MCH 31.3 26.1 - 32.9 PG    MCHC 32.9 31.4 - 35.0 g/dL    RDW 18.6 (H) 11.9 - 14.6 %    Platelets 32 (L) 437 - 450 K/uL    MPV 10.0 9.4 - 12.3 FL    nRBC 0.00 0.0 - 0.2 K/uL    Seg Neutrophils 0 (L) 43 - 78 %    Lymphocytes 68 (H) 13 - 44 %    Monocytes 32 (H) 4.0 - 12.0 %    Eosinophils % 0 (L) 0.5 - 7.8 %    Basophils 0 0.0 - 2.0 %    Immature Granulocytes 0 0.0 - 5.0 %    Segs Absolute 0.0 (L) 1.7 - 8.2 K/UL    Absolute Lymph # 81.7 (H) 0.5 - 4.6 K/UL    Absolute Mono # 38.4 (H) 0.1 - 1.3 K/UL    Absolute Eos # 0.0 0.0 - 0.8 K/UL    Basophils Absolute 0.0 0.0 - 0.2 K/UL    Absolute Immature Granulocyte 0.0 0.0 - 0.5 K/UL    RBC Comment SLIGHT  ANISOCYTOSIS + POIKILOCYTOSIS        RBC Comment SLIGHT  POLYCHROMASIA        RBC Comment SLIGHT  MACROCYTOSIS        RBC Comment SLIGHT  HYPOCHROMIA        WBC Comment Result Confirmed By Smear      Platelet Comment MARKED      Differential Type AUTOMATED         Other Studies:  XR CHEST (2 VW)    Result Date: 6/3/2022  EXAM: XR CHEST (2 VW) HISTORY: pain. TECHNIQUE: Frontal and lateral chest. COMPARISON: 11/5/2021 FINDINGS: The cardiac silhouette, mediastinum, and pulmonary vasculature are within normal limits. There is no consolidation, pleural effusion, or pneumothorax. There is a right-sided MediPort. No significant osseous abnormalities are observed. No evidence of an acute intrathoracic process.         Current Meds:  Current Facility-Administered Medications   Medication Dose Route Frequency    acyclovir (ZOVIRAX) tablet 400 mg  400 mg Oral BID    fluconazole (DIFLUCAN) tablet 200 mg  200 mg Oral Daily    furosemide (LASIX) tablet 20 mg  20 mg Oral Daily    LORazepam (ATIVAN) tablet 1 mg  1 mg Oral BID PRN    atorvastatin (LIPITOR) tablet 10 mg  10 mg Oral Daily    mirtazapine (REMERON) tablet 15 mg  15 mg Oral Nightly    pantoprazole (PROTONIX) tablet 40 mg  40 mg Oral QAM AC    potassium chloride (KLOR-CON M) extended release tablet 20 mEq  20 mEq Oral Daily    Venetoclax TABS 100 mg- pt supplied (Patient Supplied)  100 mg Oral Every Other Day    gabapentin (NEURONTIN) capsule 100 mg  100 mg Oral TID    oxyCODONE (ROXICODONE) immediate release tablet 5 mg  5 mg Oral Q8H PRN    sertraline (ZOLOFT) tablet 50 mg  50 mg Oral Daily    sodium chloride flush 0.9 % injection 5-40 mL  5-40 mL IntraVENous 2 times per day    sodium chloride flush 0.9 % injection 5-40 mL  5-40 mL IntraVENous PRN    0.9 % sodium chloride infusion   IntraVENous PRN    ondansetron (ZOFRAN-ODT) disintegrating tablet 4 mg  4 mg Oral Q8H PRN    Or    ondansetron (ZOFRAN) injection 4 mg  4 mg IntraVENous Q6H PRN    polyethylene glycol (GLYCOLAX) packet 17 g  17 g Oral Daily PRN    acetaminophen (TYLENOL) tablet 650 mg  650 mg Oral Q6H PRN    Or    acetaminophen (TYLENOL) suppository 650 mg  650 mg Rectal Q6H PRN    magnesium oxide (MAG-OX) tablet 400 mg  400 mg Oral Daily    insulin regular (HUMULIN R;NOVOLIN R) injection 10 Units  10 Units IntraVENous Once    sodium zirconium cyclosilicate (LOKELMA) oral suspension 10 g  10 g Oral TID       Discharge Plan:     Location: Home  Days: 1-2  PPD Ordered: N/A    Signed:  Marlene Ratliff DO    Part of this note may have been written by using a voice dictation software. The note has been proof read but may still contain some grammatical/other typographical errors.

## 2022-06-03 NOTE — PROGRESS NOTES
06/03/22 0739   Oxygen Therapy/Pulse Ox   O2 Device Nasal cannula   O2 Flow Rate (L/min) 3 L/min   Heart Rate 83   SpO2 100 %     Last Hgb 7.7    Patient placed on 3L nasal cannula with bubble humidification.

## 2022-06-03 NOTE — PROGRESS NOTES
Comprehensive Nutrition Assessment    Type and Reason for Visit: Initial,Positive Nutrition Screen  Malnutrition Screening Tool: Malnutrition Screen  Have you recently lost weight without trying?: 14 to 23 pounds (2 points)  Have you been eating poorly because of a decreased appetite?: Yes (1 point)  Malnutrition Screening Tool Score: 3    Nutrition Recommendations/Plan:   Meals and Snacks:  Diet: Add low K feature to diet while awaiting resolution of hyperkalemia. Nutrition Supplement Therapy:   Medical food supplement therapy:  Initiate Nepro three times per day (this provides 420 kcal and 19 grams protein per bottle). Provided CAT Harris with bottle for pt to take his medications with. Once hyperkalemia resolves, could consider change to Ensure Enlive   Continue Remeron     Malnutrition Assessment:  Malnutrition Status: Severe malnutrition  Context: Chronic Illness  Findings of clinical characteristics of malnutrition:   Energy Intake:  75% or less estimated energy requirements for 1 month or longer (8-9 months of intake <75% of needs)  Weight Loss:  Greater than 20% over 1 year (275 to 210#= 23.6% since Ocotber 2021)       Nutrition Assessment:  Nutrition History: Per RD note 5/25/22: \"Pt and wife attribute wt changes to fluid fluctuations. Wt hx per outpt RD records: 219# 1/26/22 at last RD visit which was up 6# over the last month. Wt hx per wife wt continued to increase after last RD visit. 232# 4/7/22, 238# 5/5/22, 220# 5/12/22, 5/19/22 215#. No brianna muscle or fat wasting appreciated. Historically pt has had compromised intake while inpatient and accepted oral supplements. \"   6/3: Prior to cancer diagnosis, pt reports UBW ~265-275# and meal intake of 3 times daily plus snacks. Pt has had both weight gain (up to 290#) and wt loss d/t fluid. However overall trend has been one of weight loss as evidenced by change in clothes fit. Intake has been <75% of usual since at least since September 2021.  He says his appetite did improve \"at first\" when he was on Remeron, but in the past month his appetite and intake has decreased. On a good day he will eat 2 good meals a day-grits, eggs, toast, fruit for breakfast and meat and vegetables for dinner. Some days he just eats breakfast. On a bad day he'll eat just a bowl of soup. He uses Premier protein shakes at home but does not drink consistently drink every day. He has mouth/tonguesoreness last week, but it resolved with MMW. He has occasional nausea but it does not impact po intake as much as his lack of appetite does. He does find that he chews foods for long periods and then has not desire to swallow it. He tends toward constipation for which he takes daily colace and miralax prn. Nutrition Background:   H/O, HLD, ISAURO , GERD. 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 [facility] received 8 weeks of alemtuzumab and achieved SC.  He received 14 doses of Pentostatin and achieved CR.  He relapsed in September 2021. [facility] received 2 cycles of ICE with no remission, then proceeded to [facility] and bendamustine with partial response. Recent admission for DVT with IVC filter placed 5/25. Discharged 5/29. Had oncology visit and received platelets and chemo 6/2. P/W to ED later with increasing severe fatigue. Admitted with anemia, ELIZABETH/hyperkalemia. Nutrition Interval:  RN in room preparing to do AM med pass. Pt seen in company of wife. Pt reports he drank milk and orange juice for breakfast. He prefers Premier over Ensure but is willing to drink a higher kcal ONS once RD explained Premier is designed to promote wt loss given low kcal, but high protein content. He no is also receptive to Ensure clear, but no longer likes ice cream thus not receptive to magic cup at this time  Pt received Armor5 this AM and has active order for remeron         Current Nutrition Therapies:  ADULT DIET;  Regular    Current Intake:   Average Meal Intake: 1-25% Average Supplements Intake: None Ordered      Anthropometric Measures:  Height: 5' 11\" (180.3 cm)  Current Body Wt: 219 lb (99.3 kg) (6/2), Weight source: Stated  BMI: 30.6  Admission Body Weight: 219 lb (99.3 kg) (stated)  Ideal Body Weight (Kg) (Calculated): 78 kg (172 lbs),    Usual Body Wt:  (stated 265-275#. See below),stated 238#       Percent weight change:  as above       Edema: No data recorded  BMI Category Obese Class 1 (BMI 30.0-34. 9)  Estimated Daily Nutrient Needs:  Energy (kcal/day): 3526-5766 (20-25 kcal/kg) (Kcal/kg Weight used: 95.5 kg Other (Comment) (5/28 standing scale)  Protein (g/day): 76-96 (0.8-1 g/kg)-ELIZABETH Weight Used: ( ) 95.5 kg  Fluid (ml/day):   (1 ml/kcal)    Nutrition Diagnosis:   · Severe malnutrition,In context of chronic illness related to inadequate protein-energy intake as evidenced by Criteria as identified in malnutrition assessment    · Inadequate oral intake related to  (variable po intake) as evidenced by poor intake prior to admission (current intake <25% of estimated needs)    Nutrition Interventions:   Food and/or Nutrient Delivery: Modify Current Diet,Start Oral Nutrition Supplement     Coordination of Nutrition Care: Continue to monitor while inpatient,Interdisciplinary Rounds       Goals:       Active Goal: PO intake 75% or greater,by next RD assessment       Nutrition Monitoring and Evaluation:      Food/Nutrient Intake Outcomes: Food and Nutrient Intake,Supplement Intake  Physical Signs/Symptoms Outcomes: Weight    Discharge Planning:    Continue Oral Nutrition Supplement    Ej Snow, RD,LD, [id number] [facility]

## 2022-06-04 PROBLEM — E83.42 HYPOMAGNESEMIA: Status: RESOLVED | Noted: 2022-01-01 | Resolved: 2022-01-01

## 2022-06-04 PROBLEM — E87.1 HYPONATREMIA: Status: RESOLVED | Noted: 2022-01-01 | Resolved: 2022-01-01

## 2022-06-04 PROBLEM — E87.5 HYPERKALEMIA: Status: RESOLVED | Noted: 2022-01-01 | Resolved: 2022-01-01

## 2022-06-04 PROBLEM — E83.39 HYPOPHOSPHATEMIA: Status: RESOLVED | Noted: 2022-01-01 | Resolved: 2022-01-01

## 2022-06-04 NOTE — PROGRESS NOTES
OUTREACH PROGRESS NOTE:    Ashley Verdugo was seen and assessed secondary to elevated DIS score and elevated lactic acid levels. Patient resting comfortably with wife at bedside. No needs expressed at this time. Recent Vitals:  Temperature 97.7 (oral)  Heart Rate 84  Respirations 16  Blood Pressure 142/81  SpO2 98% (2 L/min nasal cannula)    Plan of care discussed with primary nurse. Will continue to monitor per outreach protocol.     Davis Bauman RN  Ana 3, 2022

## 2022-06-04 NOTE — PROGRESS NOTES
OUTREACH PROGRESS NOTE:    Spencer Lindsay was seen and assessed secondary to elevated DIS score and elevated lactic acid levels. No significant changes to patient's condition noted during shift. Lactic acid improved and within normal range. Recent Vitals:  Temperature 98.1 (oral)  Heart Rate 80  Respirations 16  Blood Pressure 133/82  SpO2 100% (2 L/min nasal cannula)    Plan of care discussed with primary nurse. Will continue to monitor per outreach protocol.     Yifan Diallo RN  June 4, 2022

## 2022-06-04 NOTE — PROGRESS NOTES
Hospitalist Progress Note   Admit Date:  2022 10:52 PM   Name:  Tharon Kanner   Age:  79 y.o. Sex:  male  :  1955   MRN:  [id number]   Room:  [facility]/    Presenting Complaint: Fatigue  Reason(s) for Admission: Anemia [D64.9]  Thrombocytopenia ([address] [D69.6]  Septicemia ([address] [A41.9]  Acute kidney injury ([address] [N17.9]  Anemia, unspecified type [D64.9]     Hospital Course & Interval History:   79 y.o. male with medical history of pro-lymphocytic T cell leukemia, HTN who presented to ED with malaise and fatigue. Patient was seen at the [facility] and underwent chemotherapy as well as a platelet transfusion. BP were low at that time, and he was told to come into the ER if they continued to be low. Upon ER workup, BP was slightly low, but with normal MAP of 71. Lab work is remarkable for WBC of 151.3 (which is actually lower that it was at the [facility] yesterday morning of 185.2). Hgb has fallen to 7.7, was 9.7 previously. Platelets are improved after transfusion at 34 (up from 5). Patient also has some ELIZABETH with Cr of 2.03 (up from 1.5). Procalcitonin is slightly elevated at 0.69. Electrolytes showed K 7.2. Subjective/24hr Events (22): Still with malaise and fatigue but feeling much improved overall. Denies N/V/D. Denies decreased UOP. Denies CP/SOB. Denies F/C.       Assessment & Plan:     Principal Problem:    Hyperkalemia  - 6/2 K 7.2 - started on Lokelma  - 6/3 K 6.4 --> supposedly 7.4 --> give Insulin (glucose 286) + Albuterol + Lokelma  - 6/4 K 4.7  - Stop remote telemetry    Active Problems:    Hypomagnesemia  - Replaced IV  - / Mag 2.8  - Continue home Mag ox      Anemia  - Due to leukemia  - Continue to monitor  - Transfuse Hgb <7      ELIZABETH (acute kidney injury) ([address]  - Improving daily  - Likely due to dehdyration  - Continue IVFs  - Check BMP daily      Hyponatremia  - Resolved  - Likely due to dehdyration  - Continue IVFs  - Check BMP daily      Hypophosphatemia  - Resolved  - Likely due to chemo  - Replace with sodium phosphate      Prolymphocytic leukemia of T-cell (HCC)  - Per Heme/Onc      Severe obstructive sleep apnea      Essential hypertension, benign      Thrombocytopenia (HCC)  - 6/4 Down to 10K  - Transfuse 1U Platelets  - Check CBC daily  - Transfuse as needed    Diet:  ADULT DIET; Regular; Low Potassium (Less than 3000 mg/day)  ADULT ORAL NUTRITION SUPPLEMENT; Breakfast, Lunch, Dinner; Renal Oral Supplement  DVT PPx: SCDs  Code status: DNR    Hospital Problems:  Hospital Problems           Last Modified POA    Anemia 6/3/2022 Yes    ELIZABETH (acute kidney injury) ([facility] 6/3/2022 Yes    Prolymphocytic leukemia of T-cell ([facility] 6/3/2022 Yes    Severe obstructive sleep apnea 6/3/2022 Yes    Essential hypertension, benign 6/3/2022 Yes    Thrombocytopenia ([facility] 6/3/2022 Yes          Objective:     Patient Vitals for the past 24 hrs:   Temp Pulse Resp BP SpO2   06/04/22 1554 97.7 °F (36.5 °C) 81 16 125/71 100 %   06/04/22 1510 97.7 °F (36.5 °C) 81 18 129/81 100 %   06/04/22 1441 98.4 °F (36.9 °C) 83 16 126/84 99 %   06/04/22 1054 97.6 °F (36.4 °C) 78 16 126/81 100 %   06/04/22 0729 97.7 °F (36.5 °C) 80 16 126/74 100 %   06/04/22 0325 98.1 °F (36.7 °C) 80 16 133/82 100 %   06/03/22 2334 98.2 °F (36.8 °C) 85 16 130/79 99 %   06/03/22 2007 -- 90 16 -- 100 %   06/03/22 1932 97.7 °F (36.5 °C) 84 16 (!) 142/81 98 %   06/03/22 1646 97.9 °F (36.6 °C) 94 20 126/89 100 %       Oxygen Therapy  SpO2: 100 %  Pulse Oximeter Device Mode: Intermittent  O2 Device: Nasal cannula  O2 Flow Rate (L/min): 2 L/min    Estimated body mass index is 30.52 kg/m² as calculated from the following:    Height as of this encounter: 5' 11\" (1.803 m). Weight as of this encounter: 218 lb 12.8 oz (99.2 kg). No intake or output data in the 24 hours ending 06/04/22 1632      Physical Exam:   Blood pressure 125/71, pulse 81, temperature 97.7 °F (36.5 °C), temperature source Oral, resp.  rate 16, height 5' 11\" (1.803 m), weight 218 lb 12.8 oz (99.2 kg), SpO2 100 %. General:    Well nourished. No overt distress. Appears fatigued. Head:  Normocephalic, atraumatic  Eyes:  Sclerae appear normal.  Pupils equally round. ENT:  Nares appear normal, no drainage. Moist oral mucosa  Neck:  No restricted ROM. Trachea midline   CV:   RRR. No m/r/g. No jugular venous distension. Lungs:   CTAB. No wheezing, rhonchi, or rales. Respirations even, unlabored  Abdomen: Bowel sounds present. Soft, nontender, nondistended. Extremities: No cyanosis or clubbing. No edema  Skin:     No rashes and normal coloration. Warm and dry. Neuro:  CN II-XII grossly intact. Sensation intact. A&Ox3  Psych:  Normal mood and affect.       I have reviewed ordered lab tests and independently visualized imaging below:    Recent Labs:  Recent Results (from the past 48 hour(s))   CBC with Auto Differential    Collection Time: 06/02/22 11:11 PM   Result Value Ref Range    .3 (HH) 4.3 - 11.1 K/uL    RBC 2.45 (L) 4.23 - 5.6 M/uL    Hemoglobin 7.7 (L) 13.6 - 17.2 g/dL    Hematocrit 23.0 (L) 41.1 - 50.3 %    MCV 93.9 79.6 - 97.8 FL    MCH 31.4 26.1 - 32.9 PG    MCHC 33.5 31.4 - 35.0 g/dL    RDW 18.5 (H) 11.9 - 14.6 %    Platelets 34 (L) 882 - 450 K/uL    MPV 9.6 9.4 - 12.3 FL    nRBC 0.02 0.0 - 0.2 K/uL    Seg Neutrophils 0 (L) 43 - 78 %    Lymphocytes 89 (H) 13 - 44 %    Monocytes 11 4.0 - 12.0 %    Eosinophils % 0 (L) 0.5 - 7.8 %    Basophils 0 0.0 - 2.0 %    Immature Granulocytes 0 0.0 - 5.0 %    Segs Absolute 0.0 (L) 1.7 - 8.2 K/UL    Absolute Lymph # 134.7 (H) 0.5 - 4.6 K/UL    Absolute Mono # 16.6 (H) 0.1 - 1.3 K/UL    Absolute Eos # 0.0 0.0 - 0.8 K/UL    Basophils Absolute 0.0 0.0 - 0.2 K/UL    Absolute Immature Granulocyte 0.0 0.0 - 0.5 K/UL    RBC Comment MODERATE  ANISOCYTOSIS + POIKILOCYTOSIS        RBC Comment OCCASIONAL  TEARDROP CELLS        WBC Comment Result Confirmed By Smear      Platelet Comment DECREASED      Differential Type AUTOMATED     Comprehensive Metabolic Panel    Collection Time: 06/02/22 11:11 PM   Result Value Ref Range    Sodium 134 (L) 136 - 145 mmol/L    Potassium 7.2 (HH) 3.5 - 5.1 mmol/L    Chloride 103 98 - 107 mmol/L    CO2 24 21 - 32 mmol/L    Anion Gap 7 7 - 16 mmol/L    Glucose 297 (H) 65 - 100 mg/dL    BUN 31 (H) 8 - 23 MG/DL    CREATININE 2.03 (H) 0.8 - 1.5 MG/DL    GFR  42 (L) >60 ml/min/1.73m2    GFR Non- 35 (L) >60 ml/min/1.73m2    Calcium 8.8 8.3 - 10.4 MG/DL    Total Bilirubin 0.7 0.2 - 1.1 MG/DL    ALT 36 12 - 65 U/L    AST 92 (H) 15 - 37 U/L    Alk Phosphatase 113 50 - 136 U/L    Total Protein 5.6 (L) 6.3 - 8.2 g/dL    Albumin 3.2 3.2 - 4.6 g/dL    Globulin 2.4 2.3 - 3.5 g/dL    Albumin/Globulin Ratio 1.3 1.2 - 3.5     Procalcitonin    Collection Time: 06/02/22 11:11 PM   Result Value Ref Range    Procalcitonin 0.69 (H) 0.00 - 0.49 ng/mL   Lactic Acid    Collection Time: 06/02/22 11:13 PM   Result Value Ref Range    Lactic Acid, Plasma 2.5 (H) 0.4 - 2.0 MMOL/L   COVID-19, Rapid    Collection Time: 06/02/22 11:45 PM    Specimen: Nasopharyngeal   Result Value Ref Range    Source NASAL SWAB      SARS-CoV-2, Rapid Not detected NOTD     Rapid influenza A/B antigens    Collection Time: 06/02/22 11:45 PM    Specimen: Nasal Washing   Result Value Ref Range    Influenza A Ag Negative NEG      Influenza B Ag Negative NEG      Source Nasopharyngeal     Culture, Blood 1    Collection Time: 06/03/22 12:39 AM    Specimen: Blood   Result Value Ref Range    Special Requests LEFT  Antecubital        Culture NO GROWTH 1 DAY     Culture, Urine    Collection Time: 06/03/22  1:05 AM    Specimen: URINE-CLEAN CATCH    URINE   Result Value Ref Range    Special Requests NO SPECIAL REQUESTS      Culture NO GROWTH 1 DAY     Culture, Blood 1    Collection Time: 06/03/22  3:05 AM    Specimen: Blood   Result Value Ref Range    Special Requests LEFT  HAND        Culture NO GROWTH 1 DAY     Basic Metabolic Panel w/ Reflex to MG    Collection Time: 06/03/22  3:05 AM   Result Value Ref Range    Sodium 132 (L) 136 - 145 mmol/L    Potassium 6.3 (HH) 3.5 - 5.1 mmol/L    Chloride 99 98 - 107 mmol/L    CO2 22 21 - 32 mmol/L    Anion Gap 11 7 - 16 mmol/L    Glucose 323 (H) 65 - 100 mg/dL    BUN 37 (H) 8 - 23 MG/DL    CREATININE 1.96 (H) 0.8 - 1.5 MG/DL    GFR  44 (L) >60 ml/min/1.73m2    GFR Non- 36 (L) >60 ml/min/1.73m2    Calcium 8.7 8.3 - 10.4 MG/DL   Lactic Acid    Collection Time: 06/03/22  3:05 AM   Result Value Ref Range    Lactic Acid, Plasma 2.7 (H) 0.4 - 2.0 MMOL/L   Renal Function Panel    Collection Time: 06/03/22  3:05 AM   Result Value Ref Range    CO2 21 21 - 32 mmol/L    Anion Gap Cannot be calculated 7 - 16 mmol/L    Glucose 286 (H) 65 - 100 mg/dL    BUN 31 (H) 8 - 23 MG/DL    CREATININE 2.08 (H) 0.8 - 1.5 MG/DL    GFR  41 (L) >60 ml/min/1.73m2    GFR Non- 34 (L) >60 ml/min/1.73m2    Calcium 9.2 8.3 - 10.4 MG/DL    Phosphorus 2.0 (L) 2.3 - 3.7 MG/DL    Albumin 3.2 3.2 - 4.6 g/dL   CBC with Auto Differential    Collection Time: 06/03/22  3:05 AM   Result Value Ref Range    .1 (HH) 4.3 - 11.1 K/uL    RBC 2.52 (L) 4.23 - 5.6 M/uL    Hemoglobin 7.9 (L) 13.6 - 17.2 g/dL    Hematocrit 24.0 (L) 41.1 - 50.3 %    MCV 95.2 79.6 - 97.8 FL    MCH 31.3 26.1 - 32.9 PG    MCHC 32.9 31.4 - 35.0 g/dL    RDW 18.6 (H) 11.9 - 14.6 %    Platelets 32 (L) 344 - 450 K/uL    MPV 10.0 9.4 - 12.3 FL    nRBC 0.00 0.0 - 0.2 K/uL    Seg Neutrophils 0 (L) 43 - 78 %    Lymphocytes 68 (H) 13 - 44 %    Monocytes 32 (H) 4.0 - 12.0 %    Eosinophils % 0 (L) 0.5 - 7.8 %    Basophils 0 0.0 - 2.0 %    Immature Granulocytes 0 0.0 - 5.0 %    Segs Absolute 0.0 (L) 1.7 - 8.2 K/UL    Absolute Lymph # 81.7 (H) 0.5 - 4.6 K/UL    Absolute Mono # 38.4 (H) 0.1 - 1.3 K/UL    Absolute Eos # 0.0 0.0 - 0.8 K/UL    Basophils Absolute 0.0 0.0 - 0.2 K/UL    Absolute Immature Granulocyte 0.0 0.0 - 0.5 K/UL    RBC Comment SLIGHT  ANISOCYTOSIS + POIKILOCYTOSIS        RBC Comment SLIGHT  POLYCHROMASIA        RBC Comment SLIGHT  MACROCYTOSIS        RBC Comment SLIGHT  HYPOCHROMIA        WBC Comment Result Confirmed By Smear      Platelet Comment MARKED      Differential Type AUTOMATED     Renal Function Panel    Collection Time: 06/03/22  6:33 PM   Result Value Ref Range    Sodium 137 136 - 145 mmol/L    Potassium 4.7 3.5 - 5.1 mmol/L    Chloride 105 98 - 107 mmol/L    CO2 18 (L) 21 - 32 mmol/L    Anion Gap 14 7 - 16 mmol/L    Glucose 311 (H) 65 - 100 mg/dL    BUN 56 (H) 8 - 23 MG/DL    CREATININE 2.09 (H) 0.8 - 1.5 MG/DL    GFR  41 (L) >60 ml/min/1.73m2    GFR Non- 34 (L) >60 ml/min/1.73m2    Calcium 8.4 8.3 - 10.4 MG/DL    Phosphorus 6.1 (H) 2.3 - 3.7 MG/DL    Albumin 3.8 3.2 - 4.6 g/dL   Lactic Acid    Collection Time: 06/03/22  8:08 PM   Result Value Ref Range    Lactic Acid, Plasma 3.4 (H) 0.4 - 2.0 MMOL/L   Lactic Acid    Collection Time: 06/04/22 12:02 AM   Result Value Ref Range    Lactic Acid, Plasma 1.7 0.4 - 2.0 MMOL/L   Magnesium    Collection Time: 06/04/22  5:20 AM   Result Value Ref Range    Magnesium 2.8 (H) 1.8 - 2.4 mg/dL   Phosphorus    Collection Time: 06/04/22  5:20 AM   Result Value Ref Range    Phosphorus 6.1 (H) 2.3 - 3.7 MG/DL   CBC with Auto Differential    Collection Time: 06/04/22  5:20 AM   Result Value Ref Range    WBC 58.9 (HH) 4.3 - 11.1 K/uL    RBC 2.60 (L) 4.23 - 5.6 M/uL    Hemoglobin 8.2 (L) 13.6 - 17.2 g/dL    Hematocrit 23.7 (L) 41.1 - 50.3 %    MCV 91.2 79.6 - 97.8 FL    MCH 31.5 26.1 - 32.9 PG    MCHC 34.6 31.4 - 35.0 g/dL    RDW 18.2 (H) 11.9 - 14.6 %    Platelets 10 (LL) 387 - 450 K/uL    MPV Unable to calculate. Recommend adding IPF.  9.4 - 12.3 FL    nRBC 0.00 0.0 - 0.2 K/uL    Seg Neutrophils 0 (L) 43 - 78 %    Lymphocytes 89 (H) 13 - 44 %    Monocytes 11 4.0 - 12.0 %    Eosinophils % 0 (L) 0.5 - 7.8 %    Basophils 0 0.0 - 2.0 %    Immature Granulocytes 0 0.0 - 5.0 %    Segs Absolute 0.0 (L) 1.7 - 8.2 K/UL    Absolute Lymph # 52.4 (H) 0.5 - 4.6 K/UL    Absolute Mono # 6.5 (H) 0.1 - 1.3 K/UL    Absolute Eos # 0.0 0.0 - 0.8 K/UL    Basophils Absolute 0.0 0.0 - 0.2 K/UL    Absolute Immature Granulocyte 0.0 0.0 - 0.5 K/UL    RBC Comment SLIGHT  ANISOCYTOSIS + POIKILOCYTOSIS        RBC Comment SLIGHT  MACROCYTOSIS        RBC Comment SLIGHT  POLYCHROMASIA        WBC Comment Result Confirmed By Smear      Platelet Comment MARKED      Differential Type AUTOMATED     Basic Metabolic Panel    Collection Time: 06/04/22  5:20 AM   Result Value Ref Range    Sodium 139 136 - 145 mmol/L    Potassium 4.7 3.5 - 5.1 mmol/L    Chloride 107 98 - 107 mmol/L    CO2 22 21 - 32 mmol/L    Anion Gap 10 7 - 16 mmol/L    Glucose 320 (H) 65 - 100 mg/dL    BUN 52 (H) 8 - 23 MG/DL    CREATININE 1.69 (H) 0.8 - 1.5 MG/DL    GFR African American 52 (L) >60 ml/min/1.73m2    GFR Non- 43 (L) >60 ml/min/1.73m2    Calcium 8.0 (L) 8.3 - 10.4 MG/DL   PREPARE PLATELETS, 1 Product    Collection Time: 06/04/22 10:00 AM   Result Value Ref Range    Unit Number [id number]     Product Code Blood Bank [facility],[email or web address]     Unit Divison 00     Dispense Status Blood Bank ISSUED    TYPE AND SCREEN    Collection Time: 06/04/22 12:32 PM   Result Value Ref Range    Crossmatch expiration date 06/07/2022,1212     ABO/Rh A POSITIVE     Antibody Screen NEG        Other Studies:  XR CHEST (2 VW)    Result Date: 6/3/2022  EXAM: XR CHEST (2 VW) HISTORY: pain. TECHNIQUE: Frontal and lateral chest. COMPARISON: 11/5/2021 FINDINGS: The cardiac silhouette, mediastinum, and pulmonary vasculature are within normal limits. There is no consolidation, pleural effusion, or pneumothorax. There is a right-sided MediPort. No significant osseous abnormalities are observed. No evidence of an acute intrathoracic process.         Current Meds:  Current Facility-Administered Medications Medication Dose Route Frequency    0.9 % sodium chloride infusion   IntraVENous PRN    acyclovir (ZOVIRAX) tablet 400 mg  400 mg Oral BID    fluconazole (DIFLUCAN) tablet 200 mg  200 mg Oral Daily    furosemide (LASIX) tablet 20 mg  20 mg Oral Daily    LORazepam (ATIVAN) tablet 1 mg  1 mg Oral BID PRN    atorvastatin (LIPITOR) tablet 10 mg  10 mg Oral Daily    mirtazapine (REMERON) tablet 15 mg  15 mg Oral Nightly    pantoprazole (PROTONIX) tablet 40 mg  40 mg Oral QAM AC    potassium chloride (KLOR-CON M) extended release tablet 20 mEq  20 mEq Oral Daily    Venetoclax TABS 100 mg- pt supplied (Patient Supplied)  100 mg Oral Every Other Day    gabapentin (NEURONTIN) capsule 100 mg  100 mg Oral TID    oxyCODONE (ROXICODONE) immediate release tablet 5 mg  5 mg Oral Q8H PRN    sertraline (ZOLOFT) tablet 50 mg  50 mg Oral Daily    sodium chloride flush 0.9 % injection 5-40 mL  5-40 mL IntraVENous 2 times per day    sodium chloride flush 0.9 % injection 5-40 mL  5-40 mL IntraVENous PRN    0.9 % sodium chloride infusion   IntraVENous PRN    ondansetron (ZOFRAN-ODT) disintegrating tablet 4 mg  4 mg Oral Q8H PRN    Or    ondansetron (ZOFRAN) injection 4 mg  4 mg IntraVENous Q6H PRN    polyethylene glycol (GLYCOLAX) packet 17 g  17 g Oral Daily PRN    acetaminophen (TYLENOL) tablet 650 mg  650 mg Oral Q6H PRN    Or    acetaminophen (TYLENOL) suppository 650 mg  650 mg Rectal Q6H PRN    magnesium oxide (MAG-OX) tablet 400 mg  400 mg Oral Daily       Discharge Plan:     Location: Home  Days: 1-2  PPD Ordered: N/A    Signed:  Cara Schmitt DO    Part of this note may have been written by using a voice dictation software. The note has been proof read but may still contain some grammatical/other typographical errors.

## 2022-06-05 NOTE — DISCHARGE SUMMARY
Hospitalist Discharge Summary   Admit Date:  2022 10:52 PM   DC Note date: 2022  Name:  Cherrie Romano   Age:  79 y.o. Sex:  male  :  1955   MRN:  [id number]   Room:  [facility]  PCP:  KESHAWN Peñaloza NP    Presenting Complaint: Hypotension     Initial Admission Diagnosis: Anemia [D64.9]  Thrombocytopenia ([address] [D69.6]  Septicemia ([address] [A41.9]  Acute kidney injury ([address] [N17.9]  Anemia, unspecified type [D64.9]     Problem List for this Hospitalization (present on admission):    Principal Problem (Resolved): Hyperkalemia  Active Problems:    Anemia    ELIZABETH (acute kidney injury) ([address]    Prolymphocytic leukemia of T-cell (HCC)    Severe obstructive sleep apnea    Essential hypertension, benign    Thrombocytopenia (HCC)  Resolved Problems:    Hypomagnesemia    Hyponatremia    Hypophosphatemia    Did Patient have Sepsis (YES OR NO): NO    Hospital Course:  79 y. o. male with medical history of pro-lymphocytic T cell leukemia, HTN who presented to ED with malaise and fatigue. Patient was seen at the [facility] and underwent chemotherapy as well as a platelet transfusion.  BP were low at that time, and he was told to come into the ER if they continued to be low.  Upon ER workup, BP was slightly low, but with normal MAP of 71.  Lab work is remarkable for WBC of 151.3 (which is actually lower that it was at the [facility] yesterday morning of 185.2).  Hgb has fallen to 7.7, was 9.7 previously.  Platelets are improved after transfusion at 34 (up from 5).  Patient also has some ELIZABETH with Cr of 2.03 (up from 1.5).  Procalcitonin is slightly elevated at 0.69. Electrolytes showed K 7.2. He got Lokelma, Albuterol, and Insulin. K came down to 4.7. His magnesium and phosphorus were replaced. His platelets dropped to 10K and he got transfused x 2. He felt much improved and was discharged home. Disposition: Home  Diet: ADULT DIET;  Regular; Low Potassium (Less than 3000 mg/day)  ADULT ORAL NUTRITION SUPPLEMENT; Breakfast, Lunch, Dinner; Renal Oral Supplement  Code Status: DNR    Follow Ups:  PCP within one week  Oncology in 1-3 days    Follow up labs/diagnostics (ultimately defer to outpatient provider):  Blood sugars  CBC by Heme/Onc      Time spent in patient discharge and coordination 34 minutes. Plan was discussed with patient, wife, nursing, case management, Oncology. All questions answered. Patient was stable at time of discharge. Instructions given to call a physician or return if any concerns. Current Discharge Medication List      START taking these medications    Details   magnesium oxide (MAG-OX) 400 (240 Mg) MG tablet Take 1 tablet by mouth daily  Qty: 30 tablet, Refills: 0         CONTINUE these medications which have NOT CHANGED    Details   gabapentin (NEURONTIN) 100 MG capsule Take by mouth in the morning and at bedtime. oxyCODONE (ROXICODONE) 5 MG immediate release tablet Take 5 mg by mouth every 8 hours as needed. sertraline (ZOLOFT) 50 MG tablet Take 50 mg by mouth daily      lidocaine-prilocaine (EMLA) 2.5-2.5 % cream Apply topically as needed (PRN for port use)  Qty: 1 each, Refills: 1    Associated Diagnoses: Prolymphocytic leukemia of T-cell (HCC)      acyclovir (ZOVIRAX) 400 MG tablet Take 400 mg by mouth 2 times daily      allopurinol (ZYLOPRIM) 300 MG tablet Take 300 mg by mouth daily      calcium carbonate (OYSTER SHELL CALCIUM 500 MG) 1250 (500 Ca) MG tablet Take 500 mg by mouth daily       docusate (COLACE, DULCOLAX) 100 MG CAPS Take 100 mg by mouth      fluconazole (DIFLUCAN) 200 MG tablet Take 200 mg by mouth daily      furosemide (LASIX) 20 MG tablet Take 20 mg by mouth daily      LORazepam (ATIVAN) 1 MG tablet Take 1 mg by mouth 2 times daily as needed.       lovastatin (MEVACOR) 10 MG tablet Take 10 mg by mouth      magnesium oxide (MAG-OX) 400 MG tablet Take 400 mg by mouth daily      mirtazapine (REMERON) 15 MG tablet Take 15 mg by mouth omeprazole (PRILOSEC) 40 MG delayed release capsule TAKE 1 CAPSULE BY MOUTH DAILY      potassium chloride (KLOR-CON M) 20 MEQ extended release tablet Take 20 mEq by mouth daily      Venetoclax (VENCLEXTA) 100 MG TABS Take 100 mg by mouth every other day          STOP taking these medications       levoFLOXacin (LEVAQUIN) 500 MG tablet Comments:   Reason for Stopping:         lidocaine viscous hcl (XYLOCAINE) 2 % SOLN solution Comments:   Reason for Stopping:               Procedures done this admission:  * No surgery found *    Consults this admission:  IP CONSULT TO ONCOLOGY    Echocardiogram results:  11/12/21    TRANSTHORACIC ECHOCARDIOGRAM (TTE) LIMITED (CONTRAST/BUBBLE/3D PRN) 11/12/2021  4:37 PM (Final)    Narrative  This is a summary report. The complete report is available in the patient's medical record. If you cannot access the medical record, please contact the sending organization for a detailed fax or copy. · LA: Left Atrium volume index is 32.88 mL/m2. · TV: Mild tricuspid valve regurgitation is present. Right Ventricular Arterial Pressure (RVSP) is 18 mmHg. · LV: Estimated LVEF is 55 - 60%. Normal cavity size, wall thickness and systolic function (ejection fraction normal). Wall motion: unable to assess due to limited image quality. Global longitudinal strain is -19%. Signed by: Cristin Gilliam on 11/12/2021  4:37 PM      Diagnostic Imaging/Tests:   XR CHEST (2 VW)    Result Date: 6/3/2022  EXAM: XR CHEST (2 VW) HISTORY: pain. TECHNIQUE: Frontal and lateral chest. COMPARISON: 11/5/2021 FINDINGS: The cardiac silhouette, mediastinum, and pulmonary vasculature are within normal limits. There is no consolidation, pleural effusion, or pneumothorax. There is a right-sided MediPort. No significant osseous abnormalities are observed. No evidence of an acute intrathoracic process.           Labs: Results:       BMP, Mg, Phos Recent Labs     06/03/22  0305 06/03/22  0305 06/03/22  1833 06/04/22  0520 06/05/22  0525   *   < > 137 139 138   K 6.3*   < > 4.7 4.7 5.0   CL 99   < > 105 107 107   CO2 21  22   < > 18* 22 23   BUN 31*  37*   < > 56* 52* 44*   MG  --   --   --  2.8*  --    PHOS 2.0*  --  6.1* 6.1*  --     < > = values in this interval not displayed.       CBC Recent Labs     06/03/22  0305 06/04/22  0520 06/05/22  0525   .1* 58.9* 25.2*   RBC 2.52* 2.60* 2.60*   HGB 7.9* 8.2* 8.2*   HCT 24.0* 23.7* 23.9*   PLT 32* 10* 11*      LFT Recent Labs     06/02/22  2311   ALT 36   GLOB 2.4      Cardiac Testing Lab Results   Component Value Date     09/30/2021     09/22/2021     09/16/2021      Coagulation Tests No results found for: INR, APTT   A1c No results found for: HBA1C   Lipid Panel Lab Results   Component Value Date    CHOL 163 12/09/2019    HDL 61 12/09/2019      Thyroid Panel Lab Results   Component Value Date    TSH 2.700 12/09/2019        Most Recent UA No results found for: MUCUS, UCOM       All Labs from Last 24 Hrs:  Recent Results (from the past 24 hour(s))   TYPE AND SCREEN    Collection Time: 06/04/22 12:32 PM   Result Value Ref Range    Crossmatch expiration date 06/07/2022,2359     ABO/Rh A POSITIVE     Antibody Screen NEG    Basic Metabolic Panel w/ Reflex to MG    Collection Time: 06/05/22  5:25 AM   Result Value Ref Range    Sodium 138 136 - 145 mmol/L    Potassium 5.0 3.5 - 5.1 mmol/L    Chloride 107 98 - 107 mmol/L    CO2 23 21 - 32 mmol/L    Anion Gap 8 7 - 16 mmol/L    Glucose 336 (H) 65 - 100 mg/dL    BUN 44 (H) 8 - 23 MG/DL    CREATININE 1.36 0.8 - 1.5 MG/DL    GFR African American >60 >60 ml/min/1.73m2    GFR Non- 56 (L) >60 ml/min/1.73m2    Calcium 8.5 8.3 - 10.4 MG/DL   CBC with Auto Differential    Collection Time: 06/05/22  5:25 AM   Result Value Ref Range    WBC 25.2 (H) 4.3 - 11.1 K/uL    RBC 2.60 (L) 4.23 - 5.6 M/uL    Hemoglobin 8.2 (L) 13.6 - 17.2 g/dL    Hematocrit 23.9 (L) 41.1 - 50.3 %    MCV 91.9 79.6 - 97.8 FL    MCH 31.5 26.1 - 32.9 PG    MCHC 34.3 31.4 - 35.0 g/dL    RDW 17.8 (H) 11.9 - 14.6 %    Platelets 11 (LL) 882 - 450 K/uL    MPV Unable to calculate. Recommend adding IPF.  9.4 - 12.3 FL    nRBC 0.00 0.0 - 0.2 K/uL    Seg Neutrophils 0 (L) 43 - 78 %    Lymphocytes 94 (H) 13 - 44 %    Monocytes 5 4.0 - 12.0 %    Eosinophils % 0 (L) 0.5 - 7.8 %    Basophils 1 0.0 - 2.0 %    Immature Granulocytes 0 0.0 - 5.0 %    Segs Absolute 0.0 (L) 1.7 - 8.2 K/UL    Absolute Lymph # 23.6 (H) 0.5 - 4.6 K/UL    Absolute Mono # 1.3 0.1 - 1.3 K/UL    Absolute Eos # 0.0 0.0 - 0.8 K/UL    Basophils Absolute 0.3 (H) 0.0 - 0.2 K/UL    Absolute Immature Granulocyte 0.0 0.0 - 0.5 K/UL    RBC Comment SLIGHT  ANISOCYTOSIS + POIKILOCYTOSIS        RBC Comment OCCASIONAL  TEARDROP CELLS        RBC Comment OCCASIONAL  OVALOCYTES        Platelet Comment MARKED      Differential Type AUTOMATED      WBC Comment Result Confirmed By Smear     PREPARE PLATELETS, 1 Product    Collection Time: 06/05/22  8:45 AM   Result Value Ref Range    Unit Number [id number]     Product Code Blood Bank [facility],LRIR2     Unit Divison 00     Dispense Status Blood Bank ALLOCATED        Allergies   Allergen Reactions    Alemtuzumab Other (See Comments)     Rigors    Other Hives     Platelets, needs pre meds before receiving transfusion     Immunization History   Administered Date(s) Administered    COVID-19, Moderna, Primary or Immunocompromised, PF, 100mcg/0.5mL 03/01/2021, 03/29/2021    COVID-19, Pfizer Purple top, DILUTE for use, 12+ yrs, 30mcg/0.3mL dose 10/24/2021    Influenza Quadv 10/30/2019    Influenza Virus Vaccine 11/17/2014, 10/09/2018    Pneumococcal Polysaccharide ([id number]) 02/17/2020    Tdap (Boostrix, Adacel) 06/29/2015       Recent Vital Data:  Patient Vitals for the past 24 hrs:   Temp Pulse Resp BP SpO2   06/05/22 0716 97.4 °F (36.3 °C) 75 16 (!) 134/91 100 %   06/04/22 2329 97.5 °F (36.4 °C) 76 16 (!) 148/93 97 %   06/04/22 2009 -- 82 16 -- 99 %   06/04/22 2007 -- 82 16 -- 99 %   06/04/22 1959 97.8 °F (36.6 °C) 77 18 122/82 100 %   06/04/22 1810 97.8 °F (36.6 °C) 77 16 (!) 123/95 100 %   06/04/22 1554 97.7 °F (36.5 °C) 81 16 125/71 100 %   06/04/22 1510 97.7 °F (36.5 °C) 81 18 129/81 100 %   06/04/22 1441 98.4 °F (36.9 °C) 83 16 126/84 99 %       Oxygen Therapy  SpO2: 100 %  Pulse Oximeter Device Mode: Intermittent  O2 Device: None (Room air)  O2 Flow Rate (L/min): 2 L/min    Estimated body mass index is 30.52 kg/m² as calculated from the following:    Height as of this encounter: 5' 11\" (1.803 m). Weight as of this encounter: 218 lb 12.8 oz (99.2 kg). No intake or output data in the 24 hours ending 06/05/22 1229      Physical Exam:  General:    Well nourished. No overt distress  Head:  Normocephalic, atraumatic  Eyes:  Sclerae appear normal.  Pupils equally round. HENT:  Nares appear normal, no drainage. Moist mucous membranes  Neck:  No restricted ROM. Trachea midline  CV:   RRR. No m/r/g. No JVD  Lungs:   CTAB. No wheezing, rhonchi, or rales. Even, unlabored  Abdomen:   Soft, nontender, nondistended. Extremities: Warm and dry. No cyanosis or clubbing. No edema. Skin:     No rashes. Normal coloration  Neuro:  CN II-XII grossly intact. Psych:  Normal mood and affect. Signed:  Cara Schmitt DO    Part of this note may have been written by using a voice dictation software. The note has been proof read but may still contain some grammatical/other typographical errors.

## 2022-06-05 NOTE — PROGRESS NOTES
Pt discharged to home after completion of platelets. Port flushed and left intact; pt returns to infusion center tomorrow morning. No new medications. PIV removed. Follow ups are scheduled.

## 2022-06-05 NOTE — CARE COORDINATION
ASSESSMENT NOTE    Attending Physician: Coit Shone,   Admit Problem: Anemia [D64.9]  Thrombocytopenia ([address] [D69.6]  Septicemia ([address] [A41.9]  Acute kidney injury ([address] [N17.9]  Anemia, unspecified type [D64.9]  Date/Time of Admission: 6/2/2022 10:52 PM  Problem List:  Patient Active Problem List   Diagnosis    Prolymphocytic leukemia of T-cell ([address]    Severe obstructive sleep apnea    Essential hypertension, benign    Hypokalemia    Right shoulder pain    DDD (degenerative disc disease), cervical    DDD (degenerative disc disease), lumbar    Neutropenic fever (HCC)    Thrombocytopenia (HCC)    Body mass index (BMI) of 40.0-44.9 in adult ([address]    First degree hemorrhoids    Pure hypercholesterolemia    Admission for antineoplastic chemotherapy    Benign prostatic hyperplasia with nocturia    Impotence    Sepsis ([address]    Acute deep vein thrombosis (DVT) of right lower extremity ([address]    Anxiety    Shortness of breath    Frailty    Anemia    ELIZABETH (acute kidney injury) [facility])       Service Assessment  Patient Orientation Alert and Oriented,Person,Place,Situation   Cognition Alert   History Provided By Spouse   Primary Caregiver Spouse   Accompanied By/Relationship wife, Micky Fortune is:     PCP Verified by CM Yes (Dr. Denver Bouton)   Last Visit to PCP Within last 3 months   Prior Functional Level Independent in ADLs/IADLs,Cooking,Housework,Shopping   Current Functional Level Assistance with the following:,Bathing,Cooking,Housework,Shopping   Can patient return to prior living arrangement Yes   Ability to make needs known: Good   Family able to assist with home care needs: Yes   Would you like for me to discuss the discharge plan with any other family members/significant others, and if so, who?      Financial Resources     Community Resources     CM/SW Referral       Social/Functional History  Lives With Spouse   Type of [facility] Access     Entrance Stairs - Number of Steps     Entrance Stairs - Rails     Bathroom Shower/Tub     Bathroom Toilet     Bathroom Equipment Shower chair   [address] Help From Family   ADL Assistance Independent   [employer]     Grooming     Feeding     Toileting     [address] Needs assistance   Meal Prep Unable to assess (comment)   Laundry Unable to assess (comment)   Vacuuming Unable to assess (comment)   Cleaning Unable to assess (comment)   Gardening Unable to assess (comment)   Yard Work Unable to assess (comment)   Driving Unable to assess (comment)   Shopping Unable to assess (comment)        Other (Comment)     [employer] Paying/Finance Responsibility     [facility]     Other (Comment)     Ambuation Assistance Independent   Transfer Assisstance Independent   Active      Patient's  Info     Mode of Transportation     Education     Occupation     Type of Occupation       Discharge Planning   Type of [address] Prior To Admission [facility] (possible need for oxygen)   DME     DME     DME Ordered? Potential Assistance Purchasing Medications No   Meds-to-Beds: Does the patient want to have any new prescriptions delivered to bedside prior to discharge? Type of Home Care Services None   Patient expects to be discharged to:      Follow Up Appointment: Best Day/Time     One/Two Story Residence:     # of Interior Steps     Height of Each Step (in)     [employer] Available     History of Falls? Services At/After Discharge  Transition of Care Consult (CM Consult): Discharge Planning,DME/Supply Assistance   Internal Home Health     Internal Hospice     Reason Outside Agency [address]     Partner SNF     Reason Why Partner SNF Not Chosen     Internal Comfort Care     Reason Outside [address] [facility] [facility] [address] Discharge     [facility] Resource Information Provided? Mode of Transport at Discharge [facility] Time of Discharge     Confirm Follow Up Transport Family     Condition of Participation: Discharge Planning  The plan for Transition of Care is related to the following treatment goals: medical stability   The Patient and/or Patient Representative was provided with a Choice of Provider? Patient Representative   Name of the Patient Representative who was provided with the Choice of Provider and agrees with the Discharge Plan? Geraldine Centeno, spouse   The Patient and/or Patient Representative Agree with the Discharge Plan? Yes   Freedom of Choice list was provided with basic dialogue that supports the individualized plan of care/goals, treatment preferences, and shares the quality data associated with the providers?  Yes     Documentation for Discharge Appeal  Discharge Appealed by     Date notified by QIO of appeal request:     Time notified by QIO of appeal request:     Detailed Notice of Discharge given to:     Date Notice of Discharge given:     Time Notice of Discharge given:     Date records sent to [address]     Time records sent to [address]     Date Notified of Outcome     Time Notified of Outcome     Outcome of appeal           CHA Tracy 06/05/22 2:08 PM    Jermaine Rosales LMSW    [facility]

## 2022-06-06 NOTE — PROGRESS NOTES
Arrived to the [facility]. 1 unit RBC's infusion completed. Patient tolerated well. Any issues or concerns during appointment: Patient arrived to visit with port accessed, chg patch in place, and dressing NOT in place. Patient stated he left hospital yesterday with port accessed and he removed dressing after showering last night. Site cleaned and new dressing applied. Educated pt and spouse about importance of dressing integrity and CLABSI prevention techniques and risks. Pt and spouse voiced understanding. Patient aware of next infusion appointment on 6/9/2022 at 0930. Patient aware of next lab and [facility] office visit on 6/7/2022 at 1400. Patient instructed to call provider with temperature of 100.4 or greater or nausea/vomiting/ diarrhea or pain not controlled by medications  Discharged ambulatory w/spouse.

## 2022-06-06 NOTE — PATIENT INSTRUCTIONS
For dry mouth, try over the counter Mouth Kote spray (artificial saliva), sour candies (lemonheads, warheads). To help keep you mouth hydrated and clean, swish and spit the following ratio: 4 cups of water, 1 tsp of salt and 1 tsp of baking soda. You can try just salt or just baking soda if you'd like. For the white coating on your tongue and taste changes, will send nystatin mouthwash. Use as directed.

## 2022-06-06 NOTE — PROGRESS NOTES
Clinical Social Work Note  Name: Trevor Raymond    : 1955    MRN: [id number]    Date of Service: 2022    Type of Service: Case Management & Health and Behavior Intervention     Length of Service: 16 minutes    Patient Diagnosis:  Prolymphocytic leukemia of T-cell [facility])    Referral Source: Infusion RN    Reason for Visit: F/U    CM Note: Seen patient wife his wife, provided therapeutic reassurance. CODY-CP discussed Self-Compassion. Mini Mental Status Exam: Trevor Raymond was dressed properly. No abnormal psychomotor movements observed. Intellectual functioning appeared to be intact. Insight was adequate. Judgment was adequate. Patient did not report suicidal ideations, intent or plans. Speech was coherent. Thought process was clear. Patient did not report homicidal ideations, intent or plans. Patient was oriented to self, place, time and situation. Protective Factors: Current care for physical and mental illness, adequate insight and judgment, family support, cultural and Scientology beliefs and values that support self-care. Next Steps: CODY-MARTÍN gave contact information and encouraged pt to call should any needs arise. Pt verbalized understanding. CODY -CP intends to follow up as needed. No flowsheet data found.         Electronically Signed By:  CODY Wills

## 2022-06-06 NOTE — PROGRESS NOTES
Outpatient Palliative Care at the  [facility], [place]: Office Visit Established Patient    Diagnosis: T-Cell leukemia     Treatment Plan: [place] and bendamustine--> Istodax     Treatment Intent: Remission     Medical Oncologist: Dr. Keiko Foley     Radiation Oncologist: N/A     Navigator: Maldonado Noyola RN    Chief Complaint:    Chief Complaint   Patient presents with    Fatigue    Taste Change       History of Present Illness:  Mr. Alber Dietz is a 79 y.o. male who presents today for evaluation regarding introduction to palliative care in the setting of T-Cell pro-lymphocytic leukemia. He was diagnosed in December of 2018. He received 8 weeks of alemtuzumab and achieved MO. He received 14 doses of Pentostatin and achieved CR. He relapsed in September 2021. He received 2 cycles of ICE with no remission, then proceeded to Vencelxta and bendamustine with partial response. He is now receiving romidepsin (Istodax). Patient was hospitalized from 5/24-5/29 with DVT. He had IVC filter placed on 5/25. He has PMH of HLD, ISAURO, DVT, and GERD. Interval History:  Patient was hospitalized from 6/2-6/5 with hypotension. He was found to have ELIZABETH with hyperkalemia. He received two platelet transfusions while hospitalized. Patient seen today in infusion prior to his blood transfusions. His wife is with him. Patient more animated and engaged in today's visit. He attributes this to medications/transfusions received in the hospital.  He also received Zoloft 50mg during hospitalization; he is still taking 25mg at home. Patient has ongoing fatigue, worse from being in the bed more in the hospital.  He has dry mouth and taste changes leading to decreased appetite. Review of Systems:  Review of Systems   Constitutional: Positive for fatigue. HENT:        Dry  Mouth, taste changes   Psychiatric/Behavioral: Positive for dysphoric mood.        Allergies   Allergen Reactions    Alemtuzumab Other (See Comments) Rigors    Other Hives     Platelets, needs pre meds before receiving transfusion     Past Medical History:   Diagnosis Date    Back pain     occassionally    Cervical radiculopathy     Claustrophobia     DVT (deep venous thrombosis) ([facility] [address] 06/2019    currently treated with Xarelto- started on 5/30/2019    GERD (gastroesophageal reflux disease)     H/O seasonal allergies     Hyperlipidemia     Impotence     Insomnia     Lateral epicondylitis     Leukemia (HCC)     CHEMO    Obesity     BMI 36.6    Sleep apnea     noncomplaint with CPAP     Past Surgical History:   Procedure Laterality Date    CIRCUMCISION      COLONOSCOPY  2017    Due in 2027    IR IVC FILTER PLACEMENT W IMAGING  5/25/2022    IR IVC FILTER PLACEMENT W IMAGING 5/25/2022 [facility]    IR PORT PLACEMENT EQUAL OR GREATER THAN 5 YEARS  11/23/2021    IR PORT PLACEMENT EQUAL OR GREATER THAN 5 YEARS  6/19/2019    IR PORT PLACEMENT EQUAL OR GREATER THAN 5 YEARS  6/19/2019    IR PORT PLACEMENT EQUAL OR GREATER THAN 5 YEARS 6/19/2019 [facility]    IR PORT PLACEMENT EQUAL OR GREATER THAN 5 YEARS  11/23/2021    IR PORT PLACEMENT EQUAL OR GREATER THAN 5 YEARS 11/23/2021 [facility] [de-identified]    IR REMOVE TUNNELED VAD W PORT  12/14/2020    ORTHOPEDIC SURGERY Right     r wrist    VASCULAR SURGERY      WISDOM TOOTH EXTRACTION       Family History   Problem Relation Age of Onset    Hypertension Brother     Hypertension Brother     No Known Problems Son     Cancer Father 76        breast    Cancer Mother 80        pancreatic    No Known Problems Daughter      Social History     Socioeconomic History    Marital status:      Spouse name: Not on file    Number of children: Not on file    Years of education: Not on file    Highest education level: Not on file   Occupational History    Not on file   Tobacco Use    Smoking status: Never Smoker    Smokeless tobacco: Never Used   Substance and Sexual Activity  Alcohol use: Not Currently     Alcohol/week: 0.0 standard drinks    Drug use: No    Sexual activity: Not on file   Other Topics Concern    Not on file   Social History Narrative    Not on file     Social Determinants of Health     Financial Resource Strain:     Difficulty of Paying Living Expenses: Not on file   Food Insecurity:     Worried About Running Out of Food in the Last Year: Not on file    Ann of Food in the Last Year: Not on file   Transportation Needs:     Lack of Transportation (Medical): Not on file    Lack of Transportation (Non-Medical): Not on file   Physical Activity:     Days of Exercise per Week: Not on file    Minutes of Exercise per Session: Not on file   Stress:     Feeling of Stress : Not on file   Social Connections:     Frequency of Communication with Friends and Family: Not on file    Frequency of Social Gatherings with Friends and Family: Not on file    Attends [employer]ism Services: Not on file    Active Member of [address] [place] [address] or Organizations: Not on file    Attends Club or Organization Meetings: Not on file    Marital Status: Not on file   Intimate Partner Violence:     Fear of Current or Ex-Partner: Not on file    Emotionally Abused: Not on file    Physically Abused: Not on file    Sexually Abused: Not on file   Housing Stability:     Unable to Pay for Housing in the Last Year: Not on file    Number of [place] in the Last Year: Not on file    Unstable Housing in the Last Year: Not on file     Current Outpatient Medications   Medication Sig Dispense Refill    nystatin (MYCOSTATIN) 179522 UNIT/ML suspension Take 5 mLs by mouth 4 times daily for 10 days Retain in mouth as long as possible 200 mL 0    magnesium oxide (MAG-OX) 400 (240 Mg) MG tablet Take 1 tablet by mouth daily 30 tablet 0    gabapentin (NEURONTIN) 100 MG capsule Take by mouth in the morning and at bedtime.        oxyCODONE (ROXICODONE) 5 MG immediate release tablet Take 5 mg by mouth every 8 hours as needed.  sertraline (ZOLOFT) 50 MG tablet Take 50 mg by mouth daily      lidocaine-prilocaine (EMLA) 2.5-2.5 % cream Apply topically as needed (PRN for port use) 1 each 1    acyclovir (ZOVIRAX) 400 MG tablet Take 400 mg by mouth 2 times daily      allopurinol (ZYLOPRIM) 300 MG tablet Take 300 mg by mouth daily      calcium carbonate (OYSTER SHELL CALCIUM 500 MG) 1250 (500 Ca) MG tablet Take 500 mg by mouth daily       docusate (COLACE, DULCOLAX) 100 MG CAPS Take 100 mg by mouth      fluconazole (DIFLUCAN) 200 MG tablet Take 200 mg by mouth daily      furosemide (LASIX) 20 MG tablet Take 20 mg by mouth daily      LORazepam (ATIVAN) 1 MG tablet Take 1 mg by mouth 2 times daily as needed.  lovastatin (MEVACOR) 10 MG tablet Take 10 mg by mouth      magnesium oxide (MAG-OX) 400 MG tablet Take 400 mg by mouth daily      mirtazapine (REMERON) 15 MG tablet Take 15 mg by mouth      omeprazole (PRILOSEC) 40 MG delayed release capsule TAKE 1 CAPSULE BY MOUTH DAILY      potassium chloride (KLOR-CON M) 20 MEQ extended release tablet Take 20 mEq by mouth daily      Venetoclax (VENCLEXTA) 100 MG TABS Take 100 mg by mouth every other day        No current facility-administered medications for this visit.      Facility-Administered Medications Ordered in Other Visits   Medication Dose Route Frequency Provider Last Rate Last Admin    sodium chloride flush 0.9 % injection 5-40 mL  5-40 mL IntraVENous PRN Get Franks MD   10 mL at 06/06/22 0850    acetaminophen (TYLENOL) tablet 650 mg  650 mg Oral Once Seabron Axe, APRN - NP        0.9 % sodium chloride infusion   IntraVENous PRN Seabron Axe, APRN - NP        diphenhydrAMINE (BENADRYL) capsule 25 mg  25 mg Oral Once Seabron Axe, APRN - NP           OBJECTIVE:  Wt Readings from Last 1 Encounters:   06/04/22 218 lb 12.8 oz (99.2 kg)     Temp Readings from Last 1 Encounters:   06/06/22 97.5 °F (36.4 °C) (Oral)     BP Readings from Last 1 Encounters:   06/06/22 132/69     Pulse Readings from Last 1 Encounters:   06/06/22 84       No data recorded      Physical Exam:  Constitutional: Well developed, well nourished male in no acute distress. HEENT: Normocephalic and atraumatic. Pupils are equal, round, and reactive to light. Extraocular muscles are intact. Sclerae anicteric. Neck supple without JVD. Lymph node   deferred   Skin Warm and dry. No bruising and no rash noted. No erythema. No pallor. Respiratory  Respiratory effort unlabored   CVS Regular rate, normotensive. Abdomen Soft, nontender and nondistended. Neuro Grossly nonfocal with no obvious sensory or motor deficits. MSK Normal range of motion in general.  1-2+ BLE edema. Psych Appropriate mood. Improved affect.      Palliative Performance Scale (PPS): 70%      Labs:  Recent Results (from the past 96 hour(s))   CBC with Auto Differential    Collection Time: 06/02/22 11:11 PM   Result Value Ref Range    .3 (HH) 4.3 - 11.1 K/uL    RBC 2.45 (L) 4.23 - 5.6 M/uL    Hemoglobin 7.7 (L) 13.6 - 17.2 g/dL    Hematocrit 23.0 (L) 41.1 - 50.3 %    MCV 93.9 79.6 - 97.8 FL    MCH 31.4 26.1 - 32.9 PG    MCHC 33.5 31.4 - 35.0 g/dL    RDW 18.5 (H) 11.9 - 14.6 %    Platelets 34 (L) 606 - 450 K/uL    MPV 9.6 9.4 - 12.3 FL    nRBC 0.02 0.0 - 0.2 K/uL    Seg Neutrophils 0 (L) 43 - 78 %    Lymphocytes 89 (H) 13 - 44 %    Monocytes 11 4.0 - 12.0 %    Eosinophils % 0 (L) 0.5 - 7.8 %    Basophils 0 0.0 - 2.0 %    Immature Granulocytes 0 0.0 - 5.0 %    Segs Absolute 0.0 (L) 1.7 - 8.2 K/UL    Absolute Lymph # 134.7 (H) 0.5 - 4.6 K/UL    Absolute Mono # 16.6 (H) 0.1 - 1.3 K/UL    Absolute Eos # 0.0 0.0 - 0.8 K/UL    Basophils Absolute 0.0 0.0 - 0.2 K/UL    Absolute Immature Granulocyte 0.0 0.0 - 0.5 K/UL    RBC Comment MODERATE  ANISOCYTOSIS + POIKILOCYTOSIS        RBC Comment OCCASIONAL  TEARDROP CELLS        WBC Comment Result Confirmed By Smear      Platelet Comment DECREASED Differential Type AUTOMATED     Comprehensive Metabolic Panel    Collection Time: 06/02/22 11:11 PM   Result Value Ref Range    Sodium 134 (L) 136 - 145 mmol/L    Potassium 7.2 (HH) 3.5 - 5.1 mmol/L    Chloride 103 98 - 107 mmol/L    CO2 24 21 - 32 mmol/L    Anion Gap 7 7 - 16 mmol/L    Glucose 297 (H) 65 - 100 mg/dL    BUN 31 (H) 8 - 23 MG/DL    CREATININE 2.03 (H) 0.8 - 1.5 MG/DL    GFR  42 (L) >60 ml/min/1.73m2    GFR Non- 35 (L) >60 ml/min/1.73m2    Calcium 8.8 8.3 - 10.4 MG/DL    Total Bilirubin 0.7 0.2 - 1.1 MG/DL    ALT 36 12 - 65 U/L    AST 92 (H) 15 - 37 U/L    Alk Phosphatase 113 50 - 136 U/L    Total Protein 5.6 (L) 6.3 - 8.2 g/dL    Albumin 3.2 3.2 - 4.6 g/dL    Globulin 2.4 2.3 - 3.5 g/dL    Albumin/Globulin Ratio 1.3 1.2 - 3.5     Procalcitonin    Collection Time: 06/02/22 11:11 PM   Result Value Ref Range    Procalcitonin 0.69 (H) 0.00 - 0.49 ng/mL   Lactic Acid    Collection Time: 06/02/22 11:13 PM   Result Value Ref Range    Lactic Acid, Plasma 2.5 (H) 0.4 - 2.0 MMOL/L   COVID-19, Rapid    Collection Time: 06/02/22 11:45 PM    Specimen: Nasopharyngeal   Result Value Ref Range    Source NASAL SWAB      SARS-CoV-2, Rapid Not detected NOTD     Rapid influenza A/B antigens    Collection Time: 06/02/22 11:45 PM    Specimen: Nasal Washing   Result Value Ref Range    Influenza A Ag Negative NEG      Influenza B Ag Negative NEG      Source Nasopharyngeal     Culture, Blood 1    Collection Time: 06/03/22 12:39 AM    Specimen: Blood   Result Value Ref Range    Special Requests LEFT  Antecubital        Culture NO GROWTH 3 DAYS     Culture, Urine    Collection Time: 06/03/22  1:05 AM    Specimen: URINE-CLEAN CATCH    URINE   Result Value Ref Range    Special Requests NO SPECIAL REQUESTS      Culture NO GROWTH 2 DAYS     Culture, Blood 1    Collection Time: 06/03/22  3:05 AM    Specimen: Blood   Result Value Ref Range    Special Requests LEFT  HAND        Culture NO GROWTH 3 DAYS Basic Metabolic Panel w/ Reflex to MG    Collection Time: 06/03/22  3:05 AM   Result Value Ref Range    Sodium 132 (L) 136 - 145 mmol/L    Potassium 6.3 (HH) 3.5 - 5.1 mmol/L    Chloride 99 98 - 107 mmol/L    CO2 22 21 - 32 mmol/L    Anion Gap 11 7 - 16 mmol/L    Glucose 323 (H) 65 - 100 mg/dL    BUN 37 (H) 8 - 23 MG/DL    CREATININE 1.96 (H) 0.8 - 1.5 MG/DL    GFR  44 (L) >60 ml/min/1.73m2    GFR Non- 36 (L) >60 ml/min/1.73m2    Calcium 8.7 8.3 - 10.4 MG/DL   Lactic Acid    Collection Time: 06/03/22  3:05 AM   Result Value Ref Range    Lactic Acid, Plasma 2.7 (H) 0.4 - 2.0 MMOL/L   Renal Function Panel    Collection Time: 06/03/22  3:05 AM   Result Value Ref Range    CO2 21 21 - 32 mmol/L    Anion Gap Cannot be calculated 7 - 16 mmol/L    Glucose 286 (H) 65 - 100 mg/dL    BUN 31 (H) 8 - 23 MG/DL    CREATININE 2.08 (H) 0.8 - 1.5 MG/DL    GFR  41 (L) >60 ml/min/1.73m2    GFR Non- 34 (L) >60 ml/min/1.73m2    Calcium 9.2 8.3 - 10.4 MG/DL    Phosphorus 2.0 (L) 2.3 - 3.7 MG/DL    Albumin 3.2 3.2 - 4.6 g/dL   CBC with Auto Differential    Collection Time: 06/03/22  3:05 AM   Result Value Ref Range    .1 (HH) 4.3 - 11.1 K/uL    RBC 2.52 (L) 4.23 - 5.6 M/uL    Hemoglobin 7.9 (L) 13.6 - 17.2 g/dL    Hematocrit 24.0 (L) 41.1 - 50.3 %    MCV 95.2 79.6 - 97.8 FL    MCH 31.3 26.1 - 32.9 PG    MCHC 32.9 31.4 - 35.0 g/dL    RDW 18.6 (H) 11.9 - 14.6 %    Platelets 32 (L) 377 - 450 K/uL    MPV 10.0 9.4 - 12.3 FL    nRBC 0.00 0.0 - 0.2 K/uL    Seg Neutrophils 0 (L) 43 - 78 %    Lymphocytes 68 (H) 13 - 44 %    Monocytes 32 (H) 4.0 - 12.0 %    Eosinophils % 0 (L) 0.5 - 7.8 %    Basophils 0 0.0 - 2.0 %    Immature Granulocytes 0 0.0 - 5.0 %    Segs Absolute 0.0 (L) 1.7 - 8.2 K/UL    Absolute Lymph # 81.7 (H) 0.5 - 4.6 K/UL    Absolute Mono # 38.4 (H) 0.1 - 1.3 K/UL    Absolute Eos # 0.0 0.0 - 0.8 K/UL    Basophils Absolute 0.0 0.0 - 0.2 K/UL    Absolute Immature Granulocyte 0.0 0.0 - 0.5 K/UL    RBC Comment SLIGHT  ANISOCYTOSIS + POIKILOCYTOSIS        RBC Comment SLIGHT  POLYCHROMASIA        RBC Comment SLIGHT  MACROCYTOSIS        RBC Comment SLIGHT  HYPOCHROMIA        WBC Comment Result Confirmed By Smear      Platelet Comment MARKED      Differential Type AUTOMATED     Renal Function Panel    Collection Time: 06/03/22  6:33 PM   Result Value Ref Range    Sodium 137 136 - 145 mmol/L    Potassium 4.7 3.5 - 5.1 mmol/L    Chloride 105 98 - 107 mmol/L    CO2 18 (L) 21 - 32 mmol/L    Anion Gap 14 7 - 16 mmol/L    Glucose 311 (H) 65 - 100 mg/dL    BUN 56 (H) 8 - 23 MG/DL    CREATININE 2.09 (H) 0.8 - 1.5 MG/DL    GFR  41 (L) >60 ml/min/1.73m2    GFR Non- 34 (L) >60 ml/min/1.73m2    Calcium 8.4 8.3 - 10.4 MG/DL    Phosphorus 6.1 (H) 2.3 - 3.7 MG/DL    Albumin 3.8 3.2 - 4.6 g/dL   Lactic Acid    Collection Time: 06/03/22  8:08 PM   Result Value Ref Range    Lactic Acid, Plasma 3.4 (H) 0.4 - 2.0 MMOL/L   Lactic Acid    Collection Time: 06/04/22 12:02 AM   Result Value Ref Range    Lactic Acid, Plasma 1.7 0.4 - 2.0 MMOL/L   Magnesium    Collection Time: 06/04/22  5:20 AM   Result Value Ref Range    Magnesium 2.8 (H) 1.8 - 2.4 mg/dL   Phosphorus    Collection Time: 06/04/22  5:20 AM   Result Value Ref Range    Phosphorus 6.1 (H) 2.3 - 3.7 MG/DL   CBC with Auto Differential    Collection Time: 06/04/22  5:20 AM   Result Value Ref Range    WBC 58.9 (HH) 4.3 - 11.1 K/uL    RBC 2.60 (L) 4.23 - 5.6 M/uL    Hemoglobin 8.2 (L) 13.6 - 17.2 g/dL    Hematocrit 23.7 (L) 41.1 - 50.3 %    MCV 91.2 79.6 - 97.8 FL    MCH 31.5 26.1 - 32.9 PG    MCHC 34.6 31.4 - 35.0 g/dL    RDW 18.2 (H) 11.9 - 14.6 %    Platelets 10 (LL) 035 - 450 K/uL    MPV Unable to calculate. Recommend adding IPF.  9.4 - 12.3 FL    nRBC 0.00 0.0 - 0.2 K/uL    Seg Neutrophils 0 (L) 43 - 78 %    Lymphocytes 89 (H) 13 - 44 %    Monocytes 11 4.0 - 12.0 %    Eosinophils % 0 (L) 0.5 - 7.8 %    Basophils 0 0.0 - 2.0 %    Immature Granulocytes 0 0.0 - 5.0 %    Segs Absolute 0.0 (L) 1.7 - 8.2 K/UL    Absolute Lymph # 52.4 (H) 0.5 - 4.6 K/UL    Absolute Mono # 6.5 (H) 0.1 - 1.3 K/UL    Absolute Eos # 0.0 0.0 - 0.8 K/UL    Basophils Absolute 0.0 0.0 - 0.2 K/UL    Absolute Immature Granulocyte 0.0 0.0 - 0.5 K/UL    RBC Comment SLIGHT  ANISOCYTOSIS + POIKILOCYTOSIS        RBC Comment SLIGHT  MACROCYTOSIS        RBC Comment SLIGHT  POLYCHROMASIA        WBC Comment Result Confirmed By Smear      Platelet Comment MARKED      Differential Type AUTOMATED     Basic Metabolic Panel    Collection Time: 06/04/22  5:20 AM   Result Value Ref Range    Sodium 139 136 - 145 mmol/L    Potassium 4.7 3.5 - 5.1 mmol/L    Chloride 107 98 - 107 mmol/L    CO2 22 21 - 32 mmol/L    Anion Gap 10 7 - 16 mmol/L    Glucose 320 (H) 65 - 100 mg/dL    BUN 52 (H) 8 - 23 MG/DL    CREATININE 1.69 (H) 0.8 - 1.5 MG/DL    GFR African American 52 (L) >60 ml/min/1.73m2    GFR Non- 43 (L) >60 ml/min/1.73m2    Calcium 8.0 (L) 8.3 - 10.4 MG/DL   PREPARE PLATELETS, 1 Product    Collection Time: 06/04/22 10:00 AM   Result Value Ref Range    Unit Number [id number]     Product Code Blood Bank [facility],LRIR2     Unit Divison 00     Dispense Status Blood Bank TRANSFUSED    TYPE AND SCREEN    Collection Time: 06/04/22 12:32 PM   Result Value Ref Range    Crossmatch expiration date 06/05/2022,6530     ABO/Rh A POSITIVE     Antibody Screen NEG    Basic Metabolic Panel w/ Reflex to MG    Collection Time: 06/05/22  5:25 AM   Result Value Ref Range    Sodium 138 136 - 145 mmol/L    Potassium 5.0 3.5 - 5.1 mmol/L    Chloride 107 98 - 107 mmol/L    CO2 23 21 - 32 mmol/L    Anion Gap 8 7 - 16 mmol/L    Glucose 336 (H) 65 - 100 mg/dL    BUN 44 (H) 8 - 23 MG/DL    CREATININE 1.36 0.8 - 1.5 MG/DL    GFR African American >60 >60 ml/min/1.73m2    GFR Non- 56 (L) >60 ml/min/1.73m2    Calcium 8.5 8.3 - 10.4 MG/DL   CBC with Auto Differential    Collection Time: 06/05/22  5:25 AM   Result Value Ref Range    WBC 25.2 (H) 4.3 - 11.1 K/uL    RBC 2.60 (L) 4.23 - 5.6 M/uL    Hemoglobin 8.2 (L) 13.6 - 17.2 g/dL    Hematocrit 23.9 (L) 41.1 - 50.3 %    MCV 91.9 79.6 - 97.8 FL    MCH 31.5 26.1 - 32.9 PG    MCHC 34.3 31.4 - 35.0 g/dL    RDW 17.8 (H) 11.9 - 14.6 %    Platelets 11 (LL) 002 - 450 K/uL    MPV Unable to calculate. Recommend adding IPF.  9.4 - 12.3 FL    nRBC 0.00 0.0 - 0.2 K/uL    Seg Neutrophils 0 (L) 43 - 78 %    Lymphocytes 94 (H) 13 - 44 %    Monocytes 5 4.0 - 12.0 %    Eosinophils % 0 (L) 0.5 - 7.8 %    Basophils 1 0.0 - 2.0 %    Immature Granulocytes 0 0.0 - 5.0 %    Segs Absolute 0.0 (L) 1.7 - 8.2 K/UL    Absolute Lymph # 23.6 (H) 0.5 - 4.6 K/UL    Absolute Mono # 1.3 0.1 - 1.3 K/UL    Absolute Eos # 0.0 0.0 - 0.8 K/UL    Basophils Absolute 0.3 (H) 0.0 - 0.2 K/UL    Absolute Immature Granulocyte 0.0 0.0 - 0.5 K/UL    RBC Comment SLIGHT  ANISOCYTOSIS + POIKILOCYTOSIS        RBC Comment OCCASIONAL  TEARDROP CELLS        RBC Comment OCCASIONAL  OVALOCYTES        Platelet Comment MARKED      Differential Type AUTOMATED      WBC Comment Result Confirmed By Smear     Hemoglobin A1C    Collection Time: 06/05/22  5:25 AM   Result Value Ref Range    Hemoglobin A1C 7.5 (H) 4.20 - 6.30 %    eAG 169 mg/dL   PREPARE PLATELETS, 1 Product    Collection Time: 06/05/22  8:45 AM   Result Value Ref Range    Unit Number [id number]     Product Code Blood Bank [facility],LRIR2     Unit Divison 00     Dispense Status Blood Bank TRANSFUSED    Magnesium    Collection Time: 06/06/22  8:53 AM   Result Value Ref Range    Magnesium 2.4 1.8 - 2.4 mg/dL   Comprehensive Metabolic Panel    Collection Time: 06/06/22  8:53 AM   Result Value Ref Range    Sodium 140 136 - 145 mmol/L    Potassium 4.7 3.5 - 5.1 mmol/L    Chloride 108 (H) 98 - 107 mmol/L    CO2 23 21 - 32 mmol/L    Anion Gap 9 7 - 16 mmol/L    Glucose 223 (H) 65 - 100 mg/dL    BUN 42 (H) 8 - 23 MG/DL    CREATININE 1.30 0.8 - 1.5 MG/DL GFR African American >60 >60 ml/min/1.73m2    GFR Non- 59 (L) >60 ml/min/1.73m2    Calcium 9.3 8.3 - 10.4 MG/DL    Total Bilirubin 1.0 0.2 - 1.1 MG/DL    ALT 35 12 - 65 U/L    AST 27 15 - 37 U/L    Alk Phosphatase 106 50 - 136 U/L    Total Protein 6.6 6.3 - 8.2 g/dL    Albumin 4.0 3.2 - 4.6 g/dL    Globulin 2.6 2.3 - 3.5 g/dL    Albumin/Globulin Ratio 1.5 1.2 - 3.5     CBC with Auto Differential    Collection Time: 06/06/22  8:53 AM   Result Value Ref Range    WBC 13.3 (H) 4.3 - 11.1 K/uL    RBC 2.61 (L) 4.23 - 5.6 M/uL    Hemoglobin 8.2 (L) 13.6 - 17.2 g/dL    Hematocrit 24.1 %    MCV 92.3 79.6 - 97.8 FL    MCH 31.4 26.1 - 32.9 PG    MCHC 34.0 31.4 - 35.0 g/dL    RDW 17.6 (H) 11.9 - 14.6 %    Platelets 15 (LL) 665 - 450 K/uL    MPV 9.7 9.4 - 12.3 FL    nRBC 0.00 0.0 - 0.2 K/uL    Seg Neutrophils 0 (L) 43 - 78 %    Lymphocytes 90 (H) 13 - 44 %    Monocytes 10 4.0 - 12.0 %    Eosinophils % 0 (L) 0.5 - 7.8 %    Basophils 0 0.0 - 2.0 %    Immature Granulocytes 0 0.0 - 5.0 %    Segs Absolute 0.0 (L) 1.7 - 8.2 K/UL    Absolute Lymph # 12.0 (H) 0.5 - 4.6 K/UL    Absolute Mono # 1.3 0.1 - 1.3 K/UL    Absolute Eos # 0.0 0.0 - 0.8 K/UL    Basophils Absolute 0.0 0.0 - 0.2 K/UL    Absolute Immature Granulocyte 0.0 0.0 - 0.5 K/UL    RBC Comment SLIGHT  ANISOCYTOSIS + POIKILOCYTOSIS        RBC Comment OCCASIONAL  OVALOCYTES        RBC Comment OCCASIONAL  TEARDROP CELLS        RBC Comment RARE  SCHISTOCYTES        WBC Comment Result Confirmed By Smear      Platelet Comment DECREASED      Differential Type AUTOMATED         Imaging:  No valid procedures specified. ASSESSMENT:   Diagnosis Orders   1. Oral thrush  nystatin (MYCOSTATIN) 401526 UNIT/ML suspension   2. Neoplastic malignant related fatigue     3. Encounter for palliative care     4. Prolymphocytic leukemia of T-cell ([facility] [address]           PLAN:  1. Fatigue: Ongoing and multifactorial- slightly worse due to three day hospitalization.   2. Pain: Denies today, but has oxycodone 5mg available if needed. 3. Anxiety/depression: Continue Zoloft at 50mg dose. Continue Remeron 15mg HS. He has Ativan available as needed. 4. Dry mouth/taste changes: Encouraged mouth kote, Biotene, sour candies for dry mouth. Encouraged baking soda/salt water mouth rinse. White coating present on tongue. Start nystatin mouthwash. May need compounded mouthwash prescribed again (he has used this in the past with great results). Time spent talking about tomorrow's visit with Dr. Jovi Sewell. They wonder about continuing with current regimen vs transitioning, given WBC of 13.3 today. They also wonder about [facility] follow-up. Shared concerns with Em Wilson RN. Advanced Care Planning:  None today. Patient has HCPOA and DNR on file. Will follow up in: 1 week or earlier if needed. I have reviewed the patient's controlled substance prescription history, as maintained in the [place] prescription monitoring program, so that the prescriptions(s) for a controlled substance can be given.   Last Date Reviewed: 6/6/22          KESHAWN Enriquez - CNP  Outpatient Palliative Care at the  [facility]  [id number] [place] [address]  Office : [phone]  Fax : [phone]

## 2022-06-07 NOTE — PATIENT INSTRUCTIONS
Patient Instructions from Today's Visit    Reason for Visit:  Follow- up    Diagnosis Information:  [email or web address] net/about-us/[employer]-answers-patient-education-materials/ysjk-xdldcgy-whgh-sheets  Patient was educated and given handouts published by [employer] entitled [employer] Answers Fact Sheets about their diagnosis of  during todays office visit. Plan:        Follow Up: As planned    Recent Lab Results:  No labs today    Treatment Summary has been discussed and given to patient: na        -------------------------------------------------------------------------------------------------------------------  Please call our office at [phone] if you have any  of the following symptoms:   · Fever of 100.5 or greater  · Chills  · Shortness of breath  · Swelling or pain in one leg    After office hours an answering service is available and will contact a provider for emergencies or if you are experiencing any of the above symptoms.  Patient does express an interest in My Chart. My Chart log in information explained on the after visit summary printout at the [facility] [address] desk.     Em Wilson RN, BSN, [facility]  Hematology Nurse Navigator  phone: [phone]  cell: [phone]  fax: [phone]  Email: [email or web address]

## 2022-06-07 NOTE — PROGRESS NOTES
Pt was seen today by dr. Lizzie Cardenas. Plan of care reviewed. Pt would like to continue the Istodax for now. He will have C2D8 on thia Thursday. He will have his EKG week of 6/23. He will see kim on 6/30. Plan would be for 6 cycles and then a bone marrow bx. Pt and wife VU they are able to stop treatment when they would like. For now they would like to continue. Advised to call with any further needs. Next Elsie Juan is being delivered tomorrow per pt. Oral Chemotherapy Adherence:     Current Regimen:  Drug Name: Elsie Juan  Dose: 100 mg every other day  Frequency: every other day    Barriers to care identified including (financial, physical, psychosocial) : No    Missed doses reported: No    Patient verbalizes understanding of what to do in the event of a missed dose:  Yes    Adverse reactions/toxicities reported:None

## 2022-06-07 NOTE — PROGRESS NOTES
Estevan Friedman [facility]  [address], [address] [facility]  Phone: [phone]           6/7/2022  Kris Centeno  1955  [id number]          Maryam Hudson is a 79 year old -American man who has returned to my clinic for a follow-up visit; he was initially referred to [facility] Dr. Torin Poole. 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 He developed a left leg DVT in 6/2019 and was anti-coagulated with Xarelto, in 11/2019 he was switched to [facility] he took it till 11/2020. He relapsed in 9/2021 and has received 2 cycles of ICE and did not achieve remission; he then received 5 cycles of Bendamustine/Venetoclax and achieved a CT. He has received 1 cycle of Romidepsin/Venetoclax.         ALLERGIES:     No known drug allergies.        FAMILY HISTORY:    No hematologic disorders.        SOCIAL HISTORY:    He is  and lives with his wife. He used to work as an . He denies ever using any tobacco products.        PAST MEDICAL HISTORY:   Hyperlipidemia, Obstructive Sleep Apnea, DVT, GERD, BPH and T-Cell PLL.       ROS:  The patient complained of fatigue; all other systems negative.        PHYSICAL EXAM:   The patient was alert, awake and oriented, no acute distress was noted. Oral examination revealed a right buccal mucosal lesion. Lymph node examination did not reveal any adenopathy. Neck examination revealed a supple neck, no thyromegaly or masses were noted. Chest examination revealed normal vesicular breath sounds. Heart examination revealed S-1 and S-2 without any murmurs. Abdominal examination revealed a non-tender abdomen, bowel sounds were positive, no organomegaly could be appreciated. Examination of the extremities did not reveal any tenderness or erythema. Examination of the skin did not reveal any lesions. Medical problems and test results were reviewed with the patient today.        KPS:     90.         LABORATORY INVESTIGATIONS: [address] showed a WBC count of 13.3, ANC was 0, ALC was 12.1, Hemoglobin was 8.2 and Platelets MDLN 09.        ASSESSMENT:    T-Cell Pro-Lymphocytic Leukemia.  I spent a total of 42 minutes on the day of the visit, managing the care of this patient.        PLAN:   He should continue taking prophylactic Acyclovir and prophylactic Fluconazole, today he will continue Cycle 2 of Romidepsin/Venetoclax as per his wishes.  Momo Saldivar MD  Hematology/BMT

## 2022-06-09 PROBLEM — R00.0 TACHYCARDIA: Status: ACTIVE | Noted: 2022-01-01

## 2022-06-09 PROBLEM — R53.83 LETHARGY: Status: ACTIVE | Noted: 2022-01-01

## 2022-06-09 PROBLEM — R65.10 SIRS (SYSTEMIC INFLAMMATORY RESPONSE SYNDROME) (HCC): Status: ACTIVE | Noted: 2022-01-01

## 2022-06-09 NOTE — PROGRESS NOTES
[address] ambulance service here to transport patient to room 522 [facility]. Report given and informed of IVF and IV antibiotics given with current fever 102.2. Lyssa Borges Np informed of fever 102.2 also prior to discharge. Istodax infusion held today due to patient condition with fever.

## 2022-06-09 NOTE — H&P
[address] Hematology Oncology  Inpatient Hematology / Oncology History and Physical    Reason for Asmission:  Neutropenic Fever, AMS, Leukemia    History of Present Illness:  Mr. Luis Roth is a 79 y.o. male admitted on June 9, 2002 with a primary diagnosis of acute T-Cell Pro-Lymphocytic Leukemia who most recently has received cycle 2 of romidepsin/venetoclax - day 8 was due today. He presented to the cancer center with altered mental status, moaning, fever and looking generally unwell. His wife states he has been getting progressively worse over the past few days. It was hard to get him into the car this morning. She denies any specific infectious symptoms. He just states he \"doesn't feel good. \" He is lethargic but oriented. He did have bright red blood in his urine this morning - platelet count is 5,642. Blood cultures were obtained. Maxipime was started and he was transferred downtown for admission for SIRS as criteria was met - Temp 102.2, Pulse 113, WBC 54.8, platelets 6,991, altered mental status, appears unwell, and immunocompromised with leukemia. Review of Systems:  Constitutional Denies chills, night sweats. + fever, decreased appetite, and weakness. HEENT Denies trauma, blurry vision, hearing loss, ear pain, nosebleeds, sore throat, neck pain and ear discharge. Skin Denies lesions or rashes. Lungs Denies dyspnea, cough, sputum production or hemoptysis. Cardiovascular Denies chest pain, palpitations. + lower extremity edema - stable. Gastrointestinal Denies bloody or black stools, abdominal pain. + nausea/vomiting this morning, diarrhea x 2 yesterday.  Denies dysuria, frequency or hesitancy of urination. + hematuria. Neuro Denies headaches, visual changes or ataxia. Denies dizziness, tingling, tremors, sensory change, speech change, focal weakness or headaches. Hematology Denies gingival bleeding or epistaxis. + bruising, bleeding.     Endo Denies heat/cold intolerance, denies diabetes or thyroid abnormalities. MSK Denies back pain, arthralgias, myalgias or frequent falls. Psychiatric/Behavioral Denies depression and substance abuse. The patient is not nervous/anxious.          Allergies   Allergen Reactions    Alemtuzumab Other (See Comments)     Rigors    Other Hives     Platelets, needs pre meds before receiving transfusion     Past Medical History:   Diagnosis Date    Back pain     occassionally    Cervical radiculopathy     Claustrophobia     DVT (deep venous thrombosis) ([facility] [address] 06/2019    currently treated with Xarelto- started on 5/30/2019    GERD (gastroesophageal reflux disease)     H/O seasonal allergies     Hyperlipidemia     Impotence     Insomnia     Lateral epicondylitis     Leukemia (HCC)     CHEMO    Obesity     BMI 36.6    Sleep apnea     noncomplaint with CPAP     Past Surgical History:   Procedure Laterality Date    CIRCUMCISION      COLONOSCOPY  2017    Due in 2027    IR IVC FILTER PLACEMENT W IMAGING  5/25/2022    IR IVC FILTER PLACEMENT W IMAGING 5/25/2022 [facility] RADIOLOGY SPECIALS    IR PORT PLACEMENT EQUAL OR GREATER THAN 5 YEARS  11/23/2021    IR PORT PLACEMENT EQUAL OR GREATER THAN 5 YEARS  6/19/2019    IR PORT PLACEMENT EQUAL OR GREATER THAN 5 YEARS  6/19/2019    IR PORT PLACEMENT EQUAL OR GREATER THAN 5 YEARS 6/19/2019 [facility] RADIOLOGY SPECIALS    IR PORT PLACEMENT EQUAL OR GREATER THAN 5 YEARS  11/23/2021    IR PORT PLACEMENT EQUAL OR GREATER THAN 5 YEARS 11/23/2021 [facility] RADIOLOGY SPECIALS    IR REMOVE TUNNELED VAD W PORT  12/14/2020    ORTHOPEDIC SURGERY Right     r wrist    VASCULAR SURGERY      WISDOM TOOTH EXTRACTION       Family History   Problem Relation Age of Onset    Hypertension Brother     Hypertension Brother     No Known Problems Son     Cancer Father 76        breast    Cancer Mother 80        pancreatic    No Known Problems Daughter      Social History     Socioeconomic History    Marital status:      Spouse name: Not on file    Number of children: Not on file    Years of education: Not on file    Highest education level: Not on file   Occupational History    Not on file   Tobacco Use    Smoking status: Never Smoker    Smokeless tobacco: Never Used   Substance and Sexual Activity    Alcohol use: Not Currently     Alcohol/week: 0.0 standard drinks    Drug use: No    Sexual activity: Not on file   Other Topics Concern    Not on file   Social History Narrative    Not on file     Social Determinants of Health     Financial Resource Strain:     Difficulty of Paying Living Expenses: Not on file   Food Insecurity:     Worried About Running Out of Food in the Last Year: Not on file    Ann of Food in the Last Year: Not on file   Transportation Needs:     Lack of Transportation (Medical): Not on file    Lack of Transportation (Non-Medical): Not on file   Physical Activity:     Days of Exercise per Week: Not on file    Minutes of Exercise per Session: Not on file   Stress:     Feeling of Stress : Not on file   Social Connections:     Frequency of Communication with Friends and Family: Not on file    Frequency of Social Gatherings with Friends and Family: Not on file    Attends Zoroastrian Services: Not on file    Active Member of [address] [place] [address] [employer] or Organizations: Not on file    Attends Club or Organization Meetings: Not on file    Marital Status: Not on file   Intimate Partner Violence:     Fear of Current or Ex-Partner: Not on file    Emotionally Abused: Not on file    Physically Abused: Not on file    Sexually Abused: Not on file   Housing Stability:     Unable to Pay for Housing in the Last Year: Not on file    Number of [place] in the Last Year: Not on file    Unstable Housing in the Last Year: Not on file     No current facility-administered medications for this encounter.      Current Outpatient Medications   Medication Sig Dispense Refill    nystatin (MYCOSTATIN) 762194 UNIT/ML suspension Take 5 mLs by mouth 4 times daily for 10 days Retain in mouth as long as possible 200 mL 0    magnesium oxide (MAG-OX) 400 (240 Mg) MG tablet Take 1 tablet by mouth daily 30 tablet 0    gabapentin (NEURONTIN) 100 MG capsule Take by mouth in the morning and at bedtime.  oxyCODONE (ROXICODONE) 5 MG immediate release tablet Take 5 mg by mouth every 8 hours as needed.  sertraline (ZOLOFT) 50 MG tablet Take 50 mg by mouth daily      lidocaine-prilocaine (EMLA) 2.5-2.5 % cream Apply topically as needed (PRN for port use) 1 each 1    acyclovir (ZOVIRAX) 400 MG tablet Take 400 mg by mouth 2 times daily      allopurinol (ZYLOPRIM) 300 MG tablet Take 300 mg by mouth daily      calcium carbonate (OYSTER SHELL CALCIUM 500 MG) 1250 (500 Ca) MG tablet Take 500 mg by mouth daily       docusate (COLACE, DULCOLAX) 100 MG CAPS Take 100 mg by mouth      fluconazole (DIFLUCAN) 200 MG tablet Take 200 mg by mouth daily      furosemide (LASIX) 20 MG tablet Take 20 mg by mouth daily      LORazepam (ATIVAN) 1 MG tablet Take 1 mg by mouth 2 times daily as needed.  lovastatin (MEVACOR) 10 MG tablet Take 10 mg by mouth      mirtazapine (REMERON) 15 MG tablet Take 15 mg by mouth      omeprazole (PRILOSEC) 40 MG delayed release capsule TAKE 1 CAPSULE BY MOUTH DAILY      potassium chloride (KLOR-CON M) 20 MEQ extended release tablet Take 20 mEq by mouth daily      Venetoclax (VENCLEXTA) 100 MG TABS Take 100 mg by mouth every other day          OBJECTIVE:  No data found. Temp (24hrs), Av.6 °F (38.1 °C), Min:99.4 °F (37.4 °C), Max:102.2 °F (39 °C)  /63, P 113, R 18, T 102.4, O2sat 98%, 215 lb, 5\"11\"    No intake/output data recorded. Physical Exam:  Constitutional: Well developed, well nourished, ill-appearing male in no acute distress, lying comfortably in the hospital bed. Lethargic. HEENT: Normocephalic and atraumatic.  Oropharynx is clear, mucous membranes are moist.  Extraocular muscles are intact. Sclerae anicteric. Neck supple. Skin Warm and dry. No bruising and no rash noted. No erythema. No pallor. Respiratory Lungs are clear to auscultation bilaterally without wheezes, rales or rhonchi, normal air exchange without accessory muscle use. CVS Tachcycardicl rate, regular rhythm and normal S1 and S2. No murmurs, gallops, or rubs. Abdomen Soft, nontender and nondistended, normoactive bowel sounds. No palpable mass. Neuro Grossly nonfocal with no obvious sensory or motor deficits. + lethargy. MSK Normal range of motion in general.  + trace LE edema bilaterally. No tenderness. Psych Lethargic mood and affect. Labs:    Recent Results (from the past 24 hour(s))   CBC with Auto Differential    Collection Time: 06/09/22 10:29 AM   Result Value Ref Range    WBC 54.8 (HH) 4.3 - 11.1 K/uL    RBC 3.09 (L) 4.23 - 5.6 M/uL    Hemoglobin 9.8 (L) 13.6 - 17.2 g/dL    Hematocrit 28.1 %    MCV 90.9 79.6 - 97.8 FL    MCH 31.7 26.1 - 32.9 PG    MCHC 34.9 31.4 - 35.0 g/dL    RDW 17.2 (H) 11.9 - 14.6 %    Platelets 3 (LL) 634 - 450 K/uL    MPV Unable to calculate. Recommend adding IPF.  9.4 - 12.3 FL    nRBC 0.00 0.0 - 0.2 K/uL    Seg Neutrophils 0 (L) 43 - 78 %    Lymphocytes 68 (H) 13 - 44 %    Monocytes 32 (H) 4.0 - 12.0 %    Eosinophils % 0 (L) 0.5 - 7.8 %    Basophils 0 0.0 - 2.0 %    Immature Granulocytes 0 0.0 - 5.0 %    Segs Absolute 0.0 (L) 1.7 - 8.2 K/UL    Absolute Lymph # 37.3 (H) 0.5 - 4.6 K/UL    Absolute Mono # 17.5 (H) 0.1 - 1.3 K/UL    Absolute Eos # 0.0 0.0 - 0.8 K/UL    Basophils Absolute 0.0 0.0 - 0.2 K/UL    Absolute Immature Granulocyte 0.0 0.0 - 0.5 K/UL    RBC Comment SLIGHT  ANISOCYTOSIS + POIKILOCYTOSIS        RBC Comment OCCASIONAL  OVALOCYTES        RBC Comment OCCASIONAL  TEARDROP CELLS        RBC Comment SLIGHT  MICROCYTOSIS        RBC Comment SLIGHT  HYPOCHROMIA        WBC Comment Result Confirmed By Smear      Platelet Comment MARKED Differential Type AUTOMATED     Comprehensive Metabolic Panel    Collection Time: 06/09/22 10:29 AM   Result Value Ref Range    Sodium 132 (L) 136 - 145 mmol/L    Potassium 4.6 3.5 - 5.1 mmol/L    Chloride 100 98 - 107 mmol/L    CO2 23 21 - 32 mmol/L    Anion Gap 9 7 - 16 mmol/L    Glucose 192 (H) 65 - 100 mg/dL    BUN 16 8 - 23 MG/DL    CREATININE 1.40 0.8 - 1.5 MG/DL    GFR African American >60 >60 ml/min/1.73m2    GFR Non- 54 (L) >60 ml/min/1.73m2    Calcium 9.4 8.3 - 10.4 MG/DL    Total Bilirubin 0.8 0.2 - 1.1 MG/DL    ALT 39 12 - 65 U/L    AST 31 15 - 37 U/L    Alk Phosphatase 104 50 - 136 U/L    Total Protein 6.4 6.3 - 8.2 g/dL    Albumin 3.7 3.2 - 4.6 g/dL    Globulin 2.7 2.3 - 3.5 g/dL    Albumin/Globulin Ratio 1.4 1.2 - 3.5     Magnesium    Collection Time: 06/09/22 10:29 AM   Result Value Ref Range    Magnesium 1.8 1.8 - 2.4 mg/dL   Protime-INR    Collection Time: 06/09/22 10:29 AM   Result Value Ref Range    Protime 14.5 12.6 - 14.5 sec    INR 1.1     Urinalysis with Reflex to Culture    Collection Time: 06/09/22 10:49 AM    Specimen: Urine   Result Value Ref Range    Color, UA YELLOW      Appearance CLOUDY      Specific Catherine, UA 1.015 1.001 - 1.023      pH, Urine 5.5 5.0 - 9.0      Protein, UA 30 (A) NEG mg/dL    Glucose, UA Negative NEG mg/dL    Ketones, Urine Negative NEG mg/dL    Bilirubin Urine Negative NEG      Blood, Urine LARGE (A) NEG      Urobilinogen, Urine 0.2 0.2 - 1.0 EU/dL    Nitrite, Urine Negative NEG      Leukocyte Esterase, Urine Negative NEG      WBC, UA 3-5 0 /hpf    RBC, UA >100 0 /hpf    BACTERIA, URINE 0 0 /hpf    Urine Culture if Indicated CULTURE NOT INDICATED BY UA RESULT      Epithelial Cells UA 0-3 0 /hpf    Casts 0-3 0 /lpf    Crystals 0 0 /LPF    Mucus, UA 0 0 /lpf       Imaging:  Pending. ASSESSMENT and PLAN:  T-Cell Pro-Lymphocytic Leukemia  - Most recently has received cycle 2 of romidepsin/venetoclax - day 8 was due today. Hold Venetoclax. SIRS  - Temp 102.2, Pulse 113, WBC 54.8, platelets 9,498, altered mental status, appears unwell, and immunocompromised with leukemia  - Pan-cultures obtained, started cefepime outpatient and will receive his first dose of vancomycin inpatient. Will obtain chest xray and procalcitonin. IV fluids. Thrombocytopenia  - Transfuse one unit platelets for count of 3,000. Continue home medication. PT/OT evaluation. No DVT prophylaxis due to severe thrombocytopenia. DNR. Lab studies and imaging studies were personally reviewed. Pertinent old records were reviewed. Romain Ny, [facility] Hematology Oncology  [address] [place] [address]  Office : [phone]  Fax : [phone]           Attending Addendum:  I have personally performed a face to face diagnostic evaluation on this patient. I have reviewed and agree with the care plan as documented by Jon Severe, N.BRITT. My findings are as follows:  He has Pro-Lymphocytic Leukemia and SIRS, appears WEAK, heart rate regular without murmurs, abdomen is non-tender, bowel sounds are positive, we will admit him to our service and continue IV ABX.               Alicja Gonzalez MD    [facility] Hematology/Oncology  [address] [place] [address]  Office : [phone]  Fax : [phone]

## 2022-06-09 NOTE — PROGRESS NOTES
Pt to be admitted to room 522. Report called to [facility]. Pt being admitted for AMS. , neutropenic fever. He will need plt. He received cef and fluid. UA pending. Please see infusion note. SOB and wheezing x few days requiring evaluation, labs, imaging and ecg as well as medications.

## 2022-06-09 NOTE — PROGRESS NOTES
Arrived to [facility] with temp 100.1 orally and lethargic. Wife reports patient urinated bright red blood this am which was a new symptom for him. Pulse 113 and B/P 106/63. Patient vomited x 1 this am and reports diarrhea stools x 2 06/08/2022. Labs drawn and blood culture set #1 drawn from right portacath. Type and screen drawn and blood bank bracelet applied. Myrtle Fuentes NP and [facility] AND [facility] Navigator notified of patient condition. Awaiting lab results at present time.

## 2022-06-09 NOTE — PROGRESS NOTES
VANCO DAILY FOLLOW UP NOTE  [address] [facility] Pharmacokinetic Monitoring Service - Vancomycin    Consulting Provider: Darin Atkinson   Indication: sepsis  Target Concentration: Goal AUC/NATHALIE 400-600 mg*hr/L  Day of Therapy: 1  Additional Antimicrobials: cefepime, acyclovir, fluconazole    Pertinent Laboratory Values: Wt Readings from Last 1 Encounters:   06/09/22 215 lb 3.2 oz (97.6 kg)     Temp Readings from Last 1 Encounters:   06/09/22 100 °F (37.8 °C) (Oral)     Recent Labs     06/09/22  1029   BUN 16   CREATININE 1.40   WBC 54.8*     Estimated Creatinine Clearance: 61 mL/min (based on SCr of 1.4 mg/dL).     Lab Results   Component Value Date    VANCORANDOM 20.1 10/08/2021       MRSA Nasal Swab:       Assessment:  Date/Time Dose Concentration AUC         Note: Serum concentrations collected for AUC dosing may appear elevated if collected in close proximity to the dose administered, this is not necessarily an indication of toxicity    Plan:  Current dosing regimen is based on [address] vancomycin 2000mg once, then 1500mg q24h  Predicted AUC/Tr 481/14.4  Repeat vancomycin concentrations will be ordered as clinically appropriate   Pharmacy will continue to monitor patient and adjust therapy as indicated    Thank you for the consult,  Katie Hyatt [facility]

## 2022-06-09 NOTE — PLAN OF CARE
Reviewed POC for patient with possible sepsis. Labs, medications, and orders reviewed with wife. Verbalizes understanding.

## 2022-06-10 NOTE — PROGRESS NOTES
VANCO DAILY FOLLOW UP NOTE  [address] [facility] Pharmacokinetic Monitoring Service - Vancomycin    Consulting Provider: Buddy Barron   Indication: sepsis  Target Concentration: Goal AUC/NATHALIE 400-600 mg*hr/L  Day of Therapy: 2  Additional Antimicrobials: cefepime, acyclovir, fluconazole    Pertinent Laboratory Values: Wt Readings from Last 1 Encounters:   06/09/22 215 lb 3.2 oz (97.6 kg)     Temp Readings from Last 1 Encounters:   06/10/22 99.9 °F (37.7 °C) (Oral)     Recent Labs     06/09/22  1029 06/09/22  1420 06/10/22  0400   BUN 16  --  15   CREATININE 1.40  --  1.30   WBC 54.8*  --  36.6*   PROCAL  --  1.18*  --      Estimated Creatinine Clearance: 66 mL/min (based on SCr of 1.3 mg/dL).     Lab Results   Component Value Date    VANCORANDOM 20.1 10/08/2021       MRSA Nasal Swab:       Assessment:  Date/Time Dose Concentration AUC         Note: Serum concentrations collected for AUC dosing may appear elevated if collected in close proximity to the dose administered, this is not necessarily an indication of toxicity    Plan:  Continue 1500 mg q24h  Repeat vancomycin concentrations will be ordered as clinically appropriate   Pharmacy will continue to monitor patient and adjust therapy as indicated    Thank you for the consult,  Amaury Yang, PharmD, BCOP  Clinical Pharmacist  Contact via [employer]

## 2022-06-10 NOTE — PROGRESS NOTES
[facility] Hematology & Oncology        Inpatient Hematology / Oncology Progress Note      Admission Date: 2022 12:52 PM  Reason for Admission/Hospital Course: SIRS (systemic inflammatory response syndrome) ([facility]) [R65.10]      24 Hour Events:  Patient extremely weak  Wife at bedside  Transfuse prbc and platelets  Tmax 993.1   Day 2 vanc and cefe    ROS:  Constitutional: positive for  fever, chills, weakness, malaise, fatigue. CV: negative for chest pain, palpitations, edema. Respiratory:  negative for dyspnea, cough, wheezing. GI: negative for nausea, abdominal pain, diarrhea. 10 point review of systems is otherwise negative with the exception of the elements mentioned above in the HPI. Allergies   Allergen Reactions    Alemtuzumab Other (See Comments)     Rigors    Other Hives     Platelets, needs pre meds before receiving transfusion       OBJECTIVE:  Patient Vitals for the past 8 hrs:   BP Temp Temp src Pulse Resp SpO2 Height   06/10/22 1148 -- -- -- -- -- -- 5' 11\" (1.803 m)   06/10/22 1040 109/65 100.2 °F (37.9 °C) Oral 96 24 96 % --   06/10/22 1004 -- 99.9 °F (37.7 °C) Oral -- -- -- --   06/10/22 0956 -- 98.5 °F (36.9 °C) Oral -- -- -- --   06/10/22 0912 -- 99.4 °F (37.4 °C) Oral -- -- -- --   06/10/22 0740 98/61 98.9 °F (37.2 °C) Oral (!) 101 20 96 % --   06/10/22 0530 -- 98.5 °F (36.9 °C) Oral -- -- -- --     Temp (24hrs), Av.3 °F (37.4 °C), Min:98.2 °F (36.8 °C), Max:100.8 °F (38.2 °C)    06/10 0701 - 06/10 1900  In: 283 [I.V.:283]  Out: 200 [Urine:200]    Physical Exam:  Constitutional: Ill appearing  male in no acute distress, sitting comfortably in the hospital bed. HEENT: Normocephalic and atraumatic. Oropharynx is clear, mucous membranes are moist.  Pupils are equal, round, and reactive to light. Extraocular muscles are intact. Sclerae anicteric. Skin Warm and dry. No bruising and no rash noted. No erythema. No pallor.     Respiratory Lungs are clear to auscultation Daily    sodium chloride flush 0.9 % injection 5-40 mL  5-40 mL IntraVENous 2 times per day    sodium chloride flush 0.9 % injection 5-40 mL  5-40 mL IntraVENous PRN    0.9 % sodium chloride infusion   IntraVENous PRN    polyethylene glycol (GLYCOLAX) packet 17 g  17 g Oral Daily PRN    acetaminophen (TYLENOL) tablet 650 mg  650 mg Oral Q6H PRN    cefepime (MAXIPIME) 2000 mg IVPB minibag in NS  2,000 mg IntraVENous Q8H    0.9 % sodium chloride infusion   IntraVENous Continuous    0.9 % sodium chloride infusion   IntraVENous PRN    vancomycin (VANCOCIN) 1500 mg in sodium chloride 0.9% 500 mL IVPB  1,500 mg IntraVENous Q24H    morphine injection 1 mg  1 mg IntraVENous Q4H PRN    LORazepam (ATIVAN) injection 0.5 mg  0.5 mg IntraVENous Q4H PRN    Or    LORazepam (ATIVAN) tablet 1 mg  1 mg Oral Q4H PRN    ondansetron (ZOFRAN) injection 4 mg  4 mg IntraVENous Q6H PRN    prochlorperazine (COMPAZINE) injection 10 mg  10 mg IntraVENous Q6H PRN    0.9 % sodium chloride infusion   IntraVENous PRN    diphenhydrAMINE (BENADRYL) capsule 25 mg  25 mg Oral Q6H PRN    loperamide (IMODIUM) capsule 2 mg  2 mg Oral 4x Daily PRN     Mr. Reynaldo Castillo is a 79 y.o. male admitted on June 9, 2002 with a primary diagnosis of acute T-Cell Pro-Lymphocytic Leukemia who most recently has received cycle 2 of romidepsin/venetoclax - day 8 was due today. He presented to the cancer center with altered mental status, moaning, fever and looking generally unwell. His wife states he has been getting progressively worse over the past few days. It was hard to get him into the car this morning. She denies any specific infectious symptoms. He just states he \"doesn't feel good. \" He is lethargic but oriented. He did have bright red blood in his urine this morning - platelet count is 6,910. Blood cultures were obtained.  Maxipime was started and he was transferred downtown for admission for SIRS as criteria was met - Temp 102.2, Pulse 113, WBC 54.8, platelets 1,881, altered mental status, appears unwell, and immunocompromised with leukemia. PLAN:  T-Cell Pro-Lymphocytic Leukemia  - Most recently has received cycle 2 of romidepsin/venetoclax - day 8 was due today. Hold Venetoclax. 6/10 Dr. Keiko Foley told patient and spouse that he is no longer candidate for treatment-that it would likely make him sicker. PC consulted. Last PM patient expressed that he didn't think that he can do this anymore.      SIRS  - Temp 102.2, Pulse 113, WBC 54.8, platelets 4,448, altered mental status, appears unwell, and immunocompromised with leukemia  - Pan-cultures obtained, started cefepime outpatient and will receive his first dose of vancomycin inpatient. Will obtain chest xray and procalcitonin. IV fluids. 6/10 day 2 cefe/vanc     Thrombocytopenia/Anemia  - Transfuse one unit platelets for count of 3,000. 6/10 transfuse platelets and prbc today      Continue home medication. PT/OT evaluation. No DVT prophylaxis due to severe thrombocytopenia. DNR. Goals and plan of care reviewed with the patient. All questions answered to the best of our ability. I have discussed with the patient the rationale for blood component transfusion; its benefits in treating or preventing fatigue, organ damage, or death; and its risk which includes mild transfusion reactions, rare risk of blood borne infection, or more serious but rare reactions. I have discussed the alternatives to transfusion, including the risk and consequences of not receiving transfusion. The patient had an opportunity to ask questions and had agreed to proceed with transfusion of blood components. KESHAWN Bustos - REINA   [facility] Hematology & Oncology  [address] [address]  Office : [phone]  Fax : [phone]         Attending Addendum:  I have personally performed a face to face diagnostic evaluation on this patient.  I have reviewed and agree with the care plan as documented by Pauline Celis N.P. 36 minutes were spent on patient care, including but not limited to, reviewing the chart and time with the patient and family, more than 50% of the time documented was spent in face-to-face contact with the patient and in the care of the patient on the floor/unit where the patient is located. My findings are as follows:  He has T-Cell PLL and SIRS, appears drowsy, heart rate regular without murmurs, abdomen is non-tender, bowel sounds are positive, we will continue IV ABX and arrange for him to go to hospice.               Grace Max MD    [facility] Hematology/Oncology  [address] [place] [address]  Office : [phone]  Fax : [phone]

## 2022-06-10 NOTE — PROGRESS NOTES
[facility]    Attempted to contact patient's spouse for hospice presentation. Left vm and direct number for this liaison to schedule date and time.      John Srivastava, RN, BSN  [facility]  [phone] C  [phone] Radha Sandy

## 2022-06-10 NOTE — PROGRESS NOTES
's visit requested by nurse. Mr. Teresa Alvarado and his wife Carin Machuca are known to me from prior visits. Mr. Teresa Alvarado was asleep and I spent the visit offering spiritual and emotional support to Carin Machuca. Carin Machuca informed me that Mr. Teresa Alvarado is contemplating ending chemo treatments. She says she supports the decision patient makes, including ending chemo if patient desires. Jasmyn's elizabeth is significant for coping. She expressed appreciation for the visit and giving her the opportunity to Rachael haute. \" Chaplains remain available for support as needed.        Brooklynn Velasco 68  Board Certified

## 2022-06-10 NOTE — PROGRESS NOTES
Spoke with NP about patient having uncontrollable shaking. Checked temp every 30 minutes 99.1, 99.6, then 98.5.  Patient given prn IV Ativan

## 2022-06-10 NOTE — PROGRESS NOTES
[facility]    Referral received and discussed with Mitra Schmitt CM. Hospice liaison will contact patient/family for hospice presentation and evaluation.       Thank you for this referral,  Elias Reed RN, [address] [place] [address]  [phone]   [phone] Brayden Alexander

## 2022-06-10 NOTE — PROGRESS NOTES
END OF SHIFT:    Shift Summary:    Patient had a max temp of 100.8 and was given Tylenol and temp came down to 98.5. Patient had some intermittent shaking earlier in the night and NP notified and Ativan given. Patient reported that they had three loose stools today and was given loperamide. Patient hasn't complained of any diarrhea since. Oxy x 1 for knee pain  Tylenol x 1 for fever  zofran x 1   Ativan x 1   Loperamide x 1     I/Os:    06/09 1901 - 06/10 0700  In: 0494 [P.O.:450; I.V.:1266]  Out: 375 [Urine:375]  I/O last 3 completed shifts:   In: 669 [I.V.:873]  Out: 450 [Urine:450]    Natalee Hunt RN

## 2022-06-10 NOTE — PROGRESS NOTES
OCCUPATIONAL THERAPY Initial Assessment and Daily Note       OT Visit Days: 1  Acknowledge Orders  Time  OT Charge Capture  Rehab Caseload Tracker      Jeanette Solano is a 79 y.o. male   PRIMARY DIAGNOSIS: SIRS (systemic inflammatory response syndrome) (HCC)  SIRS (systemic inflammatory response syndrome) (HCC) [R65.10]       Reason for Referral: Generalized Muscle Weakness (M62.81)  Difficulty in walking, Not elsewhere classified (R26.2)  Other abnormalities of gait and mobility (R26.89)  Inpatient: Payor: Silas Dyson / Plan: [facility] PPO / Product Type: Medicare /     ASSESSMENT:     REHAB RECOMMENDATIONS:   Recommendation to date pending progress:  Settin [facility] with family support    Equipment:     3 in 1 Bedside Commode   Rolling Walker     ASSESSMENT:  Mr. Marija Christianson is a 80 y/o male presents with AMS and SIRS. Today pt presents with decreased activity tolerance, balance, strength and mobility impacting ADLs. Pt confusion has resolved and pt AAO x4. Pt overall min A for bed mobility and sit<>stand transfer with RW. Pt then CGA RW and increased time for steps to bedside chair. Pt was very cold throughout session and shivering so much it was affecting his fine motor skills for ADLs. Pt required min A for brushing his teeth seated in chair due to tremors and set up for washing face. As pt warmed up with blankets, his tremors/shivering got better. Pt is very weak and below his baseline for mobility and activity tolerance--pt reported fatigue after activity today. Pt is currently functioning below baseline and would benefit from skilled OT services to address OT goals and plan of care. Rec HHOT at d/c with supportive family.       [employer] AM-PAC 6 Clicks Daily Activity Inpatient Short Form:    AM-PAC Daily Activity Inpatient   How much help for putting on and taking off regular lower body clothing?: A Lot  How much help for Bathing?: A Little  How much help for Toileting?: A Little  How much help for putting on and taking off regular upper body clothing?: A Little  How much help for taking care of personal grooming?: A Little  How much help for eating meals?: None  AM-PAC Inpatient Daily Activity Raw Score: 18  AM-PAC Inpatient ADL T-Scale Score : 38.66  ADL Inpatient CMS 0-100% Score: 46.65  ADL Inpatient CMS G-Code Modifier : CK           SUBJECTIVE:     Mr. Ede Holman states, \"Do you make recommendations for me? \"     Social/Functional Lives With: Spouse  Type of Home: House  Home Layout: One level  Bathroom Shower/Tub: Walk-in shower  Home Equipment: Cane  ADL Assistance: Independent  Homemaking Assistance: Independent  Ambulation Assistance: Needs assistance  Transfer Assistance: Independent  Active : Yes  Mode of Transportation: Car    OBJECTIVE:     Rachell Martin / Nallely Harvey / Mateo Govern: IV    RESTRICTIONS/PRECAUTIONS:  Restrictions/Precautions: Fall Risk    PAIN: VITALS / O2:   Pre Treatment:   Pain Assessment: None - Denies Pain      Post Treatment: no c/o pain, resting comfortably in bedside chair       Vitals          Oxygen            GROSS EVALUATION: INTACT IMPAIRED   (See Comments)   UE AROM [x] []   UE PROM [x] []   Strength []   generally decreased   Posture / Balance [] Posture: Fair  Sitting - Static: Fair,+  Standing - Static: Fair,+  Standing - Dynamic: Fair   Sensation [x]     Coordination []   fine motor difficulties due to shivering/tremors     Tone [x]       Edema [x]    Activity Tolerance [] Patient limited by fatigue     Hand Dominance R [x] L []      COGNITION/  PERCEPTION: INTACT IMPAIRED   (See Comments)   Orientation [x]     Vision [x]     Hearing [x]     Cognition  [x]     Perception [x]       MOBILITY: I Mod I S SBA CGA Min Mod Max Total  NT x2 Comments:   Bed Mobility    Rolling [] [] [] [] [] [] [] [] [] [x] []    Supine to Sit [] [] [] [] [] [x] [] [] [] [] []    Scooting [] [] [] [] [x] [] [] [] [] [] []    Sit to Supine [] [] [] [] [] [] [] [] [] [x] []    Transfers    Sit to Stand [] [] [] [] [] [x] [] [] [] [] [] RW   Bed to Chair [] [] [] [] [x] [] [] [] [] [] [] RW   Stand to Sit [] [] [] [] [x] [] [] [] [] [] [] RW   Tub/Shower [] [] [] [] [] [] [] [] [] [x] []     Toilet [] [] [] [] [] [] [] [] [] [x] []      [] [] [] [] [] [] [] [] [] [x] []    I=Independent, Mod I=Modified Independent, S=Supervision/Setup, SBA=Standby Assistance, CGA=Contact Guard Assistance, Min=Minimal Assistance, Mod=Moderate Assistance, Max=Maximal Assistance, Total=Total Assistance, NT=Not Tested    ACTIVITIES OF DAILY LIVING: I Mod I S SBA CGA Min Mod Max Total NT Comments   BASIC ADLs:              Upper Body Bathing  [] [] [] [] [] [] [] [] [] [x]    Lower Body Bathing [] [] [] [] [] [] [] [] [] [x]    Toileting [] [] [] [] [] [] [] [] [] [x]    Upper Body Dressing [] [] [] [] [] [] [] [] [] [x]    Lower Body Dressing [] [] [] [] [] [] [] [] [] [x]    Feeding [] [] [] [] [] [] [] [] [] [x]    Grooming [] [] [x] [] [] [x] [] [] [] [] Min A brushing teeth sitting in chair due to fine motor coordination difficulties, set up washing face seated in chair   Personal Device Care [] [] [] [] [] [] [] [] [] [x]    Functional Mobility [] [] [] [] [x] [x] [] [] [] [] RW   I=Independent, Mod I=Modified Independent, S=Supervision/Setup, SBA=Standby Assistance, CGA=Contact Guard Assistance, Min=Minimal Assistance, Mod=Moderate Assistance, Max=Maximal Assistance, Total=Total Assistance, NT=Not Tested    PLAN:     FREQUENCY/DURATION   OT Plan of Care: 3 times/week for duration of hospital stay or until stated goals are met, whichever comes first.    ACUTE OCCUPATIONAL THERAPY GOALS:   (Developed with and agreed upon by patient and/or caregiver.)  1. Patient will complete lower body bathing and dressing with MOD I and adaptive equipment as needed.    2. Patient will complete toileting with MOD I.   3. Patient will complete grooming ADL with MOD I.  4. Patient will tolerate 25 minutes of OT treatment with 1-2 rest breaks to increase activity tolerance for ADLs. 5. Patient will complete functional transfers with MOD I and adaptive equipment as needed. 6. Patient will tolerate 10 minutes BUE exercises to increase strength for safe, functional transfers. Timeframe: 7 visits       PROBLEM LIST:   (Skilled intervention is medically necessary to address:)  Decreased ADL/Functional Activities  Decreased Activity Tolerance  Decreased AROM/PROM  Decreased Balance  Decreased Coordination  Decreased Gait Ability  Decreased Strength  Decreased Transfer Abilities   INTERVENTIONS PLANNED:  (Benefits and precautions of occupational therapy have been discussed with the patient.)  Self Care Training  Therapeutic Activity  Therapeutic Exercise/HEP  Neuromuscular Re-education  Manual Therapy  Education         TREATMENT:     EVALUATION: MODERATE COMPLEXITY: (Untimed Charge)    TREATMENT:   Co-Treatment PT/OT necessary due to patient's decreased overall endurance/tolerance levels, as well as need for high level skilled assistance to complete functional transfers/mobility and functional tasks  Self Care: (12): Procedure(s) (per grid) utilized to improve and/or restore self-care/home management as related to grooming and functional transfers in prep for ADLs and ambulating household distances. Required moderate verbal, manual and tactile cueing to facilitate activities of daily living skills and compensatory activities. TREATMENT GRID:  N/A    AFTER TREATMENT PRECAUTIONS: Call light within reach, Chair, Needs within reach, RN notified and Visitors at bedside    INTERDISCIPLINARY COLLABORATION:  RN/ PCT, PT/ PTA and OT/ DA SILVA    EDUCATION:  Education Given To: Patient; Family  Education Provided: Role of Therapy;Plan of Care;Energy Conservation  Education Method: Verbal  Barriers to Learning: None  Education Outcome: Verbalized understanding    TOTAL TREATMENT DURATION AND TIME:  Time In: 0919  Time Out: 3815  Minutes: 0892 Konstantin Christensen, [place]

## 2022-06-10 NOTE — CONSULTS
Palliative Care    Patient: Pauly Reid MRN: [id number]  SSN: [id number]    YOB: 1955  Age: 79 y.o. Sex: male       Date of Request: 6/10/22  Date of Consult:  6/10/2022  Reason for Consult:  goals of care  Requesting Physician: REINA Vogt     Assessment/Plan:     Principal Diagnosis:     Fatigue, Lethargy  R53.83    Additional Diagnoses:   · Advance Care Planning Counseling Z71.89  · Encounter for Palliative Care  Z51.5    Palliative Performance Scale (PPS):       Medical Decision Making:   Reviewed and summarized notes from admission to present. Discussed case with appropriate providers:  CAT Adkins  Reviewed laboratory and x-ray data from admission to present. Pt resting in bed, wife present. Mr Gregory James endorsed weakness and occasional nausea for which Zofran has been helpful. While Mr and Ms Gregory James are sad about stopping chemo, they are realistic about the pt's needs. They are interested in home hospice. Have placed a consult for case management's assistance in arranging home hospice. Will discuss findings with members of the interdisciplinary team.      Thank you for this referral.          .    Subjective:     History obtained from:  Care Provider and Chart    Chief Complaint: Weakness    History of Present Illness:  Mr. Gregory James is a 79 y.o. male admitted on June 9, 2002 with a primary diagnosis of acute T-Cell Pro-Lymphocytic Leukemia who most recently has received cycle 2 of romidepsin/venetoclax - day 8 was due today. He presented to the cancer center with altered mental status, moaning, fever and looking generally unwell. His wife states he has been getting progressively worse over the past few days. It was hard to get him into the car this morning. She denies any specific infectious symptoms. He just states he \"doesn't feel good. \" He is lethargic but oriented. He did have bright red blood in his urine this morning - platelet count is 9,560.  Blood cultures were obtained. Maxipime was started and he was transferred downtown for admission for SIRS as criteria was met - Temp 102.2, Pulse 113, WBC 54.8, platelets 8,899, altered mental status, appears unwell, and immunocompromised with leukemia. Advance Directive: Yes       Code Status:  DNR            Health Care Power of : Yes - Copy of [address] on file.     Past Medical History:   Diagnosis Date    Back pain     occassionally    Cervical radiculopathy     Claustrophobia     DVT (deep venous thrombosis) ([facility] [address] 06/2019    currently treated with Xarelto- started on 5/30/2019    GERD (gastroesophageal reflux disease)     H/O seasonal allergies     Hyperlipidemia     Impotence     Insomnia     Lateral epicondylitis     Leukemia (HCC)     CHEMO    Obesity     BMI 36.6    Sleep apnea     noncomplaint with CPAP      Past Surgical History:   Procedure Laterality Date    CIRCUMCISION      COLONOSCOPY  2017    Due in 2027    IR IVC FILTER PLACEMENT W IMAGING  5/25/2022    IR IVC FILTER PLACEMENT W IMAGING 5/25/2022 [facility] RADIOLOGY SPECIALS    IR PORT PLACEMENT EQUAL OR GREATER THAN 5 YEARS  11/23/2021    IR PORT PLACEMENT EQUAL OR GREATER THAN 5 YEARS  6/19/2019    IR PORT PLACEMENT EQUAL OR GREATER THAN 5 YEARS  6/19/2019    IR PORT PLACEMENT EQUAL OR GREATER THAN 5 YEARS 6/19/2019 [facility] RADIOLOGY SPECIALS    IR PORT PLACEMENT EQUAL OR GREATER THAN 5 YEARS  11/23/2021    IR PORT PLACEMENT EQUAL OR GREATER THAN 5 YEARS 11/23/2021 [facility] RADIOLOGY SPECIALS    IR REMOVE TUNNELED VAD W PORT  12/14/2020    ORTHOPEDIC SURGERY Right     r wrist    VASCULAR SURGERY      WISDOM TOOTH EXTRACTION       Family History   Problem Relation Age of Onset    Hypertension Brother     Hypertension Brother     No Known Problems Son     Cancer Father 76        breast    Cancer Mother 80        pancreatic    No Known Problems Daughter       Social History     Tobacco Use    Smoking status: Never Smoker    Smokeless tobacco: Never Used   Substance Use Topics    Alcohol use: Not Currently     Alcohol/week: 0.0 standard drinks     Prior to Admission medications    Medication Sig Start Date End Date Taking? Authorizing Provider   nystatin (MYCOSTATIN) 566449 UNIT/ML suspension Take 5 mLs by mouth 4 times daily for 10 days Retain in mouth as long as possible 6/6/22 6/16/22  Ana Laura Tovar, APRN - CNP   magnesium oxide (MAG-OX) 400 (240 Mg) MG tablet Take 1 tablet by mouth daily 6/6/22   Trev Lombardi DO   gabapentin (NEURONTIN) 100 MG capsule Take by mouth in the morning and at bedtime. 5/1/22   Historical Provider, MD   oxyCODONE (ROXICODONE) 5 MG immediate release tablet Take 5 mg by mouth every 8 hours as needed. 10/10/21   Historical Provider, MD   sertraline (ZOLOFT) 50 MG tablet Take 50 mg by mouth daily 5/20/22   Historical Provider, MD   lidocaine-prilocaine (EMLA) 2.5-2.5 % cream Apply topically as needed (PRN for port use) 6/2/22   Meredith Moralez APRN - NP   acyclovir (ZOVIRAX) 400 MG tablet Take 400 mg by mouth 2 times daily 3/30/22   Ar Automatic Reconciliation   allopurinol (ZYLOPRIM) 300 MG tablet Take 300 mg by mouth daily 4/14/22   Ar Automatic Reconciliation   calcium carbonate (OYSTER SHELL CALCIUM 500 MG) 1250 (500 Ca) MG tablet Take 500 mg by mouth daily  11/24/21   Ar Automatic Reconciliation   docusate (COLACE, DULCOLAX) 100 MG CAPS Take 100 mg by mouth    Ar Automatic Reconciliation   fluconazole (DIFLUCAN) 200 MG tablet Take 200 mg by mouth daily 3/30/22   Ar Automatic Reconciliation   furosemide (LASIX) 20 MG tablet Take 20 mg by mouth daily 3/30/22   Ar Automatic Reconciliation   LORazepam (ATIVAN) 1 MG tablet Take 1 mg by mouth 2 times daily as needed.  10/14/21   Ar Automatic Reconciliation   lovastatin (MEVACOR) 10 MG tablet Take 10 mg by mouth 5/19/20   Ar Automatic Reconciliation   mirtazapine (REMERON) 15 MG tablet Take 15 mg by mouth 3/30/22   Ar Automatic Reconciliation   omeprazole (PRILOSEC) 40 MG delayed release capsule TAKE 1 CAPSULE BY MOUTH DAILY 3/29/21   Ar Automatic Reconciliation   potassium chloride (KLOR-CON M) 20 MEQ extended release tablet Take 20 mEq by mouth daily 4/14/22   Ar Automatic Reconciliation   Venetoclax (VENCLEXTA) 100 MG TABS Take 100 mg by mouth every other day  4/7/22   Ar Automatic Reconciliation       Allergies   Allergen Reactions    Alemtuzumab Other (See Comments)     Rigors    Other Hives     Platelets, needs pre meds before receiving transfusion        Review of Systems:  A comprehensive review of systems was negative except as noted above. Objective:     Visit Vitals  /65   Pulse 96   Temp 100.2 °F (37.9 °C) (Oral)   Resp 24   Ht 5' 11\" (1.803 m)   SpO2 96%   BMI 30.01 kg/m²        Physical Exam:    General:  Cooperative. Pt chilly. Blankets and hat brought to room   Eyes:  Conjunctivae/corneas clear    Nose: Nares normal. Septum midline. Neck: Supple, symmetrical, trachea midline, no JVD   Lungs:   Clear to auscultation bilaterally, unlabored   Heart:  Regular rate and rhythm, no murmur    Abdomen:   Soft, non-tender, non-distended   Extremities: Normal, atraumatic, no cyanosis or edema   Skin: Skin color, texture, turgor normal. No rash or lesions.    Neurologic: Nonfocal   Psych: Alert and oriented      Assessment:     [unfilled]    Signed By: Edwin Jeong, KESHAWN - CNP     Ana 10, 2022

## 2022-06-10 NOTE — PROGRESS NOTES
Patient is well known to the 5th floor. After palliative  care consult  recommendation is for home hospice. Referral sent to [facility] hospice. Follow-up call to [address]  spoke with Christina Owen at intake and referral was received.   Discharge plan is more than likely home with Hospice if patient and family are agreeable    ASSESSMENT NOTE    Attending Physician: Steph Haywood MD  Admit Problem: SIRS (systemic inflammatory response syndrome) ([facility] [R65.10]  Date/Time of Admission: 6/9/2022 12:52 PM  Problem List:  Patient Active Problem List   Diagnosis    Prolymphocytic leukemia of T-cell ([address]    Severe obstructive sleep apnea    Essential hypertension, benign    Hypokalemia    Right shoulder pain    DDD (degenerative disc disease), cervical    DDD (degenerative disc disease), lumbar    Neutropenic fever (HCC)    Thrombocytopenia (HCC)    Body mass index (BMI) of 40.0-44.9 in adult ([address]    First degree hemorrhoids    Pure hypercholesterolemia    Admission for antineoplastic chemotherapy    Benign prostatic hyperplasia with nocturia    Impotence    Sepsis ([address]    Acute deep vein thrombosis (DVT) of right lower extremity ([address]    Anxiety    Shortness of breath    Frailty    Anemia    ELIZABETH (acute kidney injury) ([address]    Lethargy    Tachycardia    SIRS (systemic inflammatory response syndrome) ([address]    ACP (advance care planning)    Encounter for palliative care       Service Assessment  Patient Orientation (P) Alert and Oriented   Cognition (P) Alert   History Provided By     Primary Caregiver     Accompanied By/Relationship     Support Systems Spouse/Significant Other   Patient's Healthcare Decision Maker is: Legal Next of Kin   PCP Verified by CM     Last Visit to PCP     Prior Functional Level     Current Functional Level     Can patient return to prior living arrangement     Ability to make needs known:     Family able to assist with home care needs:     Would you like for me to discuss the discharge plan with any other family members/significant others, and if so, who? Financial Resources     Community Resources     CM/SW Referral       Social/Functional History  Lives With (P) Spouse   Type of Home (P) House   Home Layout One level   Home Access     Entrance Stairs - Number of Steps     Entrance Stairs - Rails     Bathroom Shower/Tub Walk-in shower   Bathroom Toilet     Bathroom Equipment (P) Shower chair   [address] [facility] (P) Cane   Receives Help From (P) Family   ADL Assistance (P) CHARLES [address] (P) Needs assistance   Meal Prep (P) Unable to assess (comment)   Laundry (P) Unable to assess (comment)   Vacuuming (P) Unable to assess (comment)   Cleaning (P) Unable to assess (comment)   Gardening (P) Unable to assess (comment)   Yard Work (P) Unable to assess (comment)   Driving (P) Unable to assess (comment)   Shopping (P) Unable to assess (comment)        Other (Comment)     [address] [facility] Paying/Finance Responsibility     [facility] Management     Other (Comment)     Ambuation Assistance (P) Independent   Transfer Assisstance (P) Independent   Active  Yes   Patient's  Info     Mode of Transportation Car   Education     Occupation     Type of Occupation       Discharge Planning   Type of [facility] Prior To Admission None   Potential Assistance Needed N/A   DME     DME     DME Ordered? Cane   Potential Assistance Purchasing Medications     Meds-to-Beds: Does the patient want to have any new prescriptions delivered to bedside prior to discharge?      Type of Home Care Services None   Patient expects to be discharged to: House   Follow Up Appointment: Best Day/Time     One/Two Story Residence:     # of Interior Steps     Height of Each Step (in)     [employer] Available     History of Falls? Services At/After Discharge  Transition of Care Consult (CM Consult): Internal Home Health     Internal Hospice     Reason Outside Agency [address]     Partner SNF     Reason Why Partner SNF Not Chosen     Internal Comfort Care     Reason Outside [address] Discharge     [address] Provided? Mode of Transport at [facility] Time of Discharge     Confirm Follow Up Transport       Condition of Participation: Discharge Planning  The plan for Transition of Care is related to the following treatment goals: (P) medical stability   The Patient and/or Patient Representative was provided with a Choice of Provider? Name of the Patient Representative who was provided with the Choice of Provider and agrees with the Discharge Plan? (P) [place], spouse   The Patient and/or Patient Representative Agree with the Discharge Plan? Freedom of Choice list was provided with basic dialogue that supports the individualized plan of care/goals, treatment preferences, and shares the quality data associated with the providers?  (P) Yes     Documentation for Discharge Appeal  Discharge Appealed by     Date notified by QIO of appeal request:     Time notified by QIO of appeal request:     Detailed Notice of Discharge given to:     Date Notice of Discharge given:     Time Notice of Discharge given:     Date records sent to [address]     Time records sent to [address]     Date Notified of Outcome     Time Notified of Outcome     Outcome of appeal           Angel Gill RN 06/10/22 3:41 PM

## 2022-06-10 NOTE — PROGRESS NOTES
PHYSICAL THERAPY Initial Assessment and AM  (Link to Caseload Tracking:    Acknowledge Orders  Time In/Out  PT Charge Capture  Rehab Caseload Tracker    Kris Centeno is a 79 y.o. male   PRIMARY DIAGNOSIS: SIRS (systemic inflammatory response syndrome) ([facility])  SIRS (systemic inflammatory response syndrome) ([facility] [address] [facility] [R65.10]       Reason for Referral: Generalized Muscle Weakness (M62.81)  Difficulty in walking, Not elsewhere classified (R26.2)  Inpatient: Payor: Sandrine Nevarez / Plan: Janine Coyle PPO / Product Type: Medicare /     ASSESSMENT:     REHAB RECOMMENDATIONS:   Recommendation to date pending progress:  [address] [place] [address] Therapy    Equipment:     To Be Determined     ASSESSMENT:  Mr. Laurie Lee was supine at arrival, wife present. He initially did not want to participate. But wife encouraged. He needed modA to EOB, Anne to stand and walk to chair. Pt is in low energy posture and shuffled to chair. Once in chair pt was a little more lively. Made comfortable. Pt will benefit from skilled and sophisticated PT to help improve impairments.         [address] [facility] [address] [address] AM-PAC 6 Clicks Basic Mobility Inpatient Short Form  AM-PAC Mobility Inpatient   How much difficulty turning over in bed?: A Lot  How much difficulty sitting down on / standing up from a chair with arms?: A Little  How much difficulty moving from lying on back to sitting on side of bed?: A Lot  How much help from another person moving to and from a bed to a chair?: A Little  How much help from another person needed to walk in hospital room?: A Little  How much help from another person for climbing 3-5 steps with a railing?: A Little  -[facility] Inpatient Mobility Raw Score : 16  -PAC Inpatient T-Scale Score : 40.78  Mobility Inpatient CMS 0-100% Score: 54.16  Mobility Inpatient CMS G-Code Modifier : CK    SUBJECTIVE:   Mr. Laurie Lee states, \"no\"     Social/Functional Lives With: Spouse  Type of Home: House  Home Layout: One level  Bathroom Shower/Tub: Walk-in shower  Home Equipment: Episencial  ADL Assistance: Independent  Homemaking Assistance: Independent  Ambulation Assistance: Needs assistance  Transfer Assistance: Independent  Active : Yes  Mode of Transportation: Car    OBJECTIVE:     PAIN: Oracio Jerrell / O2: PRECAUTION / Vernel Koroma / DRAINS:   Pre Treatment:   Pain Assessment: 0-10  Pain Level: 0      Post Treatment: 0 Vitals        Oxygen      IV    RESTRICTIONS/PRECAUTIONS:                    GROSS EVALUATION: Intact Impaired (Comments):   AROM [x]     PROM []    Strength [] Strength RLE  Strength RLE: Exception  Comment: weak  Strength LLE  Strength LLE: Exception  Comment: weak   Balance [x]     Posture [] Trunk Flexion  Knee Flexion  Hip Flexion   Sensation [x]     Coordination [x]      Tone []     Edema []    Activity Tolerance [] Patient limited by fatigue    []      COGNITION/  PERCEPTION: Intact Impaired (Comments):   Orientation [x]     Vision [x]     Hearing [x]     Cognition  [x]       MOBILITY: I Mod I S SBA CGA Min Mod Max Total  NT x2 Comments:   Bed Mobility    Rolling [] [] [] [] [] [] [] [] [] [] []    Supine to Sit [] [] [] [] [] [] [] [] [] [] []    Scooting [] [] [] [] [] [] [] [] [] [] []    Sit to Supine [] [] [] [] [] [] [] [] [] [] []    Transfers    Sit to Stand [] [] [] [] [] [] [] [] [] [] []    Bed to Chair [] [] [] [] [] [] [] [] [] [] []    Stand to Sit [] [] [] [] [] [] [] [] [] [] []     [] [] [] [] [] [] [] [] [] [] []    I=Independent, Mod I=Modified Independent, S=Supervision, SBA=Standby Assistance, CGA=Contact Guard Assistance,   Min=Minimal Assistance, Mod=Moderate Assistance, Max=Maximal Assistance, Total=Total Assistance, NT=Not Tested    GAIT: I Mod I S SBA CGA Min Mod Max Total  NT x2 Comments:   Level of Assistance [] [] [] [] [] [] [] [] [] [] []    Distance 5 feet    DME Rolling Walker    Gait Quality Decreased ene , Decreased step clearance, Decreased step length, Decreased stance and Shuffling     Weightbearing Status      Stairs      I=Independent, Mod I=Modified Independent, S=Supervision, SBA=Standby Assistance, CGA=Contact Guard Assistance,   Min=Minimal Assistance, Mod=Moderate Assistance, Max=Maximal Assistance, Total=Total Assistance, NT=Not Tested    PLAN:   ACUTE PHYSICAL THERAPY GOALS:   (Developed with and agreed upon by patient and/or caregiver. )  LTG:  (1.)Mr. Laurie Lee will move from supine to sit and sit to supine , scoot up and down and roll side to side in bed with SUPERVISION within 7 treatment day(s). (2.)Mr. Laurie Lee will transfer from bed to chair and chair to bed with SUPERVISION using the least restrictive device within 7 treatment day(s). (3.)Mr. Laurie Lee will ambulate with SUPERVISION for 200+ feet with the least restrictive device within 7 treatment day(s). ________________________________________________________________________________________________        FREQUENCY AND DURATION: 3 times/week for duration of hospital stay or until stated goals are met, whichever comes first.    THERAPY PROGNOSIS: Good    PROBLEM LIST:   (Skilled intervention is medically necessary to address:)  Decreased ADL/Functional Activities  Decreased Activity Tolerance  Decreased AROM/PROM  Decreased Balance  Decreased Cognition  Decreased Coordination  Decreased Gait Ability  Decreased Safety Awareness  Decreased Strength  Decreased Transfer Abilities  Increased Pain INTERVENTIONS PLANNED:   (Benefits and precautions of physical therapy have been discussed with the patient.)  Therapeutic Activity  Therapeutic Exercise/HEP  Neuromuscular Re-education  Gait Training  Education       TREATMENT:   EVALUATION: MODERATE COMPLEXITY: (Untimed Charge)    TREATMENT:   Therapeutic Activity (15 Minutes):  Therapeutic activity included Rolling, Supine to Sit, Scooting, Lateral Scooting, Transfer Training, Ambulation on level ground, Sitting balance  and Standing balance to improve functional Activity tolerance, Balance, Coordination, Mobility and Strength.     TREATMENT GRID:  N/A    AFTER TREATMENT PRECAUTIONS: Bed/Chair Locked, Call light within reach, Needs within reach and Visitors at bedside    INTERDISCIPLINARY COLLABORATION:  RN/ PCT, PT/ PTA and OT/ DA SILVA    EDUCATION: Education Given To: Patient  Education Provided: Role of Therapy;Plan of Care  Education Method: Verbal  Barriers to Learning: None  Education Outcome: Verbalized understanding    TIME IN/OUT:  Time In: 0915  Time Out: 0940  Minutes: [address] Ronn Copeland PT

## 2022-06-11 NOTE — PROGRESS NOTES
VANCO DAILY FOLLOW UP NOTE  [address] [facility] Pharmacokinetic Monitoring Service - Vancomycin    Consulting Provider: Bernice De La Rosa   Indication: sepsis  Target Concentration: Goal AUC/NATHALIE 400-600 mg*hr/L  Day of Therapy: 3  Additional Antimicrobials: cefepime, acyclovir, fluconazole    Pertinent Laboratory Values: Wt Readings from Last 1 Encounters:   06/10/22 229 lb 8 oz (104.1 kg)     Temp Readings from Last 1 Encounters:   06/11/22 97.7 °F (36.5 °C) (Oral)     Recent Labs     06/09/22  1029 06/09/22  1420 06/10/22  0400 06/11/22  0519   BUN 16  --  15 14   CREATININE 1.40  --  1.30 1.20   WBC 54.8*  --  36.6* 21.7*   PROCAL  --  1.18*  --   --      Estimated Creatinine Clearance: 73 mL/min (based on SCr of 1.2 mg/dL).     Lab Results   Component Value Date    VANCORANDOM 16.4 06/11/2022       MRSA Nasal Swab:       Assessment:  Date/Time Dose Concentration AUC   6/11 0519 1500 mg q24h 16.4 475   Note: Serum concentrations collected for AUC dosing may appear elevated if collected in close proximity to the dose administered, this is not necessarily an indication of toxicity    Plan:  Current dosing regimen is therapeutic  Continue current dose of 1500 mg every 24 hours for now  Repeat vancomycin concentrations will be ordered as clinically appropriate   Pharmacy will continue to monitor patient and adjust therapy as indicated    Thank you for the consult,  KIEL Louise [facility]

## 2022-06-11 NOTE — PROGRESS NOTES
[facility] Hematology & Oncology        Inpatient Hematology / Oncology Progress Note      Admission Date: 2022 12:52 PM  Reason for Admission/Hospital Course: SIRS (systemic inflammatory response syndrome) ([facility]) [R65.10]      24 Hour Events:  Tmax 103, VSS  Feeling better than yesterday  Wife at bedside  Open to hospice care-awaiting meeting  Day 3 vanc and cefe  BC-NGTD    ROS:  Constitutional: positive for  fever, chills, weakness, malaise, fatigue. CV: negative for chest pain, palpitations, edema. Respiratory:  negative for dyspnea, cough, wheezing. GI: negative for nausea, abdominal pain, diarrhea. 10 point review of systems is otherwise negative with the exception of the elements mentioned above in the HPI. Allergies   Allergen Reactions    Alemtuzumab Other (See Comments)     Rigors    Other Hives     Platelets, needs pre meds before receiving transfusion       OBJECTIVE:  Patient Vitals for the past 8 hrs:   BP Temp Temp src Pulse Resp SpO2   22 0715 96/61 97.7 °F (36.5 °C) Oral 78 20 100 %   22 0524 83/69 97.7 °F (36.5 °C) Axillary 91 22 98 %   22 0321 104/62 -- Axillary 95 24 100 %     Temp (24hrs), Av °F (37.2 °C), Min:97.5 °F (36.4 °C), Max:103 °F (39.4 °C)     0701 -  1900  In: 1 [I.V.:1]  Out: 200 [Urine:200]    Physical Exam:  Constitutional: Ill appearing  male in no acute distress, sitting comfortably on the side of the hospital bed. HEENT: Normocephalic and atraumatic. Oropharynx is clear, mucous membranes are moist.  Sclerae anicteric. Skin Warm and dry. No bruising and no rash noted. No erythema. No pallor. Respiratory Lungs are clear to auscultation bilaterally without wheezes, rales or rhonchi, normal air exchange without accessory muscle use. CVS Normal rate, regular rhythm and normal S1 and S2. No murmurs, gallops, or rubs. Abdomen Soft, nontender and nondistended, normoactive bowel sounds. No palpable mass.      Neuro Grossly nonfocal with no obvious sensory or motor deficits. MSK Normal range of motion in general.  Trace b/l ankle edema and no tenderness. Psych Appropriate mood and affect. Labs:      Recent Labs     06/09/22  1029 06/09/22  1029 06/09/22  2314 06/10/22  0400 06/11/22  0519   WBC 54.8*  --   --  36.6* 21.7*   RBC 3.09*  --   --  2.53* 2.83*   HGB 9.8*  --   --  7.7* 8.6*   HCT 28.1  --   --  23.0* 25.5*   MCV 90.9  --   --  90.9 90.1   MCH 31.7  --   --  30.4 30.4   MCHC 34.9  --   --  33.5 33.7   RDW 17.2*  --   --  17.1* 18.5*   PLT 3*   < > 14* 12* 16*   MPV Unable to calculate. Recommend adding IPF.  --   --  Unable to calculate. Recommend adding IPF. 9.6    < > = values in this interval not displayed.         Recent Labs     06/09/22  1029 06/10/22  0400 06/11/22  0519   * 138 140   K 4.6 3.9 4.1    106 109*   CO2 23 24 22   BUN 16 15 14   GFRAA >60 >60 >60   GLOB 2.7 2.6 2.9   MG 1.8 1.9 1.8         Imaging:    Medications:  Current Facility-Administered Medications   Medication Dose Route Frequency    diphenhydrAMINE (BENADRYL) injection 25 mg  25 mg IntraVENous Q6H PRN    0.9 % sodium chloride infusion   IntraVENous PRN    methylPREDNISolone sodium (SOLU-MEDROL) 125 MG injection        acyclovir (ZOVIRAX) tablet 400 mg  400 mg Oral BID    allopurinol (ZYLOPRIM) tablet 300 mg  300 mg Oral Daily    docusate sodium (COLACE) capsule 100 mg  100 mg Oral Daily    fluconazole (DIFLUCAN) tablet 200 mg  200 mg Oral Daily    furosemide (LASIX) tablet 20 mg  20 mg Oral Daily    gabapentin (NEURONTIN) capsule 100 mg  100 mg Oral BID    magnesium oxide (MAG-OX) tablet 400 mg  400 mg Oral Daily    mirtazapine (REMERON) tablet 15 mg  15 mg Oral Nightly    nystatin (MYCOSTATIN) 388900 UNIT/ML suspension 500,000 Units  500,000 Units Oral 4x Daily    pantoprazole (PROTONIX) tablet 40 mg  40 mg Oral QAM AC    oxyCODONE (ROXICODONE) immediate release tablet 5 mg  5 mg Oral Q8H PRN    potassium chloride (KLOR-CON M) extended release tablet 20 mEq  20 mEq Oral Daily    sertraline (ZOLOFT) tablet 50 mg  50 mg Oral Daily    sodium chloride flush 0.9 % injection 5-40 mL  5-40 mL IntraVENous 2 times per day    sodium chloride flush 0.9 % injection 5-40 mL  5-40 mL IntraVENous PRN    0.9 % sodium chloride infusion   IntraVENous PRN    polyethylene glycol (GLYCOLAX) packet 17 g  17 g Oral Daily PRN    acetaminophen (TYLENOL) tablet 650 mg  650 mg Oral Q6H PRN    cefepime (MAXIPIME) 2000 mg IVPB minibag in NS  2,000 mg IntraVENous Q8H    0.9 % sodium chloride infusion   IntraVENous Continuous    vancomycin (VANCOCIN) 1500 mg in sodium chloride 0.9% 500 mL IVPB  1,500 mg IntraVENous Q24H    morphine injection 1 mg  1 mg IntraVENous Q4H PRN    LORazepam (ATIVAN) injection 0.5 mg  0.5 mg IntraVENous Q4H PRN    Or    LORazepam (ATIVAN) tablet 1 mg  1 mg Oral Q4H PRN    ondansetron (ZOFRAN) injection 4 mg  4 mg IntraVENous Q6H PRN    prochlorperazine (COMPAZINE) injection 10 mg  10 mg IntraVENous Q6H PRN    diphenhydrAMINE (BENADRYL) capsule 25 mg  25 mg Oral Q6H PRN    loperamide (IMODIUM) capsule 2 mg  2 mg Oral 4x Daily PRN     Mr. Osmar Cardoso is a 79 y.o. male admitted on June 9, 2002 with a primary diagnosis of acute T-Cell Pro-Lymphocytic Leukemia who most recently has received cycle 2 of romidepsin/venetoclax - day 8 was due today. He presented to the cancer center with altered mental status, moaning, fever and looking generally unwell. His wife states he has been getting progressively worse over the past few days. It was hard to get him into the car this morning. She denies any specific infectious symptoms. He just states he \"doesn't feel good. \" He is lethargic but oriented. He did have bright red blood in his urine this morning - platelet count is 3,591. Blood cultures were obtained.  Maxipime was started and he was transferred downtown for admission for SIRS as criteria was met - Temp 102.2, Pulse 113, WBC 54.8, platelets 4,985, altered mental status, appears unwell, and immunocompromised with leukemia. PLAN:  T-Cell Pro-Lymphocytic Leukemia  - Most recently has received cycle 2 of romidepsin/venetoclax - day 8 was due today. Hold Venetoclax. 6/10 Dr. Dorita Arceo told patient and spouse that he is no longer candidate for treatment-that it would likely make him sicker. PC consulted. Last PM patient expressed that he didn't think that he can do this anymore. 6/11 Patient and wife open to hospice care, awaiting meeting with [facility]     SIRS  - Temp 102.2, Pulse 113, WBC 54.8, platelets 8,826, altered mental status, appears unwell, and immunocompromised with leukemia  - Pan-cultures obtained, started cefepime outpatient and will receive his first dose of vancomycin inpatient. Will obtain chest xray and procalcitonin. IV fluids. 6/10 day 2 cefe/vanc  6/11 D3 Cef/Vanc     Thrombocytopenia/Anemia  - Transfuse one unit platelets for count of 3,000. 6/10 transfuse platelets and prbc today   6/11 Hgb 8.6, Plt 16-no replacements today     Continue home medication. PT/OT evaluation. No DVT prophylaxis due to severe thrombocytopenia. DNR. Goals and plan of care reviewed with the patient. All questions answered to the best of our ability. I have discussed with the patient the rationale for blood component transfusion; its benefits in treating or preventing fatigue, organ damage, or death; and its risk which includes mild transfusion reactions, rare risk of blood borne infection, or more serious but rare reactions. I have discussed the alternatives to transfusion, including the risk and consequences of not receiving transfusion. The patient had an opportunity to ask questions and had agreed to proceed with transfusion of blood components.             KESHAWN Wills - [address] Hematology & Oncology  [address] [facility] [place] [address]  Office : [phone]  Fax : [phone]

## 2022-06-11 NOTE — PROGRESS NOTES
On-call oncology np contacted for extra pre-meds for plt transfusion as pt has known reactions  Orders for one time extra dose tylenol 650mg po, pepcid 20mg IV x's 1, solu-cortef 100mg IV x's 1 & benadryl 25mg IV prn pre-plts  1 unit plts & 1 unit prbcs transfused per orders  Pt had SOB - oxygen applied w/ resolution  Pt rested throughout night  Low bps throughout night - MAP wnl    Shift report given to [facility] RN    Vitals:    06/11/22 0045 06/11/22 0321 06/11/22 0524 06/11/22 0715   BP: (!) 97/54 104/62 83/69 96/61   Pulse: 99 95 91 78   Resp: 24 24 22 20   Temp: 97.5 °F (36.4 °C)  97.7 °F (36.5 °C) 97.7 °F (36.5 °C)   TempSrc: Axillary Axillary Axillary Oral   SpO2: 98% 100% 98% 100%   Weight:       Height:

## 2022-06-11 NOTE — PROGRESS NOTES
Met with patient spouse, Jeet Low, to discuss hospice services. Patient sleeping, and spouse didn't want to wake him up. She states they still need to discuss whether or not patient wants to continue receiving blood transfusions. [facility] Brochure with intake phone number and this RN phone number left with spouse. She will call us when patient is awake so that we can discuss hospice services with him. Spouse states she is leaning towards home hospice at this time.      Thank you for this referral,    MORENA DudleyN, [facility]  [phone]

## 2022-06-11 NOTE — PROGRESS NOTES
Spoke with spouse, Carin Machuca, regarding meeting for hospice consult. She is currently with patient, and they are both open to discussion. Meeting patient and spouse in his room at 1000 am for hospice presentation.       Thank you for this referral,    GINA Goff, [facility]  [phone]

## 2022-06-12 NOTE — PROGRESS NOTES
VANCO DAILY FOLLOW UP NOTE  [address] [facility] Pharmacokinetic Monitoring Service - Vancomycin    Consulting Provider: Milton Kurtz   Indication: sepsis  Target Concentration: Goal AUC/NATHALIE 400-600 mg*hr/L  Day of Therapy: 4  Additional Antimicrobials: cefepime, acyclovir, fluconazole    Pertinent Laboratory Values: Wt Readings from Last 1 Encounters:   06/11/22 215 lb 5 oz (97.7 kg)     Temp Readings from Last 1 Encounters:   06/12/22 99.3 °F (37.4 °C) (Oral)     Recent Labs     06/09/22  1420 06/10/22  0400 06/11/22  0519 06/12/22  0423   BUN  --  15 14 15   CREATININE  --  1.30 1.20 1.30   WBC  --  36.6* 21.7* 53.3*   PROCAL 1.18*  --   --   --      Estimated Creatinine Clearance: 66 mL/min (based on SCr of 1.3 mg/dL). Lab Results   Component Value Date    VANCORANDOM 16.4 06/11/2022       MRSA Nasal Swab:       Assessment:  Date/Time Dose Concentration AUC   6/11 0519 1500 mg q24h 16.4 475   Note: Serum concentrations collected for AUC dosing may appear elevated if collected in close proximity to the dose administered, this is not necessarily an indication of toxicity    Plan:   Will continue the current vancomycin dose of 1500 mg every 24 hours for now  Repeat vancomycin concentrations will be ordered as clinically appropriate   Pharmacy will continue to monitor patient and adjust therapy as indicated    Thank you for the consult,  Louie Hernandez, [address] [facility]

## 2022-06-12 NOTE — PROGRESS NOTES
[address] [facility] Hematology & Oncology        Inpatient Hematology / Oncology Progress Note      Admission Date: 2022 12:52 PM  Reason for Admission/Hospital Course: SIRS (systemic inflammatory response syndrome) ([facility]) [R65.10]      24 Hour Events:  Afeb, VSS  Sitting in chair, fatigued  Wife at bedside  They desire to continue labs/replacements, therefore not current candidate for [facility]  Day 4 vanc and cefe  BC-NGTD  Not eating    ROS:  Constitutional: Negative for fever, positive for weakness, malaise, fatigue. CV: negative for chest pain, palpitations, edema. Respiratory:  negative for dyspnea, cough, wheezing. GI: negative for nausea, abdominal pain, diarrhea. 10 point review of systems is otherwise negative with the exception of the elements mentioned above in the HPI. Allergies   Allergen Reactions    Alemtuzumab Other (See Comments)     Rigors    Other Hives     Platelets, needs pre meds before receiving transfusion       OBJECTIVE:  Patient Vitals for the past 8 hrs:   BP Temp Temp src Pulse Resp SpO2   22 0730 97/62 99.2 °F (37.3 °C) Oral (!) 105 18 97 %   22 0315 105/71 98.2 °F (36.8 °C) Oral (!) 116 20 96 %     Temp (24hrs), Av.2 °F (36.8 °C), Min:97.5 °F (36.4 °C), Max:99.2 °F (37.3 °C)    No intake/output data recorded. Physical Exam:  Constitutional: Ill appearing  male in no acute distress, sitting comfortably in the bedside chair. HEENT: Normocephalic and atraumatic. Oropharynx is clear, mucous membranes are moist.  Sclerae anicteric. Skin Warm and dry. No bruising and no rash noted. No erythema. No pallor. Respiratory Lungs are clear to auscultation bilaterally without wheezes, rales or rhonchi, normal air exchange without accessory muscle use. CVS Normal rate, regular rhythm and normal S1 and S2. No murmurs, gallops, or rubs. Abdomen Soft, nontender and nondistended, normoactive bowel sounds. No palpable mass.      Neuro Grossly nonfocal with no obvious sensory or motor deficits. MSK Normal range of motion in general.  Trace b/l ankle edema and no tenderness. Psych Appropriate mood and affect. Labs:      Recent Labs     06/10/22  0400 06/11/22  0519 06/12/22  0423   WBC 36.6* 21.7* 53.3*   RBC 2.53* 2.83* 2.78*   HGB 7.7* 8.6* 8.3*   HCT 23.0* 25.5* 24.6*   MCV 90.9 90.1 88.5   MCH 30.4 30.4 29.9   MCHC 33.5 33.7 33.7   RDW 17.1* 18.5* 18.9*   PLT 12* 16* 9*   MPV Unable to calculate. Recommend adding IPF.  9.6 11.3        Recent Labs     06/10/22  0400 06/11/22  0519 06/12/22  0423    140 140   K 3.9 4.1 3.2*    109* 111*   CO2 24 22 19*   BUN 15 14 15   GFRAA >60 >60 >60   GLOB 2.6 2.9 2.8   MG 1.9 1.8 1.7*         Imaging:    Medications:  Current Facility-Administered Medications   Medication Dose Route Frequency    0.9 % sodium chloride infusion   IntraVENous PRN    diphenoxylate-atropine (LOMOTIL) 2.5-0.025 MG per tablet 2 tablet  2 tablet Oral BID    medicated lip ointment (BLISTEX)   Topical PRN    diphenhydrAMINE (BENADRYL) injection 25 mg  25 mg IntraVENous Q6H PRN    0.9 % sodium chloride infusion   IntraVENous PRN    acyclovir (ZOVIRAX) tablet 400 mg  400 mg Oral BID    allopurinol (ZYLOPRIM) tablet 300 mg  300 mg Oral Daily    docusate sodium (COLACE) capsule 100 mg  100 mg Oral Daily    fluconazole (DIFLUCAN) tablet 200 mg  200 mg Oral Daily    furosemide (LASIX) tablet 20 mg  20 mg Oral Daily    gabapentin (NEURONTIN) capsule 100 mg  100 mg Oral BID    magnesium oxide (MAG-OX) tablet 400 mg  400 mg Oral Daily    mirtazapine (REMERON) tablet 15 mg  15 mg Oral Nightly    nystatin (MYCOSTATIN) 031945 UNIT/ML suspension 500,000 Units  500,000 Units Oral 4x Daily    pantoprazole (PROTONIX) tablet 40 mg  40 mg Oral QAM AC    oxyCODONE (ROXICODONE) immediate release tablet 5 mg  5 mg Oral Q8H PRN    potassium chloride (KLOR-CON M) extended release tablet 20 mEq  20 mEq Oral Daily    sertraline (ZOLOFT) tablet 50 mg  50 mg Oral Daily    sodium chloride flush 0.9 % injection 5-40 mL  5-40 mL IntraVENous 2 times per day    sodium chloride flush 0.9 % injection 5-40 mL  5-40 mL IntraVENous PRN    0.9 % sodium chloride infusion   IntraVENous PRN    polyethylene glycol (GLYCOLAX) packet 17 g  17 g Oral Daily PRN    acetaminophen (TYLENOL) tablet 650 mg  650 mg Oral Q6H PRN    cefepime (MAXIPIME) 2000 mg IVPB minibag in NS  2,000 mg IntraVENous Q8H    0.9 % sodium chloride infusion   IntraVENous Continuous    vancomycin (VANCOCIN) 1500 mg in sodium chloride 0.9% 500 mL IVPB  1,500 mg IntraVENous Q24H    morphine injection 1 mg  1 mg IntraVENous Q4H PRN    LORazepam (ATIVAN) injection 0.5 mg  0.5 mg IntraVENous Q4H PRN    Or    LORazepam (ATIVAN) tablet 1 mg  1 mg Oral Q4H PRN    ondansetron (ZOFRAN) injection 4 mg  4 mg IntraVENous Q6H PRN    prochlorperazine (COMPAZINE) injection 10 mg  10 mg IntraVENous Q6H PRN    diphenhydrAMINE (BENADRYL) capsule 25 mg  25 mg Oral Q6H PRN    loperamide (IMODIUM) capsule 2 mg  2 mg Oral 4x Daily PRN     Mr. Devora Montoya is a 79 y.o. male admitted on June 9, 2002 with a primary diagnosis of acute T-Cell Pro-Lymphocytic Leukemia who most recently has received cycle 2 of romidepsin/venetoclax - day 8 was due today. He presented to the cancer center with altered mental status, moaning, fever and looking generally unwell. His wife states he has been getting progressively worse over the past few days. It was hard to get him into the car this morning. She denies any specific infectious symptoms. He just states he \"doesn't feel good. \" He is lethargic but oriented. He did have bright red blood in his urine this morning - platelet count is 6,496. Blood cultures were obtained.  Maxipime was started and he was transferred downtown for admission for SIRS as criteria was met - Temp 102.2, Pulse 113, WBC 54.8, platelets 3,387, altered mental status, appears unwell, and immunocompromised with leukemia. PLAN:  T-Cell Pro-Lymphocytic Leukemia  - Most recently has received cycle 2 of romidepsin/venetoclax - day 8 was due today. Hold Venetoclax. 6/10 Dr. Reynaldo Perkins told patient and spouse that he is no longer candidate for treatment-that it would likely make him sicker. PC consulted. Last PM patient expressed that he didn't think that he can do this anymore. 6/11 Patient and wife open to hospice care, awaiting meeting with [facility]  6/12 WBC 53.3, ANC 0, ALC 20.3, They currently desire to continue with labs/replacements which [facility] does not allow, will consult PC tomorrow for assistance      SIRS  - Temp 102.2, Pulse 113, WBC 54.8, platelets 7,855, altered mental status, appears unwell, and immunocompromised with leukemia  - Pan-cultures obtained, started cefepime outpatient and will receive his first dose of vancomycin inpatient. Will obtain chest xray and procalcitonin. IV fluids. 6/10 day 2 cefe/vanc  6/11 D3 Cef/Vanc  6/12 D4 Cef/Vanc     Thrombocytopenia/Anemia  - Transfuse one unit platelets for count of 3,000. 6/10 transfuse platelets and prbc today   6/11 Hgb 8.6, Plt 16-no replacements today  6/12 Hgb 8.3, Plt 9K- 1 unit platelets today    Diarrhea  6/12 schedule lomotil BID and use imodium prn in order to conserve energy      Continue home medication. PT/OT evaluation. No DVT prophylaxis due to severe thrombocytopenia. DNR. Goals and plan of care reviewed with the patient. All questions answered to the best of our ability. I have discussed with the patient the rationale for blood component transfusion; its benefits in treating or preventing fatigue, organ damage, or death; and its risk which includes mild transfusion reactions, rare risk of blood borne infection, or more serious but rare reactions. I have discussed the alternatives to transfusion, including the risk and consequences of not receiving transfusion.  The patient had an opportunity to ask questions and had agreed to proceed with transfusion of blood components.             KESHAWN Gu[address]arr[address] [facility] Hematology & Oncology  [address] [address]  Office : [phone]  Fax : [phone]

## 2022-06-12 NOTE — PROGRESS NOTES
Patient rested on and off throughout the night. Had two episodes of diarrhea that resolved with some prn Imodium. Platelets ordered for platelet count of 9. Patient tolerated getting to bedside commode and chair well.

## 2022-06-13 NOTE — PROGRESS NOTES
[facility] Hematology & Oncology        Inpatient Hematology / Oncology Progress Note      Admission Date: 2022 12:52 PM  Reason for Admission/Hospital Course: SIRS (systemic inflammatory response syndrome) ([facility]) [R65.10]      24 Hour Events:  Afeb, VSS  Wife at bedside  They desire to continue labs/replacements, therefore not current candidate for [facility]  Day 5 vanc and cefe-dc vanc  BC-NGTD  Last fever 6/10      ROS:  Constitutional: Negative for fever, positive for weakness, malaise, fatigue. CV: negative for chest pain, palpitations, edema. Respiratory:  negative for dyspnea, cough, wheezing. GI: negative for nausea, abdominal pain, diarrhea. 10 point review of systems is otherwise negative with the exception of the elements mentioned above in the HPI. Allergies   Allergen Reactions    Alemtuzumab Other (See Comments)     Rigors    Other Hives     Platelets, needs pre meds before receiving transfusion       OBJECTIVE:  Patient Vitals for the past 8 hrs:   BP Temp Temp src Pulse Resp SpO2   22 0739 98/69 98.6 °F (37 °C) -- 93 -- --   22 0318 94/60 97.8 °F (36.6 °C) Oral 83 15 94 %     Temp (24hrs), Av.5 °F (36.9 °C), Min:97.6 °F (36.4 °C), Max:99.9 °F (37.7 °C)    No intake/output data recorded. Physical Exam:  Constitutional: Ill appearing  male in no acute distress, sitting comfortably in the bed. Visitors and wife at bedside   HEENT: Normocephalic and atraumatic. Oropharynx is clear, mucous membranes are moist.  Sclerae anicteric. Skin Warm and dry. No bruising and no rash noted. No erythema. No pallor. Respiratory Lungs are clear to auscultation bilaterally without wheezes, rales or rhonchi, normal air exchange without accessory muscle use. CVS Normal rate, regular rhythm and normal S1 and S2. No murmurs, gallops, or rubs. Abdomen Soft, nontender and nondistended, normoactive bowel sounds. No palpable mass.      Neuro Grossly nonfocal with no obvious sensory or motor deficits. MSK Normal range of motion in general. 2+ BLE edema and no tenderness. Psych Appropriate mood and affect.         Labs:      Recent Labs     06/11/22 0519 06/12/22 0423 06/13/22 0223   WBC 21.7* 53.3* 37.3*   RBC 2.83* 2.78* 2.69*   HGB 8.6* 8.3* 8.1*   HCT 25.5* 24.6* 23.9*   MCV 90.1 88.5 88.8   MCH 30.4 29.9 30.1   MCHC 33.7 33.7 33.9   RDW 18.5* 18.9* 19.3*   PLT 16* 9* 15*   MPV 9.6 11.3 8.9*        Recent Labs     06/11/22 0519 06/12/22 0423 06/13/22 0223    140 129*   K 4.1 3.2* 3.4*   * 111* 100   CO2 22 19* 21   BUN 14 15 16   GFRAA >60 >60 >60   GLOB 2.9 2.8 2.8   MG 1.8 1.7* 1.7*         Imaging:    Medications:  Current Facility-Administered Medications   Medication Dose Route Frequency    potassium chloride (KLOR-CON M) extended release tablet 20 mEq  20 mEq Oral BID    0.9 % sodium chloride infusion   IntraVENous PRN    diphenoxylate-atropine (LOMOTIL) 2.5-0.025 MG per tablet 2 tablet  2 tablet Oral BID    famotidine (PEPCID) 20 mg in sodium chloride (PF) 10 mL injection  20 mg IntraVENous Q12H PRN    hydrocortisone sodium succinate PF (SOLU-CORTEF) injection 100 mg  100 mg IntraVENous Q8H PRN    medicated lip ointment (BLISTEX)   Topical PRN    diphenhydrAMINE (BENADRYL) injection 25 mg  25 mg IntraVENous Q6H PRN    0.9 % sodium chloride infusion   IntraVENous PRN    acyclovir (ZOVIRAX) tablet 400 mg  400 mg Oral BID    allopurinol (ZYLOPRIM) tablet 300 mg  300 mg Oral Daily    docusate sodium (COLACE) capsule 100 mg  100 mg Oral Daily    fluconazole (DIFLUCAN) tablet 200 mg  200 mg Oral Daily    furosemide (LASIX) tablet 20 mg  20 mg Oral Daily    gabapentin (NEURONTIN) capsule 100 mg  100 mg Oral BID    magnesium oxide (MAG-OX) tablet 400 mg  400 mg Oral Daily    mirtazapine (REMERON) tablet 15 mg  15 mg Oral Nightly    nystatin (MYCOSTATIN) 295498 UNIT/ML suspension 500,000 Units  500,000 Units Oral 4x Daily    pantoprazole (PROTONIX) tablet 40 mg  40 mg Oral QAM AC    oxyCODONE (ROXICODONE) immediate release tablet 5 mg  5 mg Oral Q8H PRN    sertraline (ZOLOFT) tablet 50 mg  50 mg Oral Daily    sodium chloride flush 0.9 % injection 5-40 mL  5-40 mL IntraVENous 2 times per day    sodium chloride flush 0.9 % injection 5-40 mL  5-40 mL IntraVENous PRN    0.9 % sodium chloride infusion   IntraVENous PRN    polyethylene glycol (GLYCOLAX) packet 17 g  17 g Oral Daily PRN    acetaminophen (TYLENOL) tablet 650 mg  650 mg Oral Q6H PRN    cefepime (MAXIPIME) 2000 mg IVPB minibag in NS  2,000 mg IntraVENous Q8H    0.9 % sodium chloride infusion   IntraVENous Continuous    vancomycin (VANCOCIN) 1500 mg in sodium chloride 0.9% 500 mL IVPB  1,500 mg IntraVENous Q24H    morphine injection 1 mg  1 mg IntraVENous Q4H PRN    LORazepam (ATIVAN) injection 0.5 mg  0.5 mg IntraVENous Q4H PRN    Or    LORazepam (ATIVAN) tablet 1 mg  1 mg Oral Q4H PRN    ondansetron (ZOFRAN) injection 4 mg  4 mg IntraVENous Q6H PRN    prochlorperazine (COMPAZINE) injection 10 mg  10 mg IntraVENous Q6H PRN    diphenhydrAMINE (BENADRYL) capsule 25 mg  25 mg Oral Q6H PRN    loperamide (IMODIUM) capsule 2 mg  2 mg Oral 4x Daily PRN     Mr. Elieser Britt is a 79 y.o. male admitted on June 9, 2002 with a primary diagnosis of acute T-Cell Pro-Lymphocytic Leukemia who most recently has received cycle 2 of romidepsin/venetoclax - day 8 was due today. He presented to the cancer center with altered mental status, moaning, fever and looking generally unwell. His wife states he has been getting progressively worse over the past few days. It was hard to get him into the car this morning. She denies any specific infectious symptoms. He just states he \"doesn't feel good. \" He is lethargic but oriented. He did have bright red blood in his urine this morning - platelet count is 4,626. Blood cultures were obtained.  Maxipime was started and he was transferred downtown for admission for SIRS as criteria was met - Temp 102.2, Pulse 113, WBC 54.8, platelets 1,465, altered mental status, appears unwell, and immunocompromised with leukemia. PLAN:  T-Cell Pro-Lymphocytic Leukemia  - Most recently has received cycle 2 of romidepsin/venetoclax - day 8 was due today. Hold Venetoclax. 6/10 Dr. Bernhard Oppenheim told patient and spouse that he is no longer candidate for treatment-that it would likely make him sicker. PC consulted. Last PM patient expressed that he didn't think that he can do this anymore. 6/11 Patient and wife open to hospice care, awaiting meeting with [facility]  6/12 WBC 53.3, ANC 0, ALC 20.3, They currently desire to continue with labs/replacements which [facility] does not allow, will consult PC tomorrow for assistance      SIRS  - Temp 102.2, Pulse 113, WBC 54.8, platelets 1,023, altered mental status, appears unwell, and immunocompromised with leukemia  - Pan-cultures obtained, started cefepime outpatient and will receive his first dose of vancomycin inpatient. Will obtain chest xray and procalcitonin. IV fluids. 6/10 day 2 cefe/vanc  6/11 D3 Cef/Vanc  6/13 D5 Cef/Vanc. Last fever 6/10. Viral panel negative other than corona virus. BC NTD. DC vanc       Thrombocytopenia/Anemia  - Transfuse one unit platelets for count of 3,000. 6/10 transfuse platelets and prbc today   6/11 Hgb 8.6, Plt 16-no replacements today  6/12 Hgb 8.3, Plt 9K- 1 unit platelets today  5/99 No transfusion needed today    Diarrhea  6/12 schedule lomotil BID and use imodium prn in order to conserve energy   6/13 reports 3 BMs-soft, mushy stools    Hyponatremia  6/13 check serum osmolality     BLE edema  6/13 received home dose 20 mg lasix this am. Give 20 mg lasix IV.      Continue home medication. PT/OT evaluation. No DVT prophylaxis due to severe thrombocytopenia. DNR. Goals and plan of care reviewed with the patient. All questions answered to the best of our ability.     Late addendum: Patient is requesting second opinion at [facility] with either Dr. Memo Wiley.              KESHAWN Feldman - NP   [facility] Hematology & Oncology  [address] [place] [address]  Office : [phone]  Fax : [phone]

## 2022-06-13 NOTE — CONSULTS
This is a duplicate consult. Please see consult note from [facility] on 6/10/2022. We are continuing to follow.     RICKI Rojas  Palliative Care

## 2022-06-13 NOTE — TELEPHONE ENCOUNTER
Phone Contact YUDI Note       Name: Shoshana De La Cruz  : 1955  MRN: [id number]  Date of Service: 2022      YUDI called patient and spoke with his wife, Mrs. Dorcas Pinto, at the Hospital. Wife responded she is following patient \"decisions\". Patient is requesting a \"second opinion\". YUDI provided reflective listening. Otherwise, no other needs at this time.     CODY Nowak

## 2022-06-13 NOTE — PROGRESS NOTES
VANCO DAILY FOLLOW UP NOTE  [address] [facility] Pharmacokinetic Monitoring Service - Vancomycin    Consulting Provider: Cm Holloway   Indication: sepsis  Target Concentration: Goal AUC/NATHALIE 400-600 mg*hr/L  Day of Therapy: 5  Additional Antimicrobials: cefepime, acyclovir, fluconazole    Pertinent Laboratory Values: Wt Readings from Last 1 Encounters:   06/11/22 215 lb 5 oz (97.7 kg)     Temp Readings from Last 1 Encounters:   06/13/22 98.6 °F (37 °C) (Oral)     Recent Labs     06/11/22  0519 06/12/22  0423 06/13/22  0223   BUN 14 15 16   CREATININE 1.20 1.30 1.10   WBC 21.7* 53.3* 37.3*     Estimated Creatinine Clearance: 78 mL/min (based on SCr of 1.1 mg/dL).     Lab Results   Component Value Date    VANCORANDOM 16.4 06/11/2022       MRSA Nasal Swab:       Assessment:  Date/Time Dose Concentration AUC   6/11 0519 1500 mg q24h 16.4 475   Note: Serum concentrations collected for AUC dosing may appear elevated if collected in close proximity to the dose administered, this is not necessarily an indication of toxicity    Plan:  Continue current vancomycin dose of 1500 mg every 24 hours   Repeat vancomycin concentrations will be ordered as clinically appropriate   Pharmacy will continue to monitor patient and adjust therapy as indicated    Thank you for the consult,  Paco Vicente, PharmD, BCOP  Clinical Pharmacist  Contact via Perfect Serve

## 2022-06-13 NOTE — PROGRESS NOTES
OCCUPATIONAL THERAPY Daily Note     OT Visit Days: 2   Time  OT Charge Capture  Rehab Caseload Tracker  OT Orders    Yvon Young is a 79 y.o. male   PRIMARY DIAGNOSIS: SIRS (systemic inflammatory response syndrome) ([facility])  SIRS (systemic inflammatory response syndrome) ([facility].) [R65.10]       Inpatient: Payor: Jaclyn Crumb / Plan: [address] PPO / Product Type: Medicare /     ASSESSMENT:     REHAB RECOMMENDATIONS: CURRENT LEVEL OF FUNCTION:  (Most Recently Demonstrated)   Recommendation to date pending progress:  [address] [place] [address] Therapy    Equipment:     3 in 1 Bedside Commode   Rolling Walker Bathing:   Not Tested  Dressing:   Moderate Assist  Feeding/Grooming:   Minimal Assist  Toileting:   Minimal Assist  Functional Mobility:   Minimal Assist     ASSESSMENT:  Mr. Osmar Cardoso is progressing well towards OT goals. Today, pt was received supine in bed and had just completed bathing with PCT. Completed bed mobility with Anne. ModA for LB dressing. Sit>stand Anne x2 with first stand but progressed to Anne later in session. Ambulated with Anne with RW--required seated rest break. Anne for toileting and grooming task standing at the sink. Pt with great improvements this session and willingness to participate. Mr. Osmar Cardoso continues to demonstrate overall deficits in strength, balance, activity tolerance, and ADL performance. Continue OT efforts and POC in order to address functional deficits and OT goals stated above. SUBJECTIVE:     Mr. Osmar Cardoso states, \"Let's do it. \"     Social/Functional Lives With: Spouse  Type of Home: House  Home Layout: One level  Bathroom Shower/Tub: Walk-in shower  Bathroom Equipment: Shower chair  Home Equipment: [id number] [employer] Help From: Family  ADL Assistance: Independent  Homemaking Assistance: Needs assistance  Meal Prep: Unable to assess (comment)  Laundry: Unable to assess (comment)  Vacuuming: Unable to assess (comment)  Cleaning: Unable to assess (comment)  Gardening: Unable to assess (comment)  Yard Work: Unable to assess (comment)  Driving: Unable to assess (comment)  Shopping: Unable to assess (comment)  Ambulation Assistance: Independent  Transfer Assistance: Independent  Active : Yes  Mode of Transportation: Car    OBJECTIVE:     Bin Dayanna / Ambers Jose / AIRWAY: IV    RESTRICTIONS/PRECAUTIONS:  Restrictions/Precautions  Restrictions/Precautions: Fall Risk        PAIN: VITALS / O2:   Pre Treatment:   Pain Assessment: None - Denies Pain          Post Treatment: no change  Vitals          Oxygen            MOBILITY: I Mod I S SBA CGA Min Mod Max Total  NT x2 Comments:   Bed Mobility    Rolling [] [] [] [] [] [] [] [] [] [x] []    Supine to Sit [] [] [] [] [] [x] [] [] [] [] []    Scooting [] [] [] [] [] [] [] [] [] [x] []    Sit to Supine [] [] [] [] [] [x] [] [] [] [] []    Transfers    Sit to Stand [] [] [] [] [] [x] [] [] [] [] []    Bed to Chair [] [] [] [] [] [] [] [] [] [] [] Anne for mobility with RW   Stand to Sit [] [] [] [] [] [x] [] [] [] [] []    Tub/Shower [] [] [] [] [] [] [] [] [] [x] []     Toilet [] [] [] [] [] [] [] [] [] [x] [] Performed toileting in standing     [] [] [] [] [] [] [] [] [] [] []    I=Independent, Mod I=Modified Independent, S=Supervision/Setup, SBA=Standby Assistance, CGA=Contact Guard Assistance, Min=Minimal Assistance, Mod=Moderate Assistance, Max=Maximal Assistance, Total=Total Assistance, NT=Not Tested    ACTIVITIES OF DAILY LIVING: I Mod I S SBA CGA Min Mod Max Total NT Comments   BASIC ADLs:              Upper Body   Bathing [] [] [] [] [] [] [] [] [] [x]    Lower Body Bathing [] [] [] [] [x] [] [] [] [] [] LB sitting EOB   Toileting [] [] [] [] [] [x] [] [] [] [] Standing at toilet    Upper Body Dressing [] [] [] [] [] [] [] [] [] [x]     Lower Body Dressing [] [] [] [] [] [] [x] [] [] [] Socks    Feeding [] [] [] [] [] [] [] [] [] [x]    Grooming [] [] [] [] [] [x] [] [] [] [] Washed hands    Personal Device Care [] [] [] [] [] [] [] [] [] [x]    Functional Mobility [] [] [] [] [] [x] [] [] [] [] RW   I=Independent, Mod I=Modified Independent, S=Supervision/Setup, SBA=Standby Assistance, CGA=Contact Guard Assistance, Min=Minimal Assistance, Mod=Moderate Assistance, Max=Maximal Assistance, Total=Total Assistance, NT=Not Tested    BALANCE: Good Fair+ Fair Fair- Poor NT Comments   Sitting Static [] [x] [] [] [] []    Sitting Dynamic [] [x] [] [] [] []              Standing Static [] [x] [] [] [] []    Standing Dynamic [] [] [x] [] [] []        PLAN:     FREQUENCY/DURATION   OT Plan of Care: 3 times/week for duration of hospital stay or until stated goals are met, whichever comes first.    ACUTE OCCUPATIONAL THERAPY GOALS:   (Developed with and agreed upon by patient and/or caregiver.)  1. Patient will complete lower body bathing and dressing with MOD I and adaptive equipment as needed. 2. Patient will complete toileting with MOD I.   3. Patient will complete grooming ADL with MOD I.  4. Patient will tolerate 25 minutes of OT treatment with 1-2 rest breaks to increase activity tolerance for ADLs. 5. Patient will complete functional transfers with MOD I and adaptive equipment as needed. 6. Patient will tolerate 10 minutes BUE exercises to increase strength for safe, functional transfers.      Timeframe: 7 visits    TREATMENT:     TREATMENT:   Co-Treatment PT/OT necessary due to patient's decreased overall endurance/tolerance levels, as well as need for high level skilled assistance to complete functional transfers/mobility and functional tasks  Therapeutic Activity (18 Minutes): Therapeutic activity included Supine to Sit, Sit to Supine, Transfer Training, Ambulation on level ground, Sitting balance  and Standing balance to improve functional Activity tolerance, Balance, Coordination, Mobility and Strength.   Self Care (10 minutes): Patient participated in lower body bathing, toileting, lower body dressing and grooming ADLs in standing with no verbal cueing to increase independence, decrease assistance required, increase activity tolerance and increase safety awareness. Patient also participated in functional mobility, functional transfer and energy conservation to increase independence, decrease assistance required, increase activity tolerance and increase safety awareness.      TREATMENT GRID:  N/A    AFTER TREATMENT PRECAUTIONS: Bed, Call light within reach, Needs within reach, RN notified and Visitors at bedside    INTERDISCIPLINARY COLLABORATION:  RN/ PCT, PT/ PTA and OT/ DA SILVA    EDUCATION:       TOTAL TREATMENT DURATION AND TIME:  Time In: 1425  Time Out: 1420 [facility] [place]  Minutes: [address], OT

## 2022-06-13 NOTE — PROGRESS NOTES
Uneventful shift  No stools overnight  Klor-Con increased to 20meq BID from 20meq daily for K level of 3.4 on labs    Shift report given to [address], oncoming RN    Vitals:    06/12/22 1803 06/12/22 1941 06/12/22 2232 06/13/22 0318   BP: 100/69 97/65 98/61 94/60   Pulse: 98 88 87 83   Resp: 18 17 16 15   Temp: 98.1 °F (36.7 °C) 97.6 °F (36.4 °C) 97.6 °F (36.4 °C) 97.8 °F (36.6 °C)   TempSrc: Oral Oral Oral Oral   SpO2: 97% 93% 96% 94%   Weight:       Height:

## 2022-06-13 NOTE — PLAN OF CARE
Problem: Skin/Tissue Integrity  Goal: Absence of new skin breakdown  Description: 1. Monitor for areas of redness and/or skin breakdown  2. Assess vascular access sites hourly  3. Every 4-6 hours minimum:  Change oxygen saturation probe site  4. Every 4-6 hours:  If on nasal continuous positive airway pressure, respiratory therapy assess nares and determine need for appliance change or resting period.   Outcome: Progressing     Problem: Safety - Adult  Goal: Free from fall injury  Outcome: Progressing  Flowsheets (Taken 6/13/2022 0825)  Free From Fall Injury: Instruct family/caregiver on patient safety     Problem: ABCDS Injury Assessment  Goal: Absence of physical injury  Outcome: Progressing  Flowsheets (Taken 6/13/2022 0825)  Absence of Physical Injury: Implement safety measures based on patient assessment     Problem: Pain  Goal: Verbalizes/displays adequate comfort level or baseline comfort level  Outcome: Progressing

## 2022-06-13 NOTE — DIABETES MGMT
Patient admitted with SIRS. Admitting blood glucose 192. Lab blood glucose this morning was 213. Creatinine 1.10. GFR >60. HbA1c 7.5 (). Patient would likely benefit from POC glucose checks to assess glycemic control. Noted patient is getting solucortef as needed. Provider updated via [employer] regarding recommendations and patient glycemic control. New orders received to start Humalog correctional insulin ACHS.

## 2022-06-13 NOTE — PROGRESS NOTES
Chart reviewed by [facility] for discharge planning. Hospice Consult placed patient and spouse are reluctant to hospice related to they would like to continue with blood transfusion if needed  PT and OT have evaluated and recommendation is for Home Health at discharge.   Will continue to follow for discharge planning needs  Please consult  if any new issues arise

## 2022-06-13 NOTE — PROGRESS NOTES
PHYSICAL THERAPY Daily Note and PM  (Link to Caseload Tracking:    Time In/Out PT Charge Capture  Rehab Caseload Tracker  Orders      Shoshana De La Cruz is a 79 y.o. male   PRIMARY DIAGNOSIS: SIRS (systemic inflammatory response syndrome) ([facility])  S[facility] (systemic inflammatory response syndrome) ([facility] [R65.10]       Inpatient: Payor: Radha Bowen / Plan: [employer] PPO / Product Type: Medicare /     ASSESSMENT:     REHAB RECOMMENDATIONS:   Recommendation to date pending progress:  [address] [place] [address] Therapy    Equipment:     To Be Determined     ASSESSMENT:  Mr. Rachana Johnson presents supine in bed after finishing bathing sitting EOB. He is fatigued but agreeable to therapy. Pt was able to come sit EOB with min A x2 and then came to stand with VC and use of RW with min A-CGA x2. He then proceeded to ambulate down the thomas for 122' before needing a seated rest break. Pt with decreased gait speed and step clearance and need for CGA-min A with RW for increased safety. He was able to ambulate the same distance back to his room with a chair follow. To get back to bed he is SBA and to get OOB again to the R sided rather than the L he is CGA. Pt ambulated into the bathroom and stood at the toilet with CGA. Overall, significant improvement in mobility. Pt is making good progress towards his goals. PT will continue to follow.        SUBJECTIVE:   Mr. Rachana Johnson states, \"Thank you for coming to see me\"     Social/Functional Lives With: Spouse  Type of Home: House  Home Layout: One level  Bathroom Shower/Tub: Walk-in shower  Bathroom Equipment: Shower chair  Home Equipment: [id number] Help From: Family  ADL Assistance: Independent  Homemaking Assistance: Needs assistance  Meal Prep: Unable to assess (comment)  Laundry: Unable to assess (comment)  Vacuuming: Unable to assess (comment)  Cleaning: Unable to assess (comment)  Gardening: Unable to assess (comment)  Yard Work: Unable to assess (comment)  Driving: Unable to assess (comment)  Shopping: Unable to assess (comment)  Ambulation Assistance: Independent  Transfer Assistance: Independent  Active : Yes  Mode of Transportation: Car  OBJECTIVE:     PAIN: [employer] / O2: PRECAUTION / Hailey Chapo / Alejandra Mederos:   Pre Treatment:   Pain Assessment: None - Denies Pain      Post Treatment: 0/10 Vitals        Oxygen    IV    RESTRICTIONS/PRECAUTIONS:  Restrictions/Precautions  Restrictions/Precautions: Fall Risk  Restrictions/Precautions: Fall Risk     MOBILITY: I Mod I S SBA CGA Min Mod Max Total  NT x2 Comments:   Bed Mobility    Rolling [] [] [] [x] [] [] [] [] [] [] []    Supine to Sit [] [] [] [] [x] [x] [] [] [] [] [x]    Scooting [] [] [] [x] [] [] [] [] [] [] []    Sit to Supine [] [] [] [x] [] [] [] [] [] [] []    Transfers    Sit to Stand [] [] [] [] [x] [x] [x] [] [] [] [x] Mod A x2 for S2S from low chair   Bed to Chair [] [] [] [] [] [] [] [] [] [x] []    Stand to Sit [] [] [] [] [x] [] [] [] [] [] [x]     [] [] [] [] [] [] [] [] [] [] []    I=Independent, Mod I=Modified Independent, S=Supervision, SBA=Standby Assistance, CGA=Contact Guard Assistance,   Min=Minimal Assistance, Mod=Moderate Assistance, Max=Maximal Assistance, Total=Total Assistance, NT=Not Tested    BALANCE: Good Fair+ Fair Fair- Poor NT Comments   Sitting Static [x] [] [] [] [] []    Sitting Dynamic [x] [] [] [] [] []              Standing Static [] [x] [] [] [] []    Standing Dynamic [] [] [x] [] [] []      GAIT: I Mod I S SBA CGA Min Mod Max Total  NT x2 Comments:   Level of Assistance [] [] [] [] [x] [] [] [] [] [] []    Distance 122x2, 20 feet    DME Rolling Walker    Gait Quality Decreased ene , Decreased step clearance and wide SONIA    Weightbearing Status      Stairs      I=Independent, Mod I=Modified Independent, S=Supervision, SBA=Standby Assistance, CGA=Contact Guard Assistance,   Min=Minimal Assistance, Mod=Moderate Assistance, Max=Maximal Assistance, Total=Total Assistance, NT=Not Tested    PLAN:

## 2022-06-13 NOTE — PROGRESS NOTES
Palliative Care Progress Note    Patient: Yony Jha MRN: [id number]  SSN: [id number]    YOB: 1955  Age: 79 y.o. Sex: male       Assessment/Plan:     Chief Complaint/Interval History: Pt opting to pursue opinion rather that hospice for now. Principal Diagnosis:    · Fatigue, Lethargy  R53.83    Additional Diagnoses:   · Encounter for Palliative Care  Z51.5    Palliative Performance Scale (PPS)       Medical Decision Making:   Reviewed and summarized notes from last palliative care visit to present. Discussed case with appropriate providers: RN Kristen Bautista, NP Shanda Kirkpatrick, JESUS Fiore  Reviewed lab and X-ray data from last palliative care visit to present. Pt resting in bed, working with PT/OT. He was smiling and much more energetic than he was on Friday. Ms Zully Reed requested to speak with our Palliative Care NPs at the [facility], as her  has been seeing them for some time. NP Shanda Kirkpatrick spoke via phone with Ms Zully Reed and answered her questions. Mr Zully Reed has decided not to pursue hospice at this time, as he wishes to continue receiving blood transfusions as needed. The plan is that he would go to the [facility] regularly to have labs done and receive a transfusion if needed. Today, Mr Zully Reed has decided that he will also pursue a second opinion at [facility]. In the meantime, he will need some DME, which JESUS Schmitt will order. Will discuss findings with members of the interdisciplinary team.         More than 50% of this 25 minute visit was spent counseling and coordination of care as outlined above. Subjective:     Review of Systems:  A comprehensive review of systems was negative except as noted above. Objective:     Visit Vitals  /66   Pulse 92   Temp 98.2 °F (36.8 °C) (Oral)   Resp 20   Ht 5' 11\" (1.803 m)   Wt 215 lb 5 oz (97.7 kg)   SpO2 98%   BMI 30.03 kg/m²       Physical Exam:    General:  Cooperative. No acute distress.    Eyes: Conjunctivae/corneas clear    Nose: Nares normal. Septum midline. Neck: Supple, symmetrical, trachea midline, no JVD   Lungs:   Clear to auscultation bilaterally, unlabored   Heart:  Regular rate and rhythm, no murmur    Abdomen:   Soft, non-tender, non-distended   Extremities: Normal, atraumatic, no cyanosis or edema   Skin: Skin color, texture, turgor normal. No rash or lesions.    Neurologic: Nonfocal   Psych: Alert and oriented     Signed By: KESHAWN Parks - KATIE     June 13, 2022

## 2022-06-14 NOTE — DIABETES MGMT
Patient admitted 6/9/22 with SIRS, acute T-cell Prolymphocytic leukemia. Spouse, Jasmyn at bedside. FBS: 130. History of hyperlipidemia, GERD, and sleep apnea. New diagnosis type 2 DM. HbA1c 7.6%. Pt states that he has never been told that he had hyperglycemia or DM and has no family history of diabetes. Educated patient and spouse regarding current insulin regimen: Humalog correctional scale coverage 4x/day ac and hs, including type of insulin, timing with meals, onset, peak of action and duration of effect. Discussed signs, symptoms and treatments for hypoglycemia. Pt and spouse verbalize understanding. Patient states that he is not feeling well and prefers to talk at another time. Pt given Diabetes Self management educational folder. Discussed the significance of an HbA1c of 7.6% and to follow up regarding new diagnosis of DM with PCP. Patient and spouse have no further questions at this time.

## 2022-06-14 NOTE — PROGRESS NOTES
Comprehensive Nutrition Assessment    Type and Reason for Visit: Reassess  Malnutrition Screening Tool: Malnutrition Screen  Have you recently lost weight without trying?: 2 to 13 pounds (1 point)  Have you been eating poorly because of a decreased appetite?: Yes (1 point)  Malnutrition Screening Tool Score: 2    Nutrition Recommendations/Plan:   Meals and Snacks:  Diet: Continue current order  Nutrition Supplement Therapy:   Medical food supplement therapy:  Continue Ensure Enlive three times per day (this provides 350 kcal and 20 grams protein per bottle)     Malnutrition Assessment:  Malnutrition Status: Insufficient data (wt downward trend, poor PO at home and on recurrent admits)    Nutrition Assessment:  Nutrition History: Patient known to nutrition services from previous admissions. Poor PO over recent admissions end of May and beginning of this month. Noted decline in PO intake from 2 good meals per day to one meal per day or no real meals just eats soup. Inpatient PO has been widely variable but declining last admission. Weight history has also bee widely variable due to fluid but over declining trend. Wife at bedside today reports now only real intake is Premier Protein drinks and she is struggling to get him to drink one per day. She states that occasionally he will state hunger and eat a good meal but not often. Do You Have Any Cultural, Taoist, or Ethnic Food Preferences?: No   Nutrition Background:   H/O, HLD, ISAURO , GERD. Cell pro-lymphocytic leukemia. Diagnosed Dec of 2018 and achieved CR. Relapsed Sept 2021 s/p 2 cycles of R-ICE with no response. Most recent regimen with partial response. Recent admissions for IVC filter 5/25 and then again 6/2 for anemia, ELIZABETH/hyperkalemia. He presented to cancer center for C2 d8 chemo and was admitted to hospital with SIRS and AMS. Nutrition Interval:  Noted patient feeling better with more energy yesterday.  Patient and spouse were considering hospice but have now decided they wish to continue to receive blood products. Patient sleeping with wife at bedside. She states he is having a bad day today. She states that he is really not eating solids at all. She states 0% breakfast this am but did drink Ensure. She states that he ate a little better yesterday when he was feeling. Discussed foods he would more readily accept and will add preferences for soup and fruit to diet comments. Nutrition Related Findings:   No brianna wasting although NFPE limited due to patient in several blankets      Current Nutrition Therapies:  ADULT DIET; Regular  ADULT ORAL NUTRITION SUPPLEMENT; Breakfast, Lunch, Dinner; Standard High Calorie/High Protein Oral Supplement    Current Intake:   Average Meal Intake: 1-25% Average Supplements Intake: 51-75%      Anthropometric Measures:  Height: 5' 11\" (180.3 cm)  Current Body Wt: 225 lb 1.4 oz (102.1 kg) (6/13), Weight source: Bed Scale  BMI: 31.4  Admission Body Weight: 215 lb 2.7 oz (97.6 kg) (6/7 office wt)  Ideal Body Weight (Kg) (Calculated): 78 kg (172 lbs), 125.1 %  Usual Body Wt: 271 lb (122.9 kg) (office wt 6/2021), Percent weight change: -20.6       Edema: No data recorded  BMI Category Obese Class 1 (BMI 30.0-34. 9)  Estimated Daily Nutrient Needs:  Energy (kcal/day): 4354-7119 (Kcal/kg (20-25) Weight used: 97.6 kg Current  Protein (g/day):  (1-1.1 g/kg) Weight Used: (Current) 97.6 kg  Fluid (ml/day):   ( )    Nutrition Diagnosis:   · Inadequate oral intake related to  (lack of appetite) as evidenced by  (prolonged poor appetite and intake with wt loss)    Nutrition Interventions:   Food and/or Nutrient Delivery: Continue Current Diet,Continue Oral Nutrition Supplement     Coordination of Nutrition Care: Continue to monitor while inpatient       Goals:   Previous Goal Met: Progressing toward Goal(s)  Active Goal: PO intake 50% or greater,by next RD assessment       Nutrition Monitoring and Evaluation:      Food/Nutrient

## 2022-06-14 NOTE — PROGRESS NOTES
[facility] Hematology & Oncology        Inpatient Hematology / Oncology Progress Note      Admission Date: 2022 12:52 PM  Reason for Admission/Hospital Course: SIRS (systemic inflammatory response syndrome) ([facility]) [R65.10]      24 Hour Events:  Afeb, VSS  Feeling extremely weak today  Platelets 9K transfuse  Replacing mg with IV  BP soft-adding midodrine      ROS:  Constitutional: Negative for fever, positive for weakness, malaise, fatigue. CV: negative for chest pain, palpitations, edema. Respiratory:  negative for dyspnea, cough, wheezing. GI: negative for nausea, abdominal pain, diarrhea. 10 point review of systems is otherwise negative with the exception of the elements mentioned above in the HPI. Allergies   Allergen Reactions    Alemtuzumab Other (See Comments)     Rigors    Other Hives     Platelets, needs pre meds before receiving transfusion       OBJECTIVE:  Patient Vitals for the past 8 hrs:   BP Temp Temp src Pulse Resp SpO2   22 0849 (!) 96/58 -- -- -- -- --   22 0837 (!) 94/56 99.6 °F (37.6 °C) Oral 100 24 --   22 0739 (!) 91/58 99 °F (37.2 °C) Oral (!) 102 20 94 %     Temp (24hrs), Av.8 °F (37.1 °C), Min:98.1 °F (36.7 °C), Max:99.6 °F (37.6 °C)    No intake/output data recorded. Physical Exam:  Constitutional: Ill appearing  male in no acute distress, sitting comfortably in the bed. HEENT: Normocephalic and atraumatic. Oropharynx is clear, mucous membranes are moist.  Sclerae anicteric. Skin Warm and dry. No bruising and no rash noted. No erythema. No pallor. Respiratory Lungs are clear to auscultation bilaterally without wheezes, rales or rhonchi, normal air exchange without accessory muscle use. CVS Normal rate, regular rhythm and normal S1 and S2. No murmurs, gallops, or rubs. Abdomen Soft, nontender and nondistended, normoactive bowel sounds. No palpable mass. Neuro Grossly nonfocal with no obvious sensory or motor deficits.    MSK Normal range of motion in general. 1+ BLE edema and no tenderness. Psych Appropriate mood and affect.  Seems depressed       Labs:      Recent Labs     06/12/22 0423 06/13/22 0223 06/14/22 0235   WBC 53.3* 37.3* 82.9*   RBC 2.78* 2.69* 2.64*   HGB 8.3* 8.1* 7.8*   HCT 24.6* 23.9* 23.1*   MCV 88.5 88.8 87.5   MCH 29.9 30.1 29.5   MCHC 33.7 33.9 33.8   RDW 18.9* 19.3* 19.5*   PLT 9* 15* 9*   MPV 11.3 8.9* 8.7*        Recent Labs     06/12/22 0423 06/13/22 0223 06/14/22 0235    129* 142   K 3.2* 3.4* 3.7   * 100 113*   CO2 19* 21 21   BUN 15 16 14   GFRAA >60 >60 >60   GLOB 2.8 2.8 2.7   MG 1.7* 1.7* 1.7*         Imaging:    Medications:  Current Facility-Administered Medications   Medication Dose Route Frequency    0.9 % sodium chloride infusion   IntraVENous PRN    potassium chloride (KLOR-CON M) extended release tablet 20 mEq  20 mEq Oral BID    insulin lispro (HUMALOG) injection vial 0-8 Units  0-8 Units SubCUTAneous TID WC    insulin lispro (HUMALOG) injection vial 0-4 Units  0-4 Units SubCUTAneous Nightly    glucose chewable tablet 16 g  4 tablet Oral PRN    dextrose bolus 10% 125 mL  125 mL IntraVENous PRN    Or    dextrose bolus 10% 250 mL  250 mL IntraVENous PRN    glucagon injection 1 mg  1 mg IntraMUSCular PRN    dextrose 5 % solution  100 mL/hr IntraVENous PRN    0.9 % sodium chloride infusion   IntraVENous PRN    diphenoxylate-atropine (LOMOTIL) 2.5-0.025 MG per tablet 2 tablet  2 tablet Oral BID    famotidine (PEPCID) 20 mg in sodium chloride (PF) 10 mL injection  20 mg IntraVENous Q12H PRN    hydrocortisone sodium succinate PF (SOLU-CORTEF) injection 100 mg  100 mg IntraVENous Q8H PRN    medicated lip ointment (BLISTEX)   Topical PRN    diphenhydrAMINE (BENADRYL) injection 25 mg  25 mg IntraVENous Q6H PRN    0.9 % sodium chloride infusion   IntraVENous PRN    acyclovir (ZOVIRAX) tablet 400 mg  400 mg Oral BID    allopurinol (ZYLOPRIM) tablet 300 mg  300 mg Oral Daily  docusate sodium (COLACE) capsule 100 mg  100 mg Oral Daily    fluconazole (DIFLUCAN) tablet 200 mg  200 mg Oral Daily    furosemide (LASIX) tablet 20 mg  20 mg Oral Daily    gabapentin (NEURONTIN) capsule 100 mg  100 mg Oral BID    magnesium oxide (MAG-OX) tablet 400 mg  400 mg Oral Daily    mirtazapine (REMERON) tablet 15 mg  15 mg Oral Nightly    nystatin (MYCOSTATIN) 350172 UNIT/ML suspension 500,000 Units  500,000 Units Oral 4x Daily    pantoprazole (PROTONIX) tablet 40 mg  40 mg Oral QAM AC    oxyCODONE (ROXICODONE) immediate release tablet 5 mg  5 mg Oral Q8H PRN    sertraline (ZOLOFT) tablet 50 mg  50 mg Oral Daily    sodium chloride flush 0.9 % injection 5-40 mL  5-40 mL IntraVENous 2 times per day    sodium chloride flush 0.9 % injection 5-40 mL  5-40 mL IntraVENous PRN    0.9 % sodium chloride infusion   IntraVENous PRN    polyethylene glycol (GLYCOLAX) packet 17 g  17 g Oral Daily PRN    acetaminophen (TYLENOL) tablet 650 mg  650 mg Oral Q6H PRN    cefepime (MAXIPIME) 2000 mg IVPB minibag in NS  2,000 mg IntraVENous Q8H    0.9 % sodium chloride infusion   IntraVENous Continuous    morphine injection 1 mg  1 mg IntraVENous Q4H PRN    LORazepam (ATIVAN) injection 0.5 mg  0.5 mg IntraVENous Q4H PRN    Or    LORazepam (ATIVAN) tablet 1 mg  1 mg Oral Q4H PRN    ondansetron (ZOFRAN) injection 4 mg  4 mg IntraVENous Q6H PRN    prochlorperazine (COMPAZINE) injection 10 mg  10 mg IntraVENous Q6H PRN    diphenhydrAMINE (BENADRYL) capsule 25 mg  25 mg Oral Q6H PRN    loperamide (IMODIUM) capsule 2 mg  2 mg Oral 4x Daily PRN      [facility] 79 y[facility] admitted on June 9, 2002 with a primary diagnosis of acute T-Cell Pro-Lymphocytic Leukemia who most recently has received cycle 2 of romidepsin/venetoclax - day 8 was due today. He presented to the cancer center with altered mental status, moaning, fever and looking generally unwell.  His wife states he has been getting progressively worse over the past few days. It was hard to get him into the car this morning. She denies any specific infectious symptoms. He just states he \"doesn't feel good. \" He is lethargic but oriented. He did have bright red blood in his urine this morning - platelet count is 6,755. Blood cultures were obtained. Maxipime was started and he was transferred downtown for admission for SIRS as criteria was met - Temp 102.2, Pulse 113, WBC 54.8, platelets 0,110, altered mental status, appears unwell, and immunocompromised with leukemia. PLAN:  T-Cell Pro-Lymphocytic Leukemia  - Most recently has received cycle 2 of romidepsin/venetoclax - day 8 was due today. Hold Venetoclax. 6/10 Dr. Lew Griffin told patient and spouse that he is no longer candidate for treatment-that it would likely make him sicker. PC consulted. Last PM patient expressed that he didn't think that he can do this anymore. 6/11 Patient and wife open to hospice care, awaiting meeting with [facility]  6/12 WBC 53.3, ANC 0, ALC 20.3, They currently desire to continue with labs/replacements which [facility] does not allow, will consult PC tomorrow for assistance   6/14 Will help patient with referral to [facility] for second opinion once discharged     SIRS  - Temp 102.2, Pulse 113, WBC 54.8, platelets 2,644, altered mental status, appears unwell, and immunocompromised with leukemia  - Pan-cultures obtained, started cefepime outpatient and will receive his first dose of vancomycin inpatient. Will obtain chest xray and procalcitonin. IV fluids. 6/10 day 2 cefe/vanc  6/11 D3 Cef/Vanc  6/13 D5 Cef/Vanc. Last fever 6/10. Viral panel negative other than corona virus. BC NTD. DC vanc       Thrombocytopenia/Anemia/Leukocytosis  - Transfuse one unit platelets for count of 3,000. 6/10 transfuse platelets and prbc today   6/11 Hgb 8.6, Plt 16-no replacements today  6/12 Hgb 8.3, Plt 9K- 1 unit platelets today  1/98 No transfusion needed today  6/14 Platelets 9K transfuse.  WBC 82.9 (37.3) repeat CBC    Diarrhea  6/12 schedule lomotil BID and use imodium prn in order to conserve energy   6/13 reports 3 BMs-soft, mushy stools    Hypomagnesemia  6/14 give 2 gm IV (avoiding oral due to diarrhea)      Hyponatremia  6/13 check serum osmolality   6/14 Na 142    BLE edema  6/13 received home dose 20 mg lasix this am. Give 20 mg lasix IV.      Continue home medication. PT/OT evaluation. No DVT prophylaxis due to severe thrombocytopenia. DNR. Goals and plan of care reviewed with the patient. All questions answered to the best of our ability.             KESHAWN Cohn - NP   [facility] Hematology & Oncology  [address] [address]  Office : [phone]  Fax : [phone]

## 2022-06-14 NOTE — PROGRESS NOTES
Physical Therapy Note:    Attempted to see patient this AM for physical therapy treatment  session. Patient declined session this morning and in the afternoon the RN deferred therapy as pt is not feeling well and is sleeping. Will follow and re-attempt as schedule permits/patient available.  Thank you,    DES GAMEZ, [facility]     Rehab Caseload Tracker

## 2022-06-14 NOTE — PLAN OF CARE
Problem: Skin/Tissue Integrity  Goal: Absence of new skin breakdown  Description: 1. Monitor for areas of redness and/or skin breakdown  2. Assess vascular access sites hourly  3. Every 4-6 hours minimum:  Change oxygen saturation probe site  4. Every 4-6 hours:  If on nasal continuous positive airway pressure, respiratory therapy assess nares and determine need for appliance change or resting period.   Outcome: Progressing     Problem: Safety - Adult  Goal: Free from fall injury  Outcome: Progressing  Flowsheets (Taken 6/14/2022 0803)  Free From Fall Injury: Instruct family/caregiver on patient safety     Problem: ABCDS Injury Assessment  Goal: Absence of physical injury  Outcome: Progressing  Flowsheets (Taken 6/14/2022 0803)  Absence of Physical Injury: Implement safety measures based on patient assessment     Problem: Pain  Goal: Verbalizes/displays adequate comfort level or baseline comfort level  Outcome: Progressing

## 2022-06-15 PROBLEM — D70.9 NEUTROPENIC FEVER (HCC): Status: ACTIVE | Noted: 2021-01-01

## 2022-06-15 PROBLEM — C91.00: Status: ACTIVE | Noted: 2022-01-01

## 2022-06-15 PROBLEM — Z51.11 ADMISSION FOR ANTINEOPLASTIC CHEMOTHERAPY: Status: ACTIVE | Noted: 2019-01-14

## 2022-06-15 PROBLEM — A41.9 SEPSIS (HCC): Status: ACTIVE | Noted: 2021-01-01

## 2022-06-15 PROBLEM — G93.41 METABOLIC ENCEPHALOPATHY: Status: ACTIVE | Noted: 2022-01-01

## 2022-06-15 PROBLEM — R50.81 NEUTROPENIC FEVER (HCC): Status: ACTIVE | Noted: 2021-01-01

## 2022-06-15 PROBLEM — E66.01 SEVERE OBESITY WITH BODY MASS INDEX (BMI) OF 35.0 TO 39.9 WITH SERIOUS COMORBIDITY (HCC): Status: ACTIVE | Noted: 2018-09-06

## 2022-06-15 NOTE — PROGRESS NOTES
PHYSICAL THERAPY Daily Note and PM  (Link to Caseload Tracking:    Time In/Out PT Charge Capture  Rehab Caseload Tracker  Orders      Brooke Stover is a 79 y.o. male   PRIMARY DIAGNOSIS: SIRS (systemic inflammatory response syndrome) ([facility])  SIRS (systemic inflammatory response syndrome) ([facility] [R65.10]       Inpatient: Payor: [address] [place] [address] / Plan: [facility] PPO / Product Type: Medicare /     ASSESSMENT:     REHAB RECOMMENDATIONS:   Recommendation to date pending progress:  [address] [place] [address] Therapy    Equipment:     3 in 1 Bedside Commode   WC for in home use  A RW as well      ASSESSMENT:  Mr. Elieser Britt is not doing as well today. He is fatigued. Feels weak. Extra time needed for all activity. He was only able to ambulate as far as the chair 5 ft away and that was rough for him. His wife was present for part of the treatment. Took the time to provide some education for her for safety when assisting him. She got a call and missed the transfer and gait portion. Mr. Elieser Britt is quite weak. It will be hard to get him out of the house to go to get labs drawn day to day not knowing what his strength will be at any given time. Due to weakness today would recommend a wc for in home use for safety reasons. We discussed him sleeping in the recliner chair downstairs instead of the couch for ease of getting out of. Lengthy discussion with CM regarding today's session. OT cotx for  Complexity of case.      SUBJECTIVE:   Mr. Elieser Britt states, \"Im weak today\"    Social/Functional Lives With: Spouse  Type of Home: House  Home Layout: One level  Bathroom Shower/Tub: Walk-in shower  Bathroom Equipment: Shower chair  Home Equipment: [id number] [employer] Help From: Family  ADL Assistance: Independent  Homemaking Assistance: Needs assistance  Meal Prep: Unable to assess (comment)  Laundry: Unable to assess (comment)  Vacuuming: Unable to assess (comment)  Cleaning: Unable to assess (comment)  Gardening: Unable to assess (comment)  Yard Work: Unable to assess (comment)  Driving: Unable to assess (comment)  Shopping: Unable to assess (comment)  Ambulation Assistance: Independent  Transfer Assistance: Independent  Active : Yes  Mode of Transportation: Car  OBJECTIVE:     PAIN: VITALS / O2: PRECAUTION / LINES / DRAINS:   Pre Treatment:          Post Treatment: 0/10 Vitals        Oxygen    IV    RESTRICTIONS/PRECAUTIONS:  Restrictions/Precautions  Restrictions/Precautions: Fall Risk  Restrictions/Precautions: Fall Risk     MOBILITY: I Mod I S SBA CGA Min Mod Max Total  NT x2 Comments:   Bed Mobility    Rolling [] [] [] [x] [] [] [] [] [] [] []    Supine to Sit [] [] [] [] [x] [x] [] [] [] [] []    Scooting [] [] [] [x] [] [] [] [] [] [] []    Sit to Supine [] [] [] [] [] [] [] [] [] [x] []    Transfers    Sit to Stand [] [] [] [] [x] [x] [] [] [] [] [x]    Bed to Chair [] [] [] [] [x] [x] [] [] [] [] []    Stand to Sit [] [] [] [] [x] [x] [] [] [] [] [x]     [] [] [] [] [] [] [] [] [] [] []    I=Independent, Mod I=Modified Independent, S=Supervision, SBA=Standby Assistance, CGA=Contact Guard Assistance,   Min=Minimal Assistance, Mod=Moderate Assistance, Max=Maximal Assistance, Total=Total Assistance, NT=Not Tested    BALANCE: Good Fair+ Fair Fair- Poor NT Comments   Sitting Static [x] [] [] [] [] []    Sitting Dynamic [x] [] [] [] [] []              Standing Static [] [x] [] [] [] []    Standing Dynamic [] [] [x] [] [] []      GAIT: I Mod I S SBA CGA Min Mod Max Total  NT x2 Comments:   Level of Assistance [] [] [] [] [x] [] [] [] [] [] []    Distance 5 feet    DME Rolling Walker    Gait Quality Decreased ene , Decreased step clearance and wide SONIA    Weightbearing Status      Stairs      I=Independent, Mod I=Modified Independent, S=Supervision, SBA=Standby Assistance, CGA=Contact Guard Assistance,   Min=Minimal Assistance, Mod=Moderate Assistance, Max=Maximal Assistance, Total=Total Assistance, NT=Not Tested    PLAN:   ACUTE PHYSICAL THERAPY GOALS:   (Developed with and agreed upon by patient and/or caregiver. )  LTG:  (1.)Mr. Brandt King will move from supine to sit and sit to supine , scoot up and down and roll side to side in bed with SUPERVISION within 7 treatment day(s). (2.)Mr. Brandt King will transfer from bed to chair and chair to bed with SUPERVISION using the least restrictive device within 7 treatment day(s). (3.)Mr. Brandt King will ambulate with SUPERVISION for 200+ feet with the least restrictive device within 7 treatment day(s). ________________________________________________________________________________________________      FREQUENCY AND DURATION: 3 times/week for duration of hospital stay or until stated goals are met, whichever comes first.    TREATMENT:   TREATMENT:   Co-Treatment PT/OT necessary due to patient's decreased overall endurance/tolerance levels, as well as need for high level skilled assistance to complete functional transfers/mobility and functional tasks  Therapeutic Activity (40 Minutes): Therapeutic activity included Supine to Sit, Transfer Training, Ambulation on level ground, Sitting balance , Standing balance and caregiver education to improve functional Mobility, Strength and safety for transition home.     TREATMENT GRID:  N/A    AFTER TREATMENT PRECAUTIONS: Bed, Bed/Chair Locked, Call light within reach, Needs within reach and Visitors at bedside    INTERDISCIPLINARY COLLABORATION:  RN/ PCT, PT/ PTA and OT/ DA SILVA    EDUCATION:      TIME IN/OUT:  Time In: 1022  Time Out: [address]  Minutes: [address] [facility], PT

## 2022-06-15 NOTE — PROGRESS NOTES
Follow-up visit to continue care as Mr. Osmar Cardoso was preparing for transport to hospice house. Mr. Osmar Cardoso was resting with his eyes closed. I offered care to . Tristan/Jasmyn. She appeared calm and seemed to be coping well under the circumstances. jaison Edwards attended this visit with me and informed family and staff of her availability to continue support as needed. Rocio Mota expressed gratitude for  support.      Brooklynn Pace 68  Board Certified

## 2022-06-15 NOTE — PROGRESS NOTES
Staff requested 's visit. Patient and family are known to me from prior visits. Upon my arrival patient's wife, preferred name Chinedu Vasquez, was in the hallway speaking to the oncologist. Staff were at Mr. Krishnamurthy [facility] bedside offering care. Chinedu Vasquez expressed her interest in [facility] to the oncologist. Mr. Corina Gay received difficult news today. I continued the visit at bedside with patient and wife, offering emotional and spiritual support.          Brooklynn Segura 68  Board Certified

## 2022-06-15 NOTE — CARE COORDINATION
Pt is for discharge today to the [address] [place] [address] Rd @ [phone] with [facility]  No further  needs/supportive care orders received for CM at this time.   Pt will have close follow up with [facility]    Milestones met    ASSESSMENT NOTE    Attending Physician: Shauna Marino MD  Admit Problem: SIRS (systemic inflammatory response syndrome) ([facility] [R65.10]  Date/Time of Admission: 6/9/2022 12:52 PM  Problem List:  Patient Active Problem List   Diagnosis    Prolymphocytic leukemia of T-cell ([facility]    Severe obstructive sleep apnea    Essential hypertension, benign    Hypokalemia    Right shoulder pain    DDD (degenerative disc disease), cervical    DDD (degenerative disc disease), lumbar    Neutropenic fever (HCC)    Thrombocytopenia (HCC)    Body mass index (BMI) of 40.0-44.9 in adult ([facility]    First degree hemorrhoids    Pure hypercholesterolemia    Admission for antineoplastic chemotherapy    Benign prostatic hyperplasia with nocturia    Impotence    Sepsis ([facility]    Acute deep vein thrombosis (DVT) of right lower extremity (HCC)    Anxiety    Shortness of breath    Frailty    Anemia    ELIZABETH (acute kidney injury) ([facility]    Hyponatremia    Lethargy    Tachycardia    SIRS (systemic inflammatory response syndrome) (HCC)    ACP (advance care planning)    Encounter for palliative care    Localized edema    Severe obesity with body mass index (BMI) of 35.0 to 39.9 with serious comorbidity [facility])    Metabolic encephalopathy       Service Assessment  Patient Orientation Alert and Oriented   Cognition Alert   History Provided By     Primary Caregiver     Accompanied By/Relationship     Support Systems Spouse/Significant [address] is: Legal Next of Kin   PCP Verified by CM     Last Visit to PCP     Prior Functional Level     Current Functional Level     Can patient return to prior living arrangement     Ability to make needs known:     Family able to assist with home care needs:     Would you like for me to discuss the discharge plan with any other family members/significant others, and if so, who? Financial Resources     Community Resources     CM/SW Referral       Social/Functional History  Lives With Spouse   Type of [address] One level   Home Access     Entrance Stairs - Number of Steps     Entrance Stairs - Rails     Bathroom Shower/Tub Walk-in shower   Bathroom Toilet     Bathroom Equipment Shower chair   [address] Help From Family   ADL Assistance Independent   [employer]     Grooming     Feeding     Toileting     [address] Needs assistance   Meal Prep Unable to assess (comment)   Laundry Unable to assess (comment)   Vacuuming Unable to assess (comment)   Cleaning Unable to assess (comment)   Gardening Unable to assess (comment)   Yard Work Unable to assess (comment)   Driving Unable to assess (comment)   Shopping Unable to assess (comment)        Other (Comment)     [address] [employer] Paying/Finance Responsibility     [address] Management     Other (Comment)     Ambuation Assistance Independent   Transfer Assisstance Independent   Active  Yes   Patient's  Info     Mode of Transportation Car   Education     Occupation     Type of Occupation       Discharge Planning   Type of Residence [address] [place] [address]     Living Arrangements Spouse/Significant Other   [address] Prior To Admission None   Potential Assistance Needed N/A   DME     DME     DME Ordered? No   Potential Assistance Purchasing Medications No   Meds-to-Beds: Does the patient want to have any new prescriptions delivered to bedside prior to discharge?      Type of Home Care Services None   Patient expects to be discharged to: Hospice (comment) [facility]   Follow Up Appointment: Best Day/Time     One/Two Story Residence:     # of Interior Steps     Height of Each Step (in)     [employer] Available     History of Falls? Services At/After Discharge  Transition of Care Consult (CM Consult): Internal Home Health     Internal Hospice     Reason Outside Agency [address]     Partner SNF     Reason Why Partner SNF Not Chosen     Internal Comfort Care     Reason Outside [address] Discharge     [address] Provided? No   Mode of Transport at Discharge [address] Time of Discharge 1645   Confirm Follow Up Transport       Condition of Participation: Discharge Planning  The plan for Transition of Care is related to the following treatment goals: medical stability   The Patient and/or Patient Representative was provided with a Choice of Provider? Name of the Patient Representative who was provided with the Choice of Provider and agrees with the Discharge Plan? Geraldine Centeno, spouse   The Patient and/or Patient Representative Agree with the Discharge Plan? Yes   Freedom of Choice list was provided with basic dialogue that supports the individualized plan of care/goals, treatment preferences, and shares the quality data associated with the providers?  Yes     Documentation for Discharge Appeal  Discharge Appealed by     Date notified by QIO of appeal request:     Time notified by QIO of appeal request:     Detailed Notice of Discharge given to:     Date Notice of Discharge given:     Time Notice of Discharge given:     Date records sent to [address]     Time records sent to [address]     Date Notified of Outcome     Time Notified of Outcome     Outcome of appeal           Emma Estrada RN 06/15/22 4:37 PM

## 2022-06-15 NOTE — PROGRESS NOTES
Call placed to [address] for patient to go to [address] [place] [address].  RNCM spoke Billie Horton in Intake

## 2022-06-15 NOTE — DISCHARGE SUMMARY
[address] [place] [address] Hematology & Oncology: Inpatient Hematology / Oncology Discharge Summary Note    Patient ID:  Jere Womack  [id number]  79 y.o.  1955    Admit Date: 6/9/2022    Discharge Date: 6/15/2022    Admission Diagnoses: SIRS (systemic inflammatory response syndrome) ([facility] [R65.10]    Discharge Diagnoses:  Principal Diagnosis: SIRS (systemic inflammatory response syndrome) ([facility]  Principal Problem:    SIRS (systemic inflammatory response syndrome) ([facility]  Active Problems:    Hyponatremia    Lethargy    Tachycardia    ACP (advance care planning)    Encounter for palliative care    Localized edema    Prolymphocytic leukemia of T-cell ([facility]  Resolved Problems:    * No resolved hospital problems. [facility] Course:  Mr. Hudson is a 79 y. o. male admitted on June 9, 2002 with a primary diagnosis of acute T-Cell Pro-Lymphocytic Leukemia who most recently has received cycle 2 of romidepsin/venetoclax - day 8 was due today. He presented to the cancer center with altered mental status, moaning, fever and looking generally unwell. His wife states he has been getting progressively worse over the past few days. It was hard to get him into the car that am.  She denies any specific infectious symptoms. He just states he \"doesn't feel good. \" He was lethargic but oriented. Blood cultures were obtained. Maxipime was started and he was transferred downtown for admission for SIRS as criteria was met - Temp 102.2, Pulse 113, WBC 54.8, plateles 8,697, altered mental status. Patient completed antibiotics for fever. There was never a clear source of infection. During the admission, he has continued to require frequent transfusions and even with transfusions he had continued to decline. His WBC now is steadily rising. Goals and plan of care reviewed with the patient and wife today. Extensive discussion on disease process as well at the fact that he has run out of treatment options.  Patient became light head and requested back to bed after these conversations. Vasovagal response, vomitting, BP stable. After this occurred we had discussion with patient's wife and informed her of the severity of this situation. She ultimately made the decision to take patient to [facility]. Further details of stay see below:     T-Cell Pro-Lymphocytic Leukemia  - Most recently has received cycle 2 of romidepsin/venetoclax - day 8 was due today. Hold Venetoclax.   6/10 Dr. Francois Carvajal told patient and spouse that he is no longer candidate for treatment-that it would likely make him sicker. PC consulted. Last PM patient expressed that he didn't think that he can do this anymore. 6/11 Patient and wife open to hospice care, awaiting meeting with [facility]  6/12 WBC 53.3, ANC 0, ALC 20.3, They currently desire to continue with labs/replacements which [facility] does not allow, will consult PC tomorrow for assistance   6/14 Will help patient with referral to [facility] for second opinion once discharged     SIRS  - Temp 102.2, Pulse 113, WBC 54.8, platelets 4,808, altered mental status, appears unwell, and immunocompromised with leukemia  - Pan-cultures obtained, started cefepime outpatient and will receive his first dose of vancomycin inpatient. Will obtain chest xray and procalcitonin. IV fluids.   6/10 day 2 cefe/vanc  6/11 D3 Cef/Vanc  6/13 D5 Cef/Vanc. Last fever 6/10. Viral panel negative other than corona virus. BC NTD. DC vanc  6/15 DC cefe        Thrombocytopenia/Anemia/Leukocytosis  - Transfuse one unit platelets for count of 3,000. 6/10 transfuse platelets and prbc today   6/11 Hgb 8.6, Plt 16-no replacements today  6/12 Hgb 8.3, Plt 9K- 1 unit platelets today  4/24 No transfusion needed today  6/14 Platelets 9K transfuse.  WBC 82.9 (37.3) repeat CBC  6/15 WBC 86.4, ;hgb 7.6, platelets 45D     Diarrhea  6/12 schedule lomotil BID and use imodium prn in order to conserve energy   6/13 reports 3 BMs-soft, mushy stools     Hypomagnesemia  6/14 give 2 gm IV (avoiding oral due to diarrhea)        Hyponatremia   check serum osmolality    Na 142     BLE edema   received home dose 20 mg lasix this am. Give 20 mg lasix IV.      Continue home medication. PT/OT evaluation. No DVT prophylaxis due to severe thrombocytopenia. DNR.      .    Consults:  IP CONSULT TO PHARMACY  IP CONSULT TO OCCUPATIONAL THERAPY  IP CONSULT TO PALLIATIVE CARE  IP CONSULT TO HOSPICE  IP CONSULT TO PALLIATIVE CARE  IP CONSULT TO DIABETES MANAGEMENT  IP CONSULT HOME HEALTH    Pertinent Diagnostic Studies:   Labs:    Recent Labs     22  0235 22  1650 06/15/22  0300   WBC 82.9* 85.3* 86.4*   HGB 7.8* 7.7* 7.6*   PLT 9* 20* 15*      Recent Labs     22  0223 22  0235 06/15/22  0300   * 142 141   K 3.4* 3.7 4.2    113* 114*   CO2 21 21 21   BUN 16 14 18   MG 1.7* 1.7* 2.3         OBJECTIVE:  Patient Vitals for the past 8 hrs:   BP Temp Temp src Pulse Resp SpO2   06/15/22 1422 95/65 98.4 °F (36.9 °C) -- (!) 112 -- 94 %   06/15/22 1140 130/80 97.5 °F (36.4 °C) Oral (!) 113 22 --   06/15/22 0757 98/62 99.1 °F (37.3 °C) Oral (!) 110 18 98 %     Temp (24hrs), Av.3 °F (36.8 °C), Min:97.5 °F (36.4 °C), Max:99.1 °F (37.3 °C)    06/15 0701 - 06/15 1900  In: 8029 [P.O.:240; I.V.:957]  Out: 125 [Urine:125]    Physical Exam:  Constitutional: Ill appearing  male in no acute distress, sitting comfortably in the bedside chair   HEENT: Normocephalic and atraumatic. Oropharynx is clear, mucous membranes are moist.  Sclerae anicteric. Skin Warm and dry. No bruising and no rash noted. No erythema. No pallor. Respiratory Lungs are clear to auscultation bilaterally without wheezes, rales or rhonchi, normal air exchange without accessory muscle use. CVS Normal rate, regular rhythm and normal S1 and S2. No murmurs, gallops, or rubs. Abdomen Soft, nontender and nondistended, normoactive bowel sounds. No palpable mass.      Neuro Grossly nonfocal with no obvious sensory or motor deficits. MSK Normal range of motion in general. 1+ BLE edema and no tenderness. Psych Depressed, weakness             ASSESSMENT:    Principal Problem:    SIRS (systemic inflammatory response syndrome) (HCC)  Active Problems:    Hyponatremia    Lethargy    Tachycardia    ACP (advance care planning)    Encounter for palliative care    Localized edema    Prolymphocytic leukemia of T-cell (HCC)  Resolved Problems:    * No resolved hospital problems. *      DISPOSITION:  Discharge to [facility]    Over 30 minutes was spent in discharge planning and coordination of care.             KESHAWN Maddox - REINA  [facility] Hematology & Oncology  [address] [place] [address]  Office : [phone]  Fax : [phone]

## 2022-06-15 NOTE — PROGRESS NOTES
[facility]  This Liaison was called to patients room per family request. After visit from MD family has decided to go with hospice. Dr Rachana Espinal approved GIP for [facility]. Transport was set up for 1630. Consents signed by wife. JESUS Escoto and CAT Carrero are aware.    Thank you for your referral   Beatrice SHOEMAKER -[phone]  Office [phone]

## 2022-06-15 NOTE — PROGRESS NOTES
Chart reviewed chart for discharge planning. Recommendation from PT/OT is Home Health and DME:  [facility] and [facility]. Discharge plan when patient is medically stable    Home Health SN, PT, OT and Aid  DME: [facility] and [facility].     Will continue to follow for discharge planning needs  Please consult  if any new issues arise

## 2022-06-15 NOTE — PROGRESS NOTES
Physician Progress Note      PATIENT:               Hay Heredia  [facility] #:                  [id number]  :                       1955  ADMIT DATE:       2022 12:52 PM  [address] [place] DATE:  RESPONDING  PROVIDER #:        Loreto EAGLE          QUERY TEXT:    Pt admitted with SIRS . Pt noted to have leukemia and tested positive for the   coronavirus 229E. If possible, please document in progress notes and   discharge summary the relationship, if any, between SIRS, leukemia and the   common cold virus. The medical record reflects the following:  Risk Factors: leukemia, immunocompromised patient , receiving chemo,  Clinical Indicators: temp 102.2, pulse 113, wbc- 54, platelets-3,000. positive   for the coronavirus 229E, procal 1.18. cxray, urine, blood cultures negative. Treatment: cxray, cultures, cefepime, vanc,  plates, fluids, essential home   meds, hospice  Options provided:  -- SIRS due to the cold virus  -- SIRS due to the cold virus with Sepsis  -- SIRS due to underlying leukemia  -- SIRS due to both the cold virus and leukemia, please specify if with or   without Sepsis  -- Other - I will add my own diagnosis  -- Disagree - Not applicable / Not valid  -- Disagree - Clinically unable to determine / Unknown  -- Refer to Clinical Documentation Reviewer    PROVIDER RESPONSE TEXT:    Provider disagreed with this query.     Query created by: Piter Nelson on 2022 1:25 PM      Electronically signed by:  Loreto EAGLE 6/15/2022 2:20 PM

## 2022-06-15 NOTE — PROGRESS NOTES
[facility] Hematology & Oncology        Inpatient Hematology / Oncology Progress Note      Admission Date: 2022 12:52 PM  Reason for Admission/Hospital Course: SIRS (systemic inflammatory response syndrome) ([facility]) [R65.10]      24 Hour Events:  Sitting up in bedside chair  Wife at bedside  Vasovagal response occurred  Staff at bedside-stable    ROS:  Constitutional: Negative for fever, positive for weakness, malaise, fatigue. CV: negative for chest pain, palpitations, edema. Respiratory:  negative for dyspnea, cough, wheezing. GI: negative for nausea, abdominal pain, diarrhea. 10 point review of systems is otherwise negative with the exception of the elements mentioned above in the HPI. Allergies   Allergen Reactions    Alemtuzumab Other (See Comments)     Rigors    Other Hives     Platelets, needs pre meds before receiving transfusion       OBJECTIVE:  Patient Vitals for the past 8 hrs:   BP Temp Temp src Pulse Resp SpO2   06/15/22 1140 130/80 97.5 °F (36.4 °C) Oral (!) 113 22    06/15/22 0757 98/62 99.1 °F (37.3 °C) Oral (!) 110 18 98 %     Temp (24hrs), Av.4 °F (36.9 °C), Min:97.5 °F (36.4 °C), Max:99.1 °F (37.3 °C)    06/15 0701 - 06/15 1900  In: 1345 [P.O.:240; I.V.:957]  Out: 125 [Urine:125]    Physical Exam:  Constitutional: Ill appearing  male in no acute distress, sitting comfortably in the bedside chair   HEENT: Normocephalic and atraumatic. Oropharynx is clear, mucous membranes are moist.  Sclerae anicteric. Skin Warm and dry. No bruising and no rash noted. No erythema. No pallor. Respiratory Lungs are clear to auscultation bilaterally without wheezes, rales or rhonchi, normal air exchange without accessory muscle use. CVS Normal rate, regular rhythm and normal S1 and S2. No murmurs, gallops, or rubs. Abdomen Soft, nontender and nondistended, normoactive bowel sounds. No palpable mass. Neuro Grossly nonfocal with no obvious sensory or motor deficits.    MSK Normal range of motion in general. 1+ BLE edema and no tenderness. Psych Depressed, weakness       Labs:      Recent Labs     06/14/22  0235 06/14/22  1650 06/15/22  0300   WBC 82.9* 85.3* 86.4*   RBC 2.64* 2.54* 2.57*   HGB 7.8* 7.7* 7.6*   HCT 23.1* 22.4* 22.7*   MCV 87.5 88.2 88.3   MCH 29.5 30.3 29.6   MCHC 33.8 34.4 33.5   RDW 19.5* 19.6* 20.0*   PLT 9* 20* 15*   MPV 8.7* Unable to calculate. Recommend adding IPF. Unable to calculate. Recommend adding IPF.         Recent Labs     06/13/22 0223 06/14/22  0235 06/15/22  0300   * 142 141   K 3.4* 3.7 4.2    113* 114*   CO2 21 21 21   BUN 16 14 18   GFRAA >60 >60 >60   GLOB 2.8 2.7 2.8   MG 1.7* 1.7* 2.3         Imaging:    Medications:  Current Facility-Administered Medications   Medication Dose Route Frequency    insulin lispro (HUMALOG) injection vial 0-8 Units  0-8 Units SubCUTAneous Dinner    guaiFENesin (ROBITUSSIN) 100 MG/5ML oral solution 200 mg  200 mg Oral Q4H PRN    0.9 % sodium chloride infusion   IntraVENous PRN    midodrine (PROAMATINE) tablet 5 mg  5 mg Oral TID WC    potassium chloride (KLOR-CON M) extended release tablet 20 mEq  20 mEq Oral BID    glucose chewable tablet 16 g  4 tablet Oral PRN    dextrose bolus 10% 125 mL  125 mL IntraVENous PRN    Or    dextrose bolus 10% 250 mL  250 mL IntraVENous PRN    glucagon injection 1 mg  1 mg IntraMUSCular PRN    dextrose 5 % solution  100 mL/hr IntraVENous PRN    0.9 % sodium chloride infusion   IntraVENous PRN    diphenoxylate-atropine (LOMOTIL) 2.5-0.025 MG per tablet 2 tablet  2 tablet Oral BID    famotidine (PEPCID) 20 mg in sodium chloride (PF) 10 mL injection  20 mg IntraVENous Q12H PRN    hydrocortisone sodium succinate PF (SOLU-CORTEF) injection 100 mg  100 mg IntraVENous Q8H PRN    medicated lip ointment (BLISTEX)   Topical PRN    diphenhydrAMINE (BENADRYL) injection 25 mg  25 mg IntraVENous Q6H PRN    0.9 % sodium chloride infusion   IntraVENous PRN    acyclovir (ZOVIRAX) tablet 400 mg  400 mg Oral BID    allopurinol (ZYLOPRIM) tablet 300 mg  300 mg Oral Daily    docusate sodium (COLACE) capsule 100 mg  100 mg Oral Daily    fluconazole (DIFLUCAN) tablet 200 mg  200 mg Oral Daily    furosemide (LASIX) tablet 20 mg  20 mg Oral Daily    gabapentin (NEURONTIN) capsule 100 mg  100 mg Oral BID    magnesium oxide (MAG-OX) tablet 400 mg  400 mg Oral Daily    mirtazapine (REMERON) tablet 15 mg  15 mg Oral Nightly    nystatin (MYCOSTATIN) 857746 UNIT/ML suspension 500,000 Units  500,000 Units Oral 4x Daily    pantoprazole (PROTONIX) tablet 40 mg  40 mg Oral QAM AC    oxyCODONE (ROXICODONE) immediate release tablet 5 mg  5 mg Oral Q8H PRN    sertraline (ZOLOFT) tablet 50 mg  50 mg Oral Daily    sodium chloride flush 0.9 % injection 5-40 mL  5-40 mL IntraVENous 2 times per day    sodium chloride flush 0.9 % injection 5-40 mL  5-40 mL IntraVENous PRN    0.9 % sodium chloride infusion   IntraVENous PRN    polyethylene glycol (GLYCOLAX) packet 17 g  17 g Oral Daily PRN    acetaminophen (TYLENOL) tablet 650 mg  650 mg Oral Q6H PRN    cefepime (MAXIPIME) 2000 mg IVPB minibag in NS  2,000 mg IntraVENous Q8H    0.9 % sodium chloride infusion   IntraVENous Continuous    morphine injection 1 mg  1 mg IntraVENous Q4H PRN    LORazepam (ATIVAN) injection 0.5 mg  0.5 mg IntraVENous Q4H PRN    Or    LORazepam (ATIVAN) tablet 1 mg  1 mg Oral Q4H PRN    ondansetron (ZOFRAN) injection 4 mg  4 mg IntraVENous Q6H PRN    prochlorperazine (COMPAZINE) injection 10 mg  10 mg IntraVENous Q6H PRN    diphenhydrAMINE (BENADRYL) capsule 25 mg  25 mg Oral Q6H PRN    loperamide (IMODIUM) capsule 2 mg  2 mg Oral 4x Daily PRN     Mr. Lise Moon is a 79 y.o. male admitted on June 9, 2002 with a primary diagnosis of acute T-Cell Pro-Lymphocytic Leukemia who most recently has received cycle 2 of romidepsin/venetoclax - day 8 was due today.  He presented to the cancer center with altered mental status, moaning, fever and looking generally unwell. His wife states he has been getting progressively worse over the past few days. It was hard to get him into the car this morning. She denies any specific infectious symptoms. He just states he \"doesn't feel good. \" He is lethargic but oriented. He did have bright red blood in his urine this morning - platelet count is 2,011. Blood cultures were obtained. Maxipime was started and he was transferred downtown for admission for SIRS as criteria was met - Temp 102.2, Pulse 113, WBC 54.8, platelets 8,681, altered mental status, appears unwell, and immunocompromised with leukemia. PLAN:  T-Cell Pro-Lymphocytic Leukemia  - Most recently has received cycle 2 of romidepsin/venetoclax - day 8 was due today. Hold Venetoclax. 6/10 Dr. Keiko Foley told patient and spouse that he is no longer candidate for treatment-that it would likely make him sicker. PC consulted. Last PM patient expressed that he didn't think that he can do this anymore. 6/11 Patient and wife open to hospice care, awaiting meeting with [facility]  6/12 WBC 53.3, ANC 0, ALC 20.3, They currently desire to continue with labs/replacements which [facility] does not allow, will consult PC tomorrow for assistance   6/14 Will help patient with referral to [facility] for second opinion once discharged     SIRS  - Temp 102.2, Pulse 113, WBC 54.8, platelets 0,052, altered mental status, appears unwell, and immunocompromised with leukemia  - Pan-cultures obtained, started cefepime outpatient and will receive his first dose of vancomycin inpatient. Will obtain chest xray and procalcitonin. IV fluids. 6/10 day 2 cefe/vanc  6/11 D3 Cef/Vanc  6/13 D5 Cef/Vanc. Last fever 6/10. Viral panel negative other than corona virus. BC NTD. DC vanc  6/15 DC cefe       Thrombocytopenia/Anemia/Leukocytosis  - Transfuse one unit platelets for count of 3,000.    6/10 transfuse platelets and prbc today   6/11 Hgb 8.6, Plt 16-no replacements today  6/12 Hgb 8.3, Plt 9K- 1 unit platelets today  7/00 No transfusion needed today  6/14 Platelets 9K transfuse. WBC 82.9 (37.3) repeat CBC  6/15 WBC 86.4, ;hgb 7.6, platelets 36F    Diarrhea  6/12 schedule lomotil BID and use imodium prn in order to conserve energy   6/13 reports 3 BMs-soft, mushy stools    Hypomagnesemia  6/14 give 2 gm IV (avoiding oral due to diarrhea)      Hyponatremia  6/13 check serum osmolality   6/14 Na 142    BLE edema  6/13 received home dose 20 mg lasix this am. Give 20 mg lasix IV.      Continue home medication. PT/OT evaluation. No DVT prophylaxis due to severe thrombocytopenia. DNR. Goals and plan of care reviewed with the patient and wife. Extensive discussion on disease process as well at the fact that he has run out of treatment options. Patient became light head and requested back to bed. Vasovagal response, vomitting, BP stable. After this occurred we had discussion with patient's wife and informed her of the severity of this situation. She would like to speak with [facility]. All questions answered to the best of our ability.             KESHAWN Mckeon NP   [facility] Hematology & Oncology  [address] [address]  Office : [phone]  Fax : [phone]

## 2022-06-15 NOTE — PROGRESS NOTES
chair  Home Equipment: Ana Donald Help From: Family  ADL Assistance: Independent  Homemaking Assistance: Needs assistance  Meal Prep: Unable to assess (comment)  Laundry: Unable to assess (comment)  Vacuuming: Unable to assess (comment)  Cleaning: Unable to assess (comment)  Gardening: Unable to assess (comment)  Yard Work: Unable to assess (comment)  Driving: Unable to assess (comment)  Shopping: Unable to assess (comment)  Ambulation Assistance: Independent  Transfer Assistance: Independent  Active : Yes  Mode of Transportation: Car    OBJECTIVE:     Yani Ramirez / Soo Bermeoerty / AIRWAY: IV    RESTRICTIONS/PRECAUTIONS:  Restrictions/Precautions  Restrictions/Precautions: Fall Risk        PAIN: VITALS / O2:   Pre Treatment:   Pain Assessment: None - Denies Pain          Post Treatment: no change  Vitals          Oxygen            MOBILITY: I Mod I S SBA CGA Min Mod Max Total  NT x2 Comments:   Bed Mobility    Rolling [] [] [] [] [] [] [] [] [] [] []    Supine to Sit [] [] [] [] [] [x] [] [] [] [] []    Scooting [] [] [] [] [] [] [] [] [] [x] []    Sit to Supine [] [] [] [] [] [] [] [] [] [x] [] Left sitting in the chair    Transfers    Sit to Stand [] [] [] [] [] [x] [] [] [] [] [x]    Bed to Chair [] [] [] [] [] [x] [] [] [] [] [x] RW   Stand to Sit [] [] [] [] [] [x] [] [] [] [] []    Tub/Shower [] [] [] [] [] [] [] [] [] [x] []     Toilet [] [] [] [] [] [] [] [] [] [x] []      [] [] [] [] [] [] [] [] [] [] []    I=Independent, Mod I=Modified Independent, S=Supervision/Setup, SBA=Standby Assistance, CGA=Contact Guard Assistance, Min=Minimal Assistance, Mod=Moderate Assistance, Max=Maximal Assistance, Total=Total Assistance, NT=Not Tested    ACTIVITIES OF DAILY LIVING: I Mod I S SBA CGA Min Mod Max Total NT Comments   BASIC ADLs:              Upper Body   Bathing [] [] [] [] [] [] [] [] [] [x]    Lower Body Bathing [] [] [] [] [] [] [] [] [] [x]    Toileting [] [] [] [] [] [] [] [] [] [x]    Upper Body Dressing [] [] [] [] [] [] [] [] [] [x]    Lower Body Dressing [] [] [] [] [] [] [] [x] [] [] socks   Feeding [] [] [] [] [] [] [] [] [] [x]    Grooming [] [] [] [] [x] [] [] [] [] [] Washed face and brushed teeth sitting EOB   Personal Device Care [] [] [] [] [] [] [] [] [] [x]    Functional Mobility [] [] [] [] [] [x] [] [] [] [] x2   I=Independent, Mod I=Modified Independent, S=Supervision/Setup, SBA=Standby Assistance, CGA=Contact Guard Assistance, Min=Minimal Assistance, Mod=Moderate Assistance, Max=Maximal Assistance, Total=Total Assistance, NT=Not Tested    BALANCE: Good Fair+ Fair Fair- Poor NT Comments   Sitting Static [] [x] [] [] [] []    Sitting Dynamic [] [x] [] [] [] []              Standing Static [] [x] [] [] [] []    Standing Dynamic [] [] [x] [] [] []        PLAN:     FREQUENCY/DURATION   OT Plan of Care: 3 times/week for duration of hospital stay or until stated goals are met, whichever comes first.    ACUTE OCCUPATIONAL THERAPY GOALS:   (Developed with and agreed upon by patient and/or caregiver.)  1. Patient will complete lower body bathing and dressing with MOD I and adaptive equipment as needed. 2. Patient will complete toileting with MOD I.   3. Patient will complete grooming ADL with MOD I.  4. Patient will tolerate 25 minutes of OT treatment with 1-2 rest breaks to increase activity tolerance for ADLs. 5. Patient will complete functional transfers with MOD I and adaptive equipment as needed.    6. Patient will tolerate 10 minutes BUE exercises to increase strength for safe, functional transfers.      Timeframe: 7 visits    TREATMENT:     TREATMENT:   Co-Treatment PT/OT necessary due to patient's decreased overall endurance/tolerance levels, as well as need for high level skilled assistance to complete functional transfers/mobility and functional tasks  Neuromuscular Re-education (25 Minutes): Neuromuscular Re-education included Balance Training, Coordination training, Postural training, Sitting balance training and Standing balance training to improve Balance, Coordination, Functional Mobility and Postural Control. Self Care (18 minutes): Patient participated in lower body dressing and grooming ADLs in unsupported sitting with no   cueing to increase independence, decrease assistance required, increase activity tolerance and increase safety awareness. Patient also participated in functional mobility, functional transfer and energy conservation to increase independence, decrease assistance required, increase activity tolerance and increase safety awareness.      TREATMENT GRID:  N/A    AFTER TREATMENT PRECAUTIONS: Call light within reach, Chair, Needs within reach, RN notified and Visitors at bedside    INTERDISCIPLINARY COLLABORATION:  RN/ PCT, PT/ PTA and OT/ DA SILVA    EDUCATION:       TOTAL TREATMENT DURATION AND TIME:  Time In: 1022  Time Out: [address]  Minutes: [address], OT

## 2022-06-16 NOTE — PROGRESS NOTES
Physician Progress Note      PATIENT:               Sanya Rueda  CSN #:                  [id number]  :                       1955  ADMIT DATE:       2022 10:52 PM  [address] DATE:        2022 4:31 PM  RESPONDING  PROVIDER #:        Usman MAYERS DO          QUERY TEXT:    Patient admitted with Hyperkalemia. 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 onlinelibrary. williamson. com/doi/full/10.1177/629249989951443  5  Options provided:  -- Protein calorie malnutrition severe  -- Underweight with BMI 30.31  -- Other - I will add my own diagnosis  -- Disagree - Not applicable / Not valid  -- Disagree - Clinically unable to determine / Unknown  -- Refer to Clinical Documentation Reviewer    PROVIDER RESPONSE TEXT:    This patient has severe protein calorie malnutrition.     Query created by: Lucie Johnsno on 6/10/2022 2:31 PM      Electronically signed by:  Victor M Bansal DO 2022 8:15 AM

## 2022-06-16 NOTE — PROGRESS NOTES
Danya Mckeon requested [facility] follow-up on PT and Spouse as PT is transitioning to Hospice. Danya Mckeon introduced [facility] to Spouse and PT. [facility] checked on PT and Spouse. [facility] offered comforting spiritual presence, active listening and therapeutic communication. Spouse expressed affection for PT and PT smiled at Spouse. Spouse and PT expressed comfort in elizabeth. CH offered prayer. [facility] checked to see when Hospice would arrive. CH let PT and Spouse know they were in [facility] prayers. Rev. Zuri Higginbotham M.Div.

## 2023-09-01 NOTE — PROGRESS NOTES
Pt was seen today in infusion. He will need 1 unit of PRBC and 1 unit of plt. He finished his venclexta yesterday. He is feeling well. A little run down. He is anxious to get started with NS. He will return next week or labs replacements. QUEtiapine (SEROquel) 300 MG tablet 60 tablet 0 8/1/2023     Sig - Route: Take 2 tablets by mouth at bedtime. - Oral      No protocol for requested medication     Last office visit date: 4/11/23  [facility]       Order pended, routed to clinician for review.     FUV:9/19/23   Chonodrocutaneous Helical Advancement Flap Text: The defect edges were debeveled with a #15 scalpel blade.  Given the location of the defect and the proximity to free margins a chondrocutaneous helical advancement flap was deemed most appropriate.  Using a sterile surgical marker, the appropriate advancement flap was drawn incorporating the defect and placing the expected incisions within the relaxed skin tension lines where possible.    The area thus outlined was incised deep to adipose tissue with a #15 scalpel blade.  The skin margins were undermined to an appropriate distance in all directions utilizing iris scissors.
